# Patient Record
Sex: FEMALE | Race: WHITE
[De-identification: names, ages, dates, MRNs, and addresses within clinical notes are randomized per-mention and may not be internally consistent; named-entity substitution may affect disease eponyms.]

---
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
## 2021-01-05 ENCOUNTER — HOSPITAL ENCOUNTER (OUTPATIENT)
Dept: HOSPITAL 100 - WPOUT | Age: 21
Discharge: HOME | End: 2021-01-05
Payer: MEDICAID

## 2021-01-05 VITALS — DIASTOLIC BLOOD PRESSURE: 71 MMHG | HEART RATE: 93 BPM | SYSTOLIC BLOOD PRESSURE: 130 MMHG

## 2021-01-05 VITALS — DIASTOLIC BLOOD PRESSURE: 75 MMHG | SYSTOLIC BLOOD PRESSURE: 112 MMHG | TEMPERATURE: 98.78 F | HEART RATE: 78 BPM

## 2021-01-05 VITALS — TEMPERATURE: 98.42 F

## 2021-01-05 VITALS — BODY MASS INDEX: 24.2 KG/M2

## 2021-01-05 DIAGNOSIS — O60.00: Primary | ICD-10-CM

## 2021-01-05 DIAGNOSIS — Z3A.00: ICD-10-CM

## 2021-01-05 LAB
LEUKOCYTE ESTERASE UR QL STRIP: 500 /UL
MUCOUS THREADS URNS QL MICRO: (no result) /HPF
RBC UR QL: (no result) /HPF (ref 0–5)
SP GR UR: 1.01 (ref 1–1.03)
SQUAMOUS URNS QL MICRO: (no result) /HPF (ref 5–10)
URINE PRESERVATIVE: (no result)

## 2021-01-05 PROCEDURE — 81002 URINALYSIS NONAUTO W/O SCOPE: CPT

## 2021-01-05 PROCEDURE — 99218: CPT

## 2021-01-05 PROCEDURE — 59025 FETAL NON-STRESS TEST: CPT

## 2021-01-05 PROCEDURE — 87086 URINE CULTURE/COLONY COUNT: CPT

## 2021-01-05 PROCEDURE — 87088 URINE BACTERIA CULTURE: CPT

## 2021-01-05 PROCEDURE — G0378 HOSPITAL OBSERVATION PER HR: HCPCS

## 2021-01-05 PROCEDURE — 59050 FETAL MONITOR W/REPORT: CPT

## 2021-02-05 ENCOUNTER — HOSPITAL ENCOUNTER (OUTPATIENT)
Dept: HOSPITAL 100 - WPOUT | Age: 21
Discharge: HOME | End: 2021-02-05
Payer: MEDICAID

## 2021-02-05 VITALS — SYSTOLIC BLOOD PRESSURE: 97 MMHG | DIASTOLIC BLOOD PRESSURE: 59 MMHG | HEART RATE: 110 BPM

## 2021-02-05 VITALS — HEART RATE: 94 BPM | OXYGEN SATURATION: 94 %

## 2021-02-05 VITALS — OXYGEN SATURATION: 97 % | HEART RATE: 105 BPM

## 2021-02-05 VITALS — BODY MASS INDEX: 26.6 KG/M2 | BODY MASS INDEX: 27.2 KG/M2

## 2021-02-05 DIAGNOSIS — O60.00: Primary | ICD-10-CM

## 2021-02-05 DIAGNOSIS — Z3A.00: ICD-10-CM

## 2021-02-05 LAB — ROM INTERNAL CONTROL TEST: (no result)

## 2021-02-05 PROCEDURE — 84112 EVAL AMNIOTIC FLUID PROTEIN: CPT

## 2021-02-05 PROCEDURE — G0378 HOSPITAL OBSERVATION PER HR: HCPCS

## 2021-02-05 PROCEDURE — 59050 FETAL MONITOR W/REPORT: CPT

## 2021-02-05 PROCEDURE — 59025 FETAL NON-STRESS TEST: CPT

## 2021-02-05 PROCEDURE — 99218: CPT

## 2021-02-12 ENCOUNTER — HOSPITAL ENCOUNTER (OUTPATIENT)
Dept: HOSPITAL 100 - LABSPEC | Age: 21
Discharge: HOME | End: 2021-02-12
Payer: MEDICAID

## 2021-02-12 VITALS — BODY MASS INDEX: 27.6 KG/M2

## 2021-02-12 DIAGNOSIS — Z34.90: Primary | ICD-10-CM

## 2021-02-12 PROCEDURE — 87081 CULTURE SCREEN ONLY: CPT

## 2021-02-19 ENCOUNTER — HOSPITAL ENCOUNTER (OUTPATIENT)
Age: 21
Discharge: HOME | End: 2021-02-19
Payer: MEDICAID

## 2021-02-19 VITALS
TEMPERATURE: 98.24 F | DIASTOLIC BLOOD PRESSURE: 74 MMHG | HEART RATE: 91 BPM | SYSTOLIC BLOOD PRESSURE: 125 MMHG | OXYGEN SATURATION: 97 %

## 2021-02-19 VITALS — BODY MASS INDEX: 27.7 KG/M2 | BODY MASS INDEX: 27.6 KG/M2

## 2021-02-19 DIAGNOSIS — Z34.90: Primary | ICD-10-CM

## 2021-02-19 PROCEDURE — 59025 FETAL NON-STRESS TEST: CPT

## 2021-02-19 PROCEDURE — G0378 HOSPITAL OBSERVATION PER HR: HCPCS

## 2021-02-19 PROCEDURE — 99218: CPT

## 2021-02-19 PROCEDURE — 59050 FETAL MONITOR W/REPORT: CPT

## 2021-03-01 ENCOUNTER — HOSPITAL ENCOUNTER (INPATIENT)
Age: 21
LOS: 1 days | Discharge: HOME | DRG: 560 | End: 2021-03-02
Payer: MEDICAID

## 2021-03-01 VITALS — HEART RATE: 92 BPM | OXYGEN SATURATION: 99 %

## 2021-03-01 VITALS — TEMPERATURE: 97.88 F | OXYGEN SATURATION: 100 %

## 2021-03-01 VITALS — HEART RATE: 98 BPM | OXYGEN SATURATION: 97 %

## 2021-03-01 VITALS — HEART RATE: 88 BPM | OXYGEN SATURATION: 97 %

## 2021-03-01 VITALS — OXYGEN SATURATION: 100 % | HEART RATE: 118 BPM

## 2021-03-01 VITALS
DIASTOLIC BLOOD PRESSURE: 59 MMHG | SYSTOLIC BLOOD PRESSURE: 110 MMHG | OXYGEN SATURATION: 97 % | HEART RATE: 88 BPM | TEMPERATURE: 99.68 F

## 2021-03-01 VITALS — TEMPERATURE: 99.5 F | DIASTOLIC BLOOD PRESSURE: 71 MMHG | SYSTOLIC BLOOD PRESSURE: 102 MMHG | HEART RATE: 116 BPM

## 2021-03-01 VITALS — HEART RATE: 84 BPM | OXYGEN SATURATION: 97 %

## 2021-03-01 VITALS
DIASTOLIC BLOOD PRESSURE: 67 MMHG | OXYGEN SATURATION: 99 % | SYSTOLIC BLOOD PRESSURE: 115 MMHG | TEMPERATURE: 98.8 F | HEART RATE: 80 BPM

## 2021-03-01 VITALS — OXYGEN SATURATION: 97 % | HEART RATE: 94 BPM

## 2021-03-01 VITALS
TEMPERATURE: 97.8 F | SYSTOLIC BLOOD PRESSURE: 119 MMHG | HEART RATE: 104 BPM | RESPIRATION RATE: 16 BRPM | DIASTOLIC BLOOD PRESSURE: 77 MMHG

## 2021-03-01 VITALS — HEART RATE: 74 BPM | DIASTOLIC BLOOD PRESSURE: 88 MMHG | SYSTOLIC BLOOD PRESSURE: 131 MMHG

## 2021-03-01 VITALS — HEART RATE: 77 BPM | DIASTOLIC BLOOD PRESSURE: 71 MMHG | SYSTOLIC BLOOD PRESSURE: 115 MMHG | OXYGEN SATURATION: 100 %

## 2021-03-01 VITALS — OXYGEN SATURATION: 98 % | HEART RATE: 128 BPM

## 2021-03-01 VITALS
DIASTOLIC BLOOD PRESSURE: 85 MMHG | TEMPERATURE: 100.7 F | HEART RATE: 99 BPM | SYSTOLIC BLOOD PRESSURE: 131 MMHG | OXYGEN SATURATION: 99 %

## 2021-03-01 VITALS — SYSTOLIC BLOOD PRESSURE: 119 MMHG | TEMPERATURE: 98.24 F | DIASTOLIC BLOOD PRESSURE: 85 MMHG | HEART RATE: 81 BPM

## 2021-03-01 VITALS — HEART RATE: 94 BPM | OXYGEN SATURATION: 98 %

## 2021-03-01 VITALS
HEART RATE: 76 BPM | TEMPERATURE: 98.06 F | DIASTOLIC BLOOD PRESSURE: 68 MMHG | SYSTOLIC BLOOD PRESSURE: 105 MMHG | OXYGEN SATURATION: 100 %

## 2021-03-01 VITALS — HEART RATE: 99 BPM | OXYGEN SATURATION: 100 %

## 2021-03-01 VITALS — HEART RATE: 69 BPM | DIASTOLIC BLOOD PRESSURE: 84 MMHG | SYSTOLIC BLOOD PRESSURE: 121 MMHG

## 2021-03-01 VITALS
TEMPERATURE: 99.86 F | DIASTOLIC BLOOD PRESSURE: 76 MMHG | OXYGEN SATURATION: 97 % | SYSTOLIC BLOOD PRESSURE: 137 MMHG | HEART RATE: 101 BPM

## 2021-03-01 VITALS
HEART RATE: 96 BPM | OXYGEN SATURATION: 98 % | TEMPERATURE: 98.9 F | SYSTOLIC BLOOD PRESSURE: 116 MMHG | DIASTOLIC BLOOD PRESSURE: 68 MMHG

## 2021-03-01 VITALS — OXYGEN SATURATION: 98 % | HEART RATE: 135 BPM

## 2021-03-01 VITALS
HEART RATE: 82 BPM | OXYGEN SATURATION: 97 % | TEMPERATURE: 100.2 F | DIASTOLIC BLOOD PRESSURE: 70 MMHG | SYSTOLIC BLOOD PRESSURE: 122 MMHG

## 2021-03-01 VITALS — DIASTOLIC BLOOD PRESSURE: 86 MMHG | HEART RATE: 115 BPM | SYSTOLIC BLOOD PRESSURE: 131 MMHG | OXYGEN SATURATION: 99 %

## 2021-03-01 VITALS — HEART RATE: 117 BPM | OXYGEN SATURATION: 86 %

## 2021-03-01 VITALS — HEART RATE: 86 BPM | OXYGEN SATURATION: 99 % | DIASTOLIC BLOOD PRESSURE: 68 MMHG | SYSTOLIC BLOOD PRESSURE: 114 MMHG

## 2021-03-01 VITALS — OXYGEN SATURATION: 97 % | HEART RATE: 78 BPM

## 2021-03-01 VITALS — OXYGEN SATURATION: 100 % | HEART RATE: 81 BPM

## 2021-03-01 VITALS — DIASTOLIC BLOOD PRESSURE: 72 MMHG | HEART RATE: 86 BPM | SYSTOLIC BLOOD PRESSURE: 128 MMHG

## 2021-03-01 VITALS — HEART RATE: 103 BPM | OXYGEN SATURATION: 97 %

## 2021-03-01 VITALS — OXYGEN SATURATION: 100 % | HEART RATE: 86 BPM

## 2021-03-01 VITALS — HEART RATE: 84 BPM | SYSTOLIC BLOOD PRESSURE: 112 MMHG | DIASTOLIC BLOOD PRESSURE: 69 MMHG

## 2021-03-01 VITALS — HEART RATE: 114 BPM | OXYGEN SATURATION: 95 %

## 2021-03-01 VITALS — OXYGEN SATURATION: 97 % | HEART RATE: 83 BPM

## 2021-03-01 VITALS — SYSTOLIC BLOOD PRESSURE: 122 MMHG | HEART RATE: 76 BPM | DIASTOLIC BLOOD PRESSURE: 73 MMHG

## 2021-03-01 VITALS — HEART RATE: 89 BPM | DIASTOLIC BLOOD PRESSURE: 61 MMHG | SYSTOLIC BLOOD PRESSURE: 112 MMHG

## 2021-03-01 VITALS — HEART RATE: 88 BPM | OXYGEN SATURATION: 100 %

## 2021-03-01 VITALS — HEART RATE: 100 BPM | OXYGEN SATURATION: 98 %

## 2021-03-01 VITALS — HEART RATE: 85 BPM | OXYGEN SATURATION: 100 %

## 2021-03-01 VITALS
SYSTOLIC BLOOD PRESSURE: 116 MMHG | DIASTOLIC BLOOD PRESSURE: 73 MMHG | HEART RATE: 84 BPM | OXYGEN SATURATION: 100 % | TEMPERATURE: 98.96 F

## 2021-03-01 VITALS
HEART RATE: 71 BPM | TEMPERATURE: 97.8 F | SYSTOLIC BLOOD PRESSURE: 122 MMHG | DIASTOLIC BLOOD PRESSURE: 68 MMHG | OXYGEN SATURATION: 98 %

## 2021-03-01 VITALS — OXYGEN SATURATION: 97 % | HEART RATE: 78 BPM | SYSTOLIC BLOOD PRESSURE: 130 MMHG | DIASTOLIC BLOOD PRESSURE: 80 MMHG

## 2021-03-01 VITALS — HEART RATE: 122 BPM | OXYGEN SATURATION: 94 %

## 2021-03-01 VITALS — HEART RATE: 84 BPM | DIASTOLIC BLOOD PRESSURE: 65 MMHG | SYSTOLIC BLOOD PRESSURE: 109 MMHG

## 2021-03-01 VITALS — OXYGEN SATURATION: 97 % | HEART RATE: 111 BPM

## 2021-03-01 VITALS — HEART RATE: 114 BPM | OXYGEN SATURATION: 100 %

## 2021-03-01 VITALS — HEART RATE: 118 BPM | SYSTOLIC BLOOD PRESSURE: 118 MMHG | DIASTOLIC BLOOD PRESSURE: 62 MMHG

## 2021-03-01 VITALS
HEART RATE: 75 BPM | SYSTOLIC BLOOD PRESSURE: 122 MMHG | TEMPERATURE: 98.7 F | DIASTOLIC BLOOD PRESSURE: 62 MMHG | OXYGEN SATURATION: 98 %

## 2021-03-01 VITALS — HEART RATE: 101 BPM | OXYGEN SATURATION: 100 %

## 2021-03-01 VITALS — HEART RATE: 94 BPM | OXYGEN SATURATION: 97 %

## 2021-03-01 VITALS — TEMPERATURE: 98.06 F

## 2021-03-01 VITALS — OXYGEN SATURATION: 98 % | TEMPERATURE: 98.3 F

## 2021-03-01 VITALS — OXYGEN SATURATION: 99 % | HEART RATE: 114 BPM

## 2021-03-01 VITALS — SYSTOLIC BLOOD PRESSURE: 109 MMHG | DIASTOLIC BLOOD PRESSURE: 59 MMHG | HEART RATE: 96 BPM

## 2021-03-01 VITALS — TEMPERATURE: 100.76 F

## 2021-03-01 VITALS — BODY MASS INDEX: 27.8 KG/M2 | BODY MASS INDEX: 27.1 KG/M2

## 2021-03-01 VITALS — HEART RATE: 93 BPM | OXYGEN SATURATION: 99 %

## 2021-03-01 VITALS — OXYGEN SATURATION: 100 % | HEART RATE: 91 BPM

## 2021-03-01 VITALS — HEART RATE: 92 BPM | SYSTOLIC BLOOD PRESSURE: 142 MMHG | DIASTOLIC BLOOD PRESSURE: 98 MMHG

## 2021-03-01 VITALS — HEART RATE: 108 BPM | OXYGEN SATURATION: 96 %

## 2021-03-01 DIAGNOSIS — Z3A.39: ICD-10-CM

## 2021-03-01 DIAGNOSIS — O36.8130: Primary | ICD-10-CM

## 2021-03-01 LAB
AMPHET UR-MCNC: NEGATIVE NG/ML
BARBITURATE URINE VISTA: NEGATIVE
BENZODIAZEPINE URINE VISTA: NEGATIVE
COCAINE URINE VISTA: NEGATIVE
DEPRECATED RDW RBC: 43.7 FL (ref 35.1–43.9)
DRUG CONFIRMATION TO FOLLOW?: (no result)
ECSTACY URINE VISTA: NEGATIVE
ERYTHROCYTE [DISTWIDTH] IN BLOOD: 14.5 % (ref 11.6–14.6)
HCT VFR BLD AUTO: 33.4 % (ref 37–47)
HEMOGLOBIN: 10.1 G/DL (ref 12–15)
HGB BLD-MCNC: 10.1 G/DL (ref 12–15)
IMMATURE GRANULOCYTES COUNT: 0.16 X10^3/UL (ref 0–0)
MCV RBC: 83.1 FL (ref 81–99)
MEAN CORP HGB CONC: 30.2 G/DL (ref 32–36)
MEAN PLATELET VOL.: 13.3 FL (ref 6.2–12)
METHADONE URINE VISTA: NEGATIVE
NRBC FLAGGED BY ANALYZER: 0 % (ref 0–5)
PCP UR QL: NEGATIVE
PH UR: 6 [PH]
PLATELET # BLD: 206 K/MM3 (ref 150–450)
PLATELET COUNT: 206 K/MM3 (ref 150–450)
RBC # BLD AUTO: 4.02 M/MM3 (ref 4.2–5.4)
RBC DISTRIBUTION WIDTH CV: 14.5 % (ref 11.6–14.6)
RBC DISTRIBUTION WIDTH SD: 43.7 FL (ref 35.1–43.9)
THC URINE VISTA: NEGATIVE
WBC # BLD AUTO: 9.9 K/MM3 (ref 4.4–11)
WHITE BLOOD COUNT: 9.9 K/MM3 (ref 4.4–11)

## 2021-03-01 PROCEDURE — 86850 RBC ANTIBODY SCREEN: CPT

## 2021-03-01 PROCEDURE — 80307 DRUG TEST PRSMV CHEM ANLYZR: CPT

## 2021-03-01 PROCEDURE — 87426 SARSCOV CORONAVIRUS AG IA: CPT

## 2021-03-01 PROCEDURE — A4216 STERILE WATER/SALINE, 10 ML: HCPCS

## 2021-03-01 PROCEDURE — 86900 BLOOD TYPING SEROLOGIC ABO: CPT

## 2021-03-01 PROCEDURE — 99218: CPT

## 2021-03-01 PROCEDURE — 85025 COMPLETE CBC W/AUTO DIFF WBC: CPT

## 2021-03-01 PROCEDURE — 59050 FETAL MONITOR W/REPORT: CPT

## 2021-03-01 PROCEDURE — G0378 HOSPITAL OBSERVATION PER HR: HCPCS

## 2021-03-01 PROCEDURE — 86901 BLOOD TYPING SEROLOGIC RH(D): CPT

## 2021-03-01 PROCEDURE — 59025 FETAL NON-STRESS TEST: CPT

## 2021-03-02 VITALS
DIASTOLIC BLOOD PRESSURE: 78 MMHG | HEART RATE: 64 BPM | SYSTOLIC BLOOD PRESSURE: 124 MMHG | RESPIRATION RATE: 16 BRPM | TEMPERATURE: 97.2 F

## 2021-03-02 VITALS
SYSTOLIC BLOOD PRESSURE: 117 MMHG | TEMPERATURE: 97.88 F | DIASTOLIC BLOOD PRESSURE: 82 MMHG | HEART RATE: 91 BPM | RESPIRATION RATE: 16 BRPM

## 2021-03-02 VITALS
TEMPERATURE: 98.2 F | HEART RATE: 76 BPM | RESPIRATION RATE: 20 BRPM | SYSTOLIC BLOOD PRESSURE: 120 MMHG | DIASTOLIC BLOOD PRESSURE: 82 MMHG

## 2021-03-02 VITALS
DIASTOLIC BLOOD PRESSURE: 84 MMHG | SYSTOLIC BLOOD PRESSURE: 116 MMHG | TEMPERATURE: 97.16 F | HEART RATE: 78 BPM | RESPIRATION RATE: 16 BRPM

## 2024-02-09 ENCOUNTER — HOSPITAL ENCOUNTER (EMERGENCY)
Facility: HOSPITAL | Age: 24
Discharge: HOME | End: 2024-02-09
Attending: EMERGENCY MEDICINE
Payer: MEDICAID

## 2024-02-09 ENCOUNTER — APPOINTMENT (OUTPATIENT)
Dept: CARDIOLOGY | Facility: HOSPITAL | Age: 24
End: 2024-02-09
Payer: MEDICAID

## 2024-02-09 VITALS
SYSTOLIC BLOOD PRESSURE: 121 MMHG | WEIGHT: 113 LBS | OXYGEN SATURATION: 98 % | RESPIRATION RATE: 16 BRPM | DIASTOLIC BLOOD PRESSURE: 105 MMHG | TEMPERATURE: 97.5 F | HEART RATE: 81 BPM | BODY MASS INDEX: 22.82 KG/M2

## 2024-02-09 DIAGNOSIS — R11.2 NAUSEA AND VOMITING, UNSPECIFIED VOMITING TYPE: Primary | ICD-10-CM

## 2024-02-09 LAB
ALBUMIN SERPL BCP-MCNC: 5 G/DL (ref 3.4–5)
ALP SERPL-CCNC: 49 U/L (ref 33–110)
ALT SERPL W P-5'-P-CCNC: 11 U/L (ref 7–45)
ANION GAP SERPL CALC-SCNC: 23 MMOL/L (ref 10–20)
APPEARANCE UR: ABNORMAL
AST SERPL W P-5'-P-CCNC: 15 U/L (ref 9–39)
BACTERIA #/AREA URNS AUTO: ABNORMAL /HPF
BASOPHILS # BLD AUTO: 0.13 X10*3/UL (ref 0–0.1)
BASOPHILS NFR BLD AUTO: 1.1 %
BILIRUB SERPL-MCNC: 1.3 MG/DL (ref 0–1.2)
BILIRUB UR STRIP.AUTO-MCNC: NEGATIVE MG/DL
BUN SERPL-MCNC: 13 MG/DL (ref 6–23)
CALCIUM SERPL-MCNC: 10.1 MG/DL (ref 8.6–10.3)
CHLORIDE SERPL-SCNC: 102 MMOL/L (ref 98–107)
CO2 SERPL-SCNC: 18 MMOL/L (ref 21–32)
COLOR UR: YELLOW
CREAT SERPL-MCNC: 0.79 MG/DL (ref 0.5–1.05)
EGFRCR SERPLBLD CKD-EPI 2021: >90 ML/MIN/1.73M*2
EOSINOPHIL # BLD AUTO: 0.05 X10*3/UL (ref 0–0.7)
EOSINOPHIL NFR BLD AUTO: 0.4 %
ERYTHROCYTE [DISTWIDTH] IN BLOOD BY AUTOMATED COUNT: 12.3 % (ref 11.5–14.5)
FLUAV RNA RESP QL NAA+PROBE: NOT DETECTED
FLUBV RNA RESP QL NAA+PROBE: NOT DETECTED
GLUCOSE BLD MANUAL STRIP-MCNC: 129 MG/DL (ref 74–99)
GLUCOSE SERPL-MCNC: 123 MG/DL (ref 74–99)
GLUCOSE UR STRIP.AUTO-MCNC: NEGATIVE MG/DL
HCG UR QL IA.RAPID: NEGATIVE
HCT VFR BLD AUTO: 41.1 % (ref 36–46)
HGB BLD-MCNC: 14 G/DL (ref 12–16)
IMM GRANULOCYTES # BLD AUTO: 0.03 X10*3/UL (ref 0–0.7)
IMM GRANULOCYTES NFR BLD AUTO: 0.3 % (ref 0–0.9)
KETONES UR STRIP.AUTO-MCNC: ABNORMAL MG/DL
LEUKOCYTE ESTERASE UR QL STRIP.AUTO: ABNORMAL
LYMPHOCYTES # BLD AUTO: 1.82 X10*3/UL (ref 1.2–4.8)
LYMPHOCYTES NFR BLD AUTO: 15.9 %
MCH RBC QN AUTO: 29.6 PG (ref 26–34)
MCHC RBC AUTO-ENTMCNC: 34.1 G/DL (ref 32–36)
MCV RBC AUTO: 87 FL (ref 80–100)
MONOCYTES # BLD AUTO: 0.8 X10*3/UL (ref 0.1–1)
MONOCYTES NFR BLD AUTO: 7 %
MUCOUS THREADS #/AREA URNS AUTO: ABNORMAL /LPF
NEUTROPHILS # BLD AUTO: 8.59 X10*3/UL (ref 1.2–7.7)
NEUTROPHILS NFR BLD AUTO: 75.3 %
NITRITE UR QL STRIP.AUTO: NEGATIVE
NRBC BLD-RTO: 0 /100 WBCS (ref 0–0)
PH UR STRIP.AUTO: 5 [PH]
PLATELET # BLD AUTO: 347 X10*3/UL (ref 150–450)
POTASSIUM SERPL-SCNC: 3.7 MMOL/L (ref 3.5–5.3)
PROT SERPL-MCNC: 8.1 G/DL (ref 6.4–8.2)
PROT UR STRIP.AUTO-MCNC: ABNORMAL MG/DL
RBC # BLD AUTO: 4.73 X10*6/UL (ref 4–5.2)
RBC # UR STRIP.AUTO: NEGATIVE /UL
RBC #/AREA URNS AUTO: ABNORMAL /HPF
SARS-COV-2 RNA RESP QL NAA+PROBE: NOT DETECTED
SODIUM SERPL-SCNC: 139 MMOL/L (ref 136–145)
SP GR UR STRIP.AUTO: 1.03
SQUAMOUS #/AREA URNS AUTO: ABNORMAL /HPF
UROBILINOGEN UR STRIP.AUTO-MCNC: <2 MG/DL
WBC # BLD AUTO: 11.4 X10*3/UL (ref 4.4–11.3)
WBC #/AREA URNS AUTO: ABNORMAL /HPF

## 2024-02-09 PROCEDURE — 87086 URINE CULTURE/COLONY COUNT: CPT | Mod: SAMLAB | Performed by: EMERGENCY MEDICINE

## 2024-02-09 PROCEDURE — 81001 URINALYSIS AUTO W/SCOPE: CPT | Performed by: EMERGENCY MEDICINE

## 2024-02-09 PROCEDURE — 96361 HYDRATE IV INFUSION ADD-ON: CPT

## 2024-02-09 PROCEDURE — 36415 COLL VENOUS BLD VENIPUNCTURE: CPT | Performed by: EMERGENCY MEDICINE

## 2024-02-09 PROCEDURE — 82947 ASSAY GLUCOSE BLOOD QUANT: CPT | Mod: 59

## 2024-02-09 PROCEDURE — 85025 COMPLETE CBC W/AUTO DIFF WBC: CPT | Performed by: EMERGENCY MEDICINE

## 2024-02-09 PROCEDURE — 93005 ELECTROCARDIOGRAM TRACING: CPT

## 2024-02-09 PROCEDURE — 81025 URINE PREGNANCY TEST: CPT | Performed by: EMERGENCY MEDICINE

## 2024-02-09 PROCEDURE — 2500000004 HC RX 250 GENERAL PHARMACY W/ HCPCS (ALT 636 FOR OP/ED): Performed by: EMERGENCY MEDICINE

## 2024-02-09 PROCEDURE — 96365 THER/PROPH/DIAG IV INF INIT: CPT

## 2024-02-09 PROCEDURE — 96375 TX/PRO/DX INJ NEW DRUG ADDON: CPT

## 2024-02-09 PROCEDURE — 84075 ASSAY ALKALINE PHOSPHATASE: CPT | Performed by: EMERGENCY MEDICINE

## 2024-02-09 PROCEDURE — 87502 INFLUENZA DNA AMP PROBE: CPT | Performed by: EMERGENCY MEDICINE

## 2024-02-09 PROCEDURE — 99284 EMERGENCY DEPT VISIT MOD MDM: CPT | Mod: 25 | Performed by: EMERGENCY MEDICINE

## 2024-02-09 RX ORDER — MORPHINE SULFATE 4 MG/ML
4 INJECTION, SOLUTION INTRAMUSCULAR; INTRAVENOUS ONCE
Status: COMPLETED | OUTPATIENT
Start: 2024-02-09 | End: 2024-02-09

## 2024-02-09 RX ORDER — ONDANSETRON 4 MG/1
4 TABLET, ORALLY DISINTEGRATING ORAL EVERY 8 HOURS PRN
Qty: 30 TABLET | Refills: 0 | Status: SHIPPED | OUTPATIENT
Start: 2024-02-09

## 2024-02-09 RX ORDER — ONDANSETRON HYDROCHLORIDE 2 MG/ML
4 INJECTION, SOLUTION INTRAVENOUS ONCE
Status: COMPLETED | OUTPATIENT
Start: 2024-02-09 | End: 2024-02-09

## 2024-02-09 RX ORDER — HALOPERIDOL 5 MG/ML
2 INJECTION INTRAMUSCULAR ONCE
Status: COMPLETED | OUTPATIENT
Start: 2024-02-09 | End: 2024-02-09

## 2024-02-09 RX ADMIN — ONDANSETRON 4 MG: 2 INJECTION INTRAMUSCULAR; INTRAVENOUS at 14:59

## 2024-02-09 RX ADMIN — MORPHINE SULFATE 4 MG: 4 INJECTION, SOLUTION INTRAMUSCULAR; INTRAVENOUS at 15:06

## 2024-02-09 RX ADMIN — HALOPERIDOL LACTATE 2 MG: 5 INJECTION, SOLUTION INTRAMUSCULAR at 17:27

## 2024-02-09 RX ADMIN — SODIUM CHLORIDE 1000 ML: 9 INJECTION, SOLUTION INTRAVENOUS at 14:59

## 2024-02-09 RX ADMIN — PROMETHAZINE HYDROCHLORIDE 12.5 MG: 25 INJECTION INTRAMUSCULAR; INTRAVENOUS at 15:35

## 2024-02-09 ASSESSMENT — PAIN DESCRIPTION - LOCATION
LOCATION: ABDOMEN
LOCATION: ABDOMEN

## 2024-02-09 ASSESSMENT — PAIN - FUNCTIONAL ASSESSMENT
PAIN_FUNCTIONAL_ASSESSMENT: 0-10
PAIN_FUNCTIONAL_ASSESSMENT: 0-10

## 2024-02-09 ASSESSMENT — COLUMBIA-SUICIDE SEVERITY RATING SCALE - C-SSRS
2. HAVE YOU ACTUALLY HAD ANY THOUGHTS OF KILLING YOURSELF?: NO
2. HAVE YOU ACTUALLY HAD ANY THOUGHTS OF KILLING YOURSELF?: NO
1. IN THE PAST MONTH, HAVE YOU WISHED YOU WERE DEAD OR WISHED YOU COULD GO TO SLEEP AND NOT WAKE UP?: NO
1. IN THE PAST MONTH, HAVE YOU WISHED YOU WERE DEAD OR WISHED YOU COULD GO TO SLEEP AND NOT WAKE UP?: NO
6. HAVE YOU EVER DONE ANYTHING, STARTED TO DO ANYTHING, OR PREPARED TO DO ANYTHING TO END YOUR LIFE?: NO

## 2024-02-09 ASSESSMENT — PAIN SCALES - GENERAL
PAINLEVEL_OUTOF10: 7
PAINLEVEL_OUTOF10: 4

## 2024-02-09 ASSESSMENT — PAIN DESCRIPTION - PAIN TYPE: TYPE: ACUTE PAIN

## 2024-02-09 NOTE — ED PROVIDER NOTES
HPI   Chief Complaint   Patient presents with    Abdominal Pain     Pt to ED with vomiting started yesterday, c/o severe abd pain, dry heaving. Pt unsure if she had a seizure at home, pt's fiance was concerned she may have had one. Pt does not have history. Upon arrival pt was being carried by family, pt drowsy, clammy and pale. Pt able to answer questions.        Limitations to History: None    HPI: 23-year-old female presents with concern for abdominal pain and nausea as well as vomiting.  Present since yesterday morning.  Diffuse abdominal pain.  Aching in nature.  States that she also has lower extremity cramping.  Denies any fever, chills, urinary symptoms, vaginal bleeding or discharge.    ------------------------------------------------------------------------------------------------------------------------------------------  Physical Exam:    VS: As documented in the triage note and EMR flowsheet from this visit were reviewed.    Appearance: Alert. cooperative,  in no acute distress.   Skin: Intact,  dry skin, no lesions, rash, petechiae or purpura.   Eyes: PERRLA, EOMs intact,  Conjunctiva pink with no redness or exudates.   HENT: Normocephalic, atraumatic. Nares patent. No intraoral lesions.   Neck: Supple, without meningismus. Trachea at midline. No lymphadenopathy.  Pulmonary: Clear bilaterally with good chest wall excursion. No rales, rhonchi or wheezing. No accessory muscle use or stridor.  Cardiac: Tachycardic and regular rhythm, no rubs, murmurs, or gallops. No JVD, Carotids without bruits.  Abdomen: Abdomen is soft.  Diffuse abdominal tenderness.  Nondistended.  No palpable organomegaly.  No rebound or guarding.  No CVA tenderness. Nonsurgical abdomen  Genitourinary: Exam deferred.  Musculoskeletal: Full range of motion.  Pulses full and equal. No cyanosis, clubbing, or edema.  Psychiatric: Appropriate mood and affect.                            [place] Coma Scale Score: 15                      Patient History   Past Medical History:   Diagnosis Date    Supervision of other high risk pregnancies, first trimester 09/22/2020    High risk teen pregnancy in first trimester     Past Surgical History:   Procedure Laterality Date    APPENDECTOMY      TONSILLECTOMY       No family history on file.  Social History     Tobacco Use    Smoking status: Not on file    Smokeless tobacco: Not on file   Substance Use Topics    Alcohol use: Not on file    Drug use: Not on file       Physical Exam   ED Triage Vitals [02/09/24 1448]   Temperature Heart Rate Respirations BP   36.4 °C (97.5 °F) (!) 138 (!) 22 (!) 148/132      Pulse Ox Temp Source Heart Rate Source Patient Position   97 % Temporal -- Lying      BP Location FiO2 (%)     Right arm --       Physical Exam    ED Course & MDM   Diagnoses as of 02/09/24 1721   Nausea and vomiting, unspecified vomiting type       Medical Decision Making  Labs Reviewed  CBC WITH AUTO DIFFERENTIAL - Abnormal     WBC                           11.4 (*)               nRBC                          0.0                    RBC                           4.73                   Hemoglobin                    14.0                   Hematocrit                    41.1                   MCV                           87                     MCH                           29.6                   MCHC                          34.1                   RDW                           12.3                   Platelets                     347                    Neutrophils %                 75.3                   Immature Granulocytes %, Automated   0.3                    Lymphocytes %                 15.9                   Monocytes %                   7.0                    Eosinophils %                 0.4                    Basophils %                   1.1                    Neutrophils Absolute          8.59 (*)               Immature Granulocytes Absolute, Au*   0.03                   Lymphocytes Absolute           1.82                   Monocytes Absolute            0.80                   Eosinophils Absolute          0.05                   Basophils Absolute            0.13 (*)            COMPREHENSIVE METABOLIC PANEL - Abnormal     Glucose                       123 (*)                Sodium                        139                    Potassium                     3.7                    Chloride                      102                    Bicarbonate                   18 (*)                 Anion Gap                     23 (*)                 Urea Nitrogen                 13                     Creatinine                    0.79                   eGFR                          >90                    Calcium                       10.1                   Albumin                       5.0                    Alkaline Phosphatase          49                     Total Protein                 8.1                    AST                           15                     Bilirubin, Total              1.3 (*)                ALT                           11                  URINALYSIS WITH REFLEX CULTURE AND MICROSCOPIC - Abnormal     Color, Urine                  Yellow                 Appearance, Urine             Hazy (*)               Specific Gravity, Urine       1.030                  pH, Urine                     5.0                    Protein, Urine                100 (2+) (*)               Glucose, Urine                NEGATIVE                Blood, Urine                  NEGATIVE                Ketones, Urine                80 (2+) (*)               Bilirubin, Urine              NEGATIVE                Urobilinogen, Urine           <2.0                   Nitrite, Urine                NEGATIVE                Leukocyte Esterase, Urine     TRACE (*)            MICROSCOPIC ONLY, URINE - Abnormal     WBC, Urine                    1-5                    RBC, Urine                    1-2                    Squamous Epithelial  Cells, Urine                          Bacteria, Urine               1+ (*)                 Mucus, Urine                  2+                  POCT GLUCOSE - Abnormal     POCT Glucose                  129 (*)             HCG, URINE, QUALITATIVE - Normal     HCG, Urine                    NEGATIVE             SARS-COV-2 PCR - Normal     Coronavirus 2019, PCR                                    Narrative: This assay has received FDA Emergency Use Authorization (EUA) and is only authorized for the duration of time that circumstances exist to justify the authorization of the emergency use of in vitro diagnostic tests for the detection of SARS-CoV-2 virus and/or diagnosis of COVID-19 infection under section 564(b)(1) of the Act, 21 U.S.C. 360bbb-3(b)(1). This assay is an in vitro diagnostic nucleic acid amplification test for the qualitative detection of SARS-CoV-2 from nasopharyngeal specimens and has been validated for use at [facility]. Negative results do not preclude COVID-19 infections and should not be used as the sole basis for diagnosis, treatment, or other management decisions.                    INFLUENZA A AND B PCR - Normal     Flu A Result                                         Flu B Result                                             Narrative: This assay is an in vitro diagnostic multiplex nucleic acid amplification test for the detection and discrimination of Influenza A & B from nasopharyngeal specimens, and has been validated for use at [facility]. Negative results do not preclude Influenza A/B infections, and should not be used as the sole basis for diagnosis, treatment, or other management decisions. If Influenza A/B and RSV PCR results are negative, testing for Parainfluenza virus, Adenovirus and Metapneumovirus is routinely performed for [facility] pediatric oncology and intensive care inpatients, and is available on other patients by placing an add-on  request.  URINE CULTURE  URINALYSIS WITH REFLEX CULTURE AND MICROSCOPIC         Narrative: The following orders were created for panel order Urinalysis with Reflex Culture and Microscopic.                  Procedure                               Abnormality         Status                                     ---------                               -----------         ------                                     Urinalysis with Reflex C...[[id number]]  Abnormal            Final result                               Extra Urine Gray Tube[[id number]]                            In process                                                   Please view results for these tests on the individual orders.  EXTRA URINE GRAY TUBE  Medical Decision Making:    Patient actively vomiting upon arrival.  No recent surgery.  No recent antibiotics.  Tachycardic and hypertensive.  Patient initially treated with 1 L of normal saline, morphine, Zofran.  Pain resolved but continued to have nausea and vomiting.  Treated with intravenous Phenergan as well as 1 L of normal saline.  Lab work otherwise unremarkable.  Urine shows no evidence of infection or pregnancy.  Patient feeling much improved on reevaluation.  Will be treated with ODT Zofran and advised on continued oral hydration.  Patient did have some mild retching following and was given a dose of Haldol and feels much improved.  Stable at time of discharge.    Differential Diagnoses Considered: Gastroenteritis, nausea and vomiting, pregnancy, electrolyte abnormality, volume depletion    Escalation of Care:  Appropriate for discharge and follow-up with primary care.    Prescription Drug Consideration: Oral Zofran.    Diagnostic testing considered: [PERC, D-Dimer, PECARN, etc.]              Procedure  ECG 12 lead    Performed by: Te Snowden DO  Authorized by: Te Snowden DO    ECG interpreted by ED Physician in the absence of a cardiologist: yes    Comments:      EKG  interpreted by Dr. Te Martinez: Sinus tachycardia at a rate of 136 bpm.  AK interval 118 ms.  QTc of 469 ms.  No evidence of ST elevation or depression at this time.       Te Martinez,   02/09/24 5626

## 2024-02-10 LAB — HOLD SPECIMEN: NORMAL

## 2024-02-11 ENCOUNTER — HOSPITAL ENCOUNTER (EMERGENCY)
Facility: HOSPITAL | Age: 24
Discharge: HOME | End: 2024-02-11
Attending: EMERGENCY MEDICINE
Payer: MEDICAID

## 2024-02-11 VITALS
WEIGHT: 113 LBS | HEIGHT: 59 IN | SYSTOLIC BLOOD PRESSURE: 109 MMHG | OXYGEN SATURATION: 97 % | RESPIRATION RATE: 18 BRPM | HEART RATE: 100 BPM | TEMPERATURE: 99 F | BODY MASS INDEX: 22.78 KG/M2 | DIASTOLIC BLOOD PRESSURE: 73 MMHG

## 2024-02-11 DIAGNOSIS — R11.2 NAUSEA AND VOMITING, UNSPECIFIED VOMITING TYPE: ICD-10-CM

## 2024-02-11 DIAGNOSIS — R10.9 ABDOMINAL PAIN, UNSPECIFIED ABDOMINAL LOCATION: Primary | ICD-10-CM

## 2024-02-11 LAB
ALBUMIN SERPL BCP-MCNC: 4.6 G/DL (ref 3.4–5)
ALP SERPL-CCNC: 38 U/L (ref 33–110)
ALT SERPL W P-5'-P-CCNC: 10 U/L (ref 7–45)
ANION GAP SERPL CALC-SCNC: 18 MMOL/L (ref 10–20)
AST SERPL W P-5'-P-CCNC: 13 U/L (ref 9–39)
BACTERIA UR CULT: NORMAL
BASOPHILS # BLD AUTO: 0.1 X10*3/UL (ref 0–0.1)
BASOPHILS NFR BLD AUTO: 1.2 %
BILIRUB SERPL-MCNC: 0.8 MG/DL (ref 0–1.2)
BUN SERPL-MCNC: 7 MG/DL (ref 6–23)
CALCIUM SERPL-MCNC: 9 MG/DL (ref 8.6–10.3)
CHLORIDE SERPL-SCNC: 102 MMOL/L (ref 98–107)
CO2 SERPL-SCNC: 19 MMOL/L (ref 21–32)
CREAT SERPL-MCNC: 0.63 MG/DL (ref 0.5–1.05)
EGFRCR SERPLBLD CKD-EPI 2021: >90 ML/MIN/1.73M*2
EOSINOPHIL # BLD AUTO: 0.03 X10*3/UL (ref 0–0.7)
EOSINOPHIL NFR BLD AUTO: 0.3 %
ERYTHROCYTE [DISTWIDTH] IN BLOOD BY AUTOMATED COUNT: 12.4 % (ref 11.5–14.5)
GLUCOSE SERPL-MCNC: 85 MG/DL (ref 74–99)
HCT VFR BLD AUTO: 39 % (ref 36–46)
HGB BLD-MCNC: 13.1 G/DL (ref 12–16)
IMM GRANULOCYTES # BLD AUTO: 0.02 X10*3/UL (ref 0–0.7)
IMM GRANULOCYTES NFR BLD AUTO: 0.2 % (ref 0–0.9)
LIPASE SERPL-CCNC: 15 U/L (ref 9–82)
LYMPHOCYTES # BLD AUTO: 1.25 X10*3/UL (ref 1.2–4.8)
LYMPHOCYTES NFR BLD AUTO: 14.5 %
MCH RBC QN AUTO: 29.8 PG (ref 26–34)
MCHC RBC AUTO-ENTMCNC: 33.6 G/DL (ref 32–36)
MCV RBC AUTO: 89 FL (ref 80–100)
MONOCYTES # BLD AUTO: 0.64 X10*3/UL (ref 0.1–1)
MONOCYTES NFR BLD AUTO: 7.4 %
NEUTROPHILS # BLD AUTO: 6.56 X10*3/UL (ref 1.2–7.7)
NEUTROPHILS NFR BLD AUTO: 76.4 %
NRBC BLD-RTO: 0 /100 WBCS (ref 0–0)
PLATELET # BLD AUTO: 278 X10*3/UL (ref 150–450)
POTASSIUM SERPL-SCNC: 3.5 MMOL/L (ref 3.5–5.3)
PROT SERPL-MCNC: 6.9 G/DL (ref 6.4–8.2)
RBC # BLD AUTO: 4.39 X10*6/UL (ref 4–5.2)
SODIUM SERPL-SCNC: 135 MMOL/L (ref 136–145)
WBC # BLD AUTO: 8.6 X10*3/UL (ref 4.4–11.3)

## 2024-02-11 PROCEDURE — 83690 ASSAY OF LIPASE: CPT | Performed by: EMERGENCY MEDICINE

## 2024-02-11 PROCEDURE — 36415 COLL VENOUS BLD VENIPUNCTURE: CPT | Performed by: EMERGENCY MEDICINE

## 2024-02-11 PROCEDURE — 96372 THER/PROPH/DIAG INJ SC/IM: CPT

## 2024-02-11 PROCEDURE — 85025 COMPLETE CBC W/AUTO DIFF WBC: CPT | Performed by: EMERGENCY MEDICINE

## 2024-02-11 PROCEDURE — 96375 TX/PRO/DX INJ NEW DRUG ADDON: CPT

## 2024-02-11 PROCEDURE — 2500000004 HC RX 250 GENERAL PHARMACY W/ HCPCS (ALT 636 FOR OP/ED): Performed by: EMERGENCY MEDICINE

## 2024-02-11 PROCEDURE — 96361 HYDRATE IV INFUSION ADD-ON: CPT

## 2024-02-11 PROCEDURE — 96374 THER/PROPH/DIAG INJ IV PUSH: CPT

## 2024-02-11 PROCEDURE — 99284 EMERGENCY DEPT VISIT MOD MDM: CPT | Mod: 25 | Performed by: EMERGENCY MEDICINE

## 2024-02-11 PROCEDURE — 80053 COMPREHEN METABOLIC PANEL: CPT | Performed by: EMERGENCY MEDICINE

## 2024-02-11 RX ORDER — DICYCLOMINE HYDROCHLORIDE 10 MG/ML
20 INJECTION INTRAMUSCULAR ONCE
Status: COMPLETED | OUTPATIENT
Start: 2024-02-11 | End: 2024-02-11

## 2024-02-11 RX ORDER — DIPHENHYDRAMINE HYDROCHLORIDE 50 MG/ML
25 INJECTION INTRAMUSCULAR; INTRAVENOUS ONCE
Status: COMPLETED | OUTPATIENT
Start: 2024-02-11 | End: 2024-02-11

## 2024-02-11 RX ORDER — CAPSAICIN 0 G/G
CREAM TOPICAL ONCE
Status: DISCONTINUED | OUTPATIENT
Start: 2024-02-11 | End: 2024-02-11

## 2024-02-11 RX ORDER — HALOPERIDOL 5 MG/ML
2 INJECTION INTRAMUSCULAR ONCE
Status: COMPLETED | OUTPATIENT
Start: 2024-02-11 | End: 2024-02-11

## 2024-02-11 RX ORDER — DICYCLOMINE HYDROCHLORIDE 20 MG/1
20 TABLET ORAL
Qty: 120 TABLET | Refills: 0 | Status: SHIPPED | OUTPATIENT
Start: 2024-02-11 | End: 2025-02-10

## 2024-02-11 RX ORDER — HYOSCYAMINE SULFATE 0.125 MG
0.12 TABLET ORAL EVERY 4 HOURS PRN
Qty: 28 TABLET | Refills: 0 | Status: SHIPPED | OUTPATIENT
Start: 2024-02-11 | End: 2024-02-18

## 2024-02-11 RX ORDER — METOCLOPRAMIDE HYDROCHLORIDE 5 MG/ML
10 INJECTION INTRAMUSCULAR; INTRAVENOUS ONCE
Status: COMPLETED | OUTPATIENT
Start: 2024-02-11 | End: 2024-02-11

## 2024-02-11 RX ADMIN — DICYCLOMINE HYDROCHLORIDE 20 MG: 10 INJECTION INTRAMUSCULAR at 10:15

## 2024-02-11 RX ADMIN — SODIUM CHLORIDE 2000 ML: 9 INJECTION, SOLUTION INTRAVENOUS at 08:59

## 2024-02-11 RX ADMIN — DIPHENHYDRAMINE HYDROCHLORIDE 25 MG: 50 INJECTION, SOLUTION INTRAMUSCULAR; INTRAVENOUS at 10:15

## 2024-02-11 RX ADMIN — METOCLOPRAMIDE 10 MG: 5 INJECTION, SOLUTION INTRAMUSCULAR; INTRAVENOUS at 10:15

## 2024-02-11 RX ADMIN — HALOPERIDOL LACTATE 2 MG: 5 INJECTION, SOLUTION INTRAMUSCULAR at 09:03

## 2024-02-11 RX ADMIN — PROMETHAZINE HYDROCHLORIDE 12.5 MG: 25 INJECTION INTRAMUSCULAR; INTRAVENOUS at 10:45

## 2024-02-11 ASSESSMENT — COLUMBIA-SUICIDE SEVERITY RATING SCALE - C-SSRS
2. HAVE YOU ACTUALLY HAD ANY THOUGHTS OF KILLING YOURSELF?: NO
1. IN THE PAST MONTH, HAVE YOU WISHED YOU WERE DEAD OR WISHED YOU COULD GO TO SLEEP AND NOT WAKE UP?: NO
1. IN THE PAST MONTH, HAVE YOU WISHED YOU WERE DEAD OR WISHED YOU COULD GO TO SLEEP AND NOT WAKE UP?: NO
6. HAVE YOU EVER DONE ANYTHING, STARTED TO DO ANYTHING, OR PREPARED TO DO ANYTHING TO END YOUR LIFE?: NO
2. HAVE YOU ACTUALLY HAD ANY THOUGHTS OF KILLING YOURSELF?: NO

## 2024-02-11 ASSESSMENT — PAIN DESCRIPTION - DESCRIPTORS
DESCRIPTORS: SHARP
DESCRIPTORS: SHARP

## 2024-02-11 ASSESSMENT — PAIN - FUNCTIONAL ASSESSMENT: PAIN_FUNCTIONAL_ASSESSMENT: 0-10

## 2024-02-11 ASSESSMENT — PAIN DESCRIPTION - LOCATION: LOCATION: ABDOMEN

## 2024-02-11 ASSESSMENT — PAIN SCALES - GENERAL: PAINLEVEL_OUTOF10: 8

## 2024-02-11 ASSESSMENT — PAIN DESCRIPTION - PAIN TYPE: TYPE: ACUTE PAIN

## 2024-02-11 NOTE — ED PROVIDER NOTES
HPI   Chief Complaint   Patient presents with    Abdominal Pain     Pt to ED with c/o nausea and vomiting and epigastric pain since Tuesday. Pt seen here yesterday, then went to [facility] dx with colitis, bu tunable to stop feeling nauseous and dry heaving. Can't keep down meds or food/liquids       Limitations to History: None    HPI: 23-year-old female presents with concern for vomiting and abdominal pain.  Patient was seen here 2 days ago with intractable vomiting.  Patient was treated with Zofran, Phenergan, Haldol with resolution of her vomiting.  Patient began vomiting later that night and was seen by another emergency department where she was diagnosed with colitis.  Was placed on antibiotic therapy.  States that she continues to vomit.  States that she has periumbilical abdominal pain.  Aching in nature.  Denies any fever, chills, cough, vaginal bleeding or discharge.    Additional History Obtained from: Significant other at the bedside.    ------------------------------------------------------------------------------------------------------------------------------------------  Physical Exam:    VS: As documented in the triage note and EMR flowsheet from this visit were reviewed.    Appearance: Alert. cooperative,  in no acute distress.   Skin: Intact,  dry skin, no lesions, rash, petechiae or purpura.   Eyes: PERRLA, EOMs intact,  Conjunctiva pink with no redness or exudates.   HENT: Normocephalic, atraumatic. Nares patent. No intraoral lesions.   Neck: Supple, without meningismus. Trachea at midline. No lymphadenopathy.  Pulmonary: Clear bilaterally with good chest wall excursion. No rales, rhonchi or wheezing. No accessory muscle use or stridor.  Cardiac: Regular rate and rhythm, no rubs, murmurs, or gallops.   Abdomen: Abdomen is soft, nontender, and nondistended.  No palpable organomegaly.  No rebound or guarding.  No CVA tenderness. Nonsurgical abdomen.  Genitourinary: Exam deferred.  Musculoskeletal: Full  range of motion.  Pulses full and equal. No cyanosis, clubbing, or edema.  Psychiatric: Appropriate mood and affect.                            [place] Coma Scale Score: 15                     Patient History   Past Medical History:   Diagnosis Date    Supervision of other high risk pregnancies, first trimester 09/22/2020    High risk teen pregnancy in first trimester     Past Surgical History:   Procedure Laterality Date    APPENDECTOMY      TONSILLECTOMY       No family history on file.  Social History     Tobacco Use    Smoking status: Not on file    Smokeless tobacco: Not on file   Substance Use Topics    Alcohol use: Not on file    Drug use: Not on file       Physical Exam   ED Triage Vitals   Temperature Heart Rate Respirations BP   02/11/24 0843 02/11/24 0843 02/11/24 0843 02/11/24 0843   37.2 °C (99 °F) 93 18 118/72      Pulse Ox Temp Source Heart Rate Source Patient Position   02/11/24 0900 02/11/24 0843 02/11/24 0843 02/11/24 0843   100 % Temporal Monitor Lying      BP Location FiO2 (%)     02/11/24 0843 --     Left arm        Physical Exam    ED Course & MDM   Diagnoses as of 02/11/24 1138   Abdominal pain, unspecified abdominal location   Nausea and vomiting, unspecified vomiting type       Medical Decision Making  Labs Reviewed  COMPREHENSIVE METABOLIC PANEL - Abnormal     Glucose                       85                     Sodium                        135 (*)                Potassium                     3.5                    Chloride                      102                    Bicarbonate                   19 (*)                 Anion Gap                     18                     Urea Nitrogen                 7                      Creatinine                    0.63                   eGFR                          >90                    Calcium                       9.0                    Albumin                       4.6                    Alkaline Phosphatase          38                     Total  Protein                 6.9                    AST                           13                     Bilirubin, Total              0.8                    ALT                           10                  LIPASE - Normal     Lipase                        15                         Narrative: Venipuncture immediately after or during the administration of Metamizole may lead to falsely low results. Testing should be performed immediately prior to Metamizole dosing.  CBC WITH AUTO DIFFERENTIAL    Medical Decision Making:    Patient retching upon arrival.  No active vomiting.  Lab work within normal limits.  Patient treated with intravenous Haldol and 2 L normal saline.  Felt improved but then had continued retching and was treated with intravenous Benadryl, Reglan, IM Bentyl.  Patient continued to have retching and was treated with IM Phenergan.  Feels much improved following reevaluation.  Patient will be given Levsin for home as well as oral Bentyl.  Advised on follow-up with primary care.  Stable at time of discharge.    Differential Diagnoses Considered: Gastroenteritis, cannabinoid hyperemesis syndrome, UTI    Escalation of Care:  Appropriate for discharge and follow-up with primary care.    Prescription Drug Consideration: Oral Levsin and Bentyl          Procedure  Procedures     Te Snowden DO  02/11/24 1141

## 2024-02-12 LAB
ATRIAL RATE: 136 BPM
P AXIS: 83 DEGREES
P OFFSET: 205 MS
P ONSET: 161 MS
PR INTERVAL: 118 MS
Q ONSET: 220 MS
QRS COUNT: 22 BEATS
QRS DURATION: 76 MS
QT INTERVAL: 312 MS
QTC CALCULATION(BAZETT): 469 MS
QTC FREDERICIA: 409 MS
R AXIS: 91 DEGREES
T AXIS: 72 DEGREES
T OFFSET: 376 MS
VENTRICULAR RATE: 136 BPM

## 2024-02-14 ENCOUNTER — APPOINTMENT (OUTPATIENT)
Dept: CARDIOLOGY | Facility: HOSPITAL | Age: 24
End: 2024-02-14
Payer: MEDICAID

## 2024-02-14 ENCOUNTER — APPOINTMENT (OUTPATIENT)
Dept: RADIOLOGY | Facility: HOSPITAL | Age: 24
End: 2024-02-14
Payer: MEDICAID

## 2024-02-14 ENCOUNTER — HOSPITAL ENCOUNTER (EMERGENCY)
Facility: HOSPITAL | Age: 24
Discharge: HOME | End: 2024-02-14
Attending: EMERGENCY MEDICINE
Payer: MEDICAID

## 2024-02-14 VITALS
BODY MASS INDEX: 18.95 KG/M2 | HEART RATE: 78 BPM | TEMPERATURE: 98.4 F | DIASTOLIC BLOOD PRESSURE: 74 MMHG | WEIGHT: 94 LBS | OXYGEN SATURATION: 100 % | SYSTOLIC BLOOD PRESSURE: 121 MMHG | HEIGHT: 59 IN | RESPIRATION RATE: 16 BRPM

## 2024-02-14 DIAGNOSIS — R11.14 BILIOUS VOMITING WITH NAUSEA: Primary | ICD-10-CM

## 2024-02-14 LAB
ALBUMIN SERPL BCP-MCNC: 4.5 G/DL (ref 3.4–5)
ALP SERPL-CCNC: 39 U/L (ref 33–110)
ALT SERPL W P-5'-P-CCNC: 9 U/L (ref 7–45)
AMORPH CRY #/AREA UR COMP ASSIST: ABNORMAL /HPF
AMPHETAMINES UR QL SCN: ABNORMAL
ANION GAP SERPL CALC-SCNC: 17 MMOL/L (ref 10–20)
APPEARANCE UR: ABNORMAL
AST SERPL W P-5'-P-CCNC: 11 U/L (ref 9–39)
ATRIAL RATE: 75 BPM
BACTERIA #/AREA URNS AUTO: ABNORMAL /HPF
BARBITURATES UR QL SCN: ABNORMAL
BASOPHILS # BLD AUTO: 0.09 X10*3/UL (ref 0–0.1)
BASOPHILS NFR BLD AUTO: 1.2 %
BENZODIAZ UR QL SCN: ABNORMAL
BILIRUB SERPL-MCNC: 0.7 MG/DL (ref 0–1.2)
BILIRUB UR STRIP.AUTO-MCNC: NEGATIVE MG/DL
BUN SERPL-MCNC: 7 MG/DL (ref 6–23)
BZE UR QL SCN: ABNORMAL
CALCIUM SERPL-MCNC: 8.9 MG/DL (ref 8.6–10.3)
CANNABINOIDS UR QL SCN: ABNORMAL
CHLORIDE SERPL-SCNC: 99 MMOL/L (ref 98–107)
CO2 SERPL-SCNC: 21 MMOL/L (ref 21–32)
COLOR UR: YELLOW
CREAT SERPL-MCNC: 0.59 MG/DL (ref 0.5–1.05)
EGFRCR SERPLBLD CKD-EPI 2021: >90 ML/MIN/1.73M*2
EOSINOPHIL # BLD AUTO: 0.2 X10*3/UL (ref 0–0.7)
EOSINOPHIL NFR BLD AUTO: 2.7 %
ERYTHROCYTE [DISTWIDTH] IN BLOOD BY AUTOMATED COUNT: 12.4 % (ref 11.5–14.5)
FENTANYL+NORFENTANYL UR QL SCN: ABNORMAL
GLUCOSE SERPL-MCNC: 84 MG/DL (ref 74–99)
GLUCOSE UR STRIP.AUTO-MCNC: NEGATIVE MG/DL
HCG UR QL IA.RAPID: NEGATIVE
HCT VFR BLD AUTO: 39.4 % (ref 36–46)
HGB BLD-MCNC: 13.5 G/DL (ref 12–16)
HOLD SPECIMEN: NORMAL
IMM GRANULOCYTES # BLD AUTO: 0.04 X10*3/UL (ref 0–0.7)
IMM GRANULOCYTES NFR BLD AUTO: 0.5 % (ref 0–0.9)
KETONES UR STRIP.AUTO-MCNC: ABNORMAL MG/DL
LEUKOCYTE ESTERASE UR QL STRIP.AUTO: ABNORMAL
LYMPHOCYTES # BLD AUTO: 1.85 X10*3/UL (ref 1.2–4.8)
LYMPHOCYTES NFR BLD AUTO: 24.9 %
MAGNESIUM SERPL-MCNC: 1.79 MG/DL (ref 1.6–2.4)
MCH RBC QN AUTO: 29.8 PG (ref 26–34)
MCHC RBC AUTO-ENTMCNC: 34.3 G/DL (ref 32–36)
MCV RBC AUTO: 87 FL (ref 80–100)
MONOCYTES # BLD AUTO: 0.73 X10*3/UL (ref 0.1–1)
MONOCYTES NFR BLD AUTO: 9.8 %
MUCOUS THREADS #/AREA URNS AUTO: ABNORMAL /LPF
NEUTROPHILS # BLD AUTO: 4.52 X10*3/UL (ref 1.2–7.7)
NEUTROPHILS NFR BLD AUTO: 60.9 %
NITRITE UR QL STRIP.AUTO: NEGATIVE
NRBC BLD-RTO: 0 /100 WBCS (ref 0–0)
OPIATES UR QL SCN: ABNORMAL
OXYCODONE+OXYMORPHONE UR QL SCN: ABNORMAL
P AXIS: 77 DEGREES
P OFFSET: 203 MS
P ONSET: 161 MS
PCP UR QL SCN: ABNORMAL
PH UR STRIP.AUTO: 6 [PH]
PLATELET # BLD AUTO: 276 X10*3/UL (ref 150–450)
POTASSIUM SERPL-SCNC: 3.2 MMOL/L (ref 3.5–5.3)
PR INTERVAL: 116 MS
PROLACTIN SERPL-MCNC: 9.5 UG/L (ref 3–20)
PROT SERPL-MCNC: 6.6 G/DL (ref 6.4–8.2)
PROT UR STRIP.AUTO-MCNC: ABNORMAL MG/DL
Q ONSET: 219 MS
QRS COUNT: 13 BEATS
QRS DURATION: 78 MS
QT INTERVAL: 380 MS
QTC CALCULATION(BAZETT): 424 MS
QTC FREDERICIA: 409 MS
R AXIS: 80 DEGREES
RBC # BLD AUTO: 4.53 X10*6/UL (ref 4–5.2)
RBC # UR STRIP.AUTO: NEGATIVE /UL
RBC #/AREA URNS AUTO: ABNORMAL /HPF
SODIUM SERPL-SCNC: 134 MMOL/L (ref 136–145)
SP GR UR STRIP.AUTO: 1.02
SQUAMOUS #/AREA URNS AUTO: ABNORMAL /HPF
T AXIS: 67 DEGREES
T OFFSET: 409 MS
UROBILINOGEN UR STRIP.AUTO-MCNC: 4 MG/DL
VENTRICULAR RATE: 75 BPM
WBC # BLD AUTO: 7.4 X10*3/UL (ref 4.4–11.3)
WBC #/AREA URNS AUTO: ABNORMAL /HPF

## 2024-02-14 PROCEDURE — 80307 DRUG TEST PRSMV CHEM ANLYZR: CPT | Performed by: EMERGENCY MEDICINE

## 2024-02-14 PROCEDURE — 81003 URINALYSIS AUTO W/O SCOPE: CPT | Performed by: EMERGENCY MEDICINE

## 2024-02-14 PROCEDURE — 36415 COLL VENOUS BLD VENIPUNCTURE: CPT | Performed by: EMERGENCY MEDICINE

## 2024-02-14 PROCEDURE — 81025 URINE PREGNANCY TEST: CPT | Performed by: EMERGENCY MEDICINE

## 2024-02-14 PROCEDURE — 2500000004 HC RX 250 GENERAL PHARMACY W/ HCPCS (ALT 636 FOR OP/ED)

## 2024-02-14 PROCEDURE — 96361 HYDRATE IV INFUSION ADD-ON: CPT

## 2024-02-14 PROCEDURE — 96366 THER/PROPH/DIAG IV INF ADDON: CPT

## 2024-02-14 PROCEDURE — 84146 ASSAY OF PROLACTIN: CPT | Mod: SAMLAB | Performed by: EMERGENCY MEDICINE

## 2024-02-14 PROCEDURE — 93005 ELECTROCARDIOGRAM TRACING: CPT

## 2024-02-14 PROCEDURE — 87086 URINE CULTURE/COLONY COUNT: CPT | Mod: SAMLAB | Performed by: EMERGENCY MEDICINE

## 2024-02-14 PROCEDURE — 96365 THER/PROPH/DIAG IV INF INIT: CPT

## 2024-02-14 PROCEDURE — 99284 EMERGENCY DEPT VISIT MOD MDM: CPT | Mod: 25 | Performed by: EMERGENCY MEDICINE

## 2024-02-14 PROCEDURE — 85025 COMPLETE CBC W/AUTO DIFF WBC: CPT | Performed by: EMERGENCY MEDICINE

## 2024-02-14 PROCEDURE — 2500000004 HC RX 250 GENERAL PHARMACY W/ HCPCS (ALT 636 FOR OP/ED): Performed by: EMERGENCY MEDICINE

## 2024-02-14 PROCEDURE — 70450 CT HEAD/BRAIN W/O DYE: CPT | Performed by: RADIOLOGY

## 2024-02-14 PROCEDURE — 83735 ASSAY OF MAGNESIUM: CPT | Performed by: EMERGENCY MEDICINE

## 2024-02-14 PROCEDURE — 70450 CT HEAD/BRAIN W/O DYE: CPT

## 2024-02-14 PROCEDURE — 96375 TX/PRO/DX INJ NEW DRUG ADDON: CPT

## 2024-02-14 PROCEDURE — 80053 COMPREHEN METABOLIC PANEL: CPT | Performed by: EMERGENCY MEDICINE

## 2024-02-14 RX ORDER — POTASSIUM CHLORIDE 14.9 MG/ML
20 INJECTION INTRAVENOUS ONCE
Status: COMPLETED | OUTPATIENT
Start: 2024-02-14 | End: 2024-02-14

## 2024-02-14 RX ORDER — ONDANSETRON HYDROCHLORIDE 2 MG/ML
INJECTION, SOLUTION INTRAVENOUS
Status: COMPLETED
Start: 2024-02-14 | End: 2024-02-14

## 2024-02-14 RX ORDER — METOCLOPRAMIDE HYDROCHLORIDE 5 MG/ML
10 INJECTION INTRAMUSCULAR; INTRAVENOUS ONCE
Status: COMPLETED | OUTPATIENT
Start: 2024-02-14 | End: 2024-02-14

## 2024-02-14 RX ORDER — ONDANSETRON HYDROCHLORIDE 2 MG/ML
4 INJECTION, SOLUTION INTRAVENOUS ONCE
Status: COMPLETED | OUTPATIENT
Start: 2024-02-14 | End: 2024-02-14

## 2024-02-14 RX ORDER — PROMETHAZINE HYDROCHLORIDE 25 MG/1
25 SUPPOSITORY RECTAL EVERY 6 HOURS PRN
Qty: 12 SUPPOSITORY | Refills: 0 | Status: SHIPPED | OUTPATIENT
Start: 2024-02-14 | End: 2024-02-17

## 2024-02-14 RX ADMIN — SODIUM CHLORIDE 1000 ML: 9 INJECTION, SOLUTION INTRAVENOUS at 05:35

## 2024-02-14 RX ADMIN — METOCLOPRAMIDE 10 MG: 5 INJECTION, SOLUTION INTRAMUSCULAR; INTRAVENOUS at 03:39

## 2024-02-14 RX ADMIN — POTASSIUM CHLORIDE 20 MEQ: 14.9 INJECTION, SOLUTION INTRAVENOUS at 03:39

## 2024-02-14 RX ADMIN — ONDANSETRON HYDROCHLORIDE 4 MG: 2 INJECTION, SOLUTION INTRAVENOUS at 02:35

## 2024-02-14 RX ADMIN — ONDANSETRON 4 MG: 2 INJECTION, SOLUTION INTRAMUSCULAR; INTRAVENOUS at 02:35

## 2024-02-14 RX ADMIN — SODIUM CHLORIDE 1000 ML: 9 INJECTION, SOLUTION INTRAVENOUS at 02:20

## 2024-02-14 ASSESSMENT — PAIN DESCRIPTION - PAIN TYPE: TYPE: ACUTE PAIN

## 2024-02-14 ASSESSMENT — COLUMBIA-SUICIDE SEVERITY RATING SCALE - C-SSRS
2. HAVE YOU ACTUALLY HAD ANY THOUGHTS OF KILLING YOURSELF?: NO
6. HAVE YOU EVER DONE ANYTHING, STARTED TO DO ANYTHING, OR PREPARED TO DO ANYTHING TO END YOUR LIFE?: NO
1. IN THE PAST MONTH, HAVE YOU WISHED YOU WERE DEAD OR WISHED YOU COULD GO TO SLEEP AND NOT WAKE UP?: NO

## 2024-02-14 ASSESSMENT — PAIN DESCRIPTION - LOCATION: LOCATION: HEAD

## 2024-02-14 ASSESSMENT — PAIN DESCRIPTION - DESCRIPTORS: DESCRIPTORS: SORE

## 2024-02-14 ASSESSMENT — PAIN DESCRIPTION - ONSET: ONSET: SUDDEN

## 2024-02-14 ASSESSMENT — PAIN DESCRIPTION - FREQUENCY: FREQUENCY: CONSTANT/CONTINUOUS

## 2024-02-14 ASSESSMENT — PAIN SCALES - GENERAL: PAINLEVEL_OUTOF10: 4

## 2024-02-14 ASSESSMENT — PAIN - FUNCTIONAL ASSESSMENT: PAIN_FUNCTIONAL_ASSESSMENT: 0-10

## 2024-02-14 NOTE — ED PROVIDER NOTES
HPI   Chief Complaint   Patient presents with    Seizures     Pt comes in for seizure like activity. Pt friends states that she has been having multiple seizure. Pt reports being ill recently and have lot of emesis. Pt was recently at [facility] in [place] for a similar thing. Pt reports having a hx of seizures but no medications. Pt states she was given fluids and sodium bicarb at [facility]. Pt did signout AMA from [facility] due to feeling better.        Patient presents to the emergency department secondary to altered mentation.  She is here with friend to bring her in by private transport.  The patient has a history of seizures/syncope and was recently admitted at an outside hospital and signed herself out AGAINST MEDICAL ADVICE within the past 24 hours.  The medical record was reviewed.  This contributed directly to patient care.  The patient has frequent ER visits for nausea and vomiting with a diagnosis of cannabis hyperemesis.  She was seen here at our facility recently for similar.      History provided by:  Patient   used: No                        [place] Coma Scale Score: 15                     Patient History   Past Medical History:   Diagnosis Date    Anemia     Supervision of other high risk pregnancies, first trimester 09/22/2020    High risk teen pregnancy in first trimester     Past Surgical History:   Procedure Laterality Date    APPENDECTOMY      TONSILLECTOMY       No family history on file.  Social History     Tobacco Use    Smoking status: Never    Smokeless tobacco: Never   Vaping Use    Vaping Use: Never used   Substance Use Topics    Alcohol use: Never    Drug use: Yes     Types: Marijuana       Physical Exam   ED Triage Vitals [02/14/24 0209]   Temperature Heart Rate Respirations BP   36.9 °C (98.4 °F) 79 18 126/90      Pulse Ox Temp Source Heart Rate Source Patient Position   96 % Temporal Monitor Sitting      BP Location FiO2 (%)     Left arm --       Physical Exam  Vitals and  nursing note reviewed.   Constitutional:       General: She is not in acute distress.     Appearance: Normal appearance. She is normal weight. She is not ill-appearing, toxic-appearing or diaphoretic.      Comments: At the time of arrival patient is awake, alert, and able to answer all questions.  There is no evidence of loss of bowel or bladder continence.   HENT:      Head: Normocephalic and atraumatic.      Nose: Nose normal. No rhinorrhea.      Mouth/Throat:      Comments: No evidence of tongue biting.  Neck:      Comments: Trachea is midline  Cardiovascular:      Rate and Rhythm: Normal rate and regular rhythm.      Heart sounds: No murmur heard.  Pulmonary:      Effort: Pulmonary effort is normal.      Breath sounds: Normal breath sounds. No wheezing.   Abdominal:      General: Abdomen is flat. Bowel sounds are normal. There is no distension.      Palpations: Abdomen is soft.      Tenderness: There is no abdominal tenderness.   Musculoskeletal:         General: Normal range of motion.      Cervical back: Normal range of motion.   Skin:     General: Skin is warm and dry.      Findings: No rash.   Neurological:      General: No focal deficit present.      Mental Status: She is alert and oriented to person, place, and time. Mental status is at baseline.      Cranial Nerves: No cranial nerve deficit.   Psychiatric:         Mood and Affect: Mood normal.         Behavior: Behavior normal.         Thought Content: Thought content normal.         Judgment: Judgment normal.         ED Course & MDM   Diagnoses as of 02/14/24 0650   Bilious vomiting with nausea       Medical Decision Making  Twelve-lead EKG was interpreted by myself this was noted to contribute directly to patient care.  Studies noted reveal normal sinus rhythm at 75 bpm, normal axis, early R wave progression, no acute ischemic changes.    Patient had no altered mentation while here in the department.  She is noted to feel better after being hydrated.   Potassium was replaced.  Patient will be discharged with a prescription for Phenergan suppositories.  Referral to GI and primary care.  Patient states she has neurology referral already.  Strongly encouraged to stop abusing cannabis as this is likely at least partially responsible for her cyclic vomiting.  Advance diet as tolerated and return if worse        Procedure  Procedures     Khai Sanchez,   02/14/24 0699

## 2024-02-15 LAB — BACTERIA UR CULT: NORMAL

## 2024-02-20 ENCOUNTER — HOSPITAL ENCOUNTER (EMERGENCY)
Facility: HOSPITAL | Age: 24
Discharge: HOME | End: 2024-02-20
Attending: EMERGENCY MEDICINE
Payer: MEDICAID

## 2024-02-20 VITALS
HEIGHT: 59 IN | OXYGEN SATURATION: 100 % | BODY MASS INDEX: 18.95 KG/M2 | HEART RATE: 91 BPM | RESPIRATION RATE: 18 BRPM | TEMPERATURE: 98.2 F | SYSTOLIC BLOOD PRESSURE: 112 MMHG | WEIGHT: 94 LBS | DIASTOLIC BLOOD PRESSURE: 81 MMHG

## 2024-02-20 DIAGNOSIS — R11.2 NAUSEA AND VOMITING, UNSPECIFIED VOMITING TYPE: Primary | ICD-10-CM

## 2024-02-20 LAB
ALBUMIN SERPL BCP-MCNC: 4.9 G/DL (ref 3.4–5)
ALP SERPL-CCNC: 39 U/L (ref 33–110)
ALT SERPL W P-5'-P-CCNC: 16 U/L (ref 7–45)
ANION GAP SERPL CALC-SCNC: 17 MMOL/L (ref 10–20)
AST SERPL W P-5'-P-CCNC: 15 U/L (ref 9–39)
BASOPHILS # BLD MANUAL: 0.11 X10*3/UL (ref 0–0.1)
BASOPHILS NFR BLD MANUAL: 1 %
BILIRUB DIRECT SERPL-MCNC: 0.1 MG/DL (ref 0–0.3)
BILIRUB SERPL-MCNC: 0.6 MG/DL (ref 0–1.2)
BUN SERPL-MCNC: 4 MG/DL (ref 6–23)
CALCIUM SERPL-MCNC: 9.5 MG/DL (ref 8.6–10.3)
CHLORIDE SERPL-SCNC: 102 MMOL/L (ref 98–107)
CO2 SERPL-SCNC: 20 MMOL/L (ref 21–32)
CREAT SERPL-MCNC: 0.54 MG/DL (ref 0.5–1.05)
EGFRCR SERPLBLD CKD-EPI 2021: >90 ML/MIN/1.73M*2
EOSINOPHIL # BLD MANUAL: 0 X10*3/UL (ref 0–0.7)
EOSINOPHIL NFR BLD MANUAL: 0 %
ERYTHROCYTE [DISTWIDTH] IN BLOOD BY AUTOMATED COUNT: 13.6 % (ref 11.5–14.5)
GLUCOSE SERPL-MCNC: 97 MG/DL (ref 74–99)
HCT VFR BLD AUTO: 44.1 % (ref 36–46)
HGB BLD-MCNC: 14.9 G/DL (ref 12–16)
HOLD SPECIMEN: NORMAL
IMM GRANULOCYTES # BLD AUTO: 0.04 X10*3/UL (ref 0–0.7)
IMM GRANULOCYTES NFR BLD AUTO: 0.4 % (ref 0–0.9)
LIPASE SERPL-CCNC: 27 U/L (ref 9–82)
LYMPHOCYTES # BLD MANUAL: 0.95 X10*3/UL (ref 1.2–4.8)
LYMPHOCYTES NFR BLD MANUAL: 9 %
MCH RBC QN AUTO: 29.7 PG (ref 26–34)
MCHC RBC AUTO-ENTMCNC: 33.8 G/DL (ref 32–36)
MCV RBC AUTO: 88 FL (ref 80–100)
MONOCYTES # BLD MANUAL: 0.64 X10*3/UL (ref 0.1–1)
MONOCYTES NFR BLD MANUAL: 6 %
NEUTS SEG # BLD MANUAL: 8.9 X10*3/UL (ref 1.2–7)
NEUTS SEG NFR BLD MANUAL: 84 %
NRBC BLD-RTO: 0 /100 WBCS (ref 0–0)
PLATELET # BLD AUTO: 306 X10*3/UL (ref 150–450)
POTASSIUM SERPL-SCNC: 3.4 MMOL/L (ref 3.5–5.3)
PROT SERPL-MCNC: 7.4 G/DL (ref 6.4–8.2)
RBC # BLD AUTO: 5.01 X10*6/UL (ref 4–5.2)
RBC MORPH BLD: ABNORMAL
SODIUM SERPL-SCNC: 136 MMOL/L (ref 136–145)
TOTAL CELLS COUNTED BLD: 100
WBC # BLD AUTO: 10.6 X10*3/UL (ref 4.4–11.3)

## 2024-02-20 PROCEDURE — 83690 ASSAY OF LIPASE: CPT | Performed by: EMERGENCY MEDICINE

## 2024-02-20 PROCEDURE — 85007 BL SMEAR W/DIFF WBC COUNT: CPT | Performed by: EMERGENCY MEDICINE

## 2024-02-20 PROCEDURE — 99284 EMERGENCY DEPT VISIT MOD MDM: CPT | Mod: 25 | Performed by: EMERGENCY MEDICINE

## 2024-02-20 PROCEDURE — 82248 BILIRUBIN DIRECT: CPT | Performed by: EMERGENCY MEDICINE

## 2024-02-20 PROCEDURE — 2500000004 HC RX 250 GENERAL PHARMACY W/ HCPCS (ALT 636 FOR OP/ED): Performed by: EMERGENCY MEDICINE

## 2024-02-20 PROCEDURE — 96374 THER/PROPH/DIAG INJ IV PUSH: CPT | Performed by: EMERGENCY MEDICINE

## 2024-02-20 PROCEDURE — 96361 HYDRATE IV INFUSION ADD-ON: CPT | Performed by: EMERGENCY MEDICINE

## 2024-02-20 PROCEDURE — 36415 COLL VENOUS BLD VENIPUNCTURE: CPT | Performed by: EMERGENCY MEDICINE

## 2024-02-20 PROCEDURE — 85027 COMPLETE CBC AUTOMATED: CPT | Performed by: EMERGENCY MEDICINE

## 2024-02-20 RX ORDER — PROCHLORPERAZINE EDISYLATE 5 MG/ML
5 INJECTION INTRAMUSCULAR; INTRAVENOUS ONCE
Status: COMPLETED | OUTPATIENT
Start: 2024-02-20 | End: 2024-02-20

## 2024-02-20 RX ADMIN — SODIUM CHLORIDE 1000 ML: 9 INJECTION, SOLUTION INTRAVENOUS at 21:23

## 2024-02-20 RX ADMIN — PROCHLORPERAZINE EDISYLATE 5 MG: 5 INJECTION INTRAMUSCULAR; INTRAVENOUS at 21:23

## 2024-02-20 ASSESSMENT — PAIN DESCRIPTION - ONSET: ONSET: SUDDEN

## 2024-02-20 ASSESSMENT — COLUMBIA-SUICIDE SEVERITY RATING SCALE - C-SSRS
1. IN THE PAST MONTH, HAVE YOU WISHED YOU WERE DEAD OR WISHED YOU COULD GO TO SLEEP AND NOT WAKE UP?: NO
2. HAVE YOU ACTUALLY HAD ANY THOUGHTS OF KILLING YOURSELF?: NO
6. HAVE YOU EVER DONE ANYTHING, STARTED TO DO ANYTHING, OR PREPARED TO DO ANYTHING TO END YOUR LIFE?: NO

## 2024-02-20 ASSESSMENT — PAIN DESCRIPTION - FREQUENCY: FREQUENCY: CONSTANT/CONTINUOUS

## 2024-02-20 ASSESSMENT — PAIN - FUNCTIONAL ASSESSMENT: PAIN_FUNCTIONAL_ASSESSMENT: 0-10

## 2024-02-20 ASSESSMENT — PAIN DESCRIPTION - DESCRIPTORS
DESCRIPTORS: SHARP
DESCRIPTORS: SHARP

## 2024-02-20 ASSESSMENT — PAIN DESCRIPTION - PAIN TYPE: TYPE: ACUTE PAIN

## 2024-02-20 ASSESSMENT — PAIN SCALES - GENERAL: PAINLEVEL_OUTOF10: 8

## 2024-02-20 ASSESSMENT — PAIN DESCRIPTION - LOCATION: LOCATION: ABDOMEN

## 2024-02-21 NOTE — ED PROVIDER NOTES
Patient is a 23-year-old female who describes a history of gastroparesis and marijuana use presents with a chief complaint of abdominal pain nausea vomiting starting this morning.  She insist that her nausea and vomiting is not due to her marijuana use and that she does not have cannabis hyperemesis syndrome.  This is the patient's 10th visit in February.  It is currently February 20.         Review of Systems     Physical Exam  Vitals and nursing note reviewed.   Constitutional:       General: She is not in acute distress.     Appearance: She is well-developed.   HENT:      Head: Normocephalic and atraumatic.   Eyes:      Conjunctiva/sclera: Conjunctivae normal.   Cardiovascular:      Rate and Rhythm: Normal rate and regular rhythm.      Heart sounds: No murmur heard.  Pulmonary:      Effort: Pulmonary effort is normal. No respiratory distress.      Breath sounds: Normal breath sounds.   Abdominal:      Palpations: Abdomen is soft.      Tenderness: There is no abdominal tenderness.   Musculoskeletal:         General: No swelling.      Cervical back: Neck supple.   Skin:     General: Skin is warm and dry.      Capillary Refill: Capillary refill takes less than 2 seconds.   Neurological:      Mental Status: She is alert.   Psychiatric:         Mood and Affect: Mood normal.          Labs Reviewed   BASIC METABOLIC PANEL - Abnormal       Result Value    Glucose 97      Sodium 136      Potassium 3.4 (*)     Chloride 102      Bicarbonate 20 (*)     Anion Gap 17      Urea Nitrogen 4 (*)     Creatinine 0.54      eGFR >90      Calcium 9.5     MANUAL DIFFERENTIAL - Abnormal    Neutrophils %, Manual 84.0      Lymphocytes %, Manual 9.0      Monocytes %, Manual 6.0      Eosinophils %, Manual 0.0      Basophils %, Manual 1.0      Seg Neutrophils Absolute, Manual 8.90 (*)     Lymphocytes Absolute, Manual 0.95 (*)     Monocytes Absolute, Manual 0.64      Eosinophils Absolute, Manual 0.00      Basophils Absolute, Manual 0.11 (*)      Total Cells Counted 100      RBC Morphology No significant RBC morphology present     CBC WITH AUTO DIFFERENTIAL - Normal    WBC 10.6      nRBC 0.0      RBC 5.01      Hemoglobin 14.9      Hematocrit 44.1      MCV 88      MCH 29.7      MCHC 33.8      RDW 13.6      Platelets 306      Immature Granulocytes %, Automated 0.4      Immature Granulocytes Absolute, Automated 0.04      Narrative:     The previously reported component Neutrophils % is no longer being reported.  The previously reported component Lymphocytes % is no longer being reported.  The previously reported component Monocytes % is no longer being reported.  The previously   reported component Eosinophils % is no longer being reported.  The previously reported component Basophils % is no longer being reported.  The previously reported component Absolute Neutrophils is no longer being reported.  The previously reported   component Absolute Lymphocytes is no longer being reported.  The previously reported component Absolute Monocytes is no longer being reported.  The previously reported component Absolute Eosinophils is no longer being reported.  The previously reported   component Absolute Basophils is no longer being reported.   HEPATIC FUNCTION PANEL - Normal    Albumin 4.9      Bilirubin, Total 0.6      Bilirubin, Direct 0.1      Alkaline Phosphatase 39      ALT 16      AST 15      Total Protein 7.4     LIPASE - Normal    Lipase 27      Narrative:     Venipuncture immediately after or during the administration of Metamizole may lead to falsely low results. Testing should be performed immediately prior to Metamizole dosing.   GRAY TOP        No orders to display        Procedures     Medical Decision Making  23-year-old female with multiple ER visits this month alone presented with nausea vomiting abdominal pain.  She received a liter of fluids and 5 mg IVP Compazine and is feeling much better and request to be discharged home.  Electrolytes are  essentially unremarkable for acute findings.  Lipase and biliary numbers are normal.  She can be discharged.         Diagnoses as of 02/20/24 2211   Nausea and vomiting, unspecified vomiting type                    Vipul Wan MD  02/20/24 0948

## 2024-08-09 ENCOUNTER — HOSPITAL ENCOUNTER (OUTPATIENT)
Dept: HOSPITAL 100 - LABSPEC | Age: 24
Discharge: HOME | End: 2024-08-09
Payer: MEDICAID

## 2024-08-09 DIAGNOSIS — O09.90: ICD-10-CM

## 2024-08-09 DIAGNOSIS — Z3A.00: ICD-10-CM

## 2024-08-09 DIAGNOSIS — O99.891: ICD-10-CM

## 2024-08-09 DIAGNOSIS — N96: ICD-10-CM

## 2024-08-09 DIAGNOSIS — Z12.4: Primary | ICD-10-CM

## 2024-08-09 PROCEDURE — G0145 SCR C/V CYTO,THINLAYER,RESCR: HCPCS

## 2024-08-09 PROCEDURE — 87591 N.GONORRHOEAE DNA AMP PROB: CPT

## 2024-08-09 PROCEDURE — 87491 CHLMYD TRACH DNA AMP PROBE: CPT

## 2024-08-09 PROCEDURE — 87086 URINE CULTURE/COLONY COUNT: CPT

## 2024-08-09 PROCEDURE — 88175 CYTOPATH C/V AUTO FLUID REDO: CPT

## 2024-08-09 PROCEDURE — 87088 URINE BACTERIA CULTURE: CPT

## 2024-08-24 ENCOUNTER — HOSPITAL ENCOUNTER (EMERGENCY)
Age: 24
Discharge: HOME | End: 2024-08-24
Payer: MEDICAID

## 2024-08-24 VITALS
DIASTOLIC BLOOD PRESSURE: 69 MMHG | RESPIRATION RATE: 18 BRPM | OXYGEN SATURATION: 97 % | TEMPERATURE: 97.9 F | SYSTOLIC BLOOD PRESSURE: 110 MMHG | HEART RATE: 108 BPM

## 2024-08-24 VITALS
HEART RATE: 97 BPM | TEMPERATURE: 97.52 F | OXYGEN SATURATION: 100 % | RESPIRATION RATE: 16 BRPM | SYSTOLIC BLOOD PRESSURE: 105 MMHG | DIASTOLIC BLOOD PRESSURE: 75 MMHG

## 2024-08-24 VITALS
SYSTOLIC BLOOD PRESSURE: 106 MMHG | RESPIRATION RATE: 18 BRPM | HEART RATE: 65 BPM | DIASTOLIC BLOOD PRESSURE: 65 MMHG | OXYGEN SATURATION: 95 %

## 2024-08-24 VITALS — BODY MASS INDEX: 21.3 KG/M2

## 2024-08-24 DIAGNOSIS — F12.90: ICD-10-CM

## 2024-08-24 DIAGNOSIS — O99.321: ICD-10-CM

## 2024-08-24 DIAGNOSIS — Z3A.13: ICD-10-CM

## 2024-08-24 DIAGNOSIS — O21.9: Primary | ICD-10-CM

## 2024-08-24 LAB
ALANINE AMINOTRANSFER ALT/SGPT: 7 U/L (ref 13–56)
ALBUMIN SERPL-MCNC: 3.5 G/DL (ref 3.2–5)
ALKALINE PHOSPHATASE: 42 U/L (ref 45–117)
ANION GAP: 11 (ref 5–15)
AST(SGOT): 10 U/L (ref 15–37)
BUN SERPL-MCNC: 6 MG/DL (ref 7–18)
BUN/CREAT RATIO: 9 RATIO (ref 10–20)
CALCIUM SERPL-MCNC: 9.1 MG/DL (ref 8.5–10.1)
CARBON DIOXIDE: 18 MMOL/L (ref 21–32)
CHLORIDE: 104 MMOL/L (ref 98–107)
DEPRECATED RDW RBC: 43.7 FL (ref 35.1–43.9)
ERYTHROCYTE [DISTWIDTH] IN BLOOD: 13.3 % (ref 11.6–14.6)
EST GLOM FILT RATE - AFR AMER: 142 ML/MIN (ref 60–?)
ESTIMATED CREATININE CLEARANCE: 94.6 ML/MIN
GLOBULIN: 4.2 G/DL (ref 2.2–4.2)
GLUCOSE: 126 MG/DL (ref 74–106)
HCT VFR BLD AUTO: 39.9 % (ref 37–47)
HEMOGLOBIN: 13.2 G/DL (ref 12–15)
HGB BLD-MCNC: 13.2 G/DL (ref 12–15)
IMMATURE GRANULOCYTES COUNT: 0.07 X10^3/UL (ref 0–0)
KETONE-DIPSTICK: 150 MG/DL
LIPASE: 23 U/L (ref 13–75)
MCV RBC: 90.3 FL (ref 81–99)
MEAN CORP HGB CONC: 33.1 G/DL (ref 32–36)
MEAN PLATELET VOL.: 11.8 FL (ref 6.2–12)
MUCOUS THREADS URNS QL MICRO: (no result) /HPF
NRBC FLAGGED BY ANALYZER: 0 % (ref 0–5)
PLATELET # BLD: 253 K/MM3 (ref 150–450)
PLATELET COUNT: 253 K/MM3 (ref 150–450)
POTASSIUM: 3.8 MMOL/L (ref 3.5–5.1)
PROT UR QL STRIP.AUTO: 30 MG/DL
RBC # BLD AUTO: 4.42 M/MM3 (ref 4.2–5.4)
RBC DISTRIBUTION WIDTH CV: 13.3 % (ref 11.6–14.6)
RBC DISTRIBUTION WIDTH SD: 43.7 FL (ref 35.1–43.9)
RBC UR QL: (no result) /HPF (ref 0–5)
SP GR UR: 1.03 (ref 1–1.03)
SQUAMOUS URNS QL MICRO: (no result) /HPF (ref 5–10)
URINE PRESERVATIVE: (no result)
WBC # BLD AUTO: 12 K/MM3 (ref 4.4–11)
WHITE BLOOD COUNT: 12 K/MM3 (ref 4.4–11)

## 2024-08-24 PROCEDURE — A4216 STERILE WATER/SALINE, 10 ML: HCPCS

## 2024-08-24 PROCEDURE — 81001 URINALYSIS AUTO W/SCOPE: CPT

## 2024-08-24 PROCEDURE — 99283 EMERGENCY DEPT VISIT LOW MDM: CPT

## 2024-08-24 PROCEDURE — 96375 TX/PRO/DX INJ NEW DRUG ADDON: CPT

## 2024-08-24 PROCEDURE — 96374 THER/PROPH/DIAG INJ IV PUSH: CPT

## 2024-08-24 PROCEDURE — 87086 URINE CULTURE/COLONY COUNT: CPT

## 2024-08-24 PROCEDURE — 87088 URINE BACTERIA CULTURE: CPT

## 2024-08-24 PROCEDURE — 96361 HYDRATE IV INFUSION ADD-ON: CPT

## 2024-08-24 PROCEDURE — 83690 ASSAY OF LIPASE: CPT

## 2024-08-24 PROCEDURE — 85025 COMPLETE CBC W/AUTO DIFF WBC: CPT

## 2024-08-24 PROCEDURE — 80053 COMPREHEN METABOLIC PANEL: CPT

## 2024-08-24 NOTE — EDS_ITS
HPI    
History of Present Illness    
Chief Complaint: Nausea/Vomiting    
Narrative    
Narrative:     
23-year-old female  at approximately 13 weeks gestation presents with   
nausea and vomiting for the last 48 hours.  States she does use marijuana, but   
has not smoked in a while, but she has smoked during this pregnancy.  She states  
that she is currently dry heaving.  She was seen at [facility]   
emergency department yesterday evening was not sent home with medication.  She   
presents with her grandmother because of increased nausea and dry heaving.  She   
denies any vaginal bleeding or cramping.  No fevers but she states she feels   
cold.  She states she has not had problems with hyperemesis with this pregnancy.  
 Her OB/GYN is Dr. Natali Salcido.  She presents because of the continued   
nausea and vomiting and wanting Zofran.    
    
[facility]    
Medical History (Reviewed 24 @ 11:49 by Cleveland Locke MD)    
    
OCD (obsessive compulsive disorder)    
ADD (attention deficit disorder)    
Depression with anxiety    
bipolar    
    
    
                                Home Medications    
    
    
    
?Medication ?Instructions ?Recorded ?Last Taken ?Type    
     
docosahexaenoic acid 200 mg 200 mg PO DAILY 24 Unknown History    
    
capsule (Prenatal DHA)        
     
metoclopramide HCl 10 mg tablet 10 mg PO TID PRN PRN nausea and 24 Unknown  
Rx    
    
(Reglan) vomiting #20 tabs       
    
    
    
                                            
    
    
    
Allergy/AdvReac Type Severity Reaction Status Date / Time    
     
codeine Allergy  HALLUCINATI Verified 24 11:35    
    
   ONS      
     
polyethylene glycol 3350 AdvReac  Hives Verified 24 11:35    
    
(From Miralax)         
    
    
    
Family History (Reviewed 24 @ 11:49 by Cleveland Locke MD)    
Other   Colon cancer    
   Hypertension    
   Lung cancer    
   Ovarian cancer    
   Schizophrenia    
   Uterine cancer    
   bipolar    
    
    
    
Surgical History (Reviewed 24 @ 11:49 by Cleveland Locke MD)    
    
S/P tonsillectomy    
s/p finger surgery    
S/P appendectomy    
    
    
Social History (Reviewed 24 @ 11:49 by Cleveland Locke MD)    
adopted:  No     
household members:  family     
housing:  house     
number of children:  1     
current occupational status:  employed     
current occupation:  dancer     
current occupational exposures/hazards:  No     
pets and animals:  Yes pets and animals: cat(s) and dog(s)     
history of recent travel:  No     
sexually active:  Yes     
Smoking Status:  Never smoker     
alcohol intake:  never     
substance use type:  marijuana     
caffeine:  No     
what type of physical activity do you participate in:  aerobics     
frequency:  5-6 times per week     
seatbelt use:  never     
do you feel safe at home:  Yes     
additional social history:  Boyfriend- Alexander     
    
    
    
ROS    
ROS ED    
ROS Narrative    
Constitutional: No fever, no chills.    
HEENT: No sore throat.  No neck pain.  No loss of vision.  No rhinorrhea.    
Cardiovascular: No chest pain.  No palpitations.  No pedal edema.    
Respiratory: No cough, no shortness of breath.    
Abdominal: No abdominal pain.  Positive nausea and vomiting.    
Genitourinary: No dysuria.  No hematuria.  No vaginal bleeding or cramping.    
Musculoskeletal: No myalgias.  No arthralgias.    
Neurologic: No headaches.  No dizziness.  No lightheadedness.    
Skin: No rash.  No change in color.    
Psychiatric: No depression.  No anxiety.    
    
    
EXAM    
Physical Exam    
Narrative    
Exam Narrative:     
Afebrile.  Vital signs noted.    
HEENT: Normocephalic.  Atraumatic.  PERRL, EOMI.  Neck soft and supple. No point  
tenderness or step off.    
Cardiovascular: Regular rate and rhythm.  No murmurs, rubs, or gallops   
appreciated.    
Respiratory: No tachypnea.  Lungs clear to auscultation bilaterally.    
Gastrointestinal: Abdomen soft, nontender, with normoactive bowel sounds.  No   
rebound or guarding.    
Neurological: Awake. Alert. Nonfocal, nonlateralizing.    
Skin: No rash.  Normal color.  No pallor.    
Musculoskeletal: No pedal edema.  Full range of motion extremities.    
Const    
Vital Signs:     
    
                                            
    
    
    
 24    
11:35 24    
12:50    
     
Temperature 97.5 F L     
     
Temperature Source Temporal     
     
Pulse Rate 97 65    
     
Respiratory Rate 16 18    
     
Blood Pressure 105/75 106/65    
     
Blood Pressure Mean 85 78    
     
Pulse Ox 100 95    
     
Oxygen Delivery Method Room Air Room Air    
    
    
    
    
    
MDM    
MDM    
MDM Narrative    
Medical decision making narrative:     
Concerns for hyperemesis and dehydration.  Patient bolused normal saline   
intravenously and given Zofran intravenously.  She may have hyperemesis   
gravidarum versus cyclic vomiting from cannabis use.  I have low concern for   
ectopic pregnancy or miscarriage currently.  I will obtain a CBC and a CMP along  
with a lipase.  Urinalysis will also be obtained.    
    
I reviewed her laboratory work and she has slightly elevated white count of 12.0  
which may be demargination from her vomiting, hemoglobin normal at 13.2,   
hematocrit 39.3, platelet count normal at 253.  Sodium slightly low at 133.  She  
was bolused normal saline.  Potassium normal 3.8, chloride 104.  Glucose is   
appropriately elevated at 126 with an anion gap normal at 11.  AST is low at 10   
and ALT 7 with also a low alk phos.    
    
Although patient is not having pelvic pain or vaginal bleeding, she requested   
bedside ultrasound which was performed by myself independently.  There is   
evidence of fetal heart rate and activity.  Patient was reassured.    
    
I reviewed her urinalysis and she did have 150 ketones with 0-5 white cells but   
1+ bacteria.  This was sent for culture.  I then discussed patient with the   
nurse midwife for Dr. Natali Salcido.  They agree with refraining from   
marijuana use when possible and she will be given a prescription for Reglan to   
take up to 3 times a day, and follow-up with the OB/GYN.  Repeat examination at   
approximately 2 PM shows her to be resting comfortably after receiving Reglan.    
At this point in time, I feel she can be discharged safely home with follow-up   
as her liter of normal saline has been completed.  Return instructions to the   
emergency department were reviewed.  Disposition is discharged home in stable   
condition.    
    
    
History & Record Review    
Discussion w/independent historian: Patient    
Lab Data    
Attestation: I reviewed the patient's lab results.    
Labs:     
    
                         Laboratory Results - last 24 hr    
    
    
    
  24    
    
  11:55 13:10    
     
WBC  12.0 H     
     
RBC  4.42     
     
Hgb  13.2     
     
Hct  39.9     
     
MCV  90.3     
     
MCH  29.9     
     
MCHC  33.1     
     
RDW Std Deviation  43.7     
     
RDW Coeff of Ivan  13.3     
     
Plt Count  253     
     
MPV  11.8     
     
Immature Gran % (Auto)  0.600     
     
Neut % (Auto)  88.5 H     
     
Lymph % (Auto)  6.3 L     
     
Mono % (Auto)  4.0     
     
Eos % (Auto)  0.3     
     
Baso % (Auto)  0.3     
     
Absolute Neuts (auto)  10.6 H     
     
Absolute Lymphs (auto)  0.76 L     
     
Nucleated RBC %  0     
     
Sodium  133 L     
     
Potassium  3.8     
     
Chloride  104     
     
Carbon Dioxide  18.0 L     
     
Anion Gap  11     
     
BUN  6 L     
     
Creatinine  0.67     
     
Estim Creat Clear Calc  94.60     
     
Est GFR (MDRD) Af Amer  142     
     
Est GFR (MDRD) Non-Af  117     
     
BUN/Creatinine Ratio  9.0 L     
     
Glucose  126 H     
     
Calcium  9.1     
     
Total Bilirubin  0.40     
     
AST  10 L     
     
ALT  7 L     
     
Alkaline Phosphatase  42 L     
     
Total Protein  7.7     
     
Albumin  3.5     
     
Globulin  4.2     
     
Albumin/Globulin Ratio  0.8 L     
     
Lipase  23     
     
Urine Color   Yellow    
     
Urine Clarity   Clear    
     
Urine pH   6.0    
     
Ur Specific Gravity   1.030    
     
Urine Protein   30 H    
     
Urine Glucose (UA)   Normal    
     
Urine Ketones   150 A*    
     
Urine Occult Blood   Negative    
     
Urine Nitrite   Negative    
     
Urine Bilirubin   Negative    
     
Urine Urobilinogen   Normal    
     
Ur Leukocyte Esterase   Negative    
     
Urine RBC   0 SEEN    
     
Urine WBC   0-5 SEEN    
     
Ur Squamous Epith Cells   0 SEEN    
     
Urine Bacteria   1+    
     
Hyaline Casts   0-5 SEEN    
     
Urine Mucus   1+    
    
    
    
    
Management    
Discussion w/another healthcare provider: Consultant (Nurse midwife for Dr. Natali Salcido)    
    
Discharge Plan    
Triage    
Chief Complaint: Nausea/Vomiting    
    
ED Provider: Cleveland Locke    
    
Dx/Rx/DC Orders    
Clinical Impression:    
 Pregnancy, Nausea and vomiting during pregnancy    
    
    
Instructions:  ED Vomiting (Adult), ED Pregnancy Established ...    
    
Prescriptions:    
New    
  metoclopramide HCl [Reglan] 10 mg tablet     
   10 mg PO TID PRN PRN (Reason: nausea and vomiting) Qty: 20 0RF    
    
No Action    
  Prenatal  mg capsule     
   200 mg PO DAILY     
    
Primary Care Provider: Care Physician,No Primary    
    
Referrals:    
Natali Salcido MD [Med Staff - Active Staff] - As soon as possible    
Care Physician,No Primary [Primary Care Provider] -     
    
Activity Restrictions/Additional Instructions:    
Follow-up with your OB/GYN in the next few days.  Return with fever, new or   
worsening symptoms.  Refrain from marijuana use if possible during pregnancy.    
    
Print Language: English    
    
Disposition    
***Disposition***: Home, Self Care

## 2024-08-24 NOTE — [id number]
HPI
History of Present Illness
Chief Complaint: Nausea/Vomiting
Narrative
Narrative: 
23-year-old female  at approximately 13 weeks gestation presents with nausea and vomiting for the last 48 hours.  States she does use marijuana, but has not smoked in a while, but she has smoked during this pregnancy.  She states that she is 
currently dry heaving.  She was seen at [facility] emergency department yesterday evening was not sent home with medication.  She presents with her grandmother because of increased nausea and dry heaving.  She denies any vaginal bleeding 
or cramping.  No fevers but she states she feels cold.  She states she has not had problems with hyperemesis with this pregnancy.  Her OB/GYN is Dr. Natali Salcido.  She presents because of the continued nausea and vomiting and wanting Zofran.

[facility]
Medical History (Reviewed 24 @ 11:49 by Cleveland Locke MD)

OCD (obsessive compulsive disorder)
ADD (attention deficit disorder)
Depression with anxiety
bipolar


Home Medications

?Medication ?Instructions ?Recorded ?Last Taken ?Type
docosahexaenoic acid 200 mg 200 mg PO DAILY 24 Unknown History
capsule (Prenatal DHA)    
metoclopramide HCl 10 mg tablet 10 mg PO TID PRN PRN nausea and 24 Unknown Rx
(Reglan) vomiting #20 tabs   




Allergy/AdvReac Type Severity Reaction Status Date / Time
codeine Allergy  HALLUCINATI Verified 24 11:35
   ONS  
polyethylene glycol 3350 AdvReac  Hives Verified 24 11:35
(From Miralax)     


Family History (Reviewed 24 @ 11:49 by Cleveland Locke MD)
Other
 Colon cancer
 Hypertension
 Lung cancer
 Ovarian cancer
 Schizophrenia
 Uterine cancer
 bipolar



Surgical History (Reviewed 24 @ 11:49 by Cleveland Locke MD)

S/P tonsillectomy
s/p finger surgery
S/P appendectomy


Social History (Reviewed 24 @ 11:49 by Cleveland Locke MD)
adopted:  No 
household members:  family 
housing:  house 
number of children:  1 
current occupational status:  employed 
current occupation:  dancer 
current occupational exposures/hazards:  No 
pets and animals:  Yes pets and animals: cat(s) and dog(s) 
history of recent travel:  No 
sexually active:  Yes 
Smoking Status:  Never smoker 
alcohol intake:  never 
substance use type:  marijuana 
caffeine:  No 
what type of physical activity do you participate in:  aerobics 
frequency:  5-6 times per week 
seatbelt use:  never 
do you feel safe at home:  Yes 
additional social history:  Boyfriend- Alexander 



ROS
ROS ED
ROS Narrative
Constitutional: No fever, no chills.
HEENT: No sore throat.  No neck pain.  No loss of vision.  No rhinorrhea.
Cardiovascular: No chest pain.  No palpitations.  No pedal edema.
Respiratory: No cough, no shortness of breath.
Abdominal: No abdominal pain.  Positive nausea and vomiting.
Genitourinary: No dysuria.  No hematuria.  No vaginal bleeding or cramping.
Musculoskeletal: No myalgias.  No arthralgias.
Neurologic: No headaches.  No dizziness.  No lightheadedness.
Skin: No rash.  No change in color.
Psychiatric: No depression.  No anxiety.


EXAM
Physical Exam
Narrative
Exam Narrative: 
Afebrile.  Vital signs noted.
HEENT: Normocephalic.  Atraumatic.  PERRL, EOMI.  Neck soft and supple. No point tenderness or step off.
Cardiovascular: Regular rate and rhythm.  No murmurs, rubs, or gallops appreciated.
Respiratory: No tachypnea.  Lungs clear to auscultation bilaterally.
Gastrointestinal: Abdomen soft, nontender, with normoactive bowel sounds.  No rebound or guarding.
Neurological: Awake. Alert. Nonfocal, nonlateralizing.
Skin: No rash.  Normal color.  No pallor.
Musculoskeletal: No pedal edema.  Full range of motion extremities.
Const
Vital Signs: 



 24
11:35 24
12:50
Temperature 97.5 F L 
Temperature Source Temporal 
Pulse Rate 97 65
Respiratory Rate 16 18
Blood Pressure 105/75 106/65
Blood Pressure Mean 85 78
Pulse Ox 100 95
Oxygen Delivery Method Room Air Room Air




MDM
MDM
MDM Narrative
Medical decision making narrative: 
Concerns for hyperemesis and dehydration.  Patient bolused normal saline intravenously and given Zofran intravenously.  She may have hyperemesis gravidarum versus cyclic vomiting from cannabis use.  I have low concern for ectopic pregnancy or 
miscarriage currently.  I will obtain a CBC and a CMP along with a lipase.  Urinalysis will also be obtained.

I reviewed her laboratory work and she has slightly elevated white count of 12.0 which may be demargination from her vomiting, hemoglobin normal at 13.2, hematocrit 39.3, platelet count normal at 253.  Sodium slightly low at 133.  She was bolused 
normal saline.  Potassium normal 3.8, chloride 104.  Glucose is appropriately elevated at 126 with an anion gap normal at 11.  AST is low at 10 and ALT 7 with also a low alk phos.

Although patient is not having pelvic pain or vaginal bleeding, she requested bedside ultrasound which was performed by myself independently.  There is evidence of fetal heart rate and activity.  Patient was reassured.

I reviewed her urinalysis and she did have 150 ketones with 0-5 white cells but 1+ bacteria.  This was sent for culture.  I then discussed patient with the nurse midwife for Dr. Natali Salcido.  They agree with refraining from marijuana use when 
possible and she will be given a prescription for Reglan to take up to 3 times a day, and follow-up with the OB/GYN.  Repeat examination at approximately 2 PM shows her to be resting comfortably after receiving Reglan.  At this point in time, I feel 
she can be discharged safely home with follow-up as her liter of normal saline has been completed.  Return instructions to the emergency department were reviewed.  Disposition is discharged home in stable condition.


History & Record Review
Discussion w/independent historian: Patient
Lab Data
Attestation: I reviewed the patient's lab results.
Labs: 

Laboratory Results - last 24 hr

  24
  11:55 13:10
WBC  12.0 H 
RBC  4.42 
Hgb  13.2 
Hct  39.9 
MCV  90.3 
MCH  29.9 
MCHC  33.1 
RDW Std Deviation  43.7 
RDW Coeff of Ivan  13.3 
Plt Count  253 
MPV  11.8 
Immature Gran % (Auto)  0.600 
Neut % (Auto)  88.5 H 
Lymph % (Auto)  6.3 L 
Mono % (Auto)  4.0 
Eos % (Auto)  0.3 
Baso % (Auto)  0.3 
Absolute Neuts (auto)  10.6 H 
Absolute Lymphs (auto)  0.76 L 
Nucleated RBC %  0 
Sodium  133 L 
Potassium  3.8 
Chloride  104 
Carbon Dioxide  18.0 L 
Anion Gap  11 
BUN  6 L 
Creatinine  0.67 
Estim Creat Clear Calc  94.60 
Est GFR (MDRD) Af Amer  142 
Est GFR (MDRD) Non-Af  117 
BUN/Creatinine Ratio  9.0 L 
Glucose  126 H 
Calcium  9.1 
Total Bilirubin  0.40 
AST  10 L 
ALT  7 L 
Alkaline Phosphatase  42 L 
Total Protein  7.7 
Albumin  3.5 
Globulin  4.2 
Albumin/Globulin Ratio  0.8 L 
Lipase  23 
Urine Color   Yellow
Urine Clarity   Clear
Urine pH   6.0
Ur Specific Gravity   1.030
Urine Protein   30 H
Urine Glucose (UA)   Normal
Urine Ketones   150 A*
Urine Occult Blood   Negative
Urine Nitrite   Negative
Urine Bilirubin   Negative
Urine Urobilinogen   Normal
Ur Leukocyte Esterase   Negative
Urine RBC   0 SEEN
Urine WBC   0-5 SEEN
Ur Squamous Epith Cells   0 SEEN
Urine Bacteria   1+
Hyaline Casts   0-5 SEEN
Urine Mucus   1+



Management
Discussion w/another healthcare provider: Consultant (Nurse midwife for Dr. Natali Salcido)

Discharge Plan
Triage
Chief Complaint: Nausea/Vomiting

ED Provider: Cleveland Locke

Dx/Rx/DC Orders
Clinical Impression:
 Pregnancy, Nausea and vomiting during pregnancy


Instructions:  ED Vomiting (Adult), ED Pregnancy Established ...

Prescriptions:
New
  metoclopramide HCl [Reglan] 10 mg tablet 
   10 mg PO TID PRN PRN (Reason: nausea and vomiting) Qty: 20 0RF

No Action
  Prenatal  mg capsule 
   200 mg PO DAILY 

Primary Care Provider: Care Physician,No Primary

Referrals:
Natali Salcido MD [Med Staff - Active Staff] - As soon as possible
Care Physician,No Primary [Primary Care Provider] - 

Activity Restrictions/Additional Instructions:
Follow-up with your OB/GYN in the next few days.  Return with fever, new or worsening symptoms.  Refrain from marijuana use if possible during pregnancy.

Print Language: English

Disposition
***Disposition***: Home, Self Care

## 2024-08-25 ENCOUNTER — HOSPITAL ENCOUNTER (EMERGENCY)
Age: 24
Discharge: LEFT BEFORE DISCHARGE | End: 2024-08-25
Payer: SELF-PAY

## 2024-08-25 VITALS
DIASTOLIC BLOOD PRESSURE: 71 MMHG | OXYGEN SATURATION: 98 % | HEART RATE: 82 BPM | TEMPERATURE: 97.8 F | SYSTOLIC BLOOD PRESSURE: 105 MMHG | RESPIRATION RATE: 18 BRPM

## 2024-08-25 VITALS — BODY MASS INDEX: 20.6 KG/M2

## 2024-08-25 DIAGNOSIS — O99.322: ICD-10-CM

## 2024-08-25 DIAGNOSIS — O21.9: Primary | ICD-10-CM

## 2024-08-25 DIAGNOSIS — Z53.29: ICD-10-CM

## 2024-08-25 DIAGNOSIS — F12.10: ICD-10-CM

## 2024-08-25 DIAGNOSIS — Z3A.00: ICD-10-CM

## 2024-08-25 LAB
ANION GAP: 13 (ref 5–15)
BILIRUB UR QL STRIP: 1 MG/DL
BUN SERPL-MCNC: 6 MG/DL (ref 7–18)
BUN/CREAT RATIO: 12.6 RATIO (ref 10–20)
CALCIUM SERPL-MCNC: 9.2 MG/DL (ref 8.5–10.1)
CARBON DIOXIDE: 19 MMOL/L (ref 21–32)
CHLORIDE: 105 MMOL/L (ref 98–107)
DEPRECATED RDW RBC: 42 FL (ref 35.1–43.9)
ERYTHROCYTE [DISTWIDTH] IN BLOOD: 13.2 % (ref 11.6–14.6)
EST GLOM FILT RATE - AFR AMER: 208 ML/MIN (ref 60–?)
ESTIMATED CREATININE CLEARANCE: 132.05 ML/MIN
GLUCOSE: 105 MG/DL (ref 74–106)
HCT VFR BLD AUTO: 37.6 % (ref 37–47)
HEMOGLOBIN: 12.8 G/DL (ref 12–15)
HGB BLD-MCNC: 12.8 G/DL (ref 12–15)
IMMATURE GRANULOCYTES COUNT: 0.07 X10^3/UL (ref 0–0)
KETONE-DIPSTICK: 150 MG/DL
LEUKOCYTE ESTERASE UR QL STRIP: 25 /UL
MCV RBC: 86.8 FL (ref 81–99)
MEAN CORP HGB CONC: 34 G/DL (ref 32–36)
MEAN PLATELET VOL.: 12.4 FL (ref 6.2–12)
MUCOUS THREADS URNS QL MICRO: (no result) /HPF
NRBC FLAGGED BY ANALYZER: 0 % (ref 0–5)
PLATELET # BLD: 267 K/MM3 (ref 150–450)
PLATELET COUNT: 267 K/MM3 (ref 150–450)
POTASSIUM: 3.2 MMOL/L (ref 3.5–5.1)
PROT UR QL STRIP.AUTO: 30 MG/DL
RBC # BLD AUTO: 4.33 M/MM3 (ref 4.2–5.4)
RBC DISTRIBUTION WIDTH CV: 13.2 % (ref 11.6–14.6)
RBC DISTRIBUTION WIDTH SD: 42 FL (ref 35.1–43.9)
RBC UR QL: (no result) /HPF (ref 0–5)
SP GR UR: 1.02 (ref 1–1.03)
SQUAMOUS URNS QL MICRO: (no result) /HPF (ref 5–10)
URINE PRESERVATIVE: (no result)
WBC # BLD AUTO: 13.2 K/MM3 (ref 4.4–11)
WHITE BLOOD COUNT: 13.2 K/MM3 (ref 4.4–11)

## 2024-08-25 PROCEDURE — A4216 STERILE WATER/SALINE, 10 ML: HCPCS

## 2024-08-25 PROCEDURE — 96361 HYDRATE IV INFUSION ADD-ON: CPT

## 2024-08-25 PROCEDURE — 81001 URINALYSIS AUTO W/SCOPE: CPT

## 2024-08-25 PROCEDURE — 80048 BASIC METABOLIC PNL TOTAL CA: CPT

## 2024-08-25 PROCEDURE — 96374 THER/PROPH/DIAG INJ IV PUSH: CPT

## 2024-08-25 PROCEDURE — 99283 EMERGENCY DEPT VISIT LOW MDM: CPT

## 2024-08-25 PROCEDURE — 85025 COMPLETE CBC W/AUTO DIFF WBC: CPT

## 2024-08-25 NOTE — ED.RN
BATHROOM CALL LIGHT WENT OFF, THIS NURSE WENT TO THE BATHROOM, PT WAS ON THE FLOOR LAYING DOWN. PT STATES  I DIDN'T FALL, I SAT DOWN.  PT ESCORTED BACK TO HER ROOM BY THIS RN. PATIENT DENIES DIZZINESS AND WEAKNESS.

## 2024-08-25 NOTE — [id number]
HPI
<ABBEY Flores - Last Filed: 08/25/24 17:31>
History of Present Illness
Chief Complaint: Nausea/Vomiting
Narrative
Narrative: 
Patient presenting today due to nausea and vomiting.  She reports that she is currently 13 to 15 weeks pregnant and is G4, P1.  She follows with Dr. Salcido.  She reports that over the past several days she has had increased nausea and vomiting. 
 This is her third time being seen in the emergency department.  She was here yesterday and was given a prescription for Reglan, her vomiting was under control when she left the ED.  However, today she began vomiting again.  She did try to take a 
Zofran but is still feeling nauseous.  She does admit to a history of marijuana use, she reports that she has not used in about 1 week.  She denies fever, chills,  vaginal bleeding, and urinary symptoms.  She reports she has had pain across her 
abdomen over the past 3 days from vomiting that has not changed.

[facility]
<ABBEY Flores - Last Filed: 08/25/24 17:31>
[facility]
Medical History (Reviewed 08/25/24 @ 17:28 by ABBEY Flores)

OCD (obsessive compulsive disorder)
ADD (attention deficit disorder)
Depression with anxiety
bipolar


Home Medications

?Medication ?Instructions ?Recorded ?Last Taken ?Type
docosahexaenoic acid 200 mg 200 mg PO DAILY 08/09/24 Unknown History
capsule (Prenatal DHA)    
metoclopramide HCl 10 mg tablet 10 mg PO TID PRN PRN nausea and 08/24/24 Unknown Rx
(Reglan) vomiting #20 tabs   




Allergy/AdvReac Type Severity Reaction Status Date / Time
codeine Allergy  HALLUCINATI Verified 08/25/24 16:01
   ONS  
polyethylene glycol 3350 AdvReac  Hives Verified 08/25/24 16:01
(From Miralax)     


Family History (Reviewed 08/25/24 @ 17:28 by ABBEY Flores)
Other
 Colon cancer
 Hypertension
 Lung cancer
 Ovarian cancer
 Schizophrenia
 Uterine cancer
 bipolar



Surgical History (Reviewed 08/25/24 @ 17:28 by ABBEY Flores)

S/P tonsillectomy
s/p finger surgery
S/P appendectomy


Social History (Reviewed 08/25/24 @ 17:28 by ABBEY Flores)
adopted:  No 
household members:  family 
housing:  house 
number of children:  1 
current occupational status:  employed 
current occupation:  dancer 
current occupational exposures/hazards:  No 
pets and animals:  Yes pets and animals: cat(s) and dog(s) 
history of recent travel:  No 
sexually active:  Yes 
Smoking Status:  Never smoker 
alcohol intake:  never 
substance use type:  marijuana 
caffeine:  No 
what type of physical activity do you participate in:  aerobics 
frequency:  5-6 times per week 
seatbelt use:  never 
do you feel safe at home:  Yes 
additional social history:  Boyfriend- Alexander 



ROS
<ABBEY Flores - Last Filed: 08/25/24 17:31>
[facility] ED
Constitutional
Constitutional ED: Denies chills or fever(s)
Cardiovascular
Cardiovascular: Denies chest pain
Respiratory/Chest
Respiratory/Chest: Denies cough or dyspnea
Gastrointestinal
Gastrointestinal: Reports abdominal pain, nausea and vomiting; Denies constipation or diarrhea
Genitourinary
Genitourinary ED: Denies dysuria, hematuria or urinary urgency
Musculoskeletal
Musculoskeletal: Denies arthralgias or myalgias
Integumentary
Denies rash
Neurologic
Neurologic: Denies weakness

EXAM
<ABBEY Flores - Last Filed: 08/25/24 17:31>
Physical Exam
Const
Vital Signs: 



 08/25/24
16:01
Temperature 97.8 F
Temperature Source Temporal
Pulse Rate 82
Respiratory Rate 18
Blood Pressure 105/71
Blood Pressure Mean 82
Pulse Ox 98
Oxygen Delivery Method Room Air



Positive well nourished, well developed and no apparent distress
General Appearance ED: well developed
HEENT
Reports normocephalic and head/scalp atraumatic
Mouth ED: Yes moist mucous membranes normal
Eyes
PERRL and EOMs intact bilaterally
Neck
full ROM and supple
Chest Wall
inspection of chest normal
Resp
normal respiratory effort and clear to auscultation bilaterally
Cardio
regular rate and regular rhythm
GI
soft to palpation, non-tender, non-distended and no masses
Back/Spine
normal ROM and normal to inspection
Extremity
normal to inspection and full ROM
Neuro
oriented x3, CN's II-XII intact bilaterally, moves all extremities, no focal motor deficits and no sensory deficits noted
Sensorium / Orientation: awake and alert
Psych
mental status grossly normal and thought process normal
Skin
no rashes or lesions noted and no wounds

<Dr. Parish Gallagher DO - Last Filed: 08/25/24 18:37>
Physical Exam
Const
Vital Signs: 



 08/25/24
16:01
Temperature 97.8 F
Temperature Source Temporal
Pulse Rate 82
Respiratory Rate 18
Blood Pressure 105/71
Blood Pressure Mean 82
Pulse Ox 98
Oxygen Delivery Method Room Air




[facility]
<ABBEY Flores - Last Filed: 08/25/24 17:31>
[facility] Narrative
Medical decision making narrative: 
Patient presenting due to nausea and vomiting she has had over the last several days.  This is her third time being seen in the ED for this.  She was seen here yesterday and was given Reglan, her vomiting was under control until this morning when 
she began vomiting again.  Her abdomen is soft and nontender.  Labs will be obtained to assess for electrolyte abnormality, leukocytosis, and UTI.  Patient was given IV fluids and Reglan.  Her WBC is 13.2, likely due to vomiting, potassium is 3.2, 
patient did request to be discharged prior to receiving her urine results or potassium replacement.  She will be leaving AMA, her  has to leave and she needs to be at home with her daughter.  She does understand the risks of leaving and is 
capable of making this decision.  Strict return instructions were given.
Lab Data
Attestation: I reviewed the patient's lab results.
Labs: 

Laboratory Results - last 24 hr

  08/25/24 08/25/24
  16:29 16:49
WBC  13.2 H 
RBC  4.33 
Hgb  12.8 
Hct  37.6 
MCV  86.8 
MCH  29.6 
MCHC  34.0 
RDW Std Deviation  42.0 
RDW Coeff of Ivan  13.2 
Plt Count  267 
MPV  12.4 H 
Immature Gran % (Auto)  0.500 
Neut % (Auto)  83.2 H 
Lymph % (Auto)  9.0 L 
Mono % (Auto)  6.8 
Eos % (Auto)  0.2 
Baso % (Auto)  0.3 
Absolute Neuts (auto)  11.0 H 
Absolute Lymphs (auto)  1.18 
Nucleated RBC %  0 
Sodium  137 
Potassium  3.2 L 
Chloride  105 
Carbon Dioxide  19.0 L 
Anion Gap  13 
BUN  6 L 
Creatinine  0.48 L 
Estim Creat Clear Calc  132.05 
Est GFR (MDRD) Af Amer  208 
Est GFR (MDRD) Non-Af  172 
BUN/Creatinine Ratio  12.6 
Glucose  105 
Calcium  9.2 
Urine Color   Yellow
Urine Clarity   Sl. Cloudy
Urine pH   6.0
Ur Specific Gravity   1.025
Urine Protein   30 H
Urine Glucose (UA)   Normal
Urine Ketones   150 A*
Urine Occult Blood   Negative
Urine Nitrite   Negative
Urine Bilirubin   1 H
Urine Urobilinogen   1 H
Ur Leukocyte Esterase   25 H
Urine RBC   0-5 SEEN
Urine WBC   5-10 SEEN
Ur Squamous Epith Cells   5-10 SEEN
Urine Bacteria   1+
Urine Mucus   0 SEEN




<Dr. Parish Gallagher, DO - Last Filed: 08/25/24 18:37>
[facility] Narrative
Medical decision making narrative: 
Patient presenting due to nausea and vomiting she has had over the last several days.  This is her third time being seen in the ED for this.  She was seen here yesterday and was given Reglan, her vomiting was under control until this morning when 
she began vomiting again.  Her abdomen is soft and nontender.  Labs will be obtained to assess for electrolyte abnormality, leukocytosis, and UTI.  Patient was given IV fluids and Reglan.  Her WBC is 13.2, likely due to vomiting, potassium is 3.2, 
patient did request to be discharged prior to receiving her urine results or potassium replacement.  She will be leaving AMA, her  has to leave and she needs to be at home with her daughter.  She does understand the risks of leaving and is 
capable of making this decision.  Strict return instructions were given.




I have personally performed a face to face assessment of the patient and have reviewed the SHERICE note.  I personally made/approved the management plan and take responsibility for the patient management.  I performed a substantive portion of the visit 
including all aspects of the following.  My key findings include: G4, P1 reports 15-week gestation positive pregnancy at 13 weeks seen in her OB office followed by Dr. Salcido.  Nausea and vomiting last 2 days.  History of hyperemesis gravidarum 
with her other pregnancies.  No vaginal bleeding.  No dysuria.  Prior to my evaluation nursing reported patient wanted to leave AMA.  I evaluated the patient she states since medications no emesis.  She states her sitter cannot be there therefore 
she had to go home to her daughter.  Labs are still pending for return.  Discussed risk factors.  Patient understands patient pertinent x 3.  She signed out against medical vice and will follow-up with her OB team.

Late review of labs ketones in the urine creatinine 0.48 hemoglobin 12.8.
Lab Data
Labs: 

Laboratory Results - last 24 hr

  08/25/24 08/25/24
  16:29 16:49
WBC  13.2 H 
RBC  4.33 
Hgb  12.8 
Hct  37.6 
MCV  86.8 
MCH  29.6 
MCHC  34.0 
RDW Std Deviation  42.0 
RDW Coeff of Ivan  13.2 
Plt Count  267 
MPV  12.4 H 
Immature Gran % (Auto)  0.500 
Neut % (Auto)  83.2 H 
Lymph % (Auto)  9.0 L 
Mono % (Auto)  6.8 
Eos % (Auto)  0.2 
Baso % (Auto)  0.3 
Absolute Neuts (auto)  11.0 H 
Absolute Lymphs (auto)  1.18 
Nucleated RBC %  0 
Sodium  137 
Potassium  3.2 L 
Chloride  105 
Carbon Dioxide  19.0 L 
Anion Gap  13 
BUN  6 L 
Creatinine  0.48 L 
Estim Creat Clear Calc  132.05 
Est GFR (MDRD) Af Amer  208 
Est GFR (MDRD) Non-Af  172 
BUN/Creatinine Ratio  12.6 
Glucose  105 
Calcium  9.2 
Urine Color   Yellow
Urine Clarity   Sl. Cloudy
Urine pH   6.0
Ur Specific Gravity   1.025
Urine Protein   30 H
Urine Glucose (UA)   Normal
Urine Ketones   150 A*
Urine Occult Blood   Negative
Urine Nitrite   Negative
Urine Bilirubin   1 H
Urine Urobilinogen   1 H
Ur Leukocyte Esterase   25 H
Urine RBC   0-5 SEEN
Urine WBC   5-10 SEEN
Ur Squamous Epith Cells   5-10 SEEN
Urine Bacteria   1+
Urine Mucus   0 SEEN




Discharge Plan
Triage
Chief Complaint: Nausea/Vomiting

ED Midlevel Provider: Deepti Conway

ED Provider: Parish Gallagher

Dx/Rx/DC Orders
Clinical Impression:
 Marijuana abuse, Nausea and vomiting during pregnancy


Prescriptions:
No Action
  Prenatal  mg capsule 
   200 mg PO DAILY 
  metoclopramide HCl [Reglan] 10 mg tablet 
   10 mg PO TID PRN PRN (Reason: nausea and vomiting) Qty: 20 0RF

Primary Care Provider: Care Physician,No Primary

Referrals:
Care Physician,No Primary [Primary Care Provider] - 

Print Language: English

Disposition
***Disposition***: Against Medical Advice

Discharge Date/Time: 08/25/24 17:14

## 2024-08-25 NOTE — XMS RPT_ITS
Comprehensive CCD (C-CDA v2.1)  
  
                           Created on: 2024  
  
  
DANNA SOARES  
External Reference #: CDR,PersonID:[id number]  
: 2000  
Sex: Female  
  
Demographics  
  
  
                                        Address             [address]  
[address]  
[place]  [address]  
   
                                        Home Phone          [phone]  
   
                                        Home Phone          [phone]  
   
                                        Home Phone          [phone]  
   
                                        Home Phone          [phone]  
   
                                        Home Phone          [phone]  
   
                                        Home Phone          [phone]  
   
                                        Home Phone          [phone]  
   
                                        Preferred Language  en  
   
                                        Marital Status      Single  
   
                                        [employer] Affiliation Unknown  
   
                                        Race                White  
   
                                        Ethnic Group        Not  or Lati  
no  
  
  
Author  
  
  
                                        Organization        [facility]  
  
  
Care Team Providers  
  
  
                                Care Team Member Name Role            Phone  
   
                                Shahana Mitchell Unavailable     Unavailable  
   
                                Rings, Dylan Unavailable     Unavailable  
   
                                Rings, Dylan Unavailable     Unavailable  
   
                                Shahana Mitchell M Unavailable     Unavailable  
   
                                Shahana Mitchell M Unavailable     Unavailable  
   
                                PaulaShahana M Unavailable     Unavailable  
   
                                Shahana Mitchell M Unavailable     Unavailable  
   
                                Alan Mezaua W Unavailable     Unavailable  
   
                                Ravin Meza W Unavailable     Unavailable  
   
                                SHAHANA MITCHELL Attending       Unavailable  
   
                                REFERRED, SELF  Referring       Unavailable  
   
                                SHAHANA MITCHELL Primary Care    Unavailable  
   
                                PAULASHAHANA Attending       Unavailable  
   
                                REFERRED, SELF  Referring       Unavailable  
   
                                SHAHANA MITCHELL Primary Care    Unavailable  
   
                                PAULASHAHANA RIVERA Attending       Unavailable  
   
                                REFERRED, SELF  Referring       Unavailable  
   
                                SHAHANA MITCHELL Primary Care    Unavailable  
   
                                PAULASHAHANA POTTER Attending       Unavailable  
   
                                REFERRED, SELF  Referring       Unavailable  
   
                                SHAHANA MITCHELL Primary Care    Unavailable  
   
                                PAULASHAHANA Attending       Unavailable  
   
                                REFERRED, SELF  Referring       Unavailable  
   
                                SHAHANA MITCHELL Primary Care    Unavailable  
   
                                Jerri Tabor   Unavailable     Unavailable  
   
                                Chelsie Bowden     Unavailable     Unavailable  
   
                                Pooja Haines Unavailable     Unavailable  
   
                                Memberyelena, Emma P Unavailable     Unavailable  
   
                                Shahana Mitchell Unavailable     Unavailable  
   
                                No, Physician   Primary Care Provider Unavailabl  
e  
   
                                Unknown, Referring Provider Unavailable     Unav  
ailable  
   
                                Shahana Mitchell Unavailable     Unavailable  
   
                                Memberg, Emma P Unavailable     Unavailable  
   
                                Marizol, Marguerite  Unavailable     Unavailable  
   
                                Simran Hart Unavailable     Unavailable  
   
                                No, Physician   Primary Care Provider Unavailabl  
e  
   
                                No, Physician   Unavailable     Unavailable  
   
                                Required, No Pcp Unavailable     Unavailable  
   
                                Priscilla Zepeda I   Unavailable     Unavailable  
   
                                Jonathan Mathis  Unavailable     [phone]  
   
                                Nicolas Connolly Unavailable     [phone]  
[phone]  
   
                                Ivan Coley Unavailable     UnavailMitra Espinosa     Unavailable     Unavailable  
   
                                Unavailable     Primary Care Provider Unavailabl  
e  
   
                                Unavailable     Primary Care Provider UnavailELMIRA Harrington Attending       Unavailable  
   
                                NO, PHYSICIAN   Primary Care    Unavailable  
   
                                SHANAE HERRON Admitting       Unavailable  
   
                                NO, PHYSICIAN   Primary Care    Unavailable  
   
                                [facility]      Unavai  
LARRY Cook     Attending       Unavailable  
   
                                NO, PHYSICIAN   Primary Care    Unavailable  
   
                                NO, PHYSICIAN   Primary Care    Unavailable  
   
                                ADAMS ASHRAF Attending       Unava  
ilable  
   
                                Generic Provider MD, Kayleen Assigned Pcp Primary Car  
e Provider Unavailable  
   
                                PROVIDER, UNKNOWN Admitting       Unavailable  
   
                                AXEL RADFORD  Attending       Unavailable  
   
                                IVAN COLEY Attending       Unavailable  
   
                                IVAN COLEY Attending       Unavailable  
   
                                CHRISTIANO SANTOS Attending       Unavailable  
   
                                WINSTON GORDILLO   Attending       Unavailable  
   
                                BOB COOPER Attending       Unavailable  
   
                                JUANCHO APODACA    Admitting       Unavailable  
   
                                [facility], NEUROLOGY Consulting      Unavailable  
  
  
  
Allergies  
  
  
                                                    Allergy   
Classification                          Reported   
Allergen(s)               Allergy Type              Date of   
Onset                     Reaction(s)               Facility  
   
                                                      
(17 sources)                            codeine;   
Translations:   
[codeine]                 Drug Allergy              10-  
3                                       Delirium,   
Anxiety,   
Psychosis,   
Unknown                                 [facility]   
Repository  
   
                                        Comment on above:    freaks out    
   
                                                      
(6 sources)                             polyethylene   
glycol 3350;   
Translations:   
[MiraLax]           Drug Allergy                            Hives/Urticari  
a                                       [facility]   
Repository  
   
                                                      
(2 sources)                             Polyethylene   
Glycols;   
Translations:   
[POLYETHYLENE   
GLYCOL]                   Drug Allergy              10-  
3                                                   [facility]   
Repository  
   
                                                      
(8 sources)                             POLYETHYLENE   
GLYCOL 3350;   
Translations:   
[POLYETHYLENE   
GLYCOL 3350]              Drug Allergy                
9                         [facility]  
   
                                                      
(3 sources)                             strawberry   
allergenic   
extract;   
Translations:   
[STRAWBERRY]              Drug Allergy                
6                         Rash                      [facility]  
   
                                                      
(1 source)                Strawberry                Propensity to   
adverse   
reactions to   
drug                                      
6                         Rash                      [facility]  
Work Phone:   
[phone]  
   
                                                      
(2 sources)                             Other (Review   
Comments!);   
Translations:   
[OTHER (REVIEW   
COMMENTS!)]                             Propensity to   
adverse   
reactions to   
drug                                      
1                                                   [facility]  
Work Phone:   
[phone]  
  
  
  
Medications  
Current Medications  
  
  
  
                      Medication Drug Class(es) Dates      Sig (Normalized) Sig   
(Original)  
   
                                                    cyclobenzaprine   
hydrochloride 5 mg   
oral tablet  
(5 sources)               Muscle Relaxant           Start:   
  
1                                       take 1 tablet by   
mouth three times   
daily for pain                          cyclobenzaprine 5   
mg oral tablet ; 1   
tab(s) orally 3   
times a day, As   
Needed -for pain   
Quantity: 12   
Refills: 0 Ordered:   
7-Dec-2021 Priscilla Zepeda Start:   
7-Dec-2021 Status:   
Other Generic   
Substitution   
Allowed Comments:   
Some   
non-prescription   
drugs may aggravate   
your condition.   
Read all labels   
carefully. If a   
warning appears,   
check with your   
doctor before   
taking.This drug   
may impair the   
ability to drive or   
operate machinery.   
Use care until you   
become familiar   
with its effects.  
   
                                        Comment on above:   Some non-prescriptio  
n drugs may aggravate your condition. Read  
  
all labels carefully. If a warning appears, check with your   
doctor before taking.This drug may impair the ability to drive or   
operate machinery. Use care until you become familiar with its   
effects.   
   
                                                    dicyclomine   
hydrochloride 20 mg   
oral tablet  
(8 sources)               Anticholinergic           Start:   
  
End:   
  
4                                       inject 1 dose by   
intramuscular   
injection once                          20 mg,   
Intramuscular,   
ONCE, 1 dose, On   
Sun 24 at 1815  
  
  
  
                                                    Start: 2024  
End: 02-                         take 1 tablet by mouth four   
times daily before mealtime             dicyclomine (Bentyl) 20 mg tablet   
Indications: Abdominal pain,   
unspecified abdominal location Take   
1 tablet (20 mg) by mouth 4 times a   
day before meals. 120 tablet 0   
2024 02/10/2025 Active  
   
                                        Start: 2022   take 1 tablet by bjorn  
th four   
times daily                             dicyclomine 20 mg oral tablet ; 1   
tab(s) orally 4 times a day (1 hour   
before meals and at bedtime)   
Quantity: 20 Refills: 0 Ordered:   
9-Mar-2022 Lei Anderson   
Start: 9-Mar-2022 Status:   
Discontinued Generic Substitution   
Allowed Comments: May cause   
drowsiness. Alcohol may intensify   
this effect. Use care when   
operating dangerous machinery.  
   
                                                                dicyclomine 20 m  
g oral tablet ;   
orally 3 times a day Quantity: 0   
Refills: 0 Ordered: 30-Oct-2016   
Cooper Sierra Status: Discontinued   
Generic Substitution Allowed  
  
  
  
                                        Comment on above:   May cause drowsiness  
. Alcohol may intensify this effect. Use   
care when operating dangerous machinery.   
   
                                                    famotidine 20 mg oral   
tablet  
(5 sources)                             Histamine-2 Receptor   
Antagonist                                          take 1 tablet   
by mouth twice   
daily                                   famotidine 20 mg oral   
tablet ; 1 tab(s)   
orally 2 times a day   
Quantity: 0 Refills:   
0 Ordered:   
30-Oct-2016 Cooper Sierra Status:   
Discontinued Generic   
Substitution Allowed  
   
                                                    ferrous sulfate 325 mg   
oral tablet  
(5 sources)                                                     ferrous sulfate   
325   
mg (65 mg elemental   
iron) oral tablet ;   
orally once a day   
Quantity: 0 Refills:   
0 Ordered:   
30-Oct-2016 Cooper Sierra Status:   
Discontinued Generic   
Substitution Allowed  
   
                                                    hydrocortisone 10   
mg/ml vaginal cream  
(1 source)                Corticosteroid            Start:   
                                                 hydrocortisone 1 %   
cream Apply topically   
2 times daily. 2   
times daily 30 g 1   
2013 Active  
   
                                                    hyoscyamine sulfate   
0.125 mg oral tablet  
(1 source)                                          Start:   
                                     take 1 tablet   
by mouth every   
four hours                              hyoscyamine (Anaspaz,   
Levsin) 0.125 mg   
tablet Indications:   
Nausea and vomiting,   
unspecified vomiting   
type Take 1 tablet   
(0.125 mg) by mouth   
every 4 hours if   
needed for cramping   
for up to 7 days. 28   
tablet 0 2024   
Active  
   
                                                    ibuprofen 200 mg oral   
tablet  
(1 source)                              Nonsteroidal   
Anti-inflammatory   
Drug                                    Start:   
  
End:   
                                     take 2 tablets   
by mouth every   
eight hours as   
needed                                  ibuprofen   
(ADVIL,MOTRIN) 200 MG   
tablet Take 2 (two)   
tablets (400 mg   
total) by mouth every   
8 (eight) hours as   
needed for pain . 30   
tablet 0 2021 Active  
   
                                                    1 ml ketorolac   
tromethamine 15 mg/ml   
cartridge  
(2 sources)                             Nonsteroidal   
Anti-inflammatory   
Drug, Cyclooxygenase   
Inhibitor                               Start:   
  
End:   
                                                 ketorolac (TORADOL)   
15 MG/ML injection  
  
  
  
                                                    Start: 2024  
End: 2024                                     30 mg, Intravenous, ONCE, 1   
dose, On Sun 24 at 1900  
  
  
  
                                                    50 ml magnesium sulfate 40   
mg/ml injection  
(1 source)                                          Start: 2024  
End: 2024                                     2 g, Intravenous, Administer  
 over   
4 Hours, DAILY, First dose on 24 at 0600, Until   
Discontinued, Give if magnesium   
is less than 2 on morning labs.   
Infuse at a rate of 0.5 gm/hour.  
  
  
  
                                                    Start: 2024  
End: 2024                                     2 g, Intravenous, Administer  
 over 4 Hours, DAILY, First dose on  
  
24 at 0600, Until Discontinued, Give if magnesium is   
less than 2 on morning labs. Infuse at a rate of 0.5 gm/hour.  
  
  
  
                                                    melatonin 5 mg oral   
tablet  
(5 sources)                                                 take 1 tablet by   
mouth once daily   
at bedtime                              Melatonin 5 mg oral   
tablet ; 1 tab(s)   
orally once a day (at   
bedtime) Quantity: 0   
Refills: 0 Ordered:   
30-Oct-2016 Cooper Sierra Status:   
Discontinued Generic   
Substitution Allowed  
   
                                                    omeprazole 20 mg   
delayed release oral   
capsule  
(5 sources)                             Proton Pump   
Inhibitor                                           take 1 capsule   
by mouth once   
daily                                   omeprazole 20 mg oral   
delayed release   
capsule ; 1 cap(s)   
orally once a day   
Quantity: 0 Refills:   
0 Ordered:   
30-Oct-2016 Cooper Sierra Status:   
Discontinued Generic   
Substitution Allowed  
   
                                                    2 ml ondansetron 2   
mg/ml injection  
(20 sources)                            Serotonin-3   
Receptor Antagonist                     Start:   
  
End:   
                                                   ondansetron (ZOFRAN)   
4 MG/2ML injection  
  
  
  
                                                    Start: 2024  
End: 2024                                     ondansetron (ZOFRAN) 4 MG/2M  
L   
injection  
   
                                                    Start: 2024  
End: 2024                         take 4 mg intravenously every   
four hours as needed                    4 mg, Intravenous, EVERY 4 HOURS   
AS NEEDED, Starting on Sun 24   
at 1939, Until 24 at   
2143, Nausea / Vomiting  
   
                                                    Start: 2024  
End: 2024                                     4 mg, Intravenous, ONCE, 1 d  
ose,   
On Sun 24 at 1715  
   
                                        Start: 2024   take 1 tablet by bjorn  
th every   
eight hours for nausea                  ondansetron ODT (Zofran-ODT) 4 mg   
disintegrating tablet Indications:   
Nausea and vomiting, unspecified   
vomiting type Take 1 tablet (4 mg)   
by mouth every 8 hours if needed   
for nausea or vomiting. 30 tablet   
0 2024 Active  
   
                                                    Start: 2022  
End: 2022                         take 1 tablet by mouth three   
times daily                             ondansetron 4 mg oral tablet,   
disintegrating ; 1 tab(s) orally 3   
times a day Quantity: 0 Refills: 0   
Ordered: 10-Mar-2022 CarmenMeaghan ward   
Start: 6-Mar-2022 End: 16-Mar-2022   
Generic Substitution Allowed  
   
                                Start: 2022                 Zofran 8 mg or  
al tablet ; 1 tab(s)   
orally 3 times, As Needed -for   
nausea and vomiting Quantity: 21   
Refills: 0 Ordered: 2022   
Becky Hyman Start: 2022   
Status: Other Generic Substitution   
Allowed  
   
                                        Start: 10-   take 1 tablet by bjorn  
th every   
eight hours as needed for nausea        Ondansetron 4 MG Oral Tablet   
Disintegrating TAKE 1 TABLET Every   
8 hours PRN Nausea Quantity: 20   
Refills: 0 Chelsie Cuevas   
Start : 18-Oct-2019 Active  
   
                                        Start: 10-   take 1 tablet by bjorn  
th four times   
daily as needed for nausea and   
vomiting                                ondansetron 4 mg oral tablet,   
disintegrating ; 1 tab(s) orally 4   
times a day, As Needed -for nausea   
and vomiting Quantity: 10 Refills:   
0 Ordered: 23-May-2020 Priscilla Zepeda Start: 23-May-2020 Status:   
Completed Generic Substitution   
Allowed  
  
  
  
                                                    oseltamivir 75 mg   
oral capsule  
(1 source)                              Neuraminidase   
Inhibitor                               Start:   
2022  
End: 2022                         take 1   
capsule by   
mouth twice   
daily                                   oseltamivir 75 mg   
oral capsule ; 1   
cap(s) orally 2   
times a day   
Quantity: 2 Refills:   
0 Ordered:   
12-Mar-2022 Alyssa Beckwith Start:   
13-Mar-2022 End:   
13-Mar-2022 Generic   
Substitution Allowed   
Comments: Check with   
your doctor before   
becoming   
pregnant.Finish all   
this medication   
unless otherwise   
directed by   
prescriber.  
   
                                        Comment on above:   Check with your doct  
or before becoming pregnant.Finish all   
this   
medication unless otherwise directed by prescriber.   
   
                                                    Potassium Chloride  
(1 source)                                          Start:   
2024  
End: 2024                                     Potassium chloride   
(K-DUR) tablet ER 40   
mEq  
   
                                                    promethazine   
hydrochloride 25 mg   
rectal suppository  
(19 sources)              Phenothiazine             Start:   
2022                                          Promethegan 25 mg   
rectal suppository ;   
1 suppository(ies)   
rectally every 6   
hours Quantity: 10   
Refills: 0 Ordered:   
9-Mar-2022 Lei Anderson Start:   
9-Mar-2022 Status:   
Discontinued Generic   
Substitution Allowed   
Comments: For rectal   
use only.Keep in   
refrigerator. Do not   
freeze.May cause   
drowsiness. Alcohol   
may intensify this   
effect. Use care   
when operating   
dangerous   
machinery.Obtain   
medical advice   
before taking any   
non-prescription   
drugs as some may   
affect the action of   
this medication.  
  
  
  
                                Start: 01-                 Phenadoz 25 mg  
 rectal suppository ; 1   
suppository(ies) rectally 3 times a   
day, As Needed -for nausea and   
vomiting Quantity: 12 Refills: 0   
Ordered: 10-Franco-2022 Moomaw, Priscilla I   
Start: 10-Franco-2022 Status:   
Discontinued Generic Substitution   
Allowed Comments: For rectal use   
only.Keep in refrigerator. Do not   
freeze.May cause drowsiness. Alcohol   
may intensify this effect. Use care   
when operating dangerous   
machinery.Obtain medical advice   
before taking any non-prescription   
drugs as some may affect the action   
of this medication.  
   
                                        Start: 01-   take 1 tablet by bjorn  
th three   
times daily for nausea and   
vomiting                                promethazine 25 mg oral tablet ; 1   
tab(s) orally 3 times a day, As   
Needed -for nausea and vomiting   
Quantity: 12 Refills: 0 Ordered:   
10-Franco-2022 Morocaelw, Priscilla I Start:   
10-Franco-2022 Status: Discontinued   
Generic Substitution Allowed   
Comments: May cause drowsiness.   
Alcohol may intensify this effect.   
Use care when operating dangerous   
machinery.Obtain medical advice   
before taking any non-prescription   
drugs as some may affect the action   
of this medication.  
   
                                                    Start: 2020  
End: 07-                         take 1 tablet by mouth every   
six hours                               promethazine 25 mg oral tablet ; 1   
tab(s) orally every 6 hours Quantity:   
12 Refills: 0 Ordered: 2020   
Aníbal Gustafson Start:   
2020 End: 15-Jul-2020 Status:   
Discontinued Generic Substitution   
Allowed Comments: May cause   
drowsiness. Alcohol may intensify   
this effect. Use care when operating   
dangerous machinery.Obtain medical   
advice before taking any   
non-prescription drugs as some may   
affect the action of this medication.  
   
                                                    Start: 2020  
End: 2020                         take 1 tablet by mouth three   
times daily for nausea                  promethazine 25 mg oral tablet ; 1   
tab(s) orally 3 times a day, As   
Needed for nausea/vomiting Quantity:   
21 Refills: 0 Ordered: 30-May-2020   
Hernesto Vazquez Start: 30-May-2020   
End: 2020 Status: Completed   
Generic Substitution Allowed   
Comments: May cause drowsiness.   
Alcohol may intensify this effect.   
Use care when operating dangerous   
machinery.Obtain medical advice   
before taking any non-prescription   
drugs as some may affect the action   
of this medication.  
  
  
  
                                        Comment on above:   May cause drowsiness  
. Alcohol may intensify this effect. Use   
care when operating dangerous machinery.Obtain medical advice before taking   
any non-prescription drugs as some may affect the action of this medication.   
   
                                                            For rectal use only.  
Keep in refrigerator. Do not freeze.May cause drowsiness.   
Alcohol may intensify this effect. Use care when operating   
dangerous machinery.Obtain medical advice before taking any non-prescription 
drugs as some may affect the action of this medication.   
  
  
  
Completed/Discontinued Medications  
  
  
  
                      Medication Drug Class(es) Dates      Sig (Normalized) Sig   
(Original)  
   
                                                    acetaminophen 325 mg   
oral tablet  
(1 source)                                          Start:   
2024  
End:   
2024                              take 1 tablet by   
mouth every four   
hours as needed                           
   
                                                    Ampicillin-Sulbactam   
Sodium (UNASYN) 3 g   
in sodium chloride   
0.9% (MB PLUS) 100 mL   
(total volume) IVPB  
(1 source)                                          Start:   
2024  
End:   
2024                                          3 g, Intravenous,   
Administer over   
30 Minutes, ONCE,   
1 dose, On Sun   
24 at 1930,   
Contains a   
penicillin.  
   
                                                    azithromycin 500 mg   
oral tablet  
(3 sources)                             Macrolide   
Antimicrobial                           Start:   
2019                                          Azithromycin 500   
MG Oral Tablet 2   
tablets p.o. x 1   
Quantity: 2   
Refills: 0 Chelsie Cuevas   
Start :   
2019   
Active  
   
                                                    cephalexin 500 mg   
oral capsule  
(10 sources)                            Cephalosporin   
Antibacterial                           Start:   
10-  
End:   
10-                              take 1 capsule by   
mouth twice daily                       Keflex 500 mg   
oral capsule ; 1   
cap(s) orally 2   
times a day x 5   
days Quantity: 10   
Refills: 0   
Ordered:   
17-Oct-2019   
Priscilla Zepeda   
Start:   
17-Oct-2019 End:   
21-Oct-2019   
Status:   
Discontinued   
Generic   
Substitution   
Allowed Comments:   
Finish all this   
medication unless   
otherwise   
directed by   
prescriber.  
   
                                        Comment on above:   Finish all this medi  
cation unless otherwise directed by   
prescriber.   
   
                                                    Dextrose 5% 1,000 mL   
with Sodium   
bicarbonate 100 mEq   
IV solution  
(1 source)                                          Start:   
2024  
End:   
2024                                          Intravenous,   
CONTINUOUS,   
Starting on 24 at 1200,   
Until 24   
at 2143  
   
                                                    diphenhydrAMINE  
(2 sources)                             Histamine-1   
Receptor Antagonist                     Start:   
2024  
End:   
2024                                          12.5 mg,   
Intravenous,   
ONCE, 1 dose, On   
Sun 2/11/24 at   
1715  
  
  
  
                                                    Start: 2020  
End: 2020                                     diphenhydrAMINE (BENADRYL) i  
njection 25 mg  
  
  
  
                                                    docusate sodium 100   
mg oral capsule  
(5 sources)                                         Start:   
2016  
End: 2017                         take 1 capsule by   
mouth once daily                        docusate sodium   
100 mg oral   
capsule ; 1   
cap(s) orally   
once a day   
Quantity: 60   
Refills: 0   
Ordered:   
3-Nov-2016 SylviezoyaTevin Start:   
3-Nov-2016 End:   
2017   
Status:   
Discontinued   
Generic   
Substitution   
Allowed  
   
                                                    doxycycline hyclate   
100 mg oral tablet  
(3 sources)                             Tetracycline-cl  
ass Drug                                Start:   
2019                              take 4 tablets by   
mouth once                              Doxycycline   
Hyclate 100 MG   
Oral Tablet TAKE   
4 TABLET Once   
take all pills at   
once today   
Quantity: 4   
Refills: 0   
Pooja Haines MD Start :   
2019   
Active  
   
                                                    doxylamine succinate   
25 mg oral tablet  
(1 source)                                          Start:   
2020                              take 0.5 tablet by   
mouth once daily   
as needed for   
nausea                                  Unisom SleepTabs   
25 MG Oral Tablet   
Take 1/2 tablet   
for nausea once a   
day, as needed   
Quantity: 30   
Refills: 3   
Simran Hart MD Start :   
25-Aug-2020   
Active  
   
                                                    0.4 ml enoxaparin   
sodium 100 mg/ml   
prefilled syringe  
(1 source)                              Low Molecular   
Weight Heparin                          Start:   
2024  
End: 2024                         inject 40 mg by   
subcutaneous   
injection once   
daily at bedtime                        40 mg,   
Subcutaneous,   
DAILY AT BEDTIME,   
First dose on 24 at 2100,   
Until   
Discontinued,   
Indications:   
DVT/PE   
prophylaxis  
   
                                                    Flintstones Plus   
Iron CHEW  
(1 source)                                          Start:   
2020                                          Flintstones Plus   
Iron CHEW CHEW   
AND SWALLOW 1   
TABLET DAILY.   
Quantity: 30   
Refills: 3   
Simran Hart MD Start :   
25-Aug-2020   
Active  
   
                                                    2 ml metoclopramide   
5 mg/ml prefilled   
syringe  
(5 sources)                             Dopamine-2   
Receptor   
Antagonist                              Start:   
2024  
End: 2024                         take 10 mg   
intravenously   
every six hours as   
needed                                  10 mg,   
Intravenous,   
EVERY 6 HOURS AS   
NEEDED, Starting   
on 24 at   
0918, Until 24 at 2143,   
Nausea /   
Vomiting, 2nd   
line N/V  
  
  
  
                                                    Start: 2024  
End: 2024                                     10 mg, Intravenous, ONCE, 1   
dose, On   
Sun 24 at 1715  
   
                                        Start: 2020   take 1 tablet by bjorn  
th every   
six hours as needed                     Metoclopramide HCl - 10 MG Oral   
Tablet TAKE 1 TABLET EVERY 6 HOURS AS   
NEEDED. Quantity: 30 Refills: 3   
Simran Hart MD Start :   
25-Aug-2020 Active  
   
                                                    Start: 2020  
End: 2020                                     metoclopramide (REGLAN) inje  
ction 10   
mg  
  
  
  
                                                    100 ml   
metroNIDAZOLE 5   
mg/ml injection  
(1 source)                              Nitroimidazole   
Antimicrobial                           Start:   
2024  
End:   
2024                              take 500 mg   
intravenously   
every eight hours                       500 mg,   
Intravenous, at   
200 mL/hr,   
Administer over   
30 Minutes, EVERY   
8 HOURS   
NON-STANDARD,   
First dose on 24 at 1500,   
Until   
Discontinued  
   
                                                    Multivitamin   
preparation  
(5 sources)                                                 take 1 tablet by   
mouth once daily                        Vitamins for Hair   
oral tablet ; 1   
tab(s) orally   
once a day   
Quantity: 0   
Refills: 0   
Ordered:   
30-May-2020 Melva Nieto Status:   
Completed Generic   
Substitution   
Allowed  
  
  
  
                                                take 1 tablet by mouth once noah  
y Vitamins for Hair oral tablet ; 1 tab(s)   
orally   
once a day Quantity: 0 Refills: 0 Ordered:   
30-May-2020 Melva Nieto Generic Substitution   
Allowed  
  
  
  
                                                    pantoprazole 40 mg   
injection  
(4 sources)                             Proton Pump   
Inhibitor                               Start: 2024  
End: 2024                                     40 mg, Intravenous,   
EVERY 12 HOURS, First   
dose on 24 at   
1230, Until   
Discontinued, Dilute   
each 40 mg vial with 10   
mL of NS. All bolus   
doses, whether 40 mg or   
80 mg, should be   
administered over at   
least two minutes.,   
Indications: Inpt   
Stress Ulcer   
Prophylaxis  
  
  
  
                                        Start: 2022   take 1 tablet by bjorn  
th twice   
daily                                   Protonix 40 mg oral delayed release   
tablet ; 1 tab(s) orally 2 times a day   
Quantity: 28 Refills: 0 Ordered:   
2022 Becky Hyman Start:   
2022 Status: Other Generic   
Substitution Allowed Comments: It is   
very important that you take or use   
this exactly as directed. Do not skip   
doses or discontinue unless directed by   
your doctor.Obtain medical advice   
before taking any non-prescription   
drugs as some may affect the action of   
this medication.Swallow whole. Do not   
crush.  
  
  
  
                                        Comment on above:   It is very important  
 that you take or use this exactly as   
directed. Do not skip doses or discontinue unless directed by   
your doctor.Obtain medical advice before taking any   
non-prescription drugs as some may affect the action of this   
medication.Swallow whole. Do not crush.   
   
                                                    Potassium Chloride /   
Sodium Chloride  
(1 source)                                          Start:   
  
End:   
                                                 40 mEq,   
Intravenous, at 125   
mL/hr, Administer   
over 4 Hours, ONCE,   
1 dose, On 24 at 1300  
   
                                                    predniSONE 50 mg   
oral tablet  
(5 sources)                                         Start:   
  
End:   
                                     take 1 tablet   
by mouth once   
daily at   
mealtime                                predniSONE 50 mg   
oral tablet ; 1   
tab(s) orally once   
a day x 5 days   
Quantity: 5   
Refills: 0 Ordered:   
7-Dec-2021 Priscilla Zepeda Start:   
7-Dec-2021 End:   
11-Dec-2021 Status:   
Completed Generic   
Substitution   
Allowed Comments:   
It is very   
important that you   
take or use this   
exactly as   
directed. Do not   
skip doses or   
discontinue unless   
directed by your   
doctor.Obtain   
medical advice   
before taking any   
non-prescription   
drugs as some may   
affect the action   
of this   
medication.Take   
with food or milk.  
   
                                        Comment on above:   It is very important  
 that you take or use this exactly as   
directed. Do not skip doses or discontinue unless directed by   
your doctor.Obtain medical advice before taking any   
non-prescription drugs as some may affect the action of this   
medication.Take with food or milk.   
   
                                                    Prenatal   
Multivitamins with   
Folic Acid 1.25 mg   
oral capsule  
(5 sources)                                         Start:   
  
End:   
08-10-2  
020                                                 Prenatal   
Multivitamins with   
Folic Acid 1.25 mg   
oral capsule ; 1   
cap(s) orally once   
a day Quantity: 30   
Refills: 0 Ordered:   
2020   
Aníbal Gustafson   
Start: 2020   
End: 10-Aug-2020   
Status: Completed   
Generic   
Substitution   
Allowed Comments:   
May discolor urine   
or feces.  
  
  
  
                                                    Start: 2020  
End: 08-                                     Prenatal Multivitamins with   
Folic Acid 1.25 mg oral capsule ; 1  
  
cap(s) orally once a day Quantity: 30 Refills: 0 Ordered:   
2020 Aníbal Gustafson Start: 2020 End:   
10-Aug-2020 Generic Substitution Allowed Comments: May discolor   
urine or feces.  
  
  
  
                                        Comment on above:   May discolor urine o  
r feces.   
   
                                                    Prenatal Vitamin TABS  
(2 sources)                                                     Prenatal Vitamin  
 TABS Refills:   
0 Active  
  
  
  
                                                                Prenatal Vitamin  
 TABS Refills: 0 DO Active  
  
  
  
                                                    prochlorperazine 5 mg/ml   
injectable solution  
(6 sources)               Phenothiazine             Start: 2024  
End: 2024                                     prochlorperazine   
(Compazine) injection 5   
mg  
  
  
  
                                Start: 2020                 prochlorperazi  
ne 25 mg rectal suppository ; 1   
suppository(ies)   
rectally 2 times a day Quantity: 20 Refills: 4 Ordered:   
2020 Be Olson Start: 2020 Status: Completed   
Generic Substitution Allowed Comments: Avoid prolonged or   
excessive exposure to direct and/or artificial sunlight while   
taking this medication.For rectal use only.It is very important   
that you take or use this exactly as directed. Do not skip doses   
or discontinue unless directed by your doctor.May cause   
drowsiness or dizziness.Obtain medical advice before taking any   
non-prescription drugs as some may affect the action of this   
medication.  
  
  
  
                                        Comment on above:   Avoid prolonged or e  
xcessive exposure to direct and/or   
artificial   
sunlight while taking this medication.For rectal use only.It is   
very important that you take or use this exactly as directed. Do   
not skip doses or discontinue unless directed by your doctor.May   
cause drowsiness or dizziness.Obtain medical advice before taking   
any non-prescription drugs as some may affect the action of this   
medication.   
   
                                                    Promethazine   
(PHENERGAN) 12.5 mg   
in Sodium chloride   
0.9%, with overfill   
60.5 mL (total   
volume) IVPB  
(2 sources)                                         Start:   
2024  
End:   
2024                                    take 12.5 mg   
intravenously every   
four hours as   
needed                                  Promethazine   
(PHENERGAN) 12.5   
mg in Sodium   
chloride 0.9%,   
with overfill 60.5   
mL (total volume)   
IVPB  
  
  
  
                                                    Start: 2024  
End: 2024                         take 12.5 mg intravenously every   
four hours as needed                    12.5 mg, Intravenous, at 121-242   
mL/hr, Administer over 15-30   
Minutes, EVERY 4 HOURS AS NEEDED,   
Starting on Sun 24 at 2134,   
Until 24 at 0919, Nausea   
/ Vomiting, Total dose is 25 mg,   
which will be two bags of 12.5mg   
each Extravasation Risk  
  
  
  
                                                    Senokot TABS  
(2 sources)                                                     Senokot TABS Ref  
ills: 0 Active  
  
  
  
                                                                Senokot TABS Ref  
ills: 0 DO Active  
  
  
  
                                                    sertraline 25 mg   
oral tablet  
(10 sources)                            Serotonin   
Reuptake   
Inhibitor                               Start: 2016  
End: 2017                         take 1 tablet   
by mouth once   
daily in the   
morning                                 sertraline 25 mg   
oral tablet ; 1   
tab(s) orally once   
a day (in the   
morning) Quantity:   
60 Refills: 0   
Ordered: 3-Nov-2016   
Tevin Pink   
Start: 3-Nov-2016   
End: 2017   
Status:   
Discontinued   
Generic   
Substitution   
Allowed  
  
  
  
                                                take 1 tablet by mouth once noah  
y Zoloft 50 mg oral tablet ; 1 tab(s) orally   
once a   
day Quantity: 0 Refills: 0 Ordered: 30-Oct-2016   
Cooper Sierra Status: Discontinued Generic   
Substitution Allowed  
  
  
  
                                                    1000 ml sodium chloride 9 mg  
/ml   
injection  
(3 sources)                                         Start: 2024  
End: 2024                                     sodium chloride 0.9 % bolus   
1,000 mL  
  
  
  
                                                    Start: 2024  
End: 2024                                     Intravenous, at 125 mL/hr, C  
ONTINUOUS, Starting on Sun 24   
at   
1945, Until 24 at 1037  
   
                                                    Start: 2024  
End: 2024                                     1,000 mL, Intravenous, ONCE,  
 1 dose, On Sun 24 at 1715  
  
  
  
                                                    sucralfate 1000 mg   
oral tablet  
(3 sources)               Aluminum Complex          Start: 2022  
End: 2022                                     sucralfate 1 g oral   
tablet ; 1 tab(s)   
orally 4 times a   
day -.Meds to Beds   
- 4 Times a Day   
Before Meals   
Quantity: 120   
Refills: 0 Ordered:   
2022 Becky Hyman Start:   
2022 End:   
2022 Status:   
Other Generic   
Substitution   
Allowed  
   
                                                    vitamin b6 100 mg   
oral tablet  
(1 source)                              Start: 2020   take 1 tablet   
by mouth once   
daily                                   B6 Natural 100 MG   
Oral Tablet TAKE 1   
TABLET DAILY AS   
DIRECTED. Quantity:   
30 Refills: 3   
Simran Hart MD   
Start : 25-Aug-2020   
Active  
  
  
  
Problems  
Active Problems  
  
  
                      Problem Classification Problem    Date       Documented Da  
te Episodic/Chronic  
   
                                                    Abdominal pain  
(2 sources)                             Unspecified abdominal   
pain; Translations:   
[Unspecified   
abdominal pain]                         Onset:   
2024                                          Episodic  
   
                                                    Bacterial infection;   
unspecified site  
(5 sources)                             Chlamydial infection;   
Translations:   
[Chlamydia infection]                                         Episodic  
   
                                                    Developmental   
disorders  
(1 source)                              Dyslexia;   
Translations:   
[Dyslexia and alexia]                   Onset:   
2013                Chronic  
   
                                                    Epilepsy; convulsions  
(2 sources)                             Unspecified   
convulsions;   
Translations:   
[Unspecified   
convulsions]                            Onset:   
02-                                          Episodic  
   
                                                    Fluid and electrolyte   
disorders  
(8 sources)                             Acute hypokalemia;   
Translations:   
[Hypopotassemia]                        Onset:   
2024                Episodic  
   
                                                    Genitourinary symptoms   
and ill-defined   
conditions  
(2 sources)                             At risk of urinary   
tract infection;   
Translations: [At   
risk of UTI (urinary   
tract infection)]                                           Episodic  
   
                                                    Influenza  
(1 source)                              Influenza due to   
Influenza A virus;   
Translations:   
[Influenza with other   
respiratory   
manifestations]                         03-          Episodic  
   
                                                    Influenza  
(1 source)      Influenza                       03-        
   
                                                    Mood disorders  
(2 sources)                             Depressive disorder;   
Translations:   
[Depression]                                                Chronic  
   
                                                    Nausea and vomiting  
(20 sources)                            Nausea and vomiting;   
Translations:   
[Vomiting]                              Onset:   
02-                01-                Episodic  
   
                                        Comment on above:   VOMITING   
   
                                                            NAUSEA WITH VOMITING  
, UNSPECIFIED   
   
                                                    Noninfectious   
gastroenteritis  
(5 sources)                             Colitis;   
Translations: [Other   
and unspecified   
noninfectious   
gastroenteritis and   
colitis]                                Onset:   
2022                Episodic  
   
                                                    Nonmalignant breast   
conditions  
(1 source)                              Pain of breast;   
Translations:   
[Postpartum breast   
pain]                                                       Episodic  
   
                                                    Other complications of   
birth; puerperium   
affecting management   
of mother  
(1 source)                              Disorder of breast   
associated with   
childbirth;   
Translations:   
[Postpartum bloody   
nipple discharge]                                           Episodic  
   
                                                    Other complications of   
pregnancy  
(6 sources)                             Missed miscarriage;   
Translations:   
[, missed]                                          Episodic  
   
                                                    Other complications of   
pregnancy  
(3 sources)                             Retained products of   
conception;   
Translations:   
[Retained products of   
conception]                                                 Episodic  
   
                                                    Other complications of   
pregnancy  
(1 source)                              Psychological   
disorder during   
pregnancy;   
Translations:   
[Bipolar disease   
during pregnancy,   
antepartum]                                                 Episodic  
   
                                                    Other gastrointestinal   
disorders  
(2 sources)                             Diarrhea of presumed   
infectious origin;   
Translations:   
[Diarrhea,   
unspecified]                            Onset:   
2024                Episodic  
   
                                                    Other nervous system   
disorders  
(1 source)                              Cognitive deficit in   
communication skills;   
Translations:   
[Cognitive   
communication   
deficit]                                Onset:   
2013                Chronic  
   
                                                    Other nutritional;   
endocrine; and   
metabolic disorders  
(2 sources)                             Hypomagnesemia;   
Translations:   
[Disorders of   
magnesium metabolism]                     2022          Chronic  
   
                                                    Other pregnancy and   
delivery including   
normal  
(12 sources)                            Teenage pregnancy;   
Translations: [Urine   
pregnancy test   
positive]                                                   Episodic  
   
                                                    Other skin disorders  
(1 source)                              Eruption;   
Translations: [Rash]                                         Episodic  
   
                                                    Spondylosis;   
intervertebral disc   
disorders; other back   
problems  
(1 source)                              Lumbago with   
sciatica;   
Translations:   
[Sciatica]                              2021          Episodic  
   
                                                    Substance-related   
disorders  
(2 sources)                             Smoker; Translations:   
[Nicotine dependence,   
unspecified,   
uncomplicated]                          Onset:   
2024                Chronic  
   
                                                    Substance-related   
disorders  
(2 sources)                             Cannabis use,   
unspecified,   
uncomplicated;   
Translations:   
[Cannabis use,   
unspecified,   
uncomplicated]                          Onset:   
02-                                          Episodic  
   
                                                    Syncope  
(6 sources)                             Syncope;   
Translations:   
[Syncope and   
collapse]                               Onset:   
2022                Episodic  
   
                                        Comment on above:   SYNCOPE   
   
                                                    Unclassified  
(2 sources)                             BACK PAIN NO KNOWN   
INJURY                                  2021            
   
                                        Comment on above:   BACK PAIN NO KNOWN I  
NJURY   
   
                                                    Unclassified  
(1 source)                              Low back pain with   
sciatica                                2021            
   
                                                    Unclassified  
(2 sources)                             Cannabinoid   
hyperemesis syndrome                     2022            
   
                                                    Unclassified  
(1 source)      Left sided colitis                 2022        
   
                                                    Unclassified  
(1 source)      Unilateral weakness                 2022        
   
                                                    Unclassified  
(2 sources)     FLU-LIKE SYMPTOMS                 03-        
   
                                        Comment on above:   FLU-LIKE SYMPTOMS   
   
                                                    Unclassified  
(2 sources)               Vomitting                 Onset:   
02-                                            
  
  
Past or Other Problems  
  
  
                                                    Problem   
Classification  Problem         Date            Documented Date Episodic/Chronic  
   
                                                    Intracranial injury  
(1 source)                              Concussion injury of   
body structure;   
Translations:   
[Concussion]                            Onset:   
2013                Episodic  
   
                                                    Malaise and fatigue  
(2 sources)                             Asthenia;   
Translations: [Other   
malaise and fatigue]                    Onset:   
2013                Episodic  
   
                                                    Other non-traumatic   
joint disorders  
(1 source)                              Patellofemoral stress   
syndrome;   
Translations: [Pain   
in joint, lower leg]                    Onset:   
2013                Episodic  
   
                                                    Rehabilitation care;   
fitting of   
prostheses; and   
adjustment of devices  
(2 sources)                             Patient encounter   
status; Translations:   
[Other physical   
therapy]                                Onset:   
2013                Episodic  
   
                                                    Syncope  
(1 source)      Syncope                         2022        
   
                                        Comment on above:   BLURRED VISION, ARM   
TINGLING, SYNCOPE, HIT HEAD AND LOC   
   
                                                    Unclassified  
(2 sources)                             Patient encounter   
status; Translations:   
[Screen for STD   
(sexually transmitted   
disease)]                                                     
   
                                                    NEGATED: Highlighted   
row has not   
occurred!Residual   
codes; unclassified  
(20 sources)    Disease                                         Episodic  
  
  
  
Results  
  
  
                          Test Name    Value        Interpretation Reference   
Range                                   Facility  
   
                                                    Basic metabolic 2000 panelon  
 2024   
   
                      Anion gap [Moles/Vol] 17 mmol/L  Normal     10-20      [facility]  
   
                                        Comment on above:   Performed By: #### 2  
4323-8 ####  
AIDEE RODRIGUES ([id number])  
[facility] ([facility])  
[facility] [place] [address]   
   
                      Calcium [Mass/Vol] 9.5 mg/dL  Normal     8.6-10.3   [facility]  
   
                                        Comment on above:   Performed By: #### 2  
4323-8 ####  
AIDEE RODRIGUES ([id number])  
[facility] ([facility])  
[address] [place] [address]   
   
                      Chloride [Moles/Vol] 102 mmol/L Normal          [facility]  
   
                                        Comment on above:   Performed By: #### 2  
4323-8 ####  
AIDEE RODRIGUES ([id number])  
[facility] ([facility])  
[address] [place] [address]   
   
                      CO2 [Moles/Vol] 20 mmol/L  Low        21-32      [facility]  
   
                                        Comment on above:   Performed By: #### 2  
4323-8 ####  
AIDEE RODRIGUES ([id number])  
[facility] ([facility])  
[address] [place] [address]   
   
                      Creatinine [Mass/Vol] 0.54 mg/dL Normal     0.50-1.05  [facility]  
   
                                        Comment on above:   Performed By: #### 2  
4323-8 ####  
AIDEE RODRIGUES ([id number])  
[facility] ([facility])  
[address] [place] [address]   
   
                                                    GFR/1.73 sq M.predicted   
MDRD (S/P/Bld) [Vol   
rate/Area]      mL/min/{1.73_m2} Normal          >60             [facility]  
   
                                        Comment on above:   Result Comment: Calc  
ulations of estimated GFR are performed   
using the  CKD-EPI Study Refit equation without the race   
variable for the IDMS-Traceable creatinine methods.  
https://jasn.asnjournals.org/content/early//ASN.  
585907   
   
                                                            Performed By: #### 2  
4323-8 ####  
AIDEE RODRIGUES (40411)  
[facility] ([facility])  
[address] [place] [address]   
   
                      Glucose [Mass/Vol] 97 mg/dL   Normal     74-99      [facility]  
   
                                        Comment on above:   Performed By: #### 2  
4323-8 ####  
AIDEE RODRIGUES ([id number])  
[facility] ([facility])  
[address] [place] [address]   
   
                      Potassium [Moles/Vol] 3.4 mmol/L Low        3.5-5.3    [facility]  
   
                                        Comment on above:   Performed By: #### 2  
4323-8 ####  
AIDEE RODRIGUES (92812)  
[facility] ([facility])  
[address] [place] [address]   
   
                      Sodium [Moles/Vol] 136 mmol/L Normal     136-145    [facility]  
   
                                        Comment on above:   Performed By: #### 2  
4323-8 ####  
AIDEE RODRIGUES ([id number])  
[facility] ([facility])  
[address] [place] [address]   
   
                                                    Urea nitrogen   
[Mass/Vol]      4 mg/dL         Low             6-23            [facility]  
   
                                        Comment on above:   Performed By: #### 2  
4323-8 ####  
HOSKINS RAVI ([id number])  
[facility] ([facility])  
[facility] [place] [address]   
   
                          Anion gap [Moles/Vol] 17 mmol/L                 10 - 2  
0   
mmol/L                                  [facility]  
   
                          Calcium [Mass/Vol] 9.5 mg/dL                 8.6 - 10.  
3   
mg/dL                                   [facility]  
   
                          Chloride [Moles/Vol] 102 mmol/L                98 - 10  
7   
mmol/L                                  [facility]  
   
                          CO2 [Moles/Vol] 20 mmol/L    Low          21 - 32   
mmol/L                                  [facility]  
   
                          Creatinine [Mass/Vol] 0.54 mg/dL                0.50 -  
 1.05   
mg/dL                                   [facility]  
   
                      eGFR                             - PINF     [facility]  
   
                                        Comment on above:   Calculations of xiao  
mated GFR are performed using the    
CKD-EPI Study Refit equation without the race variable for the   
IDMS-Traceable creatinine methods.  
https://jasn.asnjournals.org/content/early//ASN.  
884034  
   
   
                          Glucose [Mass/Vol] 97 mg/dL                  74 - 99   
mg/dL                                   [facility]  
   
                                                    Interpretation and   
review of laboratory   
results         Abnormal                                        [facility]  
   
                          Potassium [Moles/Vol] 3.4 mmol/L   Low          3.5 -   
5.3   
mmol/L                                  [facility]  
   
                          Sodium [Moles/Vol] 136 mmol/L                136 - 145  
   
mmol/L                                  [facility]  
   
                                                    Urea nitrogen   
[Mass/Vol]      4 mg/dL         Low             6 - 23 mg/dL    [facility]  
   
                                                    CBC W Auto Differential pane  
l (Bld)on 2024   
   
                                                    Erythrocyte   
distribution width   
(RBC) [Ratio]   13.6 %          Normal          11.5-14.5       [facility]  
   
                                        Comment on above:   Order Comment: The p  
reviously reported component Neutrophils %  
   
is no longer being reported.The previously reported component   
Lymphocytes % is no longer being reported.The previously   
reported component Monocytes % is no longer being reported.The   
previously reported component Eosinophils % is no longer being   
reported.The previously reported component Basophils % is no   
longer being reported.The previously reported component   
Absolute Neutrophils is no longer being reported.The   
previously reported component Absolute Lymphocytes is no   
longer being reported.The previously reported component   
Absolute Monocytes is no longer being reported.The previously   
reported component Absolute Eosinophils is no longer being   
reported.The previously reported component Absolute Basophils   
is no longer being reported.   
   
                                                            Performed By: #### 2  
4323-8 ####  
AIDEE RODRIGUES ([id number])  
[facility] ([facility])  
[address] [place] [address]   
   
                                                    Hematocrit (Bld)   
[Volume fraction] 44.1 %          Normal          36.0-46.0       [facility]  
   
                                        Comment on above:   Order Comment: The p  
reviously reported component Neutrophils %  
  
is no longer being reported.The previously reported component   
Lymphocytes % is no longer being reported.The previously   
reported component Monocytes % is no longer being reported.The   
previously reported component Eosinophils % is no longer being   
reported.The previously reported component Basophils % is no   
longer being reported.The previously reported component   
Absolute Neutrophils is no longer being reported.The   
previously reported component Absolute Lymphocytes is no   
longer being reported.The previously reported component   
Absolute Monocytes is no longer being reported.The previously   
reported component Absolute Eosinophils is no longer being   
reported.The previously reported component Absolute Basophils   
is no longer being reported.   
   
                                                            Performed By: #### 2  
4323-8 ####  
AIDEE RODRIGUES ([id number])  
[facility] ([facility])  
[address] [place] [address]   
   
                                                    Hemoglobin (Bld)   
[Mass/Vol]      14.9 g/dL       Normal          12.0-16.0       [facility]  
   
                                        Comment on above:   Order Comment: The p  
reviously reported component Neutrophils %  
   
is no longer being reported.The previously reported component   
Lymphocytes % is no longer being reported.The previously   
reported component Monocytes % is no longer being reported.The   
previously reported component Eosinophils % is no longer being   
reported.The previously reported component Basophils % is no   
longer being reported.The previously reported component   
Absolute Neutrophils is no longer being reported.The   
previously reported component Absolute Lymphocytes is no   
longer being reported.The previously reported component   
Absolute Monocytes is no longer being reported.The previously   
reported component Absolute Eosinophils is no longer being   
reported.The previously reported component Absolute Basophils   
is no longer being reported.   
   
                                                            Performed By: #### 2  
4323-8 ####  
AIDEE RODRIGUES ([id number])  
[facility] ([facility])  
[address] [place] [address]   
   
                                                    Immature granulocytes   
(Bld) [#/Vol]   0.04 x10*3/uL   Normal          0.00-0.70       [facility]  
   
                                        Comment on above:   Order Comment: The p  
reviously reported component Neutrophils %  
   
is no longer being reported.The previously reported component   
Lymphocytes % is no longer being reported.The previously   
reported component Monocytes % is no longer being reported.The   
previously reported component Eosinophils % is no longer being   
reported.The previously reported component Basophils % is no   
longer being reported.The previously reported component   
Absolute Neutrophils is no longer being reported.The   
previously reported component Absolute Lymphocytes is no   
longer being reported.The previously reported component   
Absolute Monocytes is no longer being reported.The previously   
reported component Absolute Eosinophils is no longer being   
reported.The previously reported component Absolute Basophils   
is no longer being reported.   
   
                                                            Performed By: #### 2  
4323-8 ####  
AIDEE RODRIGUES ([id number])  
[facility] ([facility])  
[address] [place] [address]   
   
                                                    Immature   
granulocytes/100 WBC   
(Bld)           0.4 %           Normal          0.0-0.9         [facility]  
   
                                        Comment on above:   Order Comment: The p  
reviously reported component Neutrophils %  
   
is no longer being reported.The previously reported component   
Lymphocytes % is no longer being reported.The previously   
reported component Monocytes % is no longer being reported.The   
previously reported component Eosinophils % is no longer being   
reported.The previously reported component Basophils % is no   
longer being reported.The previously reported component   
Absolute Neutrophils is no longer being reported.The   
previously reported component Absolute Lymphocytes is no   
longer being reported.The previously reported component   
Absolute Monocytes is no longer being reported.The previously   
reported component Absolute Eosinophils is no longer being   
reported.The previously reported component Absolute Basophils   
is no longer being reported.   
   
                                                            Result Comment: Jossie  
ture Granulocyte Count (IG) includes   
promyelocytes, myelocytes and metamyelocytes but does not   
include bands. Percent differential counts (%) should be   
interpreted in the context of the absolute cell counts   
(cells/UL).   
   
                                                            Performed By: #### 2  
4323-8 ####  
AIDEE RODRIGUES ([id number])  
[facility] ([facility])  
[address] [place] [address]   
   
                                                    MCH (RBC) [Entitic   
mass]           29.7 pg         Normal          26.0-34.0       [facility]  
   
                                        Comment on above:   Order Comment: The p  
reviously reported component Neutrophils %  
  
is no longer being reported.The previously reported component   
Lymphocytes % is no longer being reported.The previously   
reported component Monocytes % is no longer being reported.The   
previously reported component Eosinophils % is no longer being   
reported.The previously reported component Basophils % is no   
longer being reported.The previously reported component   
Absolute Neutrophils is no longer being reported.The   
previously reported component Absolute Lymphocytes is no   
longer being reported.The previously reported component   
Absolute Monocytes is no longer being reported.The previously   
reported component Absolute Eosinophils is no longer being   
reported.The previously reported component Absolute Basophils   
is no longer being reported.   
   
                                                            Performed By: #### 2  
4323-8 ####  
AIDEE RODRIGUES ([id number])  
[facility] ([facility])  
[facility] [place] [address]   
   
                      MCHC (RBC) [Mass/Vol] 33.8 g/dL  Normal     32.0-36.0  [facility]  
   
                                        Comment on above:   Order Comment: The p  
reviously reported component Neutrophils %  
   
is no longer being reported.The previously reported component   
Lymphocytes % is no longer being reported.The previously   
reported component Monocytes % is no longer being reported.The   
previously reported component Eosinophils % is no longer being   
reported.The previously reported component Basophils % is no   
longer being reported.The previously reported component   
Absolute Neutrophils is no longer being reported.The   
previously reported component Absolute Lymphocytes is no   
longer being reported.The previously reported component   
Absolute Monocytes is no longer being reported.The previously   
reported component Absolute Eosinophils is no longer being   
reported.The previously reported component Absolute Basophils   
is no longer being reported.   
   
                                                            Performed By: #### 2  
4323-8 ####  
AIDEE RODRIGUES ([id number])  
[facility] ([facility])  
[address] [place] [address]   
   
                      MCV (RBC) [Entitic vol] 88 fL      Normal          [facility]  
[facility]  
   
                                        Comment on above:   Order Comment: The p  
reviously reported component Neutrophils %  
  
is no longer being reported.The previously reported component   
Lymphocytes % is no longer being reported.The previously   
reported component Monocytes % is no longer being reported.The   
previously reported component Eosinophils % is no longer being   
reported.The previously reported component Basophils % is no   
longer being reported.The previously reported component   
Absolute Neutrophils is no longer being reported.The   
previously reported component Absolute Lymphocytes is no   
longer being reported.The previously reported component   
Absolute Monocytes is no longer being reported.The previously   
reported component Absolute Eosinophils is no longer being   
reported.The previously reported component Absolute Basophils   
is no longer being reported.   
   
                                                            Performed By: #### 2  
4323-8 ####  
AIDEE RODRIGUES ([id number])  
[facility] ([facility])  
[address] [place] [address]   
   
                                                    Nucleated RBC/100 WBC   
(Bld) [Ratio]   0.0 /100 WBCs   Normal          0.0-0.0         [facility]  
   
                                        Comment on above:   Order Comment: The p  
reviously reported component Neutrophils %  
   
is no longer being reported.The previously reported component   
Lymphocytes % is no longer being reported.The previously   
reported component Monocytes % is no longer being reported.The   
previously reported component Eosinophils % is no longer being   
reported.The previously reported component Basophils % is no   
longer being reported.The previously reported component   
Absolute Neutrophils is no longer being reported.The   
previously reported component Absolute Lymphocytes is no   
longer being reported.The previously reported component   
Absolute Monocytes is no longer being reported.The previously   
reported component Absolute Eosinophils is no longer being   
reported.The previously reported component Absolute Basophils   
is no longer being reported.   
   
                                                            Performed By: #### 2  
4323-8 ####  
AIDEE RODRIGUES ([id number])  
[facility] ([facility])  
[address] [place] [address]   
   
                      Platelets (Bld) [#/Vol] 306 x10*3/uL Normal     150-450     
 [facility]  
   
                                        Comment on above:   Order Comment: The p  
reviously reported component Neutrophils %  
   
is no longer being reported.The previously reported component   
Lymphocytes % is no longer being reported.The previously   
reported component Monocytes % is no longer being reported.The   
previously reported component Eosinophils % is no longer being   
reported.The previously reported component Basophils % is no   
longer being reported.The previously reported component   
Absolute Neutrophils is no longer being reported.The   
previously reported component Absolute Lymphocytes is no   
longer being reported.The previously reported component   
Absolute Monocytes is no longer being reported.The previously   
reported component Absolute Eosinophils is no longer being   
reported.The previously reported component Absolute Basophils   
is no longer being reported.   
   
                                                            Performed By: #### 2  
4323-8 ####  
AIDEE RODRIGUES ([id number])  
[facility] ([facility])  
[address] [place] [address]   
   
                      RBC (Bld) [#/Vol] 5.01 x10*6/uL Normal     4.00-5.20  [facility]  
   
                                        Comment on above:   Order Comment: The p  
reviously reported component Neutrophils %  
   
is no longer being reported.The previously reported component   
Lymphocytes % is no longer being reported.The previously   
reported component Monocytes % is no longer being reported.The   
previously reported component Eosinophils % is no longer being   
reported.The previously reported component Basophils % is no   
longer being reported.The previously reported component   
Absolute Neutrophils is no longer being reported.The   
previously reported component Absolute Lymphocytes is no   
longer being reported.The previously reported component   
Absolute Monocytes is no longer being reported.The previously   
reported component Absolute Eosinophils is no longer being   
reported.The previously reported component Absolute Basophils   
is no longer being reported.   
   
                                                            Performed By: #### 2  
4323-8 ####  
AIDEE RODRIGUES ([id number])  
[facility] ([facility])  
[address] [place] [address]   
   
                      WBC (Bld) [#/Vol] 10.6 x10*3/uL Normal     4.4-11.3   [facility]  
   
                                        Comment on above:   Order Comment: The p  
reviously reported component Neutrophils %  
   
is no longer being reported.The previously reported component   
Lymphocytes % is no longer being reported.The previously   
reported component Monocytes % is no longer being reported.The   
previously reported component Eosinophils % is no longer being   
reported.The previously reported component Basophils % is no   
longer being reported.The previously reported component   
Absolute Neutrophils is no longer being reported.The   
previously reported component Absolute Lymphocytes is no   
longer being reported.The previously reported component   
Absolute Monocytes is no longer being reported.The previously   
reported component Absolute Eosinophils is no longer being   
reported.The previously reported component Absolute Basophils   
is no longer being reported.   
   
                                                            Performed By: #### 2  
4323-8 ####  
AIDEE RODRIGUES ([id number])  
[facility] ([facility])  
[address] [place] [address]   
   
                                                    Erythrocyte   
distribution width   
(RBC) [Ratio]       13.6 %                                  11.5 - 14.5   
%                                       [facility]  
   
                                                    Hematocrit (Bld)   
[Volume fraction]   44.1 %                                  36.0 - 46.0   
%                                       [facility]  
   
                                                    Hemoglobin (Bld)   
[Mass/Vol]          14.9 g/dL                               12.0 - 16.0   
g/dL                                    [facility]  
   
                                                    Immature granulocytes   
(Bld) [#/Vol]   0.04 10*3/uL                                    [facility]  
   
                                                    Immature   
granulocytes/100 WBC   
(Bld)           0.4 %                           0.0 - 0.9 %     [facility]  
   
                                        Comment on above:   Immature Granulocyte  
 Count (IG) includes promyelocytes,   
myelocytes and metamyelocytes but does not include bands.   
Percent differential counts (%) should be interpreted in the   
context of the absolute cell counts (cells/UL).   
   
                                                    Interpretation and   
review of laboratory   
results         Normal                                          [facility]  
   
                                                    MCH (RBC) [Entitic   
mass]               29.7 pg                                 26.0 - 34.0   
pg                                      [facility]  
   
                          MCHC (RBC) [Mass/Vol] 33.8 g/dL                 32.0 -  
 36.0   
g/dL                                    [facility]  
   
                      MCV (RBC) [Entitic vol] 88 fL                 80 - 100 fL   
[facility]  
   
                                                    Nucleated RBC/100 WBC   
(Bld) [Ratio]   0.0 %                                           [facility]  
   
                      Platelets (Bld) [#/Vol] 306 10*3/uL                         
[facility]  
   
                      RBC (Bld) [#/Vol] 5.01 10*6/uL                       [facility]  
   
                      WBC (Bld) [#/Vol] 10.6 10*3/uL                       [facility]  
   
                                                            The previously repor  
patricia   
component Neutrophils %   
is no longer being   
reported.The previously   
reported component   
Lymphocytes % is no   
longer being   
reported.The previously   
reported component   
Monocytes % is no   
longer being   
reported.The previously  
reported component   
Eosinophils % is no   
longer being   
reported.The previously   
reported component   
Basophils % is no   
longer being   
reported.The previously   
reported component   
Absolute Neutrophils is   
no longer being   
reported.The previously   
reported  
component Absolute   
Lymphocytes is no   
longer being   
reported.The previously   
reported component   
Absolute Monocytes is   
no longer being   
reported.The previously   
reported component   
Absolute Eosinophils is   
no longer being   
reported.The previously   
reported  
component Absolute   
Basophils is no longer   
being reported.                                             [facility]  
   
                                                    Hepatic function 2000 panelo  
n 2024   
   
                                                    Albumin BCP dye   
[Mass/Vol]      4.9 g/dL        Normal          3.4-5.0         [facility]  
   
                                        Comment on above:   Performed By: #### 2  
4323-8 ####  
HOSKINS RAVI ([id number])  
[facility] ([facility])  
[address] [place] [address]   
   
                                                    ALP [Catalytic   
activity/Vol]   39 U/L          Normal                    [facility]  
   
                                        Comment on above:   Performed By: #### 2  
4323-8 ####  
AIDEE RODRIGUES ([id number])  
[facility] ([facility])  
[address] [place] [address]   
   
                                                    ALT With P-5'-P   
[Catalytic   
activity/Vol]   16 U/L          Normal          7-45            [facility]  
   
                                        Comment on above:   Result Comment: Kaleigh  
ents treated with Sulfasalazine may   
generate falsely decreased results for ALT.   
   
                                                            Performed By: #### 2  
4323-8 ####  
AIDEE RODRIGUES ([id number])  
[facility] ([facility])  
[address] [place] [address]   
   
                                                    AST With P-5'-P   
[Catalytic   
activity/Vol]   15 U/L          Normal          9-39            [facility]  
   
                                        Comment on above:   Performed By: #### 2  
4323-8 ####  
AIDEE RODRIGUES ([id number])  
[facility] ([facility])  
[address] [place] [address]   
   
                      Bilirubin [Mass/Vol] 0.6 mg/dL  Normal     0.0-1.2    [facility]  
   
                                        Comment on above:   Performed By: #### 2  
4323-8 ####  
AIDEE RODRIGUES ([id number])  
[facility] ([facility])  
[address] [place] [address]   
   
                                                    Bilirubin.direct   
[Mass/Vol]      0.1 mg/dL       Normal          0.0-0.3         [facility]  
   
                                        Comment on above:   Performed By: #### 2  
4323-8 ####  
AIDEE RODRIGUES ([id number])  
[facility] ([facility])  
[address] [place] [address]   
   
                      Protein [Mass/Vol] 7.4 g/dL   Normal     6.4-8.2    [facility]  
   
                                        Comment on above:   Performed By: #### 2  
4323-8 ####  
AIDEE RODRIGUES ([id number])  
[facility] ([facility])  
[address] [place] [address]   
   
                                                    Albumin BCP dye   
[Mass/Vol]          4.9 g/dL                                3.4 - 5.0   
g/dL                                    [facility]  
   
                                                    ALP [Catalytic   
activity/Vol]   39 U/L                          33 - 110 U/L    [facility]  
   
                                                    ALT With P-5'-P   
[Catalytic   
activity/Vol]   16 U/L                          7 - 45 U/L      [facility]  
   
                                        Comment on above:   Patients treated wit  
h Sulfasalazine may generate falsely   
decreased results for ALT.   
   
                                                    AST With P-5'-P   
[Catalytic   
activity/Vol]   15 U/L                          9 - 39 U/L      [facility]  
   
                          Bilirubin [Mass/Vol] 0.6 mg/dL                 0.0 - 1  
.2   
mg/dL                                   [facility]  
   
                                                    Bilirubin.direct   
[Mass/Vol]          0.1 mg/dL                               0.0 - 0.3   
mg/dL                                   [facility]  
   
                          Protein [Mass/Vol] 7.4 g/dL                  6.4 - 8.2  
   
g/dL                                    [facility]  
   
                                                    Lipaseon 2024   
   
                                                    Lipase [Catalytic   
activity/Vol]   27 U/L                          9 - 82 U/L      [facility]  
   
                                                    Lipase [Catalytic activity/V  
ol]on 2024   
   
                                                            Venipuncture   
immediately after or   
during the   
administration of   
Metamizole may lead to   
falsely low results.   
Testing should be   
performed immediately   
prior to Metamizole   
dosing.                                                     [facility]  
   
                                                    Manual differential performe  
d Ql (Bld)on 2024   
   
                      Basophils (Bld) [#/Vol] 0.11 x10*3/uL High       0.00-0.10  
  [facility]  
   
                                        Comment on above:   Performed By: #### 2  
4323-8 ####  
AIDEE RODRIGUES ([id number])  
[facility] ([facility])  
[facility] [place] [address]   
   
                      Basophils/100 WBC (Bld) 1.0 %      Normal     0.0-2.0    U  
[facility]  
   
                                        Comment on above:   Performed By: #### 2  
4323-8 ####  
AIDEE RODRIGUES ([id number])  
[facility] ([facility])  
[facility] [place] [address]   
   
                                                    Cells Counted Total   
(Bld) [#]       100             Normal                          [facility]  
   
                                        Comment on above:   Performed By: #### 2  
4323-8 ####  
AIDEE RODRIGUES (73326)  
[facility] ([facility])  
[facility] [place] [address]   
   
                                                    Eosinophils (Bld)   
[#/Vol]         0.00 x10*3/uL   Normal          0.00-0.70       [facility]  
   
                                        Comment on above:   Performed By: #### 2  
4323-8 ####  
AIDEE RODRIGUES ([id number])  
[facility] ([facility])  
[address] [place] [address]   
   
                                                    Eosinophils/100 WBC   
(Bld)           0.0 %           Normal          0.0-6.0         [facility]  
   
                                        Comment on above:   Performed By: #### 2  
4323-8 ####  
AIDEE RODRIGUES (71975)  
[facility] ([facility])  
[address] [place] [address]   
   
                                                    Lymphocytes (Bld)   
[#/Vol]         0.95 x10*3/uL   Low             1.20-4.80       [facility]  
   
                                        Comment on above:   Performed By: #### 2  
4323-8 ####  
AIDEE RODRIGUES ([id number])  
[facility] ([facility])  
[address] [place] [address]   
   
                                                    Lymphocytes/100 WBC   
(Bld)           9.0 %           Normal          13.0-44.0       [facility]  
   
                                        Comment on above:   Performed By: #### 2  
4323-8 ####  
AIDEE RODRIGUES ([id number])  
[facility] ([facility])  
[address] [place] [address]   
   
                      Monocytes (Bld) [#/Vol] 0.64 x10*3/uL Normal     0.10-1.00  
  [facility]  
   
                                        Comment on above:   Performed By: #### 2  
4323-8 ####  
AIDEE RODRIGUES ([id number])  
[facility] ([facility])  
[address] [place] [address]   
   
                      Monocytes/100 WBC (Bld) 6.0 %      Normal     2.0-10.0   U  
[facility]  
   
                                        Comment on above:   Performed By: #### 2  
4323-8 ####  
AIDEE RODRIGUES ([id number])  
[facility] ([facility])  
[address] [place] [address]   
   
                                                    RBC morphology finding   
Nom (Bld)                               No significant RBC   
morphology present  Normal                                  [facility]  
   
                                        Comment on above:   Performed By: #### 2  
4323-8 ####  
AIDEE RODRIGUES ([id number])  
[facility] ([facility])  
[address] [place] [address]   
   
                                                    Segmented neutrophils   
(Bld) [#/Vol]   8.90 x10*3/uL   High            1.20-7.00       [facility]  
   
                                        Comment on above:   Performed By: #### 2  
4323-8 ####  
AIDEE RODRIGUES ([id number])  
[facility] ([facility])  
[address] [place] [address]   
   
                                                    Segmented   
neutrophils/100 WBC   
(Bld)           84.0 %          Normal          40.0-80.0       [facility]  
   
                                        Comment on above:   Result Comment: Perc  
ent differential counts (%) should be   
interpreted in the context of the absolute cell counts   
(cells/uL).   
   
                                                            Performed By: #### 2  
4323-8 ####  
AIDEE RODRIGUES ([id number])  
[facility] ([facility])  
[address] [place] [address]   
   
                      Basophils (Bld) [#/Vol] 0.11 10*3/uL High                   
 [facility]  
   
                      Basophils/100 WBC (Bld) 1.0 %                 0.0 - 2.0 %   
[facility]  
   
                                                    Cells Counted Total   
(Bld) [#]       100 {cells}                                     [facility]  
   
                                                    Eosinophils (Bld)   
[#/Vol]         0.00 10*3/uL                                    [facility]  
   
                                                    Eosinophils/100 WBC   
(Bld)           0.0 %                           0.0 - 6.0 %     [facility]  
   
                                                    Interpretation and   
review of laboratory   
results         Abnormal                                        [facility]  
   
                                                    Lymphocytes (Bld)   
[#/Vol]         0.95 10*3/uL    Low                             [facility]  
   
                                                    Lymphocytes/100 WBC   
(Bld)               9.0 %                                   13.0 - 44.0   
%                                       [facility]  
   
                      Monocytes (Bld) [#/Vol] 0.64 10*3/uL                        
 [facility]  
   
                      Monocytes/100 WBC (Bld) 6.0 %                 2.0 - 10.0 %  
 [facility]  
   
                                                    RBC morphology finding   
Nom (Bld)                               No significant RBC   
morphology present                                          [facility]  
   
                                                    Segmented neutrophils   
(Bld) [#/Vol]   8.90 10*3/uL    High                            [facility]  
   
                                                    Segmented   
neutrophils/100 WBC   
(Bld)               84.0 %                                  40.0 - 80.0   
%                                       [facility]  
   
                                        Comment on above:   Percent differential  
 counts (%) should be interpreted in the   
context of the absolute cell counts (cells/uL).   
   
                                                                  [facility]  
   
                                                    No Panel Informationon    
   
                                                    Interpretation and   
review of laboratory   
results         Normal                                          [facility]  
   
                                                    Triacylglycerol lipaseon    
   
                                                    Lipase [Catalytic   
activity/Vol]   27 U/L          Normal          9-82            [facility]  
   
                                        Comment on above:   Order Comment: Venip  
uncture immediately after or during the   
administration of Metamizole may lead to falsely low results.   
Testing should be performed immediately prior to Metamizole   
dosing.   
   
                                                            Performed By: #### 2  
4323-8 ####  
HOSKINS RAVI ([id number])  
[facility] ([facility])  
[address] [place] [address]   
   
                                                    CALPROTECTIN, FECALon 2024   
   
                      CALPROTECTIN, FECAL 51         Normal                [facility]  
   
                                        Comment on above:   Result Comment: Refe  
rence range: 0 to 120  
Unit: ug/g  
(NOTE)  
Concentration Interpretation Follow-Up  
< 5 - 50 ug/g Normal None  
>50 -120 ug/g Borderline Re-evaluate in 4-6 weeks  
>120 ug/g Abnormal Repeat as clinically  
indicated  
PERFORMED AT [facility]   
   
                                                            Performed By: #### L  
CALP ####  
Testing performed at [facility]   
   
                                                    CT ABDOMEN PELVIS WITH IV CO  
NTRAST ONLYon 02-   
   
                                                    CT ABDOMEN PELVIS WITH   
IV CONTRAST ONLY                        EXAMINATION:  
CT ABDOMEN PELVIS WITH   
IV CONTRAST ONLY  
HISTORY:  
ORDERING SYSTEM   
PROVIDED HISTORY: n/v/d   
unable to keep anything   
down,  
recent diagnosis of   
colitis,  
TECHNOLOGIST PROVIDED   
HISTORY:  
Illness/Other  
Reason for exam:   
nausea/vomiting x 1   
week, pt states no   
injury/chance of  
pregnancy  
Encounter Type: Initial  
Additional signs and   
symptoms: .  
ORDERING SYSTEM   
PROVIDED DIAGNOSIS   
CODES:  
COMPARISON:  
CT abdomen and pelvis   
with contrast,   
02/10/2024.  
TECHNIQUE:  
Dose reduction   
techniques were   
achieved by using   
automated exposure  
control and/or   
adjustment of mA and/or   
kV according to patient   
size and/or  
use of iterative   
reconstruction   
technique.  
Postcontrast axial CT   
images obtained through   
the abdomen and pelvis.  
Reconstructions   
obtained in the   
sagittal and coronal   
planes.  
CONTRAST:  
IOPAMIDOL 370 MG   
IODINE/ML (76 %)   
INTRAVENOUS SOLUTION -   
75 mL,  
FINDINGS:  
Lung bases are clear.   
No pleural effusion.   
Heart size normal.   
Liver  
normal. Gallbladder   
normal. Common bile   
duct normal in size.   
Pancreas  
normal. Spleen normal.   
Adrenal glands normal.   
Kidneys normal. Stomach  
normal. Duodenum   
normal. No bowel   
obstruction.   
Appendectomy. No  
abnormal thickening or   
inflammation of the   
colon. Abdominal aorta   
is  
normal in size.   
Inferior vena cava   
normal in size. No   
ascites. No  
lymphadenopathy. No   
free air.  
In the pelvis, the   
bladder is normal.   
Uterus normal. Ovaries   
appear  
normal. Rectum normal.   
There is a small amount   
of free fluid in the  
pelvis that has   
increased from the   
prior.  
Osseous structures are   
unremarkable.  
IMPRESSION:  
1. Previously described   
diffuse colitis is no   
longer identified. The  
colon appears normal.  
2. Appendectomy.  
3. Small amount of free   
fluid in the pelvis is   
nonspecific. There is a  
greater amount of free   
fluid in the pelvis   
than on the prior.  
4. No small bowel   
obstruction. There is   
no bowel wall   
thickening.  
[facility]  
Workstation ID: [id number]  
Dictated by: ROSE CHANDRA on Thu Feb 15,   
2024 8:59:26 AM EST  
Transcribed by: JAS CESPEDES on Thu Feb 15,   
2024 9:09:40 AM EST  
Finalized by: ROSE CHANDRA on Thu Feb 15,   
2024 4:08:13 PM EST Normal                                  [facility]  
   
                                        Comment on above:   Order Comment: Injur  
y/Trauma or Illness?:Illness/Other  
How long have you had these symptoms (acute/chronic)?:Acute  
Reason for exam?:nausea/vomiting x 1 week, pt states no   
injury/chance of pregnancy  
Type of Exam?:Initial  
Additional signs and symptoms?:.   
   
                                                    ED Provider Noteson 02-15-20  
24   
   
                                                    Transcription   
Authentication   
Interface Message Text                  -----------------------  
------- HISTORY OF   
PRESENT ILLNESS  
-----------------------  
---  
2024, 10:37 PM.  
The history is provided   
by the Patient. Danna Soares is a 23   
year  
old female presenting   
to the ED for seizures.   
Pt states she has had   
seizures  
for the past 4 years   
but has never been   
evaluated for them.   
Reports they have  
been more frequent   
these past few days,   
with associated nausea,   
left eye pain,  
and neck pain. Family   
mentions pt's symptoms   
often resolve after she   
eats,  
noting they suspect she   
may be hypoglycemic. Pt   
requesting more nausea   
and pain  
medications. Pt   
declining further   
treatment at this time   
due to wanting to go  
back home to [place].   
Pt mentions she has an   
appointment with   
neurology  
tomorrow.  
-----------------------  
------------ REVIEW OF   
SYSTEMS  
-----------------------  
--------------  
Review of Systems  
Eyes: Positive for   
pain.  
Gastrointestinal:   
Positive for nausea.  
Musculoskeletal:   
Positive for neck pain.  
Neurological: Positive   
for seizures.  
-----------------------  
---------------- PAST   
HISTORY  
-----------------------  
-------------------  
Past Medical History:  
Past Medical History:  
Diagnosis Date  
Other  infants,   
unspecified (weight)  
Past Surgical History:  
Past Surgical History:  
Procedure Laterality   
Date  
NO PAST SURGICAL   
HISTORY  
Social History: Tobacco   
Use: denies  
Alcohol Use: denies  
Drug Use: denies  
Family History:  
Family History  
Problem Relation Age of   
Onset  
Good health Unknown  
The patient's home   
medications have been   
reviewed.  
Allergies: Strawberry   
and Other (review   
comments!)  
-----------------------  
-------------- PHYSICAL   
EXAM  
-----------------------  
------------------  
Vitals Recorded in This   
Encounter  
2024  
1827  
BP: 114/78  
Pulse: 84  
Resp: 16  
Temp: 98.4 ???F (36.9   
???C)  
Temp src: Oral  
SpO2: 99 %  
Pain Score: 0  
Constitutional: Well   
developed, well   
nourished. Awake AND   
alert. No distress.  
Eyes: PERRL. EOMI.   
Conjunctivae are not   
pale. No scleral   
icterus.  
ENT: Mucous membranes   
are moist.  
Neck: Supple. B/l neck   
tenderness.  
Cardiovascular: Regular   
rate. Regular rhythm.   
No murmurs, rubs, or   
gallops.  
Distal pulses are equal   
and 2+.  
Pulmonary/Chest: No   
evidence of respiratory   
distress. Clear to   
auscultation  
bilaterally. No   
wheezing, rales or   
rhonchi.  
Abdominal: Soft and   
non-distended. There is   
no tenderness. No   
rebound,  
guarding, or rigidity.  
Musculoskeletal: Full   
range of motion in all   
extremities. No edema.   
No calf  
tenderness.  
Skin: Skin is warm and   
dry. No rashes.  
Neurological: Alert,   
awake, and appropriate.   
Normal speech. Normal   
sensation.  
Normal gait.  
Psychiatric: Good eye   
contact. Appropriate in   
content/context. Normal   
affect.  
-----------------------  
----------- LABORATORY   
RESULTS  
-----------------------  
------------  
Results for orders   
placed or performed   
during the hospital   
encounter of 24  
COMPLETE BLOOD COUNT   
W/DIFF  
Narrative  
The following orders   
were created for panel   
order COMPLETE BLOOD   
COUNT W/DIFF.  
Procedure Abnormality   
Status  
--------- -----------   
------  
CBC WITH   
DIFFERENTIAL[[id number]]   
Abnormal Final result  
Please view results for   
these tests on the   
individual orders.  
BASIC METABOLIC PANEL  
Result Value Ref Range  
Glucose 62 (L) 74 - 109   
mg/dL  
Sodium 135 (L) 136 -   
145 mmol/L  
Potassium 3.8 3.5 - 5.0   
mmol/L  
Carbon Dioxide 19 (L)   
21 - 31 mmol/L  
Chloride 100 98 - 107   
mmol/L  
Blood Urea Nitrogen 6   
(L) 7 - 25 mg/dL  
Creatinine 0.57 (L)   
0.60 - 1.20 mg/dL  
Calcium 9.3 8.6 - 10.3   
mg/dL  
Anion Gap 20 10 - 20  
Estimated GFR (CKD-EPI)   
132 >=60 mL/min/1.73sqm  
CBC WITH DIFFERENTIAL  
Result Value Ref Range  
WBC 7.7 4.5 - 11.5 K/uL  
RBC 4.39 4.00 - 5.20   
M/uL  
Hemoglobin 13.2 12.0 -   
15.0 g/dL  
Hematocrit 39.5 36.0 -   
46.0 %  
MCV 90 80 - 100 fL  
MCH 30.0 26.0 - 34.0 pg  
MCHC 33.3 32.0 - 35.9   
g/dL  
Platelet 251 150 - 400   
K/uL  
RDW-CV 13.4 11.5 - 14.5   
%  
MPV 12.2 (H) 7.5 - 11.2   
fL  
Neutrophils 68.0 31.0 -   
76.0 %  
Neutrophil # 5.21 1.50   
- 8.00 K/uL  
Lymphocytes 20.5 (L)   
24.0 - 44.0 %  
Lymphocytes # 1.57 1.00   
- 4.80 K/uL  
Monocytes 9.8 2.0 -   
11.0 %  
Monocyte # 0.75 0.20 -   
1.00 K/uL  
Eosinophil 0.6 0.1 -   
4.0 %  
Eosinophil # 0.05 0.00   
- 0.70 K/uL  
Basophils 1.1 <=1.9 %  
Basophil # 0.09 0.00 -   
0.20 K/uL  
MDW 19 <=20  
-----------------------  
------------------- ED   
COURSE  
-----------------------  
----------------------  
HIPAA: Verbal   
permission granted from   
patient to discuss case   
, including  
protected health   
information, in front   
of family / friends in   
room at the time  
of the evaluation.  
ED Medications:  
Medications  
ondansetron (ZOFRAN) 4   
MG/2ML injection (has   
no administration in   
time range)  
ketorolac (TORADOL) 15   
MG/ML injection (has no   
administration in time   
range)  
ondansetron (ZOFRAN) 4   
MG/2ML injection (4 mg   
Intravenous Push Given   
24  
185)  
10:54 PM. Shared   
decision making:   
Patient is appropriate   
for discharge. They  
were given follow up   
instructions and return   
precautions, (more   
content not   
included)...        Normal                                  The   
[facility]   
System  
   
                                                    BASIC METABOLIC PANELon    
   
                      Anion gap [Moles/Vol] 20 mmol/L  Normal     10-20      The  
   
[facility]   
System  
   
                                        Comment on above:   Performed By: #### C  
H8 ####  
S PATHOLOG[facility] LAB[facility]  
[address] [place], [place],    
   
                      Calcium [Mass/Vol] 9.3 mg/dL  Normal     8.6-10.3   The   
[facility]   
System  
   
                                        Comment on above:   Result Comment: Note  
 updated reference ranges.   
   
                                                            Performed By: #### C  
H8 ####  
[facility]  
[address] [place], [place],    
   
                      Chloride [Moles/Vol] 100 mmol/L Normal          The   
[facility]   
System  
   
                                        Comment on above:   Result Comment: Note  
 updated reference ranges.   
   
                                                            Performed By: #### C  
H8 ####  
S PATHOLOGY LABORATORY  
[address] [place], [place],    
   
                      CO2 [Moles/Vol] 19 mmol/L  Low        21-31      The   
[facility]   
System  
   
                                        Comment on above:   Result Comment: Note  
 updated reference ranges.   
   
                                                            Performed By: #### C  
H8 ####  
S PATHOLOG[facility] LABORATORY  
[address] [place], [place],    
   
                      Creatinine [Mass/Vol] 0.57 mg/dL Low        0.60-1.20  The  
   
[facility]   
System  
   
                                        Comment on above:   Result Comment: Note  
 updated reference ranges.   
   
                                                            Performed By: #### C  
H8 ####  
S PATHOLOGY LABORATORY  
[address] [place], [place],    
   
                      ESTIMATED GFR (CKD-EPI) 132 mL/min/1.73sqm Normal     >=60  
       The   
[facility]   
[facility]  
   
                                        Comment on above:   Result Comment:   
 CKD EPI Equation using Creatinine without  
   
Race  
Comment: Estimated glomerular filtration rate (eGFR) is   
calculated without a race coefficient. Values should be   
interpreted in the context of the patient's full clinical   
presentation.  
Reference:  
1. Nicolás FIERRO, Amado M, Geoffrey HODGES, et al.. A Unifying Approach   
for GFR Estimation: Recommendations of the NKF-ASN Task Force   
on Reassessing the Inclusion of Race in Diagnosing Kidney   
Disease. American Journal of Kidney Diseases   
202;79(2):268-88.e1.  
2. N Engl J Med 1 Vol. 385 Issue 19 Pages 5561-0579   
   
                                                            Performed By: #### C  
H8 ####  
S PATHOLOGY LABORATORY  
 [place], [place],    
   
                      Glucose [Mass/Vol] 62 mg/dL   Low             The   
[facility]   
System  
   
                                        Comment on above:   Performed By: #### C  
H8 ####  
S PATHOLOGY LABORATORY  
 [place], [place],    
   
                      Potassium [Moles/Vol] 3.8 mmol/L Normal     3.5-5.0    The  
   
[facility]   
System  
   
                                        Comment on above:   Result Comment: Note  
 updated reference ranges.  
Note updated reference ranges.   
   
                                                            Performed By: #### C  
H8 ####  
[facility]  
 [place], [place],    
   
                      Sodium [Moles/Vol] 135 mmol/L Low        136-145    The   
[facility]   
System  
   
                                        Comment on above:   Result Comment: Note  
 updated reference ranges.   
   
                                                            Performed By: #### C  
H8 ####  
S PATHOLOGY LABORATORY  
 [place],    
   
                                                    Urea nitrogen   
[Mass/Vol]      6 mg/dL         Low             7-25            The   
[facility]   
System  
   
                                        Comment on above:   Result Comment: Note  
 updated reference ranges.   
   
                                                            Performed By: #### C  
H8 ####  
S PATHOLOGY LABORATORY  
 [place], [place],    
   
                                                    Bacteria identifiedon 2024   
   
                                                    Bacteria identified Cx   
Nom (U)                                 Test: Urine Culture  
Specimen Source: Clean   
Catch/Voided  
Specimen Type: Urine  
Specimen Date:   
2024 3:23 AM  
Result Date: 2/15/2024   
8:47 AM  
Result Status: Final   
result  
Abnormal: No  
Resulting Lab: [facility]   
LAB  
[address] [address]  
Tel: [phone]  
  
CULTURE  
------------------  
No significant growth Normal                                  [facility]  
   
                                        Comment on above:   Performed By: #### 2  
4323-8 ####  
HOSKINS RAVI ([id number])  
[facility] ([facility])  
[address] [place] [address]   
   
                                                    Basic metabolic 2000 panelon  
 2024   
   
                      Anion gap [Moles/Vol] 20 mmol/L             10 - 20    Met  
[facility]  
   
                          Calcium [Mass/Vol] 9.3 mg/dL                 8.6 - 10.  
3   
mg/dL                                   [facility]  
   
                                        Comment on above:   Note updated referen  
ce ranges.   
   
                          Chloride [Moles/Vol] 100 mmol/L                98 - 10  
7   
mmol/L                                  [facility]  
   
                                        Comment on above:   Note updated referen  
ce ranges.   
   
                          CO2 [Moles/Vol] 19 mmol/L    Low          21 - 31   
mmol/L                                  [facility]  
   
                                        Comment on above:   Note updated referen  
ce ranges.   
   
                          Creatinine [Mass/Vol] 0.57 mg/dL   Low          0.60 -  
 1.20   
mg/dL                                   [facility]  
   
                                        Comment on above:   Note updated referen  
ce ranges.   
   
                                                    GFR/1.73 sq M.predicted   
CKD-EPI (S/P/Bld) [Vol   
rate/Area]      132                             - PINF          [facility]  
   
                                        Comment on above:    CKD EPI Equatio  
n using Creatinine without Race  
Comment: Estimated glomerular filtration rate (eGFR) is   
calculated without a race coefficient. Values should be   
interpreted in the context of the patient's full clinical   
presentation.  
Reference:  
1. Nicolás C, Amado M, Geoffrey HODGES, et al.. A Unifying Approach   
for GFR Estimation: Recommendations of the NKF-ASN Task Force   
on Reassessing the Inclusion of Race in Diagnosing Kidney   
Disease. American Journal of Kidney Diseases   
202;79(2):268-88.e1.  
2. N Engl J Med 1 Vol. 385 Issue 19 Pages 1771-8284  
  
   
   
                          Glucose [Mass/Vol] 62 mg/dL     Low          74 - 109   
mg/dL                                   [facility]  
   
                                                    Interpretation and   
review of laboratory   
results         Abnormal                                        [facility]  
   
                          Potassium [Moles/Vol] 3.8 mmol/L                3.5 -   
5.0   
mmol/L                                  [facility]  
   
                                        Comment on above:   Note updated referen  
ce ranges.  
Note updated reference ranges.  
   
   
                          Sodium [Moles/Vol] 135 mmol/L   Low          136 - 145  
   
mmol/L                                  [facility]  
   
                                        Comment on above:   Note updated referen  
ce ranges.   
   
                                                    Urea nitrogen   
[Mass/Vol]      6 mg/dL         Low             7 - 25 mg/dL    [facility]  
   
                                        Comment on above:   Note updated referen  
ce ranges.   
   
                                                                  [facility]  
   
                                                    CBC W Auto Differential pane  
l (Bld)on 2024   
   
                      Basophils (Bld) [#/Vol] 0.09 x10*3/uL Normal     0.00-0.10  
  [facility]  
   
                                        Comment on above:   Performed By: #### 5  
7021-8 ####  
AIDEE RODRIGUES ([id number])  
[facility] ([facility])  
[address] [place] [address]   
   
                      Basophils/100 WBC (Bld) 1.2 %      Normal     0.0-2.0    [facility]  
   
                                        Comment on above:   Performed By: ####   
7021-8 ####  
AIDEE RODRIGUES (35230)  
[facility] ([facility])  
[address] [place] [address]   
   
                                                    Eosinophils (Bld)   
[#/Vol]         0.20 x10*3/uL   Normal          0.00-0.70       [facility]  
   
                                        Comment on above:   Performed By: #### 5  
7021-8 ####  
AIDEE RODRIGUES (67204)  
[facility] ([facility])  
[address] [place] [address]   
   
                                                    Eosinophils/100 WBC   
(Bld)           2.7 %           Normal          0.0-6.0         [facility]  
   
                                        Comment on above:   Performed By: ####   
7021-8 ####  
AIDEE RODRIGUES (46396)  
[facility] ([facility])  
[address] [place] [address]   
   
                                                    Erythrocyte   
distribution width   
(RBC) [Ratio]   12.4 %          Normal          11.5-14.5       [facility]  
   
                                        Comment on above:   Performed By: #### 5  
7021-8 ####  
AIDEE RODRIGUES (83502)  
[facility] ([facility])  
[address] [place] [address]   
   
                                                    Hematocrit (Bld)   
[Volume fraction] 39.4 %          Normal          36.0-46.0       [facility]  
   
                                        Comment on above:   Performed By: ####   
7021-8 ####  
AIDEE RODRIGUES (62873)  
[facility] ([facility])  
[address] [place] [address]   
   
                                                    Hemoglobin (Bld)   
[Mass/Vol]      13.5 g/dL       Normal          12.0-16.0       [facility]  
   
                                        Comment on above:   Performed By: ####   
7021-8 ####  
AIDEE RODRIGUES (32960)  
[facility] ([facility])  
[address] [place] [address]   
   
                                                    Immature granulocytes   
(Bld) [#/Vol]   0.04 x10*3/uL   Normal          0.00-0.70       [facility]  
   
                                        Comment on above:   Performed By: #### 5  
7021-8 ####  
AIDEE RODRIGUES (20389)  
[facility] ([facility])  
[address] [place] [address]   
   
                                                    Immature   
granulocytes/100 WBC   
(Bld)           0.5 %           Normal          0.0-0.9         [facility]  
   
                                        Comment on above:   Result Comment: Jossie  
ture Granulocyte Count (IG) includes   
promyelocytes, myelocytes and metamyelocytes but does not   
include bands. Percent differential counts (%) should be   
interpreted in the context of the absolute cell counts   
(cells/UL).   
   
                                                            Performed By: #### 5  
7021-8 ####  
AIDEE RODRIGUES (96958)  
[facility] ([facility])  
[address] [place] [address]   
   
                                                    Lymphocytes (Bld)   
[#/Vol]         1.85 x10*3/uL   Normal          1.20-4.80       [facility]  
   
                                        Comment on above:   Performed By: #### 5  
7021-8 ####  
AIDEE RODRIGUES (05303)  
[facility] ([facility])  
[address] [place] [address]   
   
                                                    Lymphocytes/100 WBC   
(Bld)           24.9 %          Normal          13.0-44.0       [facility]  
   
                                        Comment on above:   Performed By: #### 5  
7021-8 ####  
AIDEE RODRIGUES (83717)  
[facility] ([facility])  
[address] [place] [address]   
   
                                                    MCH (RBC) [Entitic   
mass]           29.8 pg         Normal          26.0-34.0       [facility]  
   
                                        Comment on above:   Performed By: #### 5  
7021-8 ####  
AIDEE RODRIGUES (15999)  
[facility] ([facility])  
[address] [place] [address]   
   
                      MCHC (RBC) [Mass/Vol] 34.3 g/dL  Normal     32.0-36.0  [facility]  
   
                                        Comment on above:   Performed By: #### 5  
7021-8 ####  
AIDEE RODRIGUES (03195)  
[facility] ([facility])  
[address] [place] [address]   
   
                      MCV (RBC) [Entitic vol] 87 fL      Normal          U  
[facility]  
   
                                        Comment on above:   Performed By: #### 5  
7021-8 ####  
AIDEE RODRIGUES (90303)  
[facility] ([facility])  
[address] [place] [address]   
   
                      Monocytes (Bld) [#/Vol] 0.73 x10*3/uL Normal     0.10-1.00  
  [facility]  
   
                                        Comment on above:   Performed By: #### 5  
7021-8 ####  
AIDEE RODRIGUES (86560)  
[facility] ([facility])  
[address] [place] [address]   
   
                      Monocytes/100 WBC (Bld) 9.8 %      Normal     2.0-10.0   U  
[facility]  
   
                                        Comment on above:   Performed By: #### 5  
7021-8 ####  
AIDEE RODRIGUES (16243)  
[facility] ([facility])  
[address] [place] [address]   
   
                                                    Neutrophils (Bld)   
[#/Vol]         4.52 x10*3/uL   Normal          1.20-7.70       [facility]  
   
                                        Comment on above:   Result Comment: Perc  
ent differential counts (%) should be   
interpreted in the context of the absolute cell counts   
(cells/uL).   
   
                                                            Performed By: #### 5  
7021-8 ####  
AIDEE RODRIGUES (18611)  
[facility] ([facility])  
[address] [place] [address]   
   
                                                    Neutrophils/100 WBC   
(Bld)           60.9 %          Normal          40.0-80.0       [facility]  
   
                                        Comment on above:   Performed By: #### 5  
7021-8 ####  
AIDEE RODRIGUES (86804)  
[facility] ([facility])  
[address] [place] [address]   
   
                                                    Nucleated RBC/100 WBC   
(Bld) [Ratio]   0.0 /100 WBCs   Normal          0.0-0.0         [facility]  
   
                                        Comment on above:   Performed By: #### 5  
7021-8 ####  
AIDEE RODRIGUES (12252)  
[facility] ([facility])  
[address] [place] [address]   
   
                      Platelets (Bld) [#/Vol] 276 x10*3/uL Normal     150-450     
 [facility]  
   
                                        Comment on above:   Performed By: #### 5  
7021-8 ####  
AIDEE RODRIGUES (28280)  
[facility] ([facility])  
[address] [place] [address]   
   
                      RBC (Bld) [#/Vol] 4.53 x10*6/uL Normal     4.00-5.20  [facility]  
   
                                        Comment on above:   Performed By: #### 5  
7021-8 ####  
AIDEE RODRIGUES (88813)  
[facility] ([facility])  
[address] [place] [address]   
   
                      WBC (Bld) [#/Vol] 7.4 x10*3/uL Normal     4.4-11.3   [facility]  
   
                                        Comment on above:   Performed By: #### 5  
7021-8 ####  
AIDEE RODRIGUES (98898)  
[facility] ([facility])  
[address] [place] [address]   
   
                                                    CBC WITH DIFFERENTIALon    
   
                      Basophils (Bld) [#/Vol] 0.09 10*3/uL Normal     0.00-0.20   
 The   
[facility]   
System  
   
                                        Comment on above:   Performed By: #### C  
BCDSAT ####  
S PATHOLOGY LABORATORY  
[address] [place] [address],    
   
                      Basophils/100 WBC (Bld) 1.1 %      Normal     <=1.9      T  
he   
[facility]   
System  
   
                                        Comment on above:   Performed By: #### C  
BCDSAT ####  
[facility]  
[address] [place] [address],    
   
                                                    Eosinophils (Bld)   
[#/Vol]         0.05 10*3/uL    Normal          0.00-0.70       The   
[facility]   
System  
   
                                        Comment on above:   Performed By: #### C  
BCDSAT ####  
S PATHOLOGY LABORATORY  
[address] [place] [address],    
   
                                                    Eosinophils/100 WBC   
(Bld)           0.6 %           Normal          0.1-4.0         The   
[facility]   
System  
   
                                        Comment on above:   Performed By: #### C  
HEROAT ####  
[facility] PATHOLOGY LABORATORY  
[address] [place] [address],    
   
                                                    Erythrocyte   
distribution width   
(RBC) [Ratio]   13.4 %          Normal          11.5-14.5       The   
[facility]   
System  
   
                                        Comment on above:   Performed By: #### C  
HEROAT ####  
[facility] PATHOLOGY LABORATORY  
[address] [place] [address],    
   
                                                    Hematocrit (Bld)   
[Volume fraction] 39.5 %          Normal          36.0-46.0       The   
[facility]   
System  
   
                                        Comment on above:   Performed By: #### C  
HEROAT ####  
[facility] PATHOLOGY LABORATORY  
[address] [place] [address],    
   
                                                    Hemoglobin (Bld)   
[Mass/Vol]      13.2 g/dL       Normal          12.0-15.0       The   
[facility]   
System  
   
                                        Comment on above:   Performed By: #### C  
HEROAT ####  
[facility] PATHOLOGY LABORATORY  
[address] [place] [address],    
   
                                                    Lymphocytes (Bld)   
[#/Vol]         1.57 10*3/uL    Normal          1.00-4.80       The   
[facility]   
System  
   
                                        Comment on above:   Performed By: #### C  
HEROAT ####  
[facility] PATHOLOGY LABORATORY  
[address] [place] [address],    
   
                                                    Lymphocytes/100 WBC   
(Bld)           20.5 %          Low             24.0-44.0       The   
[facility]   
System  
   
                                        Comment on above:   Performed By: #### C  
HEROAT ####  
[facility] PATHOLOGY LABORATORY  
[address] [place] [address],    
   
                                                    MCH (RBC) [Entitic   
mass]           30.0 pg         Normal          26.0-34.0       The   
[facility]   
System  
   
                                        Comment on above:   Performed By: #### C  
HEROAT ####  
[facility] PATHOLOGY LABORATORY  
[address] [place] [address],    
   
                      MCHC (RBC) [Mass/Vol] 33.3 g/dL  Normal     32.0-35.9  The  
   
[facility]   
System  
   
                                        Comment on above:   Performed By: #### C  
HEROAT ####  
[facility] PATHOLOGY LABORATORY  
[address] [place] [address],    
   
                      MCV (RBC) [Entitic vol] 90 fL      Normal          T  
[facility]   
System  
   
                                        Comment on above:   Performed By: #### VADIM  
BCLIBERTYAT ####  
[facility] PATHOLOGY LABORATORY  
[address] [place] [address],    
   
                                                    MONOCYTE DISTRIBUTION   
WIDTH           19              Normal          <=20            The   
[facility]   
System  
   
                                        Comment on above:   Performed By: #### C  
HEROAT ####  
[facility] PATHOLOGY LABORATORY  
[address] [place] [address],    
   
                      Monocytes (Bld) [#/Vol] 0.75 10*3/uL Normal     0.20-1.00   
 The   
[facility]   
System  
   
                                        Comment on above:   Performed By: #### C  
BCLIBERTYAT ####  
[facility] PATHOLOGY LABORATORY  
[address] [place] [address],    
   
                      Monocytes/100 WBC (Bld) 9.8 %      Normal     2.0-11.0   T  
[facility]   
System  
   
                                        Comment on above:   Performed By: #### C  
HEROAT ####  
[facility] PATHOLOGY LABORATORY  
[address] [place] [address],    
   
                                                    Neutrophils (Bld)   
[#/Vol]         5.21 10*3/uL    Normal          1.50-8.00       The   
[facility]   
System  
   
                                        Comment on above:   Performed By: #### C  
HEROAT ####  
[facility] PATHOLOGY LABORATORY  
[address] [place] [address],    
   
                                                    Neutrophils/100 WBC   
(Bld)           68.0 %          Normal          31.0-76.0       The   
[facility]   
System  
   
                                        Comment on above:   Performed By: #### VADIM  
BCLIBERTYAT ####  
[facility] PATHOLOGY LABORATORY  
[address] [place] [address],    
   
                                                    Platelet mean volume   
(Bld) [Entitic vol] 12.2 fL         High            7.5-11.2        The   
[facility]   
System  
   
                                        Comment on above:   Performed By: #### C  
BCLIBERTYAT ####  
[facility] PATHOLOGY LABORATORY  
[address] [place] [address],    
   
                      Platelets (Bld) [#/Vol] 251 10*3/uL Normal     150-400      
The   
[facility]   
System  
   
                                        Comment on above:   Performed By: #### VADIM  
BCLIBERTYAT ####  
[facility] PATHOLOGY LABORATORY  
[address] [place], [place],    
   
                      RBC (Bld) [#/Vol] 4.39 10*6/uL Normal     4.00-5.20  The   
[facility]   
System  
   
                                        Comment on above:   Performed By: #### C  
BCDSAT ####  
[facility] PATHOLOGY LABORATORY  
[address] [place], [place],    
   
                      WBC (Bld) [#/Vol] 7.7 10*3/uL Normal     4.5-11.5   The   
[facility]   
System  
   
                                        Comment on above:   Performed By: #### C  
BCDSAT ####  
[facility] PATHOLOGY LABORATORY  
[address] [place], [place],    
   
                          Basophils (Bld) [#/Vol] 0.09 10*3/uL              0.00  
 - 0.20   
K/uL                                    [facility]  
   
                      Basophils/100 WBC (Bld) 1.1 %                 NINF - 1.9 %  
 [facility]  
   
                                                    Eosinophils (Bld)   
[#/Vol]             0.05 10*3/uL                            0.00 - 0.70   
K/uL                                    [facility]  
   
                                                    Eosinophils/100 WBC   
(Bld)           0.6 %                           0.1 - 4.0 %     [facility]  
   
                                                    Erythrocyte   
distribution width   
(RBC) [Ratio]       13.4 %                                  11.5 - 14.5   
%                                       [facility]  
   
                                                    Hematocrit (Bld)   
[Volume fraction]   39.5 %                                  36.0 - 46.0   
%                                       [facility]  
   
                                                    Hemoglobin (Bld)   
[Mass/Vol]          13.2 g/dL                               12.0 - 15.0   
g/dL                                    [facility]  
   
                                                    Interpretation and   
review of laboratory   
results         Abnormal                                        [facility]  
   
                                                    Lymphocytes (Bld)   
[#/Vol]             1.57 10*3/uL                            1.00 - 4.80   
K/uL                                    [facility]  
   
                                                    Lymphocytes/100 WBC   
(Bld)               20.5 %              Low                 24.0 - 44.0   
%                                       [facility]  
   
                                                    MCH (RBC) [Entitic   
mass]               30.0 pg                                 26.0 - 34.0   
pg                                      [facility]  
   
                          MCHC (RBC) [Mass/Vol] 33.3 g/dL                 32.0 -  
 35.9   
g/dL                                    [facility]  
   
                      MCV (RBC) [Entitic vol] 90 fL                 80 - 100 fL   
[facility]  
   
                                                    Monocyte distribution   
width Auto (Bld)   
[Entitic vol]   19                              NINF - 20       [facility]  
   
                          Monocytes (Bld) [#/Vol] 0.75 10*3/uL              0.20  
 - 1.00   
K/uL                                    [facility]  
   
                      Monocytes/100 WBC (Bld) 9.8 %                 2.0 - 11.0 %  
 [facility]  
   
                                                    Neutrophils (Bld)   
[#/Vol]             5.21 10*3/uL                            1.50 - 8.00   
K/uL                                    [facility]  
   
                                                    Neutrophils/100 WBC   
(Bld)               68.0 %                                  31.0 - 76.0   
%                                       [facility]  
   
                                                    Platelet mean volume   
(Bld) [Entitic vol] 12.2 fL             High                7.5 - 11.2   
fL                                      [facility]  
   
                          Platelets (Bld) [#/Vol] 251 10*3/uL               150   
- 400   
K/uL                                    [facility]  
   
                      RBC (Bld) [#/Vol] 4.39 10*6/uL                       [facility]  
   
                          WBC (Bld) [#/Vol] 7.7 10*3/uL               4.5 - 11.5  
   
K/uL                                    [facility]  
   
                                                                  [facility]  
   
                                                    CT HEAD WO IV CONTRASTon    
   
                                        CT HEAD WO IV CONTRAST Interpreted By:   
Zhou Simmons,  
STUDY:  
CT HEAD WO IV CONTRAST;   
2024 2:58 am  
  
INDICATION:  
Signs/Symptoms:altered   
loc.  
  
COMPARISON:  
None  
  
ACCESSION NUMBER(S):  
[id number]  
  
ORDERING CLINICIAN:  
CHRISTIANO SANTOS  
  
TECHNIQUE:  
Contiguous axial images   
of the head were   
obtained without   
intravenous  
contrast.  
  
FINDINGS:  
BRAIN PARENCHYMA: The   
gray white matter   
differentiation is  
preserved. No mass   
effect or midline   
shift.  
  
HEMORRHAGE: No evidence   
of acute intracranial   
hemorrhage.  
VENTRICLES AND   
EXTRA-AXIAL SPACES: The   
ventricles are within   
normal  
limits in size for   
brain volume. No   
evidence of abnormal   
extraaxial  
fluid collection.   
EXTRACRANIAL SOFT   
TISSUES: Within normal   
limits.  
PARANASAL   
SINUSES/MASTOIDS: The   
visualized paranasal   
sinuses and  
mastoid air cells are   
clear and well   
pneumatized. CALVARIUM:   
No  
evidence of depressed   
calvarial fracture.  
  
OTHER FINDINGS: None  
  
IMPRESSION:  
No evidence of acute   
intracranial hemorrhage   
or mass effect.  
  
  
  
MACRO:  
None  
  
Signed by: Zhou Simmons   
2024 3:03 AM  
Dictation workstation:   
[id number]        Normal                                  [facility]  
   
                                                    Comprehensive metabolic 2000  
 panelon 2024   
   
                                                    Albumin BCP dye   
[Mass/Vol]      4.5 g/dL        Normal          3.4-5.0         [facility]  
   
                                        Comment on above:   Performed By: #### 5  
7021-8 ####  
AIDEE RODRIGUES ([id number])  
[facility] ([facility])  
[address] [place] [address]   
   
                                                    ALP [Catalytic   
activity/Vol]   39 U/L          Normal                    [facility]  
   
                                        Comment on above:   Performed By: #### 5  
7021-8 ####  
AIDEE RODRIGUES (31222)  
[facility] ([facility])  
[address] [place] [address]   
   
                                                    ALT With P-5'-P   
[Catalytic   
activity/Vol]   9 U/L           Normal          7-45            [facility]  
   
                                        Comment on above:   Result Comment: Kaleigh  
ents treated with Sulfasalazine may   
generate falsely decreased results for ALT.   
   
                                                            Performed By: #### 5  
7021-8 ####  
AIDEE RODRIGUES (55564)  
[facility] ([facility])  
[address] [place] [address]   
   
                      Anion gap [Moles/Vol] 17 mmol/L  Normal     10-20      [facility]  
   
                                        Comment on above:   Performed By: #### 5  
7021-8 ####  
AIDEE RODRIGUES (47057)  
[facility] ([facility])  
[address] [place] [address]   
   
                                                    AST With P-5'-P   
[Catalytic   
activity/Vol]   11 U/L          Normal          9-39            [facility]  
   
                                        Comment on above:   Performed By: #### 5  
7021-8 ####  
AIDEE RODRIGUES (57229)  
[facility] ([facility])  
[address] [place] [address]   
   
                      Bilirubin [Mass/Vol] 0.7 mg/dL  Normal     0.0-1.2    [facility]  
   
                                        Comment on above:   Performed By: #### 5  
7021-8 ####  
AIDEE RODRIGUES (40789)  
[facility] ([facility])  
[address] [place] [address]   
   
                      Calcium [Mass/Vol] 8.9 mg/dL  Normal     8.6-10.3   [facility]  
   
                                        Comment on above:   Performed By: #### 5  
7021-8 ####  
AIDEE RODRIGUES (24925)  
[facility] ([facility])  
[address] [place] [address] [place] [address]   
   
                      Chloride [Moles/Vol] 99 mmol/L  Normal          [facility]  
   
                                        Comment on above:   Performed By: #### 5  
7021-8 ####  
AIDEE RODRIGUES (71667)  
[facility] ([facility])  
[address] [place] [address]   
   
                      CO2 [Moles/Vol] 21 mmol/L  Normal     21-32      [facility]  
   
                                        Comment on above:   Performed By: #### 5  
7021-8 ####  
AIDEE RODRIGUES (07912)  
[facility] ([facility])  
[address] [place] [address]   
   
                      Creatinine [Mass/Vol] 0.59 mg/dL Normal     0.50-1.05  [facility]  
   
                                        Comment on above:   Performed By: #### 5  
7021-8 ####  
AIDEE RODRIGUES (83024)  
[facility] ([facility])  
[address] [place] [address]   
   
                                                    GFR/1.73 sq M.predicted   
MDRD (S/P/Bld) [Vol   
rate/Area]      mL/min/{1.73_m2} Normal          >60             [facility]  
   
                                        Comment on above:   Result Comment: Calc  
ulations of estimated GFR are performed   
using the  CKD-EPI Study Refit equation without the race   
variable for the IDMS-Traceable creatinine methods.  
https://jasn.asnjournals.org/content/early//ASN.  
623387   
   
                                                            Performed By: #### 5  
7021-8 ####  
AIDEE RODRIGUES (26973)  
[facility] ([facility])  
[facility] [place] [address]   
   
                      Glucose [Mass/Vol] 84 mg/dL   Normal     74-99      [facility]  
   
                                        Comment on above:   Performed By: #### 5  
7021-8 ####  
AIDEE RODRIGUES (91033)  
[facility] ([facility])  
[facility] [place] [address]   
   
                      Potassium [Moles/Vol] 3.2 mmol/L Low        3.5-5.3    [facility]  
   
                                        Comment on above:   Performed By: #### 5  
7021-8 ####  
AIDEE RODRIGUES (24086)  
[facility] ([facility])  
[address] [place] [address]   
   
                      Protein [Mass/Vol] 6.6 g/dL   Normal     6.4-8.2    [facility]  
   
                                        Comment on above:   Performed By: #### 5  
7021-8 ####  
AIDEE RODRIGUES (81184)  
[facility] ([facility])  
[address] [place], [place] [id number]   
   
                      Sodium [Moles/Vol] 134 mmol/L Low        136-145    [facility]  
   
                                        Comment on above:   Performed By: #### 5  
7021-8 ####  
AIDEE RODRIGUES (38205)  
[facility] ([facility])  
[address] [place] [address]   
   
                                                    Urea nitrogen   
[Mass/Vol]      7 mg/dL         Normal          6-23            [facility]  
   
                                        Comment on above:   Performed By: #### 5  
7021-8 ####  
AIDEE RODRIGUES (82396)  
[facility] ([facility])  
[address] [place] [address]   
   
                                                    DRUG SCREEN,URINEon 20  
24   
   
                                                    Amphetamines Screen Ql   
(U)                 Negative            Normal              Presumptive   
Negative                                [facility]  
   
                                        Comment on above:   Order Comment: Drug   
screen results are presumptive and should   
not be used to assesscompliance with prescribed medication.   
Contact the performing [facility] laboratoryto add-on definitive   
confirmatory testing if clinically indicated.Toxicology   
screening results are reported qualitatively. The   
concentration must???be greater than or equal to the cutoff to   
be reported as positive. The concentrationat which the   
screening test can detect an individual drug or metabolite   
varies.The absence of expected drug(s) and/or drug   
metabolite(s) may indicate non-compliance,inappropriate timing   
of specimen collection relative to drug administration, poor   
drugabsorption, diluted/adulterated urine, or limitations of   
testing. For medical purposesonly; not valid for forensic   
use.Interpretive questions should be directed to the   
laboratory medical directors.   
   
                                                            Result Comment: CUTO  
FF LEVEL: 500 NG/ML  
Cross-reactivity has been reported with high concentrations  
of the following drugs: buproprion, chloroquine,   
chlorpromazine,  
ephedrine, mephentermine, fenfluramine, phentermine,  
phenylpropanolamine, pseudoephedrine, and propranolol.   
   
                                                            Performed By: #### 5  
7021-8 ####  
AIDEE RODRIGUES (37182)  
[facility] ([facility])  
[address] [place] [address]   
   
                                                    Barbiturates Screen Ql   
(U)                 Negative            Normal              Presumptive   
Negative                                [facility]  
   
                                        Comment on above:   Order Comment: Drug   
screen results are presumptive and should   
not be used to assesscompliance with prescribed medication.   
Contact the performing [facility] laboratoryto add-on definitive   
confirmatory testing if clinically indicated.Toxicology   
screening results are reported qualitatively. The   
concentration must???be greater than or equal to the cutoff to   
be reported as positive. The concentrationat which the   
screening test can detect an individual drug or metabolite   
varies.The absence of expected drug(s) and/or drug   
metabolite(s) may indicate non-compliance,inappropriate timing   
of specimen collection relative to drug administration, poor   
drugabsorption, diluted/adulterated urine, or limitations of   
testing. For medical purposesonly; not valid for forensic   
use.Interpretive questions should be directed to the   
laboratory medical directors.   
   
                                                            Result Comment: CUTO  
FF LEVEL: 200 NG/ML   
   
                                                            Performed By: #### 5  
7021-8 ####  
AIDEE RODRIGUES (27624)  
[facility] ([facility])  
[address] [place] [address]   
   
                          Benzodiazepines Ql (U) Negative     Normal       Presu  
mptive   
Negative                                [facility]  
   
                                        Comment on above:   Order Comment: Drug   
screen results are presumptive and should   
not be used to assesscompliance with prescribed medication.   
Contact the performing [facility] laboratoryto add-on definitive   
confirmatory testing if clinically indicated.Toxicology   
screening results are reported qualitatively. The   
concentration must???be greater than or equal to the cutoff to   
be reported as positive. The concentrationat which the   
screening test can detect an individual drug or metabolite   
varies.The absence of expected drug(s) and/or drug   
metabolite(s) may indicate non-compliance,inappropriate timing   
of specimen collection relative to drug administration, poor   
drugabsorption, diluted/adulterated urine, or limitations of   
testing. For medical purposesonly; not valid for forensic   
use.Interpretive questions should be directed to the   
laboratory medical directors.   
   
                                                            Result Comment: CUTO  
FF LEVEL: 200 NG/ML   
   
                                                            Performed By: #### 5  
7021-8 ####  
AIDEE RODRIGUES (66042)  
[facility] ([facility])  
[facility] [place] [address]   
   
                                                    Benzoylecgonine Screen   
Ql (U)              Negative            Normal              Presumptive   
Negative                                [facility]  
   
                                        Comment on above:   Order Comment: Drug   
screen results are presumptive and should   
not be used to assesscompliance with prescribed medication.   
Contact the performing [facility] laboratoryto add-on definitive   
confirmatory testing if clinically indicated.Toxicology   
screening results are reported qualitatively. The   
concentration must???be greater than or equal to the cutoff to   
be reported as positive. The concentrationat which the   
screening test can detect an individual drug or metabolite   
varies.The absence of expected drug(s) and/or drug   
metabolite(s) may indicate non-compliance,inappropriate timing   
of specimen collection relative to drug administration, poor   
drugabsorption, diluted/adulterated urine, or limitations of   
testing. For medical purposesonly; not valid for forensic   
use.Interpretive questions should be directed to the   
laboratory medical directors.   
   
                                                            Result Comment: CUTO  
FF LEVEL: 150 NG/ML   
   
                                                            Performed By: #### 5  
7021-8 ####  
AIDEE RODRIGUES ([id number])  
[facility] ([facility])  
[address] [place] [address]   
   
                                                    Cannabinoids Screen Ql   
(U)                 Positive            Abnormal            Presumptive   
Negative                                [facility]  
   
                                        Comment on above:   Order Comment: Drug   
screen results are presumptive and should   
not be used to assesscompliance with prescribed medication.   
Contact the performing [facility] laboratoryto add-on definitive   
confirmatory testing if clinically indicated.Toxicology   
screening results are reported qualitatively. The   
concentration must???be greater than or equal to the cutoff to   
be reported as positive. The concentrationat which the   
screening test can detect an individual drug or metabolite   
varies.The absence of expected drug(s) and/or drug   
metabolite(s) may indicate non-compliance,inappropriate timing   
of specimen collection relative to drug administration, poor   
drugabsorption, diluted/adulterated urine, or limitations of   
testing. For medical purposesonly; not valid for forensic   
use.Interpretive questions should be directed to the   
laboratory medical directors.   
   
                                                            Result Comment: CUTO  
FF LEVEL: 50 NG/ML   
   
                                                            Performed By: #### 5  
7021-8 ####  
AIDEE RODRIGUES ([id number])  
[facility] ([facility])  
[facility] [place] [address]   
   
                                                    fentaNYL+Norfentanyl   
Screen Ql (U)       Negative            Normal              Presumptive   
Negative                                [facility]  
   
                                        Comment on above:   Order Comment: Drug   
screen results are presumptive and should   
not be used to assesscompliance with prescribed medication.   
Contact the performing [facility] laboratoryto add-on definitive   
confirmatory testing if clinically indicated.Toxicology   
screening results are reported qualitatively. The   
concentration must???be greater than or equal to the cutoff to   
be reported as positive. The concentrationat which the   
screening test can detect an individual drug or metabolite   
varies.The absence of expected drug(s) and/or drug   
metabolite(s) may indicate non-compliance,inappropriate timing   
of specimen collection relative to drug administration, poor   
drugabsorption, diluted/adulterated urine, or limitations of   
testing. For medical purposesonly; not valid for forensic   
use.Interpretive questions should be directed to the   
laboratory medical directors.   
   
                                                            Result Comment: CUTO  
FF LEVEL: 5 NG/ML   
   
                                                            Performed By: #### 5  
7021-8 ####  
AIDEE RODRIGUES ([id number])  
[facility] ([facility])  
[address] [place] [address]   
   
                          Opiates Screen Ql (U) Negative     Normal       Presum  
ptive   
Negative                                [facility]  
   
                                        Comment on above:   Order Comment: Drug   
screen results are presumptive and should   
not be used to assesscompliance with prescribed medication.   
Contact the performing [facility] laboratoryto add-on definitive   
confirmatory testing if clinically indicated.Toxicology   
screening results are reported qualitatively. The   
concentration must???be greater than or equal to the cutoff to   
be reported as positive. The concentrationat which the   
screening test can detect an individual drug or metabolite   
varies.The absence of expected drug(s) and/or drug   
metabolite(s) may indicate non-compliance,inappropriate timing   
of specimen collection relative to drug administration, poor   
drugabsorption, diluted/adulterated urine, or limitations of   
testing. For medical purposesonly; not valid for forensic   
use.Interpretive questions should be directed to the   
laboratory medical directors.   
   
                                                            Result Comment: CUTO  
FF LEVEL: 300 NG/ML  
The opiate screen does not detect fentanyl, meperidine, or  
tramadol. Oxycodone is not consistently detected (refer to  
Oxycodone Screen, Urine result).   
   
                                                            Performed By: #### 5  
7021-8 ####  
AIDEE RODRIGUES (94317)  
[facility] ([facility])  
[address] [place] [address]   
   
                                                    oxyCODONE+oxyMORphone   
Screen Ql (U)       Negative            Normal              Presumptive   
Negative                                [facility]  
   
                                        Comment on above:   Order Comment: Drug   
screen results are presumptive and should   
not be used to assesscompliance with prescribed medication.   
Contact the performing [facility] laboratoryto add-on definitive   
confirmatory testing if clinically indicated.Toxicology   
screening results are reported qualitatively. The   
concentration must???be greater than or equal to the cutoff to   
be reported as positive. The concentrationat which the   
screening test can detect an individual drug or metabolite   
varies.The absence of expected drug(s) and/or drug   
metabolite(s) may indicate non-compliance,inappropriate timing   
of specimen collection relative to drug administration, poor   
drugabsorption, diluted/adulterated urine, or limitations of   
testing. For medical purposesonly; not valid for forensic   
use.Interpretive questions should be directed to the   
laboratory medical directors.   
   
                                                            Result Comment: CUTO  
FF LEVEL: 100 NG/ML  
This test will accurately detect both oxycodone and   
oxymorphone.   
   
                                                            Performed By: #### 5  
7021-8 ####  
AIDEE RODRIGUES (75099)  
[facility] ([facility])  
[address] [place] [address]   
   
                          Phencyclidine Ql (U) Negative     Normal       Presump  
tive   
Negative                                [facility]  
   
                                        Comment on above:   Order Comment: Drug   
screen results are presumptive and should   
not be used to assesscompliance with prescribed medication.   
Contact the performing [facility] laboratoryto add-on definitive   
confirmatory testing if clinically indicated.Toxicology   
screening results are reported qualitatively. The   
concentration must???be greater than or equal to the cutoff to   
be reported as positive. The concentrationat which the   
screening test can detect an individual drug or metabolite   
varies.The absence of expected drug(s) and/or drug   
metabolite(s) may indicate non-compliance,inappropriate timing   
of specimen collection relative to drug administration, poor   
drugabsorption, diluted/adulterated urine, or limitations of   
testing. For medical purposesonly; not valid for forensic   
use.Interpretive questions should be directed to the   
laboratory medical directors.   
   
                                                            Result Comment: CUTO  
FF LEVEL: 25 NG/ML  
Cross-reactivity has been reported with dextromethorphan.   
   
                                                            Performed By: #### 5  
7021-8 ####  
HOSKINS RAVI ([id number])  
[facility] ([facility])  
[address] [place] [address]   
   
                                                    ECG 12-LEADon 2024   
   
                                        ECG 12-LEAD         Ventricular Rate  
75  
Atrial Rate  
75  
P-R Interval  
116  
QRS Duration  
78  
Q-T Interval  
380  
QTC Calculation(Bazett)  
424  
P Axis  
77  
R Axis  
80  
T Axis  
67  
QRS Count  
13  
Q Onset  
219  
P Onset  
161  
P Offset  
203  
T Offset  
409  
QTC Fredericia  
409  
Diagnosis  
Normal sinus rhythm  
Normal ECG  
When compared with ECG   
of 2024 14:49,  
Vent. rate has   
decreased BY 61 BPM  
See ED provider note   
for full interpretation   
and clinical   
correlation  
Confirmed by Ranjana Eason ([id number]) on   
2024 4:47:21 PM Normal                                  [facility]  
   
                                                    HCG (pregnancy test) IA.rapi  
d Ql (U)on 2024   
   
                                                    HCG (pregnancy test) Ql   
(U)             Negative        Normal          NEGATIVE        [facility]  
   
                                        Comment on above:   Performed By: #### 5  
7021-8 ####  
AIDEE RODRIGUES (46323)  
[facility] ([facility])  
[address] [place] [address]   
   
                                                    Magnesiumon 2024   
   
                      Magnesium [Mass/Vol] 1.79 mg/dL Normal     1.60-2.40  [facility]  
   
                                        Comment on above:   Performed By: #### 5  
7021-8 ####  
AIDEE RODRIGUES (14291)  
[facility] ([facility])  
[address] [place] [address]   
   
                                                    Prolactinon 2024   
   
                      Prolactin [Mass/Vol] 9.5 ug/L   Normal     3.0-20.0   [facility]  
   
                                        Comment on above:   Performed By: #### 2  
4323-8 ####  
AIDEE RODRIGUES (94648)  
[facility] ([facility])  
[address] [place], [place] [address]   
   
                                                    Urinalysis complete W Reflex  
 Culture panel (U)on 2024   
   
                      Appearance (U) Hazy       Normal     Clear      [facility]  
   
                                        Comment on above:   Performed By: #### 5  
7021-8 ####  
AIDEE RODRIGUES (28421)  
[facility] ([facility])  
[address] [place] [address]   
   
                                                    Bilirubin (U)   
[Mass/Vol]      Negative        Normal          NEGATIVE        [facility]  
   
                                        Comment on above:   Performed By: #### 5  
7021-8 ####  
AIDEE RODRIGUES (04035)  
[facility] ([facility])  
[address] [place] [address]   
   
                          Color (U)    Yellow       Normal       Straw,   
Yellow                                  [facility]  
   
                                        Comment on above:   Performed By: #### 5  
7021-8 ####  
AIDEE RODRIGUES (15001)  
[facility] ([facility])  
[address] [place] [address]   
   
                                                    Glucose Auto test strip   
(U) [Mass/Vol]  Negative        Normal          NEGATIVE        [facility]  
   
                                        Comment on above:   Performed By: #### 5  
7021-8 ####  
AIDEE RODRIGUES (40297)  
[facility] ([facility])  
[address] [place], [place] [address]   
   
                      Ketones (U) [Mass/Vol] 80 (2+)    Abnormal   NEGATIVE   [facility]  
[facility]  
   
                                        Comment on above:   Performed By: #### 5  
7021-8 ####  
AIDEE RODRIGUES (69540)  
[facility] ([facility])  
[address] [place], [place] [address]   
   
                                                    Leukocyte esterase Auto   
test strip Ql (U) TRACE           Abnormal        NEGATIVE        [facility]  
   
                                        Comment on above:   Performed By: #### 5  
7021-8 ####  
AIDEE RODRIGUES (47569)  
[facility] ([facility])  
[address] [place] [address]   
   
                                                    Nitrite Auto test strip   
Ql (U)          Negative        Normal          NEGATIVE        [facility]  
   
                                        Comment on above:   Performed By: #### 5  
7021-8 ####  
AIDEE RODRIGUES (11176)  
[facility] ([facility])  
[address] [place] [address]   
   
                          pH (U)       6.0 [pH]     Normal       5.0, 5.5,   
6.0, 6.5,   
7.0, 7.5,   
8.0                                     [facility]  
   
                                        Comment on above:   Performed By: #### 5  
7021-8 ####  
AIDEE RODRIGUES (40925)  
[facility] ([facility])  
[address] [place] [address]   
   
                      Protein (U) [Mass/Vol] 30 (1+)    Normal     NEGATIVE   [facility]  
[facility]  
   
                                        Comment on above:   Performed By: #### 5  
7021-8 ####  
AIDEE RODRIGUES (83856)  
[facility] ([facility])  
[address] [place] [address]   
   
                      RBC (U) [#/Vol] Negative   Normal     NEGATIVE   [facility]  
   
                                        Comment on above:   Performed By: #### 5  
7021-8 ####  
AIDEE RODRIGUES (07352)  
[facility] ([facility])  
[address] [place] [address]   
   
                                                    Specific gravity (U)   
[Rel density]   1.023           Normal          1.005-1.035     [facility]  
   
                                        Comment on above:   Performed By: #### 5  
7021-8 ####  
AIDEE RODRIGUES (66498)  
[facility] ([facility])  
[address] [place] [address]   
   
                                                    Urobilinogen (U)   
[Mass/Vol]      4.0 mg/dL       Normal          <2.0            [facility]  
   
                                        Comment on above:   Result Comment: Some  
 pigments and medications may cause a   
false positive urobilinogen.   
   
                                                            Performed By: #### 5  
7021-8 ####  
AIDEE RODRIGUES (49908)  
[facility] ([facility])  
[address] [place] [address]   
   
                                                    Urinalysis microscopic panel  
 Auto Ql (U)on 2024   
   
                                                    Bacteria Auto (Urine   
sed) [#/Area]   1+ /HPF         Abnormal        NONE SEEN       [facility]  
   
                                        Comment on above:   Performed By: #### 5  
7021-8 ####  
AIDEE RODRIGUES (63456)  
[facility] ([facility])  
[address] [place] [address]   
   
                                                    Crystals.amorphous   
Computer assisted (U)   
[#/Area]        1+ /HPF         Normal          NONE, 1+, 2+    [facility]  
   
                                        Comment on above:   Performed By: #### 5  
7021-8 ####  
AIDEE RODRIGUES (09418)  
[facility] ([facility])  
[address] [place] [address]   
   
                                                    Epithelial   
cells.squamous Auto   
(Urine sed) [#/Area] 1-9 (SPARSE)        Normal              Reference   
range not   
established.                            [facility]  
   
                                        Comment on above:   Performed By: #### 5  
7021-8 ####  
AIDEE RODRIGUES (95883)  
[facility] ([facility])  
[address] [place] [address]   
   
                                                    Mucus Auto (Urine sed)   
[#/Area]            3+ /LPF             Normal              Reference   
range not   
established.                            [facility]  
   
                                        Comment on above:   Performed By: #### 5  
7021-8 ####  
AIDEE RODRIGUES (47747)  
[facility] ([facility])  
[address] [place] [address]   
   
                                                    RBC Auto (Urine sed)   
[#/Area]            1-2                 Normal              NONE, 1-2,   
3-5                                     [facility]  
   
                                        Comment on above:   Performed By: #### 5  
7021-8 ####  
AIDEE RODRIGUES (66105)  
[facility] ([facility])  
[address] [place] [address]   
   
                                                    WBC Auto (Urine sed)   
[#/Area]        1-5             Normal          1-5, NONE       [facility]  
   
                                        Comment on above:   Performed By: #### 5  
7021-8 ####  
AIDEE RODRIGUES (25544)  
[facility] ([facility])  
[address] [place] [address]   
   
                                                    GI PANELon 2024   
   
                      ADENOVIRUS F 40/41 Not detected Normal     NOT DETECTED [facility]  
   
                                        Comment on above:   Result Comment: Tila moran:  
Nucleic acid may persist in vivo independently of organism   
viability.  
Additionally, some organisms may be carried asymptomatically.  
Detection of target organisms does not imply that the  
corresponding organisms are infectious or are the causative   
agent of clinical symptoms.  
Results must be correlated with clinical history,   
epidemiological data and other clinical information available.  
Testing performed at [place] [address]   
   
                                                            Performed By: #### G  
IPAN ####  
Testing performed at [facility]  
[address] [place] [address]  
#### CDDNAT ####  
Testing performed at [facility]  
[address] [place] [address]   
   
                      ASTROVIRUS Not detected Normal     NOT DETECTED [facility]  
   
                                        Comment on above:   Performed By: #### G  
IPAN ####  
Testing performed at [facility]  
[address] [place] [address]  
#### CDDNAT ####  
Testing performed at [address] [place] [address]   
   
                      CAMPYLOBACTER Not detected Normal     NOT DETECTED [facility]  
   
                                        Comment on above:   Performed By: #### G  
IPAN ####  
Testing performed at [address] [place] [address]  
#### CDDNAT ####  
Testing performed at [address] [place] [address]   
   
                      CRYPTOSPORIDIUM Not detected Normal     NOT DETECTED [facility]  
   
                                        Comment on above:   Performed By: #### G  
IPAN ####  
Testing performed at [address] [place] [address]  
#### CDDNAT ####  
Testing performed at [address] [place] [address]   
   
                      CYCLOSPORA CAYETANENSIS Not detected Normal     NOT DETECT  
ED [facility]  
   
                                        Comment on above:   Performed By: #### G  
IPAN ####  
Testing performed at [address] [place] [address]  
#### CDDNAT ####  
Testing performed at [address] [place] [address]   
   
                      ENTAMOEBA HISTOLYTICA Not detected Normal     NOT DETECTED  
 [facility]  
   
                                        Comment on above:   Performed By: #### G  
IPAN ####  
Testing performed at [address] [place] [address]  
#### CDDNAT ####  
Testing performed at [address] [place] [address]   
   
                                                    ENTEROAGGREGATIVE E   
COLI            Not detected    Normal          NOT DETECTED    [facility]  
   
                                        Comment on above:   Performed By: #### G  
IPAN ####  
Testing performed at [address] [place] [address]  
#### CDDNAT ####  
Testing performed at [address] [place] [address]   
   
                      ENTEROTOXIGENIC E COLI Not detected Normal     NOT DETECTE  
D [facility]  
   
                                        Comment on above:   Performed By: #### G  
IPAN ####  
Testing performed at [address] [place] [address]  
#### CDDNAT ####  
Testing performed at [address] [place] [address]   
   
                      ENTROPATHOGENIC E COLI Not detected Normal     NOT DETECTE  
D [facility]  
   
                                        Comment on above:   Performed By: #### G  
IPAN ####  
Testing performed at [address] [place] [address]  
#### CDDNAT ####  
Testing performed at [address] [place] [address]   
   
                      GIARDIA LAMBLIA Not detected Normal     NOT DETECTED [facility]  
   
                                        Comment on above:   Performed By: #### G  
IPAN ####  
Testing performed at [address] [place] [address]  
#### CDDNAT ####  
Testing performed at [address] [place] [address]   
   
                      NOROVIRUS GI/GII Not detected Normal     NOT DETECTED [facility]  
   
                                        Comment on above:   Performed By: #### G  
IPAN ####  
Testing performed at [address] [place] [address]  
#### CDDNAT ####  
Testing performed at [address] [place] [address]   
   
                                                    PLESIOMONAS   
SHIGELLOIDES    Not detected    Normal          NOT DETECTED    [facility]  
   
                                        Comment on above:   Performed By: #### G  
IPAN ####  
Testing performed at [address] [place] [address]  
#### CDDNAT ####  
Testing performed at [address] [place] [address]   
   
                      ROTAVIRUS A Not detected Normal     NOT DETECTED [facility]  
   
                                        Comment on above:   Performed By: #### G  
IPAN ####  
Testing performed at [address] [place] [address]  
#### CDDNAT ####  
Testing performed at [address] [place] [address]   
   
                      SALMONELLA Not detected Normal     NOT DETECTED [facility]  
   
                                        Comment on above:   Performed By: #### G  
IPAN ####  
Testing performed at [address] [place] [address]  
#### CDDNAT ####  
Testing performed at [address] [place] [address]   
   
                      SAPOVIRUS  Not detected Normal     NOT DETECTED [facility]  
   
                                        Comment on above:   Performed By: #### G  
IPAN ####  
Testing performed at [address] [place] [address]  
#### CDDNAT ####  
Testing performed at [address] [place] [address]   
   
                                                    SHIGA-LIKE   
TOXIN-PRODUCING E COLI Not detected    Normal          NOT DETECTED    [facility]  
   
                                        Comment on above:   Performed By: #### G  
IPAN ####  
Testing performed at [address] [address]  
#### CDDNAT ####  
Testing performed at [address] [address]   
   
                                                    SHIGELLA/ENTEROINVASIVE   
E COLI          Not detected    Normal          NOT DETECTED    [facility]  
   
                                        Comment on above:   Performed By: #### G  
IPAN ####  
Testing performed at [address] [address]  
#### CDDNAT ####  
Testing performed at [address] [place] [address]   
   
                      VIBRIO     Not detected Normal     NOT DETECTED [facility]  
   
                                        Comment on above:   Performed By: #### G  
IPAN ####  
Testing performed at [address] [place] [address]  
#### CDDNAT ####  
Testing performed at [address] [address]   
   
                      VIBRIO CHOLERAE Not detected Normal     NOT DETECTED [facility]  
   
                                        Comment on above:   Performed By: #### G  
IPAN ####  
Testing performed at [address] [place] [address]  
#### CDDNAT ####  
Testing performed at [address] [place] [address]   
   
                      YESINIA ENTEROCOLITICA Not detected Normal     NOT DETECTE  
D [facility]  
   
                                        Comment on above:   Performed By: #### G  
IPAN ####  
Testing performed at [address] [place] [address]  
#### CDDNAT ####  
Testing performed at [address] [address]   
   
                                                    C DIFFICILE BY PCR (CLOSTRID  
IUM DIFFICILE TOXIN)on 2024   
   
                      C. Diff 027 Negative              NEGATIVE   [facility]  
   
                                        Comment on above:   Hypervirulent C. dif  
ficile strain 027/NAP1/BI  
TESTING PERFORMED BY PCR  
  
   
   
                      C.DIFFICILE TOXIN,PCR Negative              NEGATIVE   [facility]  
   
                                                    C DIFFICILE DNAon 2024  
   
   
                      C. DIFF 027 Negative   Normal     NEGATIVE   [facility]  
   
                                        Comment on above:   Result Comment: Hype  
rvirulent C. difficile strain 027/NAP1/BI  
TESTING PERFORMED BY PCR   
   
                                                            Performed By: #### G  
IPAN ####  
Testing performed at [address] [place] [address]  
#### CDDNAT ####  
Testing performed at [address] [address]   
   
                      TOXIGENIC C. DIFF Negative   Normal     NEGATIVE   [facility]  
   
                                        Comment on above:   Performed By: #### G  
IPAN ####  
Testing performed at [address] [place] [address]  
#### CDDNAT ####  
Testing performed at [address] [place] [address]   
   
                                                    CBCon 2024   
   
                      ABSOLUTE BAS 0.1 10*3/uL Normal     0.0-0.2    [facility]  
   
                                        Comment on above:   Performed By: #### C  
MPF ACBC ####  
Testing performed at [address] [place] [address]   
   
                      ABSOLUTE EOS 0.1 10*3/uL Normal     0.0-0.7    [facility]  
   
                                        Comment on above:   Performed By: #### C  
MPF ACBC ####  
Testing performed at [address] [place] [address]   
   
                                                    ABSOLUTE NEUTROPHIL   
COUNT           6.5 10*3/uL     Normal          1.4-6.5         [facility]  
   
                                        Comment on above:   Performed By: #### C  
MPF ACBC ####  
Testing performed at [address] [place] [address]   
   
                      Basophils/100 WBC (Bld) 0.7 %      Normal     0.0-2.0    [facility]  
   
                                        Comment on above:   Performed By: #### C  
MPF, ACBC ####  
Testing performed at [address] [place] [address]   
   
                      DTYPE      AUTO DIFF  Normal                [facility]  
   
                                        Comment on above:   Performed By: #### C  
MPF, ACBC ####  
Testing performed at [address] [address]   
   
                                                    Eosinophils/100 WBC   
(Bld)           0.7 %           Normal          0.0-11.0        [facility]  
   
                                        Comment on above:   Performed By: #### C  
MPF, ACBC ####  
Testing performed at [address] [address]   
   
                                                    Lymphocytes (Bld)   
[#/Vol]         1.9 10*3/uL     Normal          1.2-3.4         [facility]  
   
                                        Comment on above:   Performed By: #### C  
MPF, ACBC ####  
Testing performed at [address] [address]   
   
                                                    Lymphocytes/100 WBC   
(Bld)           20.1 %          Normal          20.0-55.0       [facility]  
   
                                        Comment on above:   Performed By: #### C  
MPF, ACBC ####  
Testing performed at [address] [address]   
   
                      Monocytes (Bld) [#/Vol] 0.8 10*3/uL High       0.0-0.7      
[facility]  
   
                                        Comment on above:   Performed By: #### C  
MPF, ACBC ####  
Testing performed at [address] [address]   
   
                      Monocytes/100 WBC (Bld) 9.0 %      Normal     0.0-10.0   [facility]  
   
                                        Comment on above:   Performed By: #### C  
MPF ACBC ####  
Testing performed at [address] [address]   
   
                                                    Neutrophils/100 WBC   
(Bld)           69.5 %          Normal          37.0-75.0       [facility]  
   
                                        Comment on above:   Performed By: #### C  
MPF, ACBC ####  
Testing performed at [address] [address]   
   
                                                    Erythrocyte   
distribution width   
(RBC) [Ratio]   13.2 %          Normal          11.5-14.5       [facility]  
   
                                        Comment on above:   Performed By: #### C  
MPF, ACBC ####  
Testing performed at [address] [address]   
   
                                                    Hematocrit (Bld)   
[Volume fraction] 37.8 %          Normal          36.0-48.0       [facility]  
   
                                        Comment on above:   Performed By: #### C  
MPF, ACBC ####  
Testing performed at [facility]  
[address] [place] [address]   
   
                                                    Hemoglobin (Bld)   
[Mass/Vol]      12.8 g/dL       Normal          12.0-16.0       [facility]  
   
                                        Comment on above:   Performed By: #### C  
MPF, ACBC ####  
Testing performed at [address] [place] [address]   
   
                                                    MCH (RBC) [Entitic   
mass]           30.2 pg         Normal          26.0-35.0       [facility]  
   
                                        Comment on above:   Performed By: #### C  
MPF, ACBC ####  
Testing performed at [address] [place] [address]   
   
                      MCHC (RBC) [Mass/Vol] 33.9 g/dL  Normal     27.0-37.0  [facility]  
   
                                        Comment on above:   Performed By: #### C  
MPF, ACBC ####  
Testing performed at [address] [place] [address]   
   
                      MCV (RBC) [Entitic vol] 88.9 fL    Normal     80.0-100.0 [facility]  
   
                                        Comment on above:   Performed By: #### C  
MPF, ACBC ####  
Testing performed at [address] [place] [address]   
   
                                                    Platelet mean volume   
(Bld) [Entitic vol] 11.3 fL         High            7.4-11.0        [facility]  
   
                                        Comment on above:   Performed By: #### C  
MPF, ACBC ####  
Testing performed at [address] [place] [address]   
   
                      Platelets (Bld) [#/Vol] 228 10*3/uL Normal     130-400      
[facility]  
   
                                        Comment on above:   Performed By: #### C  
MPF, ACBC ####  
Testing performed at [address] [place] [address]   
   
                      RBC (Bld) [#/Vol] 4.25 10*6/uL Normal     4.0-5.4    [facility]  
   
                                        Comment on above:   Performed By: #### C  
MPF, ACBC ####  
Testing performed at [address] [place] [address]   
   
                      WBC (Bld) [#/Vol] 9.4 10*3/uL Normal     3.6-11.0   [facility]  
   
                                        Comment on above:   Performed By: #### C  
MPF, ACBC ####  
Testing performed at [facility]  
[address] [place] [address]   
   
                                                    CBC, EDIF, PLATELETon 2024   
   
                          ABSOLUTE BASOPHIL COUNT 0.1 10*3/uL               0.0   
- 0.2   
10*3/uL                                 [facility]   
System  
   
                      Basophils/100 WBC (Bld) 0.7 %                 0.0 - 2.0 %   
[facility]  
   
                                                    Differential cell count   
method Nom (Bld) AUTO DIFF                       %               [facility]  
   
                                                    Eosinophils (Bld)   
[#/Vol]             0.1 10*3/uL                             0.0 - 0.7   
10*3/uL                                 [facility]  
   
                                                    Eosinophils/100 WBC   
(Bld)           0.7 %                           0.0 - 11.0 %    [facility]  
   
                                                    Erythrocyte   
distribution width   
(RBC) [Ratio]       13.2 %                                  11.5 - 14.5   
%                                       [facility]  
   
                                                    Hematocrit (Bld)   
[Volume fraction]   37.8 %                                  36.0 - 48.0   
%                                       [facility]  
   
                                                    Hemoglobin (Bld)   
[Mass/Vol]      12.8 g/dL                                       [facility]  
   
                                                    Interpretation and   
review of laboratory   
results         Abnormal                                        [facility]  
   
                                                    Lymphocytes (Bld)   
[#/Vol]             1.9 10*3/uL                             1.2 - 3.4   
10*3/uL                                 [facility]   
System  
   
                                                    Lymphocytes/100 WBC   
(Bld)               20.1 %                                  20.0 - 55.0   
%                                       [facility]  
   
                                                    MCH (RBC) [Entitic   
mass]               30.2 pg                                 26.0 - 35.0   
PG                                      [facility]  
   
                      MCHC (RBC) [Mass/Vol] 33.9 g/dL                        [facility]  
   
                      MCV (RBC) [Entitic vol] 88.9 fL                          A  
[facility]   
System  
   
                          Monocytes (Bld) [#/Vol] 0.8 10*3/uL  High         0.0   
- 0.7   
10*3/uL                                 [facility]  
   
                      Monocytes/100 WBC (Bld) 9.0 %                 0.0 - 10.0 %  
 [facility]  
   
                                                    Neutrophils (Bld)   
[#/Vol]             6.5 10*3/uL                             1.4 - 6.5   
10*3/uL                                 [facility]   
System  
   
                                                    Neutrophils/100 WBC   
(Bld)               69.5 %                                  37.0 - 75.0   
%                                       [facility]  
   
                                                    Platelet mean volume   
(Bld) [Entitic vol] 11.3 fL         High                            [facility]  
   
                          Platelets (Bld) [#/Vol] 228 10*3/uL               130   
- 400   
10*3/uL                                 [facility]  
   
                          RBC (Bld) [#/Vol] 4.25 10*6/uL              4.0 - 5.4   
10*6/uL                                 [facility]  
   
                          WBC (Bld) [#/Vol] 9.4 10*3/uL               3.6 - 11.0  
   
10*3/uL                                 [facility]  
   
                                                    CMP FASTINGon 2024   
   
                                        GFR Information     Average GFR for 20-2  
9   
years old = 116.    Normal                                  [facility]  
   
                                        Comment on above:   Result Comment:   
enrico Kidney disease, GFR = <60.  
Kidney failure, GFR = <15.  
The GFR estimate is not adjusted for extreme body surface area   
or acute process, nor has it been validated for pregnant women   
or ethnic groups other than  and .   
   
                                                            Performed By: #### C  
MPF, ACBC ####  
Testing performed at [place] [id number]   
   
                      Glucose [Mass/Vol] 78 mg/dL   Normal          [facility]  
   
                                        Comment on above:   Result Comment:  
NORMAL <100 mg/dL  
PREDIABETES 101-126 mg/dL  
DIABETES 126 mg/dL or higher   
   
                                                            Performed By: #### C  
MPF, ACBC ####  
Testing performed at [address] [address]   
   
                      A:G RATIO  1.6 RATIO  Normal                [facility]  
   
                                        Comment on above:   Performed By: #### C  
MPF, ACBC ####  
Testing performed at [address] [address]   
   
                      ALBUMIN    3.9 G/dl   Normal     3.5-5.0    [facility]  
   
                                        Comment on above:   Performed By: #### C  
MPF, ACBC ####  
Testing performed at [address] [address]   
   
                                                    ALP [Catalytic   
activity/Vol]   51 U/L          Normal                    [facility]  
   
                                        Comment on above:   Performed By: #### C  
MPF, ACBC ####  
Testing performed at [address] [address]   
   
                                                    ALT [Catalytic   
activity/Vol]   14 U/L          Normal          <35             [facility]  
   
                                        Comment on above:   Performed By: #### C  
MPF, ACBC ####  
Testing performed at [address] [address]   
   
                                                    AST [Catalytic   
activity/Vol]   23 U/L          Normal          14-36           [facility]  
   
                                        Comment on above:   Performed By: #### C  
MPF, ACBC ####  
Testing performed at [address] [address]   
   
                      Bilirubin [Mass/Vol] 1.0 mg/dL  Normal     0.2-1.3    [facility]  
   
                                        Comment on above:   Performed By: #### C  
MPF, ACBC ####  
Testing performed at [address] [address]   
   
                      Calcium [Mass/Vol] 8.7 mg/dL  Normal     8.4-10.2   [facility]  
   
                                        Comment on above:   Performed By: #### C  
MPF, ACBC ####  
Testing performed at [address] [address]   
   
                      Chloride [Moles/Vol] 108 mmol/L High            [facility]  
   
                                        Comment on above:   Result Comment: Farzana mccabe note: Triglyceride levels of 600mg/dL   
or higher may positively bias chloride results by   
approximately 2.1 mmol   
   
                                                            Performed By: #### C  
MPF, ACBC ####  
Testing performed at [address] [address]   
   
                      CO2 [Moles/Vol] 12 mmol/L  Critically low 22-30      [facility]  
   
                                        Comment on above:   Result Comment: Resu  
lt called to read back by: ABEBE MARTÍNEZ   
2024 @ 05:52 by TAS   
   
                                                            Performed By: #### C  
MPF, ACBC ####  
Testing performed at [address] [address]   
   
                      Creatinine [Mass/Vol] 0.56 mg/dL Low        0.70-1.20  [facility]  
   
                                        Comment on above:   Performed By: #### C  
MPF, ACBC ####  
Testing performed at [address] [address]   
   
                                                    EST. GFR,   
American        174 ml/min/1.73sq.m Normal                          [facility]  
   
                                        Comment on above:   Performed By: #### C  
MPF, ACBC ####  
Testing performed at [address] [address]   
   
                                                    EST. GFR,Non    
American        144 ml/min/1.73sq.m Normal                          [facility]  
   
                                        Comment on above:   Performed By: #### C  
MPF, ACBC ####  
Testing performed at [address] [address]   
   
                      Potassium [Moles/Vol] 3.5 mmol/L Normal     3.5-5.1    [facility]  
   
                                        Comment on above:   Performed By: #### C  
MPF, KYLAH ####  
Testing performed at [address] [address]   
   
                      Protein [Mass/Vol] 6.3 g/dL   Normal     6.3-8.2    [facility]  
   
                                        Comment on above:   Performed By: #### C  
MPF, ACBC ####  
Testing performed at [place] [id number]   
   
                      Sodium [Moles/Vol] 136 mmol/L Low        137-145    [facility]  
   
                                        Comment on above:   Performed By: #### C  
MPFKYLAH ####  
Testing performed at [address] [address]   
   
                                                    Urea nitrogen   
[Mass/Vol]      4 mg/dL         Low             7-20            [facility]  
   
                                        Comment on above:   Performed By: #### C  
MPF, ACBC ####  
Testing performed at [address] [address]   
   
                                                    COMPREHENSIVE METABOLIC PANE  
[place]jamin 2024   
   
                          Albumin [Mass/Vol] 3.9 G/dl                  3.5 - 5.0  
   
G/dl                                    [facility]  
   
                                                    Albumin/Globulin [Mass   
ratio]          1.6 {ratio}                     RATIO           [facility]  
   
                                                    ALP [Catalytic   
activity/Vol]   51 U/L                                          [facility]  
   
                                                    ALT [Catalytic   
activity/Vol]   14 U/L                          NINF            [facility]  
   
                                                    AST [Catalytic   
activity/Vol]   23 U/L                                          [facility]  
   
                      Bilirubin [Mass/Vol] 1.0 mg/dL                        [facility]  
   
                      Calcium [Mass/Vol] 8.7 mg/dL                        [facility]  
   
                      Chloride [Moles/Vol] 108 mmol/L High                  [facility]  
   
                                        Comment on above:   Please note: Triglyc  
eride levels of 600mg/dL or higher may   
positively bias chloride results by approximately 2.1 mmol   
   
                      CO2 [Moles/Vol] 12 mmol/L  Critically low            [facility]  
   
                                        Comment on above:   Result called to jeimy godinez back by: ABEBE MARTÍNEZ 2024 @ 05:52   
by TAS   
   
                      Creatinine [Mass/Vol] 0.56 mg/dL Low                   [facility]  
   
                                        GFR COMMENT         Average GFR for 20-2  
9   
years old = 116.                                            [facility]  
   
                                        Comment on above:   Chronic Kidney disea  
se, GFR = <60.  
Kidney failure, GFR = <15.  
The GFR estimate is not adjusted for extreme body surface area   
or acute process, nor has it been validated for pregnant women   
or ethnic groups other than  and .  
  
   
   
                                                    GFR/1.73 sq M.predicted   
among blacks MDRD   
(S/P/Bld) [Vol   
rate/Area]          174 mL/min/{1.73_m2}                     ml/min/1.73s  
q.m                                     [facility]   
System  
   
                                                    GFR/1.73 sq M.predicted   
among non-blacks MDRD   
(S/P/Bld) [Vol   
rate/Area]          144 mL/min/{1.73_m2}                     ml/min/1.73s  
q.m                                     [facility]  
   
                      Glucose [Mass/Vol] 78 mg/dL                         [facility]  
   
                                        Comment on above:     
NORMAL <100 mg/dL  
PREDIABETES 101-126 mg/dL  
DIABETES 126 mg/dL or higher  
  
   
   
                                                    Interpretation and   
review of laboratory   
results         Abnormal                                        [facility]  
   
                      Potassium [Moles/Vol] 3.5 mmol/L                       [facility]  
   
                      Protein [Mass/Vol] 6.3 g/dL                         [facility]  
   
                      Sodium [Moles/Vol] 136 mmol/L Low                   [facility]  
   
                                                    Urea nitrogen   
[Mass/Vol]      4 mg/dL         Low                             [facility]  
   
                                                    CBCon 2024   
   
                      ABSOLUTE BAS 0.1 10*3/uL Normal     0.0-0.2    [facility]  
   
                                        Comment on above:   Performed By: #### C  
MPF, ACBC, LIPA2 ####  
[place] [address]   
   
                      ABSOLUTE EOS 0.0 10*3/uL Normal     0.0-0.7    [facility]  
   
                                        Comment on above:   Performed By: #### C  
MPF, ACBC, LIPA2 ####  
[place] [address]   
   
                                                    ABSOLUTE NEUTROPHIL   
COUNT           6.2 10*3/uL     Normal          1.4-6.5         [facility]  
   
                                        Comment on above:   Performed By: #### C  
MPF, ACBC, LIPA2 ####  
Testing performed at [address] [address]   
   
                      Basophils/100 WBC (Bld) 1.0 %      Normal     0.0-2.0    [facility] [place]  
   
                                        Comment on above:   Performed By: #### C  
MPF, ACBC, LIPA2 ####  
Testing performed at [address] [address]   
   
                      DTYPE      AUTO DIFF  Normal                [facility]  
   
                                        Comment on above:   Performed By: #### C  
MPF, ACBC, LIPA2 ####  
Testing performed at [address] [address]   
   
                                                    Eosinophils/100 WBC   
(Bld)           0.4 %           Normal          0.0-11.0        [facility]  
   
                                        Comment on above:   Performed By: #### C  
MPF, ACBC, LIPA2 ####  
Testing performed at [address] [address]   
   
                                                    Lymphocytes (Bld)   
[#/Vol]         1.4 10*3/uL     Normal          1.2-3.4         [facility]  
   
                                        Comment on above:   Performed By: #### C  
MPF, ACBC, LIPA2 ####  
Testing performed at [address] [address]   
   
                                                    Lymphocytes/100 WBC   
(Bld)           16.1 %          Low             20.0-55.0       [facility]  
   
                                        Comment on above:   Performed By: #### C  
MPF, ACBC, LIPA2 ####  
Testing performed at [address] [address]   
   
                      Monocytes (Bld) [#/Vol] 0.7 10*3/uL Normal     0.0-0.7      
[facility]  
   
                                        Comment on above:   Performed By: #### C  
MPF, ACBC, LIPA2 ####  
Testing performed at [address] [address]   
   
                      Monocytes/100 WBC (Bld) 8.8 %      Normal     0.0-10.0   [facility] [place]  
   
                                        Comment on above:   Performed By: #### C  
MPF, ACBC, LIPA2 ####  
Testing performed at [address] [address]   
   
                                                    Neutrophils/100 WBC   
(Bld)           73.7 %          Normal          37.0-75.0       [facility]  
   
                                        Comment on above:   Performed By: #### C  
MPF, ACBC, LIPA2 ####  
Testing performed at [address] [address]   
   
                                                    Erythrocyte   
distribution width   
(RBC) [Ratio]   13.6 %          Normal          11.5-14.5       [facility]  
   
                                        Comment on above:   Performed By: #### C  
MPF, ACBC, LIPA2 ####  
Testing performed at [address] [address]   
   
                                                    Hematocrit (Bld)   
[Volume fraction] 40.1 %          Normal          36.0-48.0       [facility]  
   
                                        Comment on above:   Performed By: #### C  
MPF, ACBC, LIPA2 ####  
Testing performed at [address] [address]   
   
                                                    Hemoglobin (Bld)   
[Mass/Vol]      13.3 g/dL       Normal          12.0-16.0       [facility]  
   
                                        Comment on above:   Performed By: #### C  
MPF, ACBC, LIPA2 ####  
Testing performed at [address] [address]   
   
                                                    MCH (RBC) [Entitic   
mass]           29.8 pg         Normal          26.0-35.0       [facility]  
   
                                        Comment on above:   Performed By: #### C  
MPF, ACBC, LIPA2 ####  
Testing performed at [address] [address]   
   
                      MCHC (RBC) [Mass/Vol] 33.1 g/dL  Normal     27.0-37.0  [facility]  
   
                                        Comment on above:   Performed By: #### C  
MPF, ACBC, LIPA2 ####  
Testing performed at [address] [address]   
   
                      MCV (RBC) [Entitic vol] 90.2 fL    Normal     80.0-100.0 [facility] [place]  
   
                                        Comment on above:   Performed By: #### C  
MPF, ACBC, LIPA2 ####  
Testing performed at [address] [address]   
   
                                                    Platelet mean volume   
(Bld) [Entitic vol] 10.2 fL         Normal          7.4-11.0        [facility]  
   
                                        Comment on above:   Performed By: #### C  
MPF, ACBC, LIPA2 ####  
Testing performed at [address] [address]   
   
                      Platelets (Bld) [#/Vol] 237 10*3/uL Normal     130-400      
[facility]  
   
                                        Comment on above:   Performed By: #### C  
MPF, ACBC, LIPA2 ####  
Testing performed at [address] [address]   
   
                      RBC (Bld) [#/Vol] 4.45 10*6/uL Normal     4.0-5.4    [facility]  
   
                                        Comment on above:   Performed By: #### C  
MPF, ACBC, LIPA2 ####  
Testing performed at [address] [address]   
   
                      WBC (Bld) [#/Vol] 8.4 10*3/uL Normal     3.6-11.0   [facility]  
   
                                        Comment on above:   Performed By: #### C  
MPF, ACBC, LIPA2 ####  
Testing performed at [address] [address]   
   
                                                    CBC W Auto Differential pane  
l (Bld)on 2024   
   
                      Basophils (Bld) [#/Vol] 0.10 x10*3/uL Normal     0.00-0.10  
  [facility]  
   
                                        Comment on above:   Performed By: #### 5  
7021-8 ####  
AIDEE RODRIGUES (55125)  
[facility] ([facility])  
[address] [place] [address]   
   
                      Basophils/100 WBC (Bld) 1.2 %      Normal     0.0-2.0    U  
[facility]  
   
                                        Comment on above:   Performed By: #### 5  
7021-8 ####  
AIDEE RODRIGUES (99581)  
[facility] ([facility])  
[address] [place] [address]   
   
                                                    Eosinophils (Bld)   
[#/Vol]         0.03 x10*3/uL   Normal          0.00-0.70       [facility]  
   
                                        Comment on above:   Performed By: #### 5  
7021-8 ####  
AIDEE RODRIGUES (10564)  
[facility] ([facility])  
[address] [place] [address]   
   
                                                    Eosinophils/100 WBC   
(Bld)           0.3 %           Normal          0.0-6.0         [facility]  
   
                                        Comment on above:   Performed By: #### 5  
7021-8 ####  
AIDEE RODRIGUES (35295)  
[facility] ([facility])  
[address] [place] [address]   
   
                                                    Erythrocyte   
distribution width   
(RBC) [Ratio]   12.4 %          Normal          11.5-14.5       [facility]  
   
                                        Comment on above:   Performed By: #### 5  
7021-8 ####  
AIDEE RODRIGUES (50863)  
[facility] ([facility])  
[address] [place] [address]   
   
                                                    Hematocrit (Bld)   
[Volume fraction] 39.0 %          Normal          36.0-46.0       [facility]  
   
                                        Comment on above:   Performed By: #### 5  
7021-8 ####  
AIDEE RODRIGUES (13873)  
[facility] ([facility])  
[address] [place] [address]   
   
                                                    Hemoglobin (Bld)   
[Mass/Vol]      13.1 g/dL       Normal          12.0-16.0       [facility]  
   
                                        Comment on above:   Performed By: #### 5  
7021-8 ####  
AIDEE RODRIGUES (57569)  
[facility] ([facility])  
[address] [place] [address]   
   
                                                    Immature granulocytes   
(Bld) [#/Vol]   0.02 x10*3/uL   Normal          0.00-0.70       [facility]  
   
                                        Comment on above:   Performed By: #### 5  
7021-8 ####  
AIDEE RODRIGUES (28510)  
[facility] ([facility])  
[address] [place] [address]   
   
                                                    Immature   
granulocytes/100 WBC   
(Bld)           0.2 %           Normal          0.0-0.9         [facility]  
   
                                        Comment on above:   Result Comment: Jossie  
ture Granulocyte Count (IG) includes   
promyelocytes, myelocytes and metamyelocytes but does not   
include bands. Percent differential counts (%) should be   
interpreted in the context of the absolute cell counts   
(cells/UL).   
   
                                                            Performed By: #### 5  
7021-8 ####  
AIDEE RODRIGUES (25079)  
[facility] ([facility])  
[address] [place] [address]   
   
                                                    Lymphocytes (Bld)   
[#/Vol]         1.25 x10*3/uL   Normal          1.20-4.80       [facility]  
   
                                        Comment on above:   Performed By: #### 5  
7021-8 ####  
AIDEE RODRIGUES (53371)  
[facility] ([facility])  
[address] [place] [address]   
   
                                                    Lymphocytes/100 WBC   
(Bld)           14.5 %          Normal          13.0-44.0       [facility]  
   
                                        Comment on above:   Performed By: #### 5  
7021-8 ####  
AIDEE RODRIGUES (61558)  
[facility] ([facility])  
[address] [place] [address]   
   
                                                    MCH (RBC) [Entitic   
mass]           29.8 pg         Normal          26.0-34.0       [facility]  
   
                                        Comment on above:   Performed By: #### 5  
7021-8 ####  
AIDEE RODRIGUES (27534)  
[facility] ([facility])  
[address] [place] [address]   
   
                      MCHC (RBC) [Mass/Vol] 33.6 g/dL  Normal     32.0-36.0  [facility]  
   
                                        Comment on above:   Performed By: #### 5  
7021-8 ####  
AIDEE RODRIGUES (26222)  
[facility] ([facility])  
[address] [place] [address]   
   
                      MCV (RBC) [Entitic vol] 89 fL      Normal          U  
[facility]  
   
                                        Comment on above:   Performed By: #### 5  
7021-8 ####  
AIDEE RODRIGUES (55683)  
[facility] ([facility])  
[address] [place] [address]   
   
                      Monocytes (Bld) [#/Vol] 0.64 x10*3/uL Normal     0.10-1.00  
  [facility]  
   
                                        Comment on above:   Performed By: #### 5  
7021-8 ####  
AIDEE RODRIGUES (53373)  
[facility] ([facility])  
[address] [place] [address]   
   
                      Monocytes/100 WBC (Bld) 7.4 %      Normal     2.0-10.0   U  
[facility]  
   
                                        Comment on above:   Performed By: #### 5  
7021-8 ####  
AIDEE RODRIGUES (60819)  
[facility] ([facility])  
[address] [place] [address]   
   
                                                    Neutrophils (Bld)   
[#/Vol]         6.56 x10*3/uL   Normal          1.20-7.70       [facility]  
   
                                        Comment on above:   Result Comment: Perc  
ent differential counts (%) should be   
interpreted in the context of the absolute cell counts   
(cells/uL).   
   
                                                            Performed By: #### 5  
7021-8 ####  
AIDEE RODRIGUES (46024)  
[facility] ([facility])  
[address] [place] [address]   
   
                                                    Neutrophils/100 WBC   
(Bld)           76.4 %          Normal          40.0-80.0       [facility]  
   
                                        Comment on above:   Performed By: #### 5  
7021-8 ####  
AIDEE RODRIGUES (89421)  
[facility] ([facility])  
[address] [place] [address]   
   
                                                    Nucleated RBC/100 WBC   
(Bld) [Ratio]   0.0 /100 WBCs   Normal          0.0-0.0         [facility]  
   
                                        Comment on above:   Performed By: #### 5  
7021-8 ####  
AIDEE RODRIGUES (05695)  
[facility] ([facility])  
[address] [place] [address]   
   
                      Platelets (Bld) [#/Vol] 278 x10*3/uL Normal     150-450     
 [facility]  
   
                                        Comment on above:   Performed By: #### 5  
7021-8 ####  
AIDEE RODRIGUES (41793)  
[facility] ([facility])  
[address] [place] [address]   
   
                      RBC (Bld) [#/Vol] 4.39 x10*6/uL Normal     4.00-5.20  [facility]  
   
                                        Comment on above:   Performed By: #### 5  
7021-8 ####  
AIDEE RODRIGUES (05914)  
[facility] ([facility])  
[address] [place] [address]   
   
                      WBC (Bld) [#/Vol] 8.6 x10*3/uL Normal     4.4-11.3   [facility]  
   
                                        Comment on above:   Performed By: #### 5  
7021-8 ####  
HOSKINS RAVI (38182)  
[facility] ([facility])  
[address] [place] [address]   
   
                                                    CBC, EDIF, PLATELETon 2024   
   
                          ABSOLUTE BASOPHIL COUNT 0.1 10*3/uL               0.0   
- 0.2   
10*3/uL                                 [facility]   
System  
   
                      Basophils/100 WBC (Bld) 1.0 %                 0.0 - 2.0 %   
[facility]   
System  
   
                                                    Differential cell count   
method Nom (Bld) AUTO DIFF                       %               [facility]   
System  
   
                                                    Eosinophils (Bld)   
[#/Vol]             0.0 10*3/uL                             0.0 - 0.7   
10*3/uL                                 [facility]   
System  
   
                                                    Eosinophils/100 WBC   
(Bld)           0.4 %                           0.0 - 11.0 %    [facility]   
System  
   
                                                    Erythrocyte   
distribution width   
(RBC) [Ratio]       13.6 %                                  11.5 - 14.5   
%                                       [facility]   
System  
   
                                                    Hematocrit (Bld)   
[Volume fraction]   40.1 %                                  36.0 - 48.0   
%                                       [facility]   
System  
   
                                                    Hemoglobin (Bld)   
[Mass/Vol]      13.3 g/dL                                       [facility]   
System  
   
                                                    Interpretation and   
review of laboratory   
results         Abnormal                                        [facility]   
System  
   
                                                    Lymphocytes (Bld)   
[#/Vol]             1.4 10*3/uL                             1.2 - 3.4   
10*3/uL                                 [facility]   
System  
   
                                                    Lymphocytes/100 WBC   
(Bld)               16.1 %              Low                 20.0 - 55.0   
%                                       [facility]   
System  
   
                                                    MCH (RBC) [Entitic   
mass]               29.8 pg                                 26.0 - 35.0   
PG                                      [facility]   
System  
   
                      MCHC (RBC) [Mass/Vol] 33.1 g/dL                        [facility]   
[facility]  
   
                      MCV (RBC) [Entitic vol] 90.2 fL                          [facility]  
[facility]   
[facility]  
   
                          Monocytes (Bld) [#/Vol] 0.7 10*3/uL               0.0   
- 0.7   
10*3/uL                                 [facility]   
System  
   
                      Monocytes/100 WBC (Bld) 8.8 %                 0.0 - 10.0 %  
 [facility]   
System  
   
                                                    Neutrophils (Bld)   
[#/Vol]             6.2 10*3/uL                             1.4 - 6.5   
10*3/uL                                 [facility]  
   
                                                    Neutrophils/100 WBC   
(Bld)               73.7 %                                  37.0 - 75.0   
%                                       [facility]  
   
                                                    Platelet mean volume   
(Bld) [Entitic vol] 10.2 fL                                         [facility]  
   
                          Platelets (Bld) [#/Vol] 237 10*3/uL               130   
- 400   
10*3/uL                                 [facility]  
   
                          RBC (Bld) [#/Vol] 4.45 10*6/uL              4.0 - 5.4   
10*6/uL                                 [facility]  
   
                          WBC (Bld) [#/Vol] 8.4 10*3/uL               3.6 - 11.0  
   
10*3/uL                                 [facility]  
   
                                                    CMP FASTINGon 2024   
   
                      A:G RATIO  1.7 RATIO  Normal                [facility]  
   
                                        Comment on above:   Performed By: #### C  
MPF, ACBC, LIPA2 ####  
Testing performed at [address] [address]   
   
                      ALBUMIN    4.5 G/dl   Normal     3.5-5.0    [facility]  
   
                                        Comment on above:   Performed By: #### C  
MPF, ACBC, LIPA2 ####  
Testing performed at [address] [address]   
   
                                                    ALP [Catalytic   
activity/Vol]   53 U/L          Normal                    [facility]  
   
                                        Comment on above:   Performed By: #### C  
MPF, ACBC, LIPA2 ####  
Testing performed at [address] [address]   
   
                                                    ALT [Catalytic   
activity/Vol]   15 U/L          Normal          <35             [facility]  
   
                                        Comment on above:   Performed By: #### C  
MPF, ACBC, LIPA2 ####  
Testing performed at [address] [address]   
   
                                                    AST [Catalytic   
activity/Vol]   26 U/L          Normal          14-36           [facility]  
   
                                        Comment on above:   Performed By: #### C  
MPF, ACBC, LIPA2 ####  
Testing performed at [address] [address]   
   
                      Bilirubin [Mass/Vol] 1.2 mg/dL  Normal     0.2-1.3    [facility]  
   
                                        Comment on above:   Performed By: #### C  
MPF, ACBC, LIPA2 ####  
Testing performed at [address] [address]   
   
                      Calcium [Mass/Vol] 8.8 mg/dL  Normal     8.4-10.2   [facility]  
   
                                        Comment on above:   Performed By: #### C  
MPF, ACBC, LIPA2 ####  
Testing performed at [address] [address]   
   
                      Chloride [Moles/Vol] 106 mmol/L Normal          [facility]  
   
                                        Comment on above:   Result Comment: Farzana mccabe note: Triglyceride levels of 600mg/dL   
or higher may positively bias chloride results by   
approximately 2.1 mmol   
   
                                                            Performed By: #### C  
MPF, ACBC, LIPA2 ####  
Testing performed at [address] [address]   
   
                      CO2 [Moles/Vol] 14 mmol/L  Critically low 22-30      [facility]  
   
                                        Comment on above:   Result Comment: Resu  
lt called to read back by: ESPINOZA SUTTON RN   
2024 @ 17:10 by [facility]   
   
                                                            Performed By: #### C  
MPF, ACBC, LIPA2 ####  
Testing performed at [address] [address]   
   
                      Creatinine [Mass/Vol] 0.60 mg/dL Low        0.70-1.20  [facility]  
   
                                        Comment on above:   Performed By: #### C  
MPF, ACBC, LIPA2 ####  
Testing performed at [address] [address]   
   
                                                    EST. GFR,   
American        161 ml/min/1.73sq.m [facility]  
   
                                        Comment on above:   Performed By: #### C  
MPF, ACBC, LIPA2 ####  
Testing performed at [address] [address]   
   
                                                    EST. GFR,Non    
American        133 ml/min/1.73sq.m [facility]  
   
                                        Comment on above:   Performed By: #### C  
MPF, ACBC, LIPA2 ####  
Testing performed at [address] [address]   
   
                                        GFR Information     Average GFR for 20-2  
9   
years old = 116.    Normal                                  [facility]  
   
                                        Comment on above:   Result Comment:   
enrico Kidney disease, GFR = <60.  
Kidney failure, GFR = <15.  
The GFR estimate is not adjusted for extreme body surface area   
or acute process, nor has it been validated for pregnant women   
or ethnic groups other than  and .   
   
                                                            Performed By: #### C  
MPF, ACBC, LIPA2 ####  
Testing performed at [address] [address]   
   
                      Glucose [Mass/Vol] 83 mg/dL   Normal          [facility]  
   
                                        Comment on above:   Result Comment:  
NORMAL <100 mg/dL  
PREDIABETES 101-126 mg/dL  
DIABETES 126 mg/dL or higher   
   
                                                            Performed By: #### C  
MPF, ACBC, LIPA2 ####  
Testing performed at [address] [address]   
   
                      Potassium [Moles/Vol] 3.5 mmol/L Normal     3.5-5.1    [facility]  
   
                                        Comment on above:   Performed By: #### C  
MPF, ACBC, LIPA2 ####  
Testing performed at [address] [address]   
   
                      Protein [Mass/Vol] 7.1 g/dL   Normal     6.3-8.2    [facility]  
   
                                        Comment on above:   Performed By: #### C  
MPF, ACBC, LIPA2 ####  
Testing performed at [address] [address]   
   
                      Sodium [Moles/Vol] 136 mmol/L Low        137-145    [facility]  
   
                                        Comment on above:   Performed By: #### C  
MPF, ACBC, LIPA2 ####  
Testing performed at [address] [address]   
   
                                                    Urea nitrogen   
[Mass/Vol]      3 mg/dL         Low             7-20            [facility]  
   
                                        Comment on above:   Performed By: #### C  
MPF, ACBC, LIPA2 ####  
Testing performed at [address] [address]   
   
                                                    COMPREHENSIVE METABOLIC PANE  
[place] 2024   
   
                          Albumin [Mass/Vol] 4.5 G/dl                  3.5 - 5.0  
   
G/dl                                    [facility]  
   
                                                    Albumin/Globulin [Mass   
ratio]          1.7 {ratio}                     RATIO           [facility]  
   
                                                    ALP [Catalytic   
activity/Vol]   53 U/L                                          [facility]  
   
                                                    ALT [Catalytic   
activity/Vol]   15 U/L                          NINF            [facility]  
   
                                                    AST [Catalytic   
activity/Vol]   26 U/L                                          [facility]  
   
                      Bilirubin [Mass/Vol] 1.2 mg/dL                        [facility]  
   
                      Calcium [Mass/Vol] 8.8 mg/dL                        [facility]  
   
                      Chloride [Moles/Vol] 106 mmol/L                       [facility]  
   
                                        Comment on above:   Please note: Triglyc  
eride levels of 600mg/dL or higher may   
positively bias chloride results by approximately 2.1 mmol   
   
                      CO2 [Moles/Vol] 14 mmol/L  Critically low            [facility]  
   
                                        Comment on above:   Result called to jeimy godinez back by: ESPINOZA SUTTON RN 2024 @ 17:10 by   
[facility]   
   
                      Creatinine [Mass/Vol] 0.60 mg/dL Low                   [facility]  
   
                                        GFR COMMENT         Average GFR for 20-2  
9   
years old = 116.                                            [facility]  
   
                                        Comment on above:   Chronic Kidney disea  
se, GFR = <60.  
Kidney failure, GFR = <15.  
The GFR estimate is not adjusted for extreme body surface area   
or acute process, nor has it been validated for pregnant women   
or ethnic groups other than  and .  
  
   
   
                                                    GFR/1.73 sq M.predicted   
among blacks MDRD   
(S/P/Bld) [Vol   
rate/Area]          161 mL/min/{1.73_m2}                     ml/min/1.73s  
q.m                                     [facility]   
System  
   
                                                    GFR/1.73 sq M.predicted   
among non-blacks MDRD   
(S/P/Bld) [Vol   
rate/Area]          133 mL/min/{1.73_m2}                     ml/min/1.73s  
q.m                                     [facility]  
   
                                                    Glucose post fast   
[Mass/Vol]      83 mg/dL                                        [facility]  
   
                                        Comment on above:     
NORMAL <100 mg/dL  
PREDIABETES 101-126 mg/dL  
DIABETES 126 mg/dL or higher  
  
   
   
                                                    Interpretation and   
review of laboratory   
results         Abnormal                                        [facility]  
   
                      Potassium [Moles/Vol] 3.5 mmol/L                       [facility]  
   
                      Protein [Mass/Vol] 7.1 g/dL                         [facility]  
   
                      Sodium [Moles/Vol] 136 mmol/L Low                   [facility]  
   
                                                    Urea nitrogen   
[Mass/Vol]      3 mg/dL         Low                             [facility]  
   
                                                    Comprehensive metabolic 2000  
 panelon 2024   
   
                                                    Albumin BCP dye   
[Mass/Vol]      4.6 g/dL        Normal          3.4-5.0         [facility]  
   
                                        Comment on above:   Performed By: #### 5  
7021-8 ####  
AIDEE RODRIGUES ([id number])  
[facility] ([facility])  
[address] [place] [address]   
   
                                                    ALP [Catalytic   
activity/Vol]   38 U/L          Normal                    [facility]  
   
                                        Comment on above:   Performed By: #### 5  
7021-8 ####  
AIDEE RODRIGUES (92825)  
[facility] ([facility])  
[address] [place] [address]   
   
                                                    ALT With P-5'-P   
[Catalytic   
activity/Vol]   10 U/L          Normal          7-45            [facility]  
   
                                        Comment on above:   Result Comment: Kaleigh  
ents treated with Sulfasalazine may   
generate falsely decreased results for ALT.   
   
                                                            Performed By: #### 5  
7021-8 ####  
AIDEE RODRIGUES (07154)  
[facility] ([facility])  
[address] [place] [address]   
   
                      Anion gap [Moles/Vol] 18 mmol/L  Normal     10-20      [facility]  
   
                                        Comment on above:   Performed By: ####   
7021-8 ####  
AIDEE RODRIGUES (05070)  
[facility] ([facility])  
[address] [place] [address]   
   
                                                    AST With P-5'-P   
[Catalytic   
activity/Vol]   13 U/L          Normal          9-39            [facility]  
   
                                        Comment on above:   Performed By: #### 5  
7021-8 ####  
AIDEE RODRIGUES (27490)  
[facility] ([facility])  
[address] [place] [address]   
   
                      Bilirubin [Mass/Vol] 0.8 mg/dL  Normal     0.0-1.2    [facility]  
   
                                        Comment on above:   Performed By: #### 5  
7021-8 ####  
AIDEE RODRIGUES (01730)  
[facility] ([facility])  
[address] [place] [address]   
   
                      Calcium [Mass/Vol] 9.0 mg/dL  Normal     8.6-10.3   [facility]  
   
                                        Comment on above:   Performed By: ####   
7021-8 ####  
AIDEE RODRIGUES (65518)  
[facility] ([facility])  
[address] [place] [address]   
   
                      Chloride [Moles/Vol] 102 mmol/L Normal          [facility]  
   
                                        Comment on above:   Performed By: #### 5  
7021-8 ####  
AIDEE RODRIGUES (63376)  
[facility] ([facility])  
[facility] [place] [address]   
   
                      CO2 [Moles/Vol] 19 mmol/L  Low        21-32      [facility]  
   
                                        Comment on above:   Performed By: #### 5  
7021-8 ####  
AIDEE RODRIGUES (46963)  
[facility] ([facility])  
[address] [place] [address]   
   
                      Creatinine [Mass/Vol] 0.63 mg/dL Normal     0.50-1.05  [facility]  
   
                                        Comment on above:   Performed By: #### 5  
7021-8 ####  
AIDEE RODRIGUES (68949)  
[facility] ([facility])  
[address] [place] [address]   
   
                                                    GFR/1.73 sq M.predicted   
MDRD (S/P/Bld) [Vol   
rate/Area]      mL/min/{1.73_m2} Normal          >60             [facility]  
   
                                        Comment on above:   Result Comment: Calc  
ulations of estimated GFR are performed   
using the  CKD-EPI Study Refit equation without the race   
variable for the IDMS-Traceable creatinine methods.  
https://jasn.asnjournals.org/content/early//ASN.  
872136   
   
                                                            Performed By: #### 5  
7021-8 ####  
AIDEE RODRIGUES (06037)  
[facility] ([facility])  
[address] [place] [address]   
   
                      Glucose [Mass/Vol] 85 mg/dL   Normal     74-99      [facility]  
   
                                        Comment on above:   Performed By: #### 5  
7021-8 ####  
AIDEE RODRIGUES (90387)  
[facility] ([facility])  
[address] [place] [address]   
   
                      Potassium [Moles/Vol] 3.5 mmol/L Normal     3.5-5.3    [facility]  
   
                                        Comment on above:   Performed By: #### 5  
7021-8 ####  
AIDEE RODRIGUES (84777)  
[facility] ([facility])  
[address] [place] [address]   
   
                      Protein [Mass/Vol] 6.9 g/dL   Normal     6.4-8.2    [facility]  
   
                                        Comment on above:   Performed By: #### 5  
7021-8 ####  
AIDEE RODRIGUES (75816)  
[facility] ([facility])  
[address] [place] [address]   
   
                      Sodium [Moles/Vol] 135 mmol/L Low        136-145    [facility]  
   
                                        Comment on above:   Performed By: #### 5  
7021-8 ####  
AIDEE RODRIGUES (08933)  
[facility] ([facility])  
[address] [place] [address]   
   
                                                    Urea nitrogen   
[Mass/Vol]      7 mg/dL         Normal          6-23            [facility]  
   
                                        Comment on above:   Performed By: #### 5  
7021-8 ####  
AIDEE RODRIGUES (93079)  
[facility] ([facility])  
[address] [place] [address]   
   
                                                    LIPASEon 2024   
   
                                                    Lipase [Catalytic   
activity/Vol]   80 U/L                          23 - 300 U/L    [facility]  
   
                                                    LIPASE,SERUMon 2024   
   
                      LIPASE,SERUM 80 U/L     Normal          [facility]  
   
                                        Comment on above:   Performed By: #### C  
MPF, ACBC, LIPA2 ####  
Testing performed at [address] [address]   
   
                                                    No Panel Informationon    
   
                                                                  [facility]  
   
                                                    RAPID TOX SCREEN,URINEon    
   
                      BUPRENORPHINE Negative   Normal     NEGATIVE   [facility]  
   
                                        Comment on above:   Result Comment: <12.  
5 ng/ml CUTOFF   
   
                                                            Performed By: #### R  
TOX ####  
Testing performed at [address] [address]   
   
                      MDMA       Negative   Normal     NEGATIVE   [facility]  
   
                                        Comment on above:   Result Comment: <100  
0 ng/ml CUTOFF   
   
                                                            Performed By: #### R  
TOX ####  
Testing performed at [address] [place] [address]   
   
                      OXYCODONE  Negative   Normal     NEGATIVE   [facility]  
   
                                        Comment on above:   Result Comment: <100  
 ng/ml CUTOFF   
   
                                                            Performed By: #### R  
TOX ####  
Testing performed at [address] [place] [address]   
   
                      AMPHETAMINE Negative   Normal     NEGATIVE   [facility]  
   
                                        Comment on above:   Result Comment: <500  
 ng/ml CUTOFF   
   
                                                            Performed By: #### R  
TOX ####  
Testing performed at [address] [place] [address]   
   
                      BARBITURATES Negative   Normal     NEGATIVE   [facility]  
   
                                        Comment on above:   Result Comment: <200  
 ng/ml CUTOFF   
   
                                                            Performed By: #### R  
TOX ####  
Testing performed at [address] [place] [address]   
   
                      BENZODIAZEPINES Negative   Normal     NEGATIVE   [facility]  
   
                                        Comment on above:   Result Comment: <200  
 ng/ml CUTOFF   
   
                                                            Performed By: #### R  
TOX ####  
Testing performed at [address] [place] [address]   
   
                      CANNABINOIDS Positive   Abnormal   NEGATIVE   [facility]  
   
                                        Comment on above:   Result Comment: <50   
ng/ml CUTOFF  
*Unconfirmed Screening Result* Unconfirmed screening results   
are to be used only for medical treatment purposes.   
   
                                                            Performed By: #### R  
TOX ####  
Testing performed at [address] [place] [address]   
   
                      COCAINE    Negative   Normal     NEGATIVE   [facility]  
   
                                        Comment on above:   Result Comment: <150  
 ng/ml CUTOFF   
   
                                                            Performed By: #### R  
TOX ####  
Testing performed at [address] [place] [address]   
   
                      METHADONE  Negative   Normal     NEGATIVE   [facility]  
   
                                        Comment on above:   Result Comment: Meth  
adone Metabolite  
<100 ng/ml CUTOFF   
   
                                                            Performed By: #### R  
TOX ####  
Testing performed at [address] [place] [address]   
   
                      METHAMPHETAMINE Negative   Normal     NEGATIVE   [facility]  
   
                                        Comment on above:   Result Comment: <500  
 ng/ml CUTOFF   
   
                                                            Performed By: #### R  
TOX ####  
Testing performed at [address] [place] [address]   
   
                      OPIATES    Negative   Normal     NEGATIVE   [facility]  
   
                                        Comment on above:   Result Comment: <300  
 ng/ml CUTOFF   
   
                                                            Performed By: #### R  
TOX ####  
Testing performed at [address] [place] [address]   
   
                                                    TRICYCLIC   
ANTIDEPRESSANTS Negative        Normal          NEGATIVE        [facility]  
   
                                        Comment on above:   Result Comment: <100  
0 ng/ml CUTOFF   
   
                                                            Performed By: #### R  
TOX ####  
Testing performed at [address] [address]   
   
                      FENTANYL   Negative   Normal     NEGATIVE   [facility]  
   
                                        Comment on above:   Performed By: #### R  
TOX ####  
Testing performed at [address] [address]   
   
                                                    TOXICOLOGY DRUG SCREEN, URIN  
[place] 2024   
   
                                                    Amphetamine (U)   
[Mass/Vol]          Negative                                NEGATIVE   
NG/ML                                   [facility]  
   
                                        Comment on above:   <500 ng/ml CUTOFF   
   
                                                    Barbiturates Screen Ql   
(U)                 Negative                                NEGATIVE   
NG/ML                                   [facility]   
System  
   
                                        Comment on above:   <200 ng/ml CUTOFF   
   
                          Benzodiazepines Ql (U) Negative                  NEGAT  
BHAVANI   
NG/ML                                   [facility]   
System  
   
                                        Comment on above:   <200 ng/ml CUTOFF   
   
                          Benzoylecgonine Ql (U) Negative                  NEGAT  
BHAVANI   
NG/ML                                   [facility]   
System  
   
                                        Comment on above:   <150 ng/ml CUTOFF   
   
                          Buprenorphine Ql (U) Negative                  NEGATIV  
E   
NG/ML                                   [facility] [employer] [facility]   
System  
   
                                        Comment on above:   <12.5 ng/ml CUTOFF   
   
                                                    Cannabinoids Screen Ql   
(U)                 Positive            Abnormal            NEGATIVE   
NG/ML                                   [facility]   
System  
   
                                        Comment on above:   <50 ng/ml CUTOFF  
*Unconfirmed Screening Result* Unconfirmed screening results   
are to be used only for medical treatment purposes.  
  
   
   
                          Fentanyl     Negative                  NEGATIVE   
NG/ML                                   [facility]   
System  
   
                                                    Interpretation and   
review of laboratory   
results         Abnormal                                        [facility]   
System  
   
                          Methadone Screen Ql (U) Negative                  NEGA  
TIVE   
NG/ML                                   [facility]   
System  
   
                                        Comment on above:   Methadone Metabolite  
<100 ng/ml CUTOFF  
  
   
   
                                                    Methamphetamine (U)   
[Mass/Vol]          Negative                                NEGATIVE   
NG/ML                                   [facility]   
System  
   
                                        Comment on above:   <500 ng/ml CUTOFF   
   
                                                    Methylenedioxymethamphe  
tamine Ql (Unsp spec) Negative                                NEGATIVE   
NG/ML                                   [facility]   
System  
   
                                        Comment on above:   <1000 ng/ml CUTOFF   
   
                          Opiates Screen Ql (U) Negative                  NEGATI  
VE   
NG/ML                                   [facility]   
System  
   
                                        Comment on above:   <300 ng/ml CUTOFF   
   
                          oxyCODONE Ql (U) Negative                  NEGATIVE   
NG/ML                                   [facility]   
System  
   
                                        Comment on above:   <100 ng/ml CUTOFF   
   
                                                    Tricyclic   
antidepressants Screen   
Ql (U)              Negative                                NEGATIVE   
NG/ML                                   [facility]   
System  
   
                                        Comment on above:   <1000 ng/ml CUTOFF   
   
                                                                  [facility]  
   
                                                    Triacylglycerol lipaseon    
   
                                                    Lipase [Catalytic   
activity/Vol]   15 U/L          Normal          9-82            [facility]  
   
                                        Comment on above:   Order Comment: Venip  
uncture immediately after or during the   
administration of Metamizole may lead to falsely low results.   
Testing should be performed immediately prior to Metamizole   
dosing.   
   
                                                            Performed By: #### 5  
7021-8 ####  
HOSKINS RAVI ([id number])  
[facility] ([facility])  
[address] [place] [address]   
   
                                                    CT ABDOMEN PELVIS WITH IV CO  
NTRAST ONLYon 02-   
   
                                                    CT ABDOMEN PELVIS WITH   
IV CONTRAST ONLY                        EXAMINATION:  
CT ABDOMEN PELVIS WITH   
IV CONTRAST ONLY  
HISTORY:  
ORDERING SYSTEM   
PROVIDED HISTORY: Upper   
and periumbilical   
abdominal pain,  
TECHNOLOGIST PROVIDED   
HISTORY:  
Illness/Other  
Reason for exam:   
seizure yesterday,   
vomiting since  
Encounter Type: Initial  
Additional signs and   
symptoms: .  
ORDERING SYSTEM   
PROVIDED DIAGNOSIS   
CODES:  
COMPARISON:  
2022  
TECHNIQUE:  
CT examination of the   
abdomen and pelvis   
following the   
administration of  
intravenous contrast.   
Coronal and sagittal   
reformations were   
performed.  
Dose reduction   
techniques were   
achieved by using   
automated exposure  
control and/or   
adjustment of mA and/or   
kV according to patient   
size and/or  
use of iterative   
reconstruction   
technique.  
CONTRAST:  
IOPAMIDOL 370 MG   
IODINE/ML (76 %)   
INTRAVENOUS SOLUTION -   
75 mL,  
FINDINGS:  
Lung bases:The   
visualized lung bases   
are clear. Small hiatal   
hernia.  
Liver: Unremarkable.  
Gallbladder:   
Unremarkable.  
Pancreas: Unremarkable.  
Spleen: Unremarkable.  
Adrenal glands:   
Unremarkable.  
Kidneys: The kidneys   
enhance   
symmetrically.There is   
no hydronephrosis.No  
focal renal lesions.   
Evaluation for   
nephrolithiasis is   
limited due to  
contrast in the renal   
collecting systems   
bilaterally.  
Bladder:   
Underdistended.  
Bowel: Mild diffuse   
colonic wall thickening   
which may be secondary   
to mild  
colitis. No evidence of   
bowel obstruction.   
Status post   
appendectomy.  
Retroperitoneum: No   
lymphadenopathy or   
mass.  
Vasculature: The   
abdominal aorta is   
normal in caliber.  
Mesentery: No abdominal   
ascites or   
lymphadenopathy. No   
free air.  
Pelvis: Small amount of   
free fluid in the   
pelvis. No   
lymphadenopathy.  
Body Wall: Small fat   
containing umbilical   
hernia.  
Bones: No acute   
abnormality.  
IMPRESSION:  
1. Mild diffuse   
colitis. No evidence of   
bowel obstruction.  
2. Small hiatal hernia.  
3. Small amount of free   
fluid in the   
cul-de-sac.  
Workstation ID: [id number]  
Dictated by: CEDRICK COOK on Sat Feb 10,   
2024 2:41:42 PM EST  
Transcribed by:   
CEDRICK COOK on Sat   
Feb 10, 2024 2:41:42 PM   
EST  
Finalized by: CEDRICK COOK on Sat Feb 10,   
2024 2:41:42 PM EST Normal                                  [facility]  
   
                                        Comment on above:   Order Comment: Injur  
y/Trauma or Illness?:Illness/Other  
How long have you had these symptoms (acute/chronic)?:Acute  
Reason for exam?:seizure yesterday, vomiting since  
Type of Exam?:Initial  
Additional signs and symptoms?:.   
   
                                                    CT HEAD OR BRAIN WITHOUT CON  
TRASTon 02-   
   
                                                    CT HEAD OR BRAIN   
WITHOUT CONTRAST                        EXAMINATION:  
CT HEAD OR BRAIN   
WITHOUT CONTRAST,   
02/10/2024  
COMPARISON:  
None.  
HISTORY:  
Dx: R56.9 (Seizure   
(HCC))  
Injury/Trauma or   
Illness?: Illness/Other  
How long have you had   
these symptoms   
(acute/chronic)?: Acute  
Seizure  
TECHNIQUE:  
3 mm axial images   
performed through the   
head. 3 mm axial,   
sagittal and  
coronal MPR   
reconstructions   
performed.  
Dose reduction   
techniques were   
achieved by using   
automated exposure  
control and/or   
adjustment of mA and/or   
kV according to patient   
size and/or  
use of iterative   
reconstruction   
technique.  
FINDINGS:  
The third, fourth, and   
lateral ventricles are   
normal in size, shape   
and  
position. There is no   
evidence of acute   
hemorrhage, mass effect   
or  
midline shift.   
Visualized paranasal   
sinuses, mastoid air   
cells and bony  
structures are   
unremarkable.  
IMPRESSION:  
No acute intracranial   
abnormality identified.   
If this is patient's   
1st  
seizure workup, an MRI   
of the head, without   
and with contrast, is a   
much  
more sensitive modality   
in assessing for   
seizure foci.  
GJT/vrs  
Workstation ID: [id number]  
Dictated by: COREEN SAMPSON on Sat Feb 10,   
2024 7:38:50 PM EST  
Transcribed by:   
KIRA GREEN on   
Sat Feb 10, 2024   
7:56:11 PM EST  
Finalized by: COREEN SAMPSON on Sat Feb 10,   
2024 8:15:26 PM EST Normal                                  [facility]  
   
                                        Comment on above:   Order Comment: Injur  
y/Trauma or Illness?:Illness/Other  
How long have you had these symptoms (acute/chronic)?:Acute  
Reason for exam?:seizures  
Type of Exam?:Initial  
Additional signs and symptoms?:vomiting, chills   
   
                                                    Bacteria identifiedon 2024   
   
                                                    Bacteria identified Cx   
Nom (U)                                 Test: Urine Culture  
Specimen Source: Clean   
Catch/Voided  
Specimen Type: Urine  
Specimen Date: 2024   
5:02 PM  
Result Date: 2024   
8:24 AM  
Result Status: Final   
result  
Abnormal: No  
Resulting Lab: [facility]   
LAB  
[address] [place] [address]  
Tel: [phone]  
  
CULTURE  
------------------  
Normal genitourinary   
maddison               Normal                                  [facility]  
   
                                        Comment on above:   Performed By: #### 6  
30-4 ####  
KATIA RIVERA ([id number])  
[facility] ([facility])  
[address] [place] [address]   
   
                                                    CBC W Auto Differential pane  
l (Bld)on 2024   
   
                      Basophils (Bld) [#/Vol] 0.13 x10*3/uL High       0.00-0.10  
  [facility]  
   
                                        Comment on above:   Performed By: #### 5  
7021-8 ####  
AIDEE RODRIGUES (15049)  
[facility] ([facility])  
[address] [place] [address]   
   
                      Basophils/100 WBC (Bld) 1.1 %      Normal     0.0-2.0    U  
[facility]  
   
                                        Comment on above:   Performed By: #### 5  
7021-8 ####  
AIDEE RODRIGUES (56562)  
[facility] ([facility])  
[address] [place] [address]   
   
                                                    Eosinophils (Bld)   
[#/Vol]         0.05 x10*3/uL   Normal          0.00-0.70       [facility]  
   
                                        Comment on above:   Performed By: #### 5  
7021-8 ####  
AIDEE RODRIGUES (89515)  
[facility] ([facility])  
[address] [place] [address]   
   
                                                    Eosinophils/100 WBC   
(Bld)           0.4 %           Normal          0.0-6.0         [facility]  
   
                                        Comment on above:   Performed By: #### 5  
7021-8 ####  
AIDEE RODRIGUES (07124)  
[facility] ([facility])  
[address] [place] [address]   
   
                                                    Erythrocyte   
distribution width   
(RBC) [Ratio]   12.3 %          Normal          11.5-14.5       [facility]  
   
                                        Comment on above:   Performed By: #### 5  
7021-8 ####  
AIDEE RODRIGUES (09175)  
[facility] ([facility])  
[address] [place] [address]   
   
                                                    Hematocrit (Bld)   
[Volume fraction] 41.1 %          Normal          36.0-46.0       [facility]  
   
                                        Comment on above:   Performed By: #### 5  
7021-8 ####  
AIDEE RODRIGUES (18380)  
[facility] ([facility])  
[address] [place] [address]   
   
                                                    Hemoglobin (Bld)   
[Mass/Vol]      14.0 g/dL       Normal          12.0-16.0       [facility]  
   
                                        Comment on above:   Performed By: #### 5  
7021-8 ####  
AIDEE RODRIGUES (90801)  
[facility] ([facility])  
[address] [place] [address]   
   
                                                    Immature granulocytes   
(Bld) [#/Vol]   0.03 x10*3/uL   Normal          0.00-0.70       [facility]  
   
                                        Comment on above:   Performed By: #### 5  
7021-8 ####  
AIDEE RODRIGUES (07617)  
[facility] ([facility])  
[address] [place] [address]   
   
                                                    Immature   
granulocytes/100 WBC   
(Bld)           0.3 %           Normal          0.0-0.9         [facility]  
   
                                        Comment on above:   Result Comment: Jossie  
ture Granulocyte Count (IG) includes   
promyelocytes, myelocytes and metamyelocytes but does not   
include bands. Percent differential counts (%) should be   
interpreted in the context of the absolute cell counts   
(cells/UL).   
   
                                                            Performed By: #### 5  
7021-8 ####  
AIDEE RODRIGUES (65979)  
[facility] ([facility])  
[address] [place] [address]   
   
                                                    Lymphocytes (Bld)   
[#/Vol]         1.82 x10*3/uL   Normal          1.20-4.80       [facility]  
   
                                        Comment on above:   Performed By: #### 5  
7021-8 ####  
AIDEE RODRIGUES (33821)  
[facility] ([facility])  
[address] [place] [address]   
   
                                                    Lymphocytes/100 WBC   
(Bld)           15.9 %          Normal          13.0-44.0       [facility]  
   
                                        Comment on above:   Performed By: #### 5  
7021-8 ####  
AIDEE RODRIGUES (12312)  
[facility] ([facility])  
[address] [place] [address]   
   
                                                    MCH (RBC) [Entitic   
mass]           29.6 pg         Normal          26.0-34.0       [facility]  
   
                                        Comment on above:   Performed By: #### 5  
7021-8 ####  
AIDEE RODRIGUES (63996)  
[facility] ([facility])  
[address] [place] [address]   
   
                      MCHC (RBC) [Mass/Vol] 34.1 g/dL  Normal     32.0-36.0  [facility]  
   
                                        Comment on above:   Performed By: #### 5  
7021-8 ####  
AIDEE RODRIGUES (27659)  
[facility] ([facility])  
[address] [place] [address]   
   
                      MCV (RBC) [Entitic vol] 87 fL      Normal          U  
[facility]  
   
                                        Comment on above:   Performed By: #### 5  
7021-8 ####  
AIDEE RODRIGUES (29542)  
[facility] ([facility])  
[address] [place] [address]   
   
                      Monocytes (Bld) [#/Vol] 0.80 x10*3/uL Normal     0.10-1.00  
  [facility]  
   
                                        Comment on above:   Performed By: #### 5  
7021-8 ####  
AIDEE RODRIGUES (90846)  
[facility] ([facility])  
[address] [place] [address]   
   
                      Monocytes/100 WBC (Bld) 7.0 %      Normal     2.0-10.0   U  
[facility]  
   
                                        Comment on above:   Performed By: #### 5  
7021-8 ####  
AIDEE RODRIGUES (34660)  
[facility] ([facility])  
[address] [place] [address]   
   
                                                    Neutrophils (Bld)   
[#/Vol]         8.59 x10*3/uL   High            1.20-7.70       [facility]  
   
                                        Comment on above:   Result Comment: Perc  
ent differential counts (%) should be   
interpreted in the context of the absolute cell counts   
(cells/uL).   
   
                                                            Performed By: #### 5  
7021-8 ####  
AIDEE RODRIGUES (07263)  
[facility] ([facility])  
[address] [place] [address]   
   
                                                    Neutrophils/100 WBC   
(Bld)           75.3 %          Normal          40.0-80.0       [facility]  
   
                                        Comment on above:   Performed By: #### 5  
7021-8 ####  
AIDEE RODRIGUES (03765)  
[facility] ([facility])  
[address] [place] [address]   
   
                                                    Nucleated RBC/100 WBC   
(Bld) [Ratio]   0.0 /100 WBCs   Normal          0.0-0.0         [facility]  
   
                                        Comment on above:   Performed By: #### 5  
7021-8 ####  
AIDEE RODRIGUES (69125)  
[facility] ([facility])  
[address] [place] [address]   
   
                      Platelets (Bld) [#/Vol] 347 x10*3/uL Normal     150-450     
 [facility]  
   
                                        Comment on above:   Performed By: #### 5  
7021-8 ####  
AIDEE RODRIGUES (22343)  
[facility] ([facility])  
[address] [place] [address]   
   
                      RBC (Bld) [#/Vol] 4.73 x10*6/uL Normal     4.00-5.20  [facility]  
   
                                        Comment on above:   Performed By: #### 5  
7021-8 ####  
AIDEE RODRIGUES (53608)  
[facility] ([facility])  
[address] [place] [address]   
   
                      WBC (Bld) [#/Vol] 11.4 x10*3/uL High       4.4-11.3   [facility]  
   
                                        Comment on above:   Performed By: #### 5  
7021-8 ####  
AIDEE RODRIGUES (21315)  
[facility] ([facility])  
[address] [place] [address]   
   
                                                    Comprehensive metabolic 2000  
 panelon 2024   
   
                                                    Albumin BCP dye   
[Mass/Vol]      5.0 g/dL        Normal          3.4-5.0         [facility]  
   
                                        Comment on above:   Performed By: #### 2  
4323-8 ####  
AIDEE RODRIGUES (98815)  
[facility] ([facility])  
[address] [place] [address]   
   
                                                    ALP [Catalytic   
activity/Vol]   49 U/L          Normal                    [facility]  
   
                                        Comment on above:   Performed By: #### 2  
4323-8 ####  
AIDEE RODRIGUES (75232)  
[facility] ([facility])  
[facility] [place] [address]   
   
                                                    ALT With P-5'-P   
[Catalytic   
activity/Vol]   11 U/L          Normal          7-45            [facility]  
   
                                        Comment on above:   Result Comment: Kaleigh  
ents treated with Sulfasalazine may   
generate falsely decreased results for ALT.   
   
                                                            Performed By: #### 2  
4323-8 ####  
AIDEE RODRIGUES (41252)  
[facility] ([facility])  
[address] [place] [address]   
   
                      Anion gap [Moles/Vol] 23 mmol/L  High       10-20      [facility]  
   
                                        Comment on above:   Performed By: #### 2  
4323-8 ####  
AIDEE RODRIGUES (58477)  
[facility] ([facility])  
[address] [place] [address]   
   
                                                    AST With P-5'-P   
[Catalytic   
activity/Vol]   15 U/L          Normal          9-39            [facility]  
   
                                        Comment on above:   Performed By: #### 2  
4323-8 ####  
AIDEE RODRIGUES (03114)  
[facility] ([facility])  
[address] [place] [address]   
   
                      Bilirubin [Mass/Vol] 1.3 mg/dL  High       0.0-1.2    [facility]  
   
                                        Comment on above:   Performed By: #### 2  
4323-8 ####  
AIDEE RODRIGUES (36267)  
[facility] ([facility])  
[address] [place] [address]   
   
                      Calcium [Mass/Vol] 10.1 mg/dL Normal     8.6-10.3   [facility]  
   
                                        Comment on above:   Performed By: #### 2  
4323-8 ####  
AIDEE RODRIGUES (61923)  
[facility] ([facility])  
[address] [place] [address]   
   
                      Chloride [Moles/Vol] 102 mmol/L Normal          [facility]  
   
                                        Comment on above:   Performed By: #### 2  
4323-8 ####  
AIDEE RODRIGUES (99989)  
[facility] ([facility])  
[address] [place] [address]   
   
                      CO2 [Moles/Vol] 18 mmol/L  Low        21-32      [facility]  
   
                                        Comment on above:   Performed By: #### 2  
4323-8 ####  
AIDEE RODRIGUES (11613)  
[facility] ([facility])  
[address] [place] [address]   
   
                      Creatinine [Mass/Vol] 0.79 mg/dL Normal     0.50-1.05  [facility]  
   
                                        Comment on above:   Performed By: #### 2  
4323-8 ####  
AIDEE RODRIGUES (26784)  
[facility] ([facility])  
[address] [place] [address]   
   
                                                    GFR/1.73 sq M.predicted   
MDRD (S/P/Bld) [Vol   
rate/Area]      mL/min/{1.73_m2} Normal          >60             [facility]  
   
                                        Comment on above:   Result Comment: Calc  
ulations of estimated GFR are performed   
using the  CKD-EPI Study Refit equation without the race   
variable for the IDMS-Traceable creatinine methods.  
https://jasn.asnjournals.org/content/early//ASN.  
212981   
   
                                                            Performed By: #### 2  
4323-8 ####  
AIDEE RODRIGUES (65577)  
[facility] ([facility])  
[facility] [place] [address]   
   
                      Glucose [Mass/Vol] 123 mg/dL  High       74-99      [facility]  
   
                                        Comment on above:   Performed By: #### 2  
4323-8 ####  
AIDEE RODRIGUES (99772)  
[facility] ([facility])  
[facility] [place] [address]   
   
                      Potassium [Moles/Vol] 3.7 mmol/L Normal     3.5-5.3    [facility]  
   
                                        Comment on above:   Performed By: #### 2  
4323-8 ####  
AIDEE RODRIGUES (18343)  
[facility] ([facility])  
[address] [place] [address]   
   
                      Protein [Mass/Vol] 8.1 g/dL   Normal     6.4-8.2    [facility]  
   
                                        Comment on above:   Performed By: #### 2  
4323-8 ####  
AIDEE RODRIGUES (94056)  
[facility] ([facility])  
[address] [place] [address]   
   
                      Sodium [Moles/Vol] 139 mmol/L Normal     136-145    [facility]  
   
                                        Comment on above:   Performed By: #### 2  
4323-8 ####  
AIDEE RODRIGUES (55543)  
[facility] ([facility])  
[address] [place] [address]   
   
                                                    Urea nitrogen   
[Mass/Vol]      13 mg/dL        Normal          6-23            [facility]  
   
                                        Comment on above:   Performed By: #### 2  
4323-8 ####  
AIDEE RODRIGUES (25251)  
[facility] ([facility])  
[address] [place] [address]   
   
                                                    ECG 12-LEADon 2024   
   
                                        ECG 12-LEAD         Ventricular Rate  
136  
Atrial Rate  
136  
P-R Interval  
118  
QRS Duration  
76  
Q-T Interval  
312  
QTC Calculation(Bazett)  
469  
P Axis  
83  
R Axis  
91  
T Axis  
72  
QRS Count  
22  
Q Onset  
220  
P Onset  
161  
P Offset  
205  
T Offset  
376  
QTC Fredericia  
409  
Diagnosis  
Sinus tachycardia  
Rightward axis  
Borderline ECG  
When compared with ECG   
of 2022 17:28,  
Previous ECG has   
undetermined rhythm,   
needs review  
See ED provider note   
for full interpretation   
and clinical   
correlation  
Confirmed by Ranjana Eason ([id number]) on   
2024 6:58:48 PM Normal                                  [facility]  
   
                                                    FLUAV and FLUBV RNA KIRSTEN+prob  
e Nom (Unsp spec)on 2024   
   
                                                    FLUAV RNA KIRSTEN+probe Ql   
(Resp)          Not detected    Normal          Not Detected    [facility]  
   
                                        Comment on above:   Order Comment: This   
assay is an in vitro diagnostic multiplex   
nucleic acid amplification test for the detection and   
discrimination of Influenza A & B from nasopharyngeal   
specimens, and has been validated for use at [facility]. Negative results do not preclude   
Influenza A/B infections, and should not be used as the sole   
basis for diagnosis, treatment, or other management decisions.   
If Influenza A/B and RSV PCR results are negative, testing for   
Parainfluenza virus, Adenovirus and Metapneumovirus is   
routinely performed for [facility] and intensive   
care inpatients, and is available on other patients by placing   
an add-on request.   
   
                                                            Performed By: #### 4  
8509-4 ####  
AIDEE RODRIGUES (72019)  
[facility] ([facility])  
[address] [place] [address]   
   
                                                    FLUBV RNA KIRSTEN+probe Ql   
(Resp)          Not detected    Normal          Not Detected    [facility]  
   
                                        Comment on above:   Order Comment: This   
assay is an in vitro diagnostic multiplex   
nucleic acid amplification test for the detection and   
discrimination of Influenza A & B from nasopharyngeal   
specimens, and has been validated for use at [facility]. Negative results do not preclude   
Influenza A/B infections, and should not be used as the sole   
basis for diagnosis, treatment, or other management decisions.   
If Influenza A/B and RSV PCR results are negative, testing for   
Parainfluenza virus, Adenovirus and Metapneumovirus is   
routinely performed for [facility] and intensive   
care inpatients, and is available on other patients by placing   
an add-on request.   
   
                                                            Performed By: #### 4  
8509-4 ####  
AIDEE RODRIGUES (34140)  
[facility] ([facility])  
[address] [place] [address]   
   
                                                    Glucose Test strip manual (B  
ld) [Mass/Vol]on 2024   
   
                      Glucose [Mass/Vol] 129 mg/dL  High       74-99      [facility]  
   
                                        Comment on above:   Performed By: #### 2  
341-6 ####  
AIDEE RODRIGUES (06632)  
[facility] ([facility])  
[address] [place], [place] [address]   
   
                                                    HCG (pregnancy test) IAshannon  
d Ql (U)on 2024   
   
                                                    HCG (pregnancy test) Ql   
(U)             Negative        Normal          NEGATIVE        [facility]  
   
                                        Comment on above:   Performed By: #### 8  
0384-1 ####  
AIDEE RODRIGUES (80850)  
[facility] ([facility])  
[address] [place] [address]   
   
                                                    SARS coronavirus 2 RNAon    
   
                                                    SARS-CoV-2 (COVID-19)   
RNA KIRSTEN+probe Ql (Resp) Not detected    Normal          Not Detected    [facility]  
   
                                        Comment on above:   Order Comment: This   
assay has received FDA Emergency Use   
Authorization (EUA) and is only authorized for the duration of   
time that circumstances exist to justify the authorization of   
the emergency use of in vitro diagnostic tests for the   
detection of SARS-CoV-2 virus and/or diagnosis of COVID-19   
infection under section 564(b)(1) of the Act, 21 U.S.C.   
360bbb-3(b)(1). This assay is an in vitro diagnostic nucleic   
acid amplification test for the qualitative detection of   
SARS-CoV-2 from nasopharyngeal specimens and has been   
validated for use at [facility].   
Negative results do not preclude COVID-19 infections and   
should not be used as the sole basis for diagnosis, treatment,   
or other management decisions.   
   
                                                            Performed By: #### 9  
4500-6 ####  
AIDEE RODRIGUES ([id number])  
[facility] ([facility])  
[address] [place] [address]   
   
                                                    Urinalysis complete W Reflex  
 Culture panel (U)on 2024   
   
                      Appearance (U) Hazy       Normal     Clear      [facility]  
   
                                        Comment on above:   Performed By: #### 5  
8077-9 ####  
AIDEE RODRIGUES (69715)  
[facility] ([facility])  
[address] [place] [address]   
   
                                                    Bilirubin (U)   
[Mass/Vol]      Negative        Normal          NEGATIVE        [facility]  
   
                                        Comment on above:   Performed By: #### 5  
8077-9 ####  
AIDEE RODRIGUES (98930)  
[facility] ([facility])  
[address] [place] [address]   
   
                          Color (U)    Yellow       Normal       Straw,   
Yellow                                  [facility]  
   
                                        Comment on above:   Performed By: #### 5  
8077-9 ####  
AIDEE RODRIGUES (85986)  
[facility] ([facility])  
[address] [place] [address]   
   
                                                    Glucose Auto test strip   
(U) [Mass/Vol]  Negative        Normal          NEGATIVE        [facility]  
   
                                        Comment on above:   Performed By: #### 5  
8077-9 ####  
AIDEE RODRIGUES (15500)  
[facility] ([facility])  
[address] [place] [address]   
   
                      Ketones (U) [Mass/Vol] 80 (2+)    Abnormal   NEGATIVE   [facility]  
   
                                        Comment on above:   Performed By: #### 5  
8077-9 ####  
AIDEE RODRIGUES (86981)  
[facility] ([facility])  
[address] [place], [place] [id number]   
   
                                                    Leukocyte esterase Auto   
test strip Ql (U) TRACE           Abnormal        NEGATIVE        [facility]  
   
                                        Comment on above:   Performed By: #### 5  
8077-9 ####  
AIDEE RODRIGUES (72603)  
[facility] ([facility])  
[address] [place], [place] [id number]   
   
                                                    Nitrite Auto test strip   
Ql (U)          Negative        Normal          NEGATIVE        [facility]  
   
                                        Comment on above:   Performed By: #### 5  
8077-9 ####  
AIDEE RODRIGUES (48747)  
[facility] ([facility])  
[address] [place] [address]   
   
                          pH (U)       5.0 [pH]     Normal       5.0, 5.5,   
6.0, 6.5,   
7.0, 7.5,   
8.0                                     [facility]  
   
                                        Comment on above:   Performed By: #### 5  
8077-9 ####  
AIDEE RODRIGUES (17596)  
[facility] ([facility])  
[address] [place] [address]   
   
                      Protein (U) [Mass/Vol] 100 (2+)   Normal     NEGATIVE   [facility]  
   
                                        Comment on above:   Performed By: #### 5  
8077-9 ####  
AIDEE RODRIGUES (60429)  
[facility] ([facility])  
[address] [place] [address]   
   
                      RBC (U) [#/Vol] Negative   Normal     NEGATIVE   [facility]  
   
                                        Comment on above:   Performed By: #### 5  
8077-9 ####  
AIDEE RODRIGUES (08575)  
[facility] ([facility])  
[address] [place] [address]   
   
                                                    Specific gravity (U)   
[Rel density]   1.030           Normal          1.005-1.035     [facility]  
   
                                        Comment on above:   Performed By: #### 5  
8077-9 ####  
AIDEE RODRIGUES (03318)  
[facility] ([facility])  
[address] [place] [address]   
   
                                                    Urobilinogen (U)   
[Mass/Vol]      mg/dL           Normal          <2.0            [facility]  
   
                                        Comment on above:   Performed By: #### 5  
8077-9 ####  
AIDEE RODRIGUES (63542)  
[facility] ([facility])  
[address] [place] [address]   
   
                                                    Urinalysis microscopic panel  
 Auto Ql (U)on 2024   
   
                                                    Bacteria Auto (Urine   
sed) [#/Area]   1+ /HPF         Abnormal        NONE SEEN       [facility]  
   
                                        Comment on above:   Performed By: #### 5  
3315-8 ####  
AIDEE RODRIGUES (21496)  
[facility] ([facility])  
[address] [place] [address]   
   
                                                    Epithelial   
cells.squamous Auto   
(Urine sed) [#/Area] 1-9 (SPARSE)        Normal              Reference   
range not   
established.                            [facility]  
   
                                        Comment on above:   Performed By: #### 5  
3315-8 ####  
AIDEE RODRIGUES (87448)  
[facility] ([facility])  
[address] [place] [address]   
   
                                                    Mucus Auto (Urine sed)   
[#/Area]            2+ /LPF             Normal              Reference   
range not   
established.                            [facility]  
   
                                        Comment on above:   Performed By: #### 5  
3315-8 ####  
AIDEE RODRIGUES (68702)  
[facility] ([facility])  
[address] [place] [address]   
   
                                                    RBC Auto (Urine sed)   
[#/Area]            1-2                 Normal              NONE, 1-2,   
3-5                                     [facility]  
   
                                        Comment on above:   Performed By: #### 5  
4475-8 ####  
AIDEE RODRIGUES (13164)  
[facility] ([facility])  
[address] [place] [address]   
   
                                                    WBC Auto (Urine sed)   
[#/Area]        1-5             Normal          1-5, NONE       [facility]  
   
                                        Comment on above:   Performed By: #### 5  
3315-8 ####  
AIDEE RODRIGUES (97139)  
[facility] ([facility])  
[facility] [place] [address]   
   
                                                    BASIC METABOLIC PANELon -   
   
                      Anion gap [Moles/Vol] 13 mmol/L  Normal     10 - 20    [facility]  
   
                                        Comment on above:   Performed By: #### B  
MP ####[address] [address]   
   
                      Calcium [Mass/Vol] 8.6 mg/dL  Normal     8.6 - 10.3 [facility]  
   
                                        Comment on above:   Performed By: #### B  
MP ####[address] [address]   
   
                      Chloride [Moles/Vol] 104 mmol/L Normal     98 - 107   [facility]  
   
                                        Comment on above:   Performed By: #### B  
MP ####[address] [address]   
   
                      Creatinine [Mass/Vol] 0.45 mg/dL Low        0.50 - 1.05 [facility]  
   
                                        Comment on above:   Performed By: #### B  
MP ####[address] [address]   
   
                      eGFR FEMALE >90        Normal     >90        [facility]  
   
                                        Comment on above:   Result Comment: CALC  
ULATIONS OF ESTIMATED GFR ARE PERFORMED  
USING THE  CKD-EPI STUDY REFIT EQUATION  
WITHOUT THE RACE VARIABLE FOR THE IDMS-TRACEABLE  
CREATININE METHODS.  
https://jasn.asnjournals.org/content/early//ASN.  
629646   
   
                                                            Performed By: #### B  
MP ####[address] [address]   
   
                      Glucose [Mass/Vol] 79 mg/dL   Normal     74 - 99    [facility]  
   
                                        Comment on above:   Performed By: #### B  
MP ####[address] [address]   
   
                      HCO3 (Bld) [Moles/Vol] 22 mmol/L  Normal     21 - 32    [facility]  
   
                                        Comment on above:   Performed By: #### B  
MP ####[address] [address]   
   
                      Potassium [Moles/Vol] 3.4 mmol/L Low        3.5 - 5.3  [facility]  
   
                                        Comment on above:   Performed By: #### B  
MP ####[place] [id number]   
   
                      Sodium [Moles/Vol] 136 mmol/L Normal     136 - 145  [facility]  
   
                                        Comment on above:   Performed By: #### B  
MP ####[place] [id number]   
   
                                                    Urea nitrogen   
[Mass/Vol]      5 mg/dL         Low             6 - 23          [facility]  
   
                                        Comment on above:   Performed By: #### B  
MP ####[place] [id number]   
   
                                                    CBCon 2022   
   
                                                    Erythrocyte   
distribution width   
(RBC) [Ratio]   14.5 %          Normal          11.5 - 14.5     [facility]  
   
                                        Comment on above:   Performed By: #### P  
TINR ####  
[address] [address]   
   
                                                    Hematocrit (Bld)   
[Volume fraction] 35.5 %          Low             36.0 - 46.0     [facility]  
   
                                        Comment on above:   Performed By: #### P  
TINR ####  
[address] [address]   
   
                                                    Hemoglobin (Bld)   
[Mass/Vol]      11.6 g/dL       Low             12.0 - 16.0     [facility]  
   
                                        Comment on above:   Performed By: #### P  
TINR ####  
[address] [address]   
   
                      MCHC (RBC) [Mass/Vol] 32.6 g/dL  Normal     32.0 - 36.0 [facility]  
   
                                        Comment on above:   Performed By: #### P  
TINR ####  
[address] [address]   
   
                      MCV (RBC) [Entitic vol] 85 fL      Normal     80 - 100   S  
[facility]  
   
                                        Comment on above:   Performed By: #### P  
TINR ####  
[address] [address]   
   
                      Platelets (Bld) [#/Vol] 156 10*3/uL Normal     150 - 450    
[facility]  
   
                                        Comment on above:   Performed By: #### P  
TINR ####  
[address] [address]   
   
                      RBC        4.18 x10E12/L Normal     4.00 - 5.20 [facility]  
   
                                        Comment on above:   Performed By: #### P  
TINR ####  
[address] [address]   
   
                      WBC (Bld) [#/Vol] 3.3 10*3/uL Low        4.4 - 11.3 [facility]  
   
                                        Comment on above:   Performed By: #### P  
TINR ####  
[address] [address]   
   
                                                    Daily Progress Note-Nephrolo  
antoinette 2022   
   
                                                    Daily Progress   
Note-Nephrology                         Service: Nephrology  
  
Subjective Data:  
DANNA SOARES   
is a 21 year old Female   
who is Hospital Day #   
3.  
  
Patient seen and   
examined at the bedside   
this morning  
She is resting   
comfortably in bed  
She states that she is   
feeling better  
She is not having any   
more nausea vomiting or   
diarrhea however she   
also has not  
eaten  
No other major   
complaints at this   
time.  
  
Objective Data:  
  
Objective Information:  
T PRBPSpO2  
[id number]/7296%  
Date/Time3/11 21:   
21::   
21: 21:29  
Range(36.6C - 36.8C )   
(80 - 87 ) (16 - 17 )   
(112 - 117 )/ (72 - 78   
) (96%  
- 100% )  
  
  
Pain reported at 3/11   
21:29: 6 = Moderate  
  
Physical Exam by   
System:  
  
Constitutional: Awake,   
alert, oriented, no   
acute distress  
Eyes: Extraocular   
muscles intact  
ENMT: Moist mucous   
membranes  
Head/Neck:   
Normocephalic,   
atraumatic  
Respiratory/Thorax:   
Bilateral equal breath   
sounds  
Cardiovascular: Regular   
rate and rhythm  
Gastrointestinal: Soft,   
nontender,   
nondistended, positive   
bowel sounds  
Musculoskeletal: Moving   
all extremities  
Extremities: No   
peripheral edema  
Neurological: Awake,   
alert, oriented  
Psychological:   
Cooperative  
Skin: Warm and dry  
  
Medication:  
  
Medications:  
  
Continuous Medications  
-----------------------  
---------  
  
1. Lactated Ringers   
Infusion: 1000 mL   
IntraVenous  
  
Scheduled Medications  
-----------------------  
---------  
  
1. LORazepam   
Injectable: 1 mg   
IntraVenous Push Every   
6 Hours  
2. Oseltamivir: 75 mg   
Oral Every 12 Hours  
3. Pantoprazole   
Injectable: 40 mg   
IntraVenous Push Every   
12 Hours  
4. Potassium Chloride   
Extended Release: 40   
mEq Oral 2 Times a Day  
  
PRN Medications  
-----------------------  
---------  
  
1. Acetaminophen: 650   
mg Oral Every 4 Hours  
2. Ondansetron   
Injectable: 4 mg   
IntraVenous Push Every   
6 Hours  
3. oxyCODONE Immediate   
Release: 5 mg Oral   
Every 6 Hours  
4. Promethazine IV   
Piggy Back: 12.5 mg   
IntraVenous Piggyback   
Every 6 Hours  
  
5. Sore Throat Lozenge:   
1 lozenge(s) Oral Every   
4 Hours  
  
  
Recent Lab Results:  
  
Results:  
CBC: 3/12/2022 05:40  
  
\ Hgb / \ 11.6 L /  
WBC ----------------   
Plt 3.3 L   
---------------- 156  
/ Hct \ / 35.5 L \  
  
RBC: 4.18 MCV: 85  
  
  
BMP: 3/12/2022 05:40  
NA+   Cl-   BUN / 136     
104   5 L /  
-----------------------  
--------- Glucose  
-----------------------  
---- 79  
K+   HCO3-   Creat \   
3.4 L  22   0.45 L \  
Calcium : 8.6 Anion Gap   
: 13  
  
  
Assessment and Plan:  
Code Status:  
Code StatusFull Code  
  
Assessment:  
Normal anion gap   
metabolic acidosis:   
Resolved  
Hypokalemia  
Very low osmolar state  
Nausea/vomiting/diarrhe  
a: Currently resolved  
Depression  
History of marijuana   
abuse  
Influenza A  
Leukopenia  
Malnourishment  
  
Plan:  
Her metabolic acidosis   
has resolved as   
suspected likely   
secondary to her  
diarrhea that she was   
having  
I will stop her IV   
fluids  
We will replace her   
potassium  
She needs a   
high-protein diet and   
better osmole intake   
with her malnourishment  
and very low osmolar   
state  
I will sign off at this   
time  
Please call with any   
further issues or needs  
  
  
Electronic Signatures:  
Srini Messina ()   
(Signed 12-Mar-2022   
07:42)  
Authored: Service,   
Subjective Data,   
Objective Data,   
Assessment and Plan,   
Note  
Completion  
  
  
Last Updated:   
12-Mar-2022 07:42 by   
Srini Messina () [facility]  
   
                                                    Discharge [id number]   
022   
   
                                        Discharge Profile2  Discharge Orders:  
Anticipated Discharge   
Date:  
Anticipated Discharge   
[id number]  
  
Anticipated Discharge   
Time12:00  
  
DNAR:  
Code Status at   
Discharge: Full Code  
  
Diet:  
Dietregular, high   
protein  
  
Labs 1:  
Lab Test(s)Basic   
Metabolic Panel  
Date To Be Drawn3/14  
Call Results ToPCP  
Fax Results ToPCP  
Lab InstructionsWalk-in   
lab, no appointment   
required.  
Commentsmonitor   
potassium  
  
Additional Orders:  
Additional Instructions  
Follow up with PCP   
within 1 week of   
discharge  
Continue Tamiflu for   
one more day (tomorrow)   
on discharge  
Continue home   
medications  
No new medications at   
time of discharge  
High protein regular   
diet  
  
Call Provider If   
(Homegoing Patients):  
Breathing faster than   
normal.  
  
Breathing harder than   
normal or having   
retractions.  
  
Fever of 100.4 F (38 C)   
or higher.  
  
Temperature is greater   
than 102 degrees.  
  
Chills.  
  
Drinking less than   
normal.  
  
Not being able to go   
4-6 hours between   
albuterol treatments.  
  
Urinating less than   
normal, over 1 day.  
  
Urinating less than 4   
times per day.  
  
Acting very sleepy and   
difficult to awaken.  
  
Vomiting (throwing up)   
and not able to eat or   
drink for 12 hours.  
  
3 or more loose, watery   
bowel movements in 24   
hours (diarrhea).  
  
Any new concerning   
symptoms.  
  
Hospital Course (Home   
Care/Gold Form):  
Hospital Course:  
Hospital Course:   
include significant   
abnormal lab values  
HPI:  
DANNA SOARES   
is a 21 year old Female   
with pmh of hyperemesis  
syndrome secondary to   
cannanboid use, major   
depression disorder who   
had  
hospitalization in   
2022 for   
similar presentation as   
today. She has 3  
emergency room visits   
in the past few days   
with today being the   
3rd visit. She  
was seen in another ED   
on the  and our ED   
on the  she had   
n/v/diarrhea  
and abdominal pain. She   
has been having   
diarrhea nausea and   
vomiting for 3  
days. On the  she   
developed abdominal   
pain that hurts with   
movement and on  
inspiration. CT abdomen   
pelvis negative and did   
not reveal acute   
pathology. She  
presented today not   
feeling any better. she   
was treated yesterday   
with  
phenergan and bentyl.   
She was diagnosed with   
influenza A and dc to   
home with  
tamiflu, bentyl and   
phenergan. On   
presentation today she   
continued to vomit was  
unable to keep down any   
liquids or her nausea   
medicine so she   
presented to  
[facility] emergency   
department for further   
evaluation. She has  
had decrease in   
urination. She denies   
any recent fevers. She   
is currently on  
her menses with a   
negative pregnancy test   
2 days ago. She had   
low-grade  
temperature 99.3, blood   
pressure 134/99 heart   
rate 82 respirations 18   
pulse  
oxygen saturation 98%   
on room air. Her   
laboratory values   
showed a bicarbonate  
of 14 and yesterday was   
12, normal creatinine,   
potassium 3.5 sodium   
135 and  
glucose 93. Urinalysis   
with ketones 80 and a   
large amount of blood   
white cells  
2, 1+ mucus. Urine   
toxicology screen   
showed cannabinoid   
positive. She states  
that she last smoked   
marijuana a week ago.   
In the emergency   
department she was  
treated with 2 L of   
normal saline and is   
being admitted for   
further evaluation.  
  
Past medical history:   
Major depressive   
disorder, hyperemesis   
syndrome secondary  
to cannabinoid  
Past surgical history:   
Appendectomy, right   
index finger surgery,   
ORIF of right  
wrist, tonsillectomy   
and adenoidectomy  
Family history:   
Father-diabetes  
Social history: Patient   
is single, she is   
cannabinoid dependent,   
denies  
nicotine or alcohol   
use.  
  
Allergies: Codeine   
MiraLAX  
Medications: Reviewed   
and reconciled  
  
Hospital course:   
Patient was started on   
Tamiflu on 3/9 which is   
4/5 day  
treatment. She will   
continue 1 day on   
discharge. She was   
given IV fluids and  
electrolytes replaced   
accordingly.   
Nausea/vomiting and   
abdominal pain likely  
related to hyperemesis   
syndrome secondary to   
cannabinoid use. She   
was seen by  
critical care Dr. Messina   
for normal anion gap   
metabolic acidosis   
which resolved.  
She has very low   
osmolar state and   
recommends a high   
protein diet on   
discharge.  
She indicates today she   
feels better than she   
has and requests to go   
home. She  
was counseled on   
marijuana cessation at   
time of discharge.  
  
Provider FINAL REVIEW   
of Orders:  
Final Review:  
Final Review of   
Medication   
Reconciliation and   
Orders Completedby APRN  
Suni   
ProviderFLYNN Graff at 12-Mar-2022   
10:53:01  
  
Appointments:  
Follow-Up Appointment   
01:  
Physician/Dept/ServiceP  
CP  
Reason for   
Referralfollow up post   
discharge  
Call to Schedule in1   
week  
CommentsYou were given   
a list of primary care   
physicians please call   
one and  
get established  
  
  
Electronic Signatures:  
Brittany Krishnamurthy ([facility]   
SECT) (Signed   
12-Mar-2022 11:23)  
Authored: Discharge   
Orders, Appointments  
Alyssa Beckwith   
(APRN-CNP) (Signed   
12-Mar-2022 10:53)  
Authored: Discharge   
Orders, Hospital Course   
(Home Care/Gold Form),   
Provider  
FINAL REVIEW of Orders,   
Appointments, Gold Form   
-    
Summary  
  
  
Last Updated: 12-Mar-20   
(more content not   
included)...        Normal                                  [facility]  
   
                                                    MAGNESIUMon 2022   
   
                      Magnesium [Mass/Vol] 1.87 mg/dL Normal     1.60 - 2.40 [facility]  
   
                                        Comment on above:   Performed By: #### M  
G ####  
[place] [id number] [place] [address]   
   
                                                    Order Reconciliationon    
   
                                        Order Reconciliation Page 1  
  
Discharge   
Reconciliation Document  
  
  
  
Reconciliation Type:   
Discharge requested on   
behalf of Alyssa Beckwith (Advanced  
Practice Nurse) done by   
Alyssa Beckwith   
(APRN-CNP)  
  
Discharge - Partial   
Reconciliation:   
12-Mar-2022 10:42 by:   
Alyssa Beckwith  
(APRN-CNP)  
Discharge -   
Reconciliation:   
12-Mar-2022 10:51 by:   
Alyssa Beckwith   
(APRN-CNP)  
  
Home Medications   
EnteredHOME MEDICATIONS   
AT DISCHARGE   
DateReconciliation  
Comment/ Additional   
Information  
dicyclomine 20 mg oral   
tablet 1 tab(s) orally   
4 times a day (1 hour   
before  
meals and at bedtime)   
09-Mar-2022 12:08   
Discontinued;   
Discontinue  
from ORM  
  
dicyclomine 20 mg oral   
tablet is not required  
ondansetron 4 mg oral   
tablet, disintegrating   
1 tab(s) orally 3 times   
a day  
06-Mar-2022 16:21   
ondansetron 4 mg oral   
tablet, disintegrating   
1 tab(s)  
orally 3 times a day   
06-Mar-2022 16:21   
ondansetron 4 mg oral   
tablet,  
disintegrating is   
continued as   
ondansetron 4 mg oral   
tablet, disintegrating  
oseltamivir 75 mg oral   
capsule 1 cap(s) orally   
2 times a day   
09-Mar-2022  
12:12 oseltamivir 75 mg   
oral capsule 1 cap(s)   
orally 2 times a day  
13-Mar-2022 10:50   
Discontinued;   
Copy/Discontinue  
  
oseltamivir 75 mg oral   
capsule is continued   
and modified  
Promethegan 25 mg   
rectal suppository 1   
suppository(ies)   
rectally every 6 hours  
09-Mar-2022 12:09   
Discontinued;   
Discontinue from ORM  
  
Promethegan 25 mg   
rectal suppository is   
not required  
  
  
Current OrdersDateHOME   
MEDICATIONS AT   
DISCHARGE   
DateReconciliation   
Comment/  
Additional Information  
Acetaminophen Tablet   
(TYLENOL)DOSE = 650 mg   
Oral Every 4 Hours, PRN   
Pain -  
Mild (1-3) 10-Mar-2022   
16:15 Acetaminophen is   
not required  
LORazepam Injectable   
(ATIVAN)DOSE = 1 mg   
IntraVenous Push Every   
6 Hours  
10-Mar-2022 16:15   
LORazepam Injectable is   
not required  
Ondansetron Injectable   
(ZOFRAN)DOSE = 4 mg   
IntraVenous Push Every   
6 Hours,  
PRN Nausea and/or   
Vomiting 10-Mar-2022   
19:57 Ondansetron   
Injectable  
is not required  
Oseltamivir Capsule   
(TAMIFLU)DOSE = 75 mg   
Oral Every 12 HoursStop   
After 5  
Days 10-Mar-2022 16:22   
Oseltamivir is not   
required  
oxyCODONE Immediate   
Release Tablet (OXYIR,   
ROXICODONE)DOSE = 5 mg   
Oral Every  
6 Hours, PRN Pain - Mod   
(4-6) 11-Mar-2022 17:51   
oxyCODONE Immediate  
Release is not required  
Pantoprazole Injectable   
(PROTONIX)DOSE = 40 mg   
IntraVenous Push Every   
12  
Hours 10-Mar-2022 16:15   
Pantoprazole Injectable   
is not required  
Potassium Chloride   
Extended Release   
Tablet, Extended   
ReleaseDOSE = 40 mEq   
Oral  
2 Times a DayStop After   
2 Doses 12-Mar-2022   
06:47 Potassium   
Chloride  
Extended Release is not   
required  
Promethazine IV Piggy   
Back in Sodium Chloride   
0.9% 50 mL   
(PHENERGAN)DOSE =  
12.5 mg Every 6 Hours,   
PRN NauseaRecommended   
Infusion Time: 15   
minute(s)  
10-Mar-2022 16:20   
Promethazine IV Piggy   
Back is not required  
Sore Throat Lozenge   
LozengeDOSE = 1   
lozenge(s) Oral Every 4   
Hours, PRN Sore  
ThroatCa   
lozenge(s)/DOSE x 1 = 1   
lozenge(s)/Dose (Daily   
Total is 6  
lozenge(s)) 11-Mar-2022   
17:52 Sore Throat   
Lozenge is not required  
  
  
All Active Home   
Medications at time of   
Discharge   
Reconciliation:   
12-Mar-2022  
10:51  
ondansetron 4 mg oral   
tablet, disintegrating   
1 tab(s) orally 3 times   
a day  
oseltamivir 75 mg oral   
capsule 1 cap(s) orally   
2 times a day       Normal                                  [facility]  
   
                                                    PHOSPHORUSon 2022   
   
                      Phosphate [Mass/Vol] 2.9 mg/dL  Normal     2.5 - 4.9  [facility]  
   
                                        Comment on above:   Result Comment: The   
performance characteristics of phosphorus   
testing in  
heparinized plasma have been validated by the individual  
 laboratory site where testing is performed. Testing  
on heparinized plasma is not approved by the FDA;  
however, such approval is not necessary.   
   
                                                            Performed By: #### P  
HOS ####  
[place] [id number]   
   
                                                    BASIC METABOLIC PANELon    
   
                      Anion gap [Moles/Vol] 13 mmol/L  Normal     10 - 20    [facility]  
   
                                        Comment on above:   Performed By: #### P  
TINR ####  
[address] [address]   
   
                      Calcium [Mass/Vol] 8.3 mg/dL  Low        8.6 - 10.3 [facility]  
   
                                        Comment on above:   Performed By: #### P  
TINR ####  
[address] [address]   
   
                      Chloride [Moles/Vol] 106 mmol/L Normal     98 - 107   [facility]  
   
                                        Comment on above:   Performed By: #### P  
TINR ####  
[address] [address]   
   
                      Creatinine [Mass/Vol] 0.40 mg/dL Low        0.50 - 1.05 [facility]  
   
                                        Comment on above:   Performed By: #### P  
TINR ####  
[address] [address]   
   
                      eGFR FEMALE >90        Normal     >90        [facility]  
   
                                        Comment on above:   Result Comment: CALC  
ULATIONS OF ESTIMATED GFR ARE PERFORMED  
USING THE  CKD-EPI STUDY REFIT EQUATION  
WITHOUT THE RACE VARIABLE FOR THE IDMS-TRACEABLE  
CREATININE METHODS.  
https://jasn.asnjournals.org/content/early//ASN.  
640481   
   
                                                            Performed By: #### P  
TINR ####  
[address] [address]   
   
                      Glucose [Mass/Vol] 89 mg/dL   Normal     74 - 99    [facility]  
   
                                        Comment on above:   Performed By: #### P  
TINR ####  
[address] [address]   
   
                      HCO3 (Bld) [Moles/Vol] 20 mmol/L  Low        21 - 32    [facility]  
   
                                        Comment on above:   Performed By: #### P  
TINR ####  
[address] [address]   
   
                      Potassium [Moles/Vol] 3.0 mmol/L Low        3.5 - 5.3  [facility]  
   
                                        Comment on above:   Performed By: #### P  
TINR ####  
[address] [address]   
   
                      Sodium [Moles/Vol] 136 mmol/L Normal     136 - 145  [facility]  
   
                                        Comment on above:   Performed By: #### P  
TINR ####  
[address] [address]   
   
                                                    Urea nitrogen   
[Mass/Vol]      2 mg/dL         Low             6 - 23          [facility]  
   
                                        Comment on above:   Performed By: #### P  
TINR ####  
[address] [address]   
   
                                                    CBCon 2022   
   
                                                    Erythrocyte   
distribution width   
(RBC) [Ratio]   14.4 %          Normal          11.5 - 14.5     [facility]  
   
                                        Comment on above:   Performed By: #### H  
EPFP ####  
[address] [address]   
   
                                                    Hematocrit (Bld)   
[Volume fraction] 35.3 %          Low             36.0 - 46.0     [facility]  
   
                                        Comment on above:   Performed By: #### H  
EPFP ####  
[address] [address]   
   
                                                    Hemoglobin (Bld)   
[Mass/Vol]      11.8 g/dL       Low             12.0 - 16.0     [facility]  
   
                                        Comment on above:   Performed By: #### H  
EPFP ####  
[address] [address]   
   
                      MCHC (RBC) [Mass/Vol] 33.3 g/dL  Normal     32.0 - 36.0 [facility]  
   
                                        Comment on above:   Performed By: #### H  
EPFP ####  
[address] [address]   
   
                      MCV (RBC) [Entitic vol] 85 fL      Normal     80 - 100   S  
[facility]  
   
                                        Comment on above:   Performed By: #### H  
EPFP ####  
[address] [address]   
   
                      Platelets (Bld) [#/Vol] 151 10*3/uL Normal     150 - 450    
[facility]  
   
                                        Comment on above:   Performed By: #### H  
EPFP ####  
[address] [address]   
   
                      RBC        4.14 x10E12/L Normal     4.00 - 5.20 [facility]  
   
                                        Comment on above:   Performed By: #### H  
EPFP ####  
[address] [address]   
   
                      WBC (Bld) [#/Vol] 3.4 10*3/uL Low        4.4 - 11.3 [facility]  
   
                                        Comment on above:   Performed By: #### H  
EPFP ####  
[address] [address]   
   
                                                    Consult-Nephrologyon   
022   
   
                                        Consult-Nephrology  Service:  
Service: Nephrology  
  
Consult:  
Consult requested by   
(Attending Name): Mitra Camara  
Reason: ACUTE NON ANION   
GAP METABOLIC ACIDOSIS-   
BICARB 14- RECEIVED 2   
LITERS  
NORMAL SALINE IN ED.   
REPEAT 12 NOW ON LR AT   
200 ML/HR  
  
History of Present   
Illness:  
HPI:  
DANNA SOARES   
is a 21 year old Female  
  
She presented to the   
emergency room   
secondary to complaints   
of nausea vomiting  
and diarrhea. She also   
was having abdominal   
pain. She presented   
multiple  
times to the emergency   
room and was also seen   
back in January for   
very similar  
complaints  
  
I was consulted to see   
her secondary to   
metabolic acidosis  
  
She was found to have a   
severe metabolic   
acidosis likely   
secondary to her acute  
illness of nausea   
vomiting and diarrhea  
  
She has a past medical   
history of depression,   
hyperemesis, marijuana   
use  
  
Her past surgical   
history includes an   
appendectomy, right   
index finger surgery,  
wrist surgery,   
tonsillectomy,   
adenoidectomy  
  
Her family history   
includes diabetes in   
her father  
  
Her social history   
includes that she is   
single. She does use   
marijuana  
  
This a.m. when I met   
her she is resting   
comfortably in bed  
She states that she is   
tired  
She denies nausea   
vomiting and diarrhea   
at this time she states   
that since she  
has been admitted here   
this is all much better   
for her and much   
improved  
  
She denies abdominal   
pain currently  
She states that she is   
having some bowel   
movements and also   
urinating well  
  
A full 10 point review   
of systems was obtained   
is negative except HPI   
as above  
  
Review Family/Social   
History and ROS:  
Social History:  
  
Smoking Status: never   
smoker (1)  
Alcohol Use: denies(1)  
Drug Use: occasionally   
(2)  
  
  
Allergies:  
MiraLax:   
Hives/Urticaria  
codeine: Unknown  
  
Objective:  
  
Objective Information:  
  
T PRBPSpO2  
[id number]/80427%  
Date/Time3/10 23:113/10   
23:113/10 23:113/10   
23:113/10 23:11  
Range(36.7C - 37.3C )   
(71 - 100 ) (16 - 18 )   
(112 - 136 )/ (68 - 103   
)  
(97% - 100% )  
Highest temp of 37.3 C   
was recorded at 3/10   
10:41  
  
Physical Exam by   
System:  
  
Constitutional: Awake,   
alert, oriented, no   
acute distress  
Eyes: Extraocular   
muscles intact  
ENMT: Moist mucous   
membranes  
Head/Neck:   
Normocephalic,   
atraumatic  
Respiratory/Thorax:   
Bilateral equal breath   
sounds  
Cardiovascular: Regular   
rate and rhythm  
Gastrointestinal: Soft,   
nontender,   
nondistended, positive   
bowel sounds  
Musculoskeletal: Moving   
all extremities  
Extremities: No   
peripheral edema  
Neurological: Awake,   
alert, oriented  
Psychological:   
Cooperative  
Skin: Warm and dry  
  
Medications:  
  
Medications:  
  
Continuous Medications  
-----------------------  
---------  
  
1. Lactated Ringers   
Infusion: 1000 mL   
IntraVenous  
  
Scheduled Medications  
-----------------------  
---------  
  
1. LORazepam   
Injectable: 1 mg   
IntraVenous Push Every   
6 Hours  
2. Oseltamivir: 75 mg   
Oral Every 12 Hours  
3. Pantoprazole   
Injectable: 40 mg   
IntraVenous Push Every   
12 Hours  
  
PRN Medications  
-----------------------  
---------  
  
1. Acetaminophen: 650   
mg Oral Every 4 Hours  
2. Ondansetron   
Injectable: 4 mg   
IntraVenous Push Every   
6 Hours  
3. Promethazine IV   
Piggy Back: 12.5 mg   
IntraVenous Piggyback   
Every 6 Hours  
  
  
  
Recent Lab Results:  
  
Results:  
  
  
I have reviewed these   
laboratory results:   
Basic Metabolic Panel   
[Drawn  
11-Mar-2022 05:38:00],   
Basic Metabolic Panel   
[Drawn 10-Mar-2022   
19:37:00],  
Basic Metabolic Panel   
[Drawn 10-Mar-2022   
17:32:00], Complete   
Blood Count [Drawn  
11-Mar-2022 05:38:00],   
Lactate, Level [Drawn   
10-Mar-2022 19:37:00],   
Blood Gas,  
Arterial [Drawn   
10-Mar-2022 19:09:00],   
Drug Screen, Urine   
[Drawn 10-Mar-2022  
13:27:00], Urinalysis   
[Drawn 10-Mar-2022   
13:27:00], Urinalysis,   
Microscopic  
[Drawn 10-Mar-2022   
13:27:00], Urinalysis   
with Culture if   
Indicated [Drawn  
09-Mar-2022 10:18:00],   
Influenza A/B,Covid   
2019 PCR,Symptomatic   
[Drawn  
09-Mar-2022 10:18:00].  
  
  
Assessment:  
Normal anion gap   
metabolic acidosis  
Hypokalemia  
Very low osmolar state  
Nausea/vomiting/diarrhe  
a  
Depression  
History of marijuana   
abuse  
Influenza A  
  
Plan:  
At this time her   
metabolic acidosis is   
greatly improved  
Her bicarb this morning   
is up to 20 with   
supportive measures  
Her metabolic acidosis   
from history is likely   
secondary to her acute   
illness  
with diarrhea  
We could obtain a urine   
anion gap but currently   
her acidosis is much   
improved  
with supportive   
measures and so I do   
not think necessary at   
this time  
I will back down on her   
lactated Ringer's  
She needs a better diet  
She has a very low   
osmolar state and so   
she needs a better   
well-balanced diet  
with good protein   
intake  
Her potassium needs   
replaced  
Thanks for the consult  
  
Consult Status:  
Consult Order ID:   
[id number]  
  
  
Electronic Signatures:  
JuanSrini BASILIA ()   
(Signed 11-Mar-2022   
08:18)  
Authored: Service,   
History of Present   
Illness, Review   
Family/Social History  
and ROS, Allergies,   
Objective,   
Assessment/Recommendati  
ons, Note Completion  
  
  
Last Updated: 11-Mar-2   
(more content not   
included)...        Normal                                  [facility]  
   
                                                    Daily Progress Note-General   
Internal Medicineon 2022   
   
                                                    Daily Progress   
Note-General Internal   
Medicine                                Consult Type:   
subsequent visit/care  
  
Service: General   
Internal Medicine  
  
Subjective Data:  
DANNA SOARES   
is a 21 year old Female   
who is Hospital Day #   
2.  
  
Patient seen and   
examined  
Lying in bed  
Has had some nausea,   
tolerating clears  
No other chief   
complaints.  
  
Overnight Events:   
Patient had an   
uneventful night.  
  
Objective Data:  
  
Objective Information:  
T PRBPSpO2  
[id number]/91647%  
Date/Time3/10 23:113/10   
23:113/10 23:113/10   
23:113/10 23:11  
Range(36.7C - 37.3C )   
(71 - 100 ) (16 - 18 )   
(112 - 136 )/ (68 - 103   
)  
(97% - 100% )  
Highest temp of 37.3 C   
was recorded at 3/10   
10:41  
  
  
Pain reported at 3/10   
20:19: 5 = Moderate  
  
Physical Exam by   
System:  
  
Constitutional:   
Ill-appearing, pale  
Eyes: PERRL, EOMI,   
clear sclera  
ENMT: mucous membranes   
moist, no apparent   
injury, no lesions seen  
Head/Neck: Neck supple,   
no apparent injury,No   
JVD, trachea midline,  
Respiratory/Thorax:   
Patent airways, CTAB,   
normal breath sounds   
with good chest  
expansion, thorax   
symmetric  
Cardiovascular:   
Regular, rate and   
rhythm, no murmurs, 2+   
equal pulses of the  
extremities, normal S   
1and S 2  
Gastrointestinal:   
Nondistended, soft,   
non-tender, no rebound   
tenderness or  
guarding, no masses   
palpable, no   
organomegaly, +BS, no   
bruits  
Musculoskeletal: ROM   
intact, no joint   
swelling, normal   
strength  
Extremities: normal   
extremities, no   
cyanosis edema,   
contusions or wounds,   
no  
clubbing  
Neurological: alert and   
oriented x3, intact   
senses, motor, response   
and  
reflexes, normal   
strength  
Psychological:   
Appropriate mood and   
behavior  
Skin: Warm and dry, no   
lesions, no rashes  
  
Medication:  
  
Medications:  
  
Continuous Medications  
-----------------------  
---------  
  
1. Lactated Ringers   
Infusion: 1000 mL   
IntraVenous  
  
Scheduled Medications  
-----------------------  
---------  
  
1. LORazepam   
Injectable: 1 mg   
IntraVenous Push Every   
6 Hours  
2. Oseltamivir: 75 mg   
Oral Every 12 Hours  
3. Pantoprazole   
Injectable: 40 mg   
IntraVenous Push Every   
12 Hours  
  
PRN Medications  
-----------------------  
---------  
  
1. Acetaminophen: 650   
mg Oral Every 4 Hours  
2. Ondansetron   
Injectable: 4 mg   
IntraVenous Push Every   
6 Hours  
3. Promethazine IV   
Piggy Back: 12.5 mg   
IntraVenous Piggyback   
Every 6 Hours  
  
  
  
Recent Lab Results:  
  
Results:  
  
  
I have reviewed these   
laboratory results:  
  
Basic Metabolic Panel   
11-Mar-2022 05:38:00  
  
ResultValue  
Glucose, Serum 89  
  
K 3.0 L  
  
Bicarbonate, Serum 20 L  
Anion Gap, Serum 13  
BUN 2 L  
CREAT 0.40 L  
GFR Female >90  
Calcium, Serum 8.3 L  
  
Complete Blood Count   
11-Mar-2022 05:38:00  
  
ResultValue  
White Blood Cell Count   
3.4 L  
Red Blood Cell Count   
4.14  
HGB 11.8 L  
HCT 35.3 L  
MCV 85  
MCHC 33.3  
  
RDW-CV 14.4  
  
  
Radiology Results:  
  
Results:  
  
  
Conclusion:  
 Please see physician   
note for formal   
interpretation   
confirmed by Scribe   
Confirmed by CARLOS MOODY   
() on 2022   
12:18:50 PM  
  
Electrocardiogram 12   
Lead [2022   
12:19PM]  
  
  
Assessment and Plan:  
Code Status:  
Code StatusFull Code  
  
Assessment:  
21-year-old female with   
significant past   
medical history of   
chronic depression,  
hyperemesis syndrome   
secondary to  
  
Cannabinoid, bipolar   
disorder who presented   
to the emergency   
department after 3  
ED visits with nausea   
vomiting diarrhea   
weakness. She was   
diagnosed with  
influenza A on 2022 and is being   
admitted for severe   
dehydration  
  
Impression  
Severe dehydration with   
acute non-anion gap   
metabolic acidosis   
secondary to GI  
loss  
Hyperemesis syndrome   
secondary to   
cannabinoid use  
Influenza A  
Chronic   
depression/bipolar   
disorder  
GI prophylaxis  
DVT prophylaxis  
  
Assessment and plan  
Patient seen and   
examined in the   
emergency department  
She is pale dehydrated  
clear liquids advance   
as tolerated- last   
admission took about 48   
hours to  
settle her vomiting   
down. she did well with   
carafate can add if no   
improvement  
on current regimen  
Oral mucosa is   
moist-her bicarb   
yesterday was 12 and   
today is 14 on  
presentation she is   
status post 2 L of   
normal saline IV fluid   
boluses  
I will add lactated   
Ringer's at 200 cc an   
hour  
Repeat BMP now  
Repeat BMP at 2000  
BMP in a.m.  
Tamiflu 75 mg p.o.   
twice a day for total   
of 5 days  
iv protonix 40 mg twice   
a day  
phenergan 12.5 mg iv   
every 6 hours as needed  
potassium 3.6-   
potassium 20 iv  
she has been counseled   
prior on marijuana and   
risk with dehydration   
causing  
hyperemesis. she is   
severely dehydrated so   
this may just be   
dehydration from  
the flu. will see how   
she does on the ativan  
plan of care discussed   
with patient  
time spent 45 minutes  
  
3/11  
Patient seen and   
examined  
Afebrile, VSS, 100 Sp02   
room air  
Has had some nausea,   
tolerating clears  
Consult to Dr. Messina:   
Metabolic acidosis   
improving, Maintain LR   
at 75 ml hour  
Hyperemesis syndrome   
secondary to   
cannabinoid use:   
Metabolic acidosis   
improving  
this am, will continue   
LR and await Dr. Messina   
recommendations  
Clear liquids (more   
content not   
included)...        Normal                                  [facility]  
   
                                                    ARTERIAL BLOOD GASon 03-10-  
022   
   
                      BASE EXCESS-BLOOD -9.9 mmol/L Low        -2.0 - 3.0 [facility]  
   
                                        Comment on above:   Performed By: #### P  
TINR ####  
[address] [address]   
   
                      BICARB, CALCULATED 13.6 mmol/L Low        22.0 - 26.0 [facility]  
   
                                        Comment on above:   Performed By: #### P  
TINR ####  
[address] [address]   
   
                                                    Oxygen (Bld) [Partial   
pressure]       111 mm[Hg]      High            85 - 95         [facility]  
   
                                        Comment on above:   Performed By: #### P  
TINR ####  
[address] [address]   
   
                      PCO2       24 mmHg    Low        38 - 42    [facility]  
   
                                        Comment on above:   Performed By: #### P  
TINR ####  
[place] [id number]   
   
                      pH (Bld)   7.36 [pH]  Low        7.38 - 7.42 [facility]  
   
                                        Comment on above:   Performed By: #### P  
TINR ####  
[place] [id number]   
   
                      SO2        100 %      Normal     94 - 100   [facility]  
   
                                        Comment on above:   Performed By: #### P  
TINR ####  
[place] [id number]   
   
                                                    Admission Risk Screen - Adul  
ton 03-   
   
                                                    Admission Risk Screen -   
Adult                                   Allergies:  
Allergies:  
MiraLax:   
Hives/Urticaria  
codeine: Unknown  
  
Patient Verification:  
New W ID Band Applied   
in my Departmentno  
Type of ID Patient is   
WearingW wristband, but   
not applied here  
Patient Transferred   
from Other  Facility   
([facility], [facility],etc)no  
Patient Identity   
Verified Bypatient  
ID Band FULL Name,   
include Middle,   
spelling matches   
patient's ID used for  
verificationyes  
ID Band  Matches   
Patient ID used for   
Verficationyes  
ID Band MRN Matches EMR   
MRNyes  
  
Visitor Restriction:  
Coronavirus Visitor   
Restriction: Reasonable   
restrictions to   
in-person  
visitors will be   
observed due to current   
coronavirus pandemic.  
  
Travel History:  
COVID-19 Screening   
Completedno exposure or   
symptoms(1)  
Travel or Exposure Past   
30 DaysNO travel to   
International locations   
in the  
past 30 days  
Ebola AlertFor   
Ebola-like Symptoms:   
Isolate Patient and   
Notify  
Provider/Infection   
Preventionist  
  
For Contact: Notify   
Provider/Infection   
Preventionist  
  
Advance Directive:  
Advance Directive/DNRno   
(2)  
Advance Directive   
Information   
Givenpatient/family   
declined  
  
[place] Fall Screen:  
History of falling   
(immediate or   
previous)no (0)  
Secondary Diagnosisyes   
(15)  
Intravenous Therapy/   
Heparin/Saline Lockyes   
(20)  
Gait/Transferringnormal  
/bedrest/wheelchair (0)  
Ambulatory   
Aidsnone/bedrest/nurse   
assist (0)  
Mental Statusoriented   
to own ability (0)  
Score: Low risk (<25).   
Moderate risk (25-44).   
High risk (>44).35  
[place]   
InterventionsMODERATE   
INTERVENTIONS:  
*Low Interventions   
Plus:  
* falls risk   
band/sticker applied to   
patient,  
*yellow non-skid   
footwear,  
*instruct to call for   
assistance before  
getting out of bed,  
*bed/chair/bedside   
commode/toilet alarms,  
*sensory   
devices/ambulatory   
aides  
available and in reach,  
*medications reviewed   
for potential  
side effects and care   
planning.  
  
  
Family Violence Screen:  
Are you or have you   
been threatened or   
abused physically,   
emotionally, or  
sexually by anyoneno  
Do you feel UNSAFE   
going back to the place   
where you are livingno  
Clinical assessment:   
Are there any apparent   
signs of   
injuries/behaviors that  
could be related to   
abuse/neglectno  
Social Service Consult   
for abuse/neglect   
needed this visitno  
  
Functional Screen:  
Functional Screen: In   
the recent/past 2-4   
weeks, patient or   
family have  
noticedno issues that   
require a   
speech/language consult   
at this time  
  
AM-PAC- Basic   
Mobility/Daily   
Activity:  
Patient baseline   
bedboundno  
  
Learning Assessment   
(Patient):  
Patient is Able to be   
Assessed for   
Learningyes  
Factors Influencing   
Readiness to   
Learnacuteness of   
illness; anxiety  
Factors that Impact   
Ability to Learnnone  
Devices/Methods Used to   
Communicatenone  
Learning   
Preferencesskill   
demonstration; verbal   
instruction; written  
material; video  
Cultural   
Considerationsnone  
Developmental   
Considerationsnone  
Mormon   
Considerationsnone  
  
Learning Assessment   
(Other Learner):  
Other learner   
availableno  
  
Depression Screen:  
During the past month,   
have you often been   
bothered by feeling   
down,  
depressed or hopelessno  
During the past month,   
have you often had   
little interest or   
pleasure in  
doing thingsno  
Have you had any   
thoughts of harming   
anyone elseno (1)  
  
[place] Suicide:  
Risk Screen Not   
Applicable/Able to   
Answerable to be   
screened  
In the Past Month: Have   
you wished you were   
dead or could go to   
sleep and not  
wake upno(1)  
In the Past Month: Have   
you had any actual   
thoughts of killing   
yourself  
no(1)  
Lifetime: Have you ever   
done, started to do, or   
prepared to do anything   
to  
end your lifeno  
[place] Suicide   
Risknegative  
  
Adult Nutrition Screen:  
Have you recently lost   
weight without tryingno  
Have you been eating   
poorly because of a   
decreased appetiteyes  
Malnutrition Screening   
Tool Score1  
Malnutrition Screening   
Tool RiskMST = 0 or 1   
Not at risk. Eating   
well with  
little or no weight   
loss  
Nutrition Consult   
needed this visitno  
Can Patient Participate   
in Room Serviceyes  
Patient requires Paper   
Dishes/Plastic   
Utensilsno  
  
Pain Screen:  
Pain Scalenumerical   
0-10  
Pain Scale   
Educationteaching   
provided  
Current Pain Level7 =   
Severe  
Acceptable Pain Level5   
= Moderate  
Expression of Pain   
(nonverbal)verbalizatio  
n  
Chronic Painno  
  
Spiritual Screen:  
Are there any cultural,   
spiritual, Taoism   
practices/values/needs   
that are  
important for us to   
knowno  
  
CAGE:  
Is this an injured   
patient at a Trauma   
Center   
([facility] [place]  
[place]): no (2)  
  
Vaccinations:  
Vaccination - Influenza   
Vaccination Screen:  
Is it flu season   
(between  and   
)Yes  
Screening for   
identified   
contraindications to   
influenza vaccination  
patient/caregiver   
refusal  
  
Vaccination - Pneumonia   
Vaccination Screen:  
Patient has received a   
previous pneumonia   
vaccine:no/unknown...  
Immunocompetent persons   
with underlying chronic   
conditions or reside in   
long  
term care   
facilitiesnone of these   
conditions  
Persons with Functional   
or Anatomic   
Asplenianone of t (more   
content not   
included)...        Normal                                  [facility]  
   
                                                    BASIC METABOLIC PANELon    
   
                      Anion gap [Moles/Vol] 17 mmol/L  Normal     10 - 20    [facility]  
   
                                        Comment on above:   Performed By: #### P  
TINR ####  
[address] [address]   
   
                      Calcium [Mass/Vol] 8.4 mg/dL  Low        8.6 - 10.3 [facility]  
   
                                        Comment on above:   Performed By: #### P  
TINR ####  
[address] [address]   
   
                      Chloride [Moles/Vol] 108 mmol/L High       98 - 107   [facility]  
   
                                        Comment on above:   Performed By: #### P  
TINR ####  
[address] [address]   
   
                      Creatinine [Mass/Vol] 0.49 mg/dL Low        0.50 - 1.05 [facility]  
   
                                        Comment on above:   Performed By: #### P  
TINR ####  
[address] [address]   
   
                      eGFR FEMALE >90        Normal     >90        [facility]  
   
                                        Comment on above:   Result Comment: CALC  
ULATIONS OF ESTIMATED GFR ARE PERFORMED  
USING THE  CKD-EPI STUDY REFIT EQUATION  
WITHOUT THE RACE VARIABLE FOR THE IDMS-TRACEABLE  
CREATININE METHODS.  
https://jasn.asnjournals.org/content/early//ASN.  
685670   
   
                                                            Performed By: #### P  
TINR ####  
[address] [address]   
   
                      Glucose [Mass/Vol] 80 mg/dL   Normal     74 - 99    [facility]  
   
                                        Comment on above:   Performed By: #### P  
TINR ####  
[address] [address]   
   
                      HCO3 (Bld) [Moles/Vol] 12 mmol/L  Low        21 - 32    [facility]  
   
                                        Comment on above:   Performed By: #### P  
TINR ####  
[address] [address]   
   
                      Potassium [Moles/Vol] 3.2 mmol/L Low        3.5 - 5.3  [facility]  
   
                                        Comment on above:   Performed By: #### P  
TINR ####  
[address] [address]   
   
                      Sodium [Moles/Vol] 134 mmol/L Low        136 - 145  [facility]  
   
                                        Comment on above:   Performed By: #### P  
TINR ####  
[address] [address]   
   
                                                    Urea nitrogen   
[Mass/Vol]      4 mg/dL         Low             6 - 23          [facility]  
   
                                        Comment on above:   Performed By: #### P  
TINR ####  
[address] [address]   
   
                      Anion gap [Moles/Vol] 17 mmol/L  Normal     10 - 20    [facility]  
   
                                        Comment on above:   Performed By: #### M  
G ####  
[address] [address]   
   
                      Calcium [Mass/Vol] 8.3 mg/dL  Low        8.6 - 10.3 [facility]  
   
                                        Comment on above:   Performed By: #### M  
G ####  
[address] [address]   
   
                      Chloride [Moles/Vol] 109 mmol/L High       98 - 107   [facility]  
   
                                        Comment on above:   Performed By: #### M  
G ####  
[address] [address]   
   
                      Creatinine [Mass/Vol] 0.45 mg/dL Low        0.50 - 1.05 [facility]  
   
                                        Comment on above:   Performed By: #### M  
G ####  
[address] [address]   
   
                      eGFR FEMALE >90        Normal     >90        [facility]  
   
                                        Comment on above:   Result Comment: CALC  
ULATIONS OF ESTIMATED GFR ARE PERFORMED  
USING THE  CKD-EPI STUDY REFIT EQUATION  
WITHOUT THE RACE VARIABLE FOR THE IDMS-TRACEABLE  
CREATININE METHODS.  
https://jasn.asnjournals.org/content/early/ASN.  
214723   
   
                                                            Performed By: #### M  
G ####  
[address] [address]   
   
                      Glucose [Mass/Vol] 87 mg/dL   Normal     74 - 99    [facility]  
   
                                        Comment on above:   Performed By: #### M  
G ####  
[address] [address]   
   
                      HCO3 (Bld) [Moles/Vol] 12 mmol/L  Low        21 - 32    [facility]  
   
                                        Comment on above:   Performed By: #### M  
G ####  
[address] [address]   
   
                      Potassium [Moles/Vol] 3.2 mmol/L Low        3.5 - 5.3  [facility]  
   
                                        Comment on above:   Performed By: #### M  
G ####  
[address] [address]   
   
                      Sodium [Moles/Vol] 135 mmol/L Low        136 - 145  [facility]  
   
                                        Comment on above:   Performed By: #### M  
G ####  
[address] [address]   
   
                                                    Urea nitrogen   
[Mass/Vol]      4 mg/dL         Low             6 - 23          [facility]  
   
                                        Comment on above:   Performed By: #### M  
G ####  
[address] [address]   
   
                                                    CBC AND DIFFERENTIALon 03-10  
-2022   
   
                      DIFFERENTIAL SEE MANUAL DIFF Normal                [facility]  
   
                                        Comment on above:   Performed By: #### H  
EPFP ####  
[address] [address]   
   
                                                    Erythrocyte   
distribution width   
(RBC) [Ratio]   14.7 %          High            11.5 - 14.5     [facility]  
   
                                        Comment on above:   Performed By: #### H  
EPFP ####  
[address] [address]   
   
                                                    Hematocrit (Bld)   
[Volume fraction] 41.6 %          Normal          36.0 - 46.0     [facility]  
   
                                        Comment on above:   Performed By: #### H  
EPFP ####  
[address] [address]   
   
                                                    Hemoglobin (Bld)   
[Mass/Vol]      13.5 g/dL       Normal          12.0 - 16.0     [facility]  
   
                                        Comment on above:   Performed By: #### H  
EPFP ####  
[address] [address]   
   
                      MCHC (RBC) [Mass/Vol] 32.4 g/dL  Normal     32.0 - 36.0 [facility]  
   
                                        Comment on above:   Performed By: #### H  
EPFP ####  
[address] [address]   
   
                      MCV (RBC) [Entitic vol] 86 fL      Normal     80 - 100   S  
[facility]  
   
                                        Comment on above:   Performed By: #### H  
EPFP ####  
[address] [address]   
   
                      NUCLEATED RBC 0.2 /100 WBC Normal                [facility]  
   
                                        Comment on above:   Performed By: #### H  
EPFP ####  
[address] [address]   
   
                      Platelets (Bld) [#/Vol] 194 10*3/uL Normal     150 - 450    
[facility]  
   
                                        Comment on above:   Performed By: #### H  
EPFP ####  
[address] [address]   
   
                      RBC        4.84 x10E12/L Normal     4.00 - 5.20 [facility]  
   
                                        Comment on above:   Performed By: #### H  
EPFP ####  
[address] [address]   
   
                      WBC (Bld) [#/Vol] 3.1 10*3/uL Low        4.4 - 11.3 [facility]  
   
                                        Comment on above:   Performed By: #### H  
EPFP ####  
[place] [id number]   
   
                                                    COMPREHENSIVE PANELon 03-10-  
2022   
   
                      Albumin [Mass/Vol] 4.5 g/dL   Normal     3.4 - 5.0  [facility]  
   
                                        Comment on above:   Performed By: #### C  
BCDF ####  
[address] [address]   
   
                                                    ALP [Catalytic   
activity/Vol]   42 U/L          Normal          33 - 110        [facility]  
   
                                        Comment on above:   Performed By: #### C  
BCDF ####  
[address] [address]   
   
                                                    ALT [Catalytic   
activity/Vol]   10 U/L          Normal          7 - 45          [facility]  
   
                                        Comment on above:   Result Comment: K[place]  
ents treated with Sulfasalazine may   
generate  
falsely decreased results for ALT.   
   
                                                            Performed By: #### C  
BCDF ####  
[address] [address]   
   
                      Anion gap [Moles/Vol] 19 mmol/L  Normal     10 - 20    [facility]  
   
                                        Comment on above:   Performed By: #### C  
BCDF ####  
[address] [address]   
   
                                                    AST [Catalytic   
activity/Vol]   21 U/L          Normal          9 - 39          [facility]  
   
                                        Comment on above:   Performed By: #### C  
BCDF ####  
[address] [address]   
   
                      Bilirubin [Mass/Vol] 0.3 mg/dL  Normal     0.0 - 1.2  [facility]  
   
                                        Comment on above:   Performed By: #### C  
BCDF ####  
[address] [address]   
   
                      Calcium [Mass/Vol] 9.0 mg/dL  Normal     8.6 - 10.3 [facility]  
   
                                        Comment on above:   Performed By: #### C  
BCDF ####  
[address] [address]   
   
                      Chloride [Moles/Vol] 106 mmol/L Normal     98 - 107   [facility]  
   
                                        Comment on above:   Performed By: #### C  
BCDF ####  
[address] [address]   
   
                      Creatinine [Mass/Vol] 0.52 mg/dL Normal     0.50 - 1.05 [facility]  
   
                                        Comment on above:   Performed By: #### C  
BCDF ####  
[address] [address]   
   
                      eGFR FEMALE >90        Normal     >90        [facility]  
   
                                        Comment on above:   Result Comment: CALC  
ULATIONS OF ESTIMATED GFR ARE PERFORMED  
USING THE  CKD-EPI STUDY REFIT EQUATION  
WITHOUT THE RACE VARIABLE FOR THE IDMS-TRACEABLE  
CREATININE METHODS.  
https://jasn.asnjournals.org/content/early/ASN.  
158082   
   
                                                            Performed By: #### C  
BCDF ####  
[address] [address]   
   
                      Glucose [Mass/Vol] 93 mg/dL   Normal     74 - 99    [facility]  
   
                                        Comment on above:   Performed By: #### C  
BCDF ####  
[address] [address]   
   
                      HCO3 (Bld) [Moles/Vol] 14 mmol/L  Low        21 - 32    [facility]  
   
                                        Comment on above:   Performed By: #### C  
BCDF ####  
[address] [address]   
   
                      Potassium [Moles/Vol] 3.5 mmol/L Normal     3.5 - 5.3  [facility]  
   
                                        Comment on above:   Performed By: #### C  
BCDF ####  
[address] [address]   
   
                      Protein [Mass/Vol] 7.3 g/dL   Normal     6.4 - 8.2  [facility]  
   
                                        Comment on above:   Performed By: #### C  
BCDF ####  
[address] [address]   
   
                      Sodium [Moles/Vol] 135 mmol/L Low        136 - 145  [facility]  
   
                                        Comment on above:   Performed By: #### C  
BCDF ####  
[address] [address]   
   
                                                    Urea nitrogen   
[Mass/Vol]      4 mg/dL         Low             6 - 23          [facility]  
   
                                        Comment on above:   Performed By: #### C  
BCDF ####  
[address] [address]   
   
                                                    CORONAVIRUS 2019, SCREEN ASY  
MPTOMATICon 03-   
   
                                                    DATE OF SYMPTOM ONSET   
[[id number]]?     Canceled        [facility]  
   
                                        Comment on above:   Order Comment: TEST   
CORONAVIRUS , SCREEN ASYMPTOMATIC WAS   
CANCELLED, 03/10/2022 15:28   
   
                                                            Performed By: #### C  
OVSC ####[place] [address]   
   
                                                    SARS-CoV-2 (COVID-19)   
RNA KIRSTEN+probe Ql (Unsp   
spec)           Canceled        [facility]  
   
                                        Comment on above:   Order Comment: TEST   
CORONAVIRUS , SCREEN ASYMPTOMATIC WAS   
CANCELLED, 03/10/2022 15:28   
   
                                                            Result Comment: .  
This test has received FDA Emergency Use Authorization (EUA)   
and has been  
verified by [facility].   
This test is only  
authorized for the duration of time that circumstances exist   
to justify the  
authorization of the emergency use of in vitro diagnostic   
tests for the  
detection of SARS-CoV-2 virus and/or diagnosis of COVID-19   
infection under  
section 564(b)(1) of the Act, 21 U.S.C. 360bbb-3(b)(1), unless   
the  
authorization is terminated or revoked sooner.  
[facility] is certified   
under CLIA-88 as  
qualified to perform high complexity testing. Testing is   
performed in the  
[facility] laboratory located at [address] [place] [address].  
SARS-CoV-2/Flu/RSV Multiplex Test:  
Fact sheet for providers:   
https://www.fda.gov/media/634919/download  
Fact sheet for patients:   
https://www.fda.gov/media/857773/download   
   
                                                            Performed By: #### C  
OVSC ####[place] [address]   
   
                      Lab Specimen Source Nasal, Nasopharyngeal [facility]  
   
                                        Comment on above:   Order Comment: TEST   
CORONAVIRUS , SCREEN ASYMPTOMATIC WAS   
CANCELLED, 03/10/2022 15:28   
   
                                                            Performed By: #### C  
OVSC ####[place] [address]   
   
                                                    DRUG SCREEN,URINEon 03-10-20  
22   
   
                      AMPHETAMINE SCREEN,U Negative   Normal     NEGATIVE   [facility]  
   
                                        Comment on above:   Result Comment: CUTO  
FF LEVEL: 500 NG/ML  
Cross-reactivity has been reported with high concentrations  
of the following drugs: buproprion, chloroquine,   
chlorpromazine,  
ephedrine, mephentermine, fenfluramine, phentermine,  
phenylpropanolamine, pseudoephedrine, and propranolol.   
   
                                                            Performed By: #### U  
AMIC ####  
[place] [id number]   
   
                      BARBITURATES SCREEN,U Negative   Normal     NEGATIVE   [facility]  
   
                                        Comment on above:   Result Comment: CUTO  
FF LEVEL: 200 NG/ML   
   
                                                            Performed By: #### U  
AMIC ####  
[place] [address]   
   
                                                    BENZODIAZEPINES   
SCREEN,U        Negative        Normal          NEGATIVE        [facility]  
   
                                        Comment on above:   Result Comment: CUTO  
FF LEVEL: 200 NG/ML   
   
                                                            Performed By: #### U  
AMIC ####  
[place] [address]   
   
                      CANNABINOIDS SCREEN,U Positive   Abnormal   NEGATIVE   [facility]  
   
                                        Comment on above:   Result Comment: CUTO  
FF LEVEL: 50 NG/ML   
   
                                                            Performed By: #### U  
AMIC ####  
[place] [address]   
   
                                                    COCAINE METABOLITE   
SCREEN,U        Negative        Normal          NEGATIVE        [facility]  
   
                                        Comment on above:   Result Comment: CUTO  
FF LEVEL: 150 NG/ML   
   
                                                            Performed By: #### U  
AMIC ####  
[place] [address]   
   
                      DRUG SCREEN COMMENT SEE BELOW  Normal                [facility]  
   
                                        Comment on above:   Result Comment: Drug  
 screen results are presumptive and should  
   
not be used to assess  
compliance with prescribed medication. Contact the performing   
[facility]  
laboratory to add-on definitive confirmatory testing if   
clinically  
indicated.  
.  
Toxicology screening results are reported qualitatively. The   
concentration  
must be greater than or equal to the cutoff to be reported as   
positive. The  
concentration at which the screening test can detect an   
individual drug or  
metabolite varies. The absence of expected drug(s) and/or drug   
metabolite(s)  
may indicate non-compliance, inappropriate timing of specimen   
collection  
relative to drug administration, poor drug absorption,   
diluted/adulterated  
urine, or limitations of testing. For medical purposes only;   
not valid for  
forensic use.  
.  
Interpretive questions should be directed to the laboratory   
medical  
directors.   
   
                                                            Performed By: #### U  
AMIC ####  
[place] [id number]   
   
                      FENTANYL SCREEN,URINE Negative   Normal     NEGATIVE   [facility]  
   
                                        Comment on above:   Result Comment: CUTO  
FF LEVEL: 1 NG/ML  
The performance characteristics of this  
test have been determined by the individual  
 laboratory site where testing is performed.  
This test has not been cleared or approved  
by the FDA; however, the FDA has determined  
that such clearance is not necessary.   
   
                                                            Performed By: #### U  
AMIC ####  
[place] [id number]   
   
                      METHADONE SCREEN,U Negative   Normal     NEGATIVE   [facility]  
   
                                        Comment on above:   Result Comment: CUTO  
FF LEVEL: 150 NG/ML  
The metabolite L-alpha-acetylmethadol (LAAM) is not  
detected by this method in concentrations that would  
be found in the urine of patients on LAAM therapy.   
   
                                                            Performed By: #### U  
AMIC ####  
[place] [address]   
   
                      OPIATES SCREEN,U Negative   Normal     NEGATIVE   [facility]  
   
                                        Comment on above:   Result Comment: CUTO  
FF LEVEL: 300 NG/ML  
The opiate screen does not detect fentanyl, meperidine, or  
tramadol. Oxycodone is not consistently detected (refer to  
Oxycodone Screen, Urine result).   
   
                                                            Performed By: #### U  
AMIC ####  
[place] [id number]   
   
                      OXYCODONE SCREEN,U Negative   Normal     NEGATIVE   [facility]  
   
                                        Comment on above:   Result Comment: CUTO  
FF LEVEL: 100 NG/ML  
This test will accurately detect both oxycodone and   
oxymorphone.   
   
                                                            Performed By: #### U  
AMIC ####  
[place] [id number]   
   
                      PCP SCREEN,U Negative   Normal     NEGATIVE   [facility]  
   
                                        Comment on above:   Result Comment: CUTO  
FF LEVEL: 25 NG/ML  
Cross-reactivity has been reported with dextromethorphan.   
   
                                                            Performed By: #### U  
AMIC ####  
[place] [address]   
   
                                                    EMR ADDONon 03-   
   
                      ADDON CONFIRMATION REQUEST REC'D Normal                [facility]  
   
                                        Comment on above:   Performed By: #### P  
TINR ####  
[address] [address]   
   
                                                    HCG,URINEon 03-   
   
                                                    Beta HCG (pregnancy   
test) Ql (U)    Negative        Normal          Negative        [facility]  
   
                                        Comment on above:   Performed By: #### U  
AMIC ####  
[address] [address]   
   
                                                    LACTATEon 03-   
   
                      Lactate [Moles/Vol] 1.1 mmol/L Normal     0.4 - 2.0  [facility]  
   
                                        Comment on above:   Result Comment: [place]  
puncture immediately after or during the  
administration of Metamizole may lead to falsely  
low results. Testing should be performed immediately  
prior to Metamizole dosing.   
   
                                                            Performed By: #### P  
TINR ####  
[address] [address]   
   
                                                    LIPASEon 03-   
   
                                                    Lipase [Catalytic   
activity/Vol]   63 U/L          Normal          9 - 82          [facility]  
   
                                        Comment on above:   Result Comment: [place]  
puncture immediately after or during the  
administration of Metamizole may lead to falsely  
low results. Testing should be performed immediately  
prior to Metamizole dosing.  
N-acetyl-p-benzoquinone imine (metabolite of Acetaminophen)  
will generate erroneously low results in samples for patients  
that have taken toxic doses of acetaminophen.   
   
                                                            Performed By: #### U  
AMIC ####  
[address] [address]   
   
                                                    MANUAL DIFFERENTIALon 03-10-  
2022   
   
                      % BAND NEUTROPHIL 2.0 %      Normal     0.0 - 5.0  [facility]  
   
                                        Comment on above:   Performed By: #### U  
AMIC ####  
[address] [address]   
   
                      % BASOPHIL 0.0 %      Normal     0.0 - 2.0  [facility]  
   
                                        Comment on above:   Performed By: #### U  
AMIC ####  
[address] [address]   
   
                      % EOSINOPHIL 0.0 %      Normal     0.0 - 6.0  [facility]  
   
                                        Comment on above:   Performed By: #### U  
AMIC ####  
[address] [address]   
   
                      % LYMPH-ATYPICAL 1.0 %      Normal     0.0 - 2.0  [facility]  
   
                                        Comment on above:   Performed By: #### U  
AMIC ####  
[address] [address]   
   
                      % LYMPHOCYTE 28.0 %     Normal     13.0 - 44.0 [facility]  
   
                                        Comment on above:   Performed By: #### U  
AMIC ####  
[address] [address]   
   
                      % MONOCYTE 27.0 %     Normal     2.0 - 10.0 [facility]  
   
                                        Comment on above:   Performed By: #### U  
AMIC ####  
[address] [address]   
   
                      % SEG NEUTROPHIL 42.0 %     Normal     40.0 - 80.0 [facility]  
   
                                        Comment on above:   Result Comment: Perc  
ent differential counts (%) should be   
interpreted in the  
context of the absolute cell counts (cells/L).   
   
                                                            Performed By: #### U  
AMIC ####  
[address] [address]   
   
                      ANC        1.36 x10E9/L Normal     1.20 - 7.70 [facility]  
   
                                        Comment on above:   Performed By: #### U  
AMIC ####  
[address] [address]   
   
                      BAND NEUTROPHIL 0.06 x10E9/L Normal     0.00 - 0.70 [facility]  
   
                                        Comment on above:   Performed By: #### U  
AMIC ####  
[address] [address]   
   
                      BASOPHIL   0.00 x10E9/L Normal     0.00 - 0.10 [facility]  
   
                                        Comment on above:   Performed By: #### U  
AMIC ####  
[address] [address]   
   
                      EOSINOPHIL 0.00 x10E9/L Normal     0.00 - 0.70 [facility]  
   
                                        Comment on above:   Performed By: #### U  
AMIC ####  
[address] [address]   
   
                      LYMPH-ATYPICAL 0.03 x10E9/L Normal     0.00 - 0.50 [facility]  
   
                                        Comment on above:   Performed By: #### U  
AMIC ####  
[address] [address]   
   
                      LYMPHOCYTE 0.87 x10E9/L Low        1.20 - 4.80 [facility]  
   
                                        Comment on above:   Performed By: #### U  
AMIC ####  
[address] [address]   
   
                      MONOCYTE   0.84 x10E9/L Normal     0.10 - 1.00 [facility]  
   
                                        Comment on above:   Performed By: #### U  
AMIC ####  
[address] [address]   
   
                      SEG NEUTROPHIL 1.30 x10E9/L Normal     1.20 - 7.00 [facility]  
   
                                        Comment on above:   Performed By: #### U  
AMIC ####  
[address] [address]   
   
                                                    Patient Profile - Adult v2on  
 03-   
   
                                                    Patient Profile - Adult   
v2                                      Profile:  
Initial Info:  
How to be   
Addressed DANNA (1)  
Spoken Language   
PreferredEnglish (2)  
Source of   
Informationpatient  
Stated Reason for   
Admission nausea   
Wants Family/Rep   
Notified of   
Admissionn/a; family   
present  
Notify PCPnotify PCP  
Informed of Patient   
Visiting Rightsyes  
Arrived Fromemergency   
department  
Patient   
Belongingsremains with   
patient  
Patient Belongings   
Remaining with   
Patientcell   
phone/electronics;   
clothing  
Medications Brought to   
Hospitalno  
  
General Health:  
Weight in kg47   
kilogram(s)  
Weight in gcp736.6   
pound(s)  
Weight Methodactual   
(measured)  
Scale Typebed  
Height in cm149.8   
centimeter(s)(3)  
Height in feet4 feet  
Height in cbloup94.98   
inch(es)  
Height Methodstated  
BMI (kg/m2)20.944   
square meter  
  
RSP Based Care:  
How would you like to   
participate in your   
care as much as I can   
What is the number one   
concern for you during   
this   
hospitalization nausea   
What is the most   
important thing we can   
do to support you   
during this  
hospitalization help me   
feel better   
Is there anything we   
need to know to best   
care for you no   
  
Substance:  
Smoking Statusnever   
smoker (4)  
Alcohol Usedenies(4)  
  
Health Mgmt:  
Symptoms/Conditions   
Managed at   
Homemusculoskeletal  
Are You Pregnantno (5)  
Are You Currently   
Breastfeedingno (5)  
Musculoskeletal   
Symptoms/Conditionsscol  
iosis  
Musculoskeletal   
Managementmanaged  
  
Relationship/Environ:  
Resource/Environmental   
Concernsnone  
Primary Source of   
Support/Comfortsignific  
ant other  
Lives Withsignificant   
other; dependent   
child(melia)  
Living   
Arrangementsmobile home  
Services Anticipated at   
Transitionnone  
Anticipated Transition   
[place]  
Significant   
IndicatorsComplete  
  
  
  
Information Review:  
Allergies, Home Meds   
and Significant Events   
have been Reviewed and   
Verified  
with Patient/Familyyes  
  
ALLERGY, INTOLERANCE,   
ADVERSE EVENT:  
Allergies:  
MiraLax: Drug,   
Hives/Urticaria, Active  
codeine: Drug, Unknown,   
Active  
  
  
Electronic Signatures:  
Rosa Elena Thao (RN)   
(Signed 10-Mar-2022   
16:46)  
Authored: Initial Info,   
General Health, RSP   
Based Care, Substance,   
Health  
Mgmt,   
Relationship/Environ,   
Additional Information  
  
  
Last Updated:   
10-Mar-2022 16:46 by   
Rosa Elena Thao (RN)  
  
References:  
1. Data Referenced From   
 Patient Profile -   
Adult v2  2022   
05:16  
2. Data Referenced From   
 Triage - ED    
2022 17:34  
3. Data Referenced From   
 1. Vital Signs    
10-Mar-2022 10:41  
4. Data Referenced From   
 Risk Screen - Adult   
Emergency  10-Mar-2022   
15:45  
5. Data Referenced From   
 Triage - ED    
10-Mar-2022 10:41   Normal                                  [facility]  
   
                                                    Provider Note - ED v3on 03-1  
   
   
                                        Provider Note - ED v3 Provider Note:  
Chart Review:  
ED NOTES  
ED NOTES:  
HPI:  
Patient complains of   
nausea vomiting no   
abdominal pain which   
has been ongoing  
for the last several   
days. She states the   
pain in her abdomen is   
worsened last  
night. She was seen   
 at [facility] ER for   
patient had  
negative lab work and a   
normal CT scan of the   
abdomen. She was seen  
subsequently here  with normal lab work   
otherwise and was   
tested  
positive for influenza   
A and negative for   
Covid. She states that   
she continues  
to vomit multiple times   
and is unable to keep   
down any liquids or her  
antiemetics. She states   
that she has been   
unable to urinate.   
Denies any  
recent fevers. Denies   
any chance of pregnancy   
stating that she had a   
negative  
pregnancy test 2 days   
ago and is currently on   
her period.  
  
  
ROS:  
All systems are   
negative other than as   
noted in HPI.  
  
  
Physical Exam  
  
I have reviewed the   
triage vital signs.  
Const: Thin young   
female , appears stated   
age, no acute distress  
Eyes: PERRL, EOM   
intact, no conjunctival   
injection, vision   
grossly normal  
HENT: Neck supple   
without meningismus ,   
Moist mucous membranes,   
no pharyengeal  
swelling or exudate  
CV: Regular rate and   
rhythm, Warm,   
well-perfused   
extremities. Chest non   
tender  
RESP: Lungs clear   
bilaterally, Unlabored   
respiratory effort  
GI: soft, left lower   
quadrant tenderness,   
non-distended, no   
masses  
:  
MSK: No gross   
deformities appreciated  
Back: Non tender, no   
pain with ROM  
Skin: Warm, dry. No   
rashes  
Neuro: Alert and   
oriented x4, GCS 15 ,   
CNs II-XII grossly   
intact. Sensation and  
motor function of   
extremities grossly   
intact.  
Psych: Appropriate mood   
and affect.  
  
I have reviewed and   
confirmed nurses/medics   
notes for patient past,   
social  
and family history.  
  
  
Portions of this note   
were dictated by speech   
recognition. An attempt   
at proof  
reading was made to   
minimize errors. Minor   
errors in transcription   
may be  
present.  
  
  
HISTORY OF PRESENTING   
ILLNESS  
DANNA is a 21 year old   
Female and was seen by   
me at 10-Mar-2022 10:37   
for a  
chief complaint of   
flu-like symptoms   
(Patient to ED   
[id number]   
nausea/vomiting  
with abdominal pain.   
Patient was diagnosed   
with influenza x 1 day   
prior.)(1).  
  
Triage Information:   
Most recent Vital Sign   
Value Date  
Temp (F): 99.3   
03- 10:41  
Temp (C): 37.3   
03- 10:41  
Heart Rate (beats/min):   
82 03- 10:41  
Respirations   
(breaths/min): 18   
03- 10:41  
SpO2 (%): 98 03-   
10:41  
BP Systolic (mm Hg):   
134 03- 10:41  
BP Diastolic (mm Hg):   
99 03- 10:41  
  
  
  
  
  
PAST MEDICAL HISTORY  
ALLERGIES/INTOLERANCES:   
Allergy  
Allergen: MiraLax  
Type: Drug  
Reaction:   
Hives/Urticaria  
  
Allergen: codeine  
Type: Drug  
Reaction: Unknown  
  
HEALTH HISTORY: No   
documented data.  
  
OUTPATIENT MEDICATIONS:   
Home Medications Review   
Status for   
Reconciliation:  
Incomplete  
Med Status: Incomplete   
Medication History  
  
Drug Name: ondansetron   
4 mg oral tablet,   
disintegrating  
Instructions: 1 tab(s)   
orally 3 times a day  
  
Drug Name: oseltamivir   
75 mg oral capsule  
Instructions: 1 cap(s)   
orally 2 times a day  
  
SIGNIFICANT EVENTS:   
Past Medical History  
Description:premature  
  
  
Description:delayed   
bone growth  
  
  
Description:Miscarriage  
  
  
Description:scoliosis  
  
  
Past Surgical History  
Description:Appendectom  
y  
  
  
  
MDM  
MDM/ED COURSE:  
1145-patient was   
initially given 4 mg   
oral Zofran and 2 L   
normal saline were  
ordered. On   
reevaluation at this   
time patient notes that   
symptoms have not  
improved and she will   
be given 10 mg Reglan   
and 50 mg IV Benadryl.  
  
1215-on reevaluation   
patient again complains   
of generalized lower   
abdominal  
pain. As she had a   
negative CAT scan  I do not feel that it   
would merit  
repeating a CAT scan   
and she was given 40 mg   
IV Protonix.  
  
1250-patient resting in   
bed asleep.  
  
1315-patient continues   
to complain of nausea   
and has urinated   
approximately 2  
times after the 2 L of   
fluids. She will be   
given 10 mg IV   
Compazine. As  
patient has been seen   
here 3 times in 3 days   
I discussed with her   
possible  
admission to the   
hospital however she   
prefers to try the   
Compazine first.  
  
1400-patient sleeping   
in bed.  
  
1445-on reevaluation   
patient does look   
better and she was   
willing to attempt  
oral intake. Patient   
given ginger ale.  
  
1430-patient again   
vomiting. I discussed   
with her that as she   
has had 3 visits  
in 3 days and currently   
unable to tolerate oral   
intake I have felt that   
she  
would benefit with   
hospitalization for   
intractable vomiting.   
Urine tox was  
positive for marijuana.   
Patient states that she   
does usually smoke   
marijuana  
about once per day but   
has not had any in   
approximately 1 week.  
  
1500-patient vomiting   
and unable to tolerate   
oral intake. Case   
reviewed with  
Dr. Camara who agrees to   
admit patient for   
intractable vomiting.  
  
DISPOSITION  
Diagnosis/Annotation:   
ED Dx  
Name:Nausea and   
vomiting  
Code:R11.2  
  
Name:Influenza A  
Code:J10.1  
  
Name:Intractable vomit   
(more content not   
included)...        Normal                                  [facility]  
   
                                                    RED CELL MORPHOLOGYon 03-10-  
2022   
   
                                                    RBC morphology finding   
Nom (Bld)       NORMAL          Normal                          [facility]  
   
                                        Comment on above:   Performed By: #### U  
[facility] ####  
[address] [address]   
   
                                                    Risk Screen - Adult Emergenc  
yon 03-   
   
                                                    Risk Screen - Adult   
Emergency                               Preferred Language:  
Preferred Language:  
Preferred Language for   
Discussing Health Care   
(patient/designee)Charleen patel  
  
Advanced Directives:  
Advance Directive/DNRno  
  
Family Violence Adult:  
Abuse Screen:  
Are you or have you   
been threatened or   
abused physically,   
emotionally, or  
sexually by anyoneno  
  
Learning Assessment   
(Patient):  
Learning Assessment   
(Patient):  
Patient is Able to be   
Assessed for   
Learningyes  
Factors Influencing   
Readiness to   
Learnacuteness of   
illness  
Factors that Impact   
Ability to Learnnone  
Devices/Methods Used to   
Communicatenone  
Learning   
Preferencesaudio  
Cultural   
Considerationsnone  
Developmental   
Considerationsnone  
Mormon   
Considerationsnone  
  
Learning Assessment   
(Other Learner):  
Learning Assessment   
(Other Learner):  
Other learner   
availableno  
  
Pressure   
Injury/TB/Substance:  
Pressure Injury:  
Do you have a coughno  
Smoking Statusnever   
smoker  
Alcohol Usedenies  
  
  
Admission Risk Screen:  
Significant   
IndicatorsComplete  
  
CAGE:  
CAGE:  
Is this an injured   
patient at a Trauma   
Center   
([facility]  
a/[place]): no  
  
  
Electronic Signatures:  
Becky Lee (RN)   
(Signed 10-Mar-2022   
15:45)  
Authored: Preferred   
Language, Advanced   
Directives, Family   
Violence Adult,  
Learning Assessment   
(Patient), Learning   
Assessment (Other   
Learner), Pressure  
Injury/TB/Substance,   
Pressure Injury, CAGE  
  
  
Last Updated:   
10-Mar-2022 15:45 by   
Becky Lee (ERNA)    [facility]  
   
                                                    Triage - EDon 03-   
   
                                        Triage - ED         Quick Triage:  
Are You Pregnantno  
Have You Given Birth In   
The Last 6 Weeksno  
Are You Currently   
Breastfeedingno  
The patient and/or   
guardian verbally   
acknowledges placement   
for services into  
the following (when   
Urgent Care Service   
hours are   
operating):emergency  
department  
  
Chart Review:  
ARRIVAL INFORMATION  
  
Mode of Arrival:   
private vehicle  
  
  
  
CHIEF COMPLAINT  
  
DANNA SOARES   
is a Female patient   
with a chief complaint   
of flu-like  
symptoms (Patient to ED   
[id number]   
nausea/vomiting with   
abdominal pain. Patient  
was diagnosed with   
influenza x 1 day   
prior.).  
Onset of the Complaint:   
06-Mar-2022 09:00  
Triage Date/Time:   
10-Mar-2022 10:41  
DIPIKA: 3  
Pain Rating (0-10): 7 =   
Severe  
Vital Signs:  
Temperature: 99.3F (   
37.3C) taken temporal  
Blood Pressure: 134/99   
Mean:  
Heart Rate: 82  
Respiratory Rate: 18  
Pulse Oximetry: 98% on   
room air, no   
respiratory support.   
Height: 4 feet 11.00  
inches. 149.8 CM  
Weight: 100.3 pounds.   
Calculated 45.5 kg.  
Calculated BMI (kg/m2):   
20.276 Calculated BSA   
(m2) 1.38  
  
Sujit Coma Scale:  
Best Eye Response: (E4)   
spontaneous  
Best Motor Response:   
(M6) obeys commands  
Best Verbal Response:   
(V5) oriented  
Sujit Score: 15  
[place]umm[place] Assessment   
Qualifiers: patient not   
sedated/intubated  
  
Cough lasting greater   
than 3 weeks: no  
Allergies: yes  
Mask applied: yes  
Last menstrual period:   
10-Mar-2022  
Patient has homicidal   
thoughts: no  
  
  
  
  
Risk Screens  
Suicide Risk Screen  
  
In the Past Month: Have   
you wished you were   
dead or wished you   
could go to  
sleep and not wake up   
no  
In the Past Month: Have   
you had any actual   
thoughts of killing   
yourself no  
In Your Lifetime: Have   
you ever done anything,   
started to do anything,   
or  
prepared to do anything   
to end your life no  
  
  
  
Avila Fall Scale   
Screening  
Has the patient fallen   
before (or is the   
patient in the ED as a   
result of a  
fall) has not had a   
fall  
Does the patient have   
an impaired gait does   
not have impaired gait  
Is the patient   
cognitively impaired   
not cognitively   
impaired  
  
  
Interventions:  
Avila Fall   
Interventions: LOW   
INTERVENTIONS:  
*patient oriented to   
surroundings and call   
system,  
* patient/family falls   
education completed  
and documented,  
*patients fall status   
communicated  
during bedside handoff,  
*whiteboard updated,  
*mode of toileting   
discussed with patient,  
*bed in low position   
with brakes locked,  
*call light in reach,  
* non-skid footwear  
  
  
TRAVEL HISTORY  
Travel History  
Coronavirus Screening:   
no exposure or symptoms  
Travel Exposure   
History: NO travel to   
International locations   
in the past 30  
days  
  
  
PAIN  
  
Pain Scale Used: ASHLEY  
Pain Rating (0-10): 7 =   
Severe  
  
  
  
  
  
Past Medical History:  
Past Medical History   
Reviewedyes  
  
  
Appendectomy: Past   
Surgical History,   
Active  
scoliosis: Past Medical   
History, Active  
Miscarriage: Past   
Medical History, Active  
delayed bone growth:   
Past Medical History,   
Active  
premature: Past Medical   
History, Active  
  
  
Electronic Signatures:  
Andrew Guerra   
(EMT-P) (Signed   
10-Mar-2022 10:44)  
Entered: Risk Screens,   
Pain, Travel History,   
Chart Review, Scores  
Authored: Quick Triage,   
Risk Screens, Pain,   
Travel History, Chart   
Review,  
Scores  
Becky Rice (RN)   
(Signed 10-Mar-2022   
11:05)  
Authored: Quick Triage,   
Chart Review, Past   
Medical History  
  
  
Last Updated:   
10-Mar-2022 11:05 by   
Becky RiceRN) Normal                                  [facility]   
Health  
   
                                                    UA MICROSCOPICon 03-   
   
                      Mucus Ql (Urine sed) 1+ /LPF    Normal                [facility]  
   
                                        Comment on above:   Performed By: #### U  
AMIC ####[address] [address]   
   
                      RBC        66 /HPF    Abnormal   0-5        [facility]  
   
                                        Comment on above:   Performed By: #### U  
AMIC ####[place] [address]   
   
                      SQUAMOUS EPITH. CELLS 3 /HPF     Normal                [facility]  
   
                                        Comment on above:   Performed By: #### U  
AMIC ####[place] [address]   
   
                      WBC        2 /HPF     Normal     0-5        [facility]  
   
                                        Comment on above:   Performed By: #### U  
AMIC ####[place] [id number]   
   
                                                    URINALYSISon 03-   
   
                      Appearance (U) HAZY       Normal     CLEAR      [facility]  
   
                                        Comment on above:   Performed By: #### C  
BCDF ####  
[address] [address]   
   
                      Bilirubin Ql (U) Negative   Normal     NEGATIVE   [facility]  
   
                                        Comment on above:   Performed By: #### C  
BCDF ####  
[address] [address]   
   
                      Color (U)  Straw      Normal     STRAW,YELLOW [facility]  
   
                                        Comment on above:   Performed By: #### C  
BCDF ####  
[place] [id number]   
   
                      Glucose Ql (U) Negative   Normal     NEGATIVE   [facility]  
   
                                        Comment on above:   Performed By: #### C  
BCDF ####  
[address] [address]   
   
                      Hemoglobin Ql (U) LARGE(3+)  Abnormal   NEGATIVE   [facility]  
   
                                        Comment on above:   Performed By: #### C  
BCDF ####  
[place] [id number]   
   
                      Ketones Ql (U) 80(2+)     Abnormal   NEGATIVE   [facility]  
   
                                        Comment on above:   Performed By: #### C  
BCDF ####  
[address] [address]   
   
                                                    Leukocyte esterase Test   
strip Ql (U)    Negative        Normal          NEGATIVE        [facility]  
   
                                        Comment on above:   Performed By: #### C  
BCDF ####  
[address] [address]   
   
                      Nitrite Ql (U) Negative   Normal     NEGATIVE   [facility]  
   
                                        Comment on above:   Performed By: #### C  
BCDF ####  
[address] [address]   
   
                      pH (U)     6.0 [pH]   Normal     5.0 - 8.0  [facility]  
   
                                        Comment on above:   Performed By: #### C  
BCDF ####  
[address] [address]   
   
                      Protein Ql (U) Negative   Normal     NEGATIVE   [facility]  
   
                                        Comment on above:   Performed By: #### C  
BCDF ####  
[address] [address]   
   
                                                    Specific gravity (U)   
[Rel density]       1.012               Normal              1.005 -   
1.035                                   [facility]  
   
                                        Comment on above:   Performed By: #### C  
BCDF ####  
[address] [address]   
   
                                                    Urobilinogen (U)   
[Mass/Vol]      mg/dL           Normal          0.0 - 1.9       [facility] [facility]  
   
                                        Comment on above:   Performed By: #### C  
BCDF ####  
[address] [address]   
   
                                                    CBC AND DIFFERENTIALon    
   
                      Basophils (Bld) [#/Vol] 0.00 10*3/uL Normal     0.00 - 0.1  
0 [facility]  
   
                                        Comment on above:   Performed By: #### C  
BCDF ####  
[address] [address]   
   
                      Basophils/100 WBC (Bld) 0.7 %      Normal     0.0 - 2.0  S  
[facility]  
   
                                        Comment on above:   Performed By: #### C  
BCDF ####  
[address] [address]   
   
                                                    Eosinophils (Bld)   
[#/Vol]         0.00 10*3/uL    Normal          0.00 - 0.70     [facility]  
   
                                        Comment on above:   Performed By: #### C  
BCDF ####  
[address] [address]   
   
                                                    Eosinophils/100 WBC   
(Bld)           0.0 %           Normal          0.0 - 6.0       [facility]  
   
                                        Comment on above:   Performed By: #### C  
BCDF ####  
[address] [address]   
   
                                                    Erythrocyte   
distribution width   
(RBC) [Ratio]   14.6 %          High            11.5 - 14.5     [facility]  
   
                                        Comment on above:   Performed By: #### C  
BCDF ####  
[address] [address]   
   
                                                    Hematocrit (Bld)   
[Volume fraction] 39.0 %          Normal          36.0 - 46.0     [facility]  
   
                                        Comment on above:   Performed By: #### C  
BCDF ####  
[address] [address]   
   
                                                    Hemoglobin (Bld)   
[Mass/Vol]      12.6 g/dL       Normal          12.0 - 16.0     [facility]  
   
                                        Comment on above:   Performed By: #### C  
BCDF ####  
[address] [address]   
   
                                                    Lymphocytes (Bld)   
[#/Vol]         0.70 10*3/uL    Low             1.20 - 4.80     [facility]  
   
                                        Comment on above:   Performed By: #### C  
BCDF ####  
[address] [address]   
   
                                                    Lymphocytes/100 WBC   
(Bld)           21.9 %          Normal          13.0 - 44.0     [facility]  
   
                                        Comment on above:   Performed By: #### C  
BCDF ####  
[address] [address]   
   
                      MCHC (RBC) [Mass/Vol] 32.3 g/dL  Normal     32.0 - 36.0 [facility]  
   
                                        Comment on above:   Performed By: #### C  
BCDF ####  
[address] [address]   
   
                      MCV (RBC) [Entitic vol] 86 fL      Normal     80 - 100   S  
[facility]  
   
                                        Comment on above:   Performed By: #### C  
BCDF ####  
[address] [address]   
   
                      Monocytes (Bld) [#/Vol] 0.60 10*3/uL Normal     0.10 - 1.0  
0 [facility]  
   
                                        Comment on above:   Performed By: #### C  
BCDF ####  
[address] [address]   
   
                      Monocytes/100 WBC (Bld) 19.0 %     Normal     2.0 - 10.0 S  
[facility]  
   
                                        Comment on above:   Performed By: #### C  
BCDF ####  
[address] [address]   
   
                                                    Neutrophils (Bld)   
[#/Vol]         1.80 10*3/uL    Normal          1.20 - 7.70     [facility]  
   
                                        Comment on above:   Result Comment: Perc  
ent differential counts (%) should be   
interpreted in the  
context of the absolute cell counts (cells/L).   
   
                                                            Performed By: #### C  
BCDF ####  
[address] [address]   
   
                                                    Neutrophils/100 WBC   
(Bld)           58.4 %          Normal          40.0 - 80.0     [facility]  
   
                                        Comment on above:   Performed By: #### C  
BCDF ####  
[address] [address]   
   
                      NUCLEATED RBC 0.1 /100 WBC Normal                [facility]  
   
                                        Comment on above:   Performed By: #### C  
BCDF ####  
[address] [address]   
   
                      Platelets (Bld) [#/Vol] 187 10*3/uL Normal     150 - 450    
[facility]  
   
                                        Comment on above:   Performed By: #### C  
BCDF ####  
[address] [address]   
   
                      RBC        4.53 x10E12/L Normal     4.00 - 5.20 [facility]  
   
                                        Comment on above:   Performed By: #### C  
BCDF ####  
[address] [address]   
   
                      WBC (Bld) [#/Vol] 3.2 10*3/uL Low        4.4 - 11.3 [facility]  
   
                                        Comment on above:   Performed By: #### C  
BCDF ####  
[address] [address]   
   
                                                    COMPREHENSIVE PANELon 2022   
   
                      Albumin [Mass/Vol] 5.0 g/dL   Normal     3.4 - 5.0  [facility]  
   
                                        Comment on above:   Performed By: #### U  
AMIC ####  
[address] [address]   
   
                                                    ALP [Catalytic   
activity/Vol]   50 U/L          Normal          33 - 110        [facility]  
   
                                        Comment on above:   Performed By: #### U  
AMIC ####  
[address] [address]   
   
                                                    ALT [Catalytic   
activity/Vol]   9 U/L           Normal          7 - 45          [facility]  
   
                                        Comment on above:   Result Comment: Kaleigh  
ents treated with Sulfasalazine may   
generate  
falsely decreased results for ALT.   
   
                                                            Performed By: #### U  
AMIC ####  
[address] [address]   
   
                      Anion gap [Moles/Vol] 24 mmol/L  High       10 - 20    [facility]  
   
                                        Comment on above:   Performed By: #### U  
AMIC ####  
[address] [address]   
   
                                                    AST [Catalytic   
activity/Vol]   16 U/L          Normal          9 - 39          [facility]  
   
                                        Comment on above:   Performed By: #### U  
AMIC ####  
[address] [address]   
   
                      Bilirubin [Mass/Vol] 0.4 mg/dL  Normal     0.0 - 1.2  [facility]  
   
                                        Comment on above:   Performed By: #### U  
AMIC ####  
[address] [address]   
   
                      Calcium [Mass/Vol] 9.4 mg/dL  Normal     8.6 - 10.3 [facility]  
   
                                        Comment on above:   Performed By: #### U  
AMIC ####  
[address] [address]   
   
                      Chloride [Moles/Vol] 106 mmol/L Normal     98 - 107   [facility]  
   
                                        Comment on above:   Performed By: #### U  
AMIC ####  
[address] [address]   
   
                      Creatinine [Mass/Vol] 0.68 mg/dL Normal     0.50 - 1.05 [facility]  
   
                                        Comment on above:   Performed By: #### U  
AMIC ####  
[address] [address]   
   
                      eGFR FEMALE >90        Normal     >90        [facility]  
   
                                        Comment on above:   Result Comment: CALC  
ULATIONS OF ESTIMATED GFR ARE PERFORMED  
USING THE  CKD-EPI STUDY REFIT EQUATION  
WITHOUT THE RACE VARIABLE FOR THE IDMS-TRACEABLE  
CREATININE METHODS.  
https://jasn.asnjournals.org/content/early//ASN.  
347312   
   
                                                            Performed By: #### U  
AMIC ####  
[address] [address]   
   
                      Glucose [Mass/Vol] 93 mg/dL   Normal     74 - 99    [facility]  
   
                                        Comment on above:   Performed By: #### U  
AMIC ####  
[address] [address]   
   
                      HCO3 (Bld) [Moles/Vol] 12 mmol/L  Low        21 - 32    [facility]  
   
                                        Comment on above:   Performed By: #### U  
AMIC ####  
[address] [address]   
   
                      Potassium [Moles/Vol] 3.9 mmol/L Normal     3.5 - 5.3  [facility]  
   
                                        Comment on above:   Performed By: #### U  
AMIC ####  
[address] [address]   
   
                      Protein [Mass/Vol] 8.1 g/dL   Normal     6.4 - 8.2  [facility]  
   
                                        Comment on above:   Performed By: #### U  
AMIC ####  
[address] [address]   
   
                      Sodium [Moles/Vol] 138 mmol/L Normal     136 - 145  [facility]  
   
                                        Comment on above:   Performed By: #### U  
AMIC ####  
[address] [address]   
   
                                                    Urea nitrogen   
[Mass/Vol]      6 mg/dL         Normal          6 - 23          [facility]  
   
                                        Comment on above:   Performed By: #### U  
AMIC ####  
[facility]  
[address] [place] [address]   
   
                                                    Covid 19 Resultson   
2   
   
                                                    SARS-CoV-2 (COVID-19)   
RNA KIRSTEN+probe Ql (Unsp   
spec)                                   NEGATIVE COVID-19 Test  
  
Coronaviruses are   
common world-wide and   
are the cause of many   
common colds.  
SARS-COV2 is a new   
coronavirus that began   
circulating worldwide   
in 2019 so we  
are calling it   
COVID-19. It has been   
estimated that four out   
of five patients  
with COVID-19 will   
recover at home without   
the need for medical   
attention.  
Symptoms of COVID-19   
may include cough,   
fever, shortness of   
breath, loss of  
taste or smell and   
other flu-like symptoms   
including chills, sore   
muscles, sore  
throat, and headache.   
Severe illness is more   
common in older people   
and people  
with other health   
problems such as high   
blood pressure,   
obesity, and immune  
system problems.  
  
If the test is   
positive, you have   
COVID-19. You will be   
contacted by the  
ordering physicians   
office and instructed   
to remain on home   
isolation, in  
accordance with CDC   
guidelines. You may   
also be contacted by   
the [facility] to   
see if any of your   
close contacts may have   
been exposed  
to the virus and need   
to quarantine.  
  
If the test is   
negative, you likely do   
not have COVID-19 at   
this time, but you  
still may have a   
different illness that   
can spread to other   
people (like  
Influenza, or the Flu)   
and could still be at   
risk for getting   
COVID-19. We  
recommend that you stay   
away from other people   
to limit the spread of   
illness  
until your symptoms are   
improving and you are   
fever-free for 24 hours   
without  
the use of fever   
lowering medications   
such as acetaminophen   
or ibuprofen. No  
test is 100% accurate   
so if you are still   
concerned you may have   
COVID-19, talk  
to your doctor about   
the need to continue to   
stay away from others.  
  
Medicines  
  
Unless your provider   
told you not to use the   
following:   
Acetaminophen (Tylenol  
and others) is   
generally safe.   
Anti-inflammatory   
medications, such as   
Ibuprofen  
(Advil or Motrin) or   
Naproxen (Aleve) can   
also be used.   
Over-the-counter  
cough and cold   
medicines can be used   
according to the   
instructions on the  
package. Some   
over-the-counter   
medicines also contain   
acetaminophen. Make   
sure  
you are not taking more   
than your recommended   
dose. For those not   
hospitalized,  
there is no specific   
treatment available for   
this illness.   
Antibiotics do not  
treat Coronaviruses.  
  
Follow-Up  
  
Follow up with your   
doctor by scheduling a   
virtual visit or   
consider follow-up  
at one of our urgent   
care fever clinics. If   
you are having   
difficulty  
breathing, or are very   
weak and having   
difficulty standing,   
this is a medical  
emergency. Call 911 or   
have someone take you   
to the nearest   
emergency room  
immediately. If   
possible, wear a   
facemask.  
  
Additional guidance   
from the CDC for   
patients who tested   
POSITIVE for COVID-19  
  
How to isolate:  
Isolate yourself in a   
specific room at home   
and limit your contact   
with others.  
Use a separate bathroom   
from other members of   
the household, when   
possible.  
Leave home only to get   
essential medical care.   
Do not go to work,   
school or  
public areas.  
Avoid using public   
transportation,   
ride-sharing, or taxis.  
Restrict contact with   
pets and other animals.   
If you must care for   
your pet or  
be around animals while   
you are sick, wash your   
hands before and after   
your  
interaction and wear a   
facemask.  
Make sure that shared   
spaces in the home have   
good airflow, such as   
by an air  
conditioner or an   
opened window, weather   
permitting.  
  
Personal Hygiene   
Procedures:  
Wear a face mask when   
in the same room as   
other people or pets.   
If a face mask  
interferes with your   
breathing, others   
should wear a mask when   
sharing space  
with you.  
Frequent hand-washing:   
wash your hands with   
soap and water for at   
least 20  
seconds. If soap and   
water are not   
available, use   
alcohol-based hand   
.  
Avoid touching your   
eyes, nose, and mouth   
with unwashed hands.  
  
Household Hygiene   
Procedures:  
Avoid sharing personal   
household items such as   
dishes, glassware,   
cups, eating  
utensils, towels or   
bedding with other   
people or pets in your   
home. After use,  
these items should be   
washed with soap and   
hot water.  
Disinfect all   
high-touch surfaces   
every day with   
antibacterial cleaning  
solutions such as Lysol   
wipes, bleach,   
cleansers, etc.   
High-touch surfaces  
include tabletops,   
doorknobs, bathroom   
fixtures, toilets,   
phones, keyboards,  
tablets and bedside   
tables.  
Immediately clean any   
surfaces that may have   
blood, poop or body   
fluids on  
them, using   
antibacterial cleaning   
solutions such as Lysol   
wipes, bleach,  
cleansers, etc.  
If clothing or bedding   
come into contact with   
blood, poop or body   
fluids, they  
should be washed   
immediately. Follow the   
directions on the   
laundry detergent  
and clothing labels but   
hot water is   
recommended when   
possible.  
  
Stopping home isolation   
precautions:  
If possible, consult   
your doctor before   
stopping home isolation   
precautions.  
According to the CDC,   
you can discontinue   
home isolation   
precautions when you  
have met both of these   
criteria:  
Your fever and   
respiratory symptoms   
have been gone for 24   
[place] (more content not   
included)...        Normal                                  [facility]  
   
                                                    HCG,URINEon 2022   
   
                                                    Beta HCG (pregnancy   
test) Ql (U)    Negative        Normal          Negative        [facility]  
   
                                        Comment on above:   Performed By: #### H  
EPFP ####  
[place] [id number]   
   
                                                    INFLUENZA A/B, COVID 2019 PC  
R,SYMPTOMATICon 2022   
   
                      INFLUENZA A, PCR Detected   Abnormal   Not Detected [facility]  
   
                                        Comment on above:   Result Comment: Resp  
iratory virus testing is performed   
routinely by PCR  
for Influenza A/B and RSV.  
Not Detected results do not preclude Influenza A/B or RSV  
infections since the adequacy of sample collection or low  
viral burden may impact the clinical sensitivity of this  
test method.   
   
                                                            Performed By: #### C  
BCDF ####  
[place] [id number]   
   
                      INFLUENZA B, PCR Not detected Normal     Not Detected [facility]  
   
                                        Comment on above:   Result Comment: Resp  
iratory virus testing is performed   
routinely by PCR  
for Influenza A/B and RSV.  
Not Detected results do not preclude Influenza A/B or RSV  
infections since the adequacy of sample collection or low  
viral burden may impact the clinical sensitivity of this  
test method.   
   
                                                            Performed By: #### C  
BCDF ####  
[place] [id number]   
   
                                                    SARS-CoV-2 (COVID-19)   
RNA KIRSTEN+probe Ql (Unsp   
spec)           Not detected    Normal          Not Detected    [facility]  
   
                                        Comment on above:   Result Comment: .  
This test has received FDA Emergency Use Authorization (EUA)   
and has been  
verified by [facility].   
This test is only  
authorized for the duration of time that circumstances exist   
to justify the  
authorization of the emergency use of in vitro diagnostic   
tests for the  
detection of SARS-CoV-2 virus and/or diagnosis of COVID-19   
infection under  
section 564(b)(1) of the Act, 21 U.S.C. 360bbb-3(b)(1), unless   
the  
authorization is terminated or revoked sooner.  
[facility] is certified   
under CLIA-88 as  
qualified to perform high complexity testing. Testing is   
performed in the  
[facility] laboratory located at [address] [place] [address].  
SARS-CoV-2/Flu/RSV Multiplex Test:  
Fact sheet for providers:   
https://www.fda.gov/media/442967/download  
Fact sheet for patients:   
https://www.fda.gov/media/733760/download   
   
                                                            Performed By: #### C  
BCDF ####  
[place] [id number]   
   
                      Lab Specimen Source Nasal, Nasopharyngeal Normal            
      [facility]  
   
                                        Comment on above:   Performed By: #### C  
BCDF ####  
[place] [id number]   
   
                                                    DATE OF SYMPTOM ONSET   
[[id number]]?     2022        Normal                          [facility]  
   
                                        Comment on above:   Performed By: #### C  
BCDF ####  
[place] [id number]   
   
                                                    LIPASEon 2022   
   
                                                    Lipase [Catalytic   
activity/Vol]   28 U/L          Normal                    [facility]  
   
                                        Comment on above:   Result Comment: [place]  
puncture immediately after or during the  
administration of Metamizole may lead to falsely  
low results. Testing should be performed immediately  
prior to Metamizole dosing.  
N-acetyl-p-benzoquinone imine (metabolite of Acetaminophen)  
will generate erroneously low results in samples for patients  
that have taken toxic doses of acetaminophen.   
   
                                                            Performed By: #### U  
AMIC ####  
[place] [id number]   
   
                                                    Provider Note - ED v3on 03-0  
   
   
                                        Provider Note - ED v3 Provider Note:  
Chart Review:  
  
ED NOTES  
ED NOTES:  
Source of Information:   
Patient. EMR was   
reviewed for previous   
records.  
-----------------------  
-----------  
-----------------------  
-----------------------  
---------------  
HPI: Nausea, vomiting,   
diarrhea, abdominal   
pain. This 21-year-old   
white female  
presents to the ED   
complaint of nausea,   
vomiting and diarrhea   
that began 3 days  
ago she states that   
abdominal pain symptoms   
started yesterday.   
Patient states  
that she was seen and   
evaluated at [facility] yesterday. She   
states that she  
had blood work   
performed and was told   
that something was low   
and also had  
imaging of her abdomen   
and pelvis with a CT   
scan that was negative.   
Patient  
states that she has not   
seen a GI specialist   
for symptoms in the   
past. She did  
treat herself with   
Zofran at midnight   
without any improvement   
of her symptoms.  
She states that she is   
unable to keep anything   
down. She denies any   
ill  
contacts.  
-----------------------  
-----------  
-----------------------  
-----------------------  
---------------  
  
PMH: Irregular   
menstrual cycle  
PSH: Appendectomy, ORIF   
of right wrist  
Social Hx: The patient   
denies any use of   
cigarettes or alcohol.   
Occasional use  
of marijuana, denies   
any recent use of   
marijuana.  
Fam:  
MEDS: Zofran  
ALLERGIES: Codeine,   
MiraLAX  
-----------------------  
-----------  
-----------------------  
-----------------------  
---------------  
PHYSICAL EXAM:  
General: Patient alert,   
awake, oriented X3,   
appears be in mild   
distress,  
nontoxic, cooperative  
Skin: Warm. Dry.   
Intact. No rash.  
Eyes: PEARTLA, EOMIs   
intact, sclera white,   
conjunctiva clear  
HEENT: Atraumatic.   
Normo-cephalic. Oral   
nasal mucosa pink and   
moist.  
Neck: Supple without   
meningismus, no   
lymphadenopathy.  
CV: Regular rate and   
rhythm without murmurs,   
heaves, lifts or   
thrills.  
Respiratory: Nonlabored   
breathing. There are no   
retractions or   
tachypnea.  
Lungs are clear to   
auscultation   
bilaterally. She is   
noted to be coughing.  
GI: Soft, tenderness in   
the epigastrium without   
gross distention, bowel   
sounds  
present in all 4   
quadrants. There is no   
pulsatile masses. There   
is no CVA  
tenderness. No rebound,   
rigidity or guarding.  
MUSC: There is no joint   
swelling or bony   
tenderness on exam.  
Neuro: Cranial nerves   
II - XII grossly   
intact. Speech is   
fluent. No focal  
neurologic deficits are   
noted on exam.  
Lower extremities:   
There is no peripheral   
edema bilaterally,   
negative Homans  
sign. No palpable   
cords. Distal pulses   
are present in both   
lower extremities.  
Psych: Maintains eye   
contact. Cooperative.  
-----------------------  
-----------  
-----------------------  
-----------------------  
---------------  
ED course: CT scan   
imaging that was   
performed at [facility] yesterday   
revealed  
free fluid in the   
pelvis which may be due   
to a ruptured ovarian   
cyst although  
no adnexal masses seen.   
Prior appendectomy. No   
acute process involving   
the  
bowel. Patient   
continues to complain   
of nausea with   
epigastric abdominal   
pain.  
Lab work is   
unremarkable with   
normal liver enzymes   
she does have evidence   
of  
dehydration. Patient   
was ordered Phenergan   
and Bentyl for   
discomfort. Patient  
was diagnosed with   
influenza A. She was   
doing much better after   
Phenergan and  
was discharged home   
with a prescription for   
Phenergan   
suppositories, Tamiflu,  
Bentyl. She was   
provided a role to see   
a primary care doctor   
and a GI  
specialist.  
  
This chart was dictated   
with the use of Dragon   
software within the   
framework of  
the current electronic   
medical records   
software. Attempts were   
made to edit in  
real time, given time   
constraints there is   
the potential for   
inaccuracies in my  
dictation.  
  
Lei Anderson DO  
  
  
HISTORY OF PRESENTING   
ILLNESS  
DANNA is a 21 year old   
Female and was seen by   
me at 09-Mar-2022 08:25   
for a  
chief complaint of   
abdominal pain (N/V/D   
started 3 days ago,   
today abd pain  
started was seen   
[place]esterday [place] said   
electrolytes were   
abnormal.).  
  
Triage Information:   
Most recent Vital Sign   
Value Date  
Temp (F): 99.9   
2022 08:29  
Temp (C): 37.7   
2022 08:29  
Heart Rate (beats/min):   
95 2022 08:29  
Respirations   
(breaths/min): 18   
2022 08:29  
SpO2 (%): 99 2022   
08:29  
BP Systolic (mm Hg):   
117 2022 08:29  
BP Diastolic (mm Hg):   
85 2022 08:29  
  
  
  
  
  
PAST MEDICAL HISTORY  
CURRENT OR FORMER   
SUBSTANCE USE:  
Tobacco/Nicotine Use:   
never smoker  
Alcohol Use: denies  
Drug Use:   
occasionally,Substance   
Comment: Marijuana  
ALLERGIES/INTOLERANCES:   
Allergy  
Allergen: MiraLax  
Type: Drug  
Reaction:   
Hives/Urticaria  
  
Allergen: codeine  
Type: Drug  
Reaction: Unknown  
  
HEALTH HISTORY: No   
documented data.  
  
OUTPATIENT MEDICATIONS:   
Home Medications Review   
Status for   
Reconciliation: Not  
Done  
Med Status: Incomplete   
Medication History  
  
Drug Name: Prot (more   
content not   
included)...        Normal                                  [facility]  
   
                                                    Risk Screen - Adult Emergenc  
yon 2022   
   
                                                    Risk Screen - Adult   
Emergency                               Preferred Language:  
Preferred Language:  
Preferred Language for   
Discussing Health Care   
(patient/designee)Engli  
sh  
  
Advanced Directives:  
Advance Directive/DNRno  
  
Family Violence Adult:  
Abuse Screen:  
Are you or have you   
been threatened or   
abused physically,   
emotionally, or  
sexually by anyoneno  
  
Learning Assessment   
(Patient):  
Learning Assessment   
(Patient):  
Patient is Able to be   
Assessed for   
Learningyes  
Factors Influencing   
Readiness to   
Learnacuteness of   
illness  
Factors that Impact   
Ability to Learnnone  
Devices/Methods Used to   
Communicatenone  
Learning   
Preferencesaudio  
Cultural   
Considerationsnone  
Developmental   
Considerationsnone  
Mormon   
Considerationsnone  
  
Learning Assessment   
(Other Learner):  
Learning Assessment   
(Other Learner):  
Other learner   
availableno  
  
Pressure   
Injury/TB/Substance:  
Pressure Injury:  
Pressure Injury Present   
on Admissionno  
Do you have a coughno  
Smoking Statusnever   
smoker  
Alcohol Usedenies  
Drug Usedenies  
  
  
Admission Risk Screen:  
Significant   
IndicatorsComplete  
  
CAGE:  
CAGE:  
Is this an injured   
patient at a Trauma   
Center   
[place] [place]  
a/[place] no  
  
  
Electronic Signatures:  
Becky Lee (ERNA)   
(Signed 09-Mar-2022   
08:34)  
Authored: Preferred   
Language, Advanced   
Directives, Family   
Violence Adult,  
Learning Assessment   
(Patient), Learning   
Assessment (Other   
Learner), Pressure  
Injury/TB/Substance,   
Pressure Injury, CAGE  
  
  
Last Updated:   
09-Mar-2022 08:34 by   
Becky Lee (ERNA)    [facility]  
   
                                                    Triage - EDon 2022   
   
                                        Triage - ED         Quick Triage:  
Are You Pregnantno  
Are You Currently   
Breastfeedingno  
  
Chart Review:  
ARRIVAL INFORMATION  
  
Mode of Arrival:   
private vehicle  
  
  
CHIEF COMPLAINT  
  
DANNA SOARES   
is a Female patient   
with a chief complaint   
of abdominal  
pain (N/V/D started 3   
days ago, today abd   
pain started was seen   
[place]day [place]  
said electrolytes were   
abnormal.).  
Triage Date/Time:   
09-Mar-2022 08:29  
DIPIKA: 3  
Pain Rating (0-10): 10   
= Severe  
Pain location: abd  
Vital Signs:  
Temperature: 99.9F (   
37.7C)  
Blood Pressure: 117/85   
Mean:  
Heart Rate: 95  
Respiratory Rate: 18  
Pulse Oximetry: 99% on   
room air, no   
respiratory support.   
Height: 4 feet 11.00  
inches. 149.8 CM  
Weight: 100.3 pounds.   
Calculated 45.5 kg.  
Calculated BMI (kg/m2):   
20.276 Calculated BSA   
(m2) 1.38  
  
Cummings Coma Scale:  
Best Motor Response:   
(M6) obeys commands  
Best Verbal Response:   
(V5) oriented  
  
  
Allergies: yes  
Last menstrual period:   
09-Mar-2022  
Patient has homicidal   
thoughts: no  
  
  
  
  
Risk Screens  
Suicide Risk Screen  
  
In the Past Month: Have   
you wished you were   
dead or wished you   
could go to  
sleep and not wake up   
no  
In the Past Month: Have   
you had any actual   
thoughts of killing   
yourself no  
In Your Lifetime: Have   
you ever done anything,   
started to do anything,   
or  
prepared to do anything   
to end your life no  
  
  
  
Avila Fall Scale   
Screening  
Has the patient fallen   
before (or is the   
patient in the ED as a   
result of a  
fall) has not had a   
fall  
Does the patient have   
an impaired gait does   
not have impaired gait  
Is the patient   
cognitively impaired   
not cognitively   
impaired  
  
  
Interventions:  
Avila Fall   
Interventions: LOW   
INTERVENTIONS:  
*patient oriented to   
surroundings and call   
system,  
* patient/family falls   
education completed  
and documented,  
*patients fall status   
communicated  
during bedside handoff,  
*whiteboard updated,  
*mode of toileting   
discussed with patient,  
*bed in low position   
with brakes locked,  
*call light in reach,  
* non-skid footwear  
  
  
TRAVEL HISTORY  
Travel History  
Coronavirus Screening:   
no exposure or symptoms  
Travel Exposure   
History: NO travel to   
International locations   
in the past 30  
days  
  
  
PAIN  
  
Pain Scale Used: ASHLEY  
Pain Rating (0-10): 10   
= Severe  
  
  
  
  
  
Past Medical History:  
Past Medical History   
Reviewedyes  
  
Appendectomy: Past   
Surgical History,   
Active  
  
  
Electronic Signatures:  
Becky Lee)   
(Signed 09-Mar-2022   
08:33)  
Entered: Risk Screens,   
Pain, Travel History,   
Chart Review, Scores,   
Past  
Medical History  
Authored: Quick Triage,   
Risk Screens, Pain,   
Travel History, Chart   
Review,  
Scores, Past Medical   
History  
  
  
Last Updated:   
09-Mar-2022 08:33 by   
Becky Lee (ERNA)    Normal                                  [facility]  
   
                                                    UA MICROSCOPICon 2022   
   
                      Mucus Ql (Urine sed) 1+ /LPF    Normal                [facility]  
   
                                        Comment on above:   Performed By: #### U  
AMIC ####  
[address] [address]   
   
                      RBC        2 /HPF     Normal     0-5        [facility]  
   
                                        Comment on above:   Performed By: #### U  
AMIC ####  
[address] [address]   
   
                      SQUAMOUS EPITH. CELLS 1 /HPF     Normal                [facility]  
   
                                        Comment on above:   Performed By: #### U  
AMIC ####  
[address] [address]   
   
                      WBC        2 /HPF     Normal     0-5        [facility]  
   
                                        Comment on above:   Performed By: #### U  
AMIC ####  
[place] [id number]   
   
                                                    URINALYSIS WITH CULTURE IF I  
NDICATEDon 2022   
   
                      Appearance (U) CLEAR      Normal     CLEAR      [facility]  
   
                                        Comment on above:   Performed By: #### U  
AMIC ####  
[address] [address]   
   
                      Bilirubin Ql (U) Negative   Normal     NEGATIVE   [facility]  
   
                                        Comment on above:   Performed By: #### U  
AMIC ####  
[address] [address]   
   
                      Color (U)  Yellow     Normal     STRAW,YELLOW [facility]  
   
                                        Comment on above:   Performed By: #### U  
AMIC ####  
[address] [address]   
   
                      Glucose Ql (U) Negative   Normal     NEGATIVE   [facility]  
   
                                        Comment on above:   Performed By: #### U  
AMIC ####  
[address] [address]   
   
                      Hemoglobin Ql (U) LARGE(3+)  Abnormal   NEGATIVE   [facility]  
   
                                        Comment on above:   Performed By: #### U  
AMIC ####  
[address] [address]   
   
                      Ketones Ql (U) 80(2+)     Abnormal   NEGATIVE   [facility]  
   
                                        Comment on above:   Performed By: #### U  
AMIC ####  
[address] [address]   
   
                                                    Leukocyte esterase Test   
strip Ql (U)    Negative        Normal          NEGATIVE        [facility]  
   
                                        Comment on above:   Performed By: #### U  
AMIC ####  
[address] [address]   
   
                      Nitrite Ql (U) Negative   Normal     NEGATIVE   [facility]  
   
                                        Comment on above:   Performed By: #### U  
AMIC ####  
[place] [id number]   
   
                      pH (U)     5.0 [pH]   Normal     5.0 - 8.0  [facility]  
   
                                        Comment on above:   Performed By: #### U  
AMIC ####  
[address] [address]   
   
                      Protein Ql (U) 30(1+)     Abnormal   NEGATIVE   [facility]  
   
                                        Comment on above:   Performed By: #### U  
AMIC ####  
[place] [id number]   
   
                                                    Specific gravity (U)   
[Rel density]       1.023               Normal              1.005 -   
1.035                                   [facility]  
   
                                        Comment on above:   Performed By: #### U  
AMIC ####  
[place] [id number]   
   
                                                    Urobilinogen (U)   
[Mass/Vol]      mg/dL           Normal          0.0 - 1.9       [facility]  
   
                                        Comment on above:   Performed By: #### U  
AMIC ####  
[place] [id number]   
   
                                                    Clinical Event Note-Stroke F  
ollow-Up Call Backon 2022   
   
                                                    Clinical Event   
Note-Stroke Follow-Up   
Call Back                               Clinical Event:  
Clinical Event Note:  
TopicStroke Follow-Up   
Call Back  
Details  
phone # listed is not a   
working number  
  
  
  
Electronic Signatures:  
Penny Vallejo (ERNA)   
(Signed 2022   
16:09)  
Authored: Clinical   
Event Note  
  
  
Last Updated:   
2022 16:09 by   
Penny Vallejo (ERNA)  Normal                                  [facility]  
   
                                                    CBC AND DIFFERENTIALon    
   
                      Basophils (Bld) [#/Vol] 0.10 10*3/uL Normal     0.00 - 0.1  
0 [facility]  
   
                                        Comment on above:   Performed By: #### C  
BCDF ####  
[address] [address]   
   
                      Basophils/100 WBC (Bld) 0.9 %      Normal     0.0 - 2.0  S  
[facility]  
   
                                        Comment on above:   Performed By: #### C  
BCDF ####  
[address] [address]   
   
                                                    Eosinophils (Bld)   
[#/Vol]         0.20 10*3/uL    Normal          0.00 - 0.70     [facility]  
   
                                        Comment on above:   Performed By: #### C  
BCDF ####  
[address] [address]   
   
                                                    Eosinophils/100 WBC   
(Bld)           2.0 %           Normal          0.0 - 6.0       [facility]  
   
                                        Comment on above:   Performed By: #### C  
BCDF ####  
[address] [address]   
   
                                                    Erythrocyte   
distribution width   
(RBC) [Ratio]   15.0 %          High            11.5 - 14.5     [facility]  
   
                                        Comment on above:   Performed By: #### C  
BCDF ####  
[address] [address]   
   
                                                    Hematocrit (Bld)   
[Volume fraction] 39.6 %          Normal          36.0 - 46.0     [facility]  
   
                                        Comment on above:   Performed By: #### C  
BCDF ####  
[address] [address]   
   
                                                    Hemoglobin (Bld)   
[Mass/Vol]      13.2 g/dL       Normal          12.0 - 16.0     [facility]  
   
                                        Comment on above:   Performed By: #### C  
BCDF ####  
[address] [address]   
   
                                                    Lymphocytes (Bld)   
[#/Vol]         2.60 10*3/uL    Normal          1.20 - 4.80     [facility]  
   
                                        Comment on above:   Performed By: #### C  
BCDF ####  
[address] [address]   
   
                                                    Lymphocytes/100 WBC   
(Bld)           29.4 %          Normal          13.0 - 44.0     [facility]  
   
                                        Comment on above:   Performed By: #### C  
BCDF ####  
[address] [address]   
   
                      MCHC (RBC) [Mass/Vol] 33.2 g/dL  Normal     32.0 - 36.0 [facility]  
   
                                        Comment on above:   Performed By: #### C  
BCDF ####  
[address] [address]   
   
                      MCV (RBC) [Entitic vol] 86 fL      Normal     80 - 100   S  
[facility]  
   
                                        Comment on above:   Performed By: #### C  
BCDF ####  
[address] [address]   
   
                      Monocytes (Bld) [#/Vol] 1.10 10*3/uL High       0.10 - 1.0  
0 [facility]  
   
                                        Comment on above:   Performed By: #### C  
BCDF ####  
[address] [address]   
   
                      Monocytes/100 WBC (Bld) 12.6 %     Normal     2.0 - 10.0 S  
[facility]  
   
                                        Comment on above:   Performed By: #### C  
BCDF ####  
[address] [address]   
   
                                                    Neutrophils (Bld)   
[#/Vol]         4.80 10*3/uL    Normal          1.20 - 7.70     [facility]  
   
                                        Comment on above:   Result Comment: Perc  
ent differential counts (%) should be   
interpreted in the  
context of the absolute cell counts (cells/L).   
   
                                                            Performed By: #### C  
BCDF ####  
[address] [address]   
   
                                                    Neutrophils/100 WBC   
(Bld)           55.1 %          Normal          40.0 - 80.0     [facility]  
   
                                        Comment on above:   Performed By: #### C  
BCDF ####  
[address] [address]   
   
                      NUCLEATED RBC 0.1 /100 WBC Normal                [facility]  
   
                                        Comment on above:   Performed By: #### C  
BCDF ####  
[address] [address]   
   
                      Platelets (Bld) [#/Vol] 298 10*3/uL Normal     150 - 450    
[facility]  
   
                                        Comment on above:   Performed By: #### C  
BCDF ####  
[address] [address]   
   
                      RBC        4.59 x10E12/L Normal     4.00 - 5.20 [facility]  
   
                                        Comment on above:   Performed By: #### C  
BCDF ####  
[address] [address]   
   
                      WBC (Bld) [#/Vol] 8.7 10*3/uL Normal     4.4 - 11.3 [facility]  
   
                                        Comment on above:   Performed By: #### C  
BCDF ####  
[facility]  
[address] [id number]   
   
                                                    CHEST 1 VIEWon 2022   
   
                                        CHEST 1 VIEW        MRN: [id number]  
Patient Name:   
DANNA SOARES  
  
STUDY:  
NR CT BRAIN ATTACK   
ANGIO HEAD W CONTRAST   
AND POST PROC; NR CT   
BRAIN  
ATTACK ANGIO NECK W   
CONTRAST AND POST PROC;   
CHEST 1 VIEW; 2022  
7:15 pm; 2022 7:03   
pm  
  
INDICATION:  
syncoep and L sided   
weakness ; syncope and   
L sided weakness ;   
syncope  
.  
  
Concern for new onset   
left-sided weakness.  
  
COMPARISON:  
Same day CT head  
  
ACCESSION NUMBER(S):  
[id number]; [id number];   
[id number]  
  
ORDERING CLINICIAN:  
PRISCILLA ZEPEDA  
  
TECHNIQUE:  
Unenhanced CT images of   
the head were obtained.   
Subsequently, 68 mL  
Omnipaque 350 was   
administered   
intravenously and axial   
images of the  
head and neck were   
acquired. Coronal,   
sagittal, and 3-D  
reconstructions were   
provided for review.   
Modified NASCET   
criteria  
were utilized for the   
evaluation of carotid   
stenoses.  
  
FINDINGS:  
  
  
CTA [id number]:  
  
Anterior circulation:   
The bilateral   
intracranial internal   
carotid  
arteries, carotid   
terminals, and proximal   
anterior and middle  
cerebral arteries are   
normal in caliber   
without significant  
narrowing, large vessel   
occlusion, or aneurysm.  
  
Posterior circulation:   
The bilateral   
intracranial vertebral   
arteries,  
vertebrobasilar   
junction, basilar   
artery, and proximal   
posterior  
cerebral arteries are   
normal in caliber   
without significant  
narrowing, large vessel   
occlusion, or aneurysm.  
  
  
CTA NECK:  
  
There is a normal   
three-vessel branch   
pattern of the aortic   
arch.  
  
Right carotid: The   
right common carotid   
artery, carotid   
bifurcation,  
and cervical internal   
carotid artery are   
normal in caliber   
without  
significant narrowing,   
dissection, or   
aneurysm. There is 0%   
stenosis  
by NASCET criteria.  
  
Left carotid: The left   
common carotid artery,   
carotid bifurcation,  
and cervical internal   
carotid artery are   
normal in caliber   
without  
significant narrowing,   
dissection, or   
aneurysm. There is 0%   
stenosis  
by NASCET criteria.  
  
Vertebral arteries: The   
cervical vertebral   
arteries are normal in  
caliber bilaterally   
without significant   
narrowing, dissection,   
or  
aneurysm.  
  
IMPRESSION:  
1. No hemodynamically   
significant stenosis of   
the anterior or  
posterior circulation   
of the brain.  
2. Normal CT angiogram   
of the neck.  
  
  
Electronically signed   
by: NUNO GANN MD  Normal                                  [facility]  
   
                                                    COMPREHENSIVE PANELon 2022   
   
                      Albumin [Mass/Vol] 4.2 g/dL   Normal     3.4 - 5.0  [facility]  
   
                                        Comment on above:   Performed By: #### U  
AMIC ####  
[address] [address]   
   
                                                    ALP [Catalytic   
activity/Vol]   42 U/L          Normal          33 - 110        [facility]  
   
                                        Comment on above:   Performed By: #### U  
AMIC ####  
[address] [address]   
   
                                                    ALT [Catalytic   
activity/Vol]   17 U/L          Normal          7 - 45          [facility]  
   
                                        Comment on above:   Result Comment: Kaleigh  
ents treated with Sulfasalazine may   
generate  
falsely decreased results for ALT.   
   
                                                            Performed By: #### U  
AMIC ####  
[address] [address]   
   
                      Anion gap [Moles/Vol] 18 mmol/L  Normal     10 - 20    [facility]  
   
                                        Comment on above:   Performed By: #### U  
AMIC ####  
[address] [address]   
   
                                                    AST [Catalytic   
activity/Vol]   19 U/L          Normal          9 - 39          [facility]  
   
                                        Comment on above:   Performed By: #### U  
AMIC ####  
[address] [address]   
   
                      Bilirubin [Mass/Vol] 0.6 mg/dL  Normal     0.0 - 1.2  [facility]  
   
                                        Comment on above:   Performed By: #### U  
AMIC ####  
[address] [address]   
   
                      Calcium [Mass/Vol] 9.0 mg/dL  Normal     8.6 - 10.3 [facility]  
   
                                        Comment on above:   Performed By: #### U  
AMIC ####  
[address] [address]   
   
                      Chloride [Moles/Vol] 104 mmol/L Normal     98 - 107   [facility]  
   
                                        Comment on above:   Performed By: #### U  
AMIC ####  
[address] [address]   
   
                      Creatinine [Mass/Vol] 0.52 mg/dL Normal     0.50 - 1.05 [facility]  
   
                                        Comment on above:   Performed By: #### U  
AMIC ####  
[address] [address]   
   
                      eGFR FEMALE >90        Normal     >90        [facility]  
   
                                        Comment on above:   Result Comment: CALC  
ULATIONS OF ESTIMATED GFR ARE PERFORMED  
USING THE  CKD-EPI STUDY REFIT EQUATION  
WITHOUT THE RACE VARIABLE FOR THE IDMS-TRACEABLE  
CREATININE METHODS.  
https://jasn.asnjournals.org/content/early/ASN.  
639642   
   
                                                            Performed By: #### U  
AMIC ####  
[address] [address]   
   
                      Glucose [Mass/Vol] 99 mg/dL   Normal     74 - 99    [facility]  
   
                                        Comment on above:   Performed By: #### U  
AMIC ####  
[address] [address]   
   
                      HCO3 (Bld) [Moles/Vol] 20 mmol/L  Low        21 - 32    [facility]  
   
                                        Comment on above:   Performed By: #### U  
AMIC ####  
[address] [address]   
   
                      Potassium [Moles/Vol] 3.2 mmol/L Low        3.5 - 5.3  [facility]  
   
                                        Comment on above:   Performed By: #### U  
AMIC ####  
[address] [address]   
   
                      Protein [Mass/Vol] 6.4 g/dL   Normal     6.4 - 8.2  [facility]  
   
                                        Comment on above:   Performed By: #### U  
AMIC ####  
[address] [address]   
   
                      Sodium [Moles/Vol] 139 mmol/L Normal     136 - 145  [facility]  
   
                                        Comment on above:   Performed By: #### U  
AMIC ####  
[address] [address]   
   
                                                    Urea nitrogen   
[Mass/Vol]      5 mg/dL         Low             6 - 23          [facility]  
   
                                        Comment on above:   Performed By: #### U  
AMIC ####  
[address] [address]   
   
                                                    CT BRAIN ATTACK HEAD WO CONT  
ANNTon 2022   
   
                                                    CT BRAIN ATTACK HEAD WO   
CONTRAST                                Addendum Begins  
MRN: [id number]  
Patient Name:   
DANNA SOARES  
  
ADDENDUM:  
Negative results   
verbally confirmed with   
Priscilla Zepeda by RAD OP   
team  
member Abel Greer.  
Electronically signed   
by: NUNO GANN MD   
Addendum Ends  
MRN: [id number]  
Patient Name:   
DANNA SOARES  
  
STUDY:  
NR CT BRAIN ATTACK HEAD   
WO CONTRAST; 2022   
6:09 pm  
  
INDICATION:  
L sided weakness  
  
COMPARISON:  
Prior CT head   
2022.  
  
ACCESSION NUMBER(S):  
[id number]  
  
ORDERING CLINICIAN:  
PRISCILLA ZEPEDA  
  
TECHNIQUE:  
Multiple contiguous   
noncontrast axial CT   
images of the brain   
were  
obtained from the skull   
base to the vertex   
utilizing 5 mm slice  
thickness. Coronal   
reformatted images were   
also obtained.  
  
FINDINGS:  
  
  
VENTRICLES AND CSF   
SPACES:  
Age-appropriate size   
and configuration of   
the ventricles and   
sulci.  
  
Brain parenchyma:  
Gray-white matter   
interface is   
well-maintained. No   
masses are seen.  
No midline shift or   
mass effect.  
  
HEMORRHAGE:  
None.  
  
CALVARIUM:  
No destructive lesion   
or depressed calvarial   
fracture. The  
extracranial soft   
tissues are grossly   
unremarkable.  
  
ADDITIONAL FINDINGS:  
Visualized paranasal   
sinuses and bilateral   
mastoids are grossly   
clear.  
  
IMPRESSION:  
No acute intracranial   
abnormality.  
  
Electronically signed   
by: NUNO GANN MD  [facility]  
   
                                                    CT C-SPINE WO CONTRASTon    
   
                                        CT C-SPINE WO CONTRAST STUDY:  
CT Maxillofacial Bones   
without IV Contrast; CT   
Cervical Spine without  
IV contrast; 2022   
5:25 PM  
  
INDICATION:  
Right eye, right jaw,   
and right neck pain.   
Left arm and leg   
weakness.  
Syncope and   
collapse/fall. Found on   
floor. Vomiting   
yesterday.  
  
COMPARISON:  
2022 CT Head.  
  
ACCESSION NUMBER(S):  
[id number], [id number]  
  
ORDERING CLINICIAN:  
PRISCILLA ZEPEDA CNP  
  
TECHNIQUE: CT of the   
maxillofacial bones was   
performed without  
contrast. CT of the   
cervical spine was   
performed without   
intravenous  
or intrathecal   
contrast. Sagittal and   
coronal reconstructions   
were  
generated.  
  
  
Automated mA/kV   
exposure control was   
utilized and patient   
examination  
was performed in strict   
accordance with   
principles of ALARA.  
  
FINDINGS:  
  
CT MAXILLOFACIAL BONES:  
There are no facial   
bone fractures. Mild   
leftward directed nasal  
septal deviation  
  
Scant opacification in   
the ethmoid air cells.   
There are no air-fluid  
levels.  
  
Globes, orbits, and   
extraocular muscles are   
intact. No retrobulbar  
hematoma is   
demonstrated. The   
temporomandibular   
joints are  
unremarkable. The   
visualized mastoid air   
cells are clear.  
  
CT CERVICAL SPINE:  
The alignment is   
anatomic. There is no   
fracture or traumatic  
subluxation.  
  
The vertebral body   
heights are well   
maintained. Disc spaces   
are  
preserved. No   
significant central   
canal stenosis is   
demonstrated.  
The neural foramina are   
patent throughout.  
  
The paravertebral soft   
tissues are within   
normal limits. The  
visualized upper chest   
is unremarkable.  
  
IMPRESSION:  
  
  
1. No evidence of   
facial bone fracture.  
  
2. Scattered   
opacifications in the   
ethmoid air cells.  
  
3. Chronic appearing   
leftward directed nasal   
septal deviation.  
  
4. No acute vertebral   
body fracture or   
subluxation.  
  
  
Signed by Nguyễn Vences MD  
Electronically signed   
by: NGUYỄN SIERRA MD     [facility]  
   
                                                    CT FACIAL BONES W/O CONTRAST  
on 2022   
   
                                                    CT FACIAL BONES W/O   
CONTRAST                                STUDY:  
CT Maxillofacial Bones   
without IV Contrast; CT   
Cervical Spine without  
IV contrast; 2022   
5:25 PM  
  
INDICATION:  
Right eye, right jaw,   
and right neck pain.   
Left arm and leg   
weakness.  
Syncope and   
collapse/fall. Found on   
floor. Vomiting   
yesterday.  
  
COMPARISON:  
2022 CT Head.  
  
ACCESSION NUMBER(S):  
[id number], [id number]  
  
ORDERING CLINICIAN:  
PRISCILLA ZEPEDA CNP  
  
TECHNIQUE: CT of the   
maxillofacial bones was   
performed without  
contrast. CT of the   
cervical spine was   
performed without   
intravenous  
or intrathecal   
contrast. Sagittal and   
coronal reconstructions   
were  
generated.  
  
  
Automated mA/kV   
exposure control was   
utilized and patient   
examination  
was performed in strict   
accordance with   
principles of ALARA.  
  
FINDINGS:  
  
CT MAXILLOFACIAL BONES:  
There are no facial   
bone fractures. Mild   
leftward directed nasal  
septal deviation  
  
Scant opacification in   
the ethmoid air cells.   
There are no air-fluid  
levels.  
  
Globes, orbits, and   
extraocular muscles are   
intact. No retrobulbar  
hematoma is   
demonstrated. The   
temporomandibular   
joints are  
unremarkable. The   
visualized mastoid air   
cells are clear.  
  
CT CERVICAL SPINE:  
The alignment is   
anatomic. There is no   
fracture or traumatic  
subluxation.  
  
The vertebral body   
heights are well   
maintained. Disc spaces   
are  
preserved. No   
significant central   
canal stenosis is   
demonstrated.  
The neural foramina are   
patent throughout.  
  
The paravertebral soft   
tissues are within   
normal limits. The  
visualized upper chest   
is unremarkable.  
  
IMPRESSION:  
  
  
1. No evidence of   
facial bone fracture.  
  
2. Scattered   
opacifications in the   
ethmoid air cells.  
  
3. Chronic appearing   
leftward directed nasal   
septal deviation.  
  
4. No acute vertebral   
body fracture or   
subluxation.  
  
  
Signed by Nguyễn Vences MD  
Electronically signed   
by: NGUYỄN SIERRA MD     Normal                                  [facility]  
   
                                                    DRUG SCREEN,URINEon 20   
   
                      AMPHETAMINE SCREEN,U Canceled   [facility]  
   
                                        Comment on above:   Order Comment: TEST   
DRUG SCREEN,URINE WAS CANCELLED,   
2022 20:45 PATIENT DISCHARGED.   
   
                                                            Result Comment: CUTO  
FF LEVEL: 500 NG/ML  
Cross-reactivity has been reported with high concentrations  
of the following drugs: buproprion, chloroquine,   
chlorpromazine,  
ephedrine, mephentermine, fenfluramine, phentermine,  
phenylpropanolamine, pseudoephedrine, and propranolol.   
   
                                                            Performed By: #### H  
EPFP ####  
[place] [id number]   
   
                      BARBITURATES SCREEN,U Canceled   formerly [facility]  
   
                                        Comment on above:   Order Comment: TEST   
DRUG SCREEN,URINE WAS CANCELLED,   
2022 20:45 PATIENT DISCHARGED.   
   
                                                            Result Comment: CUTO  
FF LEVEL: 200 NG/ML   
   
                                                            Performed By: #### H  
EPFP ####  
[place] [id number]   
   
                                                    BENZODIAZEPINES   
SCREEN,U        Canceled        [facility]  
   
                                        Comment on above:   Order Comment: TEST   
DRUG SCREEN,URINE WAS CANCELLED,   
2022 20:45 PATIENT DISCHARGED.   
   
                                                            Result Comment: CUTO  
FF LEVEL: 200 NG/ML   
   
                                                            Performed By: #### H  
EPFP ####  
[place] [id number]   
   
                      CANNABINOIDS SCREEN,U Canceled   formerly [facility]  
   
                                        Comment on above:   Order Comment: TEST   
DRUG SCREEN,URINE WAS CANCELLED,   
2022 20:45 PATIENT DISCHARGED.   
   
                                                            Result Comment: CUTO  
FF LEVEL: 50 NG/ML   
   
                                                            Performed By: #### H  
EPFP ####  
[place] [id number]   
   
                                                    COCAINE METABOLITE   
SCREEN,U        Canceled        [facility]  
   
                                        Comment on above:   Order Comment: TEST   
DRUG SCREEN,URINE WAS CANCELLED,   
2022 20:45 PATIENT DISCHARGED.   
   
                                                            Result Comment: CUTO  
FF LEVEL: 150 NG/ML   
   
                                                            Performed By: #### H  
EPFP ####  
[address] [address]   
   
                      DRUG SCREEN COMMENT Canceled   [facility]  
   
                                        Comment on above:   Order Comment: TEST   
DRUG SCREEN,URINE WAS CANCELLED,   
2022 20:45 PATIENT DISCHARGED.   
   
                                                            Result Comment: Drug  
 screen results are presumptive and should   
not be used to assess  
compliance with prescribed medication. Contact the performing   
[facility]  
laboratory to add-on definitive confirmatory testing if   
clinically  
indicated.  
.  
Toxicology screening results are reported qualitatively. The   
concentration  
must be greater than or equal to the cutoff to be reported as   
positive. The  
concentration at which the screening test can detect an   
individual drug or  
metabolite varies. The absence of expected drug(s) and/or drug   
metabolite(s)  
may indicate non-compliance, inappropriate timing of specimen   
collection  
relative to drug administration, poor drug absorption,   
diluted/adulterated  
urine, or limitations of testing. For medical purposes only;   
not valid for  
forensic use.  
.  
Interpretive questions should be directed to the laboratory   
medical  
directors.   
   
                                                            Performed By: #### H  
EPFP ####  
[address] [address]   
   
                      FENTANYL SCREEN,URINE Canceled   formerly [facility]  
   
                                        Comment on above:   Order Comment: TEST   
DRUG SCREEN,URINE WAS CANCELLED,   
2022 20:45 PATIENT DISCHARGED.   
   
                                                            Result Comment: CUTO  
FF LEVEL: 1 NG/ML  
The performance characteristics of this  
test have been determined by the individual  
 laboratory site where testing is performed.  
This test has not been cleared or approved  
by the FDA; however, the FDA has determined  
that such clearance is not necessary.   
   
                                                            Performed By: #### H  
EPFP ####  
[address] [address]   
   
                      METHADONE SCREEN,U Canceled   [facility]  
   
                                        Comment on above:   Order Comment: TEST   
DRUG SCREEN,URINE WAS CANCELLED,   
2022 20:45 PATIENT DISCHARGED.   
   
                                                            Result Comment: CUTO  
FF LEVEL: 150 NG/ML  
The metabolite L-alpha-acetylmethadol (LAAM) is not  
detected by this method in concentrations that would  
be found in the urine of patients on LAAM therapy.   
   
                                                            Performed By: #### H  
EPFP ####  
[address] [address]   
   
                      OPIATES SCREEN,U Canceled   Normal                [facility]  
   
                                        Comment on above:   Order Comment: TEST   
DRUG SCREEN,URINE WAS CANCELLED,   
2022 20:45 PATIENT DISCHARGED.   
   
                                                            Result Comment: CUTO  
FF LEVEL: 300 NG/ML  
The opiate screen does not detect fentanyl, meperidine, or  
tramadol. Oxycodone is not consistently detected (refer to  
Oxycodone Screen, Urine result).   
   
                                                            Performed By: #### H  
EPFP ####  
[place] [id number]   
   
                      OXYCODONE SCREEN,U Canceled   Normal                [facility]  
   
                                        Comment on above:   Order Comment: TEST   
DRUG SCREEN,URINE WAS CANCELLED,   
2022 20:45 PATIENT DISCHARGED.   
   
                                                            Result Comment: CUTO  
FF LEVEL: 100 NG/ML  
This test will accurately detect both oxycodone and   
oxymorphone.   
   
                                                            Performed By: #### H  
EPFP ####  
[place] [id number]   
   
                      PCP SCREEN,U Canceled   Normal                [facility]  
   
                                        Comment on above:   Order Comment: TEST   
DRUG SCREEN,URINE WAS CANCELLED,   
2022 20:45 PATIENT DISCHARGED.   
   
                                                            Result Comment: CUTO  
FF LEVEL: 25 NG/ML  
Cross-reactivity has been reported with dextromethorphan.   
   
                                                            Performed By: #### H  
EPFP ####  
[address] [address]   
   
                                                    GLUCOSE-POCTon 2022   
   
                      Glucose [Mass/Vol] 87 mg/dL   Normal     74 - 99    [facility]  
   
                                        Comment on above:   Performed By: #### H  
EPFP ####  
[address] [address]   
   
                                                    HCG,SERUM QUALITATIVEon    
   
                      HCG,SERUM QUALITATIVE Negative   Normal     Negative   [facility]  
   
                                        Comment on above:   Performed By: #### H  
EPFP ####  
[address] [address]   
   
                                                    NR CT BRAIN ATTACK ANGIO HEA  
D W CONTRAST AND POST PROCon 2022   
   
                                                    NR CT BRAIN ATTACK   
ANGIO HEAD W CONTRAST   
AND POST PROC                           MRN: [id number]  
Patient Name:   
DANNA SOARES  
  
STUDY:  
NR CT BRAIN ATTACK   
ANGIO HEAD W CONTRAST   
AND POST PROC; NR CT   
BRAIN  
ATTACK ANGIO NECK W   
CONTRAST AND POST PROC;   
CHEST 1 VIEW; 2022  
7:15 pm; 2022 7:03   
pm  
  
INDICATION:  
syncoep and L sided   
weakness ; syncope and   
L sided weakness ;   
syncope  
.  
  
Concern for new onset   
left-sided weakness.  
  
COMPARISON:  
Same day CT head  
  
ACCESSION NUMBER(S):  
[id number]; [id number];   
[id number]  
  
ORDERING CLINICIAN:  
PRISCILLA ZEPEDA  
  
TECHNIQUE:  
Unenhanced CT images of   
the head were obtained.   
Subsequently, 68 mL  
Omnipaque 350 was   
administered   
intravenously and axial   
images of the  
head and neck were   
acquired. Coronal,   
sagittal, and 3-D  
reconstructions were   
provided for review.   
Modified NASCET   
criteria  
were utilized for the   
evaluation of carotid   
stenoses.  
  
FINDINGS:  
  
  
CTA [id number]:  
  
Anterior circulation:   
The bilateral   
intracranial internal   
carotid  
arteries, carotid   
terminals, and proximal   
anterior and middle  
cerebral arteries are   
normal in caliber   
without significant  
narrowing, large vessel   
occlusion, or aneurysm.  
  
Posterior circulation:   
The bilateral   
intracranial vertebral   
arteries,  
vertebrobasilar   
junction, basilar   
artery, and proximal   
posterior  
cerebral arteries are   
normal in caliber   
without significant  
narrowing, large vessel   
occlusion, or aneurysm.  
  
  
CTA NECK:  
  
There is a normal   
three-vessel branch   
pattern of the aortic   
arch.  
  
Right carotid: The   
right common carotid   
artery, carotid   
bifurcation,  
and cervical internal   
carotid artery are   
normal in caliber   
without  
significant narrowing,   
dissection, or   
aneurysm. There is 0%   
stenosis  
by NASCET criteria.  
  
Left carotid: The left   
common carotid artery,   
carotid bifurcation,  
and cervical internal   
carotid artery are   
normal in caliber   
without  
significant narrowing,   
dissection, or   
aneurysm. There is 0%   
stenosis  
by NASCET criteria.  
  
Vertebral arteries: The   
cervical vertebral   
arteries are normal in  
caliber bilaterally   
without significant   
narrowing, dissection,   
or  
aneurysm.  
  
IMPRESSION:  
1. No hemodynamically   
significant stenosis of   
the anterior or  
posterior circulation   
of the brain.  
2. Normal CT angiogram   
of the neck.  
  
  
Electronically signed   
by: NUNO GANN MD  [facility]  
   
                                                    NR CT BRAIN ATTACK ANGIO NEC  
K W CONTRAST AND POST PROCon 2022   
   
                                                    NR CT BRAIN ATTACK   
ANGIO NECK W CONTRAST   
AND POST PROC                           MRN: [id number]  
Patient Name:   
DANNA SOARES  
  
STUDY:  
NR CT BRAIN ATTACK   
ANGIO HEAD W CONTRAST   
AND POST PROC; NR CT   
BRAIN  
ATTACK ANGIO NECK W   
CONTRAST AND POST PROC;   
CHEST 1 VIEW; 2022  
7:15 pm; 2022 7:03   
pm  
  
INDICATION:  
syncoep and L sided   
weakness ; syncope and   
L sided weakness ;   
syncope  
.  
  
Concern for new onset   
left-sided weakness.  
  
COMPARISON:  
Same day CT head  
  
ACCESSION NUMBER(S):  
[id number]; [id number];   
[id number]  
  
ORDERING CLINICIAN:  
PRISCILLA ZEPEDA  
  
TECHNIQUE:  
Unenhanced CT images of   
the head were obtained.   
Subsequently, 68 mL  
Omnipaque 350 was   
administered   
intravenously and axial   
images of the  
head and neck were   
acquired. Coronal,   
sagittal, and 3-D  
reconstructions were   
provided for review.   
Modified NASCET   
criteria  
were utilized for the   
evaluation of carotid   
stenoses.  
  
FINDINGS:  
  
  
CTA [id number]:  
  
Anterior circulation:   
The bilateral   
intracranial internal   
carotid  
arteries, carotid   
terminals, and proximal   
anterior and middle  
cerebral arteries are   
normal in caliber   
without significant  
narrowing, large vessel   
occlusion, or aneurysm.  
  
Posterior circulation:   
The bilateral   
intracranial vertebral   
arteries,  
vertebrobasilar   
junction, basilar   
artery, and proximal   
posterior  
cerebral arteries are   
normal in caliber   
without significant  
narrowing, large vessel   
occlusion, or aneurysm.  
  
  
CTA NECK:  
  
There is a normal   
three-vessel branch   
pattern of the aortic   
arch.  
  
Right carotid: The   
right common carotid   
artery, carotid   
bifurcation,  
and cervical internal   
carotid artery are   
normal in caliber   
without  
significant narrowing,   
dissection, or   
aneurysm. There is 0%   
stenosis  
by NASCET criteria.  
  
Left carotid: The left   
common carotid artery,   
carotid bifurcation,  
and cervical internal   
carotid artery are   
normal in caliber   
without  
significant narrowing,   
dissection, or   
aneurysm. There is 0%   
stenosis  
by NASCET criteria.  
  
Vertebral arteries: The   
cervical vertebral   
arteries are normal in  
caliber bilaterally   
without significant   
narrowing, dissection,   
or  
aneurysm.  
  
IMPRESSION:  
1. No hemodynamically   
significant stenosis of   
the anterior or  
posterior circulation   
of the brain.  
2. Normal CT angiogram   
of the neck.  
  
  
Electronically signed   
by: NUNO GANN MD  Normal                                  [facility]  
   
                                                    PT/INRon 2022   
   
                      PT Coag (PPP) [Time] 15.2 s     High       9.8 - 13.4 [facility]  
   
                                        Comment on above:   Result Comment: Note  
 new reference range as of 2021 at   
10:00am.   
   
                                                            Performed By: #### P  
TINR ####  
[place] [id number]   
   
                      PT, INR    1.3        High       0.9 - 1.1  [facility]  
   
                                        Comment on above:   Performed By: #### P  
TINR ####  
[facility]  
[address] [place] [address]   
   
                                                    Provider Note - ED v3on    
   
                                        Provider Note - ED v3 Provider Note:  
Chart Review:  
ED NOTES  
ED NOTES:  
HPI:  
Admitted to the   
hospital here for 5   
days and discharged   
today for vomiting.  
Apparently patient had   
gone to the bathroom   
had a syncopal episode   
and fell  
forward hitting her   
head on the ground. She   
complains of pain to   
the right jaw  
and around the right   
orbital bones. Patient   
also states that since   
her  
syncopal episode her   
left side feels weak.   
She also complains of   
some  
left-sided chest pain.   
She does note that her   
vomiting is better.  
  
  
ROS:  
All systems are   
negative other than as   
noted in HPI.  
  
  
Physical Exam  
  
I have reviewed the   
triage vital signs.  
Const: Well nourished,   
well developed, appears   
stated age, no acute   
distress  
Eyes: PERRL, EOM   
intact, no conjunctival   
injection, vision   
grossly normal  
HENT: Neck supple   
without meningismus ,   
diffuse cervical   
vertebral tenderness,  
moist mucous membranes,   
no pharyengeal swelling   
or exudate  
CV: Regular rate and   
rhythm, Warm,   
well-perfused   
extremities. Chest non   
tender  
RESP: Lungs clear   
bilaterally, Unlabored   
respiratory effort  
GI: soft, non-tender,   
non-distended, no   
masses  
:  
MSK: No gross   
deformities   
appreciated, tenderness   
over the right orbital   
bones.  
Back: Non tender, no   
pain with ROM  
Skin: Warm, dry. No   
rashes  
Neuro: Alert and   
oriented x4, GCS 15 ,   
patient has difficulty   
lifting left leg  
off the bed patient has   
decreased push and   
pulls with left foot.   
Left arm  
movement is weak..  
Psych: Appropriate mood   
and affect.  
  
I have reviewed and   
confirmed nurses/medics   
notes for patient past,   
social  
and family history.  
  
  
Portions of this note   
were dictated by speech   
recognition. An attempt   
at proof  
reading was made to   
minimize errors. Minor   
errors in transcription   
may be  
present.  
  
  
HISTORY OF PRESENTING   
ILLNESS  
DANNA is a 21 year old   
Female and was seen by   
me at 2022 17:27   
for a  
chief complaint of   
syncope (to er per   
jusitn ems with c/o   
after going to the  
, she had a syncopal   
episode. family found   
her on the floor. pt   
c/o right  
eye, right jaw and neck   
pain. c/o left arm and   
leg weakness after the   
syncopal  
episode. pt is alert   
and oriented now. pt   
was just in the   
hospital for vomiting  
and d/c yesterday.) .  
  
Triage Information:   
Most recent Vital Sign   
Value Date  
Temp (F): 98.8   
2022 17:34  
Temp (C): 37.1   
2022 17:34  
Heart Rate (beats/min):   
99 2022 17:34  
Respirations   
(breaths/min): 18   
2022 17:34  
SpO2 (%): 99 2022   
17:34  
BP Systolic (mm Hg):   
123 2022 17:34  
BP Diastolic (mm Hg):   
83 2022 17:34  
  
  
  
  
PAST MEDICAL HISTORY  
ALLERGIES/INTOLERANCES:   
Allergy  
Allergen: MiraLax  
Type: Drug  
Reaction:   
Hives/Urticaria  
  
Allergen: codeine  
Type: Drug  
Reaction: Unknown  
  
HEALTH HISTORY: No   
documented data.  
  
OUTPATIENT MEDICATIONS:   
Home Medications Review   
Status for   
Reconciliation: N/A  
Med Status: Incomplete   
Medication History  
  
Drug Name: Protonix 40   
mg oral delayed release   
tablet  
Instructions: 1 tab(s)   
orally 2 times a day  
  
Drug Name: Zofran 8 mg   
oral tablet  
Instructions: 1 tab(s)   
orally 3 times, As   
Needed -for nausea and   
vomiting  
  
Drug Name: sucralfate 1   
g oral tablet  
Instructions: 1 tab(s)   
orally 4 times a day   
-.Meds to Beds - 4   
Times a Day  
Before Meals  
  
SIGNIFICANT EVENTS:   
Past Medical History  
Description:premature  
  
  
Description:delayed   
bone growth  
  
  
Description:Miscarriage  
  
  
Description:scoliosis  
  
  
  
CRITICAL CARE  
RESULTS:  
Recent Lab Results:  
  
I have reviewed these   
laboratory results:  
  
Complete Blood Count +   
Differential   
2022 17:45:00  
  
ResultValue  
White Blood Cell Count   
8.7  
Nucleated Erythrocyte   
Count 0.1  
Red Blood Cell Count   
4.59  
HGB 13.2  
HCT 39.6  
MCV 86  
MCHC 33.2  
  
RDW-CV 15.0 H  
Neutrophil % 55.1  
Lymphocyte % 29.4  
Monocyte % 12.6  
Eosinophil % 2.0  
Basophil % 0.9  
Neutrophil Count 4.80  
Lymphocyte Count 2.60  
Monocyte Count 1.10 H  
Eosinophil Count 0.20  
Basophil Count 0.10  
  
Comprehensive Metabolic   
Panel 2022   
17:45:00  
  
ResultValue  
Glucose, Serum 99  
  
K 3.2 L  
  
Bicarbonate, Serum 20 L  
Anion Gap, Serum 18  
BUN 5 L  
CREAT 0.52  
GFR Female >90  
Calcium, Serum 9.0  
ALB 4.2  
ALKP 42  
T Pro 6.4  
T Bili 0.6  
Alanine   
Aminotransferase, Serum   
17  
Aspartate Transaminase,   
Serum 19  
  
PT + INR, Plasma   
2022 17:45:00  
  
ResultValue  
Prothrombin Time,   
Plasma 15.2 H  
International   
Normalized Ratio,   
Plasma 1.3 H  
  
RBC Morphology   
2022 17:45:00  
  
ResultValue  
Red Blood Cell   
Morphology SEE BELOW  
Ovalocytes FEW  
  
Troponin I, Serum   
2022 17:45:00  
  
ResultValue  
Troponin I, Serum <0.02  
  
HCG, Serum 2022   
17:45:00  
  
ResultValue  
HCG, Serum NEGATIVE  
  
Glucose_POCT   
2022 17:42:00  
  
ResultValue  
Glucose-POCT 87  
  
Radiology Results:  
  
Impression:  
  
1. No hemodynamically   
significant stenosis of   
the anterior or  
posterior circulation   
of the brain.  
2. Normal CT angiogram   
of the neck.  
  
(more content not   
included)...        Normal                                  [facility]  
   
                                                    RED CELL MORPHOLOGYon 2022   
   
                      OVALOCYTES FEW        Normal                [facility]  
   
                                        Comment on above:   Performed By: #### C  
BCDF ####  
[place] [id number]   
   
                                                    RBC morphology finding   
Nom (Bld)       SEE BELOW       [facility]  
   
                                        Comment on above:   Performed By: #### C  
BCDF ####  
[place] [id number]   
   
                                                    Risk Screen - Adult Emergenc  
yon 2022   
   
                                                    Risk Screen - Adult   
Emergency                               Preferred Language:  
Preferred Language:  
Preferred Language for   
Discussing Health Care   
(patient/designee)Charleen patel  
  
Advanced Directives:  
Advance Directive/DNRno  
  
Family Violence Adult:  
Abuse Screen:  
Are you or have you   
been threatened or   
abused physically,   
emotionally, or  
sexually by anyoneno  
  
Learning Assessment   
(Patient):  
Learning Assessment   
(Patient):  
Patient is Able to be   
Assessed for   
Learningyes  
Factors Influencing   
Readiness to Learnn/a  
Factors that Impact   
Ability to Learnnone  
Devices/Methods Used to   
Communicatenone  
Learning   
Preferencesverbal   
instruction; written   
material  
Cultural   
Considerationsnone  
Developmental   
Considerationsnone  
Mormon   
Considerationsnone  
  
Learning Assessment   
(Other Learner):  
Learning Assessment   
(Other Learner):  
Other learner   
availableno  
  
Pressure   
Injury/TB/Substance:  
Pressure Injury:  
Do you have a coughno  
Smoking Statusnever   
smoker  
Alcohol Usedenies  
Drug Useoccasionally  
  
  
Admission Risk Screen:  
Significant   
IndicatorsComplete  
  
CAGE:  
CAGE:  
Is this an injured   
patient at a Trauma   
Center   
([facility] [place]  
[place]): no  
  
  
Electronic Signatures:  
Becky Rice (RN)   
(Signed 2022   
17:49)  
Authored: Preferred   
Language, Advanced   
Directives, Family   
Violence Adult,  
Learning Assessment   
(Patient), Learning   
Assessment (Other   
Learner), Pressure  
Injury/TB/Substance,   
Pressure Injury, CAGE  
  
  
Last Updated:   
2022 17:49 by   
Becky Rice (ERNA) Normal                                  [facility]  
   
                                                    TROPONIN Ion 2022   
   
                                                    Troponin I.cardiac   
[Mass/Vol]      ng/mL           Normal          0.00 - 0.03     [facility]  
   
                                        Comment on above:   Result Comment: LESS  
 THAN 0.04 NG/ML: NEGATIVE  
REPEAT TESTING IN THREE TO SIX HOURS  
IF CLINICALLY INDICATED.  
0.04 - 0.5 NG/ML: CONSISTENT WITH POSSIBLE  
CARDIAC DAMAGE AND POSSIBLE INCREASED  
CLINICAL RISK.  
SERIAL MEASUREMENTS MAY HELP ASSESS EXTENT OF  
MYOCARDIAL DAMAGE.  
>0.5 NG/ML: CONSISTENT WITH CARDIAC DAMAGE,  
INCREASED CLINICAL RISK AND MYOCARDIAL  
INFARCTION. SERIAL MEASUREMENTS MAY HELP  
ASSESS EXTENT OF MYOCARDIAL DAMAGE.  
.  
Note: Troponin I testing is performed using different  
testing methodology at [facility] than at other  
[facility]. Direct result comparisons should only  
be made within the same method.   
   
                                                            Performed By: #### H  
EPFP ####  
[place] [id number]   
   
                                                    Triage - EDon 2022   
   
                                        Triage - ED         Quick Triage:  
Are You Pregnantno  
Have You Given Birth In   
The Last 6 Weeksno  
Are You Currently   
Breastfeedingno  
  
Chart Review:  
PRIMARY ASSESSMENT  
Team Activation  
Team Activated: STROKE  
  
ABCD Normal Findings:   
airway open and patent,   
circulation normal and   
alert and  
oriented  
  
  
ARRIVAL INFORMATION  
  
Means of Arrival:   
stretcher Mode of   
Arrival: ambulance   
Agency[facility]  
[facility]   
From: home Accompanied   
By: self  
Language:  
Spoken Language   
Preferred: English   
Reading Language   
Preferred: English  
  
  
CHIEF COMPLAINT  
  
DANNA SOARES   
is a Female patient   
with a chief complaint   
of syncope  
(to er per justin ems   
with c/o after going to   
the br, she had a   
syncopal  
episode. family found   
her on the floor. pt   
c/o right eye, right   
jaw and neck  
pain. c/o left arm and   
leg weakness after the   
syncopal episode. pt is   
alert and  
oriented now. pt was   
just in the hospital   
for vomiting and d/c   
yesterday.).  
Triage Date/Time:   
2022 17:26  
DIPIKA: 2  
Pain Rating (0-10): 9 =   
Severe  
Pain location: right   
eye  
Vital Signs:  
Temperature: 98.8F (   
37.1C) taken temporal  
Blood Pressure: 123/83   
Mean:  
Heart Rate: 99  
Respiratory Rate: 18  
Pulse Oximetry: 99% on   
room air, no   
respiratory support.   
Height: 4 feet 11.00  
inches. 149.8 CM  
Weight: 100.3 pounds.   
Calculated 45.5 kg.   
(stated)  
Calculated BMI (kg/m2):   
20.276 Calculated BSA   
(m2) 1.38  
  
Cummings Coma Scale:  
Best Eye Response: (E4)   
spontaneous  
Best Motor Response:   
(M6) obeys commands  
Best Verbal Response:   
(V5) oriented  
Cummings Score: 15  
  
  
Allergies: yes  
Last menstrual period:   
2022  
Patient has homicidal   
thoughts: no  
  
  
  
  
Risk Screens  
Suicide Risk Screen  
  
In the Past Month: Have   
you wished you were   
dead or wished you   
could go to  
sleep and not wake up   
no  
In the Past Month: Have   
you had any actual   
thoughts of killing   
yourself no  
In Your Lifetime: Have   
you ever done anything,   
started to do anything,   
or  
prepared to do anything   
to end your life no  
  
  
  
Avila Fall Scale   
Screening  
Has the patient fallen   
before (or is the   
patient in the ED as a   
result of a  
fall) has had a fall  
Does the patient have   
an impaired gait does   
not have impaired gait  
Is the patient   
cognitively impaired   
not cognitively   
impaired  
  
Avila Fall Scale  
History of falling   
(immediate or previous)   
yes (25)  
Secondary Diagnosis yes   
(15)  
Intravenous Therapy/   
Heparin/Saline Lock no   
(0)  
Gait/Transferring   
normal/bedrest/wheelcha  
ir (0)  
Ambulatory Aids   
none/bedrest/nurse   
assist (0)  
Mental Status oriented   
to own ability (0)  
Avila Fall Risk Score:   
40  
  
Interventions:  
Avila Fall   
Interventions: LOW   
INTERVENTIONS:  
*patient oriented to   
surroundings and call   
system,  
* patient/family falls   
education completed  
and documented,  
*patients fall status   
communicated  
during bedside handoff,  
*whiteboard updated,  
*mode of toileting   
discussed with patient,  
*bed in low position   
with brakes locked,  
*call light in reach,  
* non-skid footwear and   
MODERATE INTERVENTIONS:  
*Low Interventions   
Plus:  
* falls risk   
band/sticker applied to   
patient,  
*yellow non-skid   
footwear,  
*instruct to call for   
assistance before  
getting out of bed,  
*bed/chair/bedside   
commode/toilet alarms,  
*sensory   
devices/ambulatory   
aides  
available and in reach,  
*medications reviewed   
for potential  
side effects and care   
planning.  
  
  
TRAVEL HISTORY  
Travel History  
Coronavirus Screening:   
no exposure or symptoms  
Travel Exposure   
History: NO travel to   
International locations   
in the past 30  
days  
  
  
PAIN  
  
Pain Scale Used: ASHLEY  
Pain Rating (0-10): 9 =   
Severe  
  
  
  
  
  
Past Medical History:  
Past Medical History   
Reviewedyes  
  
scoliosis: Past Medical   
History, Active  
Miscarriage: Past   
Medical History, Active  
delayed bone growth:   
Past Medical History,   
Active  
premature: Past Medical   
History, Active  
  
  
Electronic Signatures:  
Becky Rice (ERNA)   
(Signed 2022   
17:47)  
Entered: Risk Screens,   
Pain, Arrival, ABCD,   
Travel History, Chart   
Review, Past  
Medical History  
Authored: Quick Triage,   
Risk Screens, Pain,   
Arrival, ABCD, Travel   
History,  
Chart Review, Past   
Medical History  
  
  
Last Updated:   
2022 17:47 by   
Becky Rice (ERNA) [facility]  
   
                                                    URINALYSISon 2022   
   
                      Appearance (U) Canceled   [facility]  
   
                                        Comment on above:   Order Comment: TEST   
URINALYSIS WAS CANCELLED, 2022 20:45  
   
PATIENT DISCHARGED.   
   
                                                            Performed By: #### C  
BCDF ####  
[place] [id number]   
   
                      ASCORBIC ACID Canceled   [facility]  
   
                                        Comment on above:   Order Comment: TEST   
URINALYSIS WAS CANCELLED, 2022 20:45  
   
PATIENT DISCHARGED.   
   
                                                            Result Comment: Conc  
entrations > = 20 mg/dL of ascorbic acid   
can be expected to cause strong  
interference in the reactions testing for glucose, nitrite and   
blood. It is  
recommended to discontinue Vitamin C administration and retest   
in 10 hours.   
   
                                                            Performed By: #### C  
BCDF ####  
[place] [id number]   
   
                      Bilirubin Ql (U) Canceled   [facility]  
   
                                        Comment on above:   Order Comment: TEST   
URINALYSIS WAS CANCELLED, 2022 20:45  
   
PATIENT DISCHARGED.   
   
                                                            Performed By: #### C  
BCDF ####  
[address] [address]   
   
                      Color (U)  Canceled   [facility]  
   
                                        Comment on above:   Order Comment: TEST   
URINALYSIS WAS CANCELLED, 2022 20:45  
   
PATIENT DISCHARGED.   
   
                                                            Performed By: #### C  
BCDF ####  
[address] [address]   
   
                      Glucose Ql (U) Canceled   [facility]  
   
                                        Comment on above:   Order Comment: TEST   
URINALYSIS WAS CANCELLED, 2022 20:45  
   
PATIENT DISCHARGED.   
   
                                                            Performed By: #### C  
BCDF ####  
[address] [address]   
   
                      Hemoglobin Ql (U) Canceled   [facility]  
   
                                        Comment on above:   Order Comment: TEST   
URINALYSIS WAS CANCELLED, 2022 20:45  
   
PATIENT DISCHARGED.   
   
                                                            Performed By: #### C  
BCDF ####  
[address] [address]   
   
                      Ketones Ql (U) Canceled   [facility]  
   
                                        Comment on above:   Order Comment: TEST   
URINALYSIS WAS CANCELLED, 2022 20:45  
   
PATIENT DISCHARGED.   
   
                                                            Performed By: #### C  
BCDF ####  
[address] [address]   
   
                                                    Leukocyte esterase Test   
strip Ql (U)    Canceled        [facility]  
   
                                        Comment on above:   Order Comment: TEST   
URINALYSIS WAS CANCELLED, 2022 20:45  
   
PATIENT DISCHARGED.   
   
                                                            Performed By: #### C  
BCDF ####  
[address] [address]   
   
                      Nitrite Ql (U) Canceled   [facility]  
   
                                        Comment on above:   Order Comment: TEST   
URINALYSIS WAS CANCELLED, 2022 20:45  
   
PATIENT DISCHARGED.   
   
                                                            Performed By: #### C  
BCDF ####  
[address] [address]   
   
                      pH         Canceled   [facility]  
   
                                        Comment on above:   Order Comment: TEST   
URINALYSIS WAS CANCELLED, 2022 20:45  
   
PATIENT DISCHARGED.   
   
                                                            Performed By: #### C  
BCDF ####  
[address] [address]   
   
                      Protein Ql (U) Canceled   [facility]  
   
                                        Comment on above:   Order Comment: TEST   
URINALYSIS WAS CANCELLED, 2022 20:45  
   
PATIENT DISCHARGED.   
   
                                                            Performed By: #### C  
BCDF ####  
[address] [address]   
   
                                                    Specific gravity (U)   
[Rel density]   Canceled        Normal                          [facility]  
   
                                        Comment on above:   Order Comment: TEST   
URINALYSIS WAS CANCELLED, 2022 20:45  
  
PATIENT DISCHARGED.   
   
                                                            Performed By: #### C  
BCDF ####  
[address] [address]   
   
                      UROBILINOGEN Canceled   [facility]  
   
                                        Comment on above:   Order Comment: TEST   
URINALYSIS WAS CANCELLED, 2022 20:45  
  
PATIENT DISCHARGED.   
   
                                                            Performed By: #### C  
BCDF ####  
[address] [address]   
   
                                                    BASIC METABOLIC PANELon    
   
                                                    Urea nitrogen   
[Mass/Vol]      mg/dL           Low             6 - 23          [facility]  
   
                                        Comment on above:   Performed By: #### C  
BCDF ####  
[address] [address]   
   
                      Anion gap [Moles/Vol] 13 mmol/L  Normal     10 - 20    [facility]  
   
                                        Comment on above:   Performed By: #### C  
BCDF ####  
[address] [address]   
   
                      Calcium [Mass/Vol] 8.3 mg/dL  Low        8.6 - 10.3 [facility]  
   
                                        Comment on above:   Performed By: #### C  
BCDF ####  
[address] [address]   
   
                      Chloride [Moles/Vol] 102 mmol/L Normal     98 - 107   [facility]  
   
                                        Comment on above:   Performed By: #### C  
BCDF ####  
[address] [address]   
   
                      Creatinine [Mass/Vol] 0.40 mg/dL Low        0.50 - 1.05 [facility]  
   
                                        Comment on above:   Performed By: #### C  
BCDF ####  
[address] [address]   
   
                      eGFR FEMALE >90        Normal     >90        [facility]  
   
                                        Comment on above:   Result Comment: CALC  
ULATIONS OF ESTIMATED GFR ARE PERFORMED  
USING THE  CKD-EPI STUDY REFIT EQUATION  
WITHOUT THE RACE VARIABLE FOR THE IDMS-TRACEABLE  
CREATININE METHODS.  
https://jasn.asnjournals.org/content/early//ASN.  
145716   
   
                                                            Performed By: #### C  
BCDF ####  
[address] [address]   
   
                      Glucose [Mass/Vol] 85 mg/dL   Normal     74 - 99    [facility]  
   
                                        Comment on above:   Performed By: #### C  
BCDF ####  
[address] [address]   
   
                      HCO3 (Bld) [Moles/Vol] 23 mmol/L  Normal     21 - 32    [facility]  
   
                                        Comment on above:   Performed By: #### C  
BCDF ####  
[address] [address]   
   
                      Potassium [Moles/Vol] 3.4 mmol/L Low        3.5 - 5.3  [facility]  
   
                                        Comment on above:   Performed By: #### C  
BCDF ####  
[place] [id number]   
   
                      Sodium [Moles/Vol] 135 mmol/L Low        136 - 145  [facility]  
   
                                        Comment on above:   Performed By: #### C  
BCDF ####  
[address] [address]   
   
                                                    Discharge [id number]   
022   
   
                                        Discharge Profile2  Discharge Orders:  
Anticipated Discharge   
Date:  
Anticipated Discharge   
[id number]  
  
Anticipated Discharge   
Time10:58  
  
DNAR:  
Code Status at   
Discharge: Full Code  
  
Activity:  
activity as tolerated.  
  
Diet:  
Dietlow cholesterol,   
low fat  
  
Call Provider If   
(Homegoing Patients):  
Any new concerning   
symptoms.  
  
Heart Failure:  
Patient Instructions:  
- CALL 911 IF YOU HAVE   
ANY OF THE SIGNS AND   
SYMPTOMS OF HEART   
FAILURE: 1. Chest  
pain 2. Significant   
Shortness of breath 3.   
Fainting.  
  
- Notify your physician   
immediately if you have   
shortness of breath;   
weight  
gain of 3 lbs. or more;   
fatigue and loss of   
energy; swelling of   
lower  
extremities or abdomen;   
dizziness or fainting;   
change of appetite; and   
frequent  
coughing.  
  
- Patient received   
Living With Heart   
Failure book.  
  
- Daily weight on the   
same scale, same time   
after voiding and   
before eating.  
  
- Maintain daily weight   
log.  
  
Activity:  
- Balance activity with   
rest, gradually   
increase your activity   
as tolerated.  
  
- Exercise as   
prescribed by your   
physician.  
  
Stroke:  
Patient Instructions:  
- CALL 911 OR GO   
DIRECTLY TO THE   
EMERGENCY ROOM IF YOU   
HAVE ANY OF THE SIGNS  
AND SYMPTOMS OF STROKE:   
1. Sudden weakness or   
numbness of the face,   
arm or leg,  
especially on one side   
of the body. 2. Sudden   
difficulty speaking or  
understanding. 3.   
Sudden trouble seeing   
in one or both eyes. 4.   
Sudden trouble  
walking, dizziness,   
loss of balance or   
coordination. 5. Sudden   
severe headache  
with no known cause. 6.   
Loss of consciousness   
or decreased   
consciousness,  
fainting, or seizures.  
  
- Know the Risk Factors   
for Stroke: high blood   
pressure, high   
cholesterol,  
diabetes, smoking,   
physical inactivity,   
overweight, previous   
stroke or TIA,  
heart disease, atrial   
fibrillation.  
  
- Always carry a   
medication list with   
you and take it to ALL   
Healthcare  
Provider visits.  
  
- You may be contacted   
by a Hospital   
Representative after   
discharge to evaluate  
your progress at home   
and to discuss your   
experience at   
[facility].  
  
Hospital Specific   
Instructions - [facility]:  
- For   
questions/problems/conc  
erns call the Discharge   
Physician at   
[phone]  
and have them paged.  
  
Hospital Course (Home   
Care/Gold Form):  
Hospital Course:  
Hospital Course:   
include significant   
abnormal lab values  
21-year-old female who   
was diagnosed at [facility] with infectious   
colitis was  
prescribed   
ciprofloxacin and   
Flagyl had a 7-day   
course of intractable   
nausea  
vomiting, who Has   
cannabinoid use was   
admitted to the   
hospital secondary to  
severe dehydration,   
hypokalemia and   
hypomagnesemia. Patient   
was trialed on  
multiple different   
antiemetics with no   
relief in her nausea   
vomiting. After  
she was appropriately   
fluid resuscitated   
nausea vomiting did   
improve, we tried  
a diet and she   
immediately threw up.   
She was started on   
Reglan and still threw  
up after the Reglan.   
She was complaining of   
left upper quadrant   
abdominal pain  
and secondary to the   
nausea vomiting I   
started her on Carafate   
for concern for  
gastritis. She had 2 CT   
abdomen pelvis is this   
admission 1 in the   
emergency  
room and one   
recommended by Dr. Gallagher which did not   
reveal acute pathology.  
She had a right upper   
quadrant ultrasound   
which was unremarkable.   
She was  
started on IV Protonix   
twice a day as she had   
epigastric pain, acid   
reflux and  
pain with swallowing   
and concern for   
persistent nausea   
vomiting that she  
possibly also had   
esophagitis. Patient   
did not undergo   
endoscopic as we  
clinically were   
treating her symptoms.   
She improved with   
adding Carafate and  
was able to eat with no   
vomiting. She was   
instructed to stop   
cannabinoid and  
she is in agreement   
with the plan. She has   
been instructed to   
follow with her  
primary care physician.   
She had 2 CT abdomen   
pelvis's which did not   
show  
infectious colitis so I   
discontinued   
antibiotics at time of   
discharge. She  
remained afebrile and   
hemodynamically stable.   
She has been prescribed   
a  
gastric emptying study   
that will be performed   
on Monday as   
outpatient. She is  
being discharged to   
home in stable   
condition. Discharge   
time greater than 30  
minutes. Her   
electrolytes have been   
corrected, her   
potassium is 3.4   
magnesium  
2.00. She will have an   
additional dose of   
potassium prior to   
discharge of the  
hospital. Should her   
potassium will need to   
be followed as   
outpatient and she  
has been instructed to   
eat potassium rich   
foods and she loves   
spinach which  
will also help her   
magnesium.  
  
Provider FINAL REVIEW   
of Orders:  
Final Review:  
Final Review of   
Medication   
Reconciliation and   
Orders Completedby VAL Dickens at   
2022 11:05:05  
  
Appointments:  
Follow-Up Appointment   
01:  
Physician/Dept/Kyle JORGENSEN  
Call to Schedule in1   
week  
  
Follow-Up Appointment   
02:  
Physician/Dept/Ignacio Mathis  
Reason for   
ReferralHospital   
Follow-up  
Call to Schedule in2   
weeks  
[place] [address]  
Phone   
[phone]  
  
Other Clinician   
Instructions: (more   
content not   
included)...        Normal                                  [facility]  
   
                                                    LACTATEon 2022   
   
                      Lactate [Moles/Vol] 0.6 mmol/L Normal     0.4 - 2.0  [facility]  
   
                                        Comment on above:   Result Comment: [place]  
puncture immediately after or during the  
administration of Metamizole may lead to falsely  
low results. Testing should be performed immediately  
prior to Metamizole dosing.   
   
                                                            Performed By: #### C  
BCDF ####  
[address] [address]   
   
                                                    MAGNESIUMon 2022   
   
                      Magnesium [Mass/Vol] 2.00 mg/dL Normal     1.60 - 2.40 [facility]  
   
                                        Comment on above:   Performed By: #### H  
EPFP ####  
[address] [address]   
   
                                                    BASIC METABOLIC PANELon    
   
                      Anion gap [Moles/Vol] 13 mmol/L  Normal     10 - 20    [facility]  
   
                                        Comment on above:   Performed By: #### H  
EPFP ####  
[address] [address]   
   
                      Calcium [Mass/Vol] 8.3 mg/dL  Low        8.6 - 10.3 [facility]  
   
                                        Comment on above:   Performed By: #### H  
EPFP ####  
[address] [address]   
   
                      Chloride [Moles/Vol] 102 mmol/L Normal     98 - 107   [facility]  
   
                                        Comment on above:   Performed By: #### H  
EPFP ####  
[address] [address]   
   
                      Creatinine [Mass/Vol] 0.40 mg/dL Low        0.50 - 1.05 [facility]  
   
                                        Comment on above:   Performed By: #### H  
EPFP ####  
[address] [address]   
   
                      eGFR FEMALE >90        Normal     >90        [facility]  
   
                                        Comment on above:   Result Comment: CALC  
ULATIONS OF ESTIMATED GFR ARE PERFORMED  
USING THE  CKD-EPI STUDY REFIT EQUATION  
WITHOUT THE RACE VARIABLE FOR THE IDMS-TRACEABLE  
CREATININE METHODS.  
https://jasn.asnjournals.org/content/early//ASN.  
480632   
   
                                                            Performed By: #### H  
EPFP ####  
[address] [address]   
   
                      Glucose [Mass/Vol] 102 mg/dL  High       74 - 99    [facility]  
   
                                        Comment on above:   Performed By: #### H  
EPFP ####  
[address] [address]   
   
                      HCO3 (Bld) [Moles/Vol] 23 mmol/L  Normal     21 - 32    [facility]  
   
                                        Comment on above:   Performed By: #### H  
EPFP ####  
[address] [address]   
   
                      Potassium [Moles/Vol] 3.5 mmol/L Normal     3.5 - 5.3  [facility]  
   
                                        Comment on above:   Performed By: #### H  
EPFP ####  
[address] [address]   
   
                      Sodium [Moles/Vol] 134 mmol/L Low        136 - 145  [facility]  
   
                                        Comment on above:   Performed By: #### H  
EPFP ####  
[address] [address]   
   
                                                    Urea nitrogen   
[Mass/Vol]      2 mg/dL         Low             6 - 23          [facility]  
   
                                        Comment on above:   Performed By: #### H  
EPFP ####  
[address] [address]   
   
                                                    Urea nitrogen   
[Mass/Vol]      2 mg/dL         Low             6 - 23          [facility]  
   
                                        Comment on above:   Result Comment: repe  
ated and verified   
   
                                                            Performed By: #### P  
TINR ####  
[address] [address]   
   
                      Anion gap [Moles/Vol] 8 mmol/L   Low        10 - 20    [facility]  
   
                                        Comment on above:   Performed By: #### P  
TINR ####  
[address] [address]   
   
                      Calcium [Mass/Vol] 8.2 mg/dL  Low        8.6 - 10.3 [facility]  
   
                                        Comment on above:   Performed By: #### P  
TINR ####  
[address] [address]   
   
                      Chloride [Moles/Vol] 103 mmol/L Normal     98 - 107   [facility]  
   
                                        Comment on above:   Performed By: #### P  
TINR ####  
[address] [address]   
   
                      Creatinine [Mass/Vol] 0.36 mg/dL Low        0.50 - 1.05 [facility]  
   
                                        Comment on above:   Performed By: #### P  
TINR ####  
[facility]  
[address] [place] [address]   
   
                      eGFR FEMALE >90        Normal     >90        [facility]  
   
                                        Comment on above:   Result Comment: CALC  
ULATIONS OF ESTIMATED GFR ARE PERFORMED  
USING THE  CKD-EPI STUDY REFIT EQUATION  
WITHOUT THE RACE VARIABLE FOR THE IDMS-TRACEABLE  
CREATININE METHODS.  
https://jasn.asnjournals.org/content/early/ASN.  
130406   
   
                                                            Performed By: #### P  
TINR ####  
[address] [address]   
   
                      Glucose [Mass/Vol] 91 mg/dL   Normal     74 - 99    [facility]  
   
                                        Comment on above:   Performed By: #### P  
TINR ####  
[address] [address]   
   
                      HCO3 (Bld) [Moles/Vol] 26 mmol/L  Normal     21 - 32    [facility]  
   
                                        Comment on above:   Performed By: #### P  
TINR ####  
[address] [address]   
   
                      Potassium [Moles/Vol] 3.1 mmol/L Low        3.5 - 5.3  [facility]  
   
                                        Comment on above:   Performed By: #### P  
TINR ####  
[address] [address]   
   
                      Sodium [Moles/Vol] 134 mmol/L Low        136 - 145  [facility]  
   
                                        Comment on above:   Performed By: #### P  
TINR ####  
[address] [address]   
   
                      Anion gap [Moles/Vol] 10 mmol/L  Normal     10 - 20    [facility]  
   
                                        Comment on above:   Order Comment: quiles  
d/rb to 2nd floor, 2022 09:47   
   
                                                            Performed By: #### B  
MP ####[address] [address]   
   
                      Calcium [Mass/Vol] 8.1 mg/dL  Low        8.6 - 10.3 [facility]  
   
                                        Comment on above:   Order Comment: quiles  
d/rb to 2nd floor, 2022 09:47   
   
                                                            Performed By: #### B  
MP ####[address] [address]   
   
                      Chloride [Moles/Vol] 103 mmol/L Normal     98 - 107   [facility]  
   
                                        Comment on above:   Order Comment: quiles  
d/rb to 2nd floor, 2022 09:47   
   
                                                            Performed By: #### B  
MP ####[address] [address]   
   
                      Creatinine [Mass/Vol] 0.41 mg/dL Low        0.50 - 1.05 [facility]  
   
                                        Comment on above:   Order Comment: quiles  
d/rb to 2nd floor, 2022 09:47   
   
                                                            Performed By: #### B  
MP ####[address] [address]   
   
                      eGFR FEMALE >90        Normal     >90        [facility]  
   
                                        Comment on above:   Order Comment: quiles  
d/rb to 2nd floor, 2022 09:47   
   
                                                            Result Comment: CALC  
ULATIONS OF ESTIMATED GFR ARE PERFORMED  
USING THE  CKD-EPI STUDY REFIT EQUATION  
WITHOUT THE RACE VARIABLE FOR THE IDMS-TRACEABLE  
CREATININE METHODS.  
https://jasn.asnjournals.org/content/early//ASN.  
421953   
   
                                                            Performed By: #### B  
MP ####[address] [address]   
   
                      Glucose [Mass/Vol] 135 mg/dL  High       74 - 99    [facility]  
   
                                        Comment on above:   Order Comment: quiles  
d/rb to 2nd floor, 2022 09:47   
   
                                                            Performed By: #### B  
MP ####[address] [address]   
   
                      HCO3 (Bld) [Moles/Vol] 25 mmol/L  Normal     21 - 32    [facility]  
   
                                        Comment on above:   Order Comment: quiles  
d/rb to 2nd floor, 2022 09:47   
   
                                                            Performed By: #### B  
MP ####[address] [address]   
   
                      Potassium [Moles/Vol] 2.9 mmol/L Critically low 3.5 - 5.3   
 [facility]  
   
                                        Comment on above:   Order Comment: quiles  
d/rb to 2nd floor, 2022 09:47   
   
                                                            Result Comment: call  
ed/rb to 2nd floor, 2022 09:47   
   
                                                            Performed By: #### B  
MP ####[address] [address]   
   
                      Sodium [Moles/Vol] 135 mmol/L Low        136 - 145  [facility]  
   
                                        Comment on above:   Order Comment: quiles  
d/rb to 2nd floor, 2022 09:47   
   
                                                            Performed By: #### B  
MP ####[address] [address]   
   
                                                    Urea nitrogen   
[Mass/Vol]      2 mg/dL         Low             6 - 23          [facility]  
   
                                        Comment on above:   Order Comment: quiles  
d/rb to 2nd floor, 2022 09:47   
   
                                                            Performed By: #### B  
MP ####[address] [address]   
   
                                                    Daily Progress Note-General   
Internal Medicineon 2022   
   
                                                    Daily Progress   
Note-General Internal   
Medicine                                Consult Type:   
subsequent visit/care  
  
Service: General   
Internal Medicine  
  
History of Present   
Illness:  
History Present   
Illness:  
Admission Reason:   
nausea and vomiting  
HPI:  
patient was feeling   
better this morning.   
diet was advanced. She   
took a few  
bites of her lunch meat   
sandwich and started   
vomiting.She had no   
abdominal  
pain. Her epigastric   
pain resolved. She   
denies ruq pain. She   
just is not  
keeping food down. She   
tried lunch meat   
sandwich after i seen   
her and vomited.  
  
Subjective Data:  
DANNA SOARES   
is a 21 year old Female   
who is Hospital Day #   
5.  
  
Overnight Events:   
Patient had an   
uneventful night.  
  
Objective Data:  
  
Objective Information:  
T PRBPSpO2  
[id number]/9599%  
Date/Time 8:   
8: 8:   
8: 8:20  
Range(36.8C - 37.5C )   
(95 - 98 ) (15 - 18 )   
(108 - 124 )/ (84 - 95   
) (98%  
- 99% )  
Highest temp of 37.5 C   
was recorded at    
8:20  
  
  
Pain reported at    
20:08: 0 = None  
  
Physical Exam by   
System:  
  
Constitutional: Well   
developed,   
awake/alert/oriented   
x3, no distress, alert   
and  
cooperative  
Eyes: PERRL, EOMI,   
clear sclera  
ENMT: mucous membranes   
moist, no apparent   
injury, no lesions seen  
Respiratory/Thorax:   
Patent airways, CTAB,   
normal breath sounds   
with good chest  
expansion, thorax   
symmetric  
Cardiovascular:   
Regular, rate and   
rhythm, no murmurs, 2+   
equal pulses of the  
extremities, normal S   
1and S 2  
Gastrointestinal:   
Nondistended, soft,   
non-tender, no rebound   
tenderness or  
guarding, no masses   
palpable, no   
organomegaly, +BS, no   
bruits  
Musculoskeletal: ROM   
intact, no joint   
swelling, normal   
strength  
Extremities: normal   
extremities, no   
cyanosis edema,   
contusions or wounds,   
no  
clubbing  
Neurological: alert and   
oriented x3, intact   
senses, motor, response   
and  
reflexes, normal   
strength  
Psychological:   
Appropriate mood and   
behavior  
Skin: Warm and dry, no   
lesions, no rashes  
  
Medication:  
  
Medications:  
  
Continuous Medications  
-----------------------  
---------  
  
1. Lactated Ringers   
Infusion: 1000 mL   
IntraVenous  
  
Scheduled Medications  
-----------------------  
---------  
  
1. Ciprofloxacin 400 mg   
IVPB/ Premixed Soln 200   
mL: 200 mL IntraVenous  
Piggyback Every 12   
Hours  
2. Pantoprazole   
Injectable: 40 mg   
IntraVenous Push Every   
12 Hours  
3. Potassium Chloride   
20 mEq/Sterile Water   
100 mL Premix IVPB: 20   
mEq  
IntraVenous Piggyback   
Every 2 Hours  
  
PRN Medications  
-----------------------  
---------  
  
1. Acetaminophen: 650   
mg Oral Every 4 Hours  
2. Acetaminophen: 650   
mg Oral Every 4 Hours  
3. Docusate: 100 mg   
Oral 2 Times a Day  
4. LORazepam   
Injectable: 1 mg   
IntraVenous Push Every   
8 Hours  
5. Magnesium Hydroxide   
-Al Hydrox -Simethicone   
Oral Liquid: 30 mL Oral  
Every 6 Hours  
  
  
Recent Lab Results:  
  
Results:  
  
  
  
  
I have reviewed these   
laboratory results:  
  
Basic Metabolic Panel   
2022 09:02:00  
  
ResultValue  
Lab Comment: called/rb   
to 2nd floor,   
2022 09:47  
Glucose, Serum 135 H  
 L  
K 2.9 LL  
  
Bicarbonate, Serum 25  
Anion Gap, Serum 10  
BUN 2 L  
CREAT 0.41 L  
GFR Female >90  
Calcium, Serum 8.1 L  
  
Magnesium, Serum   
2022 09:02:00  
  
ResultValue  
Magnesium, Serum 1.88  
  
  
Radiology Results:  
  
Results:  
  
  
Impression:  
  
1. Nonspecific free   
fluid within the   
pelvis.  
2. No evidence of bowel   
obstruction, free   
intraperitoneal air or  
abnormal   
intra-abdominal fluid   
collection.  
  
CT Abdomen and Pelvis   
with IV Contrast [2022 11:06AM]  
  
  
Assessment and Plan:  
Code Status:  
Code StatusFull Code  
  
Assessment:  
21-year-old female   
admitted for   
intractable nausea and   
vomiting and recent  
colitis. She also has a   
positive urine tox   
screen with   
cannabinoids and   
opiates  
so she may have   
cannabinoid hyperemesis   
syndrome. There is a   
documented history  
of major depressive   
disorder and bipolar   
disorder.  
  
PLAN:  
1. Continue fluid   
hydration with IV   
fluids  
2. Clear liquids as   
tolerated  
3. Zofran is not   
helping and she said   
Compazine did help in   
the past; I will  
discontinue the Zofran   
for now and add   
Compazine IV every 6   
hours as needed;  
may also discontinue   
the around-the-clock   
metoclopramide  
4. Lab for tomorrow   
morning  
5. We will consult GI  
6. This is day 2 of the   
IV metronidazole and   
ciprofloxacin for her   
recent  
diagnosis of colitis  
7. Pain control as   
needed  
8. DVT prophylaxis with   
early ambulation  
9. Patient will   
maintain inpatient   
status at this time as   
she is still having  
significant nausea and   
vomiting and unable to   
take much in the way of   
p.o.  
  
  
seen and examined  
afebrile  
hemodynamically stable  
change ivf to lr 150   
ml/hr - i ordered 1   
liter bolus this   
morning- clinically  
looks dry- Lips very   
dry and cracked.   
Slightly dry  
Change Flagyl to 250 mg   
IV every 6 hours-she is   
likely intolerant to   
the 500  
mg-if she continues to   
vomit despite changing   
to this dose and adding   
Ativan  
for cyclic vomiting   
along with Protonix 40   
mg IV twice daily we   
will consider  
changing to Augmentin.   
A (more content not   
included)...        Normal                                  [facility]  
   
                                                    MAGNESIUMon 2022   
   
                      Magnesium [Mass/Vol] 1.90 mg/dL Normal     1.60 - 2.40 [facility]  
   
                                        Comment on above:   Performed By: #### H  
EPFP ####  
[place] [id number]   
   
                      Magnesium [Mass/Vol] 1.63 mg/dL Normal     1.60 - 2.40 [facility]  
   
                                        Comment on above:   Performed By: #### M  
G ####  
[address] [address]   
   
                      Magnesium [Mass/Vol] 1.88 mg/dL Normal     1.60 - 2.40 [facility]  
   
                                        Comment on above:   Performed By: #### M  
G ####  
[address] [address]   
   
                                                    Nutrition Therapy-Assessment  
on 2022   
   
                                                    Nutrition   
Therapy-Assessment                      Assessment   
Subjective/Objective:  
Note Type: Assessment  
  
Note Authored by:   
Registered Dietitian   
Nutritionist  
Pager Number: [phone]  
  
Nutrition Note:  
The patient is a 21   
year old Female   
hospital day #5   
admitted for   
intractable  
N/V.  
  
Nutrition assessment   
completed today for   
NPO/Clear Liquids x 5   
days.  
Pt with diagnosis of   
colitis PTA.  
  
Clear Liquid diet x 4   
days. NPO status prior.   
Pt to be advanced to   
Regular diet  
prior to lunch.  
No oral intakes   
documented. States that   
she has had minimal   
oral intakes for  
the past 10 days.  
Pt reported that she is   
tolerating Clear   
Liquids and is ready   
for diet  
advancement as she is   
hungry. No N/V today.  
Lactated Ringers   
Infusion @150mL/hr x 24   
hours.  
GI following.  
MST=1 on admission risk   
screen for eating   
poorly.  
UBW ~120lb per pt.   
Weight upon admission:   
113lb. Weight obtained   
during  
assessment: 104lb.  
Pt with significant   
weight loss of 8.0% in   
less than one week.  
No recent weight   
history available for   
review.  
Completed NFPA. Minimal   
muscle or fat wasting   
noted.  
With significant weight   
loss and decreased oral   
intakes 2/2 N/V, the pt   
meets  
criteria for severe   
malnutrition in the   
setting of acute   
disease.  
Will monitor oral   
intakes of Regular diet   
and implement ONS only   
as needed.  
Obtained food   
preferences and   
discussed with the diet   
office.  
Encouraged taking   
solids slowly and   
making sure to get   
enough fluids.  
  
No nutritional   
interventions at this   
time. Will continue to   
follow the pt.  
  
Medical Surgical   
History: PMHx: MDD,   
bipolar disorder  
  
  
Objective Information:  
  
T PRBPSpO2  
[id number]  
%  
Date/Time 17:   
17: 17:   
17: 17:36  
Range(36.6C - 37.5C )   
(95 - 100 ) (15 - 18 )   
(108 - 118 )/ (84 - 98   
)  
(98% - 100% )  
Highest temp of 37.5 C   
was recorded at    
8:20  
  
  
  
Pain reported at    
8:00: 0 = None  
  
  
---- Intake and Output   
-----  
Mn/Dy/Year   
[facility]  
2022 2:00   
[id number]  
2022 6:00   
[id number]  
2022 10:00   
[id number]  
  
The Intake and Output   
Totals for the last 24   
hours are:  
IntakeOutputNet  
[id number]  
  
Intake Output  
IV Fluids 1800 mL Urine   
2000 mL  
  
Height/Weight:  
Height in cm: 149.8   
centimeter(s)  
Weight (kg): 51.3  
BMI (kg/m2): 22.86   
square meter  
Significant Weight   
Loss: yes  
Interpretation of   
Weight Loss: >2% in 1   
week  
  
Recent Lab Results:  
  
Results:  
  
  
I have reviewed these   
laboratory results:  
  
Basic Metabolic Panel   
[facility]  
  
[id number]   
14:03:00 2022   
09:02:00 2022   
13:33:00  
Glucose, Serum91 135 H   
121 H  
 L 135 L 134 L  
K3.1 L 2.9 LL 2.8 LL  
 103 105  
Bicarbonate, Serum26 25   
20 L  
Anion Gap, Serum8 L 10   
12  
BUN2 L 2 L 3 L  
CREAT0.36 L 0.41 L 0.42   
L  
GFR Female>90 >90 >90  
Calcium, Serum8.2 L 8.1   
L 7.9 L  
Lab Comment: called/rb   
to 2nd floor,   
2022 09:47   
called/rb to radhika harden, 2022   
14:11  
  
Magnesium, Serum   
[facility]  
  
[id number]   
14:03:00 2022   
09:02:00  
Magnesium, Serum1.63   
1.88  
  
  
Current Active   
Medications/PN:  
Acetaminophen, Tablet   
(TYLENOL)  
DOSE = 650 mg Oral   
Every 4 Hours, PRN Pain   
- Mild (1-3),   
2022  
Acetaminophen, Tablet   
(TYLENOL)  
DOSE = 650 mg Oral   
Every 4 Hours, PRN Temp   
Greater Than or Equal   
to 38.0 C,  
2022  
Docusate, Capsule   
(COLACE)  
DOSE = 100 mg Oral 2   
Times a Day, PRN   
Constipation,   
2022  
Magnesium Hydroxide -Al   
Hydrox -Simethicone   
Oral Liquid, (MAALOX)  
DOSE = 30 mL Oral Every   
6 Hours, PRN Dyspepsia,   
2022  
Ciprofloxacin 400 mg   
IVPB/ Premixed Soln 200   
mL, (CIPRO)  
Every 12 Hours  
Recommended Infusion   
Time: 60 minute(s),   
2022  
LORazepam Injectable,   
(ATIVAN)  
DOSE = 1 mg IntraVenous   
Push Every 8 Hours, PRN   
Nausea and/or Vomiting,  
2022  
Pantoprazole   
Injectable, (PROTONIX)  
DOSE = 40 mg   
IntraVenous Push Every   
12 Hours, 2022  
Potassium Chloride   
Extended Release,   
Tablet, Extended   
Release  
DOSE = 40 mEq Oral Once  
Stop After 2 Doses,   
2022  
Metoclopramide   
Injectable, (REGLAN)  
DOSE = 5 mg IntraVenous   
Push Every 6 Hours,   
2022  
LORazepam Injectable,   
(ATIVAN)  
DOSE = 1 mg IntraVenous   
Push Once, 2022  
Lactated Ringers   
Infusion, IV Bag Volume   
= 1,000 mL  
Run at: 150 mL/hr   
IntraVenous ,   
2022  
Lactated Ringers IV   
Bolus, DOSE = 1,000 mL   
Once  
Infuse over 60   
minute(s), 2022  
  
Nutrition Orders:  
Diet May Not   
Participate in Room   
Service, No  
Order entered from   
Admission Screens.,   
2022  
Full Liquid Diet,   
Routine, 2022  
  
Food/Nutrition Related   
History:  
Energy Intake: No oral   
intakes documented. Pt   
has had minimal oral   
intakes for  
the past 10 days she   
reports.  
GI Symptoms: anorexia,   
nausea, vomiting, No   
N/V today. 22 bm   
documented.  
Pt reports that she is   
hungry today. Ordered   
Colace PRN, Protonix   
and Flagyl.  
Oral Problems: denies  
  
Food Allergies Comment:   
FA  
  
Nutrition Focused   
Physical Findings:  
Subcutaneous Fat Loss:  
Orbital Fat Pads: Well   
No (more content not   
included)...        Normal                                  [facility]  
   
                                                    Order Reconciliationon    
   
                                        Order Reconciliation Page 1  
  
Discharge   
Reconciliation Document  
  
  
  
Reconciliation Type:   
Discharge requested on   
behalf of Becky Hyman   
(Advanced  
Practice Nurse) done by   
Becky Hyman (APRN-CNS)  
  
Discharge -   
Reconciliation:   
2022 16:25 by:   
Becky Hyman (APRN-CNS)  
Discharge - Reset to   
Incomplete: 2022   
10:54 by: Becky Hyman   
(APRN-CNS)  
Discharge -   
Reconciliation:   
2022 10:56 by:   
Becky Hyman (APRN-CNS)  
  
Home Medications   
EnteredHOME MEDICATIONS   
AT DISCHARGE   
DateReconciliation  
Comment/ Additional   
Information  
Phenadoz 25 mg rectal   
suppository 1   
suppository(ies)   
rectally 3 times a day,  
As Needed -for nausea   
and vomiting   
10-Franco-2022 16:20   
Discontinued;  
Discontinue from ORM  
  
Phenadoz 25 mg rectal   
suppository is not   
required  
promethazine 25 mg oral   
tablet 1 tab(s) orally   
3 times a day, As   
Needed -for  
nausea and vomiting   
10-Franco-2022 16:20   
Discontinued;   
Discontinue from  
ORM  
  
promethazine 25 mg oral   
tablet is not required  
  
  
Current OrdersDateHOME   
MEDICATIONS AT   
DISCHARGE   
DateReconciliation   
Comment/  
Additional Information  
Acetaminophen Tablet   
(TYLENOL)DOSE = 650 mg   
Oral Every 4 Hours, PRN   
Pain -  
Mild (1-3) 2022   
10:03 Acetaminophen is   
not required  
Acetaminophen Tablet   
(TYLENOL)DOSE = 650 mg   
Oral Every 4 Hours, PRN   
Temp  
Greater Than or Equal   
to 38.0 C 2022   
10:03 Acetaminophen is  
not required  
Ciprofloxacin 400 mg   
IVPB/ Premixed Soln 200   
mL (CIPRO)Every 12  
HoursRecommended   
Infusion Time: 60   
minute(s) 2022   
10:34  
Ciprofloxacin 400 mg   
IVPB/ Premixed Soln 200   
mL is not required  
Docusate Capsule   
(COLACE)DOSE = 100 mg   
Oral 2 Times a Day, PRN   
Constipation  
2022 10:03   
Docusate is not   
required  
Lactated Ringers   
Infusion IV Bag Volume   
= 1,000 mL Run at: 150   
mL/hr  
IntraVenous 2022   
12:48 Lactated Ringers   
Infusion  
is not required  
LORazepam Injectable   
(ATIVAN)DOSE = 1 mg   
IntraVenous Push Every   
8 Hours, PRN  
Nausea and/or Vomiting   
2022 08:05   
LORazepam Injectable is   
not  
required  
Magnesium Hydroxide -Al   
Hydrox -Simethicone   
Oral Liquid   
(MAALOX)DOSE = 30 mL  
Oral Every 6 Hours, PRN   
Dyspepsia 2022   
10:03 Magnesium  
Hydroxide -Al Hydrox   
-Simethicone Oral   
Liquid is not required  
Magnesium Sulfate 2   
gram/Sterile Water 50   
mL Premix Soln   
OnceRecommended  
Infusion Time: 2   
hour(s)Stop After 1   
Doses 2022 10:53  
Magnesium Sulfate 2   
gram/Sterile Water 50   
mL Premix Soln is not   
required  
Metoclopramide   
Injectable (REGLAN)DOSE   
= 5 mg IntraVenous Push   
Every 6 Hours  
2022 16:30   
Metoclopramide   
Injectable is not   
required  
Pantoprazole Injectable   
(PROTONIX)DOSE = 40 mg   
IntraVenous Push Every   
12  
Hours 2022 12:37   
Pantoprazole Injectable   
is not required  
Potassium Chloride   
Extended Release   
Tablet, Extended   
ReleaseDOSE = 40 mEq   
Oral  
OnceStop After 2 Doses   
2022 10:53   
Potassium Chloride   
Extended  
Release is not required  
Sucralfate Tablet   
(CARAFATE)DOSE = 1   
gram(s) Oral 4 Times a   
Day Before Meals  
2022 22:26   
sucralfate 1 g oral   
tablet 1 tab(s) orally   
4 times a day  
-.Meds to Beds - 4   
Times a Day Before   
Meals 2022 10:54   
Prescription  
is created for   
sucralfate 1 g oral   
tablet  
Technetium Tc 99m   
Sulfur Colloid -   
(Radiology Contrast)   
DOSE = 500 microCurie  
Oral Once 2022   
16:29 Technetium Tc 99m   
Sulfur Colloid -  
(Radiology Contrast) is   
not required  
  
  
Home Medications Added   
During Discharge   
Reconciliation  
Discharge Discharge   
Diagnosis< R11.2   
Cannabinoid hyperemesis   
syndrome;R11.2  
Intractable nausea and   
vomiting;E87.6 Acute   
hypokalemia;E83.42   
Hypomagnesemia  
Discharge Provider,   
Nicolas Connolly   
Discharge Disposition :   
.Home Condition  
at Discharge:   
Satisfactory  
Discharge Communication   
Instructions for   
Nursing Only: Discharge   
patient  
TODAY.  
Discharge Communication   
Instructions for   
Nursing Only: Remove IV   
prior to  
discharge from   
hospital. Do not remove   
any midline, if   
present, without an  
order from the   
provider.  
Discharge Instructions   
- PHR After your   
discharge from the   
hospital, two  
Summary of Care   
Documents will be   
available online in   
your  Personal Health  
Record (PHR).   
1.Consolidated-Clinical   
Document Architecture   
(C-CDA) Patient  
Discharge Summary This   
document is a summary   
of your hospital stay   
to be kept  
for your   
reference.2.C-CDA Visit   
Summary This document   
is a summary of your  
hospital stay to be   
shared with your   
follow-up providers   
(doctor, dietician,  
physical therapist,   
etc.).  
Guidelines for a   
Healthy Lifestyle  
Protonix 40 mg oral   
delayed release tablet   
1 tab(s) orally 2 times   
a day  
Zofran 8 mg oral tablet   
1 tab(s) orally 3   
times, As Needed -for   
nausea and  
vomiting  
  
All Active Home   
Medications at time of   
Discharge   
Reconciliation:   
2022  
10:56  
Discharge Discharge   
Diagnosis< R11.2   
Cannabinoid hyperemesis   
syndrome;R11.2  
Intractable nausea and   
vomiting;E87.6 Acute   
hypokalemia;E83.42   
Hypomagnesemia  
Discharge Provider,   
Nicolas Connolly   
Discharge Disposition :   
.Home Condition  
at Discharge:   
Satisfactory  
Discharge Communicati   
(more content not   
included)...        Normal                                  [facility] RIGHT UPPER QUADRANTon    
   
                                         RIGHT UPPER QUADRANT MRN: [id number]  
Patient Name:   
DANNA SOARES  
  
STUDY:  
US RUQ ABDOMEN;   
2022 3:15 pm  
  
INDICATION:  
emesis .  
  
COMPARISON:  
None.  
  
ACCESSION NUMBER(S):  
[id number]  
  
ORDERING CLINICIAN:  
BECKY HYMAN  
  
TECHNIQUE:  
Multiple images of the   
abdomen were obtained.  
  
FINDINGS:  
LIVER:  
The echogenicity of the   
liver is within normal   
limits.  
There is no hepatic   
mass.  
The sagittal dimension   
of the right lobe of   
the liver is 14 cm, not  
enlarged.  
  
GALLBLADDER:  
The gallbladder is   
nondistended.  
The gallbladder wall is   
not thickened.  
There are no   
gallstones.  
There is no sludge.  
There is no   
pericholecystic fluid.  
  
  
BILE DUCTS:  
There is no   
intrahepatic biliary   
dilatation.  
The common bile duct is   
nondilated measuring   
0.5 cm.  
  
PANCREAS:  
The pancreas is   
unremarkable.  
  
RIGHT KIDNEY:  
The right kidney is   
normal in size   
measuring 11.2 cm in   
length.  
  
The echogenicity of the   
cortex is within normal   
limits.  
There is no renal mass.  
There is no intrarenal   
calculus or   
hydronephrosis.  
  
IMPRESSION:  
Unremarkable right   
upper quadrant   
ultrasound.  
Electronically signed   
by: BAKARI SHIRLEY MD                  Normal                                  [facility]  
   
                                                    BASIC METABOLIC PANELon    
   
                      Anion gap [Moles/Vol] 12 mmol/L  Normal     10 -     [facility]  
   
                                        Comment on above:   Order Comment: etta godinez/tarik to radhika dereck, 2022 14:11   
   
                                                            Performed By: #### C  
BCDF ####  
[address] [address]   
   
                      Calcium [Mass/Vol] 7.9 mg/dL  Low        8.6 - 10.3 [facility]  
   
                                        Comment on above:   Order Comment: quiles  
d/rb to radhika harden, 2022 14:11   
   
                                                            Performed By: #### C  
BCDF ####  
[address] [address]   
   
                      Chloride [Moles/Vol] 105 mmol/L Normal     98 - 107   [facility]  
   
                                        Comment on above:   Order Comment: quiles  
d/rb to radhika harden, 2022 14:11   
   
                                                            Performed By: #### C  
BCDF ####  
[address] [address]   
   
                      Creatinine [Mass/Vol] 0.42 mg/dL Low        0.50 - 1.05 [facility]  
   
                                        Comment on above:   Order Comment: quiles  
d/rb to radhika carloser, 2022 14:11   
   
                                                            Performed By: #### C  
BCDF ####  
[address] [address]   
   
                      eGFR FEMALE >90        Normal     >90        [facility]  
   
                                        Comment on above:   Order Comment: quiles  
d/rb to radhika carloser, 2022 14:11   
   
                                                            Result Comment: CALC  
ULATIONS OF ESTIMATED GFR ARE PERFORMED  
USING THE  CKD-EPI STUDY REFIT EQUATION  
WITHOUT THE RACE VARIABLE FOR THE IDMS-TRACEABLE  
CREATININE METHODS.  
https://jasn.asnjournals.org/content/early//ASN.  
674687   
   
                                                            Performed By: #### C  
BCDF ####  
[address] [address]   
   
                      Glucose [Mass/Vol] 121 mg/dL  High       74 - 99    [facility]  
   
                                        Comment on above:   Order Comment: quiles  
d/rb to radhika harden, 2022 14:11   
   
                                                            Performed By: #### C  
BCDF ####  
[address] [address]   
   
                      HCO3 (Bld) [Moles/Vol] 20 mmol/L  Low        21 - 32    [facility]  
   
                                        Comment on above:   Order Comment: quiles  
d/rb to radhika harden, 2022 14:11   
   
                                                            Performed By: #### C  
BCDF ####  
[address] [address]   
   
                      Potassium [Moles/Vol] 2.8 mmol/L Critically low 3.5 - 5.3   
 [facility]  
   
                                        Comment on above:   Order Comment: quiles  
d/rb to radhika harden, 2022 14:11   
   
                                                            Result Comment: MILD  
 HEMOLYSIS DETECTED. The result may be   
falsely elevated due to  
hemolysis or other interferents. Clinical correlation is   
recommended.  
Repeat testing may be considered.  
called/rb to radhika harden, 2022 14:11   
   
                                                            Performed By: #### C  
BCDF ####  
[place] [id number]   
   
                      Sodium [Moles/Vol] 134 mmol/L Low        136 - 145  [facility]  
   
                                        Comment on above:   Order Comment: quiles  
d/rb to radhika harden, 2022 14:11   
   
                                                            Performed By: #### C  
BCDF ####  
[address] [address]   
   
                                                    Urea nitrogen   
[Mass/Vol]      3 mg/dL         Low             6 - 23          [facility]  
   
                                        Comment on above:   Order Comment: quiles  
d/rb to radhika harden, 2022 14:11   
   
                                                            Performed By: #### C  
BCDF ####  
[address] [address]   
   
                                                    CBCon 2022   
   
                                                    Erythrocyte   
distribution width   
(RBC) [Ratio]   14.6 %          High            11.5 - 14.5     [facility]  
   
                                        Comment on above:   Performed By: #### M  
G ####  
[address] [address]   
   
                                                    Hematocrit (Bld)   
[Volume fraction] 36.0 %          Normal          36.0 - 46.0     [facility]  
   
                                        Comment on above:   Performed By: #### M  
G ####  
[address] [address]   
   
                                                    Hemoglobin (Bld)   
[Mass/Vol]      11.9 g/dL       Low             12.0 - 16.0     [facility]  
   
                                        Comment on above:   Performed By: #### M  
G ####  
[address] [address]   
   
                      MCHC (RBC) [Mass/Vol] 33.1 g/dL  Normal     32.0 - 36.0 [facility]  
   
                                        Comment on above:   Performed By: #### M  
G ####  
[address] [address]   
   
                      MCV (RBC) [Entitic vol] 87 fL      Normal     80 - 100   S  
[facility]  
   
                                        Comment on above:   Performed By: #### M  
G ####  
[address] [address]   
   
                      Platelets (Bld) [#/Vol] 233 10*3/uL Normal     150 - 450    
[facility]  
   
                                        Comment on above:   Performed By: #### M  
G ####  
[address] [address]   
   
                      RBC        4.16 x10E12/L Normal     4.00 - 5.20 [facility]  
   
                                        Comment on above:   Performed By: #### M  
G ####  
[address] [address]   
   
                      WBC (Bld) [#/Vol] 6.3 10*3/uL Normal     4.4 - 11.3 [facility]  
   
                                        Comment on above:   Performed By: #### M  
G ####  
[address] [address]   
   
                                                    Clinical Event Noteon 2022   
   
                                        Clinical Event Note Clinical Event:  
Clinical Event Note:  
Details  
Patient still having   
intractable nausea and   
vomiting. I gave 1 dose   
of IV  
Phenergan with no   
relief. She is now   
complaining from chest   
pain. I will  
order EKG and troponin.   
She is on 4 different   
types of antiemetics.   
Given  
that she is not having   
any relief of that, I   
am suspecting now a   
central  
sensitization/central   
etiology. I will go   
ahead first and try   
morphine 2 mg IV  
1 dose stat to see if   
this will help the   
patient relax. If not,   
then I may  
consider giving IV   
Ativan.  
  
  
  
Electronic Signatures:  
Sal Ibrahim)   
(Signed 2022   
03:14)  
Authored: Clinical   
Event Note  
  
  
Last Updated:   
2022 03:14 by Sal Ibrahim) [facility]  
   
                                                    Consult-Gastroenterologyon 0  
2022   
   
                                                    Consult-Gastroenterolog  
y                                       Service:  
Service:   
Gastroenterology  
  
Consult:  
Consult requested by   
(Attending Name):   
Nicolas Connolly  
Reason: Recalcitrant   
nausea and vomiting  
  
History of Present   
Illness:  
HPI:  
DANNA SOARES   
is a 21 year old Female   
presents to ER where   
she is  
admitted with diffuse   
abdominal pain. She is   
admitted for   
recalcitrant  
vomiting. History of   
abdominal problems   
since adolescence   
largely constipation  
has seen multiple   
doctors in the past.   
Had been struggling   
with daily nausea  
and anorexia beginning   
just before [place]   
worsened after New   
Year's and the  
became acutely worse   
after eating venison.   
She states that she had   
some meat  
that she had not   
processed her uncle   
admitted later that the   
animal had hung  
out and 60 degree   
weather for 4 days   
prior to being   
processed. She states   
that  
she her grandfather and   
uncle all ate the meat   
and only she became   
ill. She  
initially presented to   
the freestanding ER   
with [facility] where   
CT scan was  
done showing diffuse   
colitis she was   
referred to [facility] and  
admitted she was   
treated with 2 days of   
IV antibiotics and sent   
home. Upon  
arrival home she had   
more vomiting and   
presented to our ER   
where she was  
readmitted. She was   
then discharged and   
readmitted with   
recalcitrant vomiting  
and hypokalemia.  
  
She states that   
multiple family members   
throughout her   
knowledge of had their  
gallbladders removed   
after pregnancy and she   
is concerned that some   
of this is  
her gallbladder.  
  
She states she had a   
normal bowel movement   
this afternoon it was   
soft dark in  
color but no blood. She   
denies any worsening   
abdominal pain with   
defecation.  
She states the pain is   
rather constant seems   
to be worse with moving   
or with  
any sudden vibration.   
She states she feels   
somewhat better with   
vomiting feels  
worse with eating. Has   
pain in her left upper   
quadrant rating to her   
left  
lower quadrant with any   
ingestion of food. She   
denies any hematemesis   
enlarged  
with the vomitus is   
bile and undigested   
food.  
  
Past medical history is   
unremarkable other than   
the constipation.  
  
Past surgical history   
is denied.  
  
Family history no one   
with inflammatory bowel   
disease or colon   
cancer.  
  
Social history she   
smokes marijuana   
socially last use was   
[place]. She  
does not use drugs does   
not drink alcohol or   
smoke. She has sole   
custody of  
her younger sister   
stating her mom just   
walked out. She does   
receive state  
assistance for her   
sister and is her   
primary guardian.  
  
10 system review is   
negative set that noted   
above  
  
Review Family/Social   
History and ROS:  
Social History:  
  
Smoking Status: never   
smoker (1)  
Alcohol Use: denies(1)  
Drug Use: denies (1)  
  
  
Allergies:  
MiraLax:   
Hives/Urticaria  
codeine: Unknown  
  
Objective:  
  
Objective Information:  
  
T PRBPSpO2  
[id number]/9299%  
Date/Time 15:   
15: 15:   
15: 15:25  
Range(36.7C - 37.4C )   
(97 - 133 ) (18 - 20 )   
(117 - 139 )/ (86 - 101   
)  
(97% - 99% )  
Highest temp of 37.4 C   
was recorded at    
15:25  
  
Physical Exam by   
System:  
  
Constitutional: Well   
developed,   
awake/alert/oriented   
x3, no distress, alert   
and  
cooperative  
Eyes: PERRL, EOMI,   
clear sclera  
ENMT: mucous membranes   
moist, no apparent   
injury, no lesions seen  
Head/Neck: Neck supple,   
no apparent injury,   
thyroid without mass or   
tenderness,  
No JVD, trachea   
midline, no bruits  
Respiratory/Thorax:   
Patent airways, CTAB,   
normal breath sounds   
with good chest  
expansion, thorax   
symmetric  
Cardiovascular:   
Regular, rate and   
rhythm, no murmurs, 2+   
equal pulses of the  
extremities, normal S   
1and S 2  
Gastrointestinal:   
Abdomen soft mildly   
tender left lower   
quadrant and  
periumbilical region   
without rebound or   
guarding no palpable   
mass or bruit  
Musculoskeletal: ROM   
intact, no joint   
swelling, normal   
strength  
Neurological: alert and   
oriented x3, intact   
senses, motor, response   
and  
reflexes, normal   
strength  
Lymphatic: No   
significant   
lymphadenopathy  
Psychological:   
Appropriate mood and   
behavior  
Skin: Warm and dry, no   
lesions, no rashes  
  
Recent Lab Results:  
  
Results:  
  
  
I have reviewed these   
laboratory results:  
  
Basic Metabolic Panel   
2022 13:33:00  
  
ResultValue  
Lab Comment: called/rb   
to radhika harden,   
2022 14:11  
Glucose, Serum 121 H  
 L  
K 2.8 LL  
  
Bicarbonate, Serum 20 L  
Anion Gap, Serum 12  
BUN 3 L  
CREAT 0.42 L  
GFR Female >90  
Calcium, Serum 7.9 L  
  
Complete Blood Count   
2022 13:33:00  
  
ResultValue  
White Blood Cell Count   
6.3  
Red Blood Cell Count   
4.16  
HGB 11.9 L  
HCT 36.0  
MCV 87  
MCHC 33.1  
  
RDW-CV 14.6 H  
  
Comprehensive Metabolic   
Panel 2022   
12:11:00  
  
ResultValue  
Glucose, Serum 78  
  
K 3.2 L  
  
Bicarbonate, Serum 19 L  
Anion Gap, Serum 16  
BUN 4 L  
CREAT 0.54  
GFR Female >90  
Calcium, Serum 8.5 L  
ALB 3.9  
ALKP 38  
T Pro 6.2 L  
T Bili 0.5  
Alanine   
Aminotransferase, Serum   
10  
Aspartate Transaminase,   
Serum 17  
  
Drug Screen, Urine 1   
(more content not   
included)...        Normal                                  [facility]  
   
                                                    Daily Progress Note-General   
Internal Medicineon 2022   
   
                                                    Daily Progress   
Note-General Internal   
Medicine                                Consult Type:   
subsequent visit/care  
  
Service: General   
Internal Medicine  
  
History of Present   
Illness:  
History Present   
Illness:  
Admission Reason:   
INTRACTABLE NAUSEA AND   
VOMITING  
HPI:  
SHE IS STILL NAUSEAS   
AND VOMITING. SHE HAS   
EPIGASTRIC PAIN. SHE IS   
INTOLERANT  
TO FLAGYL AND HAS   
VOMITING SWALLOWING THE   
PILL. SHE FELT BETTER   
AFTER SHE GOT  
LITER BOLUS THIS   
MORNING. SHE HAS NOT   
SMOKED MARIJUANA FOR   
ABOUT 10 DAYS SAID  
SHE VOMITED WHEN SHE   
TRIED. SHE STILL HAS   
LEFT LOWER QUADRANT   
ABDOMINAL PAIN  
  
Subjective Data:  
BERTHATHOMASSDGLORIA DANNA ALVAREZ   
is a 21 year old Female   
who is Hospital Day #   
4.  
  
Overnight Events:   
Patient had an   
uneventful night.  
  
Objective Data:  
  
Objective Information:  
T PRBPSpO2  
[id number]/8798%  
Date/Time 8:   
8: 8:   
8: 8:00  
Range(36.7C - 37C ) (97   
- 133 ) (18 - 20 ) (117   
- 139 )/ (86 - 101 )   
(97%  
- 99% )  
Highest temp of 37 C   
was recorded at    
17:37  
  
  
Pain reported at    
8:00: 0 = None  
  
Physical Exam by   
System:  
  
Constitutional: Well   
developed,   
awake/alert/oriented   
x3, no distress, alert   
and  
cooperative  
Eyes: PERRL, EOMI,   
clear sclera  
ENMT: mucous membranes   
slightly dry, lips   
cracked, no apparent   
injury, no  
lesions seen  
Head/Neck: Neck supple,   
no apparent injury,   
thyroid without mass or   
tenderness,  
No JVD, trachea   
midline, no bruits  
Respiratory/Thorax:   
Patent airways, CTAB,   
normal breath sounds   
with good chest  
expansion, thorax   
symmetric  
Cardiovascular:   
Regular, rate and   
rhythm, no murmurs, 2+   
equal pulses of the  
extremities, normal S   
1and S 2  
Gastrointestinal:   
Nondistended, soft,   
non-tender, no rebound   
tenderness or  
guarding, no masses   
palpable, no   
organomegaly, +BS, no   
bruits  
Musculoskeletal: ROM   
intact, no joint   
swelling, normal   
strength  
Extremities: normal   
extremities, no   
cyanosis edema,   
contusions or wounds,   
no  
clubbing  
Neurological: alert and   
oriented x3, intact   
senses, motor, response   
and  
reflexes, normal   
strength  
Psychological:   
Appropriate mood and   
behavior  
Skin: Warm and dry, no   
lesions, no rashes  
  
Medication:  
  
Medications:  
  
Continuous Medications  
-----------------------  
---------  
  
1. Lactated Ringers   
Infusion: 1000 mL   
IntraVenous  
  
Scheduled Medications  
-----------------------  
---------  
  
1. Ciprofloxacin 400 mg   
IVPB/ Premixed Soln 200   
mL: 200 mL IntraVenous  
Piggyback Every 12   
Hours  
2. metroNIDAZOLE   
(FLAGYL) 500 mg IVPB/   
Premixed Soln 100 mL:   
100 mL  
IntraVenous Piggyback   
Every 8 Hours  
3. Pantoprazole   
Injectable: 40 mg   
IntraVenous Push Every   
12 Hours  
  
PRN Medications  
-----------------------  
---------  
  
1. Acetaminophen: 650   
mg Oral Every 4 Hours  
2. Acetaminophen: 650   
mg Oral Every 4 Hours  
3. Docusate: 100 mg   
Oral 2 Times a Day  
4. LORazepam   
Injectable: 1 mg   
IntraVenous Push Every   
8 Hours  
5. Magnesium Hydroxide   
-Al Hydrox -Simethicone   
Oral Liquid: 30 mL Oral  
Every 6 Hours  
  
  
Recent Lab Results:  
  
Results:  
  
  
I have reviewed these   
laboratory results:  
  
Troponin I, Serum   
2022 04:26:00  
  
ResultValue  
Troponin I, Serum <0.02  
  
Complete Blood Count +   
Differential   
2022 12:11:00  
  
ResultValue  
White Blood Cell Count   
9.4  
Nucleated Erythrocyte   
Count 0.2  
Red Blood Cell Count   
4.53  
HGB 13.1  
HCT 39.7  
MCV 88  
MCHC 32.9  
  
RDW-CV 14.7 H  
Neutrophil % 61.2  
Lymphocyte % 25.4  
Monocyte % 10.0  
Eosinophil % 2.3  
Basophil % 1.1  
Neutrophil Count 5.70  
Lymphocyte Count 2.40  
Monocyte Count 0.90  
Eosinophil Count 0.20  
Basophil Count 0.10  
  
Comprehensive Metabolic   
Panel 2022   
12:11:00  
  
ResultValue  
Glucose, Serum 78  
  
K 3.2 L  
  
Bicarbonate, Serum 19 L  
Anion Gap, Serum 16  
BUN 4 L  
CREAT 0.54  
GFR Female >90  
Calcium, Serum 8.5 L  
ALB 3.9  
ALKP 38  
T Pro 6.2 L  
T Bili 0.5  
Alanine   
Aminotransferase, Serum   
10  
Aspartate Transaminase,   
Serum 17  
  
  
Radiology Results:  
  
Results:  
  
  
Impression:  
  
Nondiagnostic gastric   
emptying study due   
toemesis.  
  
NM Gastric Empty Solid   
Only [2022   
9:27AM]  
  
  
Conclusion:  
 Please see physician   
note for formal   
interpretation   
confirmed by Scribe   
Confirmed by CARLOS MOODY   
() on 2022   
5:11:03 AM  
  
Electrocardiogram 12   
Lead [2022   
5:11AM]  
  
  
Assessment and Plan:  
Code Status:  
Code StatusFull Code  
  
Assessment:  
21-year-old female   
admitted for   
intractable nausea and   
vomiting and recent  
colitis. She also has a   
positive urine tox   
screen with   
cannabinoids and   
opiates  
so she may have   
cannabinoid hyperemesis   
syndrome. There is a   
documented history  
of major depressive   
disorder and bipolar   
disorder.  
  
PLAN:  
1. Continue fluid   
hydration with IV   
fluids  
2. Clear liquids as   
tolerated  
3. Zofran is not   
helping and she said   
Compazine did help in   
the past; I will  
discontinue the Zofran   
for now and add   
Compazine IV every 6   
hours as needed;  
may also discontinue   
the around-the-clock   
metoclopramide  
4. Lab for tomorrow   
morning  
5. We will consult GI  
6. This is day 2 of the   
IV metr (more content   
not included)...    Normal                                  [facility]  
   
                                                    TROPONIN Ion 2022   
   
                                                    Troponin I.cardiac   
[Mass/Vol]      ng/mL           Normal          0.00 - 0.03     [facility]  
   
                                        Comment on above:   Result Comment: LESS  
 THAN 0.04 NG/ML: NEGATIVE  
REPEAT TESTING IN THREE TO SIX HOURS  
IF CLINICALLY INDICATED.  
0.04 - 0.5 NG/ML: CONSISTENT WITH POSSIBLE  
CARDIAC DAMAGE AND POSSIBLE INCREASED  
CLINICAL RISK.  
SERIAL MEASUREMENTS MAY HELP ASSESS EXTENT OF  
MYOCARDIAL DAMAGE.  
>0.5 NG/ML: CONSISTENT WITH CARDIAC DAMAGE,  
INCREASED CLINICAL RISK AND MYOCARDIAL  
INFARCTION. SERIAL MEASUREMENTS MAY HELP  
ASSESS EXTENT OF MYOCARDIAL DAMAGE.  
.  
Note: Troponin I testing is performed using different  
testing methodology at [facility] than at other  
[facility]. Direct result comparisons should only  
be made within the same method.   
   
                                                            Performed By: #### C  
BCDF ####  
[address] [address]   
   
                                                    CBC AND DIFFERENTIALon    
   
                      Basophils (Bld) [#/Vol] 0.10 10*3/uL Normal     0.00 - 0.1  
0 [facility]  
   
                                        Comment on above:   Performed By: #### P  
TINR ####  
[address] [address]   
   
                      Basophils/100 WBC (Bld) 1.1 %      Normal     0.0 - 2.0  S  
[facility]  
   
                                        Comment on above:   Performed By: #### P  
TINR ####  
[address] [address]   
   
                                                    Eosinophils (Bld)   
[#/Vol]         0.20 10*3/uL    Normal          0.00 - 0.70     [facility]  
   
                                        Comment on above:   Performed By: #### P  
TINR ####  
[address] [address]   
   
                                                    Eosinophils/100 WBC   
(Bld)           2.3 %           Normal          0.0 - 6.0       [facility]  
   
                                        Comment on above:   Performed By: #### P  
TINR ####  
[address] [address]   
   
                                                    Erythrocyte   
distribution width   
(RBC) [Ratio]   14.7 %          High            11.5 - 14.5     [facility]  
   
                                        Comment on above:   Performed By: #### P  
TINR ####  
[address] [address]   
   
                                                    Hematocrit (Bld)   
[Volume fraction] 39.7 %          Normal          36.0 - 46.0     [facility]  
   
                                        Comment on above:   Performed By: #### P  
TINR ####  
[address] [address]   
   
                                                    Hemoglobin (Bld)   
[Mass/Vol]      13.1 g/dL       Normal          12.0 - 16.0     [facility]  
   
                                        Comment on above:   Performed By: #### P  
TINR ####  
[address] [address]   
   
                                                    Lymphocytes (Bld)   
[#/Vol]         2.40 10*3/uL    Normal          1.20 - 4.80     [facility]  
   
                                        Comment on above:   Performed By: #### P  
TINR ####  
[address] [address]   
   
                                                    Lymphocytes/100 WBC   
(Bld)           25.4 %          Normal          13.0 - 44.0     [facility]  
   
                                        Comment on above:   Performed By: #### P  
TINR ####  
[address] [address]   
   
                      MCHC (RBC) [Mass/Vol] 32.9 g/dL  Normal     32.0 - 36.0 [facility]  
   
                                        Comment on above:   Performed By: #### P  
TINR ####  
[address] [address]   
   
                      MCV (RBC) [Entitic vol] 88 fL      Normal     80 - 100   S  
[facility]  
   
                                        Comment on above:   Performed By: #### P  
TINR ####  
[address] [address]   
   
                      Monocytes (Bld) [#/Vol] 0.90 10*3/uL Normal     0.10 - 1.0  
0 [facility]  
   
                                        Comment on above:   Performed By: #### P  
TINR ####  
[address] [address]   
   
                      Monocytes/100 WBC (Bld) 10.0 %     Normal     2.0 - 10.0 S  
[facility]  
   
                                        Comment on above:   Performed By: #### P  
TINR ####  
[address] [address]   
   
                                                    Neutrophils (Bld)   
[#/Vol]         5.70 10*3/uL    Normal          1.20 - 7.70     [facility]  
   
                                        Comment on above:   Result Comment: Perc  
ent differential counts (%) should be   
interpreted in the  
context of the absolute cell counts (cells/L).   
   
                                                            Performed By: #### P  
TINR ####  
[address] [address]   
   
                                                    Neutrophils/100 WBC   
(Bld)           61.2 %          Normal          40.0 - 80.0     [facility]  
   
                                        Comment on above:   Performed By: #### P  
TINR ####  
[address] [address]   
   
                      NUCLEATED RBC 0.2 /100 WBC Normal                [facility]  
   
                                        Comment on above:   Performed By: #### P  
TINR ####  
[address] [address]   
   
                      Platelets (Bld) [#/Vol] 265 10*3/uL Normal     150 - 450    
[facility]  
   
                                        Comment on above:   Performed By: #### P  
TINR ####  
[address] [address]   
   
                      RBC        4.53 x10E12/L Normal     4.00 - 5.20 [facility]  
   
                                        Comment on above:   Performed By: #### P  
TINR ####  
[address] [address]   
   
                      WBC (Bld) [#/Vol] 9.4 10*3/uL Normal     4.4 - 11.3 [facility]  
   
                                        Comment on above:   Performed By: #### P  
TINR ####  
[place] [id number]   
   
                                                    COMPREHENSIVE PANELon 2022   
   
                      Albumin [Mass/Vol] 3.9 g/dL   Normal     3.4 - 5.0  [facility]  
   
                                        Comment on above:   Performed By: #### P  
TINR ####  
[address] [address]   
   
                                                    ALP [Catalytic   
activity/Vol]   38 U/L          Normal          33 - 110        [facility]  
   
                                        Comment on above:   Performed By: #### P  
TINR ####  
[address] [address]   
   
                                                    ALT [Catalytic   
activity/Vol]   10 U/L          Normal          7 - 45          [facility]  
   
                                        Comment on above:   Result Comment: Kaleigh  
ents treated with Sulfasalazine may   
generate  
falsely decreased results for ALT.   
   
                                                            Performed By: #### P  
TINR ####  
[address] [address]   
   
                      Anion gap [Moles/Vol] 16 mmol/L  Normal     10 - 20    [facility]  
   
                                        Comment on above:   Performed By: #### P  
TINR ####  
[address] [address]   
   
                                                    AST [Catalytic   
activity/Vol]   17 U/L          Normal          9 - 39          [facility]  
   
                                        Comment on above:   Performed By: #### P  
TINR ####  
[address] [address]   
   
                      Bilirubin [Mass/Vol] 0.5 mg/dL  Normal     0.0 - 1.2  [facility]  
   
                                        Comment on above:   Performed By: #### P  
TINR ####  
[address] [address]   
   
                      Calcium [Mass/Vol] 8.5 mg/dL  Low        8.6 - 10.3 [facility]  
   
                                        Comment on above:   Performed By: #### P  
TINR ####  
[address] [address]   
   
                      Chloride [Moles/Vol] 105 mmol/L Normal     98 - 107   [facility]  
   
                                        Comment on above:   Performed By: #### P  
TINR ####  
[address] [address]   
   
                      Creatinine [Mass/Vol] 0.54 mg/dL Normal     0.50 - 1.05 [facility]  
   
                                        Comment on above:   Performed By: #### P  
TINR ####  
[address] [address]   
   
                      eGFR FEMALE >90        Normal     >90        [facility]  
   
                                        Comment on above:   Result Comment: CALC  
ULATIONS OF ESTIMATED GFR ARE PERFORMED  
USING THE  CKD-EPI STUDY REFIT EQUATION  
WITHOUT THE RACE VARIABLE FOR THE IDMS-TRACEABLE  
CREATININE METHODS.  
https://jasn.asnjournals.org/content/early/ASN.  
643186   
   
                                                            Performed By: #### P  
TINR ####  
[address] [address]   
   
                      Glucose [Mass/Vol] 78 mg/dL   Normal     74 - 99    [facility]  
   
                                        Comment on above:   Performed By: #### P  
TINR ####  
[address] [address]   
   
                      HCO3 (Bld) [Moles/Vol] 19 mmol/L  Low        21 - 32    [facility]  
   
                                        Comment on above:   Performed By: #### P  
TINR ####  
[address] [address]   
   
                      Potassium [Moles/Vol] 3.2 mmol/L Low        3.5 - 5.3  [facility]  
   
                                        Comment on above:   Performed By: #### P  
TINR ####  
[address] [address]   
   
                      Protein [Mass/Vol] 6.2 g/dL   Low        6.4 - 8.2  [facility]  
   
                                        Comment on above:   Performed By: #### P  
TINR ####  
[address] [address]   
   
                      Sodium [Moles/Vol] 137 mmol/L Normal     136 - 145  [facility]  
   
                                        Comment on above:   Performed By: #### P  
TINR ####  
[address] [address]   
   
                                                    Urea nitrogen   
[Mass/Vol]      4 mg/dL         Low             6 - 23          [facility]  
   
                                        Comment on above:   Performed By: #### P  
TINR ####  
[address] [address]   
   
                                                    Daily Progress Note-Medicine  
on 2022   
   
                                                    Daily Progress   
Note-Medicine                           Service: Medicine  
  
Review of Systems:  
Review of Systems:  
Constitutional:   
NEGATIVE: Fever, Chills  
  
ENMT: NEGATIVE: Nasal   
Discharge, Nasal   
Congestion, Ear Pain,   
Mouth Pain, Throat  
Pain  
  
Respiratory: NEGATIVE:   
Dry Cough, Productive   
Cough, Hemoptysis,   
Wheezing,  
Shortness of Breath  
  
Cardiac: NEGATIVE:   
Chest Pain, Dyspnea on   
Exertion, Orthopnea,   
Palpitations,  
Syncope  
  
Gastrointestinal:   
POSITIVE: Nausea,   
Vomiting, Abdominal   
Pain; NEGATIVE:  
Diarrhea, Constipation  
  
Genitourinary:   
NEGATIVE: Discharge,   
Dysuria, Flank Pain,   
Frequency, Hematuria  
  
Musculoskeletal:   
NEGATIVE: Decreased   
ROM, Pain, Swelling,   
Stiffness, Weakness  
  
Neurological: NEGATIVE:   
Dizziness, Confusion,   
Headache, Syncope  
  
Psychiatric: NEGATIVE:   
Mood Changes, Anxiety  
  
  
Subjective Data:  
DANNA SOARES   
is a 21 year old Female   
who is Hospital Day #   
3.  
  
Day 2 IV ciprofloxacin   
and metronidazole.  
  
Additional Information:  
Patient still having   
nausea and vomiting;   
she cannot keep some   
water down  
  
Attempted the upper GI   
this morning but   
vomited within 10   
minutes after taking  
the contrast  
  
Objective Data:  
  
Objective Information:  
T PRBPSpO2  
[id number]/9797%  
Date/Time 0:001   
0:001 0:001   
0:00118 0:00  
Range(37.1C - 37.6C )   
(93 - 101 ) (16 - 16 )   
(130 - 133 )/ (97 - 97   
)  
(97% - 97% )  
Highest temp of 37.6 C   
was recorded at    
16:13  
  
  
Pain reported at    
8:17: 1 = Mild  
  
Physical Exam by   
System:  
  
Constitutional: Awake   
and alert; oriented x3   
with no apparent   
distress or  
respiratory distress  
Head/Neck: Neck supple   
with no palpable   
lymphadenopathy, bruits   
or masses;  
trachea midline  
Respiratory/Thorax:   
Clear to auscultation   
bilaterally no wheezes   
or rhonchi  
noted  
Cardiovascular: Regular   
rate and rhythm; normal   
S1-S2 with no murmur;   
no  
pitting edema and 2+   
pulses bilaterally  
Gastrointestinal: Soft,   
nondistended, positive   
bowel sounds; there is   
some mild  
tenderness with minimal   
palpation left lower   
quadrant with some   
guarding but no  
rebound  
Neurological: Nonfocal;   
cranial nerves II   
through XII appear   
intact  
Psychological: Pleasant   
affect  
  
Recent Lab Results:  
  
Results:  
  
  
I have reviewed these   
laboratory results:  
  
Complete Blood Count +   
Differential   
2022 10:29:00  
  
ResultValue  
White Blood Cell Count   
9.3  
Nucleated Erythrocyte   
Count 0.1  
Red Blood Cell Count   
4.53  
HGB 12.8  
HCT 39.2  
MCV 87  
MCHC 32.7  
  
RDW-CV 14.8 H  
Neutrophil % 66.2  
Lymphocyte % 20.3  
Monocyte % 9.5  
Eosinophil % 2.6  
Basophil % 1.4  
Neutrophil Count 6.20  
Lymphocyte Count 1.90  
Monocyte Count 0.90  
Eosinophil Count 0.20  
Basophil Count 0.10  
  
Basic Metabolic Panel   
2022 10:29:00  
  
ResultValue  
Glucose, Serum 74  
  
K 3.6  
  
Bicarbonate, Serum 17 L  
Anion Gap, Serum 18  
BUN 7  
CREAT 0.51  
GFR Female >90  
Calcium, Serum 9.1  
  
Drug Screen, Urine   
2022 10:19:00  
  
ResultValue  
Comments. SEE BELOW   
Drug screen results are   
presumptive and should   
not be used  
to assess compliance   
with prescribed   
medication. Contact the   
performing [facility]  
laboratory to add-on   
definitive confirmatory   
testing if clinically   
indicated.  
.Toxicology scre  
Amphetamine Screen,   
Urine PRESUMPTIVE   
NEGATIVE CUTOFF LEVEL:   
500 NG/ML  
Cross-reactivity has   
been reported with high   
concentrations of the   
following  
drugs: buproprion,   
chloroquine,   
chlorpromazine,   
ephedrine,   
mephentermine,  
fenfluramine,   
phentermine,   
phenylpropanolamine  
Barbiturate Screen,   
Urine PRESUMPTIVE   
NEGATIVE PRESUMPTIVE   
NEGATIVE CUTOFF  
LEVEL: 200 NG/ML  
Benzodiazepine Screen,   
Urine PRESUMPTIVE   
NEGATIVE PRESUMPTIVE   
NEGATIVE CUTOFF  
LEVEL: 200 NG/ML  
Cannabinoid Screen,   
Urine PRESUMPTIVE   
POSITIVE PRESUMPTIVE   
POSITIVE CUTOFF  
LEVEL: 50 NG/ML A  
Cocaine Metabolite   
Screen, Urine   
PRESUMPTIVE NEGATIVE   
PRESUMPTIVE NEGATIVE  
CUTOFF LEVEL: 150 NG/ML  
Fentanyl Screen, Urine   
PRESUMPTIVE NEGATIVE   
CUTOFF LEVEL: 1 NG/ML   
The  
performance   
characteristics of this   
test have been   
determined by the   
individual  
 laboratory site   
where testing is   
performed. This test   
has not been cleared  
or approved by the FDA;   
however,  
Methadone Screen, Urine   
PRESUMPTIVE NEGATIVE   
CUTOFF LEVEL: 150 NG/ML   
The  
metabolite   
L-alpha-acetylmethadol   
(LAAM) is not detected   
by this method in  
concentrations that   
would be found in the   
urine of patients on   
LAAM therapy.  
Opiate Screen, Urine   
PRESUMPTIVE POSITIVE   
CUTOFF LEVEL: 300 NG/ML   
The opiate  
screen does not detect   
fentanyl, meperidine,   
or tramadol. Oxycodone   
is not  
consistently detected   
(refer to Oxycodone   
Screen, Urine result).   
A  
Oxycodone Screen, Urine   
(item) PRESUMPTIVE   
NEGATIVE CUTOFF LEVEL:   
100 NG/ML  
This test will   
accurately detect both   
oxycodone and   
oxymorphone.  
PCP Screen, Urine   
PRESUMPTIVE NEGATIVE   
CUTOFF LEVEL: 25 NG/ML  
Cross-reactivity has   
been reported with   
dextromethorphan.  
  
  
Assessment and Plan:  
Additional Dx:  
Left sided colitis:   
Entered Date:   
2022 12:07  
Cannabinoid hyperemesis   
(more content not   
included)...        Normal                                  [facility]  
   
                                                    GASTRIC EMPTY SOLID ONLYon 0  
2022   
   
                                                    GASTRIC EMPTY SOLID   
ONLY                                    MRN: [id number]  
Patient Name:   
DANNA SOARES  
  
STUDY:  
GASTRIC EMPTY SOLID   
ONLY; 2022 6:35 am  
  
INDICATION:  
vomiting .  
  
COMPARISON:  
None.  
  
ACCESSION NUMBER(S):  
[id number]  
  
ORDERING CLINICIAN:  
SAL MCDERMOTT  
  
TECHNIQUE:  
DIVISION OF NUCLEAR   
MEDICINE  
GASTRIC EMPTYING   
QUANTIFICATION  
  
The patient received an   
oral radiolabeled   
solid-phase meal   
utilizing  
1.2 mCi of Tc-99m   
sulfur colloid in   
cooked egg. Sequential   
anterior  
and posterior images of   
the abdomen were then   
acquired. 10 minutes  
after eating, the   
patient vomited and the   
study was discontinued.  
  
FINDINGS:  
The image series shows   
normal filling of the   
stomach in the  
immediate images. An   
image was taken after   
emesis which showed  
retained radiotracer   
material within the   
stomach.  
  
IMPRESSION  
Nondiagnostic gastric   
emptying study due to   
emesis.  
  
I personally reviewed   
the images/study and I   
agree with the findings  
as stated. This study   
was interpreted at   
[facility], [place], [place].  
Electronically signed   
by: SUMIT WETEZL MD           [facility]  
   
                                                    Admission Risk Screen - Adul  
ton 2022   
   
                                                    Admission Risk Screen -   
Adult                                   Allergies:  
Allergies:  
MiraLax:   
Hives/Urticaria  
codeine: Unknown  
  
Patient Verification:  
New W ID Band Applied   
in my Departmentyes  
Patient Identity   
Verified Bypatient  
ID Band FULL Name,   
include Middle,   
spelling matches   
patient's ID used for  
verificationyes  
ID Band  Matches   
Patient ID used for   
Verficationyes  
ID Band MRN Matches EMR   
MRNyes  
  
Visitor Restriction:  
Coronavirus Visitor   
Restriction: Reasonable   
restrictions to   
in-person  
visitors will be   
observed due to current   
coronavirus pandemic.  
  
Travel History:  
COVID-19 Screening   
Completedno exposure or   
symptoms(1)  
Travel or Exposure Past   
30 DaysNO travel to   
International locations   
in the  
past 30 days  
Ebola AlertFor   
Ebola-like Symptoms:   
Isolate Patient and   
Notify  
Provider/Infection   
Preventionist  
  
For Contact: Notify   
Provider/Infection   
Preventionist  
  
Advance Directive:  
Advance Directive/DNRno  
Advance Directive   
Information   
Givenpatient/family   
declined  
  
[place] Fall Screen:  
History of falling   
(immediate or   
previous)no (0)  
Secondary Diagnosisno   
(0)  
Intravenous Therapy/   
Heparin/Saline Lockyes   
(20)  
Gait/Transferringnormal  
/bedrest/wheelchair (0)  
Ambulatory   
Aidsnone/bedrest/nurse   
assist (0)  
Mental Statusoriented   
to own ability (0)  
Score: Low risk (<25).   
Moderate risk (25-44).   
High risk (>44).20  
[place] InterventionsLOW   
INTERVENTIONS:  
*patient oriented to   
surroundings and call   
system,  
* patient/family falls   
education completed  
and documented,  
*patients fall status   
communicated  
during bedside handoff,  
*whiteboard updated,  
*mode of toileting   
discussed with patient,  
*bed in low position   
with brakes locked,  
*call light in reach,  
* non-skid footwear  
  
  
Family Violence Screen:  
Are you or have you   
been threatened or   
abused physically,   
emotionally, or  
sexually by anyoneno  
Has anyone ever   
threatened to hurt your   
family or your petsno  
Does anyone try to keep   
you from   
having/contacting other   
friends or doing  
things outside your   
homeno  
Do you feel UNSAFE   
going back to the place   
where you are livingno  
Do you feel anyone has   
exploited or taken   
advantage of you   
financially or of  
your personal   
propertyno  
Clinical assessment:   
Are there any apparent   
signs of   
injuries/behaviors that  
could be related to   
abuse/neglectno  
Social Service Consult   
for abuse/neglect   
needed this visitno  
  
Functional Screen:  
Functional Screen: In   
the recent/past 2-4   
weeks, patient or   
family have  
noticedno issues that   
require a   
speech/language consult   
at this time  
  
AM-PAC- Basic   
Mobility/Daily   
Activity:  
Patient baseline   
bedboundno  
Turning from your back   
to your side while in a   
flat bed without using  
bedrailsnone  
Moving from lying on   
your back to sitting on   
the side of a flat bed   
without  
using bedrailsnone  
Moving to and from bed   
to chair (including a   
wheelchair)none  
Standing up from a   
chair using your arms   
(e.g. wheelchair or   
bedside chair)  
none  
To walk in hospital   
roomnone  
Climbing 3-5 steps with   
railingnone  
Basic Mobility - Total   
Score24  
Putting on and taking   
off regular lower body   
clothingnone  
Bathing (including   
washing, rinsing,   
drying)none  
Putting on and taking   
off regular upper body   
clothingnone  
Toileting, which   
includes using toilet,   
bedpan or urinalnone  
Taking care of personal   
grooming such as   
brushing teethnone  
Eating Mealsnone  
Daily Activity - Total   
Score24  
  
Learning Assessment   
(Patient):  
Patient is Able to be   
Assessed for   
Learningyes  
Factors Influencing   
Readiness to   
Learnfatigue  
Factors that Impact   
Ability to Learnnone  
Devices/Methods Used to   
Communicatenone  
Learning   
Preferenceswritten   
material; video; verbal   
instruction; individual  
instruction  
Cultural   
Considerationsnone  
Developmental   
Considerationsnone  
Mormon   
Considerationsnone  
Other   
Learnerssignificant   
other  
  
Learning Assessment   
(Other Learner):  
Other learner   
availableno  
  
Depression Screen:  
During the past month,   
have you often been   
bothered by feeling   
down,  
depressed or hopelessno  
During the past month,   
have you often had   
little interest or   
pleasure in  
doing thingsno  
Have you had any   
thoughts of harming   
anyone elseno (1)  
  
[place] Suicide:  
Risk Screen Not   
Applicable/Able to   
Answerable to be   
screened  
In the Past Month: Have   
you wished you were   
dead or could go to   
sleep and not  
wake upno(1)  
In the Past Month: Have   
you had any actual   
thoughts of killing   
yourself  
no(1)  
Lifetime: Have you ever   
done, started to do, or   
prepared to do anything   
to  
end your lifeno  
[place] Suicide   
Risknegative  
  
Adult Nutrition Screen:  
Have you recently lost   
weight without tryingno  
Have you been eating   
poorly because of a   
decreased appetiteyes  
Malnutrition Screening   
Tool Score1  
Malnutrition Screening   
Tool RiskMST = 0 or 1   
Not at risk. Eating   
well with  
little or no weight   
loss  
Nutrition Consult   
needed this visitno  
Can Patient Participate   
in Room [facility]  
Patient requires Paper   
Dishes/Plastic   
Utensilsno  
CommentsNPO FOR NOW  
  
Pain Screen:  
Joel (more content not   
included)...        Normal                                  [facility]  
   
                                                    BASIC METABOLIC PANELon    
   
                      Anion gap [Moles/Vol] 18 mmol/L  Normal     10 - 20    [facility]  
   
                                        Comment on above:   Performed By: #### B  
MP ####[address] [address]   
   
                      Calcium [Mass/Vol] 9.1 mg/dL  Normal     8.6 - 10.3 [facility]  
   
                                        Comment on above:   Performed By: #### B  
MP ####[address] [address]   
   
                      Creatinine [Mass/Vol] 0.51 mg/dL Normal     0.50 - 1.05 [facility]  
   
                                        Comment on above:   Performed By: #### B  
MP ####[address] [address]   
   
                      eGFR FEMALE >90        Normal     >90        [facility]  
   
                                        Comment on above:   Result Comment: CALC  
ULATIONS OF ESTIMATED GFR ARE PERFORMED  
USING THE  CKD-EPI STUDY REFIT EQUATION  
WITHOUT THE RACE VARIABLE FOR THE IDMS-TRACEABLE  
CREATININE METHODS.  
https://jasn.asnjournals.org/content/early/ASN.  
081824   
   
                                                            Performed By: #### B  
MP ####[address] [address]   
   
                      Glucose [Mass/Vol] 74 mg/dL   Normal     74 - 99    [facility]  
   
                                        Comment on above:   Performed By: #### B  
MP ####[address] [address]   
   
                      HCO3 (Bld) [Moles/Vol] 17 mmol/L  Low        21 - 32    [facility]  
   
                                        Comment on above:   Performed By: #### B  
MP ####[address] [address]   
   
                      Potassium [Moles/Vol] 3.6 mmol/L Normal     3.5 - 5.3  [facility]  
   
                                        Comment on above:   Result Comment: MILD  
 HEMOLYSIS DETECTED. The result may be   
falsely elevated due to  
hemolysis or other interferents. Clinical correlation is   
recommended.  
Repeat testing may be considered.   
   
                                                            Performed By: #### B  
MP ####[place] [address]   
   
                      Sodium [Moles/Vol] 136 mmol/L Normal     136 - 145  [facility]  
   
                                        Comment on above:   Performed By: #### B  
MP ####[place] [id number]   
   
                                                    Urea nitrogen   
[Mass/Vol]      7 mg/dL         Normal          6 - 23          [facility]  
   
                                        Comment on above:   Performed By: #### B  
MP ####[address] [address]   
   
                      Chloride [Moles/Vol] 105 mmol/L Normal     98 - 107   [facility]  
   
                                        Comment on above:   Performed By: #### B  
MP ####[address] [address]   
   
                                                            Performed By: #### E  
LECT ####[address] [address]   
   
                                                    CBC AND DIFFERENTIALon    
   
                      Basophils (Bld) [#/Vol] 0.10 10*3/uL Normal     0.00 - 0.1  
0 [facility]  
   
                                        Comment on above:   Performed By: #### C  
BCDF ####[address] [address]   
   
                      Basophils/100 WBC (Bld) 1.4 %      Normal     0.0 - 2.0  S  
[facility]  
   
                                        Comment on above:   Performed By: #### C  
BCDF ####[address] [address]   
   
                                                    Eosinophils (Bld)   
[#/Vol]         0.20 10*3/uL    Normal          0.00 - 0.70     [facility]  
   
                                        Comment on above:   Performed By: #### C  
BCDF ####[address] [address]   
   
                                                    Eosinophils/100 WBC   
(Bld)           2.6 %           Normal          0.0 - 6.0       [facility]  
   
                                        Comment on above:   Performed By: #### C  
BCDF ####[address] [address]   
   
                                                    Erythrocyte   
distribution width   
(RBC) [Ratio]   14.8 %          High            11.5 - 14.5     [facility]  
   
                                        Comment on above:   Performed By: #### C  
BCDF ####[address] [address]   
   
                                                    Hematocrit (Bld)   
[Volume fraction] 39.2 %          Normal          36.0 - 46.0     [facility]  
   
                                        Comment on above:   Performed By: #### C  
BCDF ####[address] [address]   
   
                                                    Hemoglobin (Bld)   
[Mass/Vol]      12.8 g/dL       Normal          12.0 - 16.0     [facility]  
   
                                        Comment on above:   Performed By: #### C  
BCDF ####[address] [address]   
   
                                                    Lymphocytes (Bld)   
[#/Vol]         1.90 10*3/uL    Normal          1.20 - 4.80     [facility]  
   
                                        Comment on above:   Performed By: #### C  
BCDF ####[address] [address]   
   
                                                    Lymphocytes/100 WBC   
(Bld)           20.3 %          Normal          13.0 - 44.0     [facility]  
   
                                        Comment on above:   Performed By: #### C  
BCDF ####[address] [address]   
   
                      MCHC (RBC) [Mass/Vol] 32.7 g/dL  Normal     32.0 - 36.0 [facility]  
   
                                        Comment on above:   Performed By: #### C  
BCDF ####[address] [address]   
   
                      MCV (RBC) [Entitic vol] 87 fL      Normal     80 - 100   S  
[facility]  
   
                                        Comment on above:   Performed By: #### C  
BCDF ####[address] [address]   
   
                      Monocytes (Bld) [#/Vol] 0.90 10*3/uL Normal     0.10 - 1.0  
0 [facility]  
   
                                        Comment on above:   Performed By: #### C  
BCDF ####[address] [address]   
   
                      Monocytes/100 WBC (Bld) 9.5 %      Normal     2.0 - 10.0 [facility]  
   
                                        Comment on above:   Performed By: #### C  
BCDF ####[address] [address]   
   
                                                    Neutrophils (Bld)   
[#/Vol]         6.20 10*3/uL    Normal          1.20 - 7.70     [facility]  
   
                                        Comment on above:   Result Comment: Perc  
ent differential counts (%) should be   
interpreted in the  
context of the absolute cell counts (cells/L).   
   
                                                            Performed By: #### C  
BCDF ####[address] [address]   
   
                                                    Neutrophils/100 WBC   
(Bld)           66.2 %          Normal          40.0 - 80.0     [facility]  
   
                                        Comment on above:   Performed By: #### C  
BCDF ####[address] [address]   
   
                      NUCLEATED RBC 0.1 /100 WBC Normal                [facility]  
   
                                        Comment on above:   Performed By: #### C  
BCDF ####[address] [address]   
   
                      Platelets (Bld) [#/Vol] 275 10*3/uL Normal     150 - 450    
[facility]  
   
                                        Comment on above:   Performed By: #### C  
BCDF ####[address] [address]   
   
                      RBC        4.53 x10E12/L Normal     4.00 - 5.20 [facility]  
   
                                        Comment on above:   Performed By: #### C  
BCDF ####[address] [address]   
   
                      WBC (Bld) [#/Vol] 9.3 10*3/uL Normal     4.4 - 11.3 [facility]  
   
                                        Comment on above:   Performed By: #### C  
BCDF ####[address] [address]   
   
                      Basophils (Bld) [#/Vol] 0.10 10*3/uL Normal     0.00 - 0.1  
0 [facility]  
   
                                        Comment on above:   Performed By: #### P  
TINR ####  
[address] [address]   
   
                      Basophils/100 WBC (Bld) 1.5 %      Normal     0.0 - 2.0  [facility]  
   
                                        Comment on above:   Performed By: #### P  
TINR ####  
[address] [address]   
   
                                                    Eosinophils (Bld)   
[#/Vol]         0.20 10*3/uL    Normal          0.00 - 0.70     [facility]  
   
                                        Comment on above:   Performed By: #### P  
TINR ####  
[address] [address]   
   
                                                    Eosinophils/100 WBC   
(Bld)           2.1 %           Normal          0.0 - 6.0       [facility]  
   
                                        Comment on above:   Performed By: #### P  
TINR ####  
[address] [address]   
   
                                                    Erythrocyte   
distribution width   
(RBC) [Ratio]   14.5 %          Normal          11.5 - 14.5     [facility]  
   
                                        Comment on above:   Performed By: #### P  
TINR ####  
[address] [address]   
   
                                                    Hematocrit (Bld)   
[Volume fraction] 40.2 %          Normal          36.0 - 46.0     [facility]  
   
                                        Comment on above:   Performed By: #### P  
TINR ####  
[address] [address]   
   
                                                    Hemoglobin (Bld)   
[Mass/Vol]      13.4 g/dL       Normal          12.0 - 16.0     [facility]  
   
                                        Comment on above:   Performed By: #### P  
TINR ####  
[address] [address]   
   
                                                    Lymphocytes (Bld)   
[#/Vol]         1.30 10*3/uL    Normal          1.20 - 4.80     [facility]  
   
                                        Comment on above:   Performed By: #### P  
TINR ####  
[address] [address]   
   
                                                    Lymphocytes/100 WBC   
(Bld)           16.6 %          Normal          13.0 - 44.0     [facility]  
   
                                        Comment on above:   Performed By: #### P  
TINR ####  
[address] [address]   
   
                      MCHC (RBC) [Mass/Vol] 33.4 g/dL  Normal     32.0 - 36.0 [facility]  
   
                                        Comment on above:   Performed By: #### P  
TINR ####  
[address] [address]   
   
                      MCV (RBC) [Entitic vol] 86 fL      Normal     80 - 100   S  
[facility]  
   
                                        Comment on above:   Performed By: #### P  
TINR ####  
[address] [address]   
   
                      Monocytes (Bld) [#/Vol] 0.80 10*3/uL Normal     0.10 - 1.0  
0 [facility]  
   
                                        Comment on above:   Performed By: #### P  
TINR ####  
[address] [address]   
   
                      Monocytes/100 WBC (Bld) 9.9 %      Normal     2.0 - 10.0 S  
[facility]  
   
                                        Comment on above:   Performed By: #### P  
TINR ####  
[address] [address]   
   
                                                    Neutrophils (Bld)   
[#/Vol]         5.60 10*3/uL    Normal          1.20 - 7.70     [facility]  
   
                                        Comment on above:   Result Comment: Perc  
ent differential counts (%) should be   
interpreted in the  
context of the absolute cell counts (cells/L).   
   
                                                            Performed By: #### P  
TINR ####  
[address] [address]   
   
                                                    Neutrophils/100 WBC   
(Bld)           69.9 %          Normal          40.0 - 80.0     [facility]  
   
                                        Comment on above:   Performed By: #### P  
TINR ####  
[address] [address]   
   
                      Platelets (Bld) [#/Vol] 272 10*3/uL Normal     150 - 450    
[facility]  
   
                                        Comment on above:   Performed By: #### P  
TINR ####  
[address] [address]   
   
                      RBC        4.69 x10E12/L Normal     4.00 - 5.20 [facility]  
   
                                        Comment on above:   Performed By: #### P  
TINR ####  
[address] [address]   
   
                      WBC (Bld) [#/Vol] 8.1 10*3/uL Normal     4.4 - 11.3 [facility]  
   
                                        Comment on above:   Performed By: #### P  
TINR ####  
[address] [address]   
   
                                                    COMPREHENSIVE PANELon 2022   
   
                      Albumin [Mass/Vol] 4.1 g/dL   Normal     3.4 - 5.0  [facility]  
   
                                        Comment on above:   Performed By: #### C  
MP ####[address] [address]   
   
                                                    ALP [Catalytic   
activity/Vol]   39 U/L          Normal          33 - 110        [facility]  
   
                                        Comment on above:   Performed By: #### C  
MP ####[address] [address]   
   
                                                    ALT [Catalytic   
activity/Vol]   9 U/L           Normal          7 - 45          [facility]  
   
                                        Comment on above:   Result Comment: Kaleigh  
ents treated with Sulfasalazine may   
generate  
falsely decreased results for ALT.   
   
                                                            Performed By: #### C  
MP ####[address] [address]   
   
                      Anion gap [Moles/Vol] 18 mmol/L  Normal     10 - 20    [facility]  
   
                                        Comment on above:   Performed By: #### C  
MP ####[address] [address]   
   
                                                    AST [Catalytic   
activity/Vol]   13 U/L          Normal          9 - 39          [facility]  
   
                                        Comment on above:   Performed By: #### C  
MP ####[address] [address]   
   
                      Bilirubin [Mass/Vol] 0.5 mg/dL  Normal     0.0 - 1.2  [facility]  
   
                                        Comment on above:   Performed By: #### C  
MP ####[address] [address]   
   
                      Calcium [Mass/Vol] 8.8 mg/dL  Normal     8.6 - 10.3 [facility]  
   
                                        Comment on above:   Performed By: #### C  
MP ####[address] [address]   
   
                      Chloride [Moles/Vol] 100 mmol/L Normal     98 - 107   [facility]  
   
                                        Comment on above:   Performed By: #### C  
MP ####[address] [address]   
   
                      Creatinine [Mass/Vol] 0.56 mg/dL Normal     0.50 - 1.05 [facility]  
   
                                        Comment on above:   Performed By: #### C  
MP ####[address] [address]   
   
                      eGFR FEMALE >90        Normal     >90        [facility]  
   
                                        Comment on above:   Result Comment: CALC  
ULATIONS OF ESTIMATED GFR ARE PERFORMED  
USING THE  CKD-EPI STUDY REFIT EQUATION  
WITHOUT THE RACE VARIABLE FOR THE IDMS-TRACEABLE  
CREATININE METHODS.  
https://jasn.asnjournals.org/content/early//ASN.  
077857   
   
                                                            Performed By: #### C  
MP ####[address] [address]   
   
                      Glucose [Mass/Vol] 86 mg/dL   Normal     74 - 99    [facility]  
   
                                        Comment on above:   Performed By: #### C  
MP ####[address] [address]   
   
                      HCO3 (Bld) [Moles/Vol] 22 mmol/L  Normal     21 - 32    [facility]  
   
                                        Comment on above:   Performed By: #### C  
MP ####[address] [address]   
   
                      Potassium [Moles/Vol] 3.1 mmol/L Low        3.5 - 5.3  [facility]  
   
                                        Comment on above:   Performed By: #### C  
MP ####[address] [address]   
   
                      Protein [Mass/Vol] 6.4 g/dL   Normal     6.4 - 8.2  [facility]  
   
                                        Comment on above:   Performed By: #### C  
MP ####[place] [address]   
   
                      Sodium [Moles/Vol] 137 mmol/L Normal     136 - 145  [facility]  
   
                                        Comment on above:   Performed By: #### C  
MP ####[place] [address]   
   
                                                    Urea nitrogen   
[Mass/Vol]      6 mg/dL         Normal          6 - 23          [facility]  
   
                                        Comment on above:   Performed By: #### C  
MP ####[place] [address]   
   
                                                    CORONAVIRUS 2019 BY PCRon    
   
                                                    SARS-CoV-2 (COVID-19)   
RNA KIRSTEN+probe Ql (Unsp   
spec)           Not detected    Normal          Not Detected    [facility]  
   
                                        Comment on above:   Result Comment: .  
This test has received FDA Emergency Use Authorization (EUA)   
and has been  
verified by [facility].   
This test is only  
authorized for the duration of time that circumstances exist   
to justify the  
authorization of the emergency use of in vitro diagnostic   
tests for the  
detection of SARS-CoV-2 virus and/or diagnosis of COVID-19   
infection under  
section 564(b)(1) of the Act, 21 U.S.C. 360bbb-3(b)(1), unless   
the  
authorization is terminated or revoked sooner.  
[facility] is certified   
under CLIA-88 as  
qualified to perform high complexity testing. Testing is   
performed in the  
[facility] laboratory located at [address] [place] [address].  
SARS-CoV-2/Flu/RSV Multiplex Test:  
Fact sheet for providers:   
https://www.fda.gov/media/591701/download  
Fact sheet for patients:   
https://www.fda.gov/media/935420/download   
   
                                                            Performed By: #### C  
OV19 ####[place] [address]   
   
                      Lab Specimen Source Nasal, Nasopharyngeal Normal            
      [facility]  
   
                                        Comment on above:   Performed By: #### C  
OV19 ####[place] [address]   
   
                                                    CT HEAD WO CONTRASTon 2022   
   
                                        CT HEAD WO CONTRAST MRN: [id number]  
Patient Name:   
DANNA OSARES  
  
STUDY:  
CT HEAD WO CONTRAST;   
2022 12:15 am  
  
INDICATION:  
head injury .  
  
COMPARISON:  
None.  
  
ACCESSION NUMBER(S):  
[id number]  
  
ORDERING CLINICIAN:  
IVAN COLEY  
  
TECHNIQUE:  
Noncontrast axial CT   
scan of head was   
performed. Angled   
reformats in  
brain and bone windows   
were generated. The   
images were reviewed in  
bone, brain, blood and   
soft tissue windows.  
  
FINDINGS:  
CSF Spaces: The   
ventricles, sulci and   
basal cisterns are   
within  
normal limits. There is   
no extraaxial fluid   
collection.  
  
Parenchyma: The   
grey-white   
differentiation is   
intact. There is no  
mass effect or midline   
shift. There is no   
intracranial   
hemorrhage.  
  
Calvarium: The   
calvarium is   
unremarkable.  
  
Paranasal sinuses and   
mastoids: Visualized   
paranasal sinuses and  
mastoids are clear.  
  
IMPRESSION:  
No evidence of acute   
intracranial   
abnormality.  
Electronically signed   
by: KENZIE HINOJOSA MD           [facility]  
   
                                                    Covid 19 Resultson   
2   
   
                                                    SARS-CoV-2 (COVID-19)   
RNA KIRSTEN+probe Ql (Unsp   
spec)                                   NEGATIVE COVID-19 Test  
  
Coronaviruses are   
common world-wide and   
are the cause of many   
common colds.  
SARS-COV2 is a new   
coronavirus that began   
circulating worldwide   
in 2019 so we  
are calling it   
COVID-19. It has been   
estimated that four out   
of five patients  
with COVID-19 will   
recover at home without   
the need for medical   
attention.  
Symptoms of COVID-19   
may include cough,   
fever, shortness of   
breath, loss of  
taste or smell and   
other flu-like symptoms   
including chills, sore   
muscles, sore  
throat, and headache.   
Severe illness is more   
common in older people   
and people  
with other health   
problems such as high   
blood pressure,   
obesity, and immune  
system problems.  
  
If the test is   
positive, you have   
COVID-19. You will be   
contacted by the  
ordering physicians   
office and instructed   
to remain on home   
isolation, in  
accordance with CDC   
guidelines. You may   
also be contacted by   
the [facility] to   
see if any of your   
close contacts may have   
been exposed  
to the virus and need   
to quarantine.  
  
If the test is   
negative, you likely do   
not have COVID-19 at   
this time, but you  
still may have a   
different illness that   
can spread to other   
people (like  
Influenza, or the Flu)   
and could still be at   
risk for getting   
COVID-19. We  
recommend that you stay   
away from other people   
to limit the spread of   
illness  
until your symptoms are   
improving and you are   
fever-free for 24 hours   
without  
the use of fever   
lowering medications   
such as acetaminophen   
or ibuprofen. No  
test is 100% accurate   
so if you are still   
concerned you may have   
COVID-19, talk  
to your doctor about   
the need to continue to   
stay away from others.  
  
Medicines  
  
Unless your provider   
told you not to use the   
following:   
Acetaminophen (Tylenol  
and others) is   
generally safe.   
Anti-inflammatory   
medications, such as   
Ibuprofen  
(Advil or Motrin) or   
Naproxen (Aleve) can   
also be used.   
Over-the-counter  
cough and cold   
medicines can be used   
according to the   
instructions on the  
package. Some   
over-the-counter   
medicines also contain   
acetaminophen. Make   
sure  
you are not taking more   
than your recommended   
dose. For those not   
hospitalized,  
there is no specific   
treatment available for   
this illness.   
Antibiotics do not  
treat Coronaviruses.  
  
Follow-Up  
  
Follow up with your   
doctor by scheduling a   
virtual visit or   
consider follow-up  
at one of our urgent   
care fever clinics. If   
you are having   
difficulty  
breathing, or are very   
weak and having   
difficulty standing,   
this is a medical  
emergency. Call 911 or   
have someone take you   
to the nearest   
emergency room  
immediately. If   
possible, wear a   
facemask.  
  
Additional guidance   
from the CDC for   
patients who tested   
POSITIVE for COVID-19  
  
How to isolate:  
Isolate yourself in a   
specific room at home   
and limit your contact   
with others.  
Use a separate bathroom   
from other members of   
the household, when   
possible.  
Leave home only to get   
essential medical care.   
Do not go to work,   
school or  
public areas.  
Avoid using public   
transportation,   
ride-sharing, or taxis.  
Restrict contact with   
pets and other animals.   
If you must care for   
your pet or  
be around animals while   
you are sick, wash your   
hands before and after   
your  
interaction and wear a   
facemask.  
Make sure that shared   
spaces in the home have   
good airflow, such as   
by an air  
conditioner or an   
opened window, weather   
permitting.  
  
Personal Hygiene   
Procedures:  
Wear a face mask when   
in the same room as   
other people or pets.   
If a face mask  
interferes with your   
breathing, others   
should wear a mask when   
sharing space  
with you.  
Frequent hand-washing:   
wash your hands with   
soap and water for at   
least 20  
seconds. If soap and   
water are not   
available, use   
alcohol-based hand   
.  
Avoid touching your   
eyes, nose, and mouth   
with unwashed hands.  
  
Household Hygiene   
Procedures:  
Avoid sharing personal   
household items such as   
dishes, glassware,   
cups, eating  
utensils, towels or   
bedding with other   
people or pets in your   
home. After use,  
these items should be   
washed with soap and   
hot water.  
Disinfect all   
high-touch surfaces   
every day with   
antibacterial cleaning  
solutions such as Lysol   
wipes, bleach,   
cleansers, etc.   
High-touch surfaces  
include tabletops,   
doorknobs, bathroom   
fixtures, toilets,   
phones, keyboards,  
tablets and bedside   
tables.  
Immediately clean any   
surfaces that may have   
blood, poop or body   
fluids on  
them, using   
antibacterial cleaning   
solutions such as Lysol   
wipes, bleach,  
cleansers, etc.  
If clothing or bedding   
come into contact with   
blood, poop or body   
fluids, they  
should be washed   
immediately. Follow the   
directions on the   
laundry detergent  
and clothing labels but   
hot water is   
recommended when   
possible.  
  
Stopping home isolation   
precautions:  
If possible, consult   
your doctor before   
stopping home isolation   
precautions.  
According to the CDC,   
you can discontinue   
home isolation   
precautions when you  
have met both of these   
criteria:  
Your fever and   
respiratory symptoms   
have been gone for 24   
[place] (more content not   
included)...        Normal                                  [facility]  
   
                                                    DRUG SCREEN,URINEon 20  
22   
   
                      AMPHETAMINE SCREEN,U Negative   Normal     NEGATIVE   [facility]  
   
                                        Comment on above:   Result Comment: CUTO  
FF LEVEL: 500 NG/ML  
Cross-reactivity has been reported with high concentrations  
of the following drugs: buproprion, chloroquine,   
chlorpromazine,  
ephedrine, mephentermine, fenfluramine, phentermine,  
phenylpropanolamine, pseudoephedrine, and propranolol.   
   
                                                            Performed By: #### D  
RUG3 ####[place] [address]   
   
                      BARBITURATES SCREEN,U Negative   Normal     NEGATIVE   [facility]  
   
                                        Comment on above:   Result Comment: CUTO  
FF LEVEL: 200 NG/ML   
   
                                                            Performed By: #### D  
RUG3 ####[address] [address]   
   
                                                    BENZODIAZEPINES   
SCREEN,U        Negative        Normal          NEGATIVE        [facility]  
   
                                        Comment on above:   Result Comment: CUTO  
FF LEVEL: 200 NG/ML   
   
                                                            Performed By: #### D  
RUG3 ####[place] [address]   
   
                      CANNABINOIDS SCREEN,U Positive   Abnormal   NEGATIVE   [facility]  
   
                                        Comment on above:   Result Comment: CUTO  
FF LEVEL: 50 NG/ML   
   
                                                            Performed By: #### D  
RUG3 ####[place] [address]   
   
                                                    COCAINE METABOLITE   
SCREEN,U        Negative        Normal          NEGATIVE        [facility]  
   
                                        Comment on above:   Result Comment: CUTO  
FF LEVEL: 150 NG/ML   
   
                                                            Performed By: #### D  
RUG3 ####[place] [address]   
   
                      DRUG SCREEN COMMENT SEE BELOW  Normal                [facility]  
   
                                        Comment on above:   Result Comment: Drug  
 screen results are presumptive and should  
   
not be used to assess  
compliance with prescribed medication. Contact the performing   
[facility]  
laboratory to add-on definitive confirmatory testing if   
clinically  
indicated.  
.  
Toxicology screening results are reported qualitatively. The   
concentration  
must be greater than or equal to the cutoff to be reported as   
positive. The  
concentration at which the screening test can detect an   
individual drug or  
metabolite varies. The absence of expected drug(s) and/or drug   
metabolite(s)  
may indicate non-compliance, inappropriate timing of specimen   
collection  
relative to drug administration, poor drug absorption,   
diluted/adulterated  
urine, or limitations of testing. For medical purposes only;   
not valid for  
forensic use.  
.  
Interpretive questions should be directed to the laboratory   
medical  
directors.   
   
                                                            Performed By: #### D  
RUG3 ####[place] [address]   
   
                      FENTANYL SCREEN,URINE Negative   Normal     NEGATIVE   [facility]  
   
                                        Comment on above:   Result Comment: CUTO  
FF LEVEL: 1 NG/ML  
The performance characteristics of this  
test have been determined by the individual  
 laboratory site where testing is performed.  
This test has not been cleared or approved  
by the FDA; however, the FDA has determined  
that such clearance is not necessary.   
   
                                                            Performed By: #### D  
RUG3 ####[place] [address]   
   
                      METHADONE SCREEN,U Negative   Normal     NEGATIVE   [facility]  
   
                                        Comment on above:   Result Comment: CUTO  
FF LEVEL: 150 NG/ML  
The metabolite L-alpha-acetylmethadol (LAAM) is not  
detected by this method in concentrations that would  
be found in the urine of patients on LAAM therapy.   
   
                                                            Performed By: #### D  
RUG3 ####[place] [address]   
   
                      OPIATES SCREEN,U Positive   Abnormal   NEGATIVE   [facility]  
   
                                        Comment on above:   Result Comment: CUTO  
FF LEVEL: 300 NG/ML  
The opiate screen does not detect fentanyl, meperidine, or  
tramadol. Oxycodone is not consistently detected (refer to  
Oxycodone Screen, Urine result).   
   
                                                            Performed By: #### D  
RUG3 ####[place] [address]   
   
                      OXYCODONE SCREEN,U Negative   Normal     NEGATIVE   [facility]  
   
                                        Comment on above:   Result Comment: CUTO  
FF LEVEL: 100 NG/ML  
This test will accurately detect both oxycodone and   
oxymorphone.   
   
                                                            Performed By: #### D  
RUG3 ####[place] [address]   
   
                      PCP SCREEN,U Negative   Normal     NEGATIVE   [facility]  
   
                                        Comment on above:   Result Comment: CUTO  
FF LEVEL: 25 NG/ML  
Cross-reactivity has been reported with dextromethorphan.   
   
                                                            Performed By: #### D  
RUG3 ####[place] [address]   
   
                                                    Discharge Planning [id number] 0  
2022   
   
                                                    Discharge Planning   
Note2                                   Discharge Planning:  
Discharge Barriersnone  
Planned Dispositionhome  
Discharge   
Destinationhome  
Patient/Representative   
Stated Goalhome  
Anticipated Discharge   
[id number]  
  
Discharge Planning  
22 CHEPE STARK RN [facility]  
TRANSITIONAL CARE   
COORDINATOR  
I met with the pt,   
introduced myself, and   
explained my role.   
Reviewed  
demographics, phone   
number, insurance, and   
RX coverage. Pt lives   
with  
significant other and   
her children. The Phone   
# she provided to [facility]   
was  
[phone] . Pt   
denies the use of any   
assistance devices ie:   
walker, cane, or  
wheelchair. Pt is able   
to drive. Pt not   
utilizing any [facility]   
agencies. Pt is able  
to obtain home meds   
without difficulty and   
denies any questions.   
Pt states  
significant other will   
take home when   
discharged. She had no   
PCP she is  
uncomfortable at moment   
and will follow to   
provide option of PCP   
list.. Denies  
home oxygen use. Pt   
feels will have the   
help needs after   
discharge at home.  
Dary FAIR, RN,   
[facility]  
  
  
22 CHEPE STARK RN [facility]  
  
Spoke with patient this   
AM and she is feeling   
better. The medical   
team is  
anticipating discharge   
in next 24 hrs as they   
have begun new   
medications trx  
for patient abdominal   
issues. plan is for   
home with no   
anticipated discharge  
needs. laxmi  
  
22 CHEPE STARK RN [facility]  
patient advises that   
she willl not be   
discharged today as   
planned as she has  
begun experiencing   
nausea and vomiting   
despite medications to   
combat. kb  
  
Discharge Note:   
2022 1133   
Discharge instructions   
reviewed by THIAGO Toney RN  
with pt. Declines   
wheelchair, ambulates   
to private car   
accompanied by self,  
personal belongings   
taken by patient, no   
distress noted, no   
complaints voiced.  
Yvonne UMANZOR  
  
  
  
Assessment:  
Discharge Planning   
Assessment   
[id number]  
Primary Contact Name   
and NumberTERE MOONEY   
[phone](1)  
Lives Withsignificant   
other(1)  
Living   
Arrangementsmobile   
home(1)  
Stated Reason for   
AdmissionNAUSEA &   
VOMITING(1)  
Arrived Fromhome (1)  
Resource/Environmental   
Concernsnone(1)  
Anticipated Transition   
[place](1)  
Services Anticipated at   
Transitionnon(1)  
  
Nursing Checklist:  
Lines/Cathetersremoved/  
appropriate for next   
level of care  
Discharge Med Rec   
Reconciled with Miguel  
  
Discharge   
Documentation:  
Discharge/Transfer   
Date/[id number]   
11:33  
Discharged Accompanied   
Byfamily member  
Discharge   
Modeambulatory  
Transportation   
Methodprivate car  
Code StatusCode Status   
order at time of   
discharge: Full Code  
[place] DNR Form Sent with   
Patient and/or   
Familyn/a  
Valuables/Medications/B  
elongings Returnedyes  
Final DispositionHome  
  
  
  
Electronic Signatures:  
Dary Stark (CLIN   
COOR) (Signed   
2022 14:37)  
Authored: Discharge   
Planning  
Kourtney Toney (RN)   
(Signed 2022   
18:04)  
Authored: Discharge   
Planning, Discharge   
Documentation  
Tonie Hunter (RN) (Signed   
2022 16:05)  
Authored: Discharge   
Planning, Nursing   
Checklist, Discharge   
Documentation  
Remi Decker (SUPV)   
(Signed 2022   
05:33)  
Authored: Assessment  
  
  
Last Updated:   
2022 18:04 by   
Kourtney Toney (RN)  
  
References:  
1. Data Referenced From   
 Patient Profile -   
Adult v2  2022   
05:16               [facility]   
Health  
   
                                                    ELECTROLYTE PANELon 20   
   
                      Anion gap [Moles/Vol] 17 mmol/L  Normal     10 - 20    [facility]  
   
                                        Comment on above:   Performed By: #### E  
LECT ####[place] [id number]   
   
                      HCO3 (Bld) [Moles/Vol] 18 mmol/L  Low        21 - 32    [facility]  
   
                                        Comment on above:   Performed By: #### E  
LECT ####[place] [address]   
   
                      Potassium [Moles/Vol] 3.2 mmol/L Low        3.5 - 5.3  [facility]  
   
                                        Comment on above:   Performed By: #### E  
LECT ####[place] [address]   
   
                      Sodium [Moles/Vol] 137 mmol/L Normal     136 - 145  [facility]  
   
                                        Comment on above:   Performed By: #### E  
LECT ####[place] [address]   
   
                                                    HCG,URINEon 2022   
   
                                                    Beta HCG (pregnancy   
test) Ql (U)    Negative        Normal          Negative        [facility]  
   
                                        Comment on above:   Performed By: #### P  
HOS ####  
[place] [id number]   
   
                                                    LIPASEon 2022   
   
                                                    Lipase [Catalytic   
activity/Vol]   37 U/L          Normal          9 - 82          [facility]  
   
                                        Comment on above:   Result Comment: [place]  
puncture immediately after or during the  
administration of Metamizole may lead to falsely  
low results. Testing should be performed immediately  
prior to Metamizole dosing.  
N-acetyl-p-benzoquinone imine (metabolite of Acetaminophen)  
will generate erroneously low results in samples for patients  
that have taken toxic doses of acetaminophen.   
   
                                                            Performed By: #### L  
IPAS ####[place] [address]   
   
                                                    MAGNESIUMon 2022   
   
                      Magnesium [Mass/Vol] 1.85 mg/dL Normal     1.60 - 2.40 [facility]  
   
                                        Comment on above:   Performed By: #### M  
G ####  
[place] [id number]   
   
                                                    Order Reconciliationon    
   
                                        Order Reconciliation Page 1  
  
Admission   
Reconciliation Document  
  
  
Reconciliation Type: ED   
to Observation   
requested on behalf of   
Nicolas Connolly (Physician) done by   
Nicolas Connolly   
(DO)  
  
ED to Observation -   
Reconciliation:   
2022 09:50 by:   
Nicolas Connolly  
(DO)  
ED to Observation -   
AutoLinked: 2022   
09:50 by: Nicolas Connolly (DO)  
  
Home   
MedicationsEnteredLast   
Dose TakenReconciled   
with current Order  
Reconciliation Comment/   
Additional Information  
Phenadoz 25 mg rectal   
suppository 1   
suppository(ies)   
rectally 3 times a day,   
As  
Needed -for nausea and   
vomiting   
[id number]   
PM Reviewed and  
Held  
promethazine 25 mg oral   
tablet 1 tab(s) orally   
3 times a day, As   
Needed -for  
nausea and vomiting   
[id number]   
UNKNOWN Reviewed and   
Held  
  
  
Additional Current   
Orders  
Metoclopramide   
Injectable (REGLAN)DOSE   
= 5 mg IntraVenous Push   
Every 8 Hours  
Technetium Tc 99m   
Sulfur Colloid -   
(Radiology Contrast)   
DOSE = 500 microCurie  
Oral Once           Normal                                  [facility]  
   
                                                    Patient Profile - Adult v2on  
 2022   
   
                                                    Patient Profile - Adult   
v2                                      Profile:  
Initial Info:  
How to be   
Addressed DANNA   
Spoken Language   
PreferredEnglish (1)  
Stated Reason for   
AdmissionNAUSEA &   
VOMITING  
Primary Contact Name   
and NumberTERE MOONEY   
[phone]  
Wants Family/Rep   
Notified of   
Admissionyes, primary   
contact  
Notify PCPnotify PCP  
Informed of Patient   
Visiting Rightsyes  
[place]  
Patient   
Belongingsremains with   
patient  
Patient Belongings   
Remaining with   
Patientclothing; cell   
phone/electronics;  
purse/wallet  
Medications Brought to   
Hospitalno  
  
General Health:  
Weight in kg51.3   
kilogram(s)(2)  
Weight in wet700   
pound(s)  
Weight Methodactual   
(measured)  
Scale Typebed  
Height in cm149.8   
centimeter(s)  
Height in feet4 feet(2)  
Height in tfdhbw11   
inch(es)(2)  
Height Methodstated  
BMI (kg/m2)22.86 square   
meter  
  
RSP Based Care:  
How would you like to   
participate in your   
careJUST GET MY NAUSEA   
AND VOMITING  
BETTER  
What is the number one   
concern for you during   
this   
hospitalization KEEPING  
DOWN MY ANTIBIOTICS,   
FIRST TIME I HAVE EVER   
HAD COLITIS    
What is the most   
important thing we can   
do to support you   
during this  
hospitalization HELP MY   
NAUSEA   
Is there anything we   
need to know to best   
care for you NOT   
REALLY'  
  
Substance:  
Smoking Statusnever   
smoker  
Alcohol Usedenies  
Drug Usedenies  
  
Health Mgmt:  
Symptoms/Conditions   
Managed at [place];   
obstetric/gynecologic  
Are You Pregnantno (3)  
Are You Currently   
Breastfeedingno (3)  
OB/GYN   
Managementmanaged  
OB/GYN   
Symptoms/Conditions   
CommentMISCARRIAGE X 2  
Skin Managementmanaged  
Skin   
Symptoms/Conditions   
CommentHYPER   
PIGMENTATION  
Barriers to Managing   
Healthnone  
  
Relationship/Environ:  
Resource/Environmental   
Concernsnone  
Primary Source of   
Support/Comfortgrandpar  
ent  
Lives Withsignificant   
other  
Living   
Arrangementsmobile home  
Services Anticipated at   
Transitionnone  
Anticipated Transition   
[place]  
Significant   
IndicatorsComplete  
  
  
  
Information Review:  
Allergies, Home Meds   
and Significant Events   
have been Reviewed and   
Verified  
with Patient/Familyyes  
  
ALLERGY, INTOLERANCE,   
ADVERSE EVENT:  
Allergies:  
MiraLax: Drug,   
Hives/Urticaria, Active  
codeine: Drug, Unknown,   
Active  
  
  
Electronic Signatures:  
Remi Decker (SUPV)   
(Signed 2022   
05:24)  
Authored: Initial Info,   
General Health, RSP   
Based Care, Substance,   
Health  
Mgmt,   
Relationship/Environ,   
Additional Information  
  
  
Last Updated:   
2022 05:24 by   
Remi Decker (SUPV)  
  
References:  
1. Data Referenced From   
 Triage - ED    
2022 19:46  
2. Data Referenced From   
 1. Vital Signs    
2022 23:14  
3. Data Referenced From   
 Triage - ED    
2022 23:14   [facility]  
   
                                                    Provider Note - ED v3on    
   
                                        Provider Note - ED v3 Provider Note:  
Chart Review:  
ED NOTES  
ED NOTES:  
History of Present   
Illness:  
21 year-old female   
presents with concern   
for presents with   
concern for syncope.  
Patient recently   
diagnosed with colitis.   
States that she has had   
vomiting and  
diarrhea. States that   
she passed out while   
vomiting over the edge   
of her bed.  
States that she fell   
off the bed and struck   
her head. States that   
she is  
having abdominal   
cramping. Denies any   
fever, chills, chest   
pain, shortness of  
breath, urinary   
symptoms. States that   
she has been having   
irregular periods  
with heavier bleeding.  
  
Past Medical History:   
None  
Past surgical History:   
None  
Family history:   
Reviewed and not   
pertinent to complaint  
Social history: Denies   
any drugs, alcohol,   
tobacco abuse.  
  
_______________________  
_____  
REVIEW OF SYSTEMS:  
  
Pertinent negatives and   
positives noted in the   
HPI. Otherwise, a   
complete  
review of system was   
negative.  
_______________________  
_____  
PHYSICAL EXAM:  
Appearance: Alert,   
oriented , cooperative,   
in no acute distress.  
Skin: Intact, dry skin,   
no lesions, rash,   
petechiae or purpura.   
Pale.  
Eyes: PERRLA, EOMs   
intact, Conjunctiva   
pink with no redness or   
exudates.  
Eyelids without   
lesions. No scleral   
icterus.  
HENT: Normocephalic,   
atraumatic. Nares   
patent  
Neck: Supple, without   
meningismus. Trachea at   
midline. No   
lymphadenopathy.  
Pulmonary: Clear   
bilaterally with good   
chest wall excursion.   
No rales, rhonchi  
or wheezing. No   
accessory muscle use or   
stridor.  
Cardiac: Regular rate   
and rhythm, no rubs,   
murmurs, or gallops. No   
JVD,  
Carotids without   
bruits.  
Abdomen: Abdomen is   
soft, nontender, and   
nondistended. No   
palpable  
organomegaly. No   
rebound or guarding. No   
CVA tenderness.   
Nonsurgical abdomen  
Genitourinary: Exam   
deferred.  
Musculoskeletal: Full   
range of motion. Pulses   
full and equal. No   
cyanosis,  
clubbing, or edema.  
Neurological: Cranial   
nerves are grossly   
intact, grossly normal   
sensation, no  
weakness, no focal   
findings identified.  
Psychiatric:   
Appropriate mood and   
affect.  
  
HISTORY OF PRESENTING   
ILLNESS  
DANNA is a 21 year old   
Female and was seen by   
me at 2022   
23:03.  
  
Triage Information:   
Most recent Vital Sign   
Value Date  
Temp (F): 100   
2022 23:14  
Temp (C): 37.7   
2022 23:14  
Heart Rate (beats/min):   
88 2022 23:14  
Respirations   
(breaths/min): 18   
2022 23:14  
SpO2 (%): 98 2022   
23:14  
BP Systolic (mm Hg):   
125 2022 23:14  
BP Diastolic (mm Hg):   
95 2022 23:14  
  
  
  
  
  
PAST MEDICAL HISTORY  
ALLERGIES/INTOLERANCES:   
Allergy  
Allergen: MiraLax  
Type: Drug  
Reaction:   
Hives/Urticaria  
  
Allergen: codeine  
Type: Drug  
Reaction: Unknown  
  
HEALTH HISTORY: No   
documented data.  
  
OUTPATIENT MEDICATIONS:   
Home Medications Review   
Status for   
Reconciliation:  
Complete  
Med Status: Patient   
Currently Takes   
Medications  
  
Drug Name: promethazine   
25 mg oral tablet  
Instructions: 1 tab(s)   
orally 3 times a day,   
As Needed -for nausea   
and  
vomiting  
  
Drug Name: Phenadoz 25   
mg rectal suppository  
Instructions: 1   
suppository(ies)   
rectally 3 times a day,   
As Needed -for  
nausea and vomiting  
  
SIGNIFICANT EVENTS:   
Past Medical History  
Description:premature  
  
  
Description:delayed   
bone growth  
  
  
Description:Miscarriage  
  
  
Description:scoliosis  
  
  
  
CRITICAL CARE  
RESULTS:  
Recent Lab Results:  
  
I have reviewed these   
laboratory results:  
  
Complete Blood Count +   
Differential   
2022 23:33:00  
  
ResultValue  
White Blood Cell Count   
8.1  
Red Blood Cell Count   
4.69  
HGB 13.4  
HCT 40.2  
MCV 86  
MCHC 33.4  
  
RDW-CV 14.5  
Neutrophil % 69.9  
Lymphocyte % 16.6  
Monocyte % 9.9  
Eosinophil % 2.1  
Basophil % 1.5  
Neutrophil Count 5.60  
Lymphocyte Count 1.30  
Monocyte Count 0.80  
Eosinophil Count 0.20  
Basophil Count 0.10  
  
Comprehensive Metabolic   
Panel 2022   
23:33:00  
  
ResultValue  
Glucose, Serum 86  
  
K 3.1 L  
  
Bicarbonate, Serum 22  
Anion Gap, Serum 18  
BUN 6  
CREAT 0.56  
GFR Female >90  
Calcium, Serum 8.8  
ALB 4.1  
ALKP 39  
T Pro 6.4  
T Bili 0.5  
Alanine   
Aminotransferase, Serum   
9  
Aspartate Transaminase,   
Serum 13  
  
Lipase, Serum   
2022 23:33:00  
  
ResultValue  
Lipase, Serum 37  
  
Radiology Results:  
  
Impression:  
  
No evidence of acute   
intracranial   
abnormality.  
  
CT Head without   
Contrast [2022   
12:20AM]  
  
VITAL SIGNS:  
  
T PRBP SpO2O2(LPM)   
%FiO2 Method  
2022   
23:14:[id number]   
98 room air, no   
respiratory  
support  
  
  
  
  
[facility]  
MDM/ED COURSE:  
Patient appears well   
and nontoxic. Vital   
signs within normal   
limits. EKG  
nonischemic. No   
significant anemia. CT   
brain negative. Patient   
treated with  
Zofran and fluid.   
Patient remained   
nauseous and was   
treated with Phenergan   
and  
Toradol given her   
abdominal cramping.   
Reviewed patient's   
charts from previous  
ER visits over the past   
week. Initial CT did   
show some evidence of   
mild  
c (more content not   
included)...        Normal                                  [facility]  
   
                                                    Triage - EDon 2022   
   
                                        Triage - ED         Quick Triage:  
Are You Pregnantno  
Have You Given Birth In   
The Last 6 Weeksno  
Are You Currently   
Breastfeedingno  
The patient and/or   
guardian verbally   
acknowledges placement   
for services into  
the following (when   
Urgent Care Service   
hours are   
operating):emergency  
department  
  
Chart Review:  
ARRIVAL INFORMATION  
  
Mode of Arrival:   
private vehicle  
  
  
CHIEF COMPLAINT  
  
DANNA SOARES   
is a Female patient   
with a chief complaint   
of multiple  
medical complaints (pt   
here from home with c/o   
syncope while trying to   
get out  
of bed, pt reports   
hitting head on floor,   
+LOC. states  I was hot   
and dizzy and  
hit the floor.  pt   
reports having low   
blood glucose at times.   
pt reports  
currently being treated   
for bacterial colitis   
but is unable to keep   
meds down.  
reports N/V/D, fever   
and headache. pt   
reports since falling   
has right hand  
numbness).  
Triage Date/Time:   
2022 23:14  
DIPIKA: 3  
Pain Rating (0-10): 9 =   
Severe  
Vital Signs:  
Temperature: 100.0F (   
37.7C) taken forehead  
Blood Pressure: 125/95   
Mean:  
Heart Rate: 88  
Respiratory Rate: 18  
Pulse Oximetry: 98% on   
room air, no   
respiratory support.   
Height: 4 feet 11.00  
inches. 149.8 CM  
Weight: 113.0 pounds.   
Calculated 51.3 kg.   
(stated)  
Calculated BMI (kg/m2):   
22.860 Calculated BSA   
(m2) 1.46  
  
Cummings Coma Scale:  
Best Eye Response: (E4)   
spontaneous  
Best Motor Response:   
(M6) obeys commands  
Best Verbal Response:   
(V5) oriented  
Sujit Score: 15  
  
  
Allergies: yes  
Last menstrual period:   
2022  
Patient has homicidal   
thoughts: no  
  
  
Symptoms Are POSITIVE   
For:  
headache, nausea,   
numbness and pain  
Symptoms Are Negative   
For:  
anxiety, chills,   
diaphoresis, dyspnea,   
loss of consciousness,   
tingling and  
weakness  
  
  
Risk Screens  
Suicide Risk Screen  
  
In the Past Month: Have   
you wished you were   
dead or wished you   
could go to  
sleep and not wake up   
no  
In the Past Month: Have   
you had any actual   
thoughts of killing   
yourself no  
In Your Lifetime: Have   
you ever done anything,   
started to do anything,   
or  
prepared to do anything   
to end your life no  
  
  
  
Avila Fall Scale   
Screening  
Has the patient fallen   
before (or is the   
patient in the ED as a   
result of a  
fall) has had a fall  
Does the patient have   
an impaired gait does   
not have impaired gait  
Is the patient   
cognitively impaired   
not cognitively   
impaired  
  
Avila Fall Scale  
History of falling   
(immediate or previous)   
yes (25)  
Secondary Diagnosis yes   
(15)  
Intravenous Therapy/   
Heparin/Saline Lock yes   
(20)  
Gait/Transferring weak   
(10)  
Ambulatory Aids   
none/bedrest/nurse   
assist (0)  
Mental Status oriented   
to own ability (0)  
Avila Fall Risk Score:   
70  
  
Interventions:  
Avila Fall   
Interventions: HIGH   
INTERVENTIONS  
*Low and Moderate   
Interventions Plus:  
* supervised toileting   
at all times  
  
  
TRAVEL HISTORY  
Travel History  
Coronavirus Screening:   
no exposure or symptoms  
Travel Exposure   
History: NO travel to   
International locations   
in the past 30  
days  
  
  
PAIN  
  
Pain Scale Used: ASHLEY  
Pain Rating (0-10): 9 =   
Severe  
  
  
  
  
  
Past Medical History:  
Past Medical History   
Reviewedyes  
  
  
Electronic Signatures:  
Robel Sauer (ERNA)   
(Signed 2022   
23:19)  
Entered: Risk Screens,   
Pain, Travel History,   
Chart Review, Scores,   
Past  
Medical History  
Authored: Quick Triage,   
Risk Screens, Pain,   
Travel History, Chart   
Review,  
Scores, Past Medical   
History  
  
  
Last Updated:   
2022 23:19 by   
Robel Sauer (ERNA) Normal                                  [facility]  
   
                                                    UA MICROSCOPICon 2022   
   
                      Mucus Ql (Urine sed) 2+ /LPF    Normal                [facility]  
   
                                        Comment on above:   Performed By: #### P  
HOS ####  
[facility]  
[address] [place] [address]   
   
                      RBC (U) [#/Vol] /uL        Abnormal   0-5        [facility]  
   
                                        Comment on above:   Performed By: #### P  
HOS ####  
[place] [id number]   
   
                      SQUAMOUS EPITH. CELLS 7 /HPF     Normal                [facility]  
   
                                        Comment on above:   Performed By: #### P  
HOS ####  
[place] [address]   
   
                      WBC        23 /HPF    Abnormal   0-5        [facility]  
   
                                        Comment on above:   Performed By: #### P  
HOS ####  
[place] [address]   
   
                                                    URINALYSISon 2022   
   
                      Appearance (U) HAZY       Normal     CLEAR      [facility]  
   
                                        Comment on above:   Performed By: #### U  
A ####[place] [address]   
   
                      Bilirubin Ql (U) Negative   Normal     NEGATIVE   [facility]  
   
                                        Comment on above:   Performed By: #### U  
A ####[place] [address]   
   
                      Color (U)  Yellow     Normal     STRAW,YELLOW [facility]  
   
                                        Comment on above:   Performed By: #### U  
A ####[place] [address]   
   
                      Glucose Ql (U) Negative   Normal     NEGATIVE   [facility]  
   
                                        Comment on above:   Performed By: #### U  
A ####[place] [address]   
   
                      Hemoglobin Ql (U) LARGE(3+)  Abnormal   NEGATIVE   [facility]  
   
                                        Comment on above:   Performed By: #### U  
A ####[place] [address]   
   
                      Ketones Ql (U) 80(2+)     Abnormal   NEGATIVE   [facility]  
   
                                        Comment on above:   Performed By: #### U  
A ####[place] [address]   
   
                                                    Leukocyte esterase Test   
strip Ql (U)    Negative        Normal          NEGATIVE        [facility]  
   
                                        Comment on above:   Performed By: #### U  
A ####[place] [address]   
   
                      Nitrite Ql (U) Negative   Normal     NEGATIVE   [facility]  
   
                                        Comment on above:   Performed By: #### U  
A ####[place] [address]   
   
                      pH (U)     6.0 [pH]   Normal     5.0 - 8.0  [facility]  
   
                                        Comment on above:   Performed By: #### U  
A ####[place] [id number]   
   
                      Protein Ql (U) 100(2+)    Abnormal   NEGATIVE   [facility]  
   
                                        Comment on above:   Performed By: #### U  
A ####[place] [id number]   
   
                                                    Specific gravity (U)   
[Rel density]       1.023               Normal              1.005 -   
1.035                                   [facility]  
   
                                        Comment on above:   Performed By: #### U  
A ####[place] [id number]   
   
                                                    Urobilinogen (U)   
[Mass/Vol]      mg/dL           Normal          0.0 - 1.9       [facility]  
   
                                        Comment on above:   Performed By: #### U  
A ####[place] [id number]   
   
                                                    CBC AND DIFFERENTIALon 01-10  
-2022   
   
                      Basophils (Bld) [#/Vol] 0.10 10*3/uL Normal     0.00 - 0.1  
0 [facility]  
   
                                        Comment on above:   Performed By: #### C  
BCDF ####[place] [id number]   
   
                      Basophils/100 WBC (Bld) 1.1 %      Normal     0.0 - 2.0  S  
[facility]  
   
                                        Comment on above:   Performed By: #### C  
BCDF ####[place] [id number]   
   
                                                    Eosinophils (Bld)   
[#/Vol]         0.00 10*3/uL    Normal          0.00 - 0.70     [facility]  
   
                                        Comment on above:   Performed By: #### C  
BCDF ####[place] [id number]   
   
                                                    Eosinophils/100 WBC   
(Bld)           0.0 %           Normal          0.0 - 6.0       [facility]  
   
                                        Comment on above:   Performed By: #### C  
BCDF ####[place] [id number]   
   
                                                    Erythrocyte   
distribution width   
(RBC) [Ratio]   14.6 %          High            11.5 - 14.5     [facility]  
   
                                        Comment on above:   Performed By: #### C  
BCDF ####[address] [address]   
   
                                                    Hematocrit (Bld)   
[Volume fraction] 39.2 %          Normal          36.0 - 46.0     [facility]  
   
                                        Comment on above:   Performed By: #### C  
BCDF ####[address] [address]   
   
                                                    Hemoglobin (Bld)   
[Mass/Vol]      12.7 g/dL       Normal          12.0 - 16.0     [facility]  
   
                                        Comment on above:   Performed By: #### C  
BCDF ####[address] [address]   
   
                                                    Lymphocytes (Bld)   
[#/Vol]         1.10 10*3/uL    Low             1.20 - 4.80     [facility]  
   
                                        Comment on above:   Performed By: #### C  
BCDF ####[address] [address]   
   
                                                    Lymphocytes/100 WBC   
(Bld)           9.9 %           Normal          13.0 - 44.0     [facility]  
   
                                        Comment on above:   Performed By: #### C  
BCDF ####[address] [address]   
   
                      MCHC (RBC) [Mass/Vol] 32.3 g/dL  Normal     32.0 - 36.0 [facility]  
   
                                        Comment on above:   Performed By: #### C  
BCDF ####[address] [address]   
   
                      MCV (RBC) [Entitic vol] 88 fL      Normal     80 - 100   S  
[facility]  
   
                                        Comment on above:   Performed By: #### C  
BCDF ####[address] [address]   
   
                      Monocytes (Bld) [#/Vol] 1.10 10*3/uL High       0.10 - 1.0  
0 [facility]  
   
                                        Comment on above:   Performed By: #### C  
BCDF ####[address] [address]   
   
                      Monocytes/100 WBC (Bld) 10.0 %     Normal     2.0 - 10.0 S  
[facility]  
   
                                        Comment on above:   Performed By: #### C  
BCDF ####[address] [address]   
   
                                                    Neutrophils (Bld)   
[#/Vol]         8.80 10*3/uL    High            1.20 - 7.70     [facility]  
   
                                        Comment on above:   Result Comment: Perc  
ent differential counts (%) should be   
interpreted in the  
context of the absolute cell counts (cells/L).   
   
                                                            Performed By: #### C  
BCDF ####[address] [address]   
   
                                                    Neutrophils/100 WBC   
(Bld)           79.0 %          Normal          40.0 - 80.0     [facility]  
   
                                        Comment on above:   Performed By: #### C  
BCDF ####[address] [address]   
   
                      NUCLEATED RBC 0.1 /100 WBC Normal                [facility]  
   
                                        Comment on above:   Performed By: #### C  
BCDF ####[address] [address]   
   
                      Platelets (Bld) [#/Vol] 262 10*3/uL Normal     150 - 450    
[facility]  
   
                                        Comment on above:   Performed By: #### C  
BCDF ####[address] [address]   
   
                      RBC        4.46 x10E12/L Normal     4.00 - 5.20 [facility]  
   
                                        Comment on above:   Performed By: #### C  
BCDF ####[address] [address]   
   
                      WBC (Bld) [#/Vol] 11.2 10*3/uL Normal     4.4 - 11.3 [facility]  
   
                                        Comment on above:   Performed By: #### C  
BCDF ####[address] [address]   
   
                                                    COMPREHENSIVE PANELon 01-10-  
2022   
   
                      Albumin [Mass/Vol] 4.2 g/dL   Normal     3.4 - 5.0  [facility]  
   
                                        Comment on above:   Performed By: #### C  
MP ####[address] [address]   
   
                                                    ALP [Catalytic   
activity/Vol]   50 U/L          Normal          33 - 110        [facility]  
   
                                        Comment on above:   Performed By: #### C  
MP ####[address] [address]   
   
                                                    ALT [Catalytic   
activity/Vol]   7 U/L           Normal          7 - 45          [facility]  
   
                                        Comment on above:   Result Comment: Kaleigh  
ents treated with Sulfasalazine may   
generate  
falsely decreased results for ALT.   
   
                                                            Performed By: #### C  
MP ####[place] [id number]   
   
                      Anion gap [Moles/Vol] 20 mmol/L  Normal     10 - 20    [facility]  
   
                                        Comment on above:   Performed By: #### C  
MP ####[place] [address]   
   
                                                    AST [Catalytic   
activity/Vol]   11 U/L          Normal          9 - 39          [facility]  
   
                                        Comment on above:   Performed By: #### C  
MP ####[address] [address]   
   
                      Bilirubin [Mass/Vol] 0.5 mg/dL  Normal     0.0 - 1.2  [facility]  
   
                                        Comment on above:   Performed By: #### C  
MP ####[place] [address]   
   
                      Calcium [Mass/Vol] 9.5 mg/dL  Normal     8.6 - 10.3 [facility]  
   
                                        Comment on above:   Performed By: #### C  
MP ####[address] [address]   
   
                      Chloride [Moles/Vol] 107 mmol/L Normal     98 - 107   [facility]  
   
                                        Comment on above:   Performed By: #### C  
MP ####[address] [address]   
   
                      Creatinine [Mass/Vol] 0.66 mg/dL Normal     0.50 - 1.05 [facility]  
   
                                        Comment on above:   Performed By: #### C  
MP ####[address] [address]   
   
                      eGFR FEMALE >90        Normal     >90        [facility]  
   
                                        Comment on above:   Result Comment: CALC  
ULATIONS OF ESTIMATED GFR ARE PERFORMED  
USING THE  CKD-EPI STUDY REFIT EQUATION  
WITHOUT THE RACE VARIABLE FOR THE IDMS-TRACEABLE  
CREATININE METHODS.  
https://jasn.asnjournals.org/content/early/ASN.  
692279   
   
                                                            Performed By: #### C  
MP ####[address] [address]   
   
                      Glucose [Mass/Vol] 91 mg/dL   Normal     74 - 99    [facility]  
   
                                        Comment on above:   Performed By: #### C  
MP ####[place] [id number]   
   
                      HCO3 (Bld) [Moles/Vol] 14 mmol/L  Low        21 - 32    [facility]  
   
                                        Comment on above:   Performed By: #### C  
MP ####[place] [id number]   
   
                      Potassium [Moles/Vol] 3.4 mmol/L Low        3.5 - 5.3  [facility]  
   
                                        Comment on above:   Performed By: #### C  
MP ####[address] [address]   
   
                      Protein [Mass/Vol] 6.8 g/dL   Normal     6.4 - 8.2  [facility]  
   
                                        Comment on above:   Performed By: #### C  
MP ####[place] [id number]   
   
                      Sodium [Moles/Vol] 138 mmol/L Normal     136 - 145  [facility]  
   
                                        Comment on above:   Performed By: #### C  
MP ####[address] [address]   
   
                                                    Urea nitrogen   
[Mass/Vol]      8 mg/dL         Normal          6 - 23          [facility]  
   
                                        Comment on above:   Performed By: #### C  
MP ####[address] [address]   
   
                                                    GROUP A STREP,PCRon 01-10-20  
22   
   
                      GROUP A STREP,PCR Not detected Normal     Not Detected [facility]  
   
                                        Comment on above:   Result Comment: This  
 test was performed utilizing an   
FDA-cleared rapid nucleic  
acid amplification by PCR to qualitatively detect Group A  
Streptococci from throat swab specimens without the need for  
culture confirmation of negative results.   
   
                                                            Performed By: #### G  
APC1 ####[place] [id number]   
   
                      Lab Specimen Source Throat     Normal                [facility]  
   
                                        Comment on above:   Performed By: #### G  
APC1 ####[place] [address]   
[place] [address]   
   
                                                    HCG,URINEon 01-   
   
                                                    Beta HCG (pregnancy   
test) Ql (U)    Negative        Normal          Negative        [facility]  
   
                                        Comment on above:   Performed By: #### H  
CGU ####[address] [address]   
   
                                                    Beta HCG (pregnancy   
test) Ql (U)    Canceled        Normal                          [facility]  
   
                                        Comment on above:   Order Comment: TEST   
HCG,URINE WAS CANCELLED, 2022 22:53   
PATIENT DISCHARGED.   
   
                                                            Performed By: #### M  
G ####  
[address] [address]   
   
                                                    Provider Note - ED v3on    
   
                                        Provider Note - ED v3 Provider Note:  
Chart Review:  
ED NOTES  
ED NOTES:  
HPI:  
Patient complains of 3   
days of vomiting. She   
states she has Zofran   
at home but  
it is not helping at   
all. Patient otherwise   
denies any fever   
urinary burning  
or frequency and does   
not think that she is   
pregnant. And   
improvement in her  
symptoms at that time.   
An appendectomy when   
she was a child.  
  
  
ROS:  
All systems are   
negative other than as   
noted in HPI.  
  
  
Physical Exam  
  
I have reviewed the   
triage vital signs.  
Const: Well nourished,   
well developed, appears   
stated age, no acute   
distress  
Eyes: PERRL, EOM   
intact, no conjunctival   
injection, vision   
grossly normal  
HENT: Neck supple   
without meningismus ,   
Moist mucous membranes,   
no pharyengeal  
swelling or exudate  
CV: Regular rate and   
rhythm, Warm,   
well-perfused   
extremities. Chest non   
tender  
RESP: Lungs clear   
bilaterally, Unlabored   
respiratory effort  
GI: soft, diffuse   
tenderness,   
non-distended, no   
masses  
:  
MSK: No gross   
deformities appreciated  
Back: Non tender, no   
pain with ROM  
Skin: Warm, dry. No   
rashes  
Neuro: Alert and   
oriented x4, GCS 15 ,   
CNs II-XII grossly   
intact. Sensation and  
motor function of   
extremities grossly   
intact.  
Psych: Appropriate mood   
and affect.  
  
I have reviewed and   
confirmed nurses/medics   
notes for patient past,   
social  
and family history.  
  
  
Portions of this note   
were dictated by speech   
recognition. An attempt   
at proof  
reading was made to   
minimize errors. Minor   
errors in transcription   
may be  
present.  
  
  
HISTORY OF PRESENTING   
ILLNESS  
DANNA is a 21 year old   
Female and was seen by   
me at 10-Franco-2022 12:58   
for a  
chief complaint of   
nausea (Pt states that   
she has been vomiting x   
3 days.  
initally it was a white   
foamy substance not is   
brown/ black. Pt states   
that she  
is unable fluids / meds   
down. Pt is post   
paratum - 10   
months)(1).  
  
Triage Information:   
Most recent Vital Sign   
Value Date  
Temp (F): 98.1   
01- 12:27  
Temp (C): 36.7   
01- 12:27  
Heart Rate (beats/min):   
110 01- 12:  
Respirations   
(breaths/min): 19   
01- 12:27  
SpO2 (%): 100   
01- 12:27  
BP Systolic (mm Hg):   
120 01- 12:27  
BP Diastolic (mm Hg):   
72 01- 12:27  
  
  
  
  
PAST MEDICAL HISTORY  
ALLERGIES/INTOLERANCES:   
Allergy  
Allergen: MiraLax  
Type: Drug  
Reaction:   
Hives/Urticaria  
  
Allergen: codeine  
Type: Drug  
Reaction: Unknown  
  
HEALTH HISTORY: No   
documented data.  
  
OUTPATIENT MEDICATIONS:   
Home Medications Review   
Status for   
Reconciliation:  
Complete  
Med Status: Patient   
Currently Takes   
Medications  
  
Drug Name:   
cyclobenzaprine 5 mg   
oral tablet  
Instructions: 1 tab(s)   
orally 3 times a day,   
As Needed -for pain  
  
SIGNIFICANT EVENTS:   
Past Medical History  
Description:premature  
  
  
Description:delayed   
bone growth  
  
  
Description:Miscarriage  
  
  
Description:scoliosis  
  
OB/GYN: Is Pregnant: no   
Is Breastfeeding: no  
  
MDM  
MDM/ED COURSE:  
1620-patient initially   
was hydrated with a   
liter of fluids and   
given 25 mg  
Phenergan with minimal   
relief of symptoms. She   
was subsequently given   
another  
liter of fluids and   
Reglan and Benadryl.   
She did complain of   
pain in her  
throat and a strep swab   
was obtained which was   
unremarkable.   
Urinalysis was  
unremarkable and   
pregnancy was negative.   
I did reevaluate her   
midway through  
her visit and patient   
was denying any   
abdominal pain other   
than when she was  
vomiting. Abdomen was   
soft with mild diffuse   
tenderness. On   
reevaluation  
patient denies any   
regular marijuana use.   
And on reevaluation at   
this time  
patient states she is   
feeling better and is   
comfortable with   
discharge home.  
She was given   
prescriptions for   
Phenergan oral and   
suppository and patient   
is  
comfortable with this.   
Patient is tolerating a   
small amount of oral   
intake at  
this time.  
  
  
Your lab work,   
urinalysis, and strep   
test were normal today.   
As your symptoms  
have improved here in   
the emergency   
department I feel that   
you are stable for  
discharge home and you   
have been given   
prescriptions for   
Phenergan oral and  
suppository. Please   
slowly reintroduce   
fluids and solids into   
your diet,  
follow-up with your   
family doctor, and   
otherwise return to the   
nearest ER for  
any new or worsening   
concerns.  
  
  
  
DISPOSITION  
Diagnosis/Annotation:   
ED Dx  
Name:Vomiting  
Code:R11.10  
  
Disposition: discharged  
Type: home  
  
  
CONSULT  
CRITICAL CARE TIME  
Is this a critically   
ill patient: no  
  
  
  
  
  
Electronic Signatures:  
Priscilla Zepeda   
(APRN-CNP) (Signed   
10-Franco-2022 16:27)  
Authored: ED Notes,   
HPI, PMH, MDM/ED   
Course, Clinical   
Impression,   
Attestation,  
Chart Review, Scores  
  
  
Last Updated:   
10-Franco-2022 16:27 by   
Priscilla Zepeda   
(APRN-CNP)  
  
References:  
1. Data Referenced From   
 Triage - ED    
10-Franco-2022 12:27   Normal                                  [facility]  
   
                                                    UA MICROSCOPICon 01-   
   
                      Mucus Ql (Urine sed) 1+ /LPF    Normal                [facility]  
   
                                        Comment on above:   Performed By: #### U  
AMIC ####  
[place] [id number]   
   
                      RBC (U) [#/Vol] /uL        Normal     0-5        [facility]  
   
                                        Comment on above:   Performed By: #### U  
AMIC ####  
[place] [id number]   
   
                      SQUAMOUS EPITH. CELLS 2 /HPF     Normal                [facility]  
   
                                        Comment on above:   Performed By: #### U  
AMIC ####  
[place] [id number]   
   
                      WBC        1 /HPF     Normal     0-5        [facility]  
   
                                        Comment on above:   Performed By: #### U  
AMIC ####  
[place] [id number]   
   
                                                    URINALYSISon 01-   
   
                      Appearance (U) CLEAR      Normal     CLEAR      [facility]  
   
                                        Comment on above:   Performed By: #### U  
A ####[place] [address]   
   
                      Bilirubin Ql (U) Negative   Normal     NEGATIVE   [facility]  
   
                                        Comment on above:   Performed By: #### U  
A ####[place] [address]   
   
                      Color (U)  Yellow     Normal     STRAW,YELLOW [facility]  
   
                                        Comment on above:   Performed By: #### U  
A ####[place] [address]   
   
                      Glucose Ql (U) Negative   Normal     NEGATIVE   [facility]  
   
                                        Comment on above:   Performed By: #### U  
A ####[place] [address]   
   
                      Hemoglobin Ql (U) Negative   Normal     NEGATIVE   [facility]  
an   
[facility]  
   
                                        Comment on above:   Performed By: #### U  
A ####[place] [address]   
   
                      Ketones Ql (U) 80(2+)     Abnormal   NEGATIVE   [facility]  
   
                                        Comment on above:   Performed By: #### U  
A ####[place] [address]   
   
                                                    Leukocyte esterase Test   
strip Ql (U)    Negative        Normal          NEGATIVE        [facility]  
   
                                        Comment on above:   Performed By: #### U  
A ####[place] [address]   
   
                      Nitrite Ql (U) Negative   Normal     NEGATIVE   [facility]  
   
                                        Comment on above:   Performed By: #### U  
A ####[place] [address]   
   
                      pH (U)     5.0 [pH]   Normal     5.0 - 8.0  [facility]  
   
                                        Comment on above:   Performed By: #### U  
A ####[place] [address]   
   
                      Protein Ql (U) 30(1+)     Abnormal   NEGATIVE   [facility]  
   
                                        Comment on above:   Performed By: #### U  
A ####[place] [address]   
   
                                                    Specific gravity (U)   
[Rel density]       1.025               Normal              1.005 -   
1.035                                   [facility]  
   
                                        Comment on above:   Performed By: #### U  
A ####[place] [address]   
   
                                                    Urobilinogen (U)   
[Mass/Vol]      mg/dL           Normal          0.0 - 1.9       [facility]  
   
                                        Comment on above:   Performed By: #### U  
A ####[address] [address]   
   
                      Appearance (U) Canceled   Normal                [facility]  
   
                                        Comment on above:   Order Comment: TEST   
URINALYSIS WAS CANCELLED, 2022 22:53  
   
PATIENT DISCHARGED.   
   
                                                            Performed By: #### U  
A ####[address] [address]   
   
                      ASCORBIC ACID Canceled   Normal                [facility]  
   
                                        Comment on above:   Order Comment: TEST   
URINALYSIS WAS CANCELLED, 2022 22:53  
   
PATIENT DISCHARGED.   
   
                                                            Result Comment: Conc  
entrations > = 20 mg/dL of ascorbic acid   
can be expected to cause strong  
interference in the reactions testing for glucose, nitrite and   
blood. It is  
recommended to discontinue Vitamin C administration and retest   
in 10 hours.   
   
                                                            Performed By: #### U  
A ####[address] [address]   
   
                      Bilirubin Ql (U) Canceled   Normal                [facility]  
   
                                        Comment on above:   Order Comment: TEST   
URINALYSIS WAS CANCELLED, 2022 22:53  
   
PATIENT DISCHARGED.   
   
                                                            Performed By: #### U  
A ####[address] [address]   
   
                      Color (U)  Canceled   [facility]  
   
                                        Comment on above:   Order Comment: TEST   
URINALYSIS WAS CANCELLED, 2022 22:53  
   
PATIENT DISCHARGED.   
   
                                                            Performed By: #### U  
A ####[address] [address]   
   
                      Glucose Ql (U) Canceled   [facility]  
   
                                        Comment on above:   Order Comment: TEST   
URINALYSIS WAS CANCELLED, 2022 22:53  
   
PATIENT DISCHARGED.   
   
                                                            Performed By: #### U  
A ####[address] [address]   
   
                      Hemoglobin Ql (U) Canceled   Normal                [facility]  
   
                                        Comment on above:   Order Comment: TEST   
URINALYSIS WAS CANCELLED, 2022 22:53  
   
PATIENT DISCHARGED.   
   
                                                            Performed By: #### U  
A ####[address] [address]   
   
                      Ketones Ql (U) Canceled   [facility]  
   
                                        Comment on above:   Order Comment: TEST   
URINALYSIS WAS CANCELLED, 2022 22:53  
   
PATIENT DISCHARGED.   
   
                                                            Performed By: #### U  
A ####[address] [address]   
   
                                                    Leukocyte esterase Test   
strip Ql (U)    Canceled        [facility]  
   
                                        Comment on above:   Order Comment: TEST   
URINALYSIS WAS CANCELLED, 2022 22:53  
   
PATIENT DISCHARGED.   
   
                                                            Performed By: #### U  
A ####[place] [id number]   
   
                      Nitrite Ql (U) Canceled   [facility]  
   
                                        Comment on above:   Order Comment: TEST   
URINALYSIS WAS CANCELLED, 2022 22:53  
   
PATIENT DISCHARGED.   
   
                                                            Performed By: #### U  
A ####[place] [id number]   
   
                      pH         Canceled   [facility]  
   
                                        Comment on above:   Order Comment: TEST   
URINALYSIS WAS CANCELLED, 2022 22:53  
   
PATIENT DISCHARGED.   
   
                                                            Performed By: #### U  
A ####[place] [id number]   
   
                      Protein Ql (U) Canceled   [facility]  
   
                                        Comment on above:   Order Comment: TEST   
URINALYSIS WAS CANCELLED, 2022 22:53  
   
PATIENT DISCHARGED.   
   
                                                            Performed By: #### U  
A ####[address] [address]   
   
                                                    Specific gravity (U)   
[Rel density]   Canceled        [facility]  
   
                                        Comment on above:   Order Comment: TEST   
URINALYSIS WAS CANCELLED, 2022 22:53  
  
PATIENT DISCHARGED.   
   
                                                            Performed By: #### U  
A ####[address] [address]   
   
                      UROBILINOGEN Canceled   [facility]  
   
                                        Comment on above:   Order Comment: TEST   
URINALYSIS WAS CANCELLED, 2022 22:53  
  
PATIENT DISCHARGED.   
   
                                                            Performed By: #### U  
A ####[address] [address]   
   
                                                    BASIC METABOLIC PANELon 01-0  
   
   
                      Anion gap [Moles/Vol] 25 mmol/L  High       10 - 20    [facility]  
   
                                        Comment on above:   Performed By: #### M  
G ####  
[address] [address]   
   
                      Calcium [Mass/Vol] 10.4 mg/dL High       8.6 - 10.3 [facility]  
   
                                        Comment on above:   Performed By: #### M  
G ####  
[address] [address]   
   
                      Chloride [Moles/Vol] 99 mmol/L  Normal     98 - 107   [facility]  
   
                                        Comment on above:   Performed By: #### M  
G ####  
[address] [address]   
   
                      Creatinine [Mass/Vol] 0.75 mg/dL Normal     0.50 - 1.05 [facility]  
   
                                        Comment on above:   Performed By: #### M  
G ####  
[address] [address]   
   
                      eGFR FEMALE >90        Normal     >90        [facility]  
   
                                        Comment on above:   Result Comment: CALC  
ULATIONS OF ESTIMATED GFR ARE PERFORMED  
USING THE  CKD-EPI STUDY REFIT EQUATION  
WITHOUT THE RACE VARIABLE FOR THE IDMS-TRACEABLE  
CREATININE METHODS.  
https://jasn.asnjournals.org/content/early/ASN.  
854401   
   
                                                            Performed By: #### M  
G ####  
[address] [address]   
   
                      Glucose [Mass/Vol] 102 mg/dL  High       74 - 99    [facility]  
   
                                        Comment on above:   Performed By: #### M  
G ####  
[address] [address]   
   
                      HCO3 (Bld) [Moles/Vol] 17 mmol/L  Low        21 - 32    [facility]  
   
                                        Comment on above:   Performed By: #### M  
G ####  
[address] [address]   
   
                      Potassium [Moles/Vol] 3.4 mmol/L Low        3.5 - 5.3  [facility]  
   
                                        Comment on above:   Performed By: #### BASILIA  
G ####  
[address] [address]   
   
                      Sodium [Moles/Vol] 138 mmol/L Normal     136 - 145  [facility]  
   
                                        Comment on above:   Performed By: #### BASILIA  
G ####  
[address] [address]   
   
                                                    Urea nitrogen   
[Mass/Vol]      11 mg/dL        Normal          6 - 23          [facility]  
   
                                        Comment on above:   Performed By: #### BASILIA  
G ####  
[address] [address]   
   
                                                    CBC AND DIFFERENTIALon    
   
                      Basophils (Bld) [#/Vol] 0.10 10*3/uL Normal     0.00 - 0.1  
0 [facility]  
   
                                        Comment on above:   Performed By: #### BASILIA  
G ####  
[address] [address]   
   
                      Basophils/100 WBC (Bld) 0.6 %      Normal     0.0 - 2.0  [facility]  
   
                                        Comment on above:   Performed By: #### BASILIA  
G ####  
[address] [address]   
   
                                                    Eosinophils (Bld)   
[#/Vol]         0.10 10*3/uL    Normal          0.00 - 0.70     [facility]  
   
                                        Comment on above:   Performed By: #### BASILIA  
G ####  
[address] [address]   
   
                                                    Eosinophils/100 WBC   
(Bld)           0.7 %           Normal          0.0 - 6.0       [facility]  
   
                                        Comment on above:   Performed By: #### BASILIA  
G ####  
[address] [address]   
   
                                                    Erythrocyte   
distribution width   
(RBC) [Ratio]   14.3 %          Normal          11.5 - 14.5     [facility]  
   
                                        Comment on above:   Performed By: #### BASILIA  
G ####  
[address] [address]   
   
                                                    Hematocrit (Bld)   
[Volume fraction] 46.0 %          Normal          36.0 - 46.0     [facility]  
   
                                        Comment on above:   Performed By: #### BASILIA  
G ####  
[address] [address]   
   
                                                    Hemoglobin (Bld)   
[Mass/Vol]      15.1 g/dL       Normal          12.0 - 16.0     [facility]  
   
                                        Comment on above:   Performed By: #### M  
G ####  
[address] [address]   
   
                                                    Lymphocytes (Bld)   
[#/Vol]         1.10 10*3/uL    Low             1.20 - 4.80     [facility]  
   
                                        Comment on above:   Performed By: #### M  
G ####  
[address] [address]   
   
                                                    Lymphocytes/100 WBC   
(Bld)           12.2 %          Normal          13.0 - 44.0     [facility]  
   
                                        Comment on above:   Performed By: #### M  
G ####  
[address] [address]   
   
                      MCHC (RBC) [Mass/Vol] 32.7 g/dL  Normal     32.0 - 36.0 [facility]  
   
                                        Comment on above:   Performed By: #### M  
G ####  
[address] [address]   
   
                      MCV (RBC) [Entitic vol] 85 fL      Normal     80 - 100   S  
[facility]  
   
                                        Comment on above:   Performed By: #### M  
G ####  
[address] [address]   
   
                      Monocytes (Bld) [#/Vol] 0.80 10*3/uL Normal     0.10 - 1.0  
0 [facility]  
   
                                        Comment on above:   Performed By: #### M  
G ####  
[address] [address]   
   
                      Monocytes/100 WBC (Bld) 8.4 %      Normal     2.0 - 10.0 S  
[facility]  
   
                                        Comment on above:   Performed By: #### M  
G ####  
[address] [address]   
   
                                                    Neutrophils (Bld)   
[#/Vol]         7.30 10*3/uL    Normal          1.20 - 7.70     [facility]  
   
                                        Comment on above:   Result Comment: Perc  
ent differential counts (%) should be   
interpreted in the  
context of the absolute cell counts (cells/L).   
   
                                                            Performed By: #### M  
G ####  
[address] [address]   
   
                                                    Neutrophils/100 WBC   
(Bld)           78.1 %          Normal          40.0 - 80.0     [facility]  
   
                                        Comment on above:   Performed By: #### M  
G ####  
[address] [address]   
   
                      NUCLEATED RBC 0.1 /100 WBC Normal                [facility]  
   
                                        Comment on above:   Performed By: #### BASILIA  
G ####  
[address] [address]   
   
                      Platelets (Bld) [#/Vol] 314 10*3/uL Normal     150 - 450    
[facility]  
   
                                        Comment on above:   Performed By: #### BASILIA  
G ####  
[address] [address]   
   
                      RBC        5.38 x10E12/L High       4.00 - 5.20 [facility]  
   
                                        Comment on above:   Performed By: #### BASILIA  
G ####  
[address] [address]   
   
                      WBC (Bld) [#/Vol] 9.3 10*3/uL Normal     4.4 - 11.3 [facility]  
   
                                        Comment on above:   Performed By: #### BASILIA  
G ####  
[address] [address]   
   
                                                    HEPATIC FUNCTION PANELon    
   
                      Albumin [Mass/Vol] 5.2 g/dL   High       3.4 - 5.0  [facility]  
   
                                        Comment on above:   Performed By: #### H  
EPFP ####  
[address] [address]   
   
                                                    ALP [Catalytic   
activity/Vol]   69 U/L          Normal          33 - 110        [facility]  
   
                                        Comment on above:   Performed By: #### H  
EPFP ####  
[address] [address]   
   
                                                    ALT [Catalytic   
activity/Vol]   10 U/L          Normal          7 - 45          [facility]  
   
                                        Comment on above:   Result Comment: Kaleigh  
ents treated with Sulfasalazine may   
generate  
falsely decreased results for ALT.   
   
                                                            Performed By: #### H  
EPFP ####  
[address] [address]   
   
                                                    AST [Catalytic   
activity/Vol]   14 U/L          Normal          9 - 39          [facility]  
   
                                        Comment on above:   Performed By: #### H  
EPFP ####  
[address] [address]   
   
                      Bilirubin [Mass/Vol] 0.6 mg/dL  Normal     0.0 - 1.2  [facility]  
   
                                        Comment on above:   Performed By: #### H  
EPFP ####  
[address] [address]   
   
                                                    Bilirubin.indirect   
[Mass/Vol]      0.1 mg/dL       Normal          0.0 - 0.3       [facility]  
   
                                        Comment on above:   Performed By: #### H  
EPFP ####  
[address] [address]   
   
                      Protein [Mass/Vol] 8.5 g/dL   High       6.4 - 8.2  [facility]  
   
                                        Comment on above:   Performed By: #### H  
EPFP ####  
[address] [address]   
   
                                                    LIPASEon 2022   
   
                                                    Lipase [Catalytic   
activity/Vol]   16 U/L          Normal          9 - 82          [facility]  
   
                                        Comment on above:   Result Comment: [place]  
puncture immediately after or during the  
administration of Metamizole may lead to falsely  
low results. Testing should be performed immediately  
prior to Metamizole dosing.  
N-acetyl-p-benzoquinone imine (metabolite of Acetaminophen)  
will generate erroneously low results in samples for patients  
that have taken toxic doses of acetaminophen.   
   
                                                            Performed By: #### M  
G ####  
[address] [address]   
   
                                                    Provider Note - ED v3on    
   
                                        Provider Note - ED v3 Provider Note:  
Chart Review:  
ED NOTES  
ED NOTES:  
HPI:  
Patient is a   
21-year-old female with   
a 2-day history of   
nausea vomiting and  
left lower quadrant   
abdominal pain. No   
longer vomiting any   
substance now it is  
just dry heaving.   
States she has had a   
long history of   
 stomach issues  . She  
tried to take some   
Zofran at home but it   
did not work. No fevers   
or chills.  
No neurologic   
complaints. No focal   
weakness or rashes.  
  
  
ROS:  
All systems are   
negative other than as   
noted in HPI.  
  
  
Physical Exam  
  
I have reviewed the   
triage vital signs.  
Const: Well nourished,   
well developed, appears   
stated age, no acute   
distress  
Eyes: PERRL, EOM   
intact, no conjunctival   
injection, vision   
grossly normal  
HENT: Neck supple   
without meningismus ,   
Moist mucous membranes,   
no pharyengeal  
swelling or exudate  
CV: Regular rate and   
rhythm, Warm,   
well-perfused   
extremities. Chest non   
tender  
RESP: Lungs clear   
bilaterally, Unlabored   
respiratory effort  
GI: soft, non-tender,   
non-distended, no   
masses  
:  
MSK: No gross   
deformities appreciated  
Skin: Warm, dry. No   
rashes  
Neuro: Alert and   
oriented x4, GCS 15 ,   
CNs II-XII grossly   
intact. Sensation and  
motor function of   
extremities grossly   
intact.  
Psych: Appropriate mood   
and affect.  
  
  
HISTORY OF PRESENTING   
ILLNESS  
DANNA is a 21 year old   
Female and was seen by   
me at 2022 19:24   
for a  
chief complaint of   
vomiting (Patient   
complains of nausea,   
vomiting, fever and  
left sided abdominal   
pain for 2 days)(1).  
  
Triage Information:   
Most recent Vital Sign   
Value Date  
Temp (F): 99.8   
2022 19:46  
Temp (C): 37.6   
2022 19:46  
Heart Rate (beats/min):   
116 2022 19:46  
Respirations   
(breaths/min): 18   
2022 19:46  
SpO2 (%): 98 2022   
19:46  
BP Systolic (mm Hg):   
126 2022 19:46  
BP Diastolic (mm Hg):   
100 2022 19:46  
  
  
  
  
PAST MEDICAL HISTORY  
ALLERGIES/INTOLERANCES:   
Allergy  
Allergen: MiraLax  
Type: Drug  
Reaction:   
Hives/Urticaria  
  
Allergen: codeine  
Type: Drug  
Reaction: Unknown  
  
HEALTH HISTORY: No   
documented data.  
  
OUTPATIENT MEDICATIONS:   
Home Medications Review   
Status for   
Reconciliation: N/A  
Med Status: Patient   
Currently Takes   
Medications  
  
Drug Name: Vitamins for   
Hair oral tablet  
Instructions: 1 tab(s)   
orally once a day  
  
Drug Name: Prenatal   
Multivitamins with   
Folic Acid 1.25 mg oral   
capsule  
Instructions: 1 cap(s)   
orally once a day  
  
Drug Name:   
prochlorperazine 25 mg   
rectal suppository  
Instructions: 1   
suppository(ies)   
rectally 2 times a day  
  
Drug Name:   
cyclobenzaprine 5 mg   
oral tablet  
Instructions: 1 tab(s)   
orally 3 times a day,   
As Needed -for pain  
  
Drug Name: predniSONE   
50 mg oral tablet  
Instructions: 1 tab(s)   
orally once a day x 5   
days  
  
SIGNIFICANT EVENTS:   
Past Medical History  
Description:premature  
  
  
Description:delayed   
bone growth  
  
  
Description:Miscarriage  
  
  
Description:scoliosis  
  
  
  
  
CRITICAL CARE  
RESULTS:  
Recent Lab Results:  
  
I have reviewed these   
laboratory results:  
  
Hepatic Function Panel   
2022 20:16:00  
  
ResultValue  
Aspartate Transaminase,   
Serum 14  
ALB 5.2 H  
T Bili 0.6  
Bilirubin, Serum Direct   
- Conjugated 0.1  
ALKP 69  
Alanine   
Aminotransferase, Serum   
10  
T Pro 8.5 H  
  
Complete Blood Count +   
Differential   
2022 20:16:00  
  
ResultValue  
White Blood Cell Count   
9.3  
Nucleated Erythrocyte   
Count 0.1  
Red Blood Cell Count   
5.38 H  
HGB 15.1  
HCT 46.0  
MCV 85  
MCHC 32.7  
  
RDW-CV 14.3  
Neutrophil % 78.1  
Lymphocyte % 12.2  
Monocyte % 8.4  
Eosinophil % 0.7  
Basophil % 0.6  
Neutrophil Count 7.30  
Lymphocyte Count 1.10 L  
Monocyte Count 0.80  
Eosinophil Count 0.10  
Basophil Count 0.10  
  
Basic Metabolic Panel   
2022 20:16:00  
  
ResultValue  
Glucose, Serum 102 H  
  
K 3.4 L  
CL 99  
Bicarbonate, Serum 17 L  
Anion Gap, Serum 25 H  
BUN 11  
CREAT 0.75  
GFR Female >90  
Calcium, Serum 10.4 H  
  
Lipase, Serum   
2022 20:16:00  
  
ResultValue  
Lipase, Serum 16  
  
VITAL SIGNS:  
  
T PRBP SpO2O2(LPM)   
%FiO2 Method  
2022   
20:30:[id number]/ 96   
room air, no   
respiratory  
support  
2022   
20:00:[id number]/ 100   
room air, no   
respiratory  
support  
2022   
19:46:[id number]/ 98 room air, no   
respiratory  
support  
2022   
19:40:[id number]/ 98 room air, no   
respiratory  
support  
  
  
  
  
MDM  
MDM/ED COURSE:  
Patient has received 2   
L of fluid and some   
Phenergan. She has   
Zofran at home.  
Heart rate is improved.   
She is ready for   
discharge.  
  
  
  
  
DISPOSITION  
Diagnosis/Annotation:   
ED Dx  
Name:Nausea and   
vomiting  
Code:R11.2  
  
Name:Dehydration  
Code:E86.0  
  
Disposition: discharged  
Type: home  
  
  
CONSULT  
CRITICAL CARE TIME  
Is this a critically   
ill patient: no  
  
  
  
  
  
Electronic Signatures:  
Winston Gordillo)   
(Signed 2022   
22:27)  
Authored: ED Notes,   
HPI, PMH, PE,   
Results/Vital Signs,   
MDM/ED Course, Clinical  
Impression,   
Attestation, Chart   
Review, Scores  
  
  
Last Updated:   
2022 22:27 by   
Winston Gordillo (more   
content not   
included)...        [facility]  
   
                                                    Risk Screen - Adult Emergenc  
yon 2022   
   
                                                    Risk Screen - Adult   
Emergency                               Preferred Language:  
Preferred Language:  
Preferred Language for   
Discussing Health Care   
(patient/designee)Charleen patel  
  
Advanced Directives:  
Advance Directive/DNRno  
Advance Directive   
Information   
Givenpatient/family   
declined  
  
Family Violence Adult:  
Abuse Screen:  
Are you or have you   
been threatened or   
abused physically,   
emotionally, or  
sexually by anyoneno  
  
Learning Assessment   
(Patient):  
Learning Assessment   
(Patient):  
Patient is Able to be   
Assessed for   
Learningyes  
Factors Influencing   
Readiness to   
Learninterest in   
learning  
Factors that Impact   
Ability to Learnnone  
Devices/Methods Used to   
Communicatenone  
Learning   
Preferencesverbal   
instruction  
Cultural   
Considerationsnone  
Developmental   
Considerationsnone  
Mormon   
Considerationsnone  
  
Learning Assessment   
(Other Learner):  
Learning Assessment   
(Other Learner):  
Other learner   
availableno  
  
Pressure   
Injury/TB/Substance:  
Pressure Injury:  
Pressure Injury Present   
on Admissionno  
Do you have a coughno  
Smoking Statusnever   
smoker  
Alcohol Usedenies  
Drug Usedenies  
  
  
Admission Risk Screen:  
Significant   
IndicatorsComplete  
  
CAGE:  
CAGE:  
Is this an injured   
patient at a Trauma   
Center   
[place] [place]  
[place] no  
  
  
Electronic Signatures:  
Ashwini Wells (RN)   
(Signed 2022   
19:50)  
Authored: Preferred   
Language, Advanced   
Directives, Family   
Violence Adult,  
Learning Assessment   
(Patient), Learning   
Assessment (Other   
Learner), Pressure  
Injury/TB/Substance,   
Pressure Injury, CAGE  
  
  
Last Updated:   
2022 19:50 by   
Ashwini Wells (RN)  [facility]  
   
                                                    Triage - EDon 2022   
   
                                        Triage - ED         Quick Triage:  
Are You Pregnantno  
Have You Given Birth In   
The Last 6 Weeksno  
Are You Currently   
Breastfeedingno  
  
Chart Review:  
PRIMARY ASSESSMENT  
  
DANNA AA   
EICHELSDERFER's primary   
assessment is Within   
Defined Limits. The  
airway is open and   
patent. Breathing   
spontaneous and   
unlabored with clear  
breath sounds   
bilaterally.   
Circulation is normal   
with good peripheral   
pulses.  
Skin is warm and dry   
and color is normal for   
race.  
  
  
ARRIVAL INFORMATION  
  
Means of Arrival:   
wheelchair Mode of   
Arrival: private   
vehicle Arrival From:  
home Accompanied By:   
self  
Language:  
Spoken Language   
Preferred: English  
  
  
CHIEF COMPLAINT  
  
DANNA SOARES   
is a Female patient   
with a chief complaint   
of vomiting  
(Patient complains of   
nausea, vomiting, fever   
and left sided   
abdominal pain for  
2 days).  
Triage Date/Time:   
2022 19:40  
DIPIKA: 3  
Pain Rating (0-10): 10   
= Severe  
Vital Signs:  
Temperature: 99.8F (   
37.6C) taken oral  
Blood Pressure: 126/100   
Mean:  
Heart Rate: 116  
Respiratory Rate: 18  
Pulse Oximetry: 98% on   
room air, no   
respiratory support.   
Height: 4 feet 11.00  
inches. 149.8 CM  
Weight: 120.5 pounds.   
Calculated 54.7 kg.   
(stated)  
Calculated BMI (kg/m2):   
24.376 Calculated BSA   
(m2) 1.51  
  
Sujit Coma Scale:  
Best Eye Response: (E4)   
spontaneous  
Best Motor Response:   
(M6) obeys commands  
Best Verbal Response:   
(V5) oriented  
Sujit Score: 15  
  
  
Allergies: yes  
Mask applied: yes  
Last menstrual period:   
2022  
Patient has homicidal   
thoughts: no  
  
  
Last Known Well: known  
Time Last Known Well   
Date/Time: 2022  
  
  
Risk Screens  
Suicide Risk Screen  
  
In the Past Month: Have   
you wished you were   
dead or wished you   
could go to  
sleep and not wake up   
no  
In the Past Month: Have   
you had any actual   
thoughts of killing   
yourself no  
In Your Lifetime: Have   
you ever done anything,   
started to do anything,   
or  
prepared to do anything   
to end your life no  
  
  
  
Avila Fall Scale   
Screening  
Has the patient fallen   
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  
Work Phone:   
[phone]  
   
                                                    Rh immune globulin   
screen (Bld) [Interp] Positive                                        MG-OBGYN-L  
an  
[address]  
Work Phone:   
[phone]  
50  
   
                                                    Otheron 2020   
   
                                 2.8 %                            BK-NYRVY-Gkk  
[address]  
Work Phone:   
[phone]  
   
                                        Comment on above:   HGB A2 values may be  
 falsely elevated in the presence of HGB   
S.   
   
                                 SEE COMMENT                       EH-BHQWX-Gbo  
[address]  
Work Phone:   
[phone]  
   
                                        Comment on above:   Normal   
   
                                 0.4 %                            PX-NEAQU-Jpc  
[address]  
Work Phone:   
[phone]  
[phone]  
   
                                 Not depressed                       MG-OBGYN-La  
n  
[address]  
Work Phone:   
[phone]  
[phone]  
   
                                 Never                            JN-LONYF-Ctx  
[address]  
Work Phone:   
[phone]  
[phone]  
   
                                 0 1                              QQ-RMTUA-Ngg  
[address]  
Work Phone:   
[phone]  
   
                                        Comment on above:   Q1: As much as I alw  
ays couldQ2: As much as I ever didQ3: No,   
neverQ4: No, not at allQ5: No, not at allQ6: No, I have been   
coping as well as everQ7: No, not at allQ8: No, not all allQ9:   
No, iqhptX18: Never   
   
                                 Negative              Negative   YH-ZPPWD-Rwm  
[address]  
Work Phone:   
[phone]  
   
                                        Comment on above:   SOURCE: Urine   
   
                                                    Rubella IgG Antibodyon    
   
                      Rubella virus IgG IA Ql Positive                         M  
G-OBGYN-Scott  
[address]  
Work Phone:   
[phone]  
   
                                        Comment on above:   SOURCE: INTERPRETATI  
VE COMMENT NEGATIVE: No IgG antibodies   
specific to Rubella detected. It is likely that the patient   
has not had a previous exposure to Rubella through infection   
or vaccination. Alternatively, the patient may have been   
exposed to Rubella but a failure to respond may indicate   
immunodeficiency. EQUIVOCAL:Equivocal results; obtain   
additional sample for retesting. POSITIVE: IgG antibody to   
Rubella detected. This may indicate that the patient was   
exposed to Rubella through infection or vaccination.The   
interpretation of serological tests should take into   
accountthe immunological status of the patient. Test results   
forpatients, including immunocompromised patients, neonates,   
andpediatric patients, reflect their capacity to   
respondimmunologically to the virus as well as their exposure   
to thepathogen. Patients treated with IVIG may demonstrate   
alteredresults in serological assays.   
   
                                                    SYPHILIS SCREENING WITH REFL  
[facility] 2020   
   
                                                    T. pallidum IgG+IgM IA   
Ql (S)          NONREACTIVE                     See Below       [employer]  
Work Phone:   
[phone]  
   
                                        Comment on above:   SOURCE: Reference Ra  
nge: NONREACTIVENo significant level of   
Treponema pallidum antibody detected. Repeat testing in 2 to 4   
weeks may be considered if early infection or incubating   
syphilis infection is suspected.   
   
                                                    Complete Blood Count + Diffe  
cristian 2020   
   
                                                    Erythrocyte   
distribution width   
(RBC) [Ratio]   13.7 %                          See Below       [employer]  
Work Phone:   
[phone]  
   
                                        Comment on above:   Reference Range: 11.  
5 - 14.5   
   
                                                            Ordering Provider: BASILIA JACOBSON [id number]   
   
                                                    Hematocrit (Bld)   
[Volume fraction] 44.1 %                          See Below       [employer]  
Work Phone:   
[phone]  
   
                                        Comment on above:   Reference Range: 36.  
0 - 46.0   
   
                                                            Ordering Provider: BASILIA JACOBSON [address]87   
   
                                                    Hemoglobin (Bld)   
[Mass/Vol]      14.6 g/dL                       See Below       [employer]  
Work Phone:   
[phone]  
   
                                        Comment on above:   Reference Range: 12.  
0 - 16.0   
   
                                                            Ordering Provider: BASILIA JACOBSON [id number]   
   
                      MCHC (RBC) [Mass/Vol] 33.1 g/dL             See Below  [employer]  
Work Phone:   
[phone]  
   
                                        Comment on above:   Reference Range: 32.  
0 - 36.0   
   
                                                            Ordering Provider: BASILIA Fong   
   
                      MCV (RBC) [Entitic vol] 90 fL                 80 - 100   M  
G-OBGYN-Scott  
[address]  
Work Phone:   
[phone]  
   
                                        Comment on above:   Ordering Provider: BASILIA Fong   
   
                      Platelets (Bld) [#/Vol] 316 {x10E9/L}            150 - 450  
  HU-GAVZQ-Acz  
[address]  
Work Phone:   
[phone]  
   
                                        Comment on above:   Ordering Provider: BASILIA Fong   
   
                      RBC (Bld) [#/Vol] 4.92 {x10E12/L}            See Below  MG  
-OBGYN-Scott  
[address]  
Work Phone:   
[phone]  
   
                                        Comment on above:   Reference Range: 4.0  
0 - 5.20   
   
                                                            Ordering Provider: BASILIA Rivera146   
   
                                WBC (Bld) [#/Vol] 17.4 {x10E9/L}  above high   
threshold                 4.4 - 11.3                SF-VKDMB-Nym  
[address]  
Work Phone:   
[phone]  
   
                                        Comment on above:   Ordering Provider: BASILIA Rivera146   
   
                                                    Complete Blood Count +   
Differential    SEE MANUAL DIFF                                 NO-LVFQP-Dbo  
[address]  
Work Phone:   
[phone]  
   
                                        Comment on above:   Ordering Provider: BASILIA Fong   
   
                                                    Coronavirus 2019 RNA by PCR,  
 Symptomaticon 2020   
   
                                                    Coronavirus 2019 RNA by   
PCR, Symptomatic NOT DETECTED                    See Below       HO-SKSSS-Scq  
[address]  
Work Phone:   
[phone]  
   
                                        Comment on above:   SOURCE: Nasal, Nasop  
haryngealReference Range: Not DetectedThis  
   
assay is designed to detect the N2 and E genes of SARS-CoV-2   
via nucleic acid amplification. A Not Detected result does not   
preclude COVID-19 infection since the adequacy of sample   
collection and/or low viral burden may result in presence of   
viral nucleic acids below the clinical sensitivity of this   
test method. Fact sheet for providers:   
www.fda.gov/media/848919/downloadFact sheet for patients:   
www.fda.gov/media/744112/downloadThis test has received FDA   
Emergency Use Authorization (EUA) and has been verified by   
[facility]. This test is   
only authorized for the duration of time that circumstances   
exist to justify the authorization of the emergency use of in   
vitro diagnostic tests for the detection of SARS-CoV-2 virus   
and/or diagnosis of COVID-19 infection under section 564(b)(1)   
of the Act, 21 U.S.C. 360bbb-3(b)(1), unless the authorization   
is terminated or revoked sooner. [facility] is certified under CLIA-88 as   
qualified to perform high complexity testing. Testing is   
performed in the [facility] laboratory located   
at [address] [place] [address].   
   
                                                            Ordering Provider: BASILIA JACOBSON [id number]   
   
                                                    HCG, Beta Quantitativeon    
   
                      [place]n [address] {mIU/mL} Abnormal              M  
G-OBGYN-Scott  
[address]  
Work Phone:   
[phone]  
   
                                        Comment on above:   Low-level positive H  
CG results can be seen in early pregnancy,  
   
in venus- or post-menopausal females due to normal pituitary   
HCG production, or with analytic interference. Repeat testing   
in 48-72 hours can aid in assessing for pregnancy as results   
should double in this time period. FSH measurement is   
recommended in venus- or post-menopausal females as concurrent   
elevation of FSH can support pituitary production as the   
source of the HCG elevation.. Total HCG measurement is   
performed using the [employer] Access Immunoassay which   
detects intact HCG and free beta HCG subunit. This test is not   
indicated for use as a tumor marker. HCG testing is performed   
using a different test methodology at [facility]   
than other [facility]. Direct result comparison should   
only be made within the same method. REF VALUESNONPREGNANT   
FEMALE <5MALES <5   
   
                                                            Ordering Provider: BASILIA JACOBSON [id number]   
   
                                                    Hematologyon 2020   
   
                                                    Band form   
neutrophils/100 WBC   
(Bld)           2.0 %                           0.0 - 5.0       OQ-TOMVQ-Gbt  
[address]  
Work Phone:   
[phone]  
   
                                        Comment on above:   Ordering Provider: BASILIA JACOBSON [address]   
   
                      Basophils (Bld) [#/Vol] 0.00 {x10E9/L}            See Adelina oseguera  QE-LKXVY-Gjn  
[address]  
Work Phone:   
[phone]  
   
                                        Comment on above:   Reference Range: 0.0  
0 - 0.10   
   
                                                            Ordering Provider: BASILIA JACOBSON [id number]   
   
                      Basophils/100 WBC (Bld) 0.0 %                 0.0 - 2.0  M  
G-OBGYN-[facility]  
[place] [address]  
Work Phone:   
[phone]  
   
                                        Comment on above:   Ordering Provider: BASILIA JACOBSON [address]   
   
                                                    Eosinophils (Bld)   
[#/Vol]         0.00 {x10E9/L}                  See Below       EQ[facility]WPTAF[facility]Sqo  
[facility]  
Work Phone:   
[phone]  
[phone]  
   
                                        Comment on above:   Reference Range: 0.0  
0 - 0.70   
   
                                                            Ordering Provider: BASILIA JACOBSON [id number]   
   
                                                    Eosinophils/100 WBC   
(Bld)           0.0 %                           0.0 - 6.0       [facility]  
Work Phone:   
[phone]  
   
                                        Comment on above:   Ordering Provider: BASILIA JACOBSON [address]453   
   
                                                    Lymphocytes (Bld)   
[#/Vol]         1.22 {x10E9/L}                  See Below       [facility]  
Work Phone:   
[phone]  
   
                                        Comment on above:   Reference Range: 1.2  
0 - 4.80   
   
                                                            Ordering Provider: BASILIA JACOBSON [address]157   
   
                                                    Lymphocytes/100 WBC   
(Bld)           7.0 %                           See Below       [facility]  
Work Phone:   
[phone]  
   
                                        Comment on above:   Reference Range: 13.  
0 - 44.0   
   
                                                            Ordering Provider: BASILIA JACOBSON [address]1528   
   
                      Monocytes (Bld) [#/Vol] 0.70 {x10E9/L}            See Adelina oseguera  [facility]  
Work Phone:   
[phone]  
50  
   
                                        Comment on above:   Reference Range: 0.1  
0 - 1.00   
   
                                                            Ordering Provider: BASILIA JACOBSON [address]   
   
                      Monocytes/100 WBC (Bld) 4.0 %                 2.0 - 10.0 M  
G-OBGYN-[facility] [address]  
Work Phone:   
[phone]  
   
                                        Comment on above:   Ordering Provider: BASILIA JACOBSON [address]   
   
                                                    Lactate, Levelon 2020   
   
                      Lactate [Moles/Vol] 1.7 mmol/L            0.4 - 2.0  MG-OB  
GYN-[facility]  
Work Phone:   
[phone]  
[phone]  
   
                                        Comment on above:   Venipuncture immedia  
tely after or during the administration of  
   
Metamizole may lead to falsely low results. Testing should be   
performed immediately prior to Metamizole dosing.   
   
                                                            Ordering Provider: BASILIA Rivera146   
   
                                                    Metabolic Panelon 2020  
   
   
                                                    ALP [Catalytic   
activity/Vol]   48 U/L                          33 - 110        IB-HJXWV-Zrf  
[address]  
Work Phone:   
[phone]  
   
                                        Comment on above:   Ordering Provider: BASILIA Fong   
   
                                Anion gap [Moles/Vol] 24 mmol/L       above high  
   
threshold                 10 - 20                   WW-YIWXB-Hpu  
[address]  
Work Phone:   
[phone]  
   
                                        Comment on above:   Ordering Provider: BASILIA Fong   
   
                      Bilirubin [Mass/Vol] 0.8 mg/dL             0.0 - 1.2  MG-O  
BGYN-Scott  
[address]  
Work Phone:   
[phone]  
   
                                        Comment on above:   Ordering Provider: BASILIA Fong   
   
                                Calcium [Mass/Vol] 11.3 mg/dL      above high   
threshold                 8.6 - 10.3                ZC-QTIKJ-Cxh  
[address]  
Work Phone:   
[phone]  
   
                                        Comment on above:   Ordering Provider: BASILIA Fong   
   
                      Chloride [Moles/Vol] 100 mmol/L            98 - 107   MG-O  
BGYN-Scott  
[address]  
Work Phone:   
[phone]  
   
                                        Comment on above:   Ordering Provider: BASILIA Fong   
   
                                CO2 [Moles/Vol] 12 mmol/L       below low   
threshold                 21 - 32                   DP-GXGRX-Gbe  
[address]  
Work Phone:   
[phone]  
   
                                        Comment on above:   Ordering Provider: BASILIA Fong   
   
                      Creatinine [Mass/Vol] 0.73 mg/dL            See Below  MG-  
OBGYN-Scott  
[address]  
Work Phone:   
[phone]  
   
                                        Comment on above:   Reference Range: 0.5  
0 - 1.05   
   
                                                            Ordering Provider: BASILIA Fong   
   
                                Glucose [Mass/Vol] 123 mg/dL       above high   
threshold                 74 - 99                   IZ-FFHOW-Kui  
[address]  
Work Phone:   
[phone]  
   
                                        Comment on above:   Ordering Provider: BASILIA Fong   
   
                      Potassium [Moles/Vol] 3.7 mmol/L            3.5 - 5.3  MG-  
OBGYN-Scott  
[address]  
Work Phone:   
[phone]  
   
                                        Comment on above:   Ordering Provider: BASILIA  
SANTACHULADOROTHEA JACOBSON [address]   
   
                                Protein [Mass/Vol] 8.6 g/dL        above high   
threshold                 6.4 - 8.2                 ZR-EVRUP-Fcn  
[address]  
Work Phone:   
[phone]  
   
                                        Comment on above:   Ordering Provider: BASILIA DELA CRUZDOROTHEA JACOBSON [address]   
   
                                Sodium [Moles/Vol] 132 mmol/L      below low   
threshold                 136 - 145                 AI-JHRGU-Spt  
[address]  
Work Phone:   
[phone]  
   
                                        Comment on above:   Ordering Provider: BASILIA DELA CRUZDOROTHEA JACOBSON [id number]   
   
                                                    Urea nitrogen   
[Mass/Vol]      11 mg/dL                        6 - 23          KV-HPQKS-Oll  
[address]  
Work Phone:   
[phone]  
   
                                        Comment on above:   Ordering Provider: BASILIA  
SANTACHULADOROTHEA JACOBSON [id number]   
   
                                                    Otheron 2020   
   
                                                            Interpreted by: OLIVIA BREWER20   
08:56MRN:   
[id number] Name:   
DANNA SOARES   
STUDY:US PELVIS OB   
TRANSABDOMINAL WITH   
TRANSVAGINAL; ;   
2020 6:53 am   
INDICATION:Abdominal   
pain in pregnancy.   
Vomiting and   
right-sided pelvic   
pain.   
COMPARISON:2020.   
ACCESSION   
NUMBER(S):[id number]   
ORDERING   
CLINICIAN:MUNA JACOBSON   
TECHNIQUE:Grayscale   
ultrasound.   
FINDINGS:Uterus is   
retroflexed and   
measures 8.8 x 5.3 x   
6.7 cm. There is   
asingle live   
intrauterine pregnancy.   
Heart rate is 115 beats   
perminute. Gestational   
sac diameter is 1.75   
mm. Crown-rump length   
is0.32 cm. These   
correspond to a   
composite age of 6   
weeks and 1 day+ / -3   
days. LMP is unknown. A   
normal-size yolk sac is   
noted.Gestational sac   
has a normal   
configuration. There is   
a smallelongated   
hypoechoic area in the   
upper cervical canal   
measured at 9 x5 x 10   
mm probably   
representing small   
amount of blood. Cervix   
isotherwise close.   
Right ovary measures   
2.9 x 1.7 x 2.4 cm and   
contains a 1.8 x 1.3   
x1.6 cm slightly   
isoechoic area. This   
may represent a corpus   
luteum orhemorrhagic   
cyst. Vascular flow is   
present in the right   
ovary Left ovary   
measures 2.2 x 1.4 x   
1.2 cm and appears   
unremarkable.Vascular   
flow is present. No   
adnexal masses or free   
fluid. IMPRESSION:A   
single live   
intrauterine pregnancy   
corresponding to 6   
weeks and 1day + / -3   
days. A small   
hypoechoic area in the   
upper cervical canal   
suggesting asmall   
amount of blood. Cervix   
is otherwise close.   
Gestational sac hasa   
normal configuration.   
Approximately 1.8 cm   
isoechoic structure in   
the right ovary   
probablya corpus luteum   
or hemorrhagic cyst.   
Electronically signed   
by: OLIVIA BREWER 20   
08:56               Normal                                  UN-GSZBB-Qkx  
[address]  
Work Phone:   
[phone]  
   
                                        Comment on above:   Ordering Provider: BASILIA Fong   
   
                                                    Albumin BCP dye   
[Mass/Vol]                5.4 g/dL                  above high   
threshold                 3.4 - 5.0                 RO-KXUTA-Bah  
[address]  
Work Phone:   
[phone]  
   
                                        Comment on above:   Ordering Provider: BASILIA Fong   
   
                                                    ALT With P-5'-P   
[Catalytic   
activity/Vol]   14 U/L                          7 - 45          YZ-VSYOK-Wvp  
[address]  
Work Phone:   
[phone]  
   
                                        Comment on above:   Patients treated wit  
h Sulfasalazine may generate falsely   
decreased results for ALT.   
   
                                                            Ordering Provider: BASILIA Fong   
   
                                                    AST With P-5'-P   
[Catalytic   
activity/Vol]   33 U/L                          9 - 39          HM-UYSWV-Ngj  
[address]  
Work Phone:   
[phone]  
   
                                        Comment on above:   Ordering Provider: BASILIA Fong   
   
                                                    Segmented   
neutrophils/100 WBC   
(Bld)           87.0 %                          See Below       VF-ZHGWO-Lix  
[address]  
Work Phone:   
[phone]  
   
                                        Comment on above:   Reference Range: 40.  
0 - 80.0 Percent differential counts (%)   
should be interpreted in the context of the absolute cell   
counts (cells/L).   
   
                                                            Ordering Provider: BASILIA Fong   
   
                                                15.49 {x10E9/L} above high   
threshold                 See Below                 YK-VDZEG-Cmr  
[address]  
Work Phone:   
[phone]  
   
                                        Comment on above:   Reference Range: 1.2  
0 - 7.70   
   
                                                            Ordering Provider: BASILIA Fong   
   
                                                15.14 {x10E9/L} above high   
threshold                 See Below                 FR-VFYFL-Gpn  
[address]  
Work Phone:   
[phone]  
   
                                        Comment on above:   Reference Range: 1.2  
0 - 7.00   
   
                                                            Ordering Provider: BASILIA Fong   
   
                                 0.35 {x10E9/L}            See Below  MG-OBGYN-L  
an  
[address]  
Work Phone:   
[phone]  
50  
   
                                        Comment on above:   Reference Range: 0.0  
0 - 0.70   
   
                                                            Ordering Provider: BASILIA Rivera146   
   
                                 NORMAL                           IC-ANJTS-Jgf  
[address]  
Work Phone:   
[phone]  
50  
   
                                        Comment on above:   Ordering Provider: BASILIA  
CARMEN Rivera146   
   
                                 >60                   >60        FZ-CEANQ-Zka  
[address]  
Work Phone:   
[phone]  
   
                                        Comment on above:   CALCULATIONS OF XIAO  
MATED GFR ARE PERFORMED USING THE MDRD   
STUDY EQUATION FOR THE IDMS-TRACEABLE CREATININE METHODS. CLIN   
CHEM 2007;53:766-72   
   
                                                            Ordering Provider: BASILIA Rivera146   
   
                                                            [email or web address]  
[email or web address]  
[email or web address]  
[email or web address]  
Time?[email or web address]  
9                                                           QS-NPKXA-Wtv  
[address]  
Work Phone:   
[phone]  
   
                                        Comment on above:   Ordering Provider: BASILIA Rivera146   
   
                                                             Please see physicia  
n   
note for formal   
interpretation   
confirmed by Scribe                                          FU-KSXBX-Wfc  
[address]  
Work Phone:   
[phone]  
   
                                        Comment on above:   Ordering Provider: BASILIA Fong   
   
                                 126 1                            QV-SRNWF-Wvd  
[address]  
Work Phone:   
[phone]  
50  
   
                                        Comment on above:   Ordering Provider: BASILIA Rivera146   
   
                                 76 1                             KW-FTSFS-Udc  
[address]  
Work Phone:   
[phone]  
50  
   
                                        Comment on above:   Ordering Provider: BASILIA Rivera146   
   
                                 290 1                            EY-DJBEP-Cyo  
[address]  
Work Phone:   
[phone]  
50  
   
                                        Comment on above:   Ordering Provider: BASILIA Rivera146   
   
                                 420 1                            FG-NLQAD-Amk  
[address]  
Work Phone:   
[phone]  
50  
   
                                        Comment on above:   Ordering Provider: BASILIA Jacobson [id number]   
   
                                 74 1                             BE-ABNMQ-Chr  
[address]  
Work Phone:   
[phone]  
50  
   
                                        Comment on above:   Ordering Provider: BASILIA Fong   
   
                                 89 1                             OT-KNBMI-Xak  
[address]  
Work Phone:   
[phone]  
50  
   
                                        Comment on above:   Ordering Provider: BASILIA Fong   
   
                                 31 1                             RV-WHYAP-Jcc  
[address]  
Work Phone:   
[phone]  
50  
   
                                        Comment on above:   Ordering Provider: BASILIA Fong   
   
                                 21 1                             VM-COXLE-Ueb  
[address]  
Work Phone:   
[phone]  
50  
   
                                        Comment on above:   Ordering Provider: BASILIA Fong   
   
                                 219 1                            PO-PYVKB-Bxw  
[address]  
Work Phone:   
[phone]  
50  
   
                                        Comment on above:   Ordering Provider: BASILIA Fong   
   
                                 166 1                            MJ-HSOXX-Plg  
[address]  
Work Phone:   
[phone]  
50  
   
                                        Comment on above:   Ordering Provider: BASILIA Jacobson [address]   
   
                                 212 1                            RZ-XDWWZ-Mrd  
[address]  
Work Phone:   
[phone]  
50  
   
                                        Comment on above:   Ordering Provider: BASILIA morales Kavon Fong   
   
                                 364 1                            VZ-DQVSL-Lko  
[address]  
Work Phone:   
[phone]  
50  
   
                                        Comment on above:   Ordering Provider: BASILIA morales Kavon Rivera146   
   
                                 371 1                            ET-YUEDM-Yjg  
[address]  
Work Phone:   
[phone]  
50  
   
                                        Comment on above:   Ordering Provider: BASILIA  
carmen Rivera146   
   
                                                    URINALYSIS WITH CULTURE IF I  
NDICATEDon 2020   
   
                      Appearance (U) CLEAR                 CLEAR      MG-OBGYN-L  
an  
[address]  
Work Phone:   
[phone]  
50  
   
                                        Comment on above:   Ordering Provider: BASILIA  
CARMEN Rivera146   
   
                      Color (U)  Straw                 See Below  OE-HICUA-Bdc  
[address]  
Work Phone:   
[phone]  
50  
   
                                        Comment on above:   SOURCE: Reference Ra  
nge: STRAW,YELLOW   
   
                                                            Ordering Provider: BASILIA JACOBSON [address]   
   
                      Glucose Ql (U) Negative              NEGATIVE   MG-OBGYN-L  
an  
[address]  
Work Phone:   
[phone]  
50  
   
                                        Comment on above:   Ordering Provider: BASILIA Fong   
   
                      Ketones Ql (U) 80(2+)     Abnormal   NEGATIVE   MG-OBGYN-L  
an  
[address]  
Work Phone:   
[phone]  
   
                                        Comment on above:   Ordering Provider: BASILIA Fong   
   
                                                    Leukocyte esterase Test   
strip Ql (U)    Negative                        NEGATIVE        GW-BXXID-Wzl  
[address]  
Work Phone:   
[phone]  
   
                                        Comment on above:   Ordering Provider: BASILIA Fong   
   
                      pH (U)     6.0 [pH]              5.0 - 8.0  MT-HOTTU-Phb  
[address]  
Work Phone:   
[phone]  
   
                                        Comment on above:   Ordering Provider: BASILIA Fong   
   
                      Protein (U) [Mass/Vol] Negative              NEGATIVE   MG  
-OBGYN-Scott  
[address]  
Work Phone:   
[phone]  
   
                                        Comment on above:   Ordering Provider: BASILIA Fong   
   
                      RBC (U) [#/Vol] Negative              NEGATIVE   MG-OBGYN-  
Scott  
[address]  
Work Phone:   
[phone]  
   
                                        Comment on above:   Ordering Provider: BASILIA Fong   
   
                                                    Specific gravity (U)   
[Rel density]   1.014 1                         See Below       QM-NQRHE-Abb  
[address]  
Work Phone:   
[phone]  
   
                                        Comment on above:   Reference Range: 1.0  
05 - 1.035   
   
                                                            Ordering Provider: BASILIA Fong   
   
                                                    URINALYSIS WITH CULTURE   
IF INDICATED    Negative                        NEGATIVE        BT-SBXKE-Czq  
[address]  
Work Phone:   
[phone]  
   
                                        Comment on above:   Ordering Provider: BASILIA Fong   
   
                                                    URINALYSIS WITH CULTURE   
IF INDICATED    <2.0                            0.0 - 1.9       MR-QDWEA-Gkb  
[address]  
Work Phone:   
[phone]  
   
                                        Comment on above:   Ordering Provider: BASILIA Fong   
   
                                                    CBC WITH AUTO DIFFERENTIALon  
 2020   
   
                      Basophils (Bld) [#/Vol] 0.07 10*3/uL                        
 [facility]  
   
                      Basophils/100 WBC (Bld) 0.6 %                            O  
[facility]  
   
                                                    Eosinophils (Bld)   
[#/Vol]         0.01 10*3/uL                                    [facility]  
   
                                                    Eosinophils/100 WBC   
(Bld)           0.1 %                                           [facility]  
   
                                                    Erythrocyte   
distribution width   
(RBC) [Entitic vol] 12.5 %                                  11.6 - 14.8   
%                                       [facility]  
   
                                                    Hematocrit (Bld)   
[Volume fraction] 43.3 %                          36 - 46 %       [facility]  
   
                                                    Hemoglobin (Bld)   
[Mass/Vol]      14.5 g/dL                       12 - 16 g/dL    [facility]  
   
                                                    Immature granulocytes   
(Bld) [#/Vol]   0.06 10*3/uL                                    [facility]  
   
                                                    Immature   
granulocytes/100 WBC   
(Bld)           0.50 %                                          [facility]  
   
                                        Comment on above:   The IG parameter is   
the percentage of metamyelocytes,   
myelocytes and promyelocytes. An immature granulocyte count   
(IG) of 1% or more suggests the possibility of infection, an   
IG count of 3% is very likely related to an infection.   
   
                                                    Interpretation and   
review of laboratory   
results         Abnormal                                        [facility]  
   
                                                    Lymphocytes (Bld)   
[#/Vol]         0.47 10*3/uL    Low                             [facility]  
   
                                                    Lymphocytes/100 WBC   
(Bld)           3.8 %                                           [facility]  
   
                                                    MCH (RBC) [Entitic   
mass]           29.2 pg                         25 - 35 pg      [facility]  
   
                      MCHC (RBC) [Mass/Vol] 33.5 g/dL             31 - 37 g/dL [facility]  
[facility]  
   
                      MCV (RBC) [Entitic vol] 87.3 fL               78 - 102 fL   
[facility]  
   
                      Monocytes (Bld) [#/Vol] 0.29 10*3/uL Low                    
 [facility]  
   
                      Monocytes/100 WBC (Bld) 2.4 %                            [facility]  
[facility]  
   
                                                    Neutrophils (Bld)   
[#/Vol]         11.39 10*3/uL   High                            [facility]  
   
                                                    Neutrophils/100 WBC   
(Bld)           92.6 %                                          [facility]  
   
                                                    Nucleated RBC (Bld)   
[#/Vol]         0.00 10*3/uL                                    [facility]  
   
                                                    Nucleated RBC/100 WBC   
(Bld) [Ratio]   0.0 %                                           [facility]  
   
                                                    Platelet mean volume   
(Bld) [Entitic vol] 13.2 fL             High                9.4 - 12.4   
fL                                      [facility]  
   
                      Platelets (Bld) [#/Vol] 307 10*3/uL                         
[facility]  
   
                      RBC (Bld) [#/Vol] 4.96 10*6/uL                       [facility]  
[facility]alth  
   
                      WBC (Bld) [#/Vol] 12.29 10*3/uL [facility]  
   
                                                    COVID-19, Molecularon 2020   
   
                                                    Interpretation and   
review of laboratory   
results         Normal                                          [facility]  
   
                      SARS-CoV-2 Not Detected            Not Detected [facility]  
   
                                        Comment on above:   This test was perfor  
med under the FDA's Emergency Use   
Authorization (EUA). Testing was performed using the Abbott ID   
NOW COVID-19 assay on the ID NOW platform.  
  
This test has not been approved for use in asymptomatic   
patients and its performance in this patient population has   
not been evaluated. Negative results do not rule out the   
presence of SARS-CoV-2/COVID-19.  
  
Fact sheets for the EUA can be found at the following links:  
For Healthcare Providers:   
https://www.fda.gov/media/129536/download  
For Patients: https://www.fda.gov/media/848110/download  
  
   
   
                                                    Comprehensive Metabolic Pane  
benjamin 2020   
   
                          Albumin [Mass/Vol] 4.6 g/dL     High         3.2 - 4.5  
   
g/dL                                    [facility]  
   
                                                    ALP [Catalytic   
activity/Vol]   56 U/L                          40 - 140 U/L    [facility]  
   
                                                    ALT [Catalytic   
activity/Vol]   20 U/L                          14 - 65 U/L     [facility]  
   
                          Anion gap [Moles/Vol] 22 mmol/L    High         10 - 2  
0   
mmol/L                                  [facility]  
   
                                                    AST [Catalytic   
activity/Vol]   42 U/L                          0 - 45 U/L      [facility]  
   
                          Bilirubin [Mass/Vol] 0.8 mg/dL                 0 - 1.3  
   
mg/dL                                   [facility]  
   
                          Calcium [Mass/Vol] 9.7 mg/dL                 8.4 - 10.  
2   
mg/dL                                   [facility]  
   
                          Chloride [Moles/Vol] 106 mmol/L                98 - 10  
8   
mmol/L                                  [facility]  
   
                      Creatinine [Mass/Vol] 0.64 mg/dL            0.50 - 1.00 [facility]  
   
                                                    GFR/1.73 sq M predicted   
among non-blacks MDRD   
(S/P/Bld) [Vol   
rate/Area]                              The eGFR should be used   
for monitoring renal   
function only and not   
for medication dosing.                                         [facility]  
   
                                                    GFR/1.73 sq M.predicted   
CKD-EPI (S/P/Bld) [Vol   
rate/Area]          131                                     >=60   
mL/min/1.73   
m2                                      [facility]  
   
                          Glucose [Mass/Vol] 96 mg/dL                  65 - 99   
mg/dL                                   [facility]  
   
                          HCO3 [Moles/Vol] 10 mmol/L    Low          21 - 32   
mmol/L                                  [facility]  
   
                          Potassium [Moles/Vol] 4.3 mmol/L                3.5 -   
5.1   
mmol/L                                  [facility]  
   
                      Protein [Mass/Vol] 8.6 g/dL   High       6 - 8 g/dL [facility]  
alth  
   
                          Sodium [Moles/Vol] 134 mmol/L   Low          135 - 145  
   
mmol/L                                  [facility]  
   
                                                    Urea nitrogen   
[Mass/Vol]      6 mg/dL         Low             8 - 25 mg/dL    [facility]  
   
                                                    Urea   
nitrogen/Creatinine   
[Mass ratio]    9.4 mg/mg       Low                             [facility]  
   
                                                    Otheron 2020   
   
                      Extra Tube Hold for add-ons.                       [facility]  
   
                                        Comment on above:   Auto resulted.   
   
                                                    Interpretation and   
review of laboratory   
results         Abnormal                                        [facility]  
   
                                                            Interpreted by: MAC STEARNS20   
04:26STUDY:Pelvic   
Ultrasound; 2020   
4:03 AM   
INDICATION:RLQ/right   
pelvic pain, nausea,   
vomiting. COMPARISON:US   
pelvis 2020, CT   
AP 2020.   
ACCESSION   
NUMBER(S):[id number]   
ORDERING   
CLINICIAN:HARMAN RIOS MD TECHNIQUE:   
Ultrasound imaging of   
the pelvis was   
performed   
bothtransabdominally   
and transvaginally.   
FINDINGS: The uterus   
measures 8.4 x 5.9 x   
6.2 cm. It demonstrates   
anormal, homogeneous   
echotexture. Single   
intrauterine gestation   
isidentified. Fetal   
pole is identified   
measuring 2 mm in   
diametercorresponding   
to a gestational age of   
5 weeks 5 days with   
fetal heartrate of 106   
bpm. Yolk sac is   
identified. Small   
subchorionichemorrhage   
is again noted,   
unchanged. The right   
ovary measures 3.5 x   
1.7 x 2.3 cm. The right   
ovary onceagain   
demonstrates an   
echogenic 1.8 x 1.5 x   
1.8 cm lesion. The   
rightovary demonstrates   
normal color Doppler   
flow. Small amount of   
simplefree fluid is   
seen in the region of   
the right ovary. The   
left ovary measures 2.7   
x 1.6 x 1.5 cm. It   
demonstrates a   
normalechotexture. The   
left ovary demonstrates   
normal color Doppler   
flow. No significant   
fluid is present within   
the cul-de-sac.   
IMPRESSION: No   
significant interval   
change. Single viable   
intrauterine   
gestation,approximately   
5 weeks 5 days   
gestational age with   
fetal heart rate of106   
bpm.Small subchorionic   
hemorrhage.Stable 1.8   
cm hypoechoic lesion in   
the right ovary,   
possiblyendometrioma or   
hemorrhagic cyst.   
Signed by Mac StearnsElectronically   
signed by: MAC STEARNS   
20 04:26      Normal                                  JG-MVYFX-Voa  
[address]  
Work Phone:   
[phone]  
   
                                        Comment on above:   Ordering Provider: BASILIA RIOS [id number]   
   
                                                    hCG, Blood, QUANTitativeon 0  
2020   
   
                                                    Beta HCG (pregnancy   
test) Ql (U)                            Males and nonpregnant   
females: <5 mIU/mL   
Females during   
pregnancy: 3-4 weeks   
9-130 mIU/mL 4-5 weeks   
 mIU/mL 5-6   
weeks 850-20,800 mIU/mL   
6-7 weeks 4000-100,200   
mIU/mL 7-12 weeks   
11,500-289,000 mIU/mL   
12-16 weeks   
18,300-137,000 mIU/mL   
16-29 weeks   
1,400-53,000 mIU/mL   
29-41 weeks 940-60,000   
mIU/mL                                                      [facility]  
   
                      HCG Qn     [address] m[IU]/mL High                  [facility]  
   
                                                    Otheron 2020   
   
                                                            Interpreted by: YO NELSON20   
00:38MRN:   
[id number] Name:   
DANNA SOARES   
STUDY:US PELVIS OB   
TRANSABDOMINAL WITH   
TRANSVAGINAL; 2020   
12:19 am   
INDICATION:pelvic pain.   
COMPARISON:None.   
ACCESSION   
NUMBER(S):[id number]   
ORDERING   
CLINICIAN:ANÍBAL GUSTAFSON   
TECHNIQUE:Grayscale and   
color Doppler   
transabdominal and   
transvaginal imagesof   
the pelvis.   
FINDINGS:Transabdominal   
images are essentially   
nondiagnostic due to   
lack ofbladder   
distention.Uterus: The   
uterus measures 8.6 x   
5.2 x 5.3 cm. There is   
anintrauterine   
gestational sac with   
normal appearing yolk   
sac. Mean sacdiameter   
is 15 mm, corresponding   
to 5 weeks 6 days   
gestational age.There   
is a small echogenic   
focus adjacent to the   
yolk sac, likely atiny   
distal pole. This   
measures 1 mm, which is   
too small to   
calculategestational   
age. Cardiac activity   
is not detected, likely   
due tosmall size. A 9 x   
10 x 8 mm hypoechoic   
nonvascular focus   
adjacent tothe   
gestational sac is   
consistent with a small   
periimplantationalbleed  
. Ovaries: The right   
ovary measures 3 x 3.1   
x 3 cm. There is a   
stable1.9 x 1.6 x 2 cm   
echogenic mass in the   
right ovary with   
diffuseinternal echoes   
and posterior acoustic   
enhancement, suggesting   
anendometrioma.. The   
left ovary measures 2.5   
x 1.3 x 1.2 cm and   
appearsnormal. No   
adnexal mass is seen.   
Cul de sac: Small   
amount of simple free   
fluid is nonspecific   
and maybe physiologic.   
IMPRESSION:Intrauterine   
gestational sac with   
normal appearing yolk   
sac andpossible small   
fetal pole. Gestational   
age is 5 weeks 6 days   
by meansac diameter. A   
short-term follow-up   
ultrasound may be   
considered toconfirm   
viability. Small amount   
of simple free pelvic   
fluid is nonspecific   
and may bephysiologic.   
1.9 cm hypoechoic focus   
in the right ovary is   
stable from   
10/17/2019,with imaging   
features suggesting an   
endometrioma.   
Differentialconsiderati  
ons include a   
hemorrhagic   
cyst.Electronically   
signed by: ROSA ELENA NELSON 20 00:38 Normal                                  YO-NZWVL-Cik  
[address]  
Work Phone:   
[phone]  
   
                                        Comment on above:   Ordering Provider: SARAH GUSTAFSON 16104   
   
                                                    Complete Blood Count + Diffe  
rentialon 2020   
   
                      Basophils (Bld) [#/Vol] 0.10 {x10E9/L}            See Belo  
w  MF-PXUVY-Ylf  
[address]  
Work Phone:   
[phone]  
   
                                        Comment on above:   Reference Range: 0.0  
0 - 0.10   
   
                                                            Ordering Provider: BASILIA RUTH 89301   
   
                      Basophils/100 WBC (Bld) 0.8 %                 0.0 - 2.0  M  
G-OBGYN-Scott  
[address]  
Work Phone:   
[phone]  
   
                                        Comment on above:   Ordering Provider: BASILIA RUTH 71927   
   
                                                    Eosinophils (Bld)   
[#/Vol]         0.00 {x10E9/L}                  See Below       DP-UIILO-Lip  
[address]  
Work Phone:   
[phone]  
   
                                        Comment on above:   Reference Range: 0.0  
0 - 0.70   
   
                                                            Ordering Provider: BASILIA RUTH 33496   
   
                                                    Eosinophils/100 WBC   
(Bld)           0.1 %                           0.0 - 6.0       FH-IIVUT-Eqs  
[address]  
Work Phone:   
[phone]  
   
                                        Comment on above:   Ordering Provider: BASILIA RUTH 42353   
   
                                                    Erythrocyte   
distribution width   
(RBC) [Ratio]   13.4 %                          See Below       NJ-VTYPC-Nud  
[address]  
Work Phone:   
[phone]  
   
                                        Comment on above:   Reference Range: 11.  
5 - 14.5   
   
                                                            Ordering Provider: BASILIA Marquez   
   
                                                    Hematocrit (Bld)   
[Volume fraction] 43.8 %                          See Below       [place] [address]  
Work Phone:   
[phone]  
   
                                        Comment on above:   Reference Range: 36.  
0 - 46.0   
   
                                                            Ordering Provider: BASILIA Marquez   
   
                                                    Hemoglobin (Bld)   
[Mass/Vol]      14.7 g/dL                       See Below       [place] [address]  
Work Phone:   
[phone]  
   
                                        Comment on above:   Reference Range: 12.  
0 - 16.0   
   
                                                            Ordering Provider: BASILIA Marquez   
   
                                                    Lymphocytes (Bld)   
[#/Vol]                   1.00 {x10E9/L}            below low   
threshold                 See Below                 [place] [address]  
Work Phone:   
[phone]  
   
                                        Comment on above:   Reference Range: 1.2  
0 - 4.80   
   
                                                            Ordering Provider: BASILIA Marquez   
   
                                                    Lymphocytes/100 WBC   
(Bld)           12.2 %                          See Below       [place] [address]  
Work Phone:   
[phone]  
   
                                        Comment on above:   Reference Range: 13.  
0 - 44.0   
   
                                                            Ordering Provider: BASILIA Marquez   
   
                      MCHC (RBC) [Mass/Vol] 33.6 g/dL             See Below  [place] [address]  
Work Phone:   
[phone]  
   
                                        Comment on above:   Reference Range: 32.  
0 - 36.0   
   
                                                            Ordering Provider: BASILIA Marquez   
   
                      MCV (RBC) [Entitic vol] 89 fL                 80 - 100   [place] [address]  
Work Phone:   
[phone]  
   
                                        Comment on above:   Ordering Provider: BASILIA Marquez   
   
                      Monocytes (Bld) [#/Vol] 0.50 {x10E9/L}            See Belo  
w  [place] [address]  
Work Phone:   
[phone]  
   
                                        Comment on above:   Reference Range: 0.1  
0 - 1.00   
   
                                                            Ordering Provider: BASILIA Marquez   
   
                      Monocytes/100 WBC (Bld) 6.3 %                 2.0 - 10.0 [place] [address]  
Work Phone:   
[phone]  
   
                                        Comment on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 [employer], [place], [place], 2013.   
   
                                                            Performed By: #### C  
BCDIF, BMP ####  
[facility]  
[address] [facility]  
[phone]   
   
                      Chloride [Moles/Vol] 103 mmol/L Normal          [facility]  
   
                                        Comment on above:   Performed By: #### C  
BCDIF, BMP ####  
[facility]  
[address] [facility]  
[phone]   
   
                                        CO2 [Moles/Vol]     Unable to assay.   
Specimen hemolyzed. Normal              22-30               [facility]  
   
                                        Comment on above:   Performed By: #### C  
BCDIF, BMP ####  
[facility]  
[address] [facility]  
[phone]   
   
                      Creatinine [Mass/Vol] 0.48 mg/dL Low        0.58-0.96  [facility]  
   
                                        Comment on above:   Performed By: #### C  
BCDIF, BMP ####  
[facility]  
[address] [facility]  
[phone]   
   
                      Glucose [Mass/Vol] 111 mg/dL  High       74-99      [facility]  
   
                                        Comment on above:   Result Comment: Refe  
rence ranges for this patient's age group   
have not been established. These reference ranges reflect   
verified or established ranges for the adult population.   
Interpret these ranges with caution using the clinical context   
and additional reference resources.  
The American Diabetes Association (ADA) provides guidance for   
cutoff values for fasting glucose and random glucose. The ADA   
defines fasting as no caloric intake for at least 8 hours.   
Fasting plasma glucose results between 100 to 125 mg/dL   
indicate increased risk for diabetes (prediabetes).  
Fasting plasma glucose results greater than or equal to 126   
mg/dL meet the criteria for diagnosis of diabetes. In the   
absence of unequivocal hyperglycemia, results should be   
confirmed by repeat testing. In a patient with classic   
symptoms of hyperglycemia or hyperglycemic crisis, random   
plasma glucose results greater than or equal to 200 mg/dL meet   
the criteria for diagnosis of diabetes.  
Reference: Standards of Medical Care in Diabetes 2016,   
American Diabetes Association. Diabetes Care. 2016.39(Suppl   
1).   
   
                                                            Performed By: #### C  
RAFAEL, ELLEN ####  
[facility]  
[address] [facility]  
[phone]   
   
                                        Potassium [Moles/Vol] Unable to assay.   
Specimen hemolyzed. Normal              3.7-5.1             [facility]  
   
                                        Comment on above:   Result Comment: Call  
ed to and read back by:  
STEPHANIA MIGUEL IN ER 19 1030   
   
                                                            Performed By: #### C  
BCDIF, ELLEN ####  
[facility]  
[address] [facility]  
[phone]   
   
                      Sodium [Moles/Vol] 135 mmol/L Low        136-144    [facility]  
   
                                        Comment on above:   Performed By: #### VADIM HALL, ELLEN ####  
[facility]  
[address] [facility]  
[phone]   
   
                                                    Urea nitrogen   
[Mass/Vol]      7 mg/dL         Normal          5-18            [facility]  
   
                                        Comment on above:   Result Comment: Refe  
rence ranges were not locally established   
for this patient's age group. The normal values are based on   
the following source: Urea/BUN (package insert v7.0 English).   
[facility], [place], [place], 2015.   
   
                                                            Performed By: #### VADIM  
BCLUZ MARIAF, BMP ####  
[facility]  
[address] [facility]  
[phone]   
   
                                                    Beta HCG Quant, EDon   
019   
   
                      Beta HCG Quant, .5 mU/mL High       <5.0       [facility]  
   
                                        Comment on above:   Result Comment: NAHEED  
TITATIVE HCG NORMAL RANGES  
Weeks of Gestation (Weeks Since LMP)  
3 Weeks (5.8-71.2 mIU/mL)  
4 Weeks (9.5-750 mIU/mL)  
5 Weeks (217-7138 mIU/mL)  
6 Weeks (158-32003 mIU/mL)  
7 Weeks (3697-705163 mIU/mL)  
8 Weeks (01467-195745 mIU/mL)  
9 Weeks (61477-732247 mIU/mL)  
10 Weeks (83487-056181 mIU/mL)  
12 Weeks (11056-042245 mIU/mL)  
Referenced to 4th IS of [facility]   
   
                                                            Performed By: #### K  
1, HCGED ####  
[facility]  
 [place] [address]   
   
                                                    CBC and Differentialon    
   
                      Abs Baso   0.03 k/uL  Normal     <0.11      [facility]  
   
                                        Comment on above:   Performed By: #### C  
BCDIF, BMP ####  
[facility]  
 [place] [address]   
   
                      Abs Mono   0.66 k/uL  Normal     <0.87      [facility]  
   
                                        Comment on above:   Performed By: #### C  
BCDIF, BMP ####  
[facility]  
 [place] [address]   
   
                      Abs Neut   5.14 k/uL  Normal     1.45-7.50  [facility]  
   
                                        Comment on above:   Performed By: #### C  
BCDIF, BMP ####  
[facility]  
 [place] [address]   
   
                      Basophils/100 WBC (Bld) 0.4 %      Normal                [facility]  
   
                                        Comment on above:   Performed By: #### C  
BCDIF, BMP ####  
[facility]  
 [place] [address]   
   
                                                    Eosinophils (Bld)   
[#/Vol]         0.06 10*3/uL    Normal          <0.46           [facility]  
   
                                        Comment on above:   Performed By: #### C  
BCDIF, BMP ####  
[facility]  
 [place] [address]   
   
                                                    Eosinophils/100 WBC   
(Bld)           0.8 %           Normal                          [facility]  
   
                                        Comment on above:   Performed By: #### C  
BCDIF, BMP ####  
[facility]  
[address] [place] [address]   
   
                                                    Erythrocyte   
distribution width   
(RBC) [Ratio]   13.7 %          Normal          11.5-15.0       [facility]  
   
                                        Comment on above:   Performed By: #### C  
BCDIF, BMP ####  
[facility]  
 [place] [address]   
   
                                                    Hematocrit (Bld)   
[Volume fraction] 32.4 %          Low             36.0-46.0       [facility]  
   
                                        Comment on above:   Performed By: #### C  
BCDIF, BMP ####  
[facility]  
 [place] [address]   
   
                                                    Hemoglobin (Bld)   
[Mass/Vol]      10.4 g/dL       Low             11.5-15.5       [facility]  
   
                                        Comment on above:   Performed By: #### C  
BCDIF, BMP ####  
[facility]  
 [place] [address]   
   
                                                    Lymphocytes (Bld)   
[#/Vol]         1.77 10*3/uL    Normal          1.00-4.00       [facility]  
   
                                        Comment on above:   Performed By: #### C  
BCDIF, BMP ####  
[facility]  
 [address]   
   
                                                    Lymphocytes/100 WBC   
(Bld)           23.1 %          Normal                          [facility]  
   
                                        Comment on above:   Performed By: #### C  
BCDIF, BMP ####  
[facility]  
 [place] [id number]   
   
                                                    MCH (RBC) [Entitic   
mass]           29.0 pG         Normal          26.0-34.0       [facility]  
   
                                        Comment on above:   Performed By: #### C  
BCDIF, BMP ####  
[facility]  
 [address]   
   
                      MCHC (RBC) [Mass/Vol] 32.1 g/dL  Normal     30.5-36.0  [facility]  
   
                                        Comment on above:   Performed By: #### C  
BCDIF, BMP ####  
[facility]  
 [address]   
   
                      MCV (RBC) [Entitic vol] 90.3 fL    Normal     80.0-100.0 [facility]  
   
                                        Comment on above:   Performed By: #### C  
BCDIF, BMP ####  
[facility]  
 [address]   
   
                      Monocytes/100 WBC (Bld) 8.6 %      Normal                [facility]  
   
                                        Comment on above:   Performed By: #### C  
BCDIF, BMP ####  
[facility]  
 [place] [address]   
   
                                                    Neutrophils/100 WBC   
(Bld)           67.1 %          Normal                          [facility]  
   
                                        Comment on above:   Performed By: #### C  
BCDIF, BMP ####  
[facility]  
 [place] [address]   
   
                                                    Platelet mean volume   
(Bld) [Entitic vol] 11.8 fL         Normal          9.0-12.7        [facility]  
   
                                        Comment on above:   Performed By: #### C  
BCDIF, BMP ####  
[facility]  
[address]   
   
                      Platelets (Bld) [#/Vol] 224 10*3/uL Normal     150-400      
[facility]  
   
                                        Comment on above:   Performed By: #### C  
BCDIF, BMP ####  
[facility]  
 [place] [address]   
   
                      RBC (Bld) [#/Vol] 3.59 10*6/uL Low        3.90-5.20  [facility]  
   
                                        Comment on above:   Performed By: #### C  
BCDIF, BMP ####  
[facility]  
 [place] [address]   
   
                      WBC (Bld) [#/Vol] 7.66 10*3/uL Normal     3.70-11.00 [facility]  
   
                                        Comment on above:   Performed By: #### C  
BCDIF, BMP ####  
[facility]  
 [place] [address]   
   
                      Abs Baso   0.04 k/uL  Normal     <0.11      [facility]  
   
                                        Comment on above:   Performed By: #### C  
BCDIF, BMP ####  
[facility]  
 [place] [address]   
   
                      Abs Mono   0.86 k/uL  Normal     <0.87      [facility]  
   
                                        Comment on above:   Performed By: #### C  
BCDIF, BMP ####  
[facility]  
[address] [place] [address]   
   
                      Abs Neut   6.68 k/uL  Normal     1.45-7.50  [facility]  
   
                                        Comment on above:   Performed By: #### C  
BCDIF, BMP ####  
[facility]  
[address] [place], [place] [id number]   
   
                      Basophils/100 WBC (Bld) 0.5 %      Normal                [facility]  
   
                                        Comment on above:   Performed By: #### C  
BCDIF, BMP ####  
[facility]  
 [place] [address]   
   
                                                    Eosinophils (Bld)   
[#/Vol]         0.11 10*3/uL    Normal          <0.46           [facility]  
   
                                        Comment on above:   Performed By: #### C  
BCDIF, BMP ####  
[facility]  
[address] [place] [address]   
   
                                                    Eosinophils/100 WBC   
(Bld)           1.2 %           Normal                          [facility]  
   
                                        Comment on above:   Performed By: #### C  
BCDIF, BMP ####  
[facility]  
 [facility]  
[phone]   
   
                                                    Erythrocyte   
distribution width   
(RBC) [Ratio]   21.3 %          High            11.5-15.0       [facility]  
   
                                        Comment on above:   Performed By: #### C  
BCDIF, BMP ####  
[facility]  
 [facility]  
[phone]   
   
                                                    Hematocrit (Bld)   
[Volume fraction] 39.4 %          Normal          36.0-46.0       [facility]  
   
                                        Comment on above:   Performed By: #### C  
BCDIF, BMP ####  
[facility]  
 [facility]  
[phone]   
   
                                                    Hemoglobin (Bld)   
[Mass/Vol]      11.9 g/dL       Normal          11.5-15.5       [facility]  
   
                                        Comment on above:   Performed By: #### C  
BCDIF, BMP ####  
[facility]  
 [address]   
   
                                                    Lymphocytes (Bld)   
[#/Vol]         1.17 10*3/uL    Normal          1.00-4.00       [facility]  
   
                                        Comment on above:   Performed By: #### C  
BCDIF, BMP ####  
[facility]  
 [address]   
   
                                                    Lymphocytes/100 WBC   
(Bld)           13.2 %          Normal                          [facility]  
   
                                        Comment on above:   Performed By: #### C  
BCDIF, BMP ####  
[facility]  
 [place] [phone]   
   
                                                    MCH (RBC) [Entitic   
mass]           29.7 pG         Normal          26.0-34.0       [facility]  
   
                                        Comment on above:   Performed By: #### C  
BCDIF, BMP ####  
[facility]  
 [place] [phone]   
   
                      MCHC (RBC) [Mass/Vol] 30.2 g/dL  Low        30.5-36.0  [facility]  
   
                                        Comment on above:   Performed By: #### C  
BCDIF, BMP ####  
[facility]  
 [place] [phone]   
   
                      MCV (RBC) [Entitic vol] 98.3 fL    Normal     80.0-100.0 [facility]  
   
                                        Comment on above:   Performed By: #### C  
BCDIF, BMP ####  
[facility]  
[address] [facility]  
[phone]   
   
                      Monocytes/100 WBC (Bld) 9.7 %      Normal                [facility]  
   
                                        Comment on above:   Performed By: #### C  
BCDIF, BMP ####  
[facility] [facility]  
 [facility]  
[phone]   
   
                                                    Neutrophils/100 WBC   
(Bld)           75.4 %          Normal                          [facility]  
   
                                        Comment on above:   Performed By: #### C  
BCDIF, BMP ####  
[facility]  
 [facility]  
[phone]   
   
                                                    Platelet mean volume   
(Bld) [Entitic vol] 12.1 fL         Normal          9.0-12.7        [facility]  
   
                                        Comment on above:   Performed By: #### C  
RAFAEL, BMP ####  
[facility]  
 [facility]  
[phone]   
   
                      Platelets (Bld) [#/Vol] 230 10*3/uL Normal     150-400      
[facility]  
   
                                        Comment on above:   Performed By: #### VADIM HALL, BMP ####  
[facility]  
 [facility]  
[phone]   
   
                      RBC (Bld) [#/Vol] 4.01 10*6/uL Normal     3.90-5.20  [facility]  
   
                                        Comment on above:   Performed By: #### VADIM  
BCDIF, BMP ####  
[facility]  
 [facility]  
[phone]   
   
                      WBC (Bld) [#/Vol] 8.86 10*3/uL Normal     3.70-11.00 [facility]  
   
                                        Comment on above:   Performed By: #### VADIM  
BCLUZ MARIAF, BMP ####  
[facility]  
 [facility]  
[phone]   
   
                                                    ED NOTEon 2019   
   
                                        ED NOTE             HNO ID: [id number]  
Author: Jerri HAM  
Service: ?  
Author Type: ?  
Type: ED Notes  
Filed: 2019 3:35   
PM  
Note Text:  
Report given to fco Alicia from [facility]   
Care medical transfer  
services. Patient is   
not in distress.   
Physician stated   
another bag of  
fluids was not needed   
for transfer since VSS. [facility]  
   
                                        ED NOTE             HNO ID: [id number]  
Author: Jerri HAM  
Service: ?  
Author Type: ?  
Type: ED Notes  
Filed: 2019 2:40   
PM  
Note Text:  
Report called and given   
to [facility] ED, charge   
nurse ERNA Elliott.  [facility]  
   
                                        ED NOTE             HNO ID: [id number]  
Author: Jerri HAM  
Service: ?  
Author Type: ?  
Type: ED Notes  
Filed: 2019 10:27   
AM  
Note Text:  
Labs redrawn for   
hemolysis. Patient is   
resting comfortably in   
bed with no  
distress. Will continue   
to monitor.         [facility]  
   
                                        ED NOTE             HNO ID: [id number]  
Author: Eugenia (Rn)   
ERNA Thomason  
Service: ?  
Author Type: Registered   
Nurse  
Type: ED Notes  
Filed: 2019 9:33   
AM  
Note Text:  
Patient presents to the   
ED post D AND C last   
Wednesday. Patient   
states she  
passed out twice last   
week after the D AND C   
and today she started   
passing  
large clots and is in   
lots of pain.       [facility]  
   
                                                    ED PROV NOTEon 2019   
   
                                        ED PROV NOTE        HNO ID: [id number]  
Author: Krissy Forbes  
Service: Emergency   
Medicine  
Author Type: Physician  
Type: ED Provider Notes  
Filed: 2019 2:14   
PM  
Note Text:  
ED Provider Note  
Patient Name: Danna Soares  
MRN: [id number]  
SERVICE DATE: 19  
History  
Patient presents with:  
Vaginal Bleeding  
Syncope  
Patient presents for   
pelvic pain and vaginal   
bleeding. She had a   
DANDC done  
6 days ago for a missed   
. At that time   
after the procedure she  
went home and states   
she passed out. She has   
not had any further   
syncopal  
episodes and the   
bleeding actually   
subsided until this   
morning when she  
woke up passing large   
blood clots and   
increased pelvic pain.   
No fevers or  
chills, nausea or   
vomiting. She states   
the procedure was done   
at   
facility.  
History reviewed. No   
pertinent past medical   
history.  
History reviewed. No   
pertinent surgical   
history.  
No family history on   
file.  
Social History  
Tobacco Use  
- Smoking status: Never   
Smoker  
- Smokeless tobacco:   
Never Used  
Substance and Sexual   
Activity  
- Alcohol use: Not   
Currently  
- Drug use: Not on file  
- Sexual activity: Not   
on file  
ALLERGIES  
Allergen Reactions  
- Codeine Mental Status   
Change  
- Miralax [Polyethyle*   
Hives  
Review of Systems  
Constitutional:   
Negative for appetite   
change, chills,   
diaphoresis and  
fever.  
Respiratory: Negative   
for cough, shortness of   
breath and wheezing.  
Cardiovascular:   
Negative for chest   
pain, palpitations and   
leg swelling.  
Gastrointestinal:   
Negative for abdominal   
pain, diarrhea, nausea   
and  
vomiting.  
Genitourinary: Positive   
for pelvic pain and   
vaginal bleeding.   
Negative for  
dysuria, flank pain,   
frequency, hematuria   
and urgency.  
Musculoskeletal:   
Negative for   
arthralgias, back pain,   
neck pain and neck  
stiffness.  
Skin: Negative for   
pallor, rash and wound.  
Neurological: Positive   
for syncope and   
light-headedness.   
Negative for  
dizziness, weakness,   
numbness and headaches.  
Psychiatric/Behavioral:   
Negative for   
self-injury and   
suicidal ideas.  
All other systems   
reviewed and are   
negative.  
Physical Exam  
/71   Pulse 75     
Temp 98.4   Resp 16     
Wt 86 lb (39.0kg)     
SpO2 98%  
O2 Therapy: Room Air  
Physical Exam  
Vitals signs and   
nursing note reviewed.  
Constitutional:  
General: She is not in   
acute distress.  
Appearance: She is   
well-developed. She is   
not diaphoretic.  
HENT:  
Head: Normocephalic and   
atraumatic.  
Nose: Nose normal.  
Eyes:  
Conjunctiva/sclera:   
Conjunctivae normal.  
Pupils: Pupils are   
equal, round, and   
reactive to light.  
Neck:  
Musculoskeletal: Normal   
range of motion and   
neck supple.  
Cardiovascular:  
Rate and Rhythm: Normal   
rate and regular   
rhythm.  
Heart sounds: Normal   
heart sounds.  
Pulmonary:  
Effort: Pulmonary   
effort is normal. No   
respiratory distress.  
Breath sounds: Normal   
breath sounds.  
Abdominal:  
General: Bowel sounds   
are normal. There is no   
distension.  
Palpations: Abdomen is   
soft.  
Tenderness: There is no   
tenderness.  
Genitourinary:  
Comments: Large amount   
of vaginal blood in   
vaginal canal. No   
clots.  
No cervical motion   
tenderness or adnexal   
tenderness or masses  
Musculoskeletal: Normal   
range of motion.  
Skin:  
General: Skin is warm   
and dry.  
Capillary Refill:   
Capillary refill takes   
less than 2 seconds.  
Coloration: Skin is not   
pale.  
Findings: No erythema   
or rash.  
Neurological:  
Mental Status: She is   
alert and oriented to   
person, place, and   
time.  
Psychiatric:  
Behavior: Behavior   
normal.  
Diagnostic Testing  
ED Labs Ordered and   
Reviewed  
CBC + DIFF - Abnormal;   
Notable for the   
following components:  
Result Value Ref Range  
MCHC 30.2 (*) 30.5 -   
36.0 g/dL  
RDW-CV 21.3 (*) 11.5 -   
15.0 %  
All other components   
within normal limits  
BASIC METABOLIC PNL -   
Abnormal; Notable for   
the following   
components:  
Glucose 111 (*) 74 - 99   
mg/dL  
Creatinine 0.48 (*)   
0.58 - 0.96 mg/dL  
Sodium 135 (*) 136 -   
144 mmol/L  
All other components   
within normal limits  
BETA HCG, QUANTITATIVE   
FOR ED - Abnormal;   
Notable for the   
following  
components:  
Beta HCG, Quantitative   
For ED Use 180.5 (*)   
<5.0 mU/mL  
All other components   
within normal limits  
CBC + DIFF - Abnormal;   
Notable for the   
following components:  
RBC 3.59 (*) 3.90 -   
5.20 m/uL  
Hemoglobin 10.4 (*)   
11.5 - 15.5 g/dL  
Hematocrit 32.4 (*)   
36.0 - 46.0 %  
All other components   
within normal limits  
PROTHROMBIN TIME/PT  
POTASSIUM BLD  
Procedures  
ED Course / Clinical   
Impression  
Clinical Impressions as   
of Dec 02 1413  
Vaginal bleeding  
MDM / Disposition /   
Plan  
Patient evaluated for   
vaginal bleeding after   
a DANDC 5 days ago.   
Initial  
hemoglobin stable.   
Repeat hemoglobin 2   
hours later had dropped   
1.5 g.  
Her blood pressures   
remained stable.   
Analgesia provided.   
Transvaginal  
ultrasound sewed in   
thickened endometrium.   
Spoke with Dr. Tabor   
who did  
the procedure up at .   
She agreed on the   
transfer. Report given   
to Dr. Qureshi in the emergency   
department. Dr. Tabor   
requested ED to ED  
transfer. Patient's   
pain was improved on   
reassessment. IV fluids   
given.  
She is stable for   
transfer at this time.  
Disposition  
The patient was   
transferred.  
Transferred to    
hospital .  
Condition at   
disposition is stable.  
SIGNATURE: DO Krissy Reddy  
19 1414       Normal                                  [facility]  
   
                                                    Potassiumon 2019   
   
                      Potassium [Moles/Vol] 3.9 mmol/L Normal     3.7-5.1    [facility]  
   
                                        Comment on above:   Performed By: #### K  
1, HCGED ####  
[facility]  
[address] [facility]  
[phone]   
   
                                                    Protimeon 2019   
   
                      PT Coag (PPP) [Time] 1.0 s      Normal     0.9-1.3    [facility]  
   
                                        Comment on above:   Result Comment: Art  
min K Antagonist (VKA) Therapeutic Range:   
INR 2 to 3 (Target INR of 2.5)  
Note: For patients treated with VKA drugs, such as warfarin,   
the American College of Chest Physicians 2012 Guideline   
recommends a therapeutic INR range of 2 to 3 (target INR of   
2.5). This recommendation includes high-risk patients with   
antiphospholipid syndrome with previous arterial or venous   
thromboembolism, current-generation mechanical or   
bioprosthetic aortic heart valve replacement.  
Note: Patients with mechanical aortic valve replacement and   
additional risk factors for thromboembolic events (atrial   
fibrillation, previous thromboembolism, LV dysfunction,   
hypercoagulable conditions) or an older generation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   
irregularity of   
scheduling)  
Activities & Sports:   
performs less than 1   
hour of physical   
activity daily  
Drugs: no tobacco use,   
no drug   
use/experimentation and   
no alcohol use  
Safety: uses seat belt  
Safety: does not use   
phone/text while   
driving  
Safety comment: Not   
driving yet, but says   
 I don't want to die    
when  
discussing driving   
safety  
Sex: sexually active  
13  
Gender of partners:   
Males (with current   
boyfriend, Baljinder, for   
the past 1-2  
months - though did   
date him in the past)  
oral contraceptives  
Previous Pregnancy: No  
Previous STD: No  
Laboratory Screening:   
pregnancy test   
(negative in ED 10 days   
ago) and  
chlamydia / gonorrhea   
(and trich negative in   
ED 10 days ago)  
Suicidality: depression   
(on Celexa 20mg, doing   
well at this dose   
though recent  
increase in stressors   
due to probation and   
starting job, doesn't   
like to be  
around people)  
Suicidality: has no   
problems with sleep,   
has no suicidal   
ideation and has no  
homicidal ideation  
Suicidality comment:   
Court mandated   
counseling at   
[facility] for   
previous  
disorderly conduct   
charge, on probation  
Follow-Up: taking   
medication as   
prescribed, desires to   
stay in current   
treatment  
plan and counseling  
Starting PHQ-9 Score: 9   
(at last med check 2   
months ago)  
Follow up PHQ-9 Score:   
13 (today, positive for   
suicidality only for   
past  
attempt, denies recent   
attempts or current SI)  
Primary Care Review of   
Systems  
Objective:  
Physical Exam  
Constitutional: She   
appears well. She is   
active. No distress.  
HENT:  
Head: Atraumatic.  
Right Ear: Tympanic   
membrane and external   
ear normal.  
Left Ear: Tympanic   
membrane and external   
ear normal.  
Nose: Nose normal.  
Mouth/Throat: Mucous   
membranes are moist.   
Dentition is normal.   
Oropharynx is  
clear.  
Eyes: Conjunctivae and   
EOM are normal. No   
strabismus. Pupils are   
equal, round,  
and reactive to light.  
Neck: Normal range of   
motion. Neck supple.   
Thyroid normal. No neck   
adenopathy.  
Cardiovascular: Normal   
rate, regular rhythm,   
S1 normal and S2   
normal. Pulses  
are palpable.  
No murmur heard.  
Pulmonary/Chest: Breath   
sounds normal. No   
respiratory distress.   
Exhibits no  
deformity.  
Abdominal: Soft. Bowel   
sounds are normal. She   
exhibits no distension   
and no  
mass. There is no   
hepatosplenomegaly.   
There is no tenderness.  
Musculoskeletal: Normal   
range of motion.  
Back: She exhibits no   
scoliosis.  
Neurological: She is   
alert. She has normal   
strength. She exhibits   
normal muscle  
tone. Gait normal.  
Skin: Capillary refill   
takes less than 3   
seconds. No rash noted.   
No pallor. Skin  
is warm.  
Vitals reviewed: Blood   
pressure 102/64, pulse   
62, height 153 cm,   
weight 45.3 kg,  
last menstrual period   
2018.         Normal                                  [facility]   
[facility]  
   
                                                    C Genitalon 2018   
   
                                        C Genital           Final Report: Modera  
te   
Gardnerella vaginalis Normal                                  [facility]  
   
                                        Comment on above:   Performed By: #### 2  
[id number] ####DU Urinalysis Manual   
[id number] [place] [address]   
   
                                                    Auto Diffon 2018   
   
                                                    Basophils Auto #/vol   
(Bld)           0.1 E3/mcL      Normal          0.0-0.2         [facility]  
   
                                        Comment on above:   Order Comment: Order  
 Added by Discern Expert.   
   
                                                            Performed By: #### 2  
[id number] ####DU [id number] [place] [address]   
   
                                                    Basophils/100 WBC Auto   
(Bld)           0.7 %           Normal          0.0-2.0         [facility]  
   
                                        Comment on above:   Order Comment: Order  
 Added by Discern Expert.   
   
                                                            Performed By: #### 2  
[id number] ####DU [id number] [place] [address]   
   
                      Eos Absolute 0.2 E3/mcL Normal     0.0-0.7    [facility]  
   
                                        Comment on above:   Order Comment: Order  
 Added by Discern Expert.   
   
                                                            Performed By: #### 2  
[id number] ####DU [id number] [place] [address]   
   
                                                    Eosinophils/100   
leukocytes      1.4 %           Normal          0.0-11.0        [facility]  
   
                                        Comment on above:   Order Comment: Order  
 Added by Discern Expert.   
   
                                                            Performed By: #### 2  
[id number] ####DU [id number] [place] [address]   
   
                      Lymphocytes 1.9 E3/mcL Normal     1.2-3.4    [facility]  
   
                                        Comment on above:   Order Comment: Order  
 Added by Discern Expert.   
   
                                                            Performed By: #### 2  
[id number] ####DU [id number] [place] [address]   
   
                                                    Lymphocytes/100   
leukocytes      15.5 %          Low             20.0-55.0       [facility]  
   
                                        Comment on above:   Order Comment: Order  
 Added by Discern Expert.   
   
                                                            Performed By: #### 2  
[id number] ####DU [id number] [place] [address]   
   
                      Mono Absolute 1.0 E3/mcL High       0.0-0.7    [facility]  
   
                                        Comment on above:   Order Comment: Order  
 Added by Discern Expert.   
   
                                                            Performed By: #### 2  
[id number] ####DU [id number] [place] [address]   
   
                                                    Monocytes/100   
leukocytes      8.5 %           Normal          0.0-10.0        [facility]  
   
                                        Comment on above:   Order Comment: Order  
 Added by Discern Expert.   
   
                                                            Performed By: #### 2  
[id number] ####DU [address] [place] [address]   
   
                      Neutro Absolute 8.9 E3/mcL High       1.4-6.5    [facility]  
   
                                        Comment on above:   Order Comment: Order  
 Added by Discern Expert.   
   
                                                            Performed By: #### 2  
[id number] ####DU [address] [place] [address]   
   
                      Neutro Auto 73.9 %     Normal     37.0-75.0  [facility]  
   
                                        Comment on above:   Order Comment: Order  
 Added by Discern Expert.   
   
                                                            Performed By: #### 2  
[id number] ####DU [address] [place] [address]   
   
                                                    CBC w/ Auto Diffon   
8   
   
                                                    Erythrocyte   
distribution width Auto   
Ratio (RBC)     14.5 %          Normal          11.5-14.5       [facility]  
   
                                        Comment on above:   Performed By: #### 2  
[id number] ####DU [address] [place] [address]   
   
                      Erythrocytes (RBC) 4.60 E6/mcL Normal     3.90-5.30  [facility]  
   
                                        Comment on above:   Performed By: #### 2  
[id number] ####DU [address] [place] [id number]   
   
                      Hematocrit (HCT) 39.9 %     Normal     35.0-45.0  [facility]  
   
                                        Comment on above:   Performed By: #### 2  
[id number] ####DU [address] [place] [id number]   
   
                                                    Hemoglobin mass conc   
(Bld)           13.2 g/dL       Normal          12.0-15.0       [facility]  
   
                                        Comment on above:   Performed By: #### 2  
[id number] ####DU [address] [place] [address]   
   
                      MCH        28.6 pg    Normal     26.0-32.0  [facility]  
   
                                        Comment on above:   Performed By: #### 2  
[id number] ####DU [address] [place] [id number]   
   
                      MCHC mass conc (RBC) 33.0 g/dL  Normal     33.0-37.0  [facility]  
   
                                        Comment on above:   Performed By: #### 2  
[id number] ####DU [address] [place] [address]   
   
                      MCV        86.9 fL    Normal     78.0-95.0  [facility]  
   
                                        Comment on above:   Performed By: #### 2  
[id number] ####DUTHEO [address] [place] [address]   
   
                                                    Platelet mean volume   
(PMV)           10.2 fL         Normal          7.4-11.0        [facility]  
   
                                        Comment on above:   Performed By: #### 2  
[id number] ####DUTHEO [address] [place] [address]   
   
                      Platelets  246 E3/mcL Normal     130-400    [facility]  
   
                                        Comment on above:   Performed By: #### 2  
[id number] ####DU [address] [place] [address]   
   
                      WBC (Leukocytes) 12.0 E3/mcL High       3.6-11.0   [facility]  
   
                                        Comment on above:   Performed By: #### 2  
[id number] ####DUTHEO [id number] [place] [id number]   
   
                                                    Chlamydia GC by PCRon 2018   
   
                      Chlamydia by PCR. Not Detected Normal     Not Detected [facility]  
   
                                        Comment on above:   Result Comment: Xper  
t CT/NG Assay performance has not been   
evaluated in patients less than 14 years of age.   
   
                                                            Performed By: #### 2  
[id number] ####DU Urinalysis Manual   
[id number] [place] [address]   
   
                      Gonorrhoeae by PCR Not Detected Normal     Not Detected [facility]  
   
                                        Comment on above:   Result Comment: Xper  
t CT/NG Assay performance has not been   
evaluated in patients less than 14 years of age.   
   
                                                            Performed By: #### 2  
[id number] ####DU Urinalysis Manual   
[id number] [place] [address]   
   
                                                    U BhCG Qlton 2018   
   
                      HCG.beta subunit Qn Negative   Normal     Neg        [facility]  
   
                                        Comment on above:   Performed By: #### 2  
[id number] ####DU Urinalysis Manual   
[id number] [place] [address]   
   
                                                    UA Completeon 2018   
   
                      UA Blood   Negative   Normal     Negative   [facility]  
   
                                        Comment on above:   Performed By: #### 8  
[id number] ####DU Urinalysis Automated   
[id number] [place] [address]   
   
                      UA Clarity Clear      Normal     Clear      [facility]  
   
                                        Comment on above:   Performed By: #### 8  
[id number] ####DU Urinalysis Automated   
[id number] [place] [address]   
   
                      UA Leuk Est Negative   Normal     Negative   [facility]  
   
                                        Comment on above:   Performed By: #### 8  
[id number] ####DU Urinalysis Automated   
[id number] [place] [id number]   
   
                      UA Mucous  Trace      Abnormal   Trace      [facility]  
   
                                        Comment on above:   Performed By: #### 8  
[id number] ####DU Urinalysis Automated   
[id number] [place], [place] [id number]   
   
                      UA Nitrite Negative   Normal     Negative   [facility]  
   
                                        Comment on above:   Performed By: #### 8  
[id number] ####DU Urinalysis Automated   
[address] [place] [address]   
   
                      UA pH      6.0        Normal     4.6-8.0    [facility]  
   
                                        Comment on above:   Performed By: #### 8  
[id number] ####DU Urinalysis Automated   
[id number] [place] [address]   
   
                      UA Protein Negative   Normal     Negative   [facility]  
   
                                        Comment on above:   Performed By: #### 8  
[id number] ####DU Urinalysis Automated   
[address] [place] [address]   
   
                      UA Spec Grav 1.018      Normal     1.003-1.030 [facility]  
   
                                        Comment on above:   Performed By: #### 8  
[id number] ####DU Urinalysis Automated   
[address] [place] [id number]   
   
                      UA Squam Epithelial 0-5        Normal     0-5        [facility]  
   
                                        Comment on above:   Performed By: #### 8  
[id number] ####DU Urinalysis Automated   
[id number] [place], [place] [address]   
   
                      UA Urobilinogen 2.0 mg/dL  Abnormal              [facility]  
   
                                        Comment on above:   Performed By: #### 8  
[id number] ####DU Urinalysis Automated   
[address] [place] [address]   
   
                      UA WBC     0-5        Normal     0-5        [facility]  
   
                                        Comment on above:   Performed By: #### 8  
[id number] ####DU Urinalysis Automated   
[address] [place] [address]   
   
                      Urine, color Yellow     Normal     Yellow     [facility]  
   
                                        Comment on above:   Performed By: #### 8  
[id number] ####DU Urinalysis Automated   
[address] [place] [address]   
   
                      Urine, erythrocytes 0-3        Normal     0-3        [facility]  
   
                                        Comment on above:   Performed By: #### 8  
[id number] ####DU Urinalysis Automated   
[address] [place] [address]   
   
                      Urine, glucose Negative   Normal     Negative   [facility]  
   
                                        Comment on above:   Performed By: #### 8  
[id number] ####DU Urinalysis Automated   
[address] [place] [address]   
   
                      Urine, ketones presence Negative   Normal     Negative   S  
[facility]  
   
                                        Comment on above:   Performed By: #### 8  
[id number] ####DU Urinalysis Automated   
[address] [place] [address]   
   
                      Urine, urobilinogen Negative   Normal     Negative   [facility]  
   
                                        Comment on above:   Performed By: #### 8  
[id number] ####DU Urinalysis Automated   
[address] [place] [address]   
   
                                                    [facility]et [facility]/Trich & Yeaston    
   
                          Trich & Yeast None Detected Normal       None   
Detected                                [facility]  
   
                                        Comment on above:   Performed By: #### 8  
[id number] ####DU Misc Micro SubSection,   
   
                                                    Progress Noteon 2018   
   
                                                    Transcription   
Authentication   
Interface Message Text                  Patient ID: Danna Soares is a 17   
y.o. female. Her chief   
complaint(s)  
include: Back Pain  
.  
Assessment:  
1. Acute low back pain   
without sciatica,   
unspecified back pain   
laterality  
2. Disturbance of   
conduct  
3. Depression with   
anxiety  
Plan:  
Danna was seen today   
for back pain.  
Diagnoses and all   
orders for this visit:  
Acute low back pain   
without sciatica,   
unspecified back pain   
laterality  
- X-ray thoracic spine   
(4 views); Future  
- X-Ray Lumbar Spine 4   
or More Views; Future  
- X-Ray Lumbar Spine 4   
or More Views  
- X-ray thoracic spine   
(4 views)  
- naproxen (NAPROSYN)   
375 MG tablet; Take 1   
Tab (375 mg) by mouth 2   
times  
daily  
Disturbance of conduct  
Depression with anxiety  
Will check xrays of   
spine, to evaluate for   
possibly bony   
pathology, though  
suspect muscle strain.   
If xrays negative,   
instructed to start   
Naprosyn BID for  
3-5 days. Should call   
for Ortho referral if   
this does not help   
symptoms.  
Will continue court   
mandated counseling,   
and follow up at [facility]   
check as planned.  
____________  
Results of xrays given   
to mom while in office   
on 3/29/18 with sib -   
negative.  
Reviewed plan to use   
Naprosyn BID through   
the weekend and call   
next week if  
needing Ortho referral.  
Subjective:  
She is accompanied by   
her mother and   
sibling(s).  
Back Pain  
The onset has been   
acute (no known injury   
or trauma). The   
duration has been 1  
week. The course is   
worsening (constant   
pain is worsening). The   
location of  
pain/injury in spine is   
mid back and lower back   
(more to the left side,   
does go  
to middle and right   
side).  
Mechanism of injury did   
not include: assault,   
fall, sports injury and   
work  
(starts new job at   
[employer] tomorrow).   
Pain is aggravated by   
flexing and  
walking/running.   
(Picking stuff/little   
sister up).  
Associated symptoms   
include(s) painful ROM   
and decreased ROM.   
Patient denies  
fever, radiating pain,   
numbness and muscle   
weakness. (No vomiting,   
diarrhea, or  
recent illness; no   
urinary symptoms).   
Prior management   
include(s) rest (feels  
better laying flat,   
pain is still there).   
There have been no   
prior visits.  
There have been no   
previous diagnostic   
tests.  
Additional Parental   
Concerns: Mom also   
mentions (with patient   
out of room) that  
Danna is currently on   
probation and in court   
mandated counseling at   
[facility].  
See previous encounters   
regarding arrest in   
[place] and [facility] time.   
She had  
charges filed for   
disorderly conduct. She   
continues to take   
Celexa for  
depression, prescribed   
by me, and is doing   
well.  
Review of Systems  
Musculoskeletal:   
Positive for back pain.  
Objective:  
Physical Exam  
Constitutional: She   
appears well. No   
distress.  
HENT:  
Head: Atraumatic.  
Right Ear: Tympanic   
membrane and external   
ear normal.  
Left Ear: Tympanic   
membrane and external   
ear normal.  
Nose: Nose normal. No   
nasal discharge.  
Mouth/Throat: Throat is   
not red. Mucous   
membranes are moist. No   
tonsillar  
exudate. Oropharynx is   
clear.  
Eyes: Conjunctivae are   
normal.  
Neck: Neck supple. No   
neck adenopathy.  
Cardiovascular: Normal   
rate, regular rhythm,   
S1 normal and S2   
normal.  
No murmur heard.  
Pulmonary/Chest: Breath   
sounds normal. No   
respiratory distress.  
Abdominal: Soft. Bowel   
sounds are normal. She   
exhibits no distension   
and no  
mass. There is no   
hepatosplenomegaly.   
There is no tenderness.  
Musculoskeletal:  
Right hip: Normal.  
Left hip: Normal.   
Cervical back: Normal.  
Thoracic back: She   
exhibits tenderness and   
bony tenderness. She   
exhibits  
normal range of motion,   
no swelling and no   
edema.  
Lumbar back: She   
exhibits tenderness and   
bony tenderness. She   
exhibits  
normal range of motion,   
no swelling and no   
edema.  
Pain to low thoracic   
through lumbar   
vertebrae, worse with   
flexion and extension  
though able to complete   
FROM. Pain on palpation   
of spinous processes   
and  
paraspinal musculature   
from T6/7 through L5.  
Neurological: She is   
alert.  
Skin: Capillary refill   
takes less than 3   
seconds. No rash noted.   
No pallor. Skin  
is warm and dry.  
Vitals reviewed: Blood   
pressure 93/63, pulse   
96, temperature 36.3 C   
(97.3 F),  
temperature source   
Temporal, weight 45.8   
kg, last menstrual   
period 2018.  Normal                                  [facility]  
   
                                                    XR Spine Lumbar w/ Obliqueso  
n 2018   
   
                                        INR Coag RelTime (Bld) Exam   
Date/Time:3/28/2018   
12:13 EDTReason for   
Exam:MID TO LOW BACK   
PAINReportXR SPINE   
LUMBAR W/   
OBLIQUESCLINICAL   
STATEMENT: Mid to low   
back pain for 3 weeks.   
No known   
trauma.TECHNIQUE: 5   
views of the lumbar   
spine were compared to   
x-rays   
2014.FINDINGS:   
There are 5   
nonrib-bearing   
lumbar-type vertebral   
body segments   
noted.Convex left   
curvature of the lumbar   
spine is seen. Pedicles   
appear symmetric.No   
pars defects are seen.   
No compression fracture   
or malalignment   
identified.No   
significant   
degenerative disc   
disease is   
identified.IMPRESSION:N  
o acute osseous   
variation is seen.*****   
FINAL REPORT   
*****Dictated:   
2018 9:06 pm Muna Walden DO   
(Electronic Signature):   
2018 9:06   
pmSigned by: Muna Walden DO Technologist:   
[facility]  
   
                                                    XR Spine Thoracic 3 Viewson   
2018   
   
                                        INR Coag RelTime (Bld) Exam   
Date/Time:3/28/2018   
12:13 EDTReason for   
Exam:MID TO LOW BACK   
PAINReportXR SPINE   
THORACIC 3   
VIEWSCLINICAL   
STATEMENT: Mid to low   
back pain. Symptoms for   
3 weeks. No known   
trauma.TECHNIQUE: 3   
views of the thoracic   
spine were compared to   
chest x-ray   
9/15/2016.FINDINGS:   
S-shaped curvature of   
the thoracolumbar spine   
is seen. 12   
rib-bearingthoracic   
segments are noted.   
Pedicles appear   
symmetric. No   
compression fractureor   
malalignment   
identified. No   
significant   
degenerative disc   
disease is   
seen.Visualized lungs   
appear   
clear.IMPRESSION:S-shap  
ed curvature of the   
thoracolumbar spine. No   
acute osseous variation   
isseen.***** FINAL   
REPORT *****Dictated:   
2018 9:07 pm Muna Walden DO PSigned   
(Electronic Signature):   
2018 9:07   
pmSigned by: Muna Walden DO Technologist:   
GLP                 Normal                                  [facility]  
   
                                                    C Urineon 2017   
   
                                        C Urine             Final Report: Rare   
Normal skin maddison   
isolated            Normal                                  [facility]  
   
                                        Comment on above:   Performed By: #### 2  
[id number] ####DU Microbiology   
[address] [place] [address]   
   
                                                    U BhCG Qlton 2017   
   
                      HCG.beta subunit Qn Negative   Normal     Neg        [facility]  
   
                                        Comment on above:   Performed By: #### 2  
[id number] ####DU Urinalysis Manual   
[address] [place] [address]   
   
                                                    UA Completeon 2017   
   
                      UA Blood   Negative   Normal     Negative   [facility]  
   
                                        Comment on above:   Performed By: #### 8  
[id number] ####DU Urinalysis Automated   
[address] [place] [address]   
   
                      UA Clarity Cloudy     Abnormal   Clear      [facility]  
   
                                        Comment on above:   Performed By: #### 8  
[id number] ####DU Urinalysis Automated   
[address] [place] [address]   
   
                      UA Leuk Est 1+         Abnormal   Negative   [facility]  
   
                                        Comment on above:   Performed By: #### 8  
[id number] ####DU Urinalysis Automated   
[address] [place] [address]   
   
                      UA Mucous  Many       Abnormal   Trace      [facility]  
   
                                        Comment on above:   Performed By: #### 8  
[id number] ####DU Urinalysis Automated   
[address] [place] [address]   
   
                      UA Nitrite Negative   Normal     Negative   [facility]  
   
                                        Comment on above:   Performed By: #### 8  
[id number] ####DU Urinalysis Automated   
[address] [place] [address]   
   
                      UA pH      6.0        Normal     4.6-8.0    [facility]  
   
                                        Comment on above:   Performed By: #### 8  
[id number] ####DU Urinalysis Automated   
[address] [place] [address]   
   
                      UA Protein Negative   Normal     Negative   [facility]  
   
                                        Comment on above:   Performed By: #### 8  
[id number] ####DU Urinalysis Automated   
[address] [place] [address]   
   
                      UA Spec Grav 1.021      Normal     1.003-1.030 [facility]  
   
                                        Comment on above:   Performed By: #### 8  
[id number] ####DU Urinalysis Automated   
[address] [place] [address]   
   
                      UA Squam Epithelial 5-10       Abnormal   0-5        [facility]  
   
                                        Comment on above:   Performed By: #### 8  
[id number] ####DU Urinalysis Automated   
[address] [place] [address]   
   
                      UA Urobilinogen 2.0 mg/dL  Abnormal              [facility]  
   
                                        Comment on above:   Performed By: #### 8  
[id number] ####DU Urinalysis Automated   
[address] [place] [address]   
   
                      UA WBC     10-20      Abnormal   0-5        [facility]  
   
                                        Comment on above:   Performed By: #### 8  
[id number] ####DU Urinalysis Automated   
[address] [place] [address]   
   
                      Urine, color Yellow     Normal     Yellow     [facility]  
   
                                        Comment on above:   Performed By: #### 8  
[id number] ####DU Urinalysis Automated   
[address] [place] [address]   
   
                      Urine, erythrocytes 0-3        Normal     0-3        [facility]  
   
                                        Comment on above:   Performed By: #### 8  
[id number] ####DU Urinalysis Automated   
[address] [place] [address]   
   
                      Urine, glucose Negative   Normal     Negative   [facility]  
   
                                        Comment on above:   Performed By: #### 8  
[id number] ####DU Urinalysis Automated   
[address] [place] [address]   
   
                      Urine, ketones presence Negative   Normal     Negative   [facility]  
   
                                        Comment on above:   Performed By: #### 8  
[id number] ####DU Urinalysis Automated   
[address] [place] [address]   
   
                      Urine, urobilinogen Negative   Normal     Negative   [facility]  
   
                                        Comment on above:   Performed By: #### 8  
[id number] ####DU Urinalysis Automated   
[address] [place] [address]   
  
  
  
Vital Signs  
  
  
                          Date Time    Vital Sign   Value        Performing   
Clinician                               Facility  
   
                                                    2024   
22:                              Diastolic blood   
pressure                  81 mm[Hg]                 Winston Gordillo MD  
Work Phone:   
[phone]                          [facility]  
   
                                                    2024   
22:          Heart rate          91 /min             Winston Gordillo MD  
Work Phone:   
[phone]                          [facility]  
   
                                                    2024   
22:          Respiratory rate    18 /min             Winston Gordillo MD  
Work Phone:   
[phone]                          [facility]  
   
                                                    2024   
22:                              SaO2% (BldA) [Mass   
fraction]                 100 %                     Winston Gordillo MD  
Work Phone:   
[phone]                          [facility]  
   
                                                    2024   
22:                              Systolic blood   
pressure                  112 mm[Hg]                Winston Gordillo MD  
Work Phone:   
[phone]                          [facility]  
   
                                                    2024   
21:          Body height         149.9 cm            Winston Gordillo MD  
Work Phone:   
[phone]                          [facility]  
   
                                                    2024   
21:                              Body mass index   
(BMI) [Ratio]             18.99 kg/m2               Winston Gordillo MD  
Work Phone:   
[phone]                          [facility]  
   
                                                    2024   
21:          Body temperature    98.2 [degF]         Winston Gordillo MD  
Work Phone:   
[phone]                          [facility]  
   
                                                    2024   
21:          Body weight         42.64 kg            Winston Gordillo MD  
Work Phone:   
[phone]                          [facility]  
   
                                                    2024   
18:          Body temperature    98.4 [degF]         Axel Radford MD  
Work Phone:   
[phone]                          [facility]  
   
                                                    2024   
18:                              Diastolic blood   
pressure                  78 mm[Hg]                 Axel Radford MD  
Work Phone:   
[phone]                          [facility]  
   
                                                    2024   
18:          Heart rate          84 /min             Axel Radford MD  
Work Phone:   
[phone]                          [facility]  
   
                                                    2024   
18:          Respiratory rate    16 /min             Axel Radford MD  
Work Phone:   
[phone]                          [facility]  
   
                                                    2024   
18:                              SaO2% (BldA) [Mass   
fraction]                 99 %                      Axel Radford MD  
Work Phone:   
[phone]                          [facility]  
   
                                                    2024   
18:                              Systolic blood   
pressure                  114 mm[Hg]                Axel Radford MD  
Work Phone:   
[phone]                          [facility]  
   
                                                    2024   
16:          Body temperature    99.1 [degF]         Christiano Knight MD  
Work Phone:   
[phone]                          [facility]  
   
                                                    2024   
16:                              Diastolic blood   
pressure                  77 mm[Hg]                 Christiano Knight MD  
Work Phone:   
[phone]                          [facility]  
   
                                                    2024   
16:          Heart rate          74 /min             Christiano Knight MD  
Work Phone:   
[phone]                          [facility]  
   
                                                    2024   
16:          Respiratory rate    14 /min             Christiano Knight MD  
Work Phone:   
[phone]                          [facility]  
   
                                                    2024   
16:                              SaO2% (BldA) [Mass   
fraction]                 100 %                     Christiano Knight MD  
Work Phone:   
[phone]                          [facility]  
   
                                                    2024   
16:                              Systolic blood   
pressure                  137 mm[Hg]                Christiano Knight MD  
Work Phone:   
[phone]                          [facility]  
   
                                                    2024   
20:          Body height         149.9 cm            Christiano Knight MD  
Work Phone:   
[phone]                          [facility]  
   
                                                    2024   
20:                              Body mass index   
(BMI) [Ratio]             22.81 kg/m2               Christiano Knight MD  
Work Phone:   
[phone]                          [facility]  
   
                                                    2024   
20:          Body weight         51.26 kg            Christiano Knight MD  
Work Phone:   
[phone]                          [facility]  
   
                                                    2022   
09:      Body temperature 98.24 [degF]    No Pcp Required [facility]  
   
                                                    2022   
09:                              Diastolic blood   
pressure            81 mm[Hg]           No Pcp Required     [facility]  
   
                                                    2022   
09:      Heart rate      94 /min         No Pcp Required [facility]  
   
                                                    2022   
09:      Respiratory rate 17 /min         No Pcp Required [facility]  
   
                                                    2022   
09:                              SaO2% (BldA) [Mass   
fraction]           98 %                No Pcp Required     [facility]  
   
                                                    2022   
09:                              Systolic blood   
pressure            117 mm[Hg]          No Pcp Required     [facility]  
   
                                                    2022   
21:                              Diastolic blood   
pressure            106 mm[Hg]          No Pcp Required     [facility]  
   
                                                    2022   
21:      Heart rate      79 /min         No Pcp Required [facility]  
   
                                                    2022   
21:      Respiratory rate 18 /min         No Pcp Required [facility]  
   
                                                    2022   
21:                              SaO2% (BldA) [Mass   
fraction]           98 %                No Pcp Required     [facility]  
   
                                                    2022   
21:                              Systolic blood   
pressure            131 mm[Hg]          No Pcp Required     [facility]  
   
                                                    2022   
19:      Body height     149.8 cm        No Pcp Required [facility]  
   
                                                    2022   
19:      Body temperature 98.78 [degF]    No Pcp Required [facility]  
   
                                                    2022   
19:      Body weight     45.5 kg         No Pcp Required [facility]  
   
                                                    2022   
10:      Body temperature 98.06 [degF]    No Pcp Required [facility]  
   
                                                    2022   
10:                              Diastolic blood   
pressure            92 mm[Hg]           No Pcp Required     [facility]  
   
                                                    2022   
10:      Heart rate      97 /min         No Pcp Required [facility]  
   
                                                    2022   
10:      Respiratory rate 16 /min         No Pcp Required [facility]  
   
                                                    2022   
10:                              SaO2% (BldA) [Mass   
fraction]           99 %                No Pcp Required     [facility]  
   
                                                    2022   
10:                              Systolic blood   
pressure            124 mm[Hg]          No Pcp Required     [facility]  
   
                                                    01-   
18:                              Diastolic blood   
pressure            81 mm[Hg]           No Pcp Required     [facility]  
   
                                                    01-   
18:      Heart rate      104 /min        No Pcp Required [facility]  
   
                                                    01-   
18:      Respiratory rate 16 /min         No Pcp Required [facility]  
   
                                                    01-   
18:                              SaO2% (BldA) [Mass   
fraction]           97 %                No Pcp Required     [facility]  
   
                                                    01-   
18:                              Systolic blood   
pressure            114 mm[Hg]          No Pcp Required     [facility]  
   
                                                    01-   
14:      Body height     149.8 cm        No Pcp Required [facility]  
   
                                                    01-   
14:      Body temperature 98.06 [degF]    No Pcp Required [facility]  
   
                                                    01-   
14:      Body weight     54.5 kg         No Pcp Required [facility]  
   
                                                    2021   
19:      Body height     149.8 cm        No Pcp Required [facility]  
   
                                                    2021   
19:      Body temperature 99.32 [degF]    No Pcp Required [facility]  
   
                                                    2021   
19:      Body weight     54.5 kg         No Pcp Required [facility]  
   
                                                    2021   
19:                              Diastolic blood   
pressure            69 mm[Hg]           No Pcp Required     [facility]  
   
                                                    2021   
19:      Heart rate      75 /min         No Pcp Required [facility]  
   
                                                    2021   
19:      Respiratory rate 16 /min         No Pcp Required [facility]  
   
                                                    2021   
19:                              SaO2% (BldA) [Mass   
fraction]           95 %                No Pcp Required     [facility]  
   
                                                    2021   
19:                              Systolic blood   
pressure            105 mm[Hg]          No Pcp Required     [facility]  
   
                                                    2021   
08:                              BMI (Body Mass   
Index)              23.11 kg/m2         [facility]  
   
                                                    2021   
08:      Body weight     51.9 kg         [facility]  
   
                                                    2021   
08:      Body Temperature 97.7 [degF]     [facility]  
   
                                                    2021   
08:      BP Diastolic    89 mm[Hg]       [facility]  
   
                                                    2021   
08:      BP Systolic     134 mm[Hg]      [facility]  
   
                                                    2021   
08:      Height          149.9 cm        [facility]  
   
                                                    2021   
08:      Pulse (Heart Rate) 72 /min         [facility]  
   
                                                    2021   
08:      Pulse Oximetry  99 %            [facility]  
   
                                                    2021   
08:      Respiratory Rate 19 /min         [facility]  
   
                                                    2020   
15:                              BMI (Body Mass   
Index)              20.2 kg/m2          Marguerite Steevnshi [facility]  
Work Phone:   
[phone]  
   
                                                    2020   
15:      Body weight     45.36 kg        Marguerite Stevenat   [facility]  
   
2nd Fl  
Work Phone:   
[phone]  
   
                                                    2020   
15:      BP Diastolic    61 mm[Hg]       Marguerite Marizol   [facility]  
   
2nd Fl  
Work Phone:   
[phone]  
   
                                                    2020   
15:      BP Systolic     96 mm[Hg]       Marguerite Marizol   [facility]  
   
2nd Fl  
Work Phone:   
[phone]  
   
                                                    2020   
15:                              BSA (Body Surface   
Area)               1.37 m2             Marguerite Marizol       [facility]   
2nd Fl  
Work Phone:   
[phone]  
   
                                                    2020   
15:      Height          149.86 cm       Marguerite Marizol   [facility]  
   
2nd Fl  
Work Phone:   
[phone]  
   
                                                    2020   
15:      Pulse (Heart Rate) 82 /min         Marguerite Marizol   [facility]  
own   
2nd Fl  
Work Phone:   
[phone]  
   
                                                    2020   
15:                      33 1            Marguerite Marizol   [facility]  
   
2nd Fl  
Work Phone:   
[phone]  
   
                                                    Comment on   
above:                                  BMI Percentile   
   
                                                    2020   
15:                      4 1             Marguerite Marizol   [facility]  
   
2nd Fl  
Work Phone:   
[phone]  
   
                                                    Comment on   
above:                                  2-20 Weight Percentile   
   
                                                    2020   
15:                      1 1             Marguerite Marizol   [facility]  
   
2nd Fl  
Work Phone:   
[phone]  
   
                                                    Comment on   
above:                                  2-20 Stature Percentile   
   
                                                    2020   
15:                      0 1             Marguerite Marizol   [facility]  
   
2nd Fl  
Work Phone:   
[phone]  
   
                                                    Comment on   
above:                                  Pain Scale   
   
                                                    2020   
15:                              BMI (Body Mass   
Index)              18.99 kg/m2         Marguerite Marizol       [facility]   
2nd Fl  
Work Phone:   
[phone]  
   
                                                    2020   
15:      Body weight     42.64 kg        Marguerite Stevenat   [facility]  
   
2nd Fl  
Work Phone:   
[phone]  
   
                                                    2020   
15:      BP Diastolic    70 mm[Hg]       Marguerite Marizol   [facility]  
   
2nd Fl  
Work Phone:   
[phone]  
   
                                                    2020   
15:      BP Systolic     110 mm[Hg]      Margueriteray Stevenat   [facility]  
   
2nd Fl  
Work Phone:   
[phone]  
   
                                                    2020   
15:                              BSA (Body Surface   
Area)               1.34 m2             Margueriteray Stevenat       [facility]   
2nd Fl  
Work Phone:   
[phone]  
   
                                                    2020   
15:      Height          149.86 cm       Marguerite Stevenat   [facility]  
   
2nd Fl  
Work Phone:   
[phone]  
   
                                                    2020   
15:      Pulse (Heart Rate) 81 /min         Marguerite Stevenat   [facility]  
own   
2nd Fl  
Work Phone:   
[phone]  
   
                                                    2020   
15:                      0 1             Margueriteray Stevenat   [facility]  
   
2nd Fl  
Work Phone:   
[phone]  
   
                                                    Comment on   
above:                                  Pain Scale   
   
                                                    2020   
15:                      17 1            Marguerite Marizol   [facility]  
   
2nd Fl  
Work Phone:   
[phone]  
   
                                                    Comment on   
above:                                  BMI Percentile   
   
                                                    2020   
15:                      1 1             Marguerite Stevenat   [facility]  
   
2nd Fl  
Work Phone:   
[phone]  
   
                                                    Comment on   
above:                                  2-20 Stature Percentile   
   
                                                            2-20 Weight Percenti  
le   
   
                                                    2020   
17:                              BMI (Body Mass   
Index)              18.38 kg/m2         Corinne Bazella     [facility]  
[address]  
Work Phone:   
[phone]  
   
                                                    2020   
17:      Body weight     41.27 kg        Corinne Bazella [facility]  
[place]  
[address]  
Work Phone:   
[phone]  
   
                                                    2020   
17:      BP Diastolic    73 mm[Hg]       Corinne Bazella [facility]  
[place]  
[address]  
Work Phone:   
[phone]  
   
                                                    2020   
17:      BP Systolic     105 mm[Hg]      Corinne Bazella [facility]  
[place]  
[address]  
Work Phone:   
[phone]  
   
                                                    2020   
17:                              BSA (Body Surface   
Area)               1.32 m2             Corinne Bazella     [facility]  
[address]  
Work Phone:   
[phone]  
   
                                                    2020   
17:      Height          149.86 cm       Corinne Bazella [facility]  
[place]  
[address]  
Work Phone:   
[phone]  
   
                                                    2020   
17:      Pulse (Heart Rate) 101 /min        Corinneraul Nixonla [facility]  
[address]  
[address]  
Work Phone:   
[phone]  
   
                                                    2020   
17:                      1 1             Corinne Bazella [facility]  
[place]  
[address]  
Work Phone:   
[phone]  
   
                                                    Comment on   
above:                                  2-20 Stature Percentile   
   
                                                            2-20 Weight Percenti  
le   
   
                                                    2020   
17:                      10 1            Corinne Bazella [facility]  
[place]  
[address]  
Work Phone:   
[phone]  
   
                                                    Comment on   
above:                                  BMI Percentile   
   
                                                    2020   
17:                      3 1             Corinne Bazella [facility]  
[place]  
[address]  
Work Phone:   
[phone]  
   
                                                    Comment on   
above:                                     
   
                                                    2020   
17:                      0 1             Corinne Bazella [facility]  
[place]  
[address]  
Work Phone:   
[phone]  
   
                                                    Comment on   
above:                                  Para   
   
                                                    2020   
18:      BP Diastolic    81 mm[Hg]       Eliot Aguirre    [facility]  
   
                                                    2020   
18:      BP Systolic     137 mm[Hg]      Eliot Aguirre    [facility]  
   
                                                    2020   
18:      Pulse Oximetry  100 %           Eliot Aguirre    [facility]  
   
                                                    2020   
17:      Pulse (Heart Rate) 124 /min        Eliotkendall Aguirre    [facility]  
   
                                                    2020   
17:      Body Temperature 97.9 [degF]     Eliot Aguirre    [facility]  
   
                                                    2020   
17:      Respiratory Rate 16 /min         Eliotkendall Aguirre    [facility]  
   
                                                    2020   
17:      Body weight     36.29 kg        Eliot Aguirre    [facility]  
   
                                                    2019   
14:                              BMI (Body Mass   
Index)              18.58 kg/m2         Chelsie Bowden          [facility] [address]   
[place]  
Work Phone:   
[phone]  
   
                                                    2019   
14:      Body weight     41.73 kg        Chelsie Bowden      [facility]  
0   
[place]  
Work Phone:   
[phone]  
   
                                                    2019   
14:      BP Diastolic    71 mm[Hg]       Chelsie Bowden      [facility]  
[facility]   
[place]  
Work Phone:   
[phone]  
   
                                                    2019   
14:      BP Systolic     103 mm[Hg]      Chelsie Bowden      [facility]  
[facility]   
[place]  
Work Phone:   
[phone]  
   
                                                    2019   
14:                              BSA (Body Surface   
Area)               1.33 m2             Chelsie Bowden          [facility] [address]   
[place]  
Work Phone:   
[phone]  
   
                                                    2019   
14:      Height          149.86 cm       Chelsie Bowden      [facility]  
[facility]   
[place]  
Work Phone:   
[phone]  
   
                                                    2019   
14:      Pulse (Heart Rate) 79 /min         Chelsie Bowden      [facility]   
[address]   
[place]  
Work Phone:   
[phone]  
   
                                                    2019   
14:                      0 1             Chelsie Bowden      [facility]  
[facility]   
[place]  
Work Phone:   
[phone]  
   
                                                    Comment on   
above:                                  Para   
   
                                                    2019   
14:                      1 1             Chelsie Bowden      [facility]  
[facility]   
[place]  
Work Phone:   
[phone]  
   
                                                    Comment on   
above:                                     
   
                                                            2-20 Weight Percenti  
le   
   
                                                    2019   
14:                      2 1             Chelsie Bowden      [facility]  
[facility]   
[place]  
Work Phone:   
[phone]  
   
                                                    Comment on   
above:                                  2-20 Stature Percentile   
   
                                                    2019   
14:                      14 1            Chelsie Bowden      [facility]  
[facility]   
[place]  
Work Phone:   
[phone]  
   
                                                    Comment on   
above:                                  BMI Percentile   
   
                                                    2019   
12:                              BMI (Body Mass   
Index)              17.37 kg/m2         Chelsie Bowden          [facility]   
2nd Fl  
Work Phone:   
[phone]  
   
                                                    2019   
12:      Body weight     39.01 kg        Chelsie Bowden      [facility]  
   
2nd Fl  
Work Phone:   
[phone]  
   
                                                    2019   
12:      BP Diastolic    86 mm[Hg]       Chelsie Bowden      [facility]  
   
2nd Fl  
Work Phone:   
[phone]  
   
                                                    2019   
12:      BP Systolic     120 mm[Hg]      Chelsie Bowden      [facility]  
   
2nd Fl  
Work Phone:   
[phone]  
   
                                                    2019   
12:                              BSA (Body Surface   
Area)               1.29 m2             Chelsie Bowden          [facility]   
2nd Fl  
Work Phone:   
[phone]  
   
                                                    2019   
12:      Height          149.86 cm       Chelsie Bowden      [facility]  
   
2nd Fl  
Work Phone:   
[phone]  
   
                                                    2019   
12:      Pulse (Heart Rate) 81 /min         Chelsie Bowden      [facility]  
own   
2nd Fl  
Work Phone:   
[phone]  
   
                                                    2019   
12:                      4 1             Chelsie Bowden      [facility]  
   
2nd Fl  
Work Phone:   
[phone]  
   
                                                    Comment on   
above:                                  BMI Percentile   
   
                                                    2019   
12:                      1 1             Chelsie Bowden      [facility]  
   
2nd Fl  
Work Phone:   
[phone]  
   
                                                    Comment on   
above:                                  2-20 Weight Percentile   
   
                                                               
   
                                                    2019   
12:                      0 1             Chelsie Bowden      [facility]  
   
2nd Fl  
Work Phone:   
[phone]  
   
                                                    Comment on   
above:                                  Pain Scale   
   
                                                            Para   
   
                                                    2019   
12:                      2 1             Chelsie Bowden      [facility]  
   
2nd Fl  
Work Phone:   
[phone]  
   
                                                    Comment on   
above:                                  2-20 Stature Percentile   
  
  
  
Encounters  
  
  
                          Encounter Date Encounter Type Care Provider Facility  
   
                                                    Start: 2024  
End: 2024                         Emergency department   
patient visit             WINSTON GORDILLO              [facility]  
   
                                                    Start: 2024  
End: 2024                         Emergency department   
patient visit                           Winston Gordillo MD  
Work Phone:   
[phone]                          [facility] Emergency   
Medicine  
   
                                        Comment on above:   Nausea and vomiting,  
 unspecified vomiting type (Primary Dx)   
   
                                                    Start: 02-  
End: 02-                         Emergency department   
patient visit             [facility]  
   
                                                    Start: 2024  
End: 02-                         Emergency department   
patient visit             UNKNOWN PROVIDER          Facility:[facility]  
   
                                                    Start: 2024  
End: 2024                         Emergency department   
patient visit                           Axel Radford MD  
Work Phone:   
[phone]                          [facility] Emergency   
Medicine  
   
                                        Comment on above:   Seizures (Family rep  
orts  months  of seizures without   
medications.   
Drove 1 hour to get here today with two seizures in the car.   
Endorses nausea and vomiting. )   
   
                                                    Start: 2024  
End: 2024                         Emergency department   
patient visit             CHRISTIANO SANTOS          [facility]  
   
                                                    Start: 2024  
End: 2024     ambulatory          BOB COOPER      [facility]  
al  
   
                                                    Start: 2024  
End: 2024                         Emergency department   
patient visit                           Christiano Knight MD  
Work Phone:   
[phone]                          [facility]   
2nd Floor  
   
                                        Comment on above:   Intractable nausea a  
nd vomiting   
   
                                                    Start: 2024  
End: 2024                         Emergency department   
patient visit             IVAN GODINEZ [facility]  
   
                                                    Start: 02-  
End: 02-                         Emergency department   
patient visit             PHYSICIAN KAYLEEN              [facility]  
   
                                                    Start: 02-  
End: 02-                         Emergency department   
patient visit             ELMIRA MANCINI            [facility]  
   
                                                    Start: 02-  
End: 02-                         Emergency department   
patient visit             PHYSICIAN KAYLEEN              [facility]  
   
                                                    Start: 2024  
End: 2024                         Emergency department   
patient visit             IVAN GODINEZ [facility]  
   
                                                    Start: 03-  
End: 2022                         Evaluation and   
management of inpatient   Mitra Camara                [facility] 3 [facility]   
[address] 01  
   
                                                    Start: 2022  
End: 2022                         Emergency department   
patient visit             Ivan HEATHER [facility] 09  
   
                                                    Start: 2022  
End: 2022                         Evaluation and   
management of inpatient   Nicolas Connolly        [facility] 2 ACS Med Surg 03  
   
                                                    Start: 01-  
End: 01-                         Emergency department   
patient visit             Priscilla I Morocaelw              [facility] Emergency 09  
   
                                                    Start: 2021  
End: 2021                         Emergency department   
patient visit             Priscilla I Moomaw              [facility] Emergency  
   
                                                    Start: 2021  
End: 2021                         Emergency department   
patient visit                           Leo Turner  
Work Phone:   
[phone]                          [facility]   
Emergency Department  
   
                                        Start: 2020   Patient encounter   
procedure                 Marguerite Marizol             [facility]  
Work Phone:   
[phone]  
   
                                        Start: 2020   Patient encounter   
procedure                 Marguerite Marizol             [facility]  
Work Phone:   
[phone]  
   
                                        Start: 2020   Patient encounter   
procedure                 Corinne Bazella           [id number]   
[address]  
Work Phone:   
[phone]  
   
                                                    Start: 2020  
End: 2020                         Emergency department   
patient visit                           Eliot Puentesreynaldo Aguirre  
Work Phone:   
[phone]                          [facility]   
Emergency Department  
   
                                        Start: 2020   Patient encounter   
procedure                 Corinne Bazella           [facility]   
[address]  
Work Phone:   
[phone]  
   
                                        Start: 2020   Patient encounter   
procedure                 Corinne Bazella           [facility]   
[address]  
Work Phone:   
[phone]  
   
                                        Start: 2019   Patient encounter   
procedure                 Corinne Bazella           [facility]   
[address]  
Work Phone:   
[phone]  
   
                                        Start: 2019   Patient encounter   
procedure                 Chelsie Bowden                [facility] [address]  
Work Phone:   
[phone]  
   
                                        Start: 2019   Patient encounter   
procedure                 Jerri Tabor              [facility]  
Work Phone:   
[phone]  
   
                                        Start: 2019   Patient encounter   
procedure                 Chelsie Bowden                [facility]  
Work Phone:   
[phone]  
   
                                        Start: 2019   Patient encounter   
procedure                 Chelsie Bowden                [facility]  
Work Phone:   
[phone]  
   
                                        Start: 2019   Patient encounter   
procedure                 Chelsie Bowden                [facility]  
Work Phone:   
[phone]  
   
                                                    Start: 2019  
End: 2019                         Patient encounter   
procedure                 SHAHANA [facility]  
   
                                                    Start: 2018  
End: 2018                         Patient encounter   
procedure                 SHAHANA [facility]  
   
                                                    Start: 06-  
End: 06-                         Patient encounter   
procedure                 SHAHANA [facility]  
   
                                                    Start: 2018  
End: 2018                         Patient encounter   
procedure                 SHAHANA [facility]  
   
                                                    Start: 2018  
End: 2018                         Emergency department   
patient visit             Shahana Mitchell         Facility:[facility]  
   
                                                    Start: 2018  
End: 2018     Ambulatory          Shahana Mitchell   Facility:[facility]  
   
                                                    Start: 2017  
End: 2017                         Emergency department   
patient visit             Shahana Mitchell         Facility:[facility]  
  
  
  
Procedures  
  
  
                          Date         Procedure    Procedure Detail Performing   
Clinician  
   
                                                    Start:   
2024                              Basic metabolic 2000 panel -   
Serum or Plasma                                     IVAN LEMASTERS  
   
                                                    Start:   
2024                              CBC W Auto Differential panel   
- Blood                                             IVAN LEMASTERS  
   
                                                    Start:   
2024                              Hepatic function 2000 panel -   
Serum or Plasma                                     IVAN LEMASTERS  
   
                                                    Start:   
2024          LIGHT BLUE TOP                          IVAN LEMASTERS  
   
                                                    Start:   
2024                              Lipase [Enzymatic   
activity/volume] in Serum or   
Plasma                                              IVAN LEMASTERS  
   
                                                    Start:   
2024                              Manual Differential panel -   
Blood                                               IVAN LEMASTERS  
   
                                                    Start:   
2024          RAINBOW DRAW                            IVAN LEMASTERS  
   
                                                    Start:   
2024          SST TOP                                 IVAN LEMASTERS  
   
                                                    Start:   
2024                              Basic metabolic panel calcium   
total                                               Winston Gordillo MD  
Work Phone:   
[phone]  
   
                                                    Start:   
2024                              Basic metabolic panel calcium   
total                                               June Kiel STEPHENN-CNP  
Work Phone:   
[phone]  
   
                                                    Start:   
2024                              Bacteria identified in Urine   
by Culture                                          IVAN LEMASTERS  
   
                                                    Start:   
2024          DRUG SCREEN,URINE                       IVAN LEMASTERS  
   
                                                    Start:   
2024          HCG, URINE, QUALITATIVE                     IVAN RAGHAV  
S  
   
                                                    Start:   
2024          MICROSCOPIC ONLY, URINE                     IVAN RAGHAV  
S  
   
                                                    Start:   
2024                              URINALYSIS WITH REFLEX CULTURE   
AND MICROSCOPIC                                     IVAN LEMASTERS  
   
                                                    Start:   
2024          CT HEAD WO IV CONTRAST                     IVAN LEMASTERS  
   
                                                    Start:   
2024          EXTRA TUBES                             IVAN LEMASTERS  
   
                                                    Start:   
2024          EXTRA URINE GRAY TUBE                     IVAN LEMASTERS  
   
                                                    Start:   
2024                              CBC W Auto Differential panel   
- Blood                                             IVAN LEMASTERS  
   
                                                    Start:   
2024                              Comprehensive metabolic 2000   
panel - Serum or Plasma                             IVAN LEMASTERS  
   
                                                    Start:   
2024                              Magnesium [Mass/volume] in   
Serum or Plasma                                     IVAN LEMASTERS  
   
                                                    Start:   
2024          PROLACTIN                               IVAN LEMASTERS  
   
                                                    Start:   
2024          ECG 12-LEAD                             IVAN LEMASTERS  
   
                                                    Start:   
2024                              Toxin/antitoxin assay tissue   
culture                                             Bob Cooper MD  
Work Phone:   
[phone]  
   
                                                    Start:   
2024                              Complete blood count with   
white cell differential,   
automated                                           Juancho Apodaca MD  
Work Phone:   
[phone]  
   
                                                    Start:   
2024          Comprehensive metabolic panel                     Juancho rivera MD  
Work Phone:   
[phone]  
   
                                                    Start:   
2024                              Drug tst prsmv instrmnt chem   
analyzers pr date                                   Christiano Knight MD  
Work Phone:   
[phone]  
   
                                                    Start:   
2024          Comprehensive metabolic panel                     Christiano Knight MD  
Work Phone:   
[phone]  
   
                                                    Start:   
2024                              CBC W Auto Differential panel   
- Blood                                             IVAN COLEY  
   
                                                    Start:   
2024                              Comprehensive metabolic 2000   
panel - Serum or Plasma                             IVAN LEMASTERS  
   
                                                    Start:   
2024                              Lipase [Enzymatic   
activity/volume] in Serum or   
Plasma                                              IVAN LEMASTERS  
   
                                                    Start:   
2024          INSERT PERIPHERAL IV                     IVAN LEMROBERT  
   
                                                    Start:   
2024          ECG 12-LEAD                             IVAN LEMASTERS  
   
                                                    Start:   
2024          HCG, URINE, QUALITATIVE                     IVAN RAGHAV  
S  
   
                                                    Start:   
2024                              Bacteria identified in Urine   
by Culture                                          IVAN BRIJESH  
   
                                                    Start:   
2024          EXTRA URINE GRAY TUBE                     IVAN LEMROBERT  
   
                                                    Start:   
2024          MICROSCOPIC ONLY, URINE                     IVAN RAGHAV  
S  
   
                                                    Start:   
2024                              URINALYSIS WITH REFLEX CULTURE   
AND MICROSCOPIC                                     IVAN LEMROBERT  
   
                                                    Start:   
2024          INFLUENZA A AND B PCR                     IVAN LEMROBERT  
   
                                                    Start:   
2024          SARS-COV-2 PCR                          IVAN LEMROBERT  
   
                                                    Start:   
2024                              INITIATE DROPLET PLUS   
ISOLATION                                           IVAN BRIJESH  
   
                                                    Start:   
2024                              CBC W Auto Differential panel   
- Blood                                             IVAN BRIJESH  
   
                                                    Start:   
2024                              Comprehensive metabolic 2000   
panel - Serum or Plasma                             IVAN BRIJESH  
   
                                                    Start:   
2024          INSERT PERIPHERAL IV                     IVAN LEMASTERS  
   
                                                    Start:   
2024                              Glucose [Mass/volume] in Serum   
or Plasma                                           IVAN LEMROBERT  
   
                                                    Start:   
03-  
End: 03-     Gas panel - Arterial blood                     Becky Dials  
   
                                                    Start:   
2022  
End: 2022     EKG impression                          Priscilla I Moomaw  
   
                                                    Start:   
2020          MAC Imaging Order                       Marguerite Marizol  
   
                                                    Start:   
2020          MAC Imaging Order                       Corinne Bazella  
   
                                                    Start:   
2020          COVID-19, MOLECULAR                     Octavia Odette Messina  
Work Phone:   
[phone]  
   
                                                    Start:   
2020                              Choriogonadotropin   
[Units/volume] in Serum or   
Plasma                                              Octavia Messina  
Work Phone:   
[phone]  
   
                                                    Start:   
2020                              Complete blood count with   
white cell differential,   
automated                                           Octavia Messina  
Work Phone:   
[phone]  
   
                                                    Start:   
2020                              Complete blood count with   
white cell differential,   
manual                                              Octavia Messina  
Work Phone:   
[phone]  
   
                                                    Start:   
2020                              Comprehensive metabolic 2000   
panel - Serum or Plasma                             Octavia Messina  
Work Phone:   
[phone]  
   
                                                    Start:   
2020          GREY TOP                                Octavia Messina  
Work Phone:   
[phone]  
   
                                                    Start:   
2020          LAVENDER TOP                            Octavia Messina  
Work Phone:   
[phone]  
   
                                                    Start:   
2020          LIGHT BLUE TOP                          Octavia Messina  
Work Phone:   
[phone]  
   
                                                    Start:   
2020          LIGHT GREEN TOP                         Octavia Messina  
Work Phone:   
[phone]  
   
                                                    Start:   
2020          MINT GREEN TOP                          Octavia Messina  
Work Phone:   
[phone]  
   
                                                    Start:   
2020          PINK TOP                                Octavia Messina  
Work Phone:   
[phone]  
   
                                                    Start:   
2020          RAINBOW DRAW                            Octavia Messina  
Work Phone:   
[phone]  
   
                                                    Start:   
2019          Surgical Pathology                      Chelsie Bowden  
   
                                                    Start:   
2019          Antibody rubella                        Chelsie Bowden  
   
                                                    Start:   
2019                              Complete Blood Count Pregnancy   
Anemia Panel with reflex                            Chelsie Bowden  
   
                                                    Start:   
2019                              Culture bacterial quanttative   
colony count urine                                  Chelsie Bowden  
   
                                                    Start:   
2019          Hemoglobin Identification                     Chelsie Bowden  
   
                                                    Start:   
2019          Hepatitis c antibody                     Chelsie Bowden  
   
                                                    Start:   
2019                              Iaad ia hepatitis b surface   
antigen                                             Chelsie Bowden  
   
                                                    Start:   
2019                              Iadna chlamydia trachomatis   
amplified probe tq                                  Chelsie Bowden  
   
                                                    Start:   
2019          SYPHILIS SCREENING WITH REFLEX                     Chelsie leone  
   
                                                    Start:   
2019          Type and Screen                         Chelsie Bowden  
  
  
  
Plan of Treatment  
  
  
                          Date         Care Activity Detail       Author  
   
                                        Start: 2051   Shingles (RZV)   
Vaccine (1 of 2)                        Shingles (RZV) Vaccine   
(1 of 2)                                [facility]  
   
                                        Start: 2051   Zoster Vaccines (1 o  
f   
2)                                      Zoster Vaccines (1 of   
2)                                      [facility]  
   
                                        Start: 2023   DTaP/Tdap/Td Vaccine  
s   
(7 - Td or Tdap)                        DTaP/Tdap/Td Vaccines   
(7 - Td or Tdap)                        [facility]  
   
                          Start: 2023 Tetanus vaccination              [facility]  
   
                                        Start: 2023   COVID-19 VACCINE ( season)                         COVID-19 VACCINE ( season)                         [facility]  
   
                          Start: 2023 Influenza vaccination INFLUENZA VACC  
INE (#1) [facility]  
   
                                        Start: 2022   Screening for   
malignant neoplasm of   
cervix                                              [facility]  
   
                                                    Start: 2022  
End: 2023                                     Sore Throat Lozenge 1   
Oral Lozenge Every 2   
Hours PRN ; LozengeDOSE   
= 1 lozenge(s) Oral   
Every 4 Hours, PRN Sore   
ThroatCa   
lozenge(s)/DOSE x 1 = 1   
lozenge(s)/Dose (Daily   
Total is 6 lozenge(s))   
Start: 11-Mar-2022 End:   
11-Mar-2023 Ordered:   
11-Mar-2022 Mitra Camara   
Intent                                  [facility]  
   
                                                    Start: 03-  
End: 2023                                     Acetaminophen 325 mg   
Oral Tablet Every 4   
Hours PRN ; Tablet   
(TYLENOL)DOSE = 650 mg   
Oral Every 4 Hours, PRN   
Pain - Mild (1-3)   
Start: 10-Mar-2022 End:   
10-Mar-2023 Ordered:   
10-Mar-2022 Becky Hyman   
Intent                                  [facility]  
   
                                                    Start: 2022  
End: 2022                                     Technetium Tc 99m   
Sulfur Colloid -   
(Radiology Contrast) .   
; DOSE = 500 microCurie   
Oral Once Start:   
2022 End:   
2022 Ordered:   
2022 Becky Hyman   
Intent                                  [facility]  
   
                                                    Start: 2022  
End: 2023                                             [facility]  
   
                                        Start: 2019   Screening for   
Chlamydia trachomatis                   STI Screening (Age   
18-24)                                  [facility]  
   
                                        Start: 10-   Hepatitis A (HAV)   
Vaccine (2 of 2 -   
2-dose series)                          Hepatitis A (HAV)   
Vaccine (2 of 2 -   
2-dose series)                          [facility]  
   
                                        Start: 10-   Hepatitis A Vaccines  
   
(2 of 2 - 2-dose   
series)                                 Hepatitis A Vaccines (2   
of 2 - 2-dose series)                   [facility]  
   
                                        Start: 2017   Screening for   
Chlamydia trachomatis     CHLAMYDIA SCREEN          [facility]  
   
                          Start: 2016 HIV screening              [facility]  
   
                          Start: 2002 COVID-19 Vaccine (#1) COVID-19 Vacci  
ne (#1) [facility]  
   
                                Start: 2001 Hepatitis C screening HEPATITI  
S C VIRUS   
SCREENING                               [facility]  
   
                          Start: 2001 Lipid panel  Lipid Panel  [facility]  
   
                                        Start: 2001   Screening for   
Chlamydia trachomatis     GONORRHEA SCREEN          [facility]  
   
                          Start: 2001 Yearly Adult Physical Yearly Adult P  
hysical [facility]  
   
                                                      
End: 2024     CALPROTECTIN, STOOL                     [facility]  
   
                                        Comment on above:   One Time for 1 Occur  
rences starting 2024 until   
2024   
   
                                                      
End: 2024     GI PANEL,PCR                            [facility]  
Work Phone:   
[phone]  
   
                                        Comment on above:   One Time for 1 Occur  
rences starting 2024 until   
2024   
   
                                                      
End: 2024     [facility]  
Work Phone:   
[phone]  
   
                                        Comment on above:   Once for 1 Occurrenc  
es starting 2024 until 2024   
   
                                                      
End: 2024     [facility]  
Work Phone:   
[phone]  
   
                                        Comment on above:   Once for 1 Occurrenc  
es starting 2024 until 2024   
   
                                                      
End: 2024     [place]                            [facility]  
Work Phone:   
[phone]  
   
                                        Comment on above:   Once (Lab) for 1 Occ  
urrences starting 2024 until   
2024   
   
                                                      
End: 2024     [facility]  
Work Phone:   
[phone]  
   
                                        Comment on above:   Once for 1 Occurrenc  
es starting 2024 until 2024   
   
                                                                 [id number]  
[address]   
[address]  
Work Phone:   
[phone]  
   
                                                    NEGATED: Highlighted   
row has been ruled   
out!                                                Planned Goals not   
documented                              [id number]   
[address]  
Work Phone:   
[phone]  
  
  
  
Immunizations  
  
  
                      Immunization Date Immunization Notes      Care Provider Fa  
vee  
   
                                        06-          meningococcal B vacc  
ine,   
recombinant, OMV,   
adjuvanted                                          Axel Radford MD  
Work Phone:   
[phone]                          [facility]  
   
                                        2018          hepatitis A vaccine,  
   
pediatric/adolescent   
dosage, 2 dose schedule                             Axel Radford MD  
Work Phone:   
[phone]                          [facility]  
   
                                        2018          meningococcal B vacc  
ine,   
recombinant, OMV,   
adjuvanted                                          Axel Radford MD  
Work Phone:   
[phone]                          [facility]  
   
                                        2018          meningococcal   
polysaccharide (groups   
A, C, Y and W-135)   
diphtheria toxoid   
conjugate vaccine   
(MCV4P)                                             Axel Radford MD  
Work Phone:   
[phone]                          [facility]  
   
                                        2018          hepatitis A and   
hepatitis B vaccine                                 Winston Gordillo MD  
Work Phone:   
[phone]                          [facility]  
Work Phone:   
[phone]  
   
                                        2014          human papilloma viru  
s   
vaccine, quadrivalent                               Axel Radford MD  
Work Phone:   
[phone]                          [facility]  
   
                                        2014          human papilloma viru  
s   
vaccine, quadrivalent                               Axel Radford MD  
Work Phone:   
[phone]                          [facility]  
   
                                        2013          human papilloma viru  
s   
vaccine, quadrivalent                               Axel Radford MD  
Work Phone:   
[phone]                          [facility]  
   
                                        2013          influenza, live,   
intranasal, quadrivalent                            Axel Radford MD  
Work Phone:   
[phone]                          [facility]  
   
                                        2013          meningococcal   
polysaccharide (groups   
A, C, Y and W-135)   
diphtheria toxoid   
conjugate vaccine   
(MCV4P)                                             Axel Radford MD  
Work Phone:   
[phone]                          [facility]  
   
                                        2013          tetanus toxoid, redu  
karina   
diphtheria toxoid, and   
acellular pertussis   
vaccine, adsorbed                                   Axel Radford MD  
Work Phone:   
[phone]                          [facility]  
   
                                        2013          influenza virus vacc  
ine,   
unspecified formulation                             Christiano Knight MD  
Work Phone:   
[phone]                          [facility]  
   
                                        2007          diphtheria, tetanus   
toxoids and acellular   
pertussis vaccine                                   Axel Radford MD  
Work Phone:   
[phone]                          [facility]  
   
                                        2007          measles, mumps and   
rubella virus vaccine                               Axel Radford MD  
Work Phone:   
[phone]                          [facility]  
   
                                        2007          poliovirus vaccine,   
inactivated                                         Axel Radford MD  
Work Phone:   
[phone]                          [facility]  
   
                          2007   varicella virus vaccine              Tej Radford MD  
Work Phone:   
[phone]                          [facility]  
   
                                        2004          diphtheria, tetanus   
toxoids and acellular   
pertussis vaccine                                   Axel Radford MD  
Work Phone:   
[phone]                          [facility]  
   
                                        2004          haemophilus influenz  
ae   
type b vaccine, PRP-T   
conjugate                                           Axel Radford MD  
Work Phone:   
[phone]                          [facility]  
   
                                        2004          measles, mumps and   
rubella virus vaccine                               Axel Radford MD  
Work Phone:   
[phone]                          [facility]  
   
                                        2004          poliovirus vaccine,   
inactivated                                         Axel Radford MD  
Work Phone:   
[phone]                          [facility]  
   
                          2004   varicella virus vaccine              Tej Radford MD  
Work Phone:   
[phone]                          [facility]  
   
                                        2002          diphtheria, tetanus   
toxoids and acellular   
pertussis vaccine                                   Axel Radford MD  
Work Phone:   
[phone]                          [facility]  
   
                                        2002          pneumococcal conjuga  
te   
vaccine, 7 valent                                   Axel Radford MD  
Work Phone:   
[phone]                          [facility]  
   
                                        2002          diphtheria, tetanus   
toxoids and acellular   
pertussis vaccine                                   Axel Radford MD  
Work Phone:   
[phone]                          [facility]  
   
                                        2002          haemophilus influenz  
ae   
type b vaccine, PRP-T   
conjugate                                           Axel Radford MD  
Work Phone:   
[phone]                          [facility]  
   
                                        2002          hepatitis B vaccine,  
   
pediatric or   
pediatric/adolescent   
dosage                                              Axel Radford MD  
Work Phone:   
[phone]                          [facility]  
   
                                        2002          pneumococcal conjuga  
te   
vaccine, 7 valent                                   Axel Radford MD  
Work Phone:   
[phone]                          [facility]  
   
                                        2002          poliovirus vaccine,   
inactivated                                         Axel Radford MD  
Work Phone:   
[phone]                          [facility]  
   
                                        2002          diphtheria, tetanus   
toxoids and acellular   
pertussis vaccine                                   Axel Radford MD  
Work Phone:   
[phone]                          [facility]  
   
                                        2002          haemophilus influenz  
ae   
type b vaccine, PRP-T   
conjugate                                           Axel Radford MD  
Work Phone:   
[phone]                          [facility]  
   
                                        2002          hepatitis B vaccine,  
   
pediatric or   
pediatric/adolescent   
dosage                                              Axel Radford MD  
Work Phone:   
[phone]                          [facility]  
   
                                        2002          pneumococcal conjuga  
te   
vaccine, 7 valent                                   Axel Radford MD  
Work Phone:   
[phone]                          [facility]  
   
                                        2002          poliovirus vaccine,   
inactivated                                         Axel Radford MD  
Work Phone:   
[phone]                          [facility]  
   
                                        2001          hepatitis B vaccine,  
   
pediatric or   
pediatric/adolescent   
dosage                                              Axel Radford MD  
Work Phone:   
[phone]                          [facility]  
  
  
  
Payers  
  
  
                          Date         Payer Category Payer        Policy ID  
   
                          2023   Medicaid                  [id number]  
[id number]  
[id number]  
   
                          2023   Medicaid                  [id number]  
   
                                2021      Medicaid        [place] [employer]  
[place] MEDICAID   
[place] [employer] MEDICAID   
[id number] 2021-Present            [id number]   
[id number]  
[id number]  
   
                          2017   Unknown                     
   
                          2001   Unknown                   [id number]   
[id number]  
   
                          2001   Unknown                   [id number]   
[id number]  
   
                          2001   Unknown                   [id number]   
[id number]  
   
                          2001   Unknown                   [id number]   
[id number]  
   
                          2001   Unknown                   [id number]   
[id number]  
   
                          2001   Unknown                   [id number]   
[id number]  
   
                          2001   Unknown                   [id number]   
[id number]  
   
                          2001   Unknown                   [id number]   
[id number]  
   
                          2001   Unknown                   [id number]   
[id number]  
   
                          2001   Unknown                   [id number]   
[id number]  
   
                          1981   Unknown                   [id number]   
[id number]  
   
                          1981   Unknown                   [id number]   
[id number]  
   
                          1981   Unknown                   [id number]   
[id number]  
   
                          1981   Unknown                   [id number]   
[id number]  
   
                          1981   Unknown                   [id number]   
[id number]  
   
                                       Unknown                   [id number]  
  
  
  
Social History  
  
  
                          Date         Type         Detail       Facility  
   
                                       -            -            [address]  
Work Phone:   
[phone]  
   
                                                    Start:   
2020  
End: 2024                         Tobacco smoking status   
NHIS                      Never smoker              [facility]  
   
                                                    Start:   
2020          Alcohol intake      Ex-drinker (finding) [facility]  
   
                                                    Start:   
2001          Sex Assigned At Birth Not on file         [facility]  
   
                                                    Start:   
02-  
End: 2024                         Exposure to SARS-CoV-2   
(event)                   Not sure                  [facility]  
   
                                                    Start:   
2021  
End: 2024     Tobacco use and exposure Never used          [facility]  
   
                                                                Tobacco smoking   
consumption unknown                     [facility]  
   
                                                    Start:   
2024  
End: 2024           Alcoholic beverage intake Lifetime non-drinker   
(finding)                               [facility]  
   
                                                    Start:   
2024  
End: 2024                         History of Social   
function                                            [facility]  
   
                                                    Start:   
2024  
End: 2024     [facility] [employer]                           [facility]  
   
                                                            Has the electric, ga  
s,   
oil, or water company   
threatened to shut off   
services in your home in   
past 12Mo                 No                        [facility]  
   
                                                            (I/We) worried viviana  
er   
(my/our) food would run   
out before (I/we) got   
money to buy more.        Sometimes true            [facility]  
   
                                                            In the past 12 month  
s,   
has lack of   
transportation kept you   
from medical appointments   
or from getting   
medications?              No                        [facility]  
   
                                                    Start:   
10-                Alcohol intake            Current non-drinker of   
alcohol (finding)                       [facility]  
  
  
  
Functional Status  
  
  
                          Date         Assessment   Result       Facility  
   
                                                    Functional observable [facility]  
   
                                                    NEGATED: Highlighted   
row                       Functional performance    Functional status   
health issues are not   
documented Disease                      [facility]   
[facility]  
Work Phone:   
[phone]  
  
  
  
Mental Status  
  
  
                          Date         Assessment   Result       Facility  
   
                                2022                      Cognitive functi  
ons   
12-Mar-202210:39                        [facility]  
   
                                2022                      Cognitive functi  
ons   
:58                        [facility]  
   
                                                    NEGATED: Highlighted   
row                                     Cognitive function   
[Interpretation]                        Cognitive status   
health issues are not   
documented Disease                      [facility]   
[address]  
Work Phone:   
[phone]  
  
  
  
Clinical Notes 2022 to 2024  
 Winston Gordillo MD - 2024 9:04 PM Molly Gordillo MD - 2024 9:04 PM   
ESTDischarge InstructionsAttaJune Jovel APRN-CNP - 2024 6:16 
PM EST<item><item>  
  
                                Note Date & Type Note            Facility  
   
                                                    2024 Emergency   
department Note                         Formatting of this note is   
different from the original.  
Patient is a 23-year-old female   
who describes a history of   
gastroparesis and marijuana use   
presents with a chief complaint   
of abdominal pain nausea   
vomiting starting this morning.   
She insist that her nausea and   
vomiting is not due to her   
marijuana use and that she does   
not have cannabis hyperemesis   
syndrome. This is the patient's   
10th visit in February. It is   
currently .  
  
  
  
Review of Systems  
  
Physical Exam  
Vitals and nursing note   
reviewed.  
Constitutional:  
General: She is not in acute   
distress.  
Appearance: She is   
well-developed.  
HENT:  
Head: Normocephalic and   
atraumatic.  
Eyes:  
Conjunctiva/sclera: Conjunctivae   
normal.  
Cardiovascular:  
Rate and Rhythm: Normal rate and   
regular rhythm.  
Heart sounds: No murmur heard.  
Pulmonary:  
Effort: Pulmonary effort is   
normal. No respiratory distress.  
Breath sounds: Normal breath   
sounds.  
Abdominal:  
Palpations: Abdomen is soft.  
Tenderness: There is no   
abdominal tenderness.  
Musculoskeletal:  
General: No swelling.  
Cervical back: Neck supple.  
Skin:  
General: Skin is warm and dry.  
Capillary Refill: Capillary   
refill takes less than 2   
seconds.  
Neurological:  
Mental Status: She is alert.  
Psychiatric:  
Mood and Affect: Mood normal.  
  
  
  
Labs Reviewed  
BASIC METABOLIC PANEL - Abnormal  
Result Value  
Glucose 97  
Sodium 136  
Potassium 3.4 (*)  
Chloride 102  
Bicarbonate 20 (*)  
Anion Gap 17  
Urea Nitrogen 4 (*)  
Creatinine 0.54  
eGFR >90  
Calcium 9.5  
MANUAL DIFFERENTIAL - Abnormal  
Neutrophils %, Manual 84.0  
Lymphocytes %, Manual 9.0  
Monocytes %, Manual 6.0  
Eosinophils %, Manual 0.0  
Basophils %, Manual 1.0  
Seg Neutrophils Absolute, Manual   
8.90 (*)  
Lymphocytes Absolute, Manual   
0.95 (*)  
Monocytes Absolute, Manual 0.64  
Eosinophils Absolute, Manual   
0.00  
Basophils Absolute, Manual 0.11   
(*)  
Total Cells Counted 100  
RBC Morphology No significant   
RBC morphology present  
CBC WITH AUTO DIFFERENTIAL -   
Normal  
WBC 10.6  
nRBC 0.0  
RBC 5.01  
Hemoglobin 14.9  
Hematocrit 44.1  
MCV 88  
MCH 29.7  
MCHC 33.8  
RDW 13.6  
Platelets 306  
Immature Granulocytes %,   
Automated 0.4  
Immature Granulocytes Absolute,   
Automated 0.04  
Narrative:  
The previously reported   
component Neutrophils % is no   
longer being reported.  
The previously reported   
component Lymphocytes % is no   
longer being reported.  
The previously reported   
component Monocytes % is no   
longer being reported.  
The previously  
reported component Eosinophils %   
is no longer being reported.  
The previously reported   
component Basophils % is no   
longer being reported.  
The previously reported   
component Absolute Neutrophils   
is no longer being reported.  
The previously reported  
component Absolute Lymphocytes   
is no longer being reported.  
The previously reported   
component Absolute Monocytes is   
no longer being reported.  
The previously reported   
component Absolute Eosinophils   
is no longer being reported.  
The previously reported  
component Absolute Basophils is   
no longer being reported.  
HEPATIC FUNCTION PANEL - Normal  
Albumin 4.9  
Bilirubin, Total 0.6  
Bilirubin, Direct 0.1  
Alkaline Phosphatase 39  
ALT 16  
AST 15  
Total Protein 7.4  
LIPASE - Normal  
Lipase 27  
Narrative:  
Venipuncture immediately after   
or during the administration of   
Metamizole may lead to falsely   
low results. Testing should be   
performed immediately prior to   
Metamizole dosing.  
[facility]  
  
  
No orders to display  
  
  
Procedures  
  
Medical Decision Making  
23-year-old female with multiple   
ER visits this month alone   
presented with nausea vomiting   
abdominal pain. She received a   
liter of fluids and 5 mg IVP   
Compazine and is feeling much   
better and request to be   
discharged home. Electrolytes   
are essentially unremarkable for   
acute findings. Lipase and   
biliary numbers are normal. She   
can be discharged.  
  
  
  
Diagnoses as of 24  
Nausea and vomiting, unspecified   
vomiting type  
  
  
Winston Gordillo MD  
24  
  
Electronically signed by Winston Gordillo MD at 2024 10:19 PM   
EST  
documented in this encounter            [facility]  
Work Phone:   
[phone]  
   
                                                    2024 Physician   
Emergency department Note               Formatting of this note is   
different from the original.  
Patient is a 23-year-old female   
who describes a history of   
gastroparesis and marijuana use   
presents with a chief complaint   
of abdominal pain nausea   
vomiting starting this morning.   
She insist that her nausea and   
vomiting is not due to her   
marijuana use and that she does   
not have cannabis hyperemesis   
syndrome. This is the patient's   
10th visit in February. It is   
currently .  
  
  
  
Review of Systems  
  
Physical Exam  
Vitals and nursing note   
reviewed.  
Constitutional:  
General: She is not in acute   
distress.  
Appearance: She is   
well-developed.  
HENT:  
Head: Normocephalic and   
atraumatic.  
Eyes:  
Conjunctiva/sclera: Conjunctivae   
normal.  
Cardiovascular:  
Rate and Rhythm: Normal rate and   
regular rhythm.  
Heart sounds: No murmur heard.  
Pulmonary:  
Effort: Pulmonary effort is   
normal. No respiratory distress.  
Breath sounds: Normal breath   
sounds.  
Abdominal:  
Palpations: Abdomen is soft.  
Tenderness: There is no   
abdominal tenderness.  
Musculoskeletal:  
General: No swelling.  
Cervical back: Neck supple.  
Skin:  
General: Skin is warm and dry.  
Capillary Refill: Capillary   
refill takes less than 2   
seconds.  
Neurological:  
Mental Status: She is alert.  
Psychiatric:  
Mood and Affect: Mood normal.  
  
  
  
Labs Reviewed  
BASIC METABOLIC PANEL - Abnormal  
Result Value  
Glucose 97  
Sodium 136  
Potassium 3.4 (*)  
Chloride 102  
Bicarbonate 20 (*)  
Anion Gap 17  
Urea Nitrogen 4 (*)  
Creatinine 0.54  
eGFR >90  
Calcium 9.5  
MANUAL DIFFERENTIAL - Abnormal  
Neutrophils %, Manual 84.0  
Lymphocytes %, Manual 9.0  
Monocytes %, Manual 6.0  
Eosinophils %, Manual 0.0  
Basophils %, Manual 1.0  
Seg Neutrophils Absolute, Manual   
8.90 (*)  
Lymphocytes Absolute, Manual   
0.95 (*)  
Monocytes Absolute, Manual 0.64  
Eosinophils Absolute, Manual   
0.00  
Basophils Absolute, Manual 0.11   
(*)  
Total Cells Counted 100  
RBC Morphology No significant   
RBC morphology present  
CBC WITH AUTO DIFFERENTIAL -   
Normal  
WBC 10.6  
nRBC 0.0  
RBC 5.01  
Hemoglobin 14.9  
Hematocrit 44.1  
MCV 88  
MCH 29.7  
MCHC 33.8  
RDW 13.6  
Platelets 306  
Immature Granulocytes %,   
Automated 0.4  
Immature Granulocytes Absolute,   
Automated 0.04  
Narrative:  
The previously reported   
component Neutrophils % is no   
longer being reported.  
The previously reported   
component Lymphocytes % is no   
longer being reported.  
The previously reported   
component Monocytes % is no   
longer being reported.  
The previously  
reported component Eosinophils %   
is no longer being reported.  
The previously reported   
component Basophils % is no   
longer being reported.  
The previously reported   
component Absolute Neutrophils   
is no longer being reported.  
The previously reported  
component Absolute Lymphocytes   
is no longer being reported.  
The previously reported   
component Absolute Monocytes is   
no longer being reported.  
The previously reported   
component Absolute Eosinophils   
is no longer being reported.  
The previously reported  
component Absolute Basophils is   
no longer being reported.  
HEPATIC FUNCTION PANEL - Normal  
Albumin 4.9  
Bilirubin, Total 0.6  
Bilirubin, Direct 0.1  
Alkaline Phosphatase 39  
ALT 16  
AST 15  
Total Protein 7.4  
LIPASE - Normal  
Lipase 27  
Narrative:  
Venipuncture immediately after   
or during the administration of   
Metamizole may lead to falsely   
low results. Testing should be   
performed immediately prior to   
Metamizole dosing.  
GRAY TOP  
  
  
No orders to display  
  
  
Procedures  
  
Medical Decision Making  
23-year-old female with multiple   
ER visits this month alone   
presented with nausea vomiting   
abdominal pain. She received a   
liter of fluids and 5 mg IVP   
Compazine and is feeling much   
better and request to be   
discharged home. Electrolytes   
are essentially unremarkable for   
acute findings. Lipase and   
biliary numbers are normal. She   
can be discharged.  
  
  
  
Diagnoses as of 24  
Nausea and vomiting, unspecified   
vomiting type  
  
  
Winston Gordillo MD  
24  
  
Electronically signed by Winston Gordillo MD at 2024 10:19 PM   
EST  
                                        [facility]  
Work Phone:   
[phone]  
   
                                                    2024 Hospital   
Discharge instructions                    
  
  
Axel Radford MD - 2024   
10:53 PM ESTFormatting of this   
note might be different from the   
original.  
  
  
Procedures done during this   
visit: None  
  
Electronically signed by Axel Radford MD at 2024 10:53   
PM EST  
  
  
  
  
The following attachments cannot   
be sent through Care   
Everywhere.Nausea and Vomiting   
Discharge Instructions, Adult   
(English)documented in this   
encounter                               [facility]  
   
                                        2024 Note     Physician Triage Not  
e  
The patient was seen by me in   
intake for a brief history and   
physical obtained  
for triage reasons only. My exam   
is intended to be an initial   
medical screening  
exam for disposition within our   
ED with limited initial orders   
placed, when  
appropriate, to expedite care by   
the treating team.  
HIPAA: Verbal permission granted   
from patient to discuss case,   
including  
protected health information, in   
front of family / friends in   
room at the time  
of the evaluation.  
Patient complains of had seizure   
on the way here - lives 1 hour   
away.+ nausea AND  
neck pains. No fevers. Had at   
least 20 seizures over the last   
week. Not on  
medications for the seizures.  
No pregnancy.  
Focused Exam: alert now,  
The patient is deemed   
appropriate for acute.  
Initial orders: iv, labs.  
The remainder of testing,   
treatment, and diagnostic plan   
will be assumed by the  
next clinician who will be   
seeing the patient as a primary   
patient, creating a  
plan and impression, and final   
disposition of the patient from   
the ED. I had a  
limited role in this case.  
PLEASE SEE OTHER   
ATTENDING/RESIDENT/PHYSICIAN/CNP  
/PA NOTATION  
FLYNN Araujo                 [facility]  
   
                                                    2024 Physician   
Emergency department Note               Formatting of this note might be   
different from the original.  
Physician Triage Note  
  
The patient was seen by me in   
intake for a brief history and   
physical obtained for triage   
reasons only. My exam is   
intended to be an initial   
medical screening exam for   
disposition within our ED with   
limited initial orders placed,   
when appropriate, to expedite   
care by the treating team.  
  
HIPAA: Verbal permission granted   
from patient to discuss case,   
including protected health   
information, in front of family   
/ friends in room at the time of   
the evaluation.  
  
Patient complains of had seizure   
on the way here - lives 1 hour   
away.+ nausea & neck pains. No   
fevers. Had at least 20 seizures   
over the last week. Not on   
medications for the seizures.  
No pregnancy.  
  
Focused Exam: alert now,  
  
The patient is deemed   
appropriate for acute.  
  
Initial orders: iv, labs.  
  
The remainder of testing,   
treatment, and diagnostic plan   
will be assumed by the next   
clinician who will be seeing the   
patient as a primary patient,   
creating a plan and impression,   
and final disposition of the   
patient from the ED. I had a   
limited role in this case.  
  
PLEASE SEE OTHER   
ATTENDING/RESIDENT/PHYSICIAN/CNP  
/PA NOTATION  
  
FLYNN Araujo  
  
Electronically signed by   
June Dyson APRN-CNP at   
2024 6:19 PM [facility]  
                                        [facility]  
Work Phone:   
[phone]  
   
                                                    2024 Emergency   
department Note                         Formatting of this note might be   
different from the original.  
Physician Triage Note  
  
The patient was seen by me in   
intake for a brief history and   
physical obtained for triage   
reasons only. My exam is   
intended to be an initial   
medical screening exam for   
disposition within our ED with   
limited initial orders placed,   
when appropriate, to expedite   
care by the treating team.  
  
HIPAA: Verbal permission granted   
from patient to discuss case,   
including protected health   
information, in front of family   
/ friends in room at the time of   
the evaluation.  
  
Patient complains of had seizure   
on the way here - lives 1 hour   
away.+ nausea & neck pains. No   
fevers. Had at least 20 seizures   
over the last week. Not on   
medications for the seizures.  
No pregnancy.  
  
Focused Exam: alert now,  
  
The patient is deemed   
appropriate for acute.  
  
Initial orders: iv, labs.  
  
The remainder of testing,   
treatment, and diagnostic plan   
will be assumed by the next   
clinician who will be seeing the   
patient as a primary patient,   
creating a plan and impression,   
and final disposition of the   
patient from the ED. I had a   
limited role in this case.  
  
PLEASE SEE OTHER   
ATTENDING/RESIDENT/PHYSICIAN/CNP  
/PA NOTATION  
  
FLYNN Araujo  
  
Electronically signed by   
June Dyson APRN-CNP at   
2024 6:19 PM EST  
documented in this encounter            [facility]  
   
                                        2024 Plan of care note Formatting   
of this note might be   
different from the original.  
  
Problem: Patient Care Overview  
Goal: Plan of Care Review  
Outcome: Adequate for Discharge  
Goal: Individualization &   
Mutuality  
Outcome: Adequate for Discharge  
Goal: Discharge Needs Assessment  
Outcome: Adequate for Discharge  
Goal: Interdisciplinary   
Rounds/Family Conf  
Outcome: Adequate for Discharge  
  
Problem: Skin Integrity   
Impairment, Risk/Actual (Adult)  
Goal: Identify Related Risk   
Factors and Signs and Symptoms  
Description: Related risk   
factors and signs and symptoms   
are identified upon initiation   
of Human Response Clinical   
Practice Guideline (CPG)  
Outcome: Adequate for Discharge  
Goal: Skin Integrity/Wound   
Healing  
Description: Patient will   
demonstrate the desired outcomes   
by discharge/transition of care.  
Outcome: Adequate for Discharge  
  
Problem: Nausea/Vomiting (Adult)  
Goal: Identify Related Risk   
Factors and Signs and Symptoms  
Description: Related risk   
factors and signs and symptoms   
are identified upon initiation   
of Human Response Clinical   
Practice Guideline (CPG)  
2024 by Janelle Short RN  
Outcome: Adequate for Discharge  
2024 by Janelle Short RN  
Outcome: Progressing  
Goal: Symptom Relief  
Description: Patient will   
demonstrate the desired outcomes   
by discharge/transition of care.  
2024 by Janelle Short RN  
Outcome: Adequate for Discharge  
2024 by Janelle Short RN  
Outcome: Progressing  
Goal: Adequate Hydration  
Description: Patient will   
demonstrate the desired outcomes   
by discharge/transition of care.  
2024 by Janelle Short RN  
Outcome: Adequate for Discharge  
2024 by Janelle Short RN  
Outcome: Progressing  
Intervention: Minimize Nausea   
Triggers/Manage Symptoms  
Flowsheets (Taken 2024   
1625)  
Nausea/Vomiting Interventions:  
cool cloth applied  
sips clear liquids given  
slow deep breathing encouraged  
stimuli minimized  
Environmental Support:  
calm environment promoted  
personal routine supported  
rest periods encouraged  
environmental consistency   
promoted  
Intervention: Position to   
Prevent Aspiration  
Flowsheets (Taken 2024   
1625)  
Body Position:   
positioned/repositioned   
independently  
Head of Bed (HOB): HOB 30-59   
degrees  
Intervention: Promote/Maintain   
Hydration  
Flowsheets (Taken 2024   
1625)  
Fluid/Electrolyte Management:  
fluids provided  
intravenous fluids adjusted  
electrolyte supplement initiated  
  
Electronically signed by Janelle Short RN at 2024 7:33   
PM [facility]  
   
                                                    2024 Miscellaneous   
Notes                                   Formatting of this note might be   
different from the original.  
  
Problem: Patient Care Overview  
Goal: Plan of Care Review  
Outcome: Adequate for Discharge  
Goal: Individualization &   
Mutuality  
Outcome: Adequate for Discharge  
Goal: Discharge Needs Assessment  
Outcome: Adequate for Discharge  
Goal: Interdisciplinary   
Rounds/Family Conf  
Outcome: Adequate for Discharge  
  
Problem: Skin Integrity   
Impairment, Risk/Actual (Adult)  
Goal: Identify Related Risk   
Factors and Signs and Symptoms  
Description: Related risk   
factors and signs and symptoms   
are identified upon initiation   
of Human Response Clinical   
Practice Guideline (CPG)  
Outcome: Adequate for Discharge  
Goal: Skin Integrity/Wound   
Healing  
Description: Patient will   
demonstrate the desired outcomes   
by discharge/transition of care.  
Outcome: Adequate for Discharge  
  
Problem: Nausea/Vomiting (Adult)  
Goal: Identify Related Risk   
Factors and Signs and Symptoms  
Description: Related risk   
factors and signs and symptoms   
are identified upon initiation   
of Human Response Clinical   
Practice Guideline (CPG)  
2024 by Janelle Short RN  
Outcome: Adequate for Discharge  
2024 by Janelle Short RN  
Outcome: Progressing  
Goal: Symptom Relief  
Description: Patient will   
demonstrate the desired outcomes   
by discharge/transition of care.  
2024 by Janelle Short RN  
Outcome: Adequate for Discharge  
2024 by Janelle Short RN  
Outcome: Progressing  
Goal: Adequate Hydration  
Description: Patient will   
demonstrate the desired outcomes   
by discharge/transition of care.  
2024 by Janelle Short RN  
Outcome: Adequate for Discharge  
2024 by Janelle Short RN  
Outcome: Progressing  
Intervention: Minimize Nausea   
Triggers/Manage Symptoms  
Flowsheets (Taken 2024   
1625)  
Nausea/Vomiting Interventions:  
cool cloth applied  
sips clear liquids given  
slow deep breathing encouraged  
stimuli minimized  
Environmental Support:  
calm environment promoted  
personal routine supported  
rest periods encouraged  
environmental consistency   
promoted  
Intervention: Position to   
Prevent Aspiration  
Flowsheets (Taken 2024   
1625)  
Body Position:   
positioned/repositioned   
independently  
Head of Bed (HOB): HOB 30-59   
degrees  
Intervention: Promote/Maintain   
Hydration  
Flowsheets (Taken 2024   
1625)  
Fluid/Electrolyte Management:  
fluids provided  
intravenous fluids adjusted  
electrolyte supplement initiated  
  
Electronically signed by Janelle Short RN at 2024 7:33   
PM EST  
Formatting of this note might be   
different from the original.  
  
Problem: Nausea/Vomiting (Adult)  
Goal: Identify Related Risk   
Factors and Signs and Symptoms  
Description: Related risk   
factors and signs and symptoms   
are identified upon initiation   
of Human Response Clinical   
Practice Guideline (CPG)  
Outcome: Progressing  
Goal: Symptom Relief  
Description: Patient will   
demonstrate the desired outcomes   
by discharge/transition of care.  
Outcome: Progressing  
Goal: Adequate Hydration  
Description: Patient will   
demonstrate the desired outcomes   
by discharge/transition of care.  
Outcome: Progressing  
Intervention: Minimize Nausea   
Triggers/Manage Symptoms  
Flowsheets (Taken 2024   
1625)  
Nausea/Vomiting Interventions:  
cool cloth applied  
sips clear liquids given  
slow deep breathing encouraged  
stimuli minimized  
Environmental Support:  
calm environment promoted  
personal routine supported  
rest periods encouraged  
environmental consistency   
promoted  
Intervention: Position to   
Prevent Aspiration  
Flowsheets (Taken 2024   
1625)  
Body Position:   
positioned/repositioned   
independently  
Head of Bed (HOB): HOB 30-59   
degrees  
Intervention: Promote/Maintain   
Hydration  
Flowsheets (Taken 2024   
1625)  
Fluid/Electrolyte Management:  
fluids provided  
intravenous fluids adjusted  
electrolyte supplement initiated  
  
Electronically signed by Janelle Short RN at 2024 4:26   
PM EST  
Formatting of this note might be   
different from the original.  
Afternoon assessment completed.   
No changes noted. Stool specimen   
obtained. Patient resting with   
no complaints. Call light and   
phone within reach. Rn continue   
to monitor.  
  
Electronically signed by Janelle Short RN at 2024 4:24   
PM EST  
Formatting of this note might be   
different from the original.  
No change noted from previous   
assessment by this RN unless   
otherwise detailed in   
coordinating flow sheets.   
Patient denies any needs at this   
time. Will continue to monitor.  
  
Electronically signed by Janelle Short RN at 2024   
12:52 PM EST  
Formatting of this note might be   
different from the original.  
Responded to patient's call   
light. She verbalizes she wants   
to leave AMA Dr. Cooper notified.   
Pulled IV out. Patient's best   
friend at bedside to drive   
patient home.  
Electronically signed by   
Jennifer Tolliver RN at   
2024 7:41 PM EST  
Formatting of this note might be   
different from the original.  
Resting in bed. Continues to be   
medicated for complaints of   
nausea and emesis which is   
yellow in color. Call light in   
reach.  
Electronically signed by   
Fadia Unger RN at   
2024 4:32 AM EST  
Formatting of this note might be   
different from the original.  
Patient awake in bed. Has many   
requests for this nurse   
including a second shower, extra   
pillows, warm blankets, drinks,   
and pain medication, and   
medication for nausea as soon as   
she can have something. No   
emesis observed this shift.   
Trying to meet all of patient   
requests and basic needs.   
Assessment unchanged from   
previous one. No seizure   
activity observed. Call light in   
reach.  
Electronically signed by   
Fadia Unger RN at   
2024 1:15 AM EST  
Formatting of this note might be   
different from the original.  
Patient to room 2755 from ED and   
assisted into bed x 1 assist.   
Patient oriented to room and   
call light system. Admission   
assessment initiated. Family   
with patient and will assist her   
with shower per her request. Is   
asking for nausea medication and   
will call  to get order   
for something else as she has   
had all she can have at this   
time. SR up x 4 due to seizure   
precautions being maintained as   
patient has active seizure   
history, having one in ED as   
reported by staff.  
Electronically signed by   
Fadia Unger RN at   
2024 10:28 PM EST  
documented in this encounter            [facility]  
   
                                        2024 Plan of care note Formatting   
of this note might be   
different from the original.  
  
Problem: Nausea/Vomiting (Adult)  
Goal: Identify Related Risk   
Factors and Signs and Symptoms  
Description: Related risk   
factors and signs and symptoms   
are identified upon initiation   
of Human Response Clinical   
Practice Guideline (CPG)  
Outcome: Progressing  
Goal: Symptom Relief  
Description: Patient will   
demonstrate the desired outcomes   
by discharge/transition of care.  
Outcome: Progressing  
Goal: Adequate Hydration  
Description: Patient will   
demonstrate the desired outcomes   
by discharge/transition of care.  
Outcome: Progressing  
Intervention: Minimize Nausea   
Triggers/Manage Symptoms  
Flowsheets (Taken 2024   
1625)  
Nausea/Vomiting Interventions:  
cool cloth applied  
sips clear liquids given  
slow deep breathing encouraged  
stimuli minimized  
Environmental Support:  
calm environment promoted  
personal routine supported  
rest periods encouraged  
environmental consistency   
promoted  
Intervention: Position to   
Prevent Aspiration  
Flowsheets (Taken 2024   
1625)  
Body Position:   
positioned/repositioned   
independently  
Head of Bed (HOB): HOB 30-59   
degrees  
Intervention: Promote/Maintain   
Hydration  
Flowsheets (Taken 2024   
0309)  
Fluid/Electrolyte Management:  
fluids provided  
intravenous fluids adjusted  
electrolyte supplement initiated  
  
Electronically signed by Janelle Short RN at 2024 4:26   
PM [facility]  
                                        [facility]  
   
                                        2024 Nurse Note Formatting of this  
 note might be   
different from the original.  
Afternoon assessment completed.   
No changes noted. Stool specimen   
obtained. Patient resting with   
no complaints. Call light and   
phone within reach. Rn continue   
to monitor.  
  
Electronically signed by Janelle Short RN at 2024 4:24   
PM [facility]  
                                        [facility]  
   
                                        2024 Nurse Note Formatting of this  
 note might be   
different from the original.  
No change noted from previous   
assessment by this RN unless   
otherwise detailed in   
coordinating flow sheets.   
Patient denies any needs at this   
time. Will continue to monitor.  
  
Electronically signed by Janelle Short RN at 2024   
12:52 PM EST  
                                        [facility]  
   
                                                    2024 History of   
Present illness Narrative               Formatting of this note is   
different from the original.  
LSW met with patient to discuss   
discharge plans. Patient states   
that she currently lives in a   
mobile home with her fiance. She   
does not have advance   
directives. She denies mental   
health history. She states that   
she is independent with all ADLs   
and does not use any DME. She   
has no PCP and states that she   
does not want one. She denies   
all questions and concerns.  
  
Discharge Plan:  
Patient will return home.  
  
24 1120  
Referral Information  
Arrived From home or self-care  
Readmission Information  
Was patient readmitted within 30   
Days? No  
Information Source  
Information Source patient  
Outpatient Providers  
Outpatient Providers Updated In   
[facility] Yes  
Contact Information  
/SW Added to Care   
Team Yes  
This Writer is Primary Case   
Manager/SW Yes  
Social Work Contact Name   
Krissy Davison  
's Phone Number   
[phone]  
Living Environment  
Lives With significant other  
Living Arrangements mobile home  
Provides Primary Care For no one  
Primary Care Provided By self  
Support System Immediate family  
Able to Return to Prior   
Arrangements yes  
Functional Status  
Patient's Functional Status   
Prior To This Admission?   
Independent  
Are There Status Changes This   
Admission? No  
Changes Observed Since   
Admission? No Changes Observed  
Concerns With Patient Being Able   
To Care For Themselves At   
Discharge? No  
Employment/Financial  
Employed? No  
Source Of Income public   
assistance  
Financial Concerns none  
Insurance  
Medical Insurance Verified Yes  
Prescription Coverage Yes  
Pharmacy updated in [facility] Yes  
Initial Discharge Planning  
Home Care Services (PTA) No  
Home Therapies (PTA) Home SLP  
DME (PTA) None  
Medical Supplies (PTA) None  
Patient Goal for Discharge   
Return home with assistance from   
family and friends  
Anticipated discharge   
disposition Home  
Anticipated Changes Related to   
Illness none  
Transportation Available car  
Assessment/Concerns to be   
Addressed  
Concerns To Be Addressed no   
discharge needs   
identified;denies needs/concerns   
at this time  
  
  
Electronically signed by   
KEARA Davis at   
2024 11:23 AM EST  
documented in this encounter            [facility]  
   
                                        2024 Nurse Note Formatting of this  
 note might be   
different from the original.  
Responded to patient's call   
light. She verbalizes she wants   
to leave AMA Dr. Cooper notified.   
Pulled IV out. Patient's best   
friend at bedside to drive   
patient home.  
Electronically signed by   
Jennifer Tolliver RN at   
2024 7:41 PM [facility]  
   
                                        2024 Nurse Note Formatting of this  
 note might be   
different from the original.  
Resting in bed. Continues to be   
medicated for complaints of   
nausea and emesis which is   
yellow in color. Call light in   
reach.  
Electronically signed by   
Fadia Unger RN at   
2024 4:32 AM [facility]  
                                        [facility]  
   
                                        2024 Nurse Note Formatting of this  
 note might be   
different from the original.  
Patient awake in bed. Has many   
requests for this nurse   
including a second shower, extra   
pillows, warm blankets, drinks,   
and pain medication, and   
medication for nausea as soon as   
she can have something. No   
emesis observed this shift.   
Trying to meet all of patient   
requests and basic needs.   
Assessment unchanged from   
previous one. No seizure   
activity observed. Call light in   
reach.  
Electronically signed by   
Fadia Unger RN at   
2024 1:15 AM EST  
                                        [facility]  
   
                                                    2024 Emergency   
department Note                         Formatting of this note might be   
different from the original.  
Pt transported to MS/ICU to room   
2755. Patient belongings with   
pt. Pt left in stable condition   
with call light within reach and   
family at bedside. Report sheet   
tubed to MS and bedside report   
given to nurse.  
Electronically signed by Marla Biggs RN at 2024 8:29 PM   
EST  
                                        [facility]  
   
                                                    2024 Emergency   
department Note                         Formatting of this note might be   
different from the original.  
Pt transported to MS/ICU to room   
2755. Patient belongings with   
pt. Pt left in stable condition   
with call light within reach and   
family at bedside. Report sheet   
tubed to MS and bedside report   
given to nurse.  
Electronically signed by Marla Biggs RN at 2024 8:29 PM   
EST  
Formatting of this note might be   
different from the original.  
Pt wishes to keep bottoms on at   
this time.  
Electronically signed by Pilar Clifford RN at 2024   
7:59 PM EST  
Formatting of this note might be   
different from the original.  
Pt requesting pain medication at   
this time. Provider aware. Pt   
ambulates to  with steady   
gait.  
Electronically signed by Pilar Clifford RN at 2024   
6:36 PM EST  
Formatting of this note is   
different from the original.  
DEPARTMENT OF EMERGENCY MEDICINE  
  
CHIEF COMPLAINT  
Vomiting, Abdominal Pain (Pt dx   
with colitis , unable to keep   
medications down with zofran and   
phenergan suppository/Neg flu   
covid and pregnancy test), and   
Seizure (X 4 days/Waiting to see   
neuro for seizures/[facility]   
in [place] last night)  
  
  
GLENDA Soares is a 23 y.o.   
female who presents with a   
complaint of nausea, vomiting,   
diarrhea, and abdominal pain.   
She has had symptoms for 4 days.   
She was seen twice yesterday at   
[facility]. She had laboratory   
testing and a CT scan of the   
abdomen and pelvis. The CT scan   
of the abdomen and pelvis showed   
possible early colitis. She was   
started on Augmentin. She was   
provided with Zofran and   
Phenergan. She states her throat   
is raw from vomiting. No fevers   
or chills. No cough. She also   
does complain of some   
intermittent seizures. This was   
worked up yesterday at   
[facility] as well. She has been   
referred to Neurology for   
follow-up.  
  
REVIEW OF SYSTEMS  
Review of Systems  
  
Constitutional: No fevers, no   
chills  
Eyes: No vision change, no   
drainage  
ENT: No ear pain, throat is raw   
from vomiting  
Cardiovascular: No chest pain,   
no edema  
Respiratory: No shortness of   
breath, no cough  
Gastrointestinal: Positive   
vomiting, Positive diarrhea  
Genitourinary: No dysuria, no   
frequency  
Musculoskeletal: No joint pain,   
no joint swelling  
Integumentary: No rash, no   
itching  
Neurologic: No headache, no   
confusion  
Psychiatric: No anxiety, no   
depression  
  
PAST MEDICAL HISTORY  
Past Medical History:  
Diagnosis Date  
Seizures  
  
  
SURGICAL HISTORY  
No past surgical history on   
file.  
  
CURRENT MEDICATIONS  
No current facility-administered   
medications for this encounter.  
  
No current outpatient   
medications on file.  
  
  
ALLERGIES  
Allergies  
Allergen Reactions  
Codeine  
Miralax [Polyethylene Glycol]  
  
  
FAMILY HISTORY  
History reviewed. No pertinent   
family history.  
  
SOCIAL HISTORY  
Social History  
  
Socioeconomic History  
Marital status: Not on file  
Spouse name: Not on file  
Number of children: Not on file  
Years of education: Not on file  
Highest education level: Not on   
file  
Occupational History  
Not on file  
Tobacco Use  
Smoking status: Never  
Smokeless tobacco: Never  
Vaping Use  
Vaping status: Never Used  
Substance and Sexual Activity  
Alcohol use: Never  
Drug use: Yes  
Types: Marijuana  
Sexual activity: Not on file  
Other Topics Concern  
Not on file  
Social History Narrative  
Not on file  
  
Social Determinants of Health  
  
Financial Resource Strain: Not   
on file  
Food Insecurity: Not on file  
Transportation Needs: Not on   
file  
Physical Activity: Not on file  
Stress: Not on file  
Social Connections: Not on file  
Intimate Partner Violence: Not   
on file  
Housing Stability: Not on file  
  
  
PHYSICAL EXAM  
/77   Pulse 80   Temp 99.4   
F (37.4 C) (Oral)   Resp 17   Ht   
1.499 m (4' 11 )   SpO2 100%     
Smoking Status Never  
Physical Exam  
  
The patient is well-developed,   
well-nourished, and in no acute   
distress.  
Head is atraumatic and   
normocephalic.  
Pupils are equal and reactive.  
Face is symmetric.  
Mucous membranes are moist.  
Neck is supple.  
Heart is regular rate and   
rhythm.  
Lungs are clear to auscultation.  
Abdomen is soft, nontender,   
nondistended.  
Skin is warm and dry.  
Extremities are warm and well   
perfused and without acute   
deformity.  
Neurologically, the patient is   
awake, alert, and without acute   
deficit.  
Psychiatrically, the patient is   
calm and cooperative.  
  
ED COURSE & MEDICAL DECISION   
MAKING  
  
Laboratory evaluation shows   
bicarb low at 14. She continues   
to complain of nausea despite   
receiving Reglan, Benadryl, and   
Zofran. Her drug screen is   
positive for cannabinoids. I did   
address possible cannabis   
hyperemesis syndrome with the   
patient but she denies any   
regular use of cannabis and   
states it is only very   
occasional use. Given her   
continued complaint of   
intractable nausea, along with   
her acidosis, I did contact Dr. Apodaca who has agreed to admit the   
patient  
  
Impression: 1. Intractable   
nausea and vomiting 2. Metabolic   
acidosis  
  
Disposition: Admission  
  
Christiano Knight MD  
[id number]  
  
Electronically signed by Christiano Knight MD at 2024 6:26   
PM EST  
documented in this encounter            [facility]  
   
                                        2024 Nurse Note Formatting of this  
 note might be   
different from the original.  
Patient to room 2755 from ED and   
assisted into bed x 1 assist.   
Patient oriented to room and   
call light system. Admission   
assessment initiated. Family   
with patient and will assist her   
with shower per her request. Is   
asking for nausea medication and   
will call  to get order   
for something else as she has   
had all she can have at this   
time. SR up x 4 due to seizure   
precautions being maintained as   
patient has active seizure   
history, having one in ED as   
reported by staff.  
Electronically signed by   
Fadia Unger RN at   
2024 10:28 PM EST  
                                        [facility]  
   
                                                    2024 Emergency   
department Note                         Formatting of this note might be   
different from the original.  
Pt wishes to keep bottoms on at   
this time.  
Electronically signed by Pilar Clifford RN at 2024   
7:59 PM EST  
                                        [facility]  
   
                                                    2024 Emergency   
department Note                         Formatting of this note might be   
different from the original.  
Pt requesting pain medication at   
this time. Provider aware. Pt   
ambulates to  with steady   
gait.  
Electronically signed by Pilar Clifford RN at 2024   
6:36 PM EST  
                                        [facility]  
   
                                                    2024 Physician   
Emergency department Note               Formatting of this note is   
different from the original.  
DEPARTMENT OF EMERGENCY MEDICINE  
  
CHIEF COMPLAINT  
Vomiting, Abdominal Pain (Pt dx   
with colitis , unable to keep   
medications down with zofran and   
phenergan suppository/Neg flu   
covid and pregnancy test), and   
Seizure (X 4 days/Waiting to see   
neuro for seizures/[facility]   
in [place] last night)  
  
  
GLENDA Soares is a 23 y.o.   
female who presents with a   
complaint of nausea, vomiting,   
diarrhea, and abdominal pain.   
She has had symptoms for 4 days.   
She was seen twice yesterday at   
[facility]. She had laboratory   
testing and a CT scan of the   
abdomen and pelvis. The CT scan   
of the abdomen and pelvis showed   
possible early colitis. She was   
started on Augmentin. She was   
provided with Zofran and   
Phenergan. She states her throat   
is raw from vomiting. No fevers   
or chills. No cough. She also   
does complain of some   
intermittent seizures. This was   
worked up yesterday at   
[facility] as well. She has been   
referred to Neurology for   
follow-up.  
  
REVIEW OF SYSTEMS  
Review of Systems  
  
Constitutional: No fevers, no   
chills  
Eyes: No vision change, no   
drainage  
ENT: No ear pain, throat is raw   
from vomiting  
Cardiovascular: No chest pain,   
no edema  
Respiratory: No shortness of   
breath, no cough  
Gastrointestinal: Positive   
vomiting, Positive diarrhea  
Genitourinary: No dysuria, no   
frequency  
Musculoskeletal: No joint pain,   
no joint swelling  
Integumentary: No rash, no   
itching  
Neurologic: No headache, no   
confusion  
Psychiatric: No anxiety, no   
depression  
  
PAST MEDICAL HISTORY  
Past Medical History:  
Diagnosis Date  
Seizures  
  
  
SURGICAL HISTORY  
No past surgical history on   
file.  
  
CURRENT MEDICATIONS  
No current facility-administered   
medications for this encounter.  
  
No current outpatient   
medications on file.  
  
  
ALLERGIES  
Allergies  
Allergen Reactions  
Codeine  
Miralax [Polyethylene Glycol]  
  
  
FAMILY HISTORY  
History reviewed. No pertinent   
family history.  
  
SOCIAL HISTORY  
Social History  
  
Socioeconomic History  
Marital status: Not on file  
Spouse name: Not on file  
Number of children: Not on file  
Years of education: Not on file  
Highest education level: Not on   
file  
Occupational History  
Not on file  
Tobacco Use  
Smoking status: Never  
Smokeless tobacco: Never  
Vaping Use  
Vaping status: Never Used  
Substance and Sexual Activity  
Alcohol use: Never  
Drug use: Yes  
Types: Marijuana  
Sexual activity: Not on file  
Other Topics Concern  
Not on file  
Social History Narrative  
Not on file  
  
Social Determinants of Health  
  
Financial Resource Strain: Not   
on file  
Food Insecurity: Not on file  
Transportation Needs: Not on   
file  
Physical Activity: Not on file  
Stress: Not on file  
Social Connections: Not on file  
Intimate Partner Violence: Not   
on file  
Housing Stability: Not on file  
  
  
PHYSICAL EXAM  
/77   Pulse 80   Temp 99.4   
F (37.4 C) (Oral)   Resp 17   Ht   
1.499 m (4' 11 )   SpO2 100%     
Smoking Status Never  
Physical Exam  
  
The patient is well-developed,   
well-nourished, and in no acute   
distress.  
Head is atraumatic and   
normocephalic.  
Pupils are equal and reactive.  
Face is symmetric.  
Mucous membranes are moist.  
Neck is supple.  
Heart is regular rate and   
rhythm.  
Lungs are clear to auscultation.  
Abdomen is soft, nontender,   
nondistended.  
Skin is warm and dry.  
Extremities are warm and well   
perfused and without acute   
deformity.  
Neurologically, the patient is   
awake, alert, and without acute   
deficit.  
Psychiatrically, the patient is   
calm and cooperative.  
  
ED COURSE & MEDICAL DECISION   
MAKING  
  
Laboratory evaluation shows   
bicarb low at 14. She continues   
to complain of nausea despite   
receiving Reglan, Benadryl, and   
Zofran. Her drug screen is   
positive for cannabinoids. I did   
address possible cannabis   
hyperemesis syndrome with the   
patient but she denies any   
regular use of cannabis and   
states it is only very   
occasional use. Given her   
continued complaint of   
intractable nausea, along with   
her acidosis, I did contact Dr. Apodaca who has agreed to admit the   
patient  
  
Impression: 1. Intractable   
nausea and vomiting 2. Metabolic   
acidosis  
  
Disposition: Admission  
  
Christiano Knight MD  
[id number]  
  
Electronically signed by Christiano Knight MD at 2024 6:26   
[facility]  
Work Phone:   
[phone]  
   
                                        2022 Note     Send Summary:  
Discharge Summary Providers:  
Provider RoleProvider Name  
Mitra Santos Hafiz  
Nurse PractitionerAlyssa Beckwith  
PrimaryRequired, No Pcp  
  
  
Note Recipients: Required, No   
Pcp, MD  
  
  
Discharge:  
  
Summary:  
Admission Date: .10-Mar-2022   
10:33:00  
Discharge Date: 12-Mar-2022  
Attending Physician at   
Discharge: Mitra Camara  
Admission Reason: influenza A   
severe dehydration(1)  
Final Discharge Diagnoses:   
Cannabinoid hyperemesis   
syndrome, Intractable nausea  
and vomiting  
Procedures: none  
Condition at Discharge:   
Satisfactory  
Disposition at Discharge: .Home  
Vital Signs:  
  
T PRBPSpO2  
[id number]/8198%  
Date/Time3/12 7: 7:   
7: 7: 7:46  
Range(36.6C - 36.8C ) (80 - 94 )   
(16 - 17 ) (112 - 117 )/ (72 -   
81 ) (96%  
- 99% )  
  
Date: Weight/Scale Type:Height:  
10-Mar-2022 16:4347 kg /   
ewh942.8 cm  
Physical Exam:  
Constitutional: Thin  
Eyes: PERRL, EOMI, clear sclera  
ENMT: mucous membranes moist, no   
apparent injury, no lesions seen  
Head/Neck: Neck supple, no   
apparent injury, No JVD, trachea   
midline,  
Respiratory/Thorax: Patent   
airways, CTAB, normal breath   
sounds with good chest  
expansion, thorax symmetric  
Cardiovascular: Regular, rate   
and rhythm, no murmurs, 2+ equal   
pulses of the  
extremities, normal S 1and S 2  
Gastrointestinal: Nondistended,   
soft, non-tender, no rebound   
tenderness or  
guarding, no masses palpable, no   
organomegaly, +BS, no bruits  
Musculoskeletal: ROM intact, no   
joint swelling, normal strength  
Extremities: normal extremities,   
no cyanosis edema, contusions or   
wounds, no  
clubbing  
Neurological: alert and oriented   
x3, intact senses, motor,   
response and  
reflexes, normal strength  
Psychological: Appropriate mood   
and behavior  
Skin: Warm and dry, no lesions,   
no rashes  
  
Hospital Course:  
HPI:  
DANNA SOARES is a 21   
year old Female with pmh of   
hyperemesis  
syndrome secondary to cannanboid   
use, major depression disorder   
who had  
hospitalization in 2022   
for similar presentation as   
today. She has 3  
emergency room visits in the   
past few days with today being   
the 3rd visit. She  
was seen in another ED on the   
 and our ED on the  she   
had n/v/diarrhea  
and abdominal pain. She has been   
having diarrhea nausea and   
vomiting for 3  
days. On the  she developed   
abdominal pain that hurts with   
movement and on  
inspiration. CT abdomen pelvis   
negative and did not reveal   
acute pathology. She  
presented today not feeling any   
better. she was treated   
yesterday with  
phenergan and bentyl. She was   
diagnosed with influenza A and   
dc to home with  
tamiflu, bentyl and phenergan.   
On presentation today she   
continued to vomit was  
unable to keep down any liquids   
or her nausea medicine so she   
presented to  
[facility]   
emergency department for further   
evaluation. She has  
had decrease in urination. She   
denies any recent fevers. She is   
currently on  
her menses with a negative   
pregnancy test 2 days ago. She   
had low-grade  
temperature 99.3, blood pressure   
134/99 heart rate 82   
respirations 18 pulse  
oxygen saturation 98% on room   
air. Her laboratory values   
showed a bicarbonate  
of 14 and yesterday was 12,   
normal creatinine, potassium 3.5   
sodium 135 and  
glucose 93. Urinalysis with   
ketones 80 and a large amount of   
blood white cells  
2, 1+ mucus. Urine toxicology   
screen showed cannabinoid   
positive. She states  
that she last smoked marijuana a   
week ago. In the emergency   
department she was  
treated with 2 L of normal   
saline and is being admitted for   
further evaluation.  
  
Past medical history: Major   
depressive disorder, hyperemesis   
syndrome secondary  
to cannabinoid  
Past surgical history:   
Appendectomy, right index finger   
surgery, ORIF of right  
wrist, tonsillectomy and   
adenoidectomy  
Family history: Father-diabetes  
Social history: Patient is   
single, she is cannabinoid   
dependent, denies  
nicotine or alcohol use.  
  
Allergies: Codeine MiraLAX  
Medications: Reviewed and   
reconciled  
  
Hospital course: Patient was   
started on Tamiflu on 3/9 which   
is 4/5 day  
treatment. She will continue 1   
day on discharge. She was given   
IV fluids and  
electrolytes replaced   
accordingly. Nausea/vomiting and   
abdominal pain likely  
related to hyperemesis syndrome   
secondary to cannabinoid use.   
She was seen by  
critical care Dr. Messina for   
normal anion gap metabolic   
acidosis which resolved.  
She has very low osmolar state   
and recommends a high protein   
diet on discharge.  
She indicates today she feels   
better than she has and requests   
to go home. She  
was counseled on marijuana   
cessation at time of discharge.   
She has not had any  
nausea or vomiting and has been   
able to tolerate her diet.   
Denies any abdominal  
pain.  
  
  
Discharge Information:  
  
and Continuing Care:  
Lab Results - Pending:  
None  
Radiology Results - Pending:   
None  
Discharge Instructions:  
Nutrition/Diet:  
regular, high protein  
  
Labs:  
Lab Test(s): Basic Metabolic   
Panel  
Date To Be Drawn: 3/14  
Call (more content not   
included)...                            [facility]  
   
                                        03- Note     History of Present I  
llness:  
Pregnant/Lactating:  
Are You Pregnantno (1)  
Are You Currently   
Breastfeedingno (1)  
  
Admission Reason: influenza A   
severe dehydration  
HPI:  
DANNA SOARES is a 21   
year old Female with pmh of   
hyperemesis  
syndrome secondary to cannanboid   
use, major depression disorder   
who had  
hospitalization in 2022   
for similar presentation as   
today. She has 3  
emergency room visits in the   
past few days with today being   
the 3rd visit. She  
was seen in another ED on the   
 and our ED on the  she   
had n/v/diarrhea  
and abdominal pain. She has been   
having diarrhea nausea and   
vomiting for 3  
days. On the  she developed   
abdominal pain that hurts with   
movement and on  
inspiration. CT abdomen pelvis   
negative and did not reveal   
acute pathology. She  
presented today not feeling any   
better. she was treated   
yesterday with  
phenergan and bentyl. She was   
diagnosed with influenza A and   
dc to home with  
tamiflu, bentyl and phenergan.   
On presentation today she   
continued to vomit was  
unable to keep down any liquids   
or her nausea medicine so she   
presented to  
[facility]   
emergency department for further   
evaluation. She has  
had decrease in urination. She   
denies any recent fevers. She is   
currently on  
her menses with a negative   
pregnancy test 2 days ago. She   
had low-grade  
temperature 99.3, blood pressure   
134/99 heart rate 82   
respirations 18 pulse  
oxygen saturation 98% on room   
air. Her laboratory values   
showed a bicarbonate  
of 14 and yesterday was 12,   
normal creatinine, potassium 3.5   
sodium 135 and  
glucose 93. Urinalysis with   
ketones 80 and a large amount of   
blood white cells  
2, 1+ mucus. Urine toxicology   
screen showed cannabinoid   
positive. She states  
that she last smoked marijuana a   
week ago. In the emergency   
department she was  
treated with 2 L of normal   
saline and is being admitted for   
further evaluation.  
  
Past medical history: Major   
depressive disorder, hyperemesis   
syndrome secondary  
to cannabinoid  
Past surgical history:   
Appendectomy, right index finger   
surgery, ORIF of right  
wrist, tonsillectomy and   
adenoidectomy  
Family history: Father-diabetes  
Social history: Patient is   
single, she is cannabinoid   
dependent, denies  
nicotine or alcohol use.  
  
Allergies: Codeine MiraLAX  
Medications: Reviewed and   
reconciled  
  
Review of systems: 12 point   
review systems reviewed with   
patient with pertinent  
positives listed in HPI   
otherwise review systems   
negative  
  
Social History:  
Social History:  
Smoking Statusnever smoker (2)  
Alcohol Usedenies(2)  
Drug Useoccasionally (3)  
  
  
  
Allergies:  
MiraLax: Hives/Urticaria  
codeine: Unknown  
  
Medications Prior to Admission:  
Admission Medication   
Reconciliation has not been   
completed for this patient.  
  
Objective:  
  
Objective Information:  
T PRBPSpO2  
[id number]/8698%  
Date/Time3/10 10:10   
16:003/10 16:003/10 16:003/10   
16:00  
Range(37.3C - 37.3C ) (72 - 100   
) (16 - 18 ) (112 - 136 )/ (68 -   
103 )  
(97% - 98% )  
Highest temp of 37.3 C was   
recorded at 3/10 10:41  
  
  
Pain reported at 3/10 14:00:   
unable to assess; sleeping  
  
  
  
Weights  
3/10 10:41: Weight in lbs   
((lbs)) 100.3  
3/10 10:: Weight in kg (Weight   
(kg)) 45.5  
3/10 10:: BMI (kg/m2) (BMI   
(kg/m2)) 20.276  
  
Physical Exam by System:  
  
Constitutional: Ill-appearing,   
pale  
Eyes: PERRL, EOMI, clear sclera  
ENMT: mucous membranes moist, no   
apparent injury, no lesions seen  
Head/Neck: Neck supple, no   
apparent injury,No JVD, trachea   
midline,  
Respiratory/Thorax: Patent   
airways, CTAB, normal breath   
sounds with good chest  
expansion, thorax symmetric  
Cardiovascular: Regular, rate   
and rhythm, no murmurs, 2+ equal   
pulses of the  
extremities, normal S 1and S 2  
Gastrointestinal: Nondistended,   
soft, non-tender, no rebound   
tenderness or  
guarding, no masses palpable, no   
organomegaly, +BS, no bruits  
Musculoskeletal: ROM intact, no   
joint swelling, normal strength  
Extremities: normal extremities,   
no cyanosis edema, contusions or   
wounds, no  
clubbing  
Neurological: alert and oriented   
x3, intact senses, motor,   
response and  
reflexes, normal strength  
Psychological: Appropriate mood   
and behavior  
Skin: Warm and dry, no lesions,   
no rashes  
  
Medications:  
  
Medications:  
  
Continuous Medications  
--------------------------------  
  
1. Lactated Ringers Infusion:   
1000 mL IntraVenous  
  
Scheduled Medications  
--------------------------------  
  
1. LORazepam Injectable: 1 mg   
IntraVenous Push Every 6 Hours  
2. Oseltamivir: 75 mg Oral Every   
12 Hours  
3. Pantoprazole Injectable: 40   
mg IntraVenous Push Every 12   
Hours  
  
PRN Medications  
--------------------------------  
  
1. Acetaminophen: 650 mg Oral   
Every 4 Hours  
2. Promethazine IV Piggy Back:   
12.5 mg IntraVenous Piggyback   
Every 6 Hours  
  
  
  
Recent Lab Results:  
  
Results:  
  
  
  
I have reviewed these laboratory   
results:  
  
Coronavirus 2019, Screen   
Asymptomatic 10-Mar-2022   
15:28:40  
  
ResultValue  
Fluid Source Nasal,   
Nasopharyngeal (more content not   
included)...                            [facility]  
   
                                        2022 Note     Send Summary:  
Discharge Summary Providers:  
Provider RoleProvider Name  
ReferringLeMaIvan rodriguez Alexander M ConsultingThomae Jonathan GEOVANI  
PrimaryRequired, No Pcp  
  
  
Note Recipients: Ivan Coley MD  
  
  
Discharge:  
  
Summary:  
Admission Date: .2022   
22:59:00  
Discharge Date: 2022  
Attending Physician at   
Discharge: Nicolas Connolly  
Admission Reason: nausea and   
vomiting(1)  
Final Discharge Diagnoses:   
Intractable nausea and vomiting  
Procedures: none  
Condition at Discharge:   
Satisfactory  
Disposition at Discharge: .Home  
Vital Signs:  
  
T PRBPSpO2  
[id number]/9299%  
Date/Time 8: 8:   
8: 8: 8:20  
Range(36.2C - 37.1C ) (89 - 100   
) (16 - 18 ) (118 - 124 )/ (79 -   
98 )  
(97% - 100% )  
Highest temp of 37.1 C was   
recorded at  4:08  
  
Date: Weight/Scale Type:Height:  
2022 05:1651.3 kg /   
cec185.8 cm  
Physical Exam:  
Constitutional: No acute   
distress  
HEENT: Moist oral mucosa  
Respiratory/thorax: Lung sounds   
clear throughout the lung fields   
even chest  
expansion  
CV: S1-S2 regular rate and   
rhythm  
GI: Soft nontender bowel sounds   
present x4  
Extremity: No peripheral edema  
Neuro: Alert and oriented x3 no   
focal deficit  
Hospital Course:  
21-year-old female who was   
diagnosed at [facility] with   
infectious colitis was  
prescribed ciprofloxacin and   
Flagyl had a 7-day course of   
intractable nausea  
vomiting, who Has cannabinoid   
use was admitted to the hospital   
secondary to  
severe dehydration, hypokalemia   
and hypomagnesemia. Patient was   
trialed on  
multiple different antiemetics   
with no relief in her nausea   
vomiting. After  
she was appropriately fluid   
resuscitated nausea vomiting did   
improve, we tried  
a diet and she immediately threw   
up. She was started on Reglan   
and still threw  
up after the Reglan. She was   
complaining of left upper   
quadrant abdominal pain  
and secondary to the nausea   
vomiting I started her on   
Carafate for concern for  
gastritis. She had 2 CT abdomen   
pelvis is this admission 1 in   
the emergency  
room and one recommended by Dr. Gallagher which did not reveal acute   
pathology.  
She had a right upper quadrant   
ultrasound which was   
unremarkable. She was  
started on IV Protonix twice a   
day as she had epigastric pain,   
acid reflux and  
pain with swallowing and concern   
for persistent nausea vomiting   
that she  
possibly also had esophagitis.   
Patient did not undergo   
endoscopic as we  
clinically were treating her   
symptoms. She improved with   
adding Carafate and  
was able to eat with no   
vomiting. She was instructed to   
stop cannabinoid and  
she is in agreement with the   
plan. She has been instructed to   
follow with her  
primary care physician. She had   
2 CT abdomen pelvis's which did   
not show  
infectious colitis so I   
discontinued antibiotics at time   
of discharge. She  
remained afebrile and   
hemodynamically stable. She has   
been prescribed a  
gastric emptying study that will   
be performed on Monday as   
outpatient. Her  
electrolytes have been   
corrected, her potassium is 3.4   
magnesium 2.00. She  
will have an additional dose of   
potassium prior to discharge of   
the hospital.  
Should her potassium will need   
to be followed as outpatient and   
she has been  
instructed to eat potassium rich   
foods and she loves spinach   
which will also  
help her magnesium. She is being   
discharged to home in stable   
condition.  
Discharge time greater than 30   
minutes.  
  
  
Discharge Information:  
  
and Continuing Care:  
Lab Results - Pending:  
None  
Radiology Results - Pending:   
None  
Discharge Instructions:  
Activity:  
activity as tolerated.  
Balance activity with rest,   
gradually increase your activity   
as  
tolerated  
Exercise as prescribed by your   
physician  
  
Nutrition/Diet:  
low cholesterol, low fat  
  
Follow Up Appointments:  
Follow-Up Appointment 01:  
Physician/Dept/Service: PCP  
Call to Schedule in: 1 week  
  
Follow-Up Appointment 02:  
Physician/Dept/Service: Dr Mathis  
Call to Schedule in: 2 weeks  
  
Discharge Medications: Home   
Medication  
Protonix 40 mg oral delayed   
release tablet - 1 tab(s) orally   
2 times a day  
sucralfate 1 g oral tablet - 1   
tab(s) orally 4 times a day   
-.Meds to Beds - 4  
Times a Day Before Meals  
  
PRN Medication  
Zofran 8 mg oral tablet - 1   
tab(s) orally 3 times, As Needed   
-for nausea and  
vomiting  
  
DNR Status:  
Code StatusCode Status order at   
time of discharge: Full Code  
  
  
Electronic Signatures:  
eBcky Hyman (APRN-CNS) (Signed   
2022 11:06)  
Authored: Send Summary, Summary   
Content, Ongoing Care, DNR   
Status, Note  
Completion  
  
  
Last Updated: 2022 11:06   
by Becky Hyman (APRN-CNS)  
  
References:  
1. Data Referenced From  Daily   
Progress Note-General Internal   
Medicine   
2022 13:35                       [facility]  
   
                                        2022 Note     History of Present I  
llness:  
Pregnant/Lactating:  
Are You Pregnantno (1)  
Are You Currently   
Breastfeedingno (1)  
  
Admission Reason: Nausea,   
vomiting and abdominal pain  
HPI:  
DANNA SOARES is a 21   
year old Female  
  
This is a 21-year-old white   
female who was recently   
diagnosed 8 days ago with  
colitis. She was at [facility] on [address] and a CT scan was done   
showing  
colitis. She was transferred to   
[facility]. She states she was   
admitted  
there for 2 days on antibiotics   
and discharged home on   
antibiotics. Since  
being discharged she has been   
having abdominal pain especially   
left side along  
with intractable nausea and   
vomiting. She says she cannot   
keep her antibiotics  
down and she was sent home with   
and she reports feeling hot and   
sweaty along  
with chills. She had diarrhea   
once a day for the last 2 days.   
Because of the  
pain and nausea and vomiting,   
she came into the ER to be   
evaluated. No sick  
contacts at home or in general   
and no change in diets.  
  
In the ER her blood pressure is   
125/95 with a pulse of 88 and a   
respiratory  
rate of 18. She is satting 98%   
on room air she has a   
temperature of 37.7. Her  
white count is normal at 8.1   
with no shift and her H&H are   
normal at 13.4 and  
40.2. Platelets of 272,000. She   
is COVID-19 negative. Her CMP is   
remarkable  
only for a potassium of 3.1.   
Urine pregnancy test is   
negative. And urinalysis  
shows 2+ protein with 3+ blood   
and 2+ ketones and greater than   
182 RBCs and 23  
WBCs. CT of the head without   
contrast is nonacute.  
  
In the ER, she was given oral   
Bentyl along with 4 mg of IV   
Zofran and started  
on IV fluids. She was given 40   
mg of p.o. potassium. Then she   
was given 50 mg  
of IV Toradol and then 12.5 mg   
IV piggyback Phenergan. She was   
then given 20  
mg of IM dicyclomine and then   
started on 10 mg of IV Reglan   
followed by 2 doses  
of 2 mg IV push Haldol. She is   
admitted to the medical service   
with working  
diagnosis of intractable nausea   
and vomiting.  
  
  
PAST MEDICAL HISTORY  
see above  
  
PAST SURGICAL HISTORY  
1. Appendectomy 8 6  
2. Surgery for her right index   
finger  
3. ORIF right wrist  
4. Tonsillectomy and   
adenoidectomy  
  
FAMILY HISTORY  
Mother is alive and well  
Father is alive with diabetes  
8 siblings alive and well  
1 daughter alive and well  
  
SOCIAL HISTORY  
Patient is single  
No tobacco or alcohol  
She denies any drug abuse   
history although she said she   
did do marijuana on New  
Year's  
  
Social History:  
Social History:  
Smoking Statusnever smoker (1)  
Alcohol Usedenies(1)  
Drug Usedenies (1)  
  
  
  
Allergies:  
MiraLax: Hives/Urticaria  
codeine: Unknown  
  
Medications Prior to Admission:  
  
promethazine 25 mg oral tablet:   
1 tab(s) orally 3 times a day,   
As Needed -for  
nausea and vomiting  
Phenadoz 25 mg rectal   
suppository: 1 suppository(ies)   
rectally 3 times a day,  
As Needed -for nausea and   
vomiting.  
  
Review of Systems:  
Constitutional: POSITIVE: Fever,   
Chills  
  
Eyes: NEGATIVE: Blurry Vision,   
Drainage, Diploplia, Redness,   
Vision Loss/  
Change  
  
ENMT: NEGATIVE: Nasal Discharge,   
Nasal Congestion, Ear Pain,   
Mouth Pain, Throat  
Pain  
  
Respiratory: NEGATIVE: Dry   
Cough, Productive Cough,   
Hemoptysis, Wheezing,  
Shortness of Breath  
  
Cardiac: NEGATIVE: Chest Pain,   
Dyspnea on Exertion, Orthopnea,   
Palpitations,  
Syncope  
  
Gastrointestinal: POSITIVE:   
Nausea, Vomiting, Diarrhea,   
Abdominal Pain;  
NEGATIVE: Constipation  
  
Genitourinary: NEGATIVE:   
Discharge, Dysuria, Flank Pain,   
Frequency, Hematuria  
  
Musculoskeletal: NEGATIVE:   
Decreased ROM, Pain, Swelling,   
Stiffness, Weakness  
  
Neurological: NEGATIVE:   
Dizziness, Confusion, Headache,   
Syncope  
  
Psychiatric: NEGATIVE: Mood   
Changes, Anxiety  
  
  
Objective:  
  
Objective Information:  
T PRBPSpO2  
[id number]/7697%  
Date/Time 7: 7:   
7: 7: 7:30  
Range(37.7C - 37.8C ) (84 - 94 )   
(16 - 23 ) (111 - 134 )/ (76 -   
95 ) (96%  
- 100% )  
Highest temp of 37.8 C was   
recorded at  7:30  
  
  
Pain reported at  4:00: 4 =   
Moderate  
  
Physical Exam by System:  
  
Constitutional: Awake and alert;   
oriented x3 with no apparent   
distress or  
respiratory distress  
Eyes: PERRL, EOMI, clear sclera  
ENMT: mucous membranes moist,   
oropharynx clear  
Head/Neck: Normocephalic; neck   
supple, no apparent injury,   
thyroid without mass  
or tenderness, No JVD, trachea   
midline, no bruits  
Respiratory/Thorax: Clear to   
auscultation bilaterally no   
wheezes or rhonchi  
noted  
Cardiovascular: Regular rate and   
rhythm; normal S1-S2 with no   
murmur; no  
pitting edema and 2+ pulses   
bilaterally  
Gastrointestinal: Soft,   
nondistended, positive bowel   
sounds; there is some mild  
tenderness with minimal   
palpation left lower quadrant   
with some guarding but no  
rebound  
Neurological: Nonfocal, intact   
senses, motor, response and   
reflexes, normal  
strength; cranial nerves II   
through XII appear intact  
Psychological: Pleasant affect  
  
Recent Lab Results:  
  
Results:  
  
  
I have reviewed these laboratory   
results (more content not   
included)...                            [facility]  
   
                                        Evaluation note     Psychological: Appro  
priate mood   
and behaviorExtremities: normal   
extremities, no cyanosis edema,   
contusions or wounds, no   
clubbingNeurological: alert and   
oriented x3, intact senses,   
motor, response and reflexes,   
normal strengthSkin: Warm and   
dry, no lesions, no   
rashesRespiratory/Thorax: Patent   
airways, CTAB, normal breath   
sounds with good chest   
expansion, thorax symmetricENMT:   
mucous membranes moist, no   
apparent injury, no lesions   
seenCardiovascular: Regular,   
rate and rhythm, no murmurs, 2+   
equal pulses of the extremities,   
normal S 1and S   
2Gastrointestinal: Nondistended,   
soft, non-tender, no rebound   
tenderness or guarding, no   
masses palpable, no   
organomegaly, +BS, no   
bruitsMusculoskeletal: ROM   
intact, no joint swelling,   
normal strengthEyes: PERRL,   
EOMI, clear   
scleraConstitutional: Well   
developed, awake/alert/oriented   
x3, no distress, alert and   
cooperative                             [facility]  
   
                                        Evaluation note     Skin: Warm and dryEy  
es:   
Extraocular muscles intactENMT:   
Moist mucous   
membranesPsychological:   
CooperativeNeurological: Awake,   
alert, orientedCardiovascular:   
Regular rate and   
rhythmExtremities: No peripheral   
edemaHead/Neck: Normocephalic,   
atraumaticRespiratory/Thorax:   
Bilateral equal breath   
soundsGastrointestinal: Soft,   
nontender, nondistended,   
positive bowel   
soundsMusculoskeletal: Moving   
all extremitiesConstitutional:   
Awake, alert, oriented, no acute   
distress                                [facility]  
  
  
  
                                                    Evaluation note   
  
  
  
                                                    Diagnosis  
   
                                                      
  
  
Intractable nausea and vomiting- Primary  
  
  
Persistent vomiting  
   
                                                      
  
  
Colitis  
  
  
Other and unspecified noninfectious gastroenteritis and colitis  
   
                                                      
  
  
Diarrhea of presumed infectious origin  
   
                                                      
  
  
Smoker  
  
  
Tobacco use disorder  
   
                                                      
  
  
Metabolic acidosis  
  
  
Acidosis  
  
documented in this encounter  
[facility]   
  
                                                    Diagnosis  
   
                                                      
  
  
Nausea and vomiting, unspecified vomiting type- Primary  
  
documented in this encounter  
[facility]   
  
                                                    Diagnosis  
   
                                                      
  
  
Nausea and vomiting, unspecified vomiting type- Primary  
  
documented in this encounter  
[facility]  
Work Phone: [phone] Discharge instructions* Activity:activity as 
  tolerated.  
* Call Provider If:Any new concerning symptoms.  
* Patient Instructions:- CALL 911 IF YOU HAVE ANY OF THE SIGNS AND SYMPTOMS OF 
  HEART FAILURE: 1. Chest pain 2. Significant Shortness of breath 3. Fainting. -
  Notify your physician immediately if you have shortness of breath; weight gain
  of 3 lbs. or more; fatigue and loss of energy; swelling of lowerextremities or
  abdomen; dizziness or fainting; change of appetite; and frequent coughing. - 
  Patientreceived Living With Heart Failure book. - Daily weight on the same 
  scale, same time after voiding and before eating. - Maintain daily weight log.  
* Activity (Heart Failure):- Balance activity with rest, gradually increase your
  activity as tolerated. - Exercise as prescribed by your physician.  
* Patient Instructions:- CALL 911 OR GO DIRECTLY TO THE EMERGENCY ROOM IF YOU 
  HAVE ANY OF THE SIGNS AND SYMPTOMS OF STROKE: 1. Sudden weakness or numbness 
  of the face, arm or leg, especially on one side of the body. 2. Sudden 
  difficulty speaking or understanding. 3. Sudden trouble seeing in one or both 
  eyes. 4. Sudden trouble walking, dizziness, loss of balance or coordination. 
  5. Sudden severe headache with no known cause. 6. Loss of consciousness or 
  decreased consciousness, fainting, or seizures. - Know the Risk Factors for 
  Stroke: high blood pressure, high cholesterol, diabetes, smoking, physical 
  inactivity, overweight, previous stroke or TIA, heart disease, atrial 
  fibrillation. - Always carry a medication list with you and take it to ALL 
  Healthcare Provider visits. - You may be contacted by a Hospital 
  Representative after discharge to evaluate your progress at home and to 
  discuss yourexper[facility].  
* Hospital Specific Instructions - [facility]:- For questions/problems/concerns call 
  the Discharge Physician at [phone] and have them paged.  
* Follow Up Appointment 1:Physician/Dept/Service: PCPCall to Schedule in: 1 week  
* Follow Up Appointment 2:Physician/Dept/Service: Dr Valverde for Referral: 
  Hospital Follow-upLocation: [address] [place] [address]Phone Number: 
  [phone]  
* Gold Form - Other Clinicians:Other Clinician Instructions: Please provide list
  of potassium rich inmagnesium rich foods. Spinach and broccoli is an excellent
  source. Please eat regularly to maintainnatural source of potassium and 
  magnesium.  
[facility]* Labs 1 (Modify 
  Template):Lab Test(s): Basic Metabolic PanelDate To Be Drawn: 3/14Call Results
  To: PCPFax Results To: PCPLab Instructions: Walk-in lab, no appointment 
  required.Comments: monitor potassium  
* Additional Orders:Additional Instructions: Follow up with PCP within 1 week of
  dischargeContinue Tamiflu for one more day (tomorrow) on dischargeContinue 
  home medicationsNo new medications at time ofdischargeHigh protein regular 
  diet  
* Call Provider If:Breathing faster than normal. Breathing harder than normal or
  having retractions. Fever of 100.4 F (38 C) or higher. Temperature is greater 
  than 102 degrees. Chills. Drinking less than normal. Not being able to go 4-6 
  hours between albuterol treatments. Urinating less than normal, over 1 day. 
  Urinating less than 4 times per day. Acting very sleepy and difficult to 
  awaken. Vomiting (throwing up) and not able to eat or drink for 12 hours. 3 or
  more loose, watery bowel movements in 24 hours (diarrhea). Any new concerning 
  symptoms.  
* Follow Up Appointment 1:Physician/Dept/Service: Cee for Referral: follow
  up post dischargeCall to Schedule in: 1 weekComments: You were given a list of
  primary care physicians please call one and get established  
[facility] ([facility]ative)* Unlisted Procedure 
  Code (Routine) - New Request  
  
                          Specialty    Diagnoses / Procedures Referred By Contac  
t Referred To Contact  
   
                                                              
  
  
Procedures  
  
  
INPATIENT ADMISSION   
NOTIFICATION                              
  
  
Juancho Apodaca MD  
  
  
[address] [place] [address]  
  
  
Phone: [phone]  
  
  
Fax: [phone]                         
  
  
  
  
  
                    Referral ID Status    Reason    Start Date Expiration Date V  
isits   
Requested                               Visits   
Authorized  
   
                [id number] New Request         2024 3/7/2025 1       1  
  
  
  
  
Electronically signed by Juancho Apodaca MD at 2024 6:29 PM [facility]  
  
Summary Purpose  
  
  
                                                      
  
  
  
Family History  
No Family History Records FoundNo Family History Records FoundNo Family History 
Records FoundNo Family History Records FoundNo Family History Records FoundNo 
Family History Records FoundNo Family History Records FoundNo Family History 
Records FoundNo Family History Records FoundNo Family History Records FoundNo 
Family History Records Found  
  
Advance Directives  
No Advanced Directives Records FoundDocuments on File  
  
                          Type         Date Recorded Patient Representative Expl  
anation  
   
                                                    Advance Directives and Livin  
g   
Will                2020 6:29 PM                         
  
                                Documents on File  
  
                          Type         Date Recorded Patient Representative Expl  
anation  
   
                                                    Advance Directives and Livin  
g   
Will                2020 6:29 PM                         
   
                                                    Advance Directives and Livin  
g   
Will                2021 8:35 AM                          
  
                           Latest Code Status on File  
  
                          Code Status  Date Activated Date Inactivated Comments  
   
                          Full Code    2024 7:39 PM                
  
  
  
Discharge Instructions  
* Instructions*   
  
Leo Turner MD - 2021  
  
  
  
If unable to follow-up with the physician/clinic recommended above, please see 
an Urgent Care Clinic for re-evaluation within the same number of days.  
Return to the nearest emergency department at any time if there is:  
any new, returning or worsening symptoms  
new or changing rash  
fever > 100.4 (or feeling like there is a high fever if you don't have a 
thermometer)  
uncontrollable shaking chills  
difficulty following up as recommended  
or any other concerns about your condition or treatment.  
  
best regards,  
Leo Turner MD  
  
  
  
  
  
* Attachments  
  
The following attachments cannot be sent through Care Everywhere.  
  
* Postpartum Breast Care: Non-Breastfeeding (English)  
* Rash (English)  
  
documented in this encounter  
  
Assessments  
  
  
                                                    Diagnosis  
   
                                                      
  
  
Postpartum breast pain- Primary  
  
  
Other and unspecified disorder of breast associated with childbirth, postpartum   
condition or complication  
   
                                                      
  
  
Postpartum bloody nipple discharge  
   
                                                      
  
  
Rash  
  
  
Rash and other nonspecific skin eruption  
  
  
  
Additional Source Comments  
  
  
  
                                                    INFORMATION SOURCE (unrecogn  
ized section and content)  
   
                                          
  
  
  
                                        DATE CREATED        AUTHOR  
   
                                2018                      [facility]  
  
  
  
                                DATE CREATED    AUTHOR          AUTHOR'S ORGANIZ  
ATION  
   
                                2019                      [facility]  
 [facility]  
  
  
  
                                DATE CREATED    AUTHOR          AUTHOR'S ORGANIZ  
ATION  
   
                                2019                      [facility]  
  
  
  
                                DATE CREATED    AUTHOR          AUTHOR'S ORGANIZ  
ATION  
   
                                2020                       [employer]  
  
  
  
                                DATE CREATED    AUTHOR          AUTHOR'S ORGANIZ  
ATION  
   
                                2022                      [facility]  
  
  
  
                                DATE CREATED    AUTHOR          AUTHOR'S ORGANIZ  
ATION  
   
                                02/15/2024                      [facility]  
  
  
  
                                DATE CREATED    AUTHOR          AUTHOR'S ORGANIZ  
ATION  
   
                                2024                      [facility]  
al  
  
  
  
                                DATE CREATED    AUTHOR          AUTHOR'S ORGANIZ  
ATION  
   
                                2024                      [facility]  
nter  
  
  
  
                                DATE CREATED    AUTHOR          AUTHOR'S ORGANIZ  
ATION  
   
                                2024                      [facility]   
[facility]  
  
  
  
                                DATE CREATED    AUTHOR          AUTHOR'S ORGANIZ  
ATION  
   
                                2024                      [facility]  
  
  
  
                                DATE CREATED    AUTHOR          AUTHOR'S ORGANIZ  
ATION  
   
                                2024                      [facility]  
[facility]  
  
  
  
  
  
                                                    Reason for Visit (unrecogniz  
ed section and content)  
   
                                          
  
  
  
                                        Reason              Comments  
   
                                        Emesis                
   
                                        Nausea                
  
  
  
                                        Reason              Comments  
   
                                        Breast Pain           
  
  
  
                                        Reason              Comments  
   
                                        Vomiting              
   
                                        Abdominal Pain      Pt dx with colitis ,  
 unable to keep medications down with zofran   
and   
phenergan suppositoryNeg flu covid and pregnancy test  
   
                                        Seizure             [facility] [place] [facility]   
[facility]  
  
  
  
                                        Reason              Comments  
   
                                        Seizures            Family reports  julio  
hs  of seizures without medications. Drove 1 hour   
to get   
here today with two seizures in the car. Endorses nausea and vomiting.  
  
  
  
                                        Reason              Comments  
   
                                        Abdominal Pain      Pt comes in for gene  
ralized abdominal pain x today. Pt was seen   
recently for a similar event. Pt states she was doing well for about   
1wk and then ate [employer] last pm, then woke up this am with vomiting.   
Pt took some reglan, but it increased the pain.  
  
  
  
    Jordan, Ashlee, RN - 2020 6:44 PM Ashlee Diaz RN -   
2020 6:34 PM Ashlee Diaz RN - 2020 5:16 PM Tish Kaye RN - 2021 8:47 AM EDT  
  
                                                    ED Notes (unrecognized secti  
on and content)  
   
                                                      
  
  
THIS RN SEES PT WALKING QUICKLY DOWN HALLWAY TO EMS EXIT. PT STOPPED BY THIS RN 
D/T   
PT STILL HAVING IV IN. WHEN ASKED WHERE SHE WAS GOING PT STATES  MY MOM CALLED 
AND WE   
HAVE TO TAKE MY SISTER TO THE CRISIS UNIT.  THIS RN STATES THAT SHE CANNOT LEAVE
WITH   
AN IV IN. PT AGREES TO COME BACK AND REMOVE IV. WHEN THIS RN WAS GETTING 
SUPPLIES TO   
TAKE IV OUT, PT RIPS IV OUT OF LEFT AC AND STARTS TO BLEED DOWN ARM AND ON 
FLOOR.   
THIS RN SECURES 2X2 WITH TAPE ON IV SITE. BLEEDING CONTROLLED. PT CONTINUES TO 
WALK   
OUT AFTER IV IS REMOVED. DR MESSINA AWARE. PT GCS 15 AND GAIT STEADY WITH AMBULATE
OUT   
OF ER.  
  
  
Electronically signed by Ashlee Ortega RN at 2020 6:48 PM EDT  
  
  
PT AMBULATES TO RR AND BACK WITHOUT DIFFICULTY. UPDATED ON POC. NO CONCERNS 
VOICED.   
RESPS EVEN AND UNLABORED  
  
  
Electronically signed by Ashlee Ortega RN at 2020 6:34 PM EDT  
  
  
PT PRESENTS TO THE ED FOR N/V X4 DAYS. STATES SHE IS 6 WEEKS PREGNANT  
  
Special isolation precautions are in place with signage outside this patient's 
room.   
This RN performs hand hygiene and enters the patient room wearing:  
? gloves  
? an appropriately fitting (N-95, PAPR, Aura) mask  
? face shield  
? protective gown  
to provide nursing care. See nursing documentation for the care provided.  
  
  
Electronically signed by Ashlee Ortega RN at 2020 6:20 PM 
EDTdocumented in   
this encounter  
  
  
Patient DC home with follow up to OB, as well as given referral for PCP here. 
NAD   
noted. Verbalized understanding of prescription. Ambulated independently out of 
ED.  
  
  
Electronically signed by Tish Angel RN at 2021 8:48 AM EDT  
  
  
Patient comes in for complaints of bilateral breast and nipple pain, with bloody
  
discharge that started today. Patient is appx 1 month post partum, vaginal 
delivery,   
not breastfeeding. States pain now is 7/10, when waking up it was 10/10 but did 
not   
take any medications at home. Patient also states having an increase in a  rash 
skin   
condition that she has had for 5 years is now all of a sudden rash has moved to 
her   
stomach. Patient takes no prescription medications at this time. Denies and   
n/v/f/d/cp/sob.  
  
  
Electronically signed by Tish Angel RN at 2021 8:30 AM EDT  
  
  
Formatting of this note might be different from the original.  
ED PROVIDER NOTE  
[facility] EMERGENCY DEPARTMENT  
  
NAME: Danna Soares AGE: 20 y.o. : 2000  
VISIT DATE: 2021  
MRN: [id number]  
CSN: [id number]  
PCP: Physician No  
  
Chief Complaint  
Patient presents with  
Breast Pain  
  
  
HPI  
  
HPI:  
20-year-old female previously healthy, now with bilateral breast pain 1 month 6 
days   
postpartum. Patient reports uncomplicated delivery and pregnancy. Not breast-
feeding.   
She began to develop the bilateral generalized breast discomfort about a week 
ago.   
Today she noted small amounts of blood leaking from both nipples. She also 
reports   
that she has a history of hyperpigmentation of her skin on her upper arms and   
bilateral thighs for about 5 years, but now has a dark discolored rash on her 
abdomen   
for the past several days as well. Not pruritic. She has never had the rash 
evaluated   
by a physician.  
No recent fever. She did have a  sinus infection  1 week ago, no recent 
antibiotic.   
Otherwise negative review of systems.  
  
Severity: Moderate  
Location: **as above*  
Radiating to: **only as above; otherwise none*  
Exacerbated by: **only as above; otherwise none*  
Relieved by: **only as above; otherwise none* patient has not tried any pain   
medicines yet  
Associated with: **only as above; otherwise none*  
  
Historian(s) deny any other concerns.  
ROS negative except as above.  
  
I have reviewed and agree with the available nursing notes except as otherwise   
reported.  
I have reviewed available medical records.  
  
  
REVIEW OF SYSTEMS:  
Const:  
No fever  
Eyes:  
No vision change  
ENT:  
No remaining congestion  
No sore throat  
CV:  
No CP  
No syncope  
Resp:  
No cough  
No SOB  
GI:  
No Abdo pain  
No nausea  
No vomiting  
No diarrhea  
No constipation  
:  
No dysuria  
No hematuria  
MSK:  
Negative except as noted in HPI  
Skin:  
rash. Not pruritic.  
Neuro:  
No HA  
Hem:  
No bleeding/clotting problems except as per HPI. No nosebleeds or GI bleeding or
  
hemoptysis or easy bruising/bleeding.  
Psych:  
Nl behavior  
  
except as otherwise noted  
  
  
PHYSICAL EXAM:  
Patient Vitals for the past 24 hrs:  
BP Temp Temp src Pulse Resp SpO2 Height Weight  
21 0835 51.9 kg (114 lb 6.7 oz)  
21 0820 134/89 97.7 F (36.5 C) Tympanic 72 (!) 19 99 % 4' 11  49.9 kg (110
lb)  
  
VS Reviewed.  
  
  
Constitutional:  
Non-toxic  
Head:  
Normocephalic  
Atraumatic  
Eyes:  
PERRL  
EOMi  
ENT:  
Mucus membranes moist  
No pharyngeal injection  
Neck:  
Nl ROM  
trachea midline  
Cardiovascular:  
RRR  
Respiratory:  
No resp distress  
Breasts  
Examined with female RN at the bedside assisting.  
Both breasts have a normal symmetric appearance, including the nipples. There is
no   
visible skin abnormality or apparent swelling. The skin is not hot to the touch.
  
Nipples appear normal. There is no active or expressible discharge or bleeding. 
There   
are no skin lesions. There is no palpable peau d'orange feel to the skin, nor 
any   
palpable mass  
Gastrointestinal:  
Non-distended  
Genitourinary:  
  
Back  
Nl inspection  
Upper Extremities:  
Nl inspection  
Lower Extremities:  
  
Neurologic:  
Alert  
answers questions appropriately  
no focal deficits  
Psych:  
Appropriate  
Skin:  
Warm  
Dry  
Nl color  
Coalesced hyperpigmented flat to slightly raised round plaques ranging from 0.5 
to 1   
cm diameter, located on the bilateral upper and lower arms, and the bilateral   
abdomen, extending across the left flank. The abdominal lesions have more of a 
dusky   
gray appearance to them, about the lesions on the arms and forearms have a 
slightly   
erythematous hyperpigmented skin color to them. Lesions are nontender.  
No crepitus or fluctuance or induration or excessive heat.  
  
No acute/emergency findings unless otherwise specified  
  
  
History reviewed. No pertinent past medical history.  
  
History reviewed. No pertinent surgical history.  
  
Family History  
Problem Relation Age of Onset  
Cancer Sister  
Cancer Maternal Grandmother  
Cancer Paternal Grandmother  
  
Social History  
  
Socioeconomic History  
Marital status: Single  
Spouse name: Not on file  
Number of children: Not on file  
Years of education: Not on file  
Highest education level: Not on file  
Occupational History  
Not on file  
Social Needs  
Financial resource strain: Not on file  
Food insecurity  
Worry: Not on file  
Inability: Not on file  
Transportation needs  
Medical: Not on file  
Non-medical: Not on file  
Tobacco Use  
Smoking status: Never Smoker  
Smokeless tobacco: Never Used  
Substance and Sexual Activity  
Alcohol use: Not on file  
Drug use: Never  
Sexual activity: Not on file  
Lifestyle  
Physical activity  
Days per week: Not on file  
Minutes per session: Not on file  
Stress: Not on file  
Relationships  
Social connections  
Talks on phone: Not on file  
Gets together: Not on file  
Attends Taoism service: Not on file  
Active member of club or organization: Not on file  
Attends meetings of clubs or organizations: Not on file  
Relationship status: Not on file  
Other Topics Concern  
Not on file  
Social History Narrative  
Not on file  
  
No current outpatient medications on file prior to encounter.  
  
  
Allergies  
Allergen Reactions  
Miralax [Polyethylene Glycol 3350] Hives  
Codeine Anxiety  
  
Review of Systems  
  
Patient Vitals for the past 24 hrs:  
BP Temp Temp src Pulse Resp SpO2 Height Weight  
21 0835 51.9 kg (114 lb 6.7 oz)  
21 0820 134/89 97.7 F (36.5 C) Tympanic 72 (!) 19 99 % 4' 11  49.9 kg (110
lb)  
  
Physical Exam  
  
Laboratory & Radiographic Imaging (if done):  
No results found for this visit on 21.  
No orders to display  
  
Procedures  
  
  
  
MDM  
  
Medical Decision Making  
  
DDx (including but not limited to):  
? Breast pain and discharge likely secondary to hormonal changes postpartum. She
will   
need follow-up with her OB/GYN/women's health clinic.  
? Nonspecific rash, acute on chronic. I encouraged patient to follow-up with a   
primary care clinic; she does not currently have one so I am providing contact 
info.   
Primary care will be able to refer her out to a dermatologist to address this   
evolving chronic rash.  
  
No indication of:  
? Mastitis/abscess  
? Breast lump/mass, though an ER examination cannot rule out breast masses and I
  
encouraged patient to follow-up closely with her OB/Gyn clinic for ongoing 
women's   
health care.  
? Systemic illness  
? Bleeding diathesis  
  
  
  
  
Considered appropriately wide DDx.  
  
At this time, acutely dangerous emergency conditions found to be unlikely based 
on   
history, exam, vitals and any testing, except as otherwise specified, and 
patient is   
appropriate for outpatient management. Pt will return to the ED if condition is   
worsening in any way, or if new sx arise.  
  
They understand, are appreciative and comfortable with outpatient plan including
  
follow-up and return ED recommendations as discussed.  
Pt appears nontoxic, well-hydrated and comfortable.  
  
The patient has been informed that they may have pre-hypertension or  
hypertension based on a blood pressure reading in the Emergency  
Department. I recommend that the patient call the primary care provider  
listed on their discharge instructions or a physician of their choice as  
soon as possible to arrange follow-up in the next 4 weeks for further  
evaluation of possible pre-hypertension or hypertension.  
  
.  
  
  
  
Clinical Impression:  
1. Postpartum breast pain  
2. Postpartum bloody nipple discharge  
3. Rash  
  
ED Disposition  
ED Disposition Condition Comment  
Discharge Stable Danna Soares discharged to home/self care in stable   
condition.  
  
  
  
  
Follow-up Information  
1. Natali Salcido MD.  
Specialty: Obstetrics/Gynecology  
Why: For further evaluation & treatment within 2 days  
[address]  
[address]  
[facility] [address]  
[phone]  
  
  
  
2. Mireille Rey MD.  
Specialty: Internal Medicine  
Why: To establish primary care within the next week. She will then be  
able to refer you to a dermatologist to evaluate the rash.  
[address] [address]  
[phone]  
  
  
  
  
Contact information for after-discharge care  
Follow-up information has not been specified.  
  
  
New Prescriptions  
  
  
ibuprofen (ADVIL,MOTRIN) 200 MG tablet Take 2 (two) tablets (400 mg total) by 
mouth   
every 8 (eight) hours as needed for pain .  
  
  
  
  
  
Leo Turner MD  
21 0852  
  
  
  
Electronically signed by Leo Turner MD at 2021 8:52 AM   
EDTdocumented in this encounter  
  
  
                         <item><item><item><item><item>  
  
                                                    Privacy Markings (unrecogniz  
ed section and content)  
   
                                                    Section Author: Teresa Ely   
PROHIBITION ON REDISCLOSURE OF CONFIDENTIAL   
INFORMATION   
This notice accompanies a disclosure of information concerning a client made to 
you   
with the consent of such client. This information has been disclosed to you from
  
records protected by federal confidentiality rules (42 C.F.R. Part 2). The 
federal   
rules prohibit you from making any further disclosure of this information unless
  
further disclosure is expressly permitted by the written consent of the person 
to   
whom it pertains or as otherwise permitted by 42 C.F.R. Part 2. A general   
authorization for the release of medical or other information is NOT sufficient 
for   
this purpose.Section Author: Teresa Ely PROHIBITION ON REDISCLOSURE OF 
CONFIDENTIAL   
INFORMATION This notice accompanies a disclosure of information concerning a 
client   
made to you with the consent of such client. This information has been disclosed
to   
you from records protected by federal confidentiality rules (42 C.F.R. Part 2). 
The   
federal rules prohibit you from making any further disclosure of this 
information   
unless further disclosure is expressly permitted by the written consent of the 
person   
to whom it pertains or as otherwise permitted by 42 C.F.R. Part 2. A general   
authorization for the release of medical or other information is NOT sufficient 
for   
this purpose.Section Author: Teresa Ely PROHIBITION ON REDISCLOSURE OF 
CONFIDENTIAL   
INFORMATION This notice accompanies a disclosure of information concerning a 
client   
made to you with the consent of such client. This information has been disclosed
to   
you from records protected by federal confidentiality rules (42 C.F.R. Part 2). 
The   
federal rules prohibit you from making any further disclosure of this 
information   
unless further disclosure is expressly permitted by the written consent of the 
person   
to whom it pertains or as otherwise permitted by 42 C.F.R. Part 2. A general   
authorization for the release of medical or other information is NOT sufficient 
for   
this purpose.Section Author: Teresa Ely PROHIBITION ON REDISCLOSURE OF 
CONFIDENTIAL   
INFORMATION This notice accompanies a disclosure of information concerning a 
client   
made to you with the consent of such client. This information has been disclosed
to   
you from records protected by federal confidentiality rules (42 C.F.R. Part 2). 
The   
federal rules prohibit you from making any further disclosure of this 
information   
unless further disclosure is expressly permitted by the written consent of the 
person   
to whom it pertains or as otherwise permitted by 42 C.F.R. Part 2. A general   
authorization for the release of medical or other information is NOT sufficient 
for   
this purpose.Section Author: Teresa Ely PROHIBITION ON REDISCLOSURE OF 
CONFIDENTIAL   
INFORMATION This notice accompanies a disclosure of information concerning a 
client   
made to you with the consent of such client. This information has been disclosed
to   
you from records protected by federal confidentiality rules (42 C.F.R. Part 2). 
The   
federal rules prohibit you from making any further disclosure of this 
information   
unless further disclosure is expressly permitted by the written consent of the 
person   
to whom it pertains or as otherwise permitted by 42 C.F.R. Part 2. A general   
authorization for the release of medical or other information is NOT sufficient 
for   
this purpose.  
  
  
                                    Scheduled  
  
                                                    Active and Recently Administ  
ered Medications (unrecognized section and content)  
   
                                          
  
  
  
                          Medication Order 02/10/2024   2024   2024  
   
                                                      
  
  
Ampicillin-Sulbactam   
Sodium (UNASYN) 3 g in   
sodium chloride 0.9% (MB   
PLUS) 100 mL (total   
volume) IVPB (COMPLETED)  
3 g, Intravenous,   
Administer over 30   
Minutes, ONCE, 1 dose, On   
Sun 2/11/24 at 1930,   
Contains a penicillin.  
                                                    193 ($$New Bag$$ -   
Provider: Marla Biggs RN)  
                                        1049 (Stopped - Provider:   
Janelle Short RN)  
  
   
                                                      
  
  
dicyclomine (BENTYL)   
injection 20 mg   
(COMPLETED)  
20 mg, Intramuscular,   
ONCE, 1 dose, On Sun   
2/11/24 at 1815  
                                                    1841 (Given - Provider:   
Pilar Clifford RN)  
                                          
   
                                                      
  
  
diphenhydrAMINE   
(BENADRYL) injection 12.5   
mg (COMPLETED)  
12.5 mg, Intravenous,   
ONCE, 1 dose, On Sun   
2/11/24 at 1715  
                                                    1710 (Given - Provider:   
Marla Biggs RN)  
                                          
   
                                                      
  
  
Enoxaparin Sodium   
(LOVENOX) injection 40 mg  
40 mg, Subcutaneous,   
DAILY AT BEDTIME, First   
dose on 24 at   
2100, Until Discontinued,   
Indications: DVT/PE   
prophylaxis  
                                                            2100 (Canceled Entry  
 -   
Provider: System Discharge   
- Comment: Automatically   
canceled at discontinue of   
medication order)  
  
   
                                                      
  
  
Ketorolac (TORADOL)   
injection 30 mg   
(COMPLETED)  
30 mg, Intravenous, ONCE,   
1 dose, On Sun 2/11/24 at   
1900  
                                                    1841 (Given - Provider:   
Pilar Clifford RN)  
                                          
   
                                                      
  
  
Magnesium sulfate 2 g/50   
ml in sterile water   
premix IVPB 2 g 50 mL   
(total volume)  
2 g, Intravenous,   
Administer over 4 Hours,   
DAILY, First dose on 24 at 0600, Until   
Discontinued, Give if   
magnesium is less than 2   
on morning labs. Infuse   
at a rate of 0.5 gm/hour.  
                                                              
   
                                                      
  
  
Metoclopramide (REGLAN)   
injection 10 mg   
(COMPLETED)  
10 mg, Intravenous, ONCE,   
1 dose, On Sun 2/11/24 at   
1715  
                                                    1710 (Given - Provider:   
Marla Biggs RN)  
                                          
   
                                                      
  
  
metroNIDAZOLE (FLAGYL)   
500 mg in NaCl premix   
IVPB  
500 mg, Intravenous, at   
200 mL/hr, Administer   
over 30 Minutes, EVERY 8   
HOURS NON-STANDARD, First   
dose on 24 at   
1500, Until Discontinued  
                                                              
   
                                                      
  
  
Ondansetron 4mg/2ml   
(ZOFRAN) injection 4 mg   
(COMPLETED)  
4 mg, Intravenous, ONCE,   
1 dose, On Sun 2/11/24 at   
1715  
                                                    1710 (Given - Provider:   
Marla Biggs RN)  
                                          
   
                                                      
  
  
Pantoprazole (PROTONIX)   
injection 40 mg  
40 mg, Intravenous, EVERY   
12 HOURS, First dose on   
24 at 1230,   
Until Discontinued,   
Dilute each 40 mg vial   
with 10 mL of NS. All   
bolus doses, whether 40   
mg or 80 mg, should be   
administered over at   
least two minutes.,   
Indications: Inpt Stress   
Ulcer Prophylaxis  
                                                            1542 (Given - Provid  
er:   
Janelle Short RN)2100   
(Canceled Entry -   
Provider: System Discharge   
- Comment: Automatically   
canceled at discontinue of   
medication order)  
  
   
                                                      
  
  
Potassium chloride   
(K-DUR) tablet ER 40   
mEq(Linked Group 1)  
40 mEq, Oral, DAILY,   
First dose on 24   
at 0600, Until   
Discontinued, Give if   
potassium is 3.1 to 3.4   
on morning labs and   
patient is able to   
tolerate oral medications   
Swallow tablets whole; do   
not crush, chew, or suck   
on tablet. Tablet may   
also be broken in half   
and each half swallowed   
separately.  
                                                              
   
                                                      
  
  
potassium chloride 40 mEq   
in 0.9% sodium chloride   
500 ml IVPB(Linked Group   
1)  
40 mEq, Intravenous, at   
125 mL/hr, Administer   
over 4 Hours, DAILY,   
First dose on 24   
at 0600, Until   
Discontinued, Give if   
potassium is less than   
3.1 on morning labs OR if   
potassium is 3.1 to 3.4   
on morning labs and   
patient is UNABLE to   
tolerate oral medications  
                                                              
   
                                                      
  
  
potassium chloride 40 mEq   
in 0.9% sodium chloride   
500 ml IVPB (COMPLETED)  
40 mEq, Intravenous, at   
125 mL/hr, Administer   
over 4 Hours, ONCE, 1   
dose, On 24 at   
1300  
                                                            1318 ($$New Bag$$ -   
Provider: Janelle Short RN)1730 (Stopped -   
Provider: Janelle Short RN)  
  
   
                                                      
  
  
Sodium chloride 0.9% IV   
solution 1,000 mL   
(COMPLETED)  
1,000 mL, Intravenous,   
ONCE, 1 dose, On Sun   
2/11/24 at 1715  
                                                    1709 ($$New Bag$$ -   
Provider: Marla Biggs RN)1909 (Stopped -   
Provider: Mary Abrams RN)  
                                          
  
                                   Continuous  
  
                          Medication Order 02/10/2024   2024   2024  
   
                                                      
  
  
Dextrose 5% 1,000 mL with   
Sodium bicarbonate 100 mEq   
IV solution  
Intravenous, CONTINUOUS,   
Starting on 24 at   
1200, Until 24 at   
2143  
                                                            1318 ($$New Bag$$ -   
Provider: Janelle Short RN)1743   
(Rate/Dose Verify -   
Provider: Janelle Short RN)  
  
   
                                                      
  
  
Sodium chloride 0.9% IV   
solution (CANCELED)  
Intravenous, at 125 mL/hr,   
CONTINUOUS, Starting on   
Sun 24 at 1945, Until   
24 at 1037  
                                                    2103 ($$New Bag$$ -   
Provider: Fadia Unger RN)  
                                        0515 ($$New Bag$$ -   
Provider: Fadia Unger RN)0925   
(Rate/Dose Verify -   
Provider: Janelle Short RN)1049   
(Stopped - Provider:   
Janelle Short RN)  
  
  
                                       PRN  
  
                          Medication Order 02/10/2024   2024   2024  
   
                                                      
  
  
Acetaminophen (TYLENOL)   
tablet 650 mg  
650 mg, Oral, EVERY 4 HOURS   
AS NEEDED, Starting on Sun   
24 at 1939, Until 24 at 2143, Mild Pain,   
Oral temp > 100.4 F  
                                                              
   
                                                      
  
  
Metoclopramide (REGLAN)   
injection 10 mg (CANCELED)  
10 mg, Intravenous, EVERY 6   
HOURS AS NEEDED, Starting   
on Sun 24 at 1939,   
Until 24 at 0919,   
Nausea / Vomiting  
                                                            0432 (Given - Provid  
er:   
Fadia Unger RN)  
  
   
                                                      
  
  
Metoclopramide (REGLAN)   
injection 10 mg  
10 mg, Intravenous, EVERY 6   
HOURS AS NEEDED, Starting   
on 24 at 0918,   
Until 24 at 2143,   
Nausea / Vomiting, 2nd line   
N/V  
                                                            1543 (Given - Provid  
er:   
Janelle Short RN)  
  
   
                                                      
  
  
Ondansetron 4mg/2ml   
(ZOFRAN) injection 4 mg  
4 mg, Intravenous, EVERY 4   
HOURS AS NEEDED, Starting   
on Sun 24 at 1939,   
Until 24 at 2143,   
Nausea / Vomiting  
                                                    1950 (Given - Provider:   
Marla Biggs RN)  
                                        0046 (Given - Provider:   
Fadia Unger RN)0909 (Given -   
Provider: Janelle Short RN)1415   
(Given - Provider: Teresa Brady RN)1827 (Given   
- Provider: Janelle Short RN)  
  
   
                                                      
  
  
Promethazine (PHENERGAN)   
12.5 mg in Sodium chloride   
0.9%, with overfill 60.5 mL   
(total volume) IVPB   
(CANCELED)  
12.5 mg, Intravenous, at   
121-242 mL/hr, Administer   
over 15-30 Minutes, EVERY 4   
HOURS AS NEEDED, Starting   
on Sun 2/11/24 at 2134,   
Until 24 at 0919,   
Nausea / Vomiting, Total   
dose is 25 mg, which will   
be two bags of 12.5mg each   
Extravasation Risk  
                                                    2210 ($$New Bag$$ -   
Provider: Fadia Unger RN)2211 ($$New   
Bag$$ - Provider:   
Fadia Unger RN)  
                                        0742 (Stopped -   
Provider: Janelle Short RN)  
  
   
                                                      
  
  
Promethazine (PHENERGAN)   
12.5 mg in Sodium chloride   
0.9%, with overfill 60.5 mL   
(total volume) IVPB(Linked   
Group 2)  
12.5 mg, Intravenous, at   
121-242 mL/hr, Administer   
over 15-30 Minutes, EVERY 4   
HOURS AS NEEDED, Starting   
on 24 at 0918,   
Until 24 at 2143,   
Refractory Nausea Vomiting,   
Total dose is 25 mg, which   
will be two bags of 12.5mg   
each Extravasation Risk  
                                                              
   
                                                      
  
  
Promethazine (PHENERGAN)   
12.5 mg in Sodium chloride   
0.9%, with overfill 60.5 mL   
(total volume) IVPB(Linked   
Group 2)  
12.5 mg, Intravenous, at   
121-242 mL/hr, Administer   
over 15-30 Minutes, EVERY 4   
HOURS AS NEEDED, Starting   
on 24 at 0918,   
Until 24 at 2143,   
Nausea / Vomiting, Total   
dose is 25mg which is two   
bags of 12.5mg each   
Extravasation Risk  
                                                              
  
                                  Linked Groups  
  
                                                    Order  
   
                                                      
  
  
Group 1:  
  
  
Potassium chloride (K-DUR) tablet ER 40 mEqJump to med  
40 mEq, Oral, DAILY, First dose on 24 at 0600, Until Discontinued, Give
if   
potassium is 3.1 to 3.4 on morning labs and patient is able to tolerate oral   
medications Swallow tablets whole; do not crush, chew, or suck on tablet. Tablet
may   
also be broken in half and each half swallowed separately.  
  
   
                                                      
  
  
Or  
  
  
potassium chloride 40 mEq in 0.9% sodium chloride 500 ml IVPBJump to med  
40 mEq, Intravenous, at 125 mL/hr, Administer over 4 Hours, DAILY, First dose on
24 at 0600, Until Discontinued, Give if potassium is less than 3.1 on 
morning   
labs OR if potassium is 3.1 to 3.4 on morning labs and patient is UNABLE to 
tolerate   
oral medications  
  
   
                                                      
  
  
Group 2:  
  
  
Promethazine (PHENERGAN) 12.5 mg in Sodium chloride 0.9%, with overfill 60.5 mL   
(total volume) IVPBJump to med  
12.5 mg, Intravenous, at 121-242 mL/hr, Administer over 15-30 Minutes, EVERY 4 
HOURS   
AS NEEDED, Starting on 24 at 0918, Until 24 at 2143, 
Refractory   
Nausea Vomiting, Total dose is 25 mg, which will be two bags of 12.5mg each   
Extravasation Risk  
  
   
                                                      
  
  
And  
  
  
Promethazine (PHENERGAN) 12.5 mg in Sodium chloride 0.9%, with overfill 60.5 mL   
(total volume) IVPBJump to med  
12.5 mg, Intravenous, at 121-242 mL/hr, Administer over 15-30 Minutes, EVERY 4 
HOURS   
AS NEEDED, Starting on 24 at 0918, Until 24 at 2143, Nausea /   
Vomiting, Total dose is 25mg which is two bags of 12.5mg each Extravasation Risk  
  
  
                                    Scheduled  
  
                          Medication Order 2024  
   
                                                      
  
  
ketorolac (TORADOL) 15 MG/ML   
injection (COMPLETED)  
15 mg, Intravenous Push, ONCE, 1   
dose, On 24 at 2323  
                                                            2300 (Given - Provid  
er:   
Arsenio Blanco RN)  
  
   
                                                      
  
  
ondansetron (ZOFRAN) 4 MG/2ML   
injection (COMPLETED)  
4 mg, Intravenous Push, STAT, 1   
dose, On 24 at 1925  
                                                            1859 (Given - Provid  
er: Zaira Allen RN)  
  
   
                                                      
  
  
ondansetron (ZOFRAN) 4 MG/2ML   
injection (COMPLETED)  
4 mg, Intravenous Push, STAT, 1   
dose, On 24 at 2323  
                                                            2259 (Given - Provid  
er:   
Arsenio Blanco RN)  
  
  
                                    Scheduled  
  
                          Medication Order 2024  
   
                                                      
  
  
prochlorperazine (Compazine)   
injection 5 mg (COMPLETED)  
5 mg, intravenous, Once, On 24 at 0, For 1 dose  
                                                             (Given - Provid  
er:   
Nicolas Ro RN)  
  
   
                                                      
  
  
sodium chloride 0.9 % bolus   
1,000 mL (COMPLETED)  
1,000 mL, intravenous, at 1,000   
mL/hr, Administer over 1 Hours,   
Once, On 24 at 0,   
For 1 dose  
                                                             (New Bag - Prov  
ider:   
Nicolas Ro RN) (Stopped   
- Provider: Tegan Lee RN)  
  
  
  
  
  
  
                                                    Care Teams (unrecognized sec  
tion and content)  
   
                                          
  
  
  
                      Team Member Relationship Specialty  Start Date End Date  
   
                                                      
  
  
Generic Provider, No Assigned Pcp, MD  
  
  
[address]  
  
  
[place] [address] PCP - General                   24            
  
  
FOR RECORDS PERTAINING TO PATIENTS WHO ARE OR HAVE BEEN ENROLLED IN A CHEMICAL 
DEPENDENCY/SUBSTANCEABUSE PROGRAM, SOME INFORMATION MAY BE OMITTED. This 
clinical summary was aggregated from multiple sources. Caution should be 
exercised in using it in the provision of clinical care. This summary normalizes
information from multiple sources, and as a consequence, information in this 
document may materially change the coding, format and clinical context of 
patient data. In addition, data may be omitted in some cases. CLINICAL DECISIONS
SHOULD BE BASED ON THE PRIMARY CLINICAL RECORDS. [facility] [employer] [facility]. provides 
no warranty or guarantee of the accuracy or completeness of information in this 
document.

## 2024-08-25 NOTE — EDS_ITS
HPI    
<ABBEY Flores - Last Filed: 08/25/24 17:31>    
History of Present Illness    
Chief Complaint: Nausea/Vomiting    
Narrative    
Narrative:     
Patient presenting today due to nausea and vomiting.  She reports that she is   
currently 13 to 15 weeks pregnant and is G4, P1.  She follows with Dr. Salcido.  She reports that over the past several days she has had increased   
nausea and vomiting.  This is her third time being seen in the emergency   
department.  She was here yesterday and was given a prescription for Reglan, her  
vomiting was under control when she left the ED.  However, today she began   
vomiting again.  She did try to take a Zofran but is still feeling nauseous.    
She does admit to a history of marijuana use, she reports that she has not used   
in about 1 week.  She denies fever, chills,  vaginal bleeding, and urinary   
symptoms.  She reports she has had pain across her abdomen over the past 3 days   
from vomiting that has not changed.    
    
[facility]    
<ABBEY Flores - Last Filed: 08/25/24 17:31>    
[facility]    
Medical History (Reviewed 08/25/24 @ 17:28 by ABBEY Flores)    
    
OCD (obsessive compulsive disorder)    
ADD (attention deficit disorder)    
Depression with anxiety    
bipolar    
    
    
                                Home Medications    
    
    
    
?Medication ?Instructions ?Recorded ?Last Taken ?Type    
     
docosahexaenoic acid 200 mg 200 mg PO DAILY 08/09/24 Unknown History    
    
capsule (Prenatal DHA)        
     
metoclopramide HCl 10 mg tablet 10 mg PO TID PRN PRN nausea and 08/24/24 Unknown  
Rx    
    
(Reglan) vomiting #20 tabs       
    
    
    
                                            
    
    
    
Allergy/AdvReac Type Severity Reaction Status Date / Time    
     
codeine Allergy  HALLUCINATI Verified 08/25/24 16:01    
    
   ONS      
     
polyethylene glycol 3350 AdvReac  Hives Verified 08/25/24 16:01    
    
(From Miralax)         
    
    
    
Family History (Reviewed 08/25/24 @ 17:28 by ABBEY Flores)    
Other   Colon cancer    
   Hypertension    
   Lung cancer    
   Ovarian cancer    
   Schizophrenia    
   Uterine cancer    
   bipolar    
    
    
    
Surgical History (Reviewed 08/25/24 @ 17:28 by ABBEY Flores)    
    
S/P tonsillectomy    
s/p finger surgery    
S/P appendectomy    
    
    
Social History (Reviewed 08/25/24 @ 17:28 by ABBEY Flores)    
adopted:  No     
household members:  family     
housing:  house     
number of children:  1     
current occupational status:  employed     
current occupation:  dancer     
current occupational exposures/hazards:  No     
pets and animals:  Yes pets and animals: cat(s) and dog(s)     
history of recent travel:  No     
sexually active:  Yes     
Smoking Status:  Never smoker     
alcohol intake:  never     
substance use type:  marijuana     
caffeine:  No     
what type of physical activity do you participate in:  aerobics     
frequency:  5-6 times per week     
seatbelt use:  never     
do you feel safe at home:  Yes     
additional social history:  Boyfriend- Alexander     
    
    
    
ROS    
<ABBEY Flores - Last Filed: 08/25/24 17:31>    
ROS ED    
Constitutional    
Constitutional ED: Denies chills or fever(s)    
Cardiovascular    
Cardiovascular: Denies chest pain    
Respiratory/Chest    
Respiratory/Chest: Denies cough or dyspnea    
Gastrointestinal    
Gastrointestinal: Reports abdominal pain, nausea and vomiting; Denies   
constipation or diarrhea    
Genitourinary    
Genitourinary ED: Denies dysuria, hematuria or urinary urgency    
Musculoskeletal    
Musculoskeletal: Denies arthralgias or myalgias    
Integumentary    
Denies rash    
Neurologic    
Neurologic: Denies weakness    
    
EXAM    
<ABBEY Flores - Last Filed: 08/25/24 17:31>    
Physical Exam    
Const    
Vital Signs:     
    
                                            
    
    
    
 08/25/24    
16:01    
     
Temperature 97.8 F    
     
Temperature Source Temporal    
     
Pulse Rate 82    
     
Respiratory Rate 18    
     
Blood Pressure 105/71    
     
Blood Pressure Mean 82    
     
Pulse Ox 98    
     
Oxygen Delivery Method Room Air    
    
    
    
    
Positive well nourished, well developed and no apparent distress    
General Appearance ED: well developed    
HEENT    
Reports normocephalic and head/scalp atraumatic    
Mouth ED: Yes moist mucous membranes normal    
Eyes    
PERRL and EOMs intact bilaterally    
Neck    
full ROM and supple    
Chest Wall    
inspection of chest normal    
Resp    
normal respiratory effort and clear to auscultation bilaterally    
Cardio    
regular rate and regular rhythm    
GI    
soft to palpation, non-tender, non-distended and no masses    
Back/Spine    
normal ROM and normal to inspection    
Extremity    
normal to inspection and full ROM    
Neuro    
oriented x3, CN's II-XII intact bilaterally, moves all extremities, no focal   
motor deficits and no sensory deficits noted    
Sensorium / Orientation: awake and alert    
Psych    
mental status grossly normal and thought process normal    
Skin    
no rashes or lesions noted and no wounds    
    
<Dr. Parish Gallagher DO - Last Filed: 08/25/24 18:37>    
Physical Exam    
Const    
Vital Signs:     
    
                                            
    
    
    
 08/25/24    
16:01    
     
Temperature 97.8 F    
     
Temperature Source Temporal    
     
Pulse Rate 82    
     
Respiratory Rate 18    
     
Blood Pressure 105/71    
     
Blood Pressure Mean 82    
     
Pulse Ox 98    
     
Oxygen Delivery Method Room Air    
    
    
    
    
    
[facility]    
<ABBEY Flores - Last Filed: 08/25/24 17:31>    
[facility] Narrative    
Medical decision making narrative:     
Patient presenting due to nausea and vomiting she has had over the last several   
days.  This is her third time being seen in the ED for this.  She was seen here   
yesterday and was given Reglan, her vomiting was under control until this   
morning when she began vomiting again.  Her abdomen is soft and nontender.  Labs  
will be obtained to assess for electrolyte abnormality, leukocytosis, and UTI.    
Patient was given IV fluids and Reglan.  Her WBC is 13.2, likely due to   
vomiting, potassium is 3.2, patient did request to be discharged prior to   
receiving her urine results or potassium replacement.  She will be leaving AMA,   
her  has to leave and she needs to be at home with her daughter.  She   
does understand the risks of leaving and is capable of making this decision.    
Strict return instructions were given.    
Lab Data    
Attestation: I reviewed the patient's lab results.    
Labs:     
    
                         Laboratory Results - last 24 hr    
    
    
    
  08/25/24 08/25/24    
    
  16:29 16:49    
     
WBC  13.2 H     
     
RBC  4.33     
     
Hgb  12.8     
     
Hct  37.6     
     
MCV  86.8     
     
MCH  29.6     
     
MCHC  34.0     
     
RDW Std Deviation  42.0     
     
RDW Coeff of Ivan  13.2     
     
Plt Count  267     
     
MPV  12.4 H     
     
Immature Gran % (Auto)  0.500     
     
Neut % (Auto)  83.2 H     
     
Lymph % (Auto)  9.0 L     
     
Mono % (Auto)  6.8     
     
Eos % (Auto)  0.2     
     
Baso % (Auto)  0.3     
     
Absolute Neuts (auto)  11.0 H     
     
Absolute Lymphs (auto)  1.18     
     
Nucleated RBC %  0     
     
Sodium  137     
     
Potassium  3.2 L     
     
Chloride  105     
     
Carbon Dioxide  19.0 L     
     
Anion Gap  13     
     
BUN  6 L     
     
Creatinine  0.48 L     
     
Estim Creat Clear Calc  132.05     
     
Est GFR (MDRD) Af Amer  208     
     
Est GFR (MDRD) Non-Af  172     
     
BUN/Creatinine Ratio  12.6     
     
Glucose  105     
     
Calcium  9.2     
     
Urine Color   Yellow    
     
Urine Clarity   Sl. Cloudy    
     
Urine pH   6.0    
     
Ur Specific Gravity   1.025    
     
Urine Protein   30 H    
     
Urine Glucose (UA)   Normal    
     
Urine Ketones   150 A*    
     
Urine Occult Blood   Negative    
     
Urine Nitrite   Negative    
     
Urine Bilirubin   1 H    
     
Urine Urobilinogen   1 H    
     
Ur Leukocyte Esterase   25 H    
     
Urine RBC   0-5 SEEN    
     
Urine WBC   5-10 SEEN    
     
Ur Squamous Epith Cells   5-10 SEEN    
     
Urine Bacteria   1+    
     
Urine Mucus   0 SEEN    
    
    
    
    
    
<Dr. Parish Gallagher, DO - Last Filed: 08/25/24 18:37>    
[facility] Narrative    
Medical decision making narrative:     
Patient presenting due to nausea and vomiting she has had over the last several   
days.  This is her third time being seen in the ED for this.  She was seen here   
yesterday and was given Reglan, her vomiting was under control until this   
morning when she began vomiting again.  Her abdomen is soft and nontender.  Labs  
will be obtained to assess for electrolyte abnormality, leukocytosis, and UTI.    
Patient was given IV fluids and Reglan.  Her WBC is 13.2, likely due to   
vomiting, potassium is 3.2, patient did request to be discharged prior to   
receiving her urine results or potassium replacement.  She will be leaving AMA,   
her  has to leave and she needs to be at home with her daughter.  She   
does understand the risks of leaving and is capable of making this decision.    
Strict return instructions were given.    
    
    
    
    
I have personally performed a face to face assessment of the patient and have   
reviewed the SHERICE note.  I personally made/approved the management plan and take   
responsibility for the patient management.  I performed a substantive portion of  
the visit including all aspects of the following.  My key findings include: G4,   
P1 reports 15-week gestation positive pregnancy at 13 weeks seen in her OB o  
ffice followed by Dr. Salcido.  Nausea and vomiting last 2 days.  History of  
hyperemesis gravidarum with her other pregnancies.  No vaginal bleeding.  No   
dysuria.  Prior to my evaluation nursing reported patient wanted to leave AMA.    
I evaluated the patient she states since medications no emesis.  She states her   
sitter cannot be there therefore she had to go home to her daughter.  Labs are   
still pending for return.  Discussed risk factors.  Patient understands patient   
pertinent x 3.  She signed out against medical vice and will follow-up with her   
OB team.    
    
Late review of labs ketones in the urine creatinine 0.48 hemoglobin 12.8.    
Lab Data    
Labs:     
    
                         Laboratory Results - last 24 hr    
    
    
    
  08/25/24 08/25/24    
    
  16:29 16:49    
     
WBC  13.2 H     
     
RBC  4.33     
     
Hgb  12.8     
     
Hct  37.6     
     
MCV  86.8     
     
MCH  29.6     
     
MCHC  34.0     
     
RDW Std Deviation  42.0     
     
RDW Coeff of Ivan  13.2     
     
Plt Count  267     
     
MPV  12.4 H     
     
Immature Gran % (Auto)  0.500     
     
Neut % (Auto)  83.2 H     
     
Lymph % (Auto)  9.0 L     
     
Mono % (Auto)  6.8     
     
Eos % (Auto)  0.2     
     
Baso % (Auto)  0.3     
     
Absolute Neuts (auto)  11.0 H     
     
Absolute Lymphs (auto)  1.18     
     
Nucleated RBC %  0     
     
Sodium  137     
     
Potassium  3.2 L     
     
Chloride  105     
     
Carbon Dioxide  19.0 L     
     
Anion Gap  13     
     
BUN  6 L     
     
Creatinine  0.48 L     
     
Estim Creat Clear Calc  132.05     
     
Est GFR (MDRD) Af Amer  208     
     
Est GFR (MDRD) Non-Af  172     
     
BUN/Creatinine Ratio  12.6     
     
Glucose  105     
     
Calcium  9.2     
     
Urine Color   Yellow    
     
Urine Clarity   Sl. Cloudy    
     
Urine pH   6.0    
     
Ur Specific Gravity   1.025    
     
Urine Protein   30 H    
     
Urine Glucose (UA)   Normal    
     
Urine Ketones   150 A*    
     
Urine Occult Blood   Negative    
     
Urine Nitrite   Negative    
     
Urine Bilirubin   1 H    
     
Urine Urobilinogen   1 H    
     
Ur Leukocyte Esterase   25 H    
     
Urine RBC   0-5 SEEN    
     
Urine WBC   5-10 SEEN    
     
Ur Squamous Epith Cells   5-10 SEEN    
     
Urine Bacteria   1+    
     
Urine Mucus   0 SEEN    
    
    
    
    
    
Discharge Plan    
Triage    
Chief Complaint: Nausea/Vomiting    
    
ED Midlevel Provider: Deepti Conway    
    
ED Provider: Parish Gallagher    
    
Dx/Rx/DC Orders    
Clinical Impression:    
 Marijuana abuse, Nausea and vomiting during pregnancy    
    
    
Prescriptions:    
No Action    
  Prenatal  mg capsule     
   200 mg PO DAILY     
  metoclopramide HCl [Reglan] 10 mg tablet     
   10 mg PO TID PRN PRN (Reason: nausea and vomiting) Qty: 20 0RF    
    
Primary Care Provider: Care Physician,No Primary    
    
Referrals:    
Care Physician,No Primary [Primary Care Provider] -     
    
Print Language: English    
    
Disposition    
***Disposition***: Against Medical Advice    
    
Discharge Date/Time: 08/25/24 17:14

## 2024-10-16 ENCOUNTER — HOSPITAL ENCOUNTER (OUTPATIENT)
Age: 24
Discharge: HOME | End: 2024-10-16
Payer: SELF-PAY

## 2024-10-16 DIAGNOSIS — Z3A.00: ICD-10-CM

## 2024-10-16 DIAGNOSIS — O09.90: Primary | ICD-10-CM

## 2024-10-16 DIAGNOSIS — N96: ICD-10-CM

## 2024-10-16 LAB
DEPRECATED RDW RBC: 46.2 FL (ref 35.1–43.9)
ERYTHROCYTE [DISTWIDTH] IN BLOOD: 13.8 % (ref 11.6–14.6)
HCT VFR BLD AUTO: 37 % (ref 37–47)
HEMOGLOBIN: 12.1 G/DL (ref 12–15)
HGB BLD-MCNC: 12.1 G/DL (ref 12–15)
IMMATURE GRANULOCYTES COUNT: 0.08 X10^3/UL (ref 0–0)
MCV RBC: 91.1 FL (ref 81–99)
MEAN CORP HGB CONC: 32.7 G/DL (ref 32–36)
MEAN PLATELET VOL.: 13 FL (ref 6.2–12)
NRBC FLAGGED BY ANALYZER: 0 % (ref 0–5)
PLATELET # BLD: 250 K/MM3 (ref 150–450)
PLATELET COUNT: 250 K/MM3 (ref 150–450)
RBC # BLD AUTO: 4.06 M/MM3 (ref 4.2–5.4)
RBC DISTRIBUTION WIDTH CV: 13.8 % (ref 11.6–14.6)
RBC DISTRIBUTION WIDTH SD: 46.2 FL (ref 35.1–43.9)
WBC # BLD AUTO: 8.1 K/MM3 (ref 4.4–11)
WHITE BLOOD COUNT: 8.1 K/MM3 (ref 4.4–11)

## 2024-10-16 PROCEDURE — 86850 RBC ANTIBODY SCREEN: CPT

## 2024-10-16 PROCEDURE — 86780 TREPONEMA PALLIDUM: CPT

## 2024-10-16 PROCEDURE — 86901 BLOOD TYPING SEROLOGIC RH(D): CPT

## 2024-10-16 PROCEDURE — 85025 COMPLETE CBC W/AUTO DIFF WBC: CPT

## 2024-10-16 PROCEDURE — 86900 BLOOD TYPING SEROLOGIC ABO: CPT

## 2024-10-16 PROCEDURE — 86703 HIV-1/HIV-2 1 RESULT ANTBDY: CPT

## 2024-10-16 PROCEDURE — 87340 HEPATITIS B SURFACE AG IA: CPT

## 2024-10-16 PROCEDURE — 86762 RUBELLA ANTIBODY: CPT

## 2024-10-16 PROCEDURE — 36415 COLL VENOUS BLD VENIPUNCTURE: CPT

## 2024-10-16 PROCEDURE — 86803 HEPATITIS C AB TEST: CPT

## 2024-11-13 ENCOUNTER — HOSPITAL ENCOUNTER (OUTPATIENT)
Dept: HOSPITAL 100 - BWCLAB | Age: 24
Discharge: HOME | End: 2024-11-13
Payer: SELF-PAY

## 2024-11-13 DIAGNOSIS — Z3A.00: ICD-10-CM

## 2024-11-13 DIAGNOSIS — O99.891: ICD-10-CM

## 2024-11-13 DIAGNOSIS — N96: ICD-10-CM

## 2024-11-13 DIAGNOSIS — O09.90: Primary | ICD-10-CM

## 2024-11-13 DIAGNOSIS — Z13.1: ICD-10-CM

## 2024-11-13 LAB — GLUCOSE 1H P 50 G GLC PO SERPL-MCNC: 162 MG/DL (ref 70–140)

## 2024-11-13 PROCEDURE — 36415 COLL VENOUS BLD VENIPUNCTURE: CPT

## 2024-11-13 PROCEDURE — 86147 CARDIOLIPIN ANTIBODY EA IG: CPT

## 2024-11-13 PROCEDURE — 86146 BETA-2 GLYCOPROTEIN ANTIBODY: CPT

## 2024-11-13 PROCEDURE — 82950 GLUCOSE TEST: CPT

## 2024-11-16 LAB
ANTI-CARDIOLIPIN AB, IGG, QN: < 9 GPL U/ML (ref 0–14)
ANTI-CARDIOLIPIN AB, IGM, QN: 13 MPL U/ML (ref 0–12)
B2 GLYCOPROT1 IGA SER QL: 13 (ref 0–25)
B2 GLYCOPROT1 IGG SER QL: <9 (ref 0–20)
B2 GLYCOPROT1 IGM SER QL: <9 (ref 0–32)
CARDIOLIPIN IGG SER IA-ACNC: < 9 GPL U/ML (ref 0–14)
DILUTE RUSSELL VIPER VENOM: 31.1 SEC (ref 0–47)
PTT-LA: 35 SEC (ref 0–43.5)
SCREEN DRVVT: 31.1 SEC (ref 0–47)

## 2024-11-18 ENCOUNTER — HOSPITAL ENCOUNTER (OUTPATIENT)
Dept: HOSPITAL 100 - LAB | Age: 24
Discharge: HOME | End: 2024-11-18
Payer: SELF-PAY

## 2024-11-18 DIAGNOSIS — Z13.1: Primary | ICD-10-CM

## 2024-11-18 PROCEDURE — 82962 GLUCOSE BLOOD TEST: CPT

## 2025-01-06 ENCOUNTER — HOSPITAL ENCOUNTER (OUTPATIENT)
Dept: HOSPITAL 100 - LABSPEC | Age: 25
Discharge: HOME | End: 2025-01-06
Payer: SELF-PAY

## 2025-01-06 DIAGNOSIS — Z3A.34: ICD-10-CM

## 2025-01-06 DIAGNOSIS — F12.10: ICD-10-CM

## 2025-01-06 DIAGNOSIS — O99.323: Primary | ICD-10-CM

## 2025-01-06 LAB
AMPHET UR-MCNC: NEGATIVE NG/ML
BARBITURATE URINE VISTA: NEGATIVE
BENZODIAZEPINE URINE VISTA: NEGATIVE
COCAINE URINE VISTA: NEGATIVE
DRUG CONFIRMATION TO FOLLOW?: (no result)
ECSTACY URINE VISTA: NEGATIVE
METHADONE URINE VISTA: NEGATIVE
PCP UR QL: NEGATIVE
PH UR: 6 [PH]
THC URINE VISTA: POSITIVE

## 2025-01-06 PROCEDURE — 80307 DRUG TEST PRSMV CHEM ANLYZR: CPT

## 2025-01-21 ENCOUNTER — HOSPITAL ENCOUNTER (OUTPATIENT)
Dept: HOSPITAL 100 - LABSPEC | Age: 25
Discharge: HOME | End: 2025-01-21
Payer: SELF-PAY

## 2025-01-21 DIAGNOSIS — O09.90: Primary | ICD-10-CM

## 2025-01-21 DIAGNOSIS — Z3A.00: ICD-10-CM

## 2025-01-21 PROCEDURE — 87081 CULTURE SCREEN ONLY: CPT

## 2025-01-31 ENCOUNTER — HOSPITAL ENCOUNTER (OUTPATIENT)
Dept: HOSPITAL 100 - US | Age: 25
Discharge: HOME | End: 2025-01-31
Payer: MEDICAID

## 2025-01-31 DIAGNOSIS — Z3A.36: ICD-10-CM

## 2025-01-31 DIAGNOSIS — O24.410: Primary | ICD-10-CM

## 2025-01-31 PROCEDURE — 76816 OB US FOLLOW-UP PER FETUS: CPT

## 2025-01-31 NOTE — US_ITS
PROCEDURE:  
OB LIMITED WITH BIOMETRICS  
   
REASON FOR EXAM:  
Fetal growth  
   
TECHNIQUE:  
Transabdominal obstetrical imaging was performed on 01/31/2025  
   
COMPARISON:  
No prior examinations are currently available for comparison.  
   
FINDINGS:  
Fetal Number: 1  
Fetal Position: Cephalic  
Placental Position: Grade 2 anterior placenta.  
Placental Abnormalities: None.  
Cervical length: Nonvisualized.  
   
   
FETAL DIMENSIONS:  
Biparietal Diameter:          9.37 cm/corresponds to 38 weeks and 1 day  
Head Circumference:        33.42 cm/corresponds to 39 weeks and 2 days  
Abdominal Circumference: 36.12 cm/corresponds to 40 weeks and 0 days  
Femur Length:                 7.03 cm/corresponds to 36 weeks and 0 days  
   
ESTIMATED FETAL WEIGHT: 3625 g +/-544 g  
ESTIMATED FETAL WEIGHT PERCENTILE (24+ weeks): 82nd percentile  
   
ESTIMATED GESTATIONAL AGE:  
Baseline: 38 weeks and 0 days  
By Ultrasound: 38 weeks and 1 day  
   
ESTIMATED DATE OF DELIVERY:  
Baseline: 02/14/2025  
By Ultrasound: 02/13/2025  
   
BIOPHYSICAL ASSESSMENT:  
Amniotic Fluid Volume: Largest vertical pocket measures 5.6 cm.  
Amniotic Fluid Index: 14.6 (8-24 cm normal range)  
   
Cardiac Motion: 141 beats per minute.  
   
   
Bilateral adnexa are not well visualized.  
   
ORDER #: [id number] US/OB Limited With Biometrics  
IMPRESSION:  
1. Single intrauterine pregnancy in vertex presentation. Fetal heart tones catrachito
ured at 141 beats per minute.  
2. Ultrasonic fetal measurements correspond to 38 weeks and 1 day for an ASHELY of
 02/13/2025.  
3. Estimated fetal weight corresponds to 82nd percentile  
   
Electronically Signed By: Aníbal Booker DO on 02/01/2025 03:32  
[facility]

## 2025-02-02 ENCOUNTER — HOSPITAL ENCOUNTER (OUTPATIENT)
Dept: HOSPITAL 100 - WPOUT | Age: 25
Discharge: HOME | End: 2025-02-02
Payer: MEDICAID

## 2025-02-02 VITALS — SYSTOLIC BLOOD PRESSURE: 125 MMHG | HEART RATE: 103 BPM | DIASTOLIC BLOOD PRESSURE: 79 MMHG | OXYGEN SATURATION: 97 %

## 2025-02-02 VITALS — RESPIRATION RATE: 24 BRPM | TEMPERATURE: 97.9 F

## 2025-02-02 VITALS — BODY MASS INDEX: 29 KG/M2

## 2025-02-02 DIAGNOSIS — Z3A.38: ICD-10-CM

## 2025-02-02 DIAGNOSIS — Z03.71: Primary | ICD-10-CM

## 2025-02-02 LAB — ROM INTERNAL CONTROL TEST: (no result)

## 2025-02-02 PROCEDURE — 59050 FETAL MONITOR W/REPORT: CPT

## 2025-02-02 PROCEDURE — G0378 HOSPITAL OBSERVATION PER HR: HCPCS

## 2025-02-02 PROCEDURE — 84112 EVAL AMNIOTIC FLUID PROTEIN: CPT

## 2025-02-02 PROCEDURE — 59025 FETAL NON-STRESS TEST: CPT

## 2025-02-02 PROCEDURE — 99221 1ST HOSP IP/OBS SF/LOW 40: CPT

## 2025-02-02 NOTE — XMS RPT_ITS
Comprehensive CCD (C-CDA v2.1)  
  
                          Created on: 2025  
  
  
DANNA SOARES  
External Reference #: CDR,PersonID:[id number]  
: 2000  
Sex: Female  
  
Demographics  
  
  
                                        Address             [address]  
[address]  
[place]  [address]  
   
                                        Home Phone          [phone]  
   
                                        Home Phone          [phone]  
   
                                        Home Phone          [phone]  
   
                                        Home Phone          [phone]  
   
                                        Home Phone          [phone]  
   
                                        Home Phone          [phone]  
   
                                        Home Phone          [phone]  
   
                                        Home Phone          [phone]  
   
                                        Preferred Language  en  
   
                                        Marital Status      Single  
   
                                        Temple Affiliation Unknown  
   
                                        Race                White  
   
                                        Ethnic Group        Not  or Lati  
no  
  
  
Author  
  
  
                                        Organization        [facility]  
  
  
Care Team Providers  
  
  
                                Care Team Member Name Role            Phone  
   
                                Jono Mitchell Unavailable     Unavailable  
   
                                Rings, Dylan Unavailable     Unavailable  
   
                                Rings Dylan Unavailable     Unavailable  
   
                                Jono Mitchell M Unavailable     Unavailable  
   
                                Jono Mitchell Unavailable     Unavailable  
   
                                Jono Mitchell Unavailable     Unavailable  
   
                                Jono Mitchell Unavailable     Unavailable  
   
                                Derrick Ravin W Unavailable     Unavailable  
   
                                Louise Mezashua W Unavailable     Unavailable  
   
                                Shaker, Jerri   Unavailable     Unavailable  
   
                                Dennise, Chelsie     Unavailable     Unavailable  
   
                                Haines Pooja Unavailable     Unavailable  
   
                                Memberg, Emma P Unavailable     Unavailable  
   
                                Jono Mitchell Unavailable     Unavailable  
   
                                No, Physician   Primary Care Provider Unavailabl  
e  
   
                                Unknown, Referring Provider Unavailable     Unav  
ailable  
   
                                Jono Mitchell Unavailable     Unavailable  
   
                                Memberg, Emma P Unavailable     Unavailable  
   
                                Marizol, Marguerite  Unavailable     Unavailable  
   
                                Kovalenko, Simran Unavailable     Unavailable  
   
                                No, Physician   Primary Care Provider Unavailabl  
e  
   
                                No, Physician   Unavailable     Unavailable  
   
                                Required, No Pcp Unavailable     Unavailable  
   
                                Moomaw, Priscilla I   Unavailable     Unavailable  
   
                                Jonathan Mathis  Unavailable     [phone]  
   
                                Nicolas Connolly Unavailable     [phone]  
[phone]  
   
                                Ivan Coley Unavailable     UnavailMitra Espinosa     Unavailable     Unavailable  
   
                                Unavailable     Primary Care Provider Unavailabl  
e  
   
                                Unavailable     Primary Care Provider Unavailabl  
e  
   
                                Generic Provider MD, No Assigned Pcp Primary Car  
e Provider Unavailable  
   
                                PROVIDER, UNKNOWN Admitting       Unavailable  
   
                                AXEL RADFORD  Attending       Unavailable  
   
                                IVAN COLEY Attending       Unavailable  
   
                                IVAN COLEY Attending       Unavailable  
   
                                CHRISTIANO SANTOS Attending       Unavailable  
   
                                WINSTON GORDILLO   Attending       Unavailable  
   
                                BOB COOPER Attending       Unavailable  
   
                                JUANCHO APODACA    Admitting       Unavailable  
   
                                CONSULT, NEUROLOGY Consulting      Unavailable  
   
                                ELMIRA MANCINI Attending       Unavailable  
   
                                NO, PHYSICIAN   Primary Care    Unavailable  
   
                                SHANAE HERRON Admitting       Unavailable  
   
                                NO, PHYSICIAN   Primary Care    Unavailable  
   
                                [facility], GENERIC Consulting      Unavai  
LARRY Cook     Attending       Unavailable  
   
                                NO, PHYSICIAN   Primary Care    Unavailable  
   
                                NO, PHYSICIAN   Primary Care    Unavailable  
   
                                ADAMS ASHRAF Attending       Unava  
ilable  
   
                                NO, PHYSICIAN   Primary Care    Unavailable  
   
                                JABARI MARIEE Attending       Unavailable  
   
                                NATALI PEREZ Referring       Unavailabl  
e  
   
                                [facility]       Primary Care    Unavailable  
   
                                SARATH HUTCHISON Attending       Unavailable  
  
  
  
Allergies  
  
  
                                                    Allergy   
Classification                          Reported   
Allergen(s)               Allergy Type              Date of   
Onset                     Reaction(s)               Facility  
   
                                                      
(18 sources)                            codeine;   
Translations:   
[codeine]                 Drug Allergy              10-  
3                                       Delirium,   
Anxiety,   
Psychosis,   
Unknown                                 [facility]   
Repository  
   
                                        Comment on above:    freaks out    
   
                                                      
(6 sources)                             polyethylene   
glycol 3350;   
Translations:   
[MiraLax]           Drug Allergy                            Hives/Urticari  
a                                       [facility]   
Repository  
   
                                                      
(8 sources)                             POLYETHYLENE   
GLYCOL 3350;   
Translations:   
[POLYETHYLENE   
GLYCOL 3350]              Drug Allergy                
9                         Hives                     [facility]  
   
                                                      
(3 sources)                             strawberry   
allergenic   
extract;   
Translations:   
[STRAWBERRY]              Drug Allergy                
6                         [facility]  
   
                                                      
(3 sources)                             polyethylene   
glycol 300;   
Translations:   
[POLYETHYLENE   
GLYCOL]                   Drug Allergy              10-  
3                                                   [facility]  
   
                                                      
(1 source)                Strawberry                Propensity to   
adverse   
reactions to   
drug                                      
6                         Rash                      [facility]  
Work Phone:   
[phone]  
   
                                                      
(2 sources)                             Other (Review   
Comments!);   
Translations:   
[OTHER (REVIEW   
COMMENTS!)]                             Propensity to   
adverse   
reactions to   
drug                                      
1                                                   [facility]  
Work Phone:   
[phone]  
  
  
  
Medications  
Current Medications  
  
  
  
                      Medication Drug Class(es) Dates      Sig (Normalized) Sig   
(Original)  
   
                                                    cyclobenzaprine   
hydrochloride 5 mg   
oral tablet  
(5 sources)               Muscle Relaxant           Start:   
  
1                                       take 1 tablet by   
mouth three times   
daily for pain                          cyclobenzaprine 5   
mg oral tablet ; 1   
tab(s) orally 3   
times a day, As   
Needed -for pain   
Quantity: 12   
Refills: 0 Ordered:   
7-Dec-2021 Priscilla Zepeda I Start:   
7-Dec-2021 Status:   
Other Generic   
Substitution   
Allowed Comments:   
Some   
non-prescription   
drugs may aggravate   
your condition.   
Read all labels   
carefully. If a   
warning appears,   
check with your   
doctor before   
taking.This drug   
may impair the   
ability to drive or   
operate machinery.   
Use care until you   
become familiar   
with its effects.  
   
                                        Comment on above:   Some non-prescriptio  
n drugs may aggravate your condition. Read  
  
all labels carefully. If a warning appears, check with your   
doctor before taking.This drug may impair the ability to drive or   
operate machinery. Use care until you become familiar with its   
effects.   
   
                                                    dicyclomine   
hydrochloride 20 mg   
oral tablet  
(8 sources)               Anticholinergic           Start:   
  
4  
End:   
  
4                                       inject 1 dose by   
intramuscular   
injection once                          20 mg,   
Intramuscular,   
ONCE, 1 dose, On   
Sun 24 at 1815  
  
  
  
                                                    Start: 2024  
End: 02-                         take 1 tablet by mouth four   
times daily before mealtime             dicyclomine (Bentyl) 20 mg tablet   
Indications: Abdominal pain,   
unspecified abdominal location Take   
1 tablet (20 mg) by mouth 4 times a   
day before meals. 120 tablet 0   
2024 02/10/2025 Active  
   
                                        Start: 2022   take 1 tablet by bjorn  
th four   
times daily                             dicyclomine 20 mg oral tablet ; 1   
tab(s) orally 4 times a day (1 hour   
before meals and at bedtime)   
Quantity: 20 Refills: 0 Ordered:   
9-Mar-2022 Lei Anderson   
Start: 9-Mar-2022 Status:   
Discontinued Generic Substitution   
Allowed Comments: May cause   
drowsiness. Alcohol may intensify   
this effect. Use care when   
operating dangerous machinery.  
   
                                                                dicyclomine 20 m  
g oral tablet ;   
orally 3 times a day Quantity: 0   
Refills: 0 Ordered: 30-Oct-2016   
Cooper Sierra Status: Discontinued   
Generic Substitution Allowed  
  
  
  
                                        Comment on above:   May cause drowsiness  
. Alcohol may intensify this effect. Use   
care when operating dangerous machinery.   
   
                                                    famotidine 20 mg oral   
tablet  
(5 sources)                             Histamine-2 Receptor   
Antagonist                                          take 1 tablet   
by mouth twice   
daily                                   famotidine 20 mg oral   
tablet ; 1 tab(s)   
orally 2 times a day   
Quantity: 0 Refills:   
0 Ordered:   
30-Oct-2016 Cooper Sierra Status:   
Discontinued Generic   
Substitution Allowed  
   
                                                    ferrous sulfate 325 mg   
oral tablet  
(5 sources)                                                     ferrous sulfate   
325   
mg (65 mg elemental   
iron) oral tablet ;   
orally once a day   
Quantity: 0 Refills:   
0 Ordered:   
30-Oct-2016 Cooper Sierra Status:   
Discontinued Generic   
Substitution Allowed  
   
                                                    hydrocortisone 10   
mg/ml vaginal cream  
(1 source)                Corticosteroid            Start:   
                                                 hydrocortisone 1 %   
cream Apply topically   
2 times daily. 2   
times daily 30 g 1   
2013 Active  
   
                                                    hyoscyamine sulfate   
0.125 mg oral tablet  
(1 source)                                          Start:   
                                     take 1 tablet   
by mouth every   
four hours                              hyoscyamine (Anaspaz,   
Levsin) 0.125 mg   
tablet Indications:   
Nausea and vomiting,   
unspecified vomiting   
type Take 1 tablet   
(0.125 mg) by mouth   
every 4 hours if   
needed for cramping   
for up to 7 days. 28   
tablet 0 2024   
Active  
   
                                                    ibuprofen 200 mg oral   
tablet  
(1 source)                              Nonsteroidal   
Anti-inflammatory   
Drug                                    Start:   
  
End:   
                                     take 2 tablets   
by mouth every   
eight hours as   
needed                                  ibuprofen   
(ADVIL,MOTRIN) 200 MG   
tablet Take 2 (two)   
tablets (400 mg   
total) by mouth every   
8 (eight) hours as   
needed for pain . 30   
tablet 0 2021 Active  
   
                                                    1 ml ketorolac   
tromethamine 15 mg/ml   
cartridge  
(2 sources)                             Nonsteroidal   
Anti-inflammatory   
Drug, Cyclooxygenase   
Inhibitor                               Start:   
  
End:   
                                                 ketorolac (TORADOL)   
15 MG/ML injection  
  
  
  
                                                    Start: 2024  
End: 2024                                     30 mg, Intravenous, ONCE, 1   
dose, On Sun 2/11/24 at 1900  
  
  
  
                                                    50 ml magnesium sulfate 40   
mg/ml injection  
(1 source)                                          Start: 2024  
End: 2024                                     2 g, Intravenous, Administer  
 over   
4 Hours, DAILY, First dose on 24 at 0600, Until   
Discontinued, Give if magnesium   
is less than 2 on morning labs.   
Infuse at a rate of 0.5 gm/hour.  
  
  
  
                                                    Start: 2024  
End: 2024                                     2 g, Intravenous, Administer  
 over 4 Hours, DAILY, First dose on  
  
24 at 0600, Until Discontinued, Give if magnesium is   
less than 2 on morning labs. Infuse at a rate of 0.5 gm/hour.  
  
  
  
                                                    melatonin 5 mg oral   
tablet  
(5 sources)                                                 take 1 tablet by   
mouth once daily   
at bedtime                              Melatonin 5 mg oral   
tablet ; 1 tab(s)   
orally once a day   
(at bedtime)   
Quantity: 0   
Refills: 0 Ordered:   
30-Oct-2016 Cooper Sierra Status:   
Discontinued   
Generic   
Substitution   
Allowed  
   
                                                    nitrofurantoin,   
macrocrystals 25 mg /   
nitrofurantoin,   
monohydrate 75 mg   
oral capsule  
(2 sources)                             Nitrofuran   
Antibacterial                           Start:   
20  
End:   
20                                      take 1 dose by   
mouth once at   
mealtime                                100 mg, Oral, ONCE,   
1 dose, On 24 at 2030,   
Administer with   
food  
  
  
  
                                                    Start: 2024  
End: 2024                         take 1 capsule by mouth   
twice daily                             Nitrofurantoin, macrocrystal-monohydrate  
,   
100 MG capsule Take 1 capsule by mouth 2   
times daily for 5 days. Take w/ food/milk   
10 capsule 2024 Active  
  
  
  
                                                    omeprazole 20 mg   
delayed release oral   
capsule  
(5 sources)                             Proton Pump   
Inhibitor                                           take 1 capsule   
by mouth once   
daily                                   omeprazole 20 mg   
oral delayed release   
capsule ; 1 cap(s)   
orally once a day   
Quantity: 0 Refills:   
0 Ordered:   
30-Oct-2016 Cooper Sierra Status:   
Discontinued Generic   
Substitution Allowed  
   
                                                    ondansetron 4 mg   
disintegrating oral   
tablet  
(20 sources)                            Serotonin-3   
Receptor Antagonist                     Start:   
20                                      take 1 tablet   
by mouth every   
eight hours as   
needed                                  Ondansetron 4 MG Tab   
Dispersible tablet   
Take 1 tablet by   
mouth every 8 hours   
as needed for   
Nausea. Place on   
tongue 10 tablet   
2024 Active  
  
  
  
                                                    Start: 2024  
End: 2024                                     ondansetron (ZOFRAN) 4 MG/2M  
L   
injection  
   
                                                    Start: 2024  
End: 2024                                     ondansetron (ZOFRAN) 4 MG/2M  
L   
injection  
   
                                                    Start: 2024  
End: 2024                         take 4 mg intravenously every   
four hours as needed                    4 mg, Intravenous, EVERY 4 HOURS   
AS NEEDED, Starting on Sun 24   
at 1939, Until Mon 24 at   
2143, Nausea / Vomiting  
   
                                                    Start: 2024  
End: 2024                                     4 mg, Intravenous, ONCE, 1 d  
ose,   
On Sun 24 at 1715  
   
                                        Start: 2024   take 1 tablet by bjorn  
th every   
eight hours for nausea                  ondansetron ODT (Zofran-ODT) 4 mg   
disintegrating tablet Indications:   
Nausea and vomiting, unspecified   
vomiting type Take 1 tablet (4 mg)   
by mouth every 8 hours if needed   
for nausea or vomiting. 30 tablet   
0 2024 Active  
   
                                                    Start: 2022  
End: 2022                         take 1 tablet by mouth three   
times daily                             ondansetron 4 mg oral tablet,   
disintegrating ; 1 tab(s) orally 3   
times a day Quantity: 0 Refills: 0   
Ordered: 10-Mar-2022 Meaghan Morales   
Start: 6-Mar-2022 End: 16-Mar-2022   
Generic Substitution Allowed  
   
                                Start: 2022                 Zofran 8 mg or  
al tablet ; 1 tab(s)   
orally 3 times, As Needed -for   
nausea and vomiting Quantity: 21   
Refills: 0 Ordered: 2022   
Becky Hyman Start: 2022   
Status: Other Generic Substitution   
Allowed  
   
                                        Start: 10-   take 1 tablet by bjorn  
th every   
eight hours as needed for nausea        Ondansetron 4 MG Oral Tablet   
Disintegrating TAKE 1 TABLET Every   
8 hours PRN Nausea Quantity: 20   
Refills: 0 Chelsie Cuevas   
Start : 18-Oct-2019 Active  
   
                                        Start: 10-   take 1 tablet by bjorn  
th four times   
daily as needed for nausea and   
vomiting                                ondansetron 4 mg oral tablet,   
disintegrating ; 1 tab(s) orally 4   
times a day, As Needed -for nausea   
and vomiting Quantity: 10 Refills:   
0 Ordered: 23-May-2020 Priscilla Zepeda Start: 23-May-2020 Status:   
Completed Generic Substitution   
Allowed  
  
  
  
                                                    oseltamivir 75 mg   
oral capsule  
(1 source)                              Neuraminidase   
Inhibitor                               Start:   
2022  
End: 2022                         take 1   
capsule by   
mouth twice   
daily                                   oseltamivir 75 mg   
oral capsule ; 1   
cap(s) orally 2   
times a day   
Quantity: 2 Refills:   
0 Ordered:   
12-Mar-2022 Alyssa Beckwith Start:   
13-Mar-2022 End:   
13-Mar-2022 Generic   
Substitution Allowed   
Comments: Check with   
your doctor before   
becoming   
pregnant.Finish all   
this medication   
unless otherwise   
directed by   
prescriber.  
   
                                        Comment on above:   Check with your doct  
or before becoming pregnant.Finish all   
this   
medication unless otherwise directed by prescriber.   
   
                                                    Potassium Chloride  
(1 source)                                          Start:   
2024  
End: 2024                                     Potassium chloride   
(K-DUR) tablet ER 40   
mEq  
   
                                                    promethazine   
hydrochloride 25 mg   
rectal suppository  
(19 sources)              Phenothiazine             Start:   
2022                                          Promethegan 25 mg   
rectal suppository ;   
1 suppository(ies)   
rectally every 6   
hours Quantity: 10   
Refills: 0 Ordered:   
9-Mar-2022 Lei Anderson Start:   
9-Mar-2022 Status:   
Discontinued Generic   
Substitution Allowed   
Comments: For rectal   
use only.Keep in   
refrigerator. Do not   
freeze.May cause   
drowsiness. Alcohol   
may intensify this   
effect. Use care   
when operating   
dangerous   
machinery.Obtain   
medical advice   
before taking any   
non-prescription   
drugs as some may   
affect the action of   
this medication.  
  
  
  
                                Start: 01-                 Phenadoz 25 mg  
 rectal suppository ; 1   
suppository(ies) rectally 3 times a   
day, As Needed -for nausea and   
vomiting Quantity: 12 Refills: 0   
Ordered: 10-Franco-2022 Keya, Priscilla I   
Start: 10-Franco-2022 Status:   
Discontinued Generic Substitution   
Allowed Comments: For rectal use   
only.Keep in refrigerator. Do not   
freeze.May cause drowsiness. Alcohol   
may intensify this effect. Use care   
when operating dangerous   
machinery.Obtain medical advice   
before taking any non-prescription   
drugs as some may affect the action   
of this medication.  
   
                                        Start: 01-   take 1 tablet by bjorn  
th three   
times daily for nausea and   
vomiting                                promethazine 25 mg oral tablet ; 1   
tab(s) orally 3 times a day, As   
Needed -for nausea and vomiting   
Quantity: 12 Refills: 0 Ordered:   
10-Franco-2022 Morocaelw, Priscilla I Start:   
10-Franco-2022 Status: Discontinued   
Generic Substitution Allowed   
Comments: May cause drowsiness.   
Alcohol may intensify this effect.   
Use care when operating dangerous   
machinery.Obtain medical advice   
before taking any non-prescription   
drugs as some may affect the action   
of this medication.  
   
                                                    Start: 2020  
End: 07-                         take 1 tablet by mouth every   
six hours                               promethazine 25 mg oral tablet ; 1   
tab(s) orally every 6 hours Quantity:   
12 Refills: 0 Ordered: 12-Jul-2020   
Aníbal Gustafson Start:   
2020 End: 15-Jul-2020 Status:   
Discontinued Generic Substitution   
Allowed Comments: May cause   
drowsiness. Alcohol may intensify   
this effect. Use care when operating   
dangerous machinery.Obtain medical   
advice before taking any   
non-prescription drugs as some may   
affect the action of this medication.  
   
                                                    Start: 2020  
End: 2020                         take 1 tablet by mouth three   
times daily for nausea                  promethazine 25 mg oral tablet ; 1   
tab(s) orally 3 times a day, As   
Needed for nausea/vomiting Quantity:   
21 Refills: 0 Ordered: 30-May-2020   
Hernesto Vazquez Start: 30-May-2020   
End: 2020 Status: Completed   
Generic Substitution Allowed   
Comments: May cause drowsiness.   
Alcohol may intensify this effect.   
Use care when operating dangerous   
machinery.Obtain medical advice   
before taking any non-prescription   
drugs as some may affect the action   
of this medication.  
  
  
  
                                        Comment on above:   May cause drowsiness  
. Alcohol may intensify this effect. Use   
care when operating dangerous machinery.Obtain medical advice before taking   
any non-prescription drugs as some may affect the action of this medication.   
   
                                                            For rectal use only.  
Keep in refrigerator. Do not freeze.May cause drowsiness.   
Alcohol may intensify this effect. Use care when operating   
dangerous machinery.Obtain medical advice before taking any non-prescription 
drugs as some may affect the action of this medication.   
  
  
  
Completed/Discontinued Medications  
  
  
  
                      Medication Drug Class(es) Dates      Sig (Normalized) Sig   
(Original)  
   
                                                    acetaminophen 325 mg   
oral tablet  
(1 source)                                          Start:   
2024  
End:   
2024                              take 1 tablet by   
mouth every four   
hours as needed                           
   
                                                    Ampicillin-Sulbactam   
Sodium (UNASYN) 3 g   
in sodium chloride   
0.9% (MB PLUS) 100 mL   
(total volume) IVPB  
(1 source)                                          Start:   
2024  
End:   
2024                                          3 g, Intravenous,   
Administer over   
30 Minutes, ONCE,   
1 dose, On Sun   
24 at 1930,   
Contains a   
penicillin.  
   
                                                    azithromycin 500 mg   
oral tablet  
(3 sources)                             Macrolide   
Antimicrobial                           Start:   
2019                                          Azithromycin 500   
MG Oral Tablet 2   
tablets p.o. x 1   
Quantity: 2   
Refills: 0 Chelsie Cuevas   
Start :   
2019   
Active  
   
                                                    cephalexin 500 mg   
oral capsule  
(10 sources)                            Cephalosporin   
Antibacterial                           Start:   
10-  
End:   
10-                              take 1 capsule by   
mouth twice daily                       Keflex 500 mg   
oral capsule ; 1   
cap(s) orally 2   
times a day x 5   
days Quantity: 10   
Refills: 0   
Ordered:   
17-Oct-2019   
Priscilla Zepeda   
Start:   
17-Oct-2019 End:   
21-Oct-2019   
Status:   
Discontinued   
Generic   
Substitution   
Allowed Comments:   
Finish all this   
medication unless   
otherwise   
directed by   
prescriber.  
   
                                        Comment on above:   Finish all this medi  
cation unless otherwise directed by   
prescriber.   
   
                                                    Dextrose 5% 1,000 mL   
with Sodium   
bicarbonate 100 mEq   
IV solution  
(1 source)                                          Start:   
2024  
End:   
2024                                          Intravenous,   
CONTINUOUS,   
Starting on 24 at 1200,   
Until 24   
at 2143  
   
                                                    diphenhydrAMINE  
(3 sources)                             Histamine-1   
Receptor Antagonist                     Start:   
2024  
End:   
2024                                          25 mg,   
Intravenous,   
ONCE, 1 dose, On   
24 at   
1830  
  
  
  
                                                    Start: 2024  
End: 2024                                     12.5 mg, Intravenous, ONCE,   
1 dose, On Sun 2/11/24 at 1715  
   
                                                    Start: 2020  
End: 2020                                     diphenhydrAMINE (BENADRYL) i  
njection 25 mg  
  
  
  
                                                    docusate sodium 100   
mg oral capsule  
(5 sources)                                         Start:   
2016  
End: 2017                         take 1 capsule by   
mouth once daily                        docusate sodium   
100 mg oral   
capsule ; 1   
cap(s) orally   
once a day   
Quantity: 60   
Refills: 0   
Ordered:   
3-Nov-2016 Tevin Pink Start:   
3-Nov-2016 End:   
2017   
Status:   
Discontinued   
Generic   
Substitution   
Allowed  
   
                                                    doxycycline hyclate   
100 mg oral tablet  
(3 sources)                             Tetracycline-cla  
ss Drug                                 Start:   
2019                              take 4 tablets by   
mouth once                              Doxycycline   
Hyclate 100 MG   
Oral Tablet TAKE   
4 TABLET Once   
take all pills at   
once today   
Quantity: 4   
Refills: 0   
Pooja Haines MD Start :   
2019   
Active  
   
                                                    doxylamine succinate   
25 mg oral tablet  
(1 source)                                          Start:   
2020                              take 0.5 tablet   
by mouth once   
daily as needed   
for nausea                              Unisom SleepTabs   
25 MG Oral Tablet   
Take 1/2 tablet   
for nausea once a   
day, as needed   
Quantity: 30   
Refills: 3   
Simran Hart MD Start :   
25-Aug-2020   
Active  
   
                                                    0.4 ml enoxaparin   
sodium 100 mg/ml   
prefilled syringe  
(1 source)                              Low Molecular   
Weight Heparin                          Start:   
2024  
End: 2024                         inject 40 mg by   
subcutaneous   
injection once   
daily at bedtime                        40 mg,   
Subcutaneous,   
DAILY AT BEDTIME,   
First dose on 24 at 2100,   
Until   
Discontinued,   
Indications:   
DVT/PE   
prophylaxis  
   
                                                    Flintstones Plus   
Iron CHEW  
(1 source)                                          Start:   
2020                                          Flintstones Plus   
Iron CHEW CHEW   
AND SWALLOW 1   
TABLET DAILY.   
Quantity: 30   
Refills: 3   
Simran Hart MD Start :   
25-Aug-2020   
Active  
   
                                                    2 ml metoclopramide   
5 mg/ml prefilled   
syringe  
(6 sources)                             Dopamine-2   
Receptor   
Antagonist                              Start:   
2024  
End: 2024                                     10 mg,   
Intravenous,   
ONCE, 1 dose, On   
24 at   
1800  
  
  
  
                                                    Start: 2024  
End: 2024                         take 10 mg intravenously every   
six hours as needed                     10 mg, Intravenous, EVERY 6 HOURS   
AS NEEDED, Starting on 24   
at 0918, Until 24 at   
2143, Nausea / Vomiting, 2nd line   
N/V  
   
                                                    Start: 2024  
End: 2024                                     10 mg, Intravenous, ONCE, 1   
dose,   
On Sun 2/11/24 at 1715  
   
                                        Start: 2020   take 1 tablet by bjorn  
 every six   
hours as needed                         Metoclopramide HCl - 10 MG Oral   
Tablet TAKE 1 TABLET EVERY 6 HOURS   
AS NEEDED. Quantity: 30 Refills: 3   
Simran Hart MD Start :   
25-Aug-2020 Active  
   
                                                    Start: 2020  
End: 2020                                     metoclopramide (REGLAN) inje  
ction   
10 mg  
  
  
  
                                                    100 ml   
metroNIDAZOLE 5   
mg/ml injection  
(1 source)                              Nitroimidazole   
Antimicrobial                           Start:   
2024  
End:   
2024                              take 500 mg   
intravenously   
every eight hours                       500 mg,   
Intravenous, at   
200 mL/hr,   
Administer over   
30 Minutes, EVERY   
8 HOURS   
NON-STANDARD,   
First dose on 24 at 1500,   
Until   
Discontinued  
   
                                                    Multivitamin   
preparation  
(5 sources)                                                 take 1 tablet by   
mouth once daily                        Vitamins for Hair   
oral tablet ; 1   
tab(s) orally   
once a day   
Quantity: 0   
Refills: 0   
Ordered:   
30-May-2020 Melva Nieto Status:   
Completed Generic   
Substitution   
Allowed  
  
  
  
                                                take 1 tablet by mouth once noah  
y Vitamins for Hair oral tablet ; 1 tab(s)   
orally   
once a day Quantity: 0 Refills: 0 Ordered:   
30-May-2020 Melva Nieto Generic Substitution   
Allowed  
  
  
  
                                                    pantoprazole 40 mg   
injection  
(4 sources)                             Proton Pump   
Inhibitor                               Start: 2024  
End: 2024                                     40 mg, Intravenous,   
EVERY 12 HOURS, First   
dose on 24 at   
1230, Until   
Discontinued, Dilute   
each 40 mg vial with 10   
mL of NS. All bolus   
doses, whether 40 mg or   
80 mg, should be   
administered over at   
least two minutes.,   
Indications: Inpt   
Stress Ulcer   
Prophylaxis  
  
  
  
                                        Start: 2022   take 1 tablet by bjorn  
th twice   
daily                                   Protonix 40 mg oral delayed release   
tablet ; 1 tab(s) orally 2 times a day   
Quantity: 28 Refills: 0 Ordered:   
2022 Becky Hyman Start:   
2022 Status: Other Generic   
Substitution Allowed Comments: It is   
very important that you take or use   
this exactly as directed. Do not skip   
doses or discontinue unless directed by   
your doctor.Obtain medical advice   
before taking any non-prescription   
drugs as some may affect the action of   
this medication.Swallow whole. Do not   
crush.  
  
  
  
                                        Comment on above:   It is very important  
 that you take or use this exactly as   
directed. Do not skip doses or discontinue unless directed by   
your doctor.Obtain medical advice before taking any   
non-prescription drugs as some may affect the action of this   
medication.Swallow whole. Do not crush.   
   
                                                    potassium   
bicarbonate 25 meq   
effervescent oral   
tablet  
(1 source)                                          Start:   
  
End:   
                                     take 1 dose by   
mouth once                              50 mEq, Oral, ONCE,   
1 dose, On 24 at 2030  
   
                                                    Potassium Chloride /   
Sodium Chloride  
(1 source)                                          Start:   
  
End:   
                                                 40 mEq,   
Intravenous, at 125   
mL/hr, Administer   
over 4 Hours, ONCE,   
1 dose, On 24 at 1300  
   
                                                    predniSONE 50 mg   
oral tablet  
(5 sources)                                         Start:   
  
End:   
                                     take 1 tablet   
by mouth once   
daily at   
mealtime                                predniSONE 50 mg   
oral tablet ; 1   
tab(s) orally once   
a day x 5 days   
Quantity: 5   
Refills: 0 Ordered:   
7-Dec-2021 Priscilla Zepeda Start:   
7-Dec-2021 End:   
11-Dec-2021 Status:   
Completed Generic   
Substitution   
Allowed Comments:   
It is very   
important that you   
take or use this   
exactly as   
directed. Do not   
skip doses or   
discontinue unless   
directed by your   
doctor.Obtain   
medical advice   
before taking any   
non-prescription   
drugs as some may   
affect the action   
of this   
medication.Take   
with food or milk.  
   
                                        Comment on above:   It is very important  
 that you take or use this exactly as   
directed. Do not skip doses or discontinue unless directed by   
your doctor.Obtain medical advice before taking any   
non-prescription drugs as some may affect the action of this   
medication.Take with food or milk.   
   
                                                    Prenatal   
Multivitamins with   
Folic Acid 1.25 mg   
oral capsule  
(5 sources)                                         Start:   
  
End:   
08-10-2  
020                                                 Prenatal   
Multivitamins with   
Folic Acid 1.25 mg   
oral capsule ; 1   
cap(s) orally once   
a day Quantity: 30   
Refills: 0 Ordered:   
2020   
Aníbal Gustafson   
Start: 2020   
End: 10-Aug-2020   
Status: Completed   
Generic   
Substitution   
Allowed Comments:   
May discolor urine   
or feces.  
  
  
  
                                                    Start: 2020  
End: 08-                                     Prenatal Multivitamins with   
Folic Acid 1.25 mg oral capsule ; 1  
  
cap(s) orally once a day Quantity: 30 Refills: 0 Ordered:   
2020 Aníbal Gustafson Start: 2020 End:   
10-Aug-2020 Generic Substitution Allowed Comments: May discolor   
urine or feces.  
  
  
  
                                        Comment on above:   May discolor urine o  
r feces.   
   
                                                    Prenatal Vitamin TABS  
(2 sources)                                                     Prenatal Vitamin  
 TABS Refills:   
0 Active  
  
  
  
                                                                Prenatal Vitamin  
 TABS Refills: 0 DO Active  
  
  
  
                                                    prochlorperazine 5 mg/ml   
injectable solution  
(6 sources)               Phenothiazine             Start: 2024  
End: 2024                                     prochlorperazine   
(Compazine) injection 5   
mg  
  
  
  
                                Start: 2020                 prochlorperazi  
ne 25 mg rectal suppository ; 1   
suppository(ies)   
rectally 2 times a day Quantity: 20 Refills: 4 Ordered:   
2020 Be Olson Start: 2020 Status: Completed   
Generic Substitution Allowed Comments: Avoid prolonged or   
excessive exposure to direct and/or artificial sunlight while   
taking this medication.For rectal use only.It is very important   
that you take or use this exactly as directed. Do not skip doses   
or discontinue unless directed by your doctor.May cause   
drowsiness or dizziness.Obtain medical advice before taking any   
non-prescription drugs as some may affect the action of this   
medication.  
  
  
  
                                        Comment on above:   Avoid prolonged or e  
xcessive exposure to direct and/or   
artificial   
sunlight while taking this medication.For rectal use only.It is   
very important that you take or use this exactly as directed. Do   
not skip doses or discontinue unless directed by your doctor.May   
cause drowsiness or dizziness.Obtain medical advice before taking   
any non-prescription drugs as some may affect the action of this   
medication.   
   
                                                    Promethazine   
(PHENERGAN) 12.5 mg   
in Sodium chloride   
0.9%, with overfill   
60.5 mL (total   
volume) IVPB  
(2 sources)                                         Start:   
2024  
End:   
2024                                    take 12.5 mg   
intravenously every   
four hours as   
needed                                  Promethazine   
(PHENERGAN) 12.5   
mg in Sodium   
chloride 0.9%,   
with overfill 60.5   
mL (total volume)   
IVPB  
  
  
  
                                                    Start: 2024  
End: 2024                         take 12.5 mg intravenously every   
four hours as needed                    12.5 mg, Intravenous, at 121-242   
mL/hr, Administer over 15-30   
Minutes, EVERY 4 HOURS AS NEEDED,   
Starting on Sun 24 at 2134,   
Until Mon 24 at 0919, Nausea   
/ Vomiting, Total dose is 25 mg,   
which will be two bags of 12.5mg   
each Extravasation Risk  
  
  
  
                                                    Senokot TABS  
(2 sources)                                                     Senokot TABS Ref  
ills: 0 Active  
  
  
  
                                                                Senokot TABS Ref  
ills: 0 DO Active  
  
  
  
                                                    sertraline 25 mg   
oral tablet  
(10 sources)                            Serotonin   
Reuptake   
Inhibitor                               Start: 2016  
End: 2017                         take 1 tablet   
by mouth once   
daily in the   
morning                                 sertraline 25 mg   
oral tablet ; 1   
tab(s) orally once   
a day (in the   
morning) Quantity:   
60 Refills: 0   
Ordered: 3-Nov-2016   
Tevin Pink   
Start: 3-Nov-2016   
End: 2017   
Status:   
Discontinued   
Generic   
Substitution   
Allowed  
  
  
  
                                                take 1 tablet by mouth once noah  
y Zoloft 50 mg oral tablet ; 1 tab(s) orally   
once a   
day Quantity: 0 Refills: 0 Ordered: 30-Oct-2016   
Rigo Cooper Status: Discontinued Generic   
Substitution Allowed  
  
  
  
                                                    1000 ml sodium chloride 9   
mg/ml injection  
(5 sources)                                         Start: 2024  
End: 2024                                     1,000 mL, Intravenous, Admin  
ister   
over 60 Minutes, ONCE, 1 dose, On   
24 at 1830  
  
  
  
                                                    Start: 2024  
End: 2024                                     sodium chloride 0.9 % bolus   
1,000 mL  
   
                                                    Start: 2024  
End: 2024                                     Intravenous, at 125 mL/hr, C  
ONTINUOUS, Starting on Sun 24   
at   
1945, Until 24 at 1037  
   
                                                    Start: 2024  
End: 2024                                     1,000 mL, Intravenous, ONCE,  
 1 dose, On Sun 24 at 1715  
  
  
  
                                                    sucralfate 1000 mg   
oral tablet  
(3 sources)               Aluminum Complex          Start: 2022  
End: 2022                                     sucralfate 1 g oral   
tablet ; 1 tab(s)   
orally 4 times a   
day -.Meds to Beds   
- 4 Times a Day   
Before Meals   
Quantity: 120   
Refills: 0 Ordered:   
2022 Becky Hyman Start:   
2022 End:   
2022 Status:   
Other Generic   
Substitution   
Allowed  
   
                                                    vitamin b6 100 mg   
oral tablet  
(1 source)                              Start: 2020   take 1 tablet   
by mouth once   
daily                                   B6 Natural 100 MG   
Oral Tablet TAKE 1   
TABLET DAILY AS   
DIRECTED. Quantity:   
30 Refills: 3   
Simran Hart MD   
Start : 25-Aug-2020   
Active  
  
  
  
Problems  
Active Problems  
  
  
                                                    Problem   
Classification  Problem         Date            Documented Date Episodic/Chronic  
   
                                                    Abdominal pain  
(2 sources)                             Unspecified   
abdominal pain;   
Translations:   
[Unspecified   
abdominal pain]                         Onset:   
2024                                          Episodic  
   
                                                    Bacterial infection;   
unspecified site  
(5 sources)                             Chlamydial   
infection;   
Translations:   
[Chlamydia   
infection]                                                  Episodic  
   
                                                    Developmental   
disorders  
(1 source)                              Dyslexia;   
Translations:   
[Dyslexia and   
alexia]                                 Onset:   
2013                Chronic  
   
                                                    Fluid and electrolyte   
disorders  
(10 sources)                            Acute hypokalemia;   
Translations:   
[Hypopotassemia]                        Onset:   
2024                Episodic  
   
                                                    Genitourinary   
symptoms and   
ill-defined   
conditions  
(2 sources)                             At risk of urinary   
tract infection;   
Translations: [At   
risk of UTI (urinary   
tract infection)]                                           Episodic  
   
                                                    Influenza  
(1 source)                              Influenza due to   
Influenza A virus;   
Translations:   
[Influenza with   
other respiratory   
manifestations]                         03-          Episodic  
   
                                                    Influenza  
(1 source)      Influenza                       03-        
   
                                                    Mood disorders  
(2 sources)                             Depressive disorder;   
Translations:   
[Depression]                                                Chronic  
   
                                                    Nonmalignant breast   
conditions  
(1 source)                              Pain of breast;   
Translations:   
[Postpartum breast   
pain]                                                       Episodic  
   
                                                    Other complications   
of birth; puerperium   
affecting management   
of mother  
(1 source)                              Disorder of breast   
associated with   
childbirth;   
Translations:   
[Postpartum bloody   
nipple discharge]                                           Episodic  
   
                                                    Other complications   
of pregnancy  
(6 sources)                             Missed miscarriage;   
Translations:   
[, missed]                                          Episodic  
   
                                                    Other complications   
of pregnancy  
(3 sources)                             Retained products of   
conception;   
Translations:   
[Retained products   
of conception]                                              Episodic  
   
                                                    Other complications   
of pregnancy  
(1 source)                              Psychological   
disorder during   
pregnancy;   
Translations:   
[Bipolar disease   
during pregnancy,   
antepartum]                                                 Episodic  
   
                                                    Other complications   
of pregnancy  
(1 source)                              Hyperemesis   
gravidarum;   
Translations: [Mild   
hyperemesis   
gravidarum]                             2024          Episodic  
   
                                                    Other complications   
of pregnancy  
(2 sources)                             Vomiting of   
pregnancy,   
unspecified;   
Translations:   
[Vomiting of   
pregnancy,   
unspecified]                            Onset:   
2024                                          Episodic  
   
                                                    Other nervous system   
disorders  
(1 source)                              Cognitive deficit in   
communication   
skills;   
Translations:   
[Cognitive   
communication   
deficit]                                Onset:   
2013                Chronic  
   
                                                    Other nutritional;   
endocrine; and   
metabolic disorders  
(2 sources)                             Hypomagnesemia;   
Translations:   
[Disorders of   
magnesium   
metabolism]                             2022          Chronic  
   
                                                    Other pregnancy and   
delivery including   
normal  
(12 sources)                            Teenage pregnancy;   
Translations: [Urine   
pregnancy test   
positive]                                                   Episodic  
   
                                                    Other skin disorders  
(1 source)                              Eruption;   
Translations: [Rash]                                         Episodic  
   
                                                    Spondylosis;   
intervertebral disc   
disorders; other back   
problems  
(1 source)                              Lumbago with   
sciatica;   
Translations:   
[Sciatica]                              12-          Episodic  
   
                                                    Substance-related   
disorders  
(3 sources)                             Smoker;   
Translations:   
[Nicotine   
dependence,   
unspecified,   
uncomplicated]                          Onset:   
2024                Chronic  
   
                                                    Syncope  
(6 sources)                             Syncope;   
Translations:   
[Syncope and   
collapse]                               Onset:   
2022                Episodic  
   
                                        Comment on above:   SYNCOPE   
   
                                                    Unclassified  
(2 sources)                             BACK PAIN NO KNOWN   
INJURY                                  2021            
   
                                        Comment on above:   BACK PAIN NO KNOWN I  
NJURY   
   
                                                    Unclassified  
(1 source)                              Low back pain with   
sciatica                                2021            
   
                                                    Unclassified  
(2 sources)                             Cannabinoid   
hyperemesis syndrome                     2022            
   
                                                    Unclassified  
(1 source)      Left sided colitis                 2022        
   
                                                    Unclassified  
(1 source)      Unilateral weakness                 2022        
   
                                                    Unclassified  
(2 sources)     FLU-LIKE SYMPTOMS                 03-        
   
                                        Comment on above:   FLU-LIKE SYMPTOMS   
   
                                                    Unclassified  
(2 sources)               Vomitting                 Onset:   
2024                                            
   
                                                    Urinary tract   
infections  
(1 source)                              Acute cystitis;   
Translations: [Acute   
cystitis without   
hematuria]                              2024          Episodic  
  
  
Past or Other Problems  
  
  
                      Problem Classification Problem    Date       Documented Da  
te Episodic/Chronic  
   
                                                    Epilepsy; convulsions  
(2 sources)                             Unspecified   
convulsions;   
Translations:   
[Unspecified   
convulsions]                            Onset:   
02-                                          Episodic  
   
                                                    Intracranial injury  
(1 source)                              Concussion injury of   
body structure;   
Translations:   
[Concussion]                            Onset:   
2013                Episodic  
   
                                                    Malaise and fatigue  
(2 sources)                             Asthenia;   
Translations: [Other   
malaise and fatigue]                    Onset:   
2013                Episodic  
   
                                                    Nausea and vomiting  
(20 sources)                            Nausea and vomiting;   
Translations:   
[Vomiting]                              Onset:   
02-                01-                Episodic  
   
                                        Comment on above:   VOMITING   
   
                                                            NAUSEA WITH VOMITING  
, UNSPECIFIED   
   
                                                    Noninfectious   
gastroenteritis  
(6 sources)                             Colitis;   
Translations: [Other   
and unspecified   
noninfectious   
gastroenteritis and   
colitis]                                Onset:   
2022                Episodic  
   
                                                    Other gastrointestinal   
disorders  
(3 sources)                             Diarrhea of presumed   
infectious origin;   
Translations:   
[Diarrhea,   
unspecified]                            Onset:   
2024                Episodic  
   
                                                    Other non-traumatic   
joint disorders  
(1 source)                              Patellofemoral stress   
syndrome;   
Translations: [Pain   
in joint, lower leg]                    Onset:   
2013                Episodic  
   
                                                    Rehabilitation care;   
fitting of prostheses;   
and adjustment of   
devices  
(2 sources)                             Patient encounter   
status; Translations:   
[Other physical   
therapy]                                Onset:   
2013                Episodic  
   
                                                    Substance-related   
disorders  
(2 sources)                             Cannabis use,   
unspecified,   
uncomplicated;   
Translations:   
[Cannabis use,   
unspecified,   
uncomplicated]                          Onset:   
02-                                          Episodic  
   
                                                    Syncope  
(1 source)      Syncope                         2022        
   
                                        Comment on above:   BLURRED VISION, ARM   
TINGLING, SYNCOPE, HIT HEAD AND LOC   
   
                                                    Unclassified  
(2 sources)                             Patient encounter   
status; Translations:   
[Screen for STD   
(sexually transmitted   
disease)]                                                     
   
                                                    NEGATED: Highlighted   
row has not   
occurred!Residual   
codes; unclassified  
(20 sources)    Disease                                         Episodic  
  
  
  
Results  
  
  
                          Test Name    Value        Interpretation Reference   
Range                                   Facility  
   
                                                    CBC, EDIF, PLATELETon 2024   
   
                          ABSOLUTE BASOPHIL COUNT 0.1 10*3/uL               0.0   
- 0.2   
10*3/uL                                 [facility]  
   
                      Basophils/100 WBC (Bld) 0.7 %                 0.0 - 2.0 %   
[facility]  
   
                                                    Differential cell count   
method Nom (Bld) AUTO DIFF                       %               [facility]  
   
                                                    Eosinophils (Bld)   
[#/Vol]             0.1 10*3/uL                             0.0 - 0.7   
10*3/uL                                 [facility]  
   
                                                    Eosinophils/100 WBC   
(Bld)           0.6 %                           0.0 - 11.0 %    [facility]  
   
                                                    Erythrocyte   
distribution width   
(RBC) [Ratio]       13.6 %                                  11.5 - 14.5   
%                                       [facility]  
   
                                                    Hematocrit (Bld)   
[Volume fraction]   39.2 %                                  36.0 - 48.0   
%                                       [facility]  
   
                                                    Hemoglobin (Bld)   
[Mass/Vol]      13.3 g/dL                                       [facility]  
   
                                                    Interpretation and   
review of laboratory   
results         Abnormal                                        [facility]  
   
                                                    Lymphocytes (Bld)   
[#/Vol]             1.8 10*3/uL                             1.2 - 3.4   
10*3/uL                                 [facility]  
   
                                                    Lymphocytes/100 WBC   
(Bld)               17.0 %              Low                 20.0 - 55.0   
%                                       [facility]  
   
                                                    MCH (RBC) [Entitic   
mass]               29.9 pg                                 26.0 - 35.0   
PG                                      [facility]  
   
                      MCHC (RBC) [Mass/Vol] 33.9 g/dL                        [facility]  
   
                      MCV (RBC) [Entitic vol] 88.1 fL                          [facility]  
   
                          Monocytes (Bld) [#/Vol] 1.1 10*3/uL  High         0.0   
- 0.7   
10*3/uL                                 [facility]  
   
                      Monocytes/100 WBC (Bld) 9.8 %                 0.0 - 10.0 %  
 [facility]  
   
                                                    Neutrophils (Bld)   
[#/Vol]             7.8 10*3/uL         High                1.4 - 6.5   
10*3/uL                                 [facility]  
   
                                                    Neutrophils/100 WBC   
(Bld)               71.9 %                                  37.0 - 75.0   
%                                       [facility]  
   
                                                    Platelet mean volume   
(Bld) [Entitic vol] 9.9 fL                                          [facility]  
   
                          Platelets (Bld) [#/Vol] 282 10*3/uL               130   
- 400   
10*3/uL                                 [facility]  
   
                          RBC (Bld) [#/Vol] 4.45 10*6/uL              4.0 - 5.4   
10*6/uL                                 [facility]  
   
                          WBC (Bld) [#/Vol] 10.8 10*3/uL              3.6 - 11.0  
   
10*3/uL                                 [facility]  
   
                                                    COMPREHENSIVE METABOLIC PANE  
[place] 2024   
   
                          Albumin [Mass/Vol] 4.1 G/dl                  3.5 - 5.0  
   
G/dl                                    [facility]  
   
                                                    Albumin/Globulin [Mass   
ratio]          1.3 {ratio}                     RATIO           [facility]  
   
                                                    ALP [Catalytic   
activity/Vol]   53 U/L                                          [facility]  
   
                                                    ALT [Catalytic   
activity/Vol]   32 U/L                          [facility]  
   
                                                    AST [Catalytic   
activity/Vol]   36 U/L                                          [facility]  
   
                      Bilirubin [Mass/Vol] 0.9 mg/dL                        [facility]  
   
                      Calcium [Mass/Vol] 9.2 mg/dL                        [facility]  
   
                      Chloride [Moles/Vol] 104 mmol/L                       [facility]  
   
                                        Comment on above:   Please note: Triglyc  
eride levels of 600mg/dL or higher may   
positively bias chloride results by approximately 2.1 mmol   
   
                      CO2 [Moles/Vol] 17 mmol/L  Critically low            [facility]  
   
                                        Comment on above:   Result called to and  
 read back by: CHEPE MCKEE 2024 @ 18:16   
by L   
   
                      Creatinine [Mass/Vol] 0.50 mg/dL Low                   [facility]  
   
                                        GFR COMMENT         Average GFR for 20-2  
9   
years old = 116.                                            [facility]  
   
                                        Comment on above:   Chronic Kidney disea  
se, GFR = <60.  
Kidney failure, GFR = <15.  
The GFR estimate is not adjusted for extreme body surface area   
or acute process, nor has it been validated for pregnant women   
or ethnic groups other than  and .  
  
   
   
                                                    GFR/1.73 sq M.predicted   
among blacks MDRD   
(S/P/Bld) [Vol   
rate/Area]          198 mL/min/{1.73_m2}                     ml/min/1.73s  
q.m                                     [facility]   
System  
   
                                                    GFR/1.73 sq M.predicted   
among non-blacks MDRD   
(S/P/Bld) [Vol   
rate/Area]          164 mL/min/{1.73_m2}                     ml/min/1.73s  
q.m                                     [facility]  
   
                                                    Glucose post fast   
[Mass/Vol]      90 mg/dL                                        [facility]  
   
                                        Comment on above:     
NORMAL <100 mg/dL  
PREDIABETES 101-126 mg/dL  
DIABETES 126 mg/dL or higher  
  
   
   
                                                    Interpretation and   
review of laboratory   
results         Abnormal                                        [facility]  
   
                      Potassium [Moles/Vol] 2.9 mmol/L Critically low             
 [facility]  
   
                                        Comment on above:   Result called to and  
 read back by: CHEPE MCKEE 2024 @ 18:15   
by [facility]   
   
                      Protein [Mass/Vol] 7.2 g/dL                         [facility]  
   
                      Sodium [Moles/Vol] 133 mmol/L Low                   [facility]  
   
                                                    Urea nitrogen   
[Mass/Vol]      7 mg/dL                                         [facility]  
   
                                                    LACTATE, BLOODon 2024   
   
                          Lactate [Moles/Vol] 1.7 mmol/L                0.7 - 2.  
0   
mmol/L                                  [facility]  
   
                                                    No Panel Informationon    
   
                                                    Interpretation and   
review of laboratory   
results         Abnormal                                        [facility]  
   
                                                    URINALYSIS, MACROon 20  
24   
   
                      Bilirubin Ql (U) MODERATE   Abnormal   NEGATIVE   [facility]   
System  
   
                      Clarity (U) CLEAR                 CLEAR      [facility]   
System  
   
                      Color (U)  YELLOW                YELLOW     [facility]   
System  
   
                                                    Glucose Test strip (U)   
[Mass/Vol]          Negative                                NEGATIVE   
mg/dl                                   [facility]  
   
                      Hemoglobin Ql (U) Negative              NEGATIVE   [facility]   
[facility]  
   
                          Ketones (U) [Mass/Vol] mg/dL        Abnormal     NEGAT  
BHAVANI   
mg/dl                                   [facility]  
   
                                                    Leukocyte esterase Test   
strip Ql (U)    TRACE           Abnormal        NEGATIVE        [facility]  
   
                      Nitrite Ql (U) Negative              NEGATIVE   [facility]   
System  
   
                      pH (U)     6.5 [pH]              5.0 - 7.0  [facility]  
   
                      Protein Ql (U) 30 mg/dl   Abnormal   NEGATIVE   [facility]   
System  
   
                                                    Specific gravity (U)   
[Rel density]       >1.030              High                1.010 -   
1.025                                   [facility]  
   
                                                    Urobilinogen (U)   
[Mass/Vol]      2.0 mg/dL       High                            [facility]  
   
                                                    URINE MICROSCOPICon 20  
24   
   
                                                    Bacteria LM.HPF (Urine   
sed) [#/Area]   2+              Abnormal        NEGATIVE        [facility]  
   
                                                    Casts LM.LPF (Urine   
sed) [#/Area]   NONE                            NONE /LPF       [facility]   
System  
   
                                                    Crystals LM Nom (Urine   
sed)            NONE                            NONE            [facility]   
System  
   
                                                    Epithelial cells LM Ql   
(Urine sed)     TOO NUMEROUS TO COUNT                 /HPF            [facility]  
th   
System  
   
                      Mucus Ql (Urine sed) 1+         Abnormal   NEGATIVE   [facility]   
System  
   
                                                    RBC LM.HPF (Urine sed)   
[#/Area]            Negative                                NEGATIVE   
/HPF                                    [facility]  
   
                                                    Urine sediment comments   
LM Kalpesh (Urine sed)                      POSSIBLY CONTAMINATED   
SPECIMEN, CULTURE MUST   
BE ORDERED SEPARATELY   
IF DEEMED NECESSARY.                                         [facility]  
   
                                                    WBC LM.HPF (Urine sed)   
[#/Area]            '5 TO 10                                NEGATIVE   
/HPF                                    [facility]  
   
                                                    ED Prov Noteon 2024   
   
                                        ED Prov Note        [place] [facility]  
   
Note:  
NAME: Danna Soares 23 y.o.   
CSN: [id number] MRN:   
[id number]  
PCP:  
Kayleen, Physician  
ED Course / Medical   
Decision Making:  
Patient is actively   
vomiting in the ER. She   
is approximately 14   
weeks  
pregnant. She cannot   
tell me when her last   
menstrual period was.   
She follows  
with an OB at   
[place]. She has had   
a history of nausea   
vomiting related to  
marijuana use. I   
discussed with patient   
antiemetics. She was   
concerned about  
Zofran because of the   
association between   
Zofran and cleft   
palate. I told her  
there is a association   
but this never been   
proven. She would have   
to give  
informed consent if she   
wants an antiemetic.   
Patient decided she   
wanted the  
Zofran. Patient is   
alert and oriented   
capable of making   
informed decision about  
her health care for her   
and her fetus. Patient   
has normal CBC.   
Chemistries are  
normal except for a low   
BUN of 5 and a CO2   
level of 18. Liver   
enzymes and  
amylase are normal. I   
felt this was most   
likely hyperemesis   
gravidarum  
secondary to pregnancy   
there could be a   
component of cyclic   
vomiting syndrome  
from marijuana use.   
Patient tells me she   
had a recent ultrasound   
done by her  
OB/GYN which showed   
intrauterine pregnancy.   
So there is no concern   
for ectopic  
pregnancy she has no   
pelvic pain. She has   
slight upper abdominal   
cramping  
related to the fact she   
has been vomiting. On   
recheck patient wanted   
to go  
home. She was not able   
to give us a urine.   
Apparently her ride   
needs to take  
her home. Patient   
discharged to follow-up   
with her OB/GYN. If   
worsening  
symptoms come back to   
the ER. Patient was   
able to hold down ice   
chips prior to  
discharge. Patient   
declined Zofran   
prescription  
Medical Decision Making  
Amount and/or   
Complexity of Data   
Reviewed  
Independent Historian:  
Details: Patient gave   
history  
Labs:  
Details: Reviewed  
Clinical Impression:  
1. Nausea/vomiting in   
pregnancy  
Disposition: Patient is   
being discharged to   
home  
History:  
Chief Complaint: Nausea   
(Pt to ER c/o nausea   
and vomiting for the   
past 24 hrs,  
pt states  Im 14 weeks   
pregnant, wanted to   
make sure I wasn't   
dehydrated,  
negative home covid   
test )  
HPI: The history was   
obtained from the   
patient. She is a 23   
y.o. female  
who presents with a   
chief complaint of   
Nausea (Pt to ER c/o   
nausea and vomiting  
for the past 24 hrs, pt   
states  Im 14 weeks   
pregnant, wanted to   
make sure I  
wasn't dehydrated,   
negative home covid   
test ).  
HPI is 14 weeks   
pregnant. She is a   
 4 para 1 with 2   
prior  
miscarriages. She is   
followed with an OB at   
[place]. She has had   
nausea  
vomiting for 2 days.   
She has not been able   
to stop with the   
vomiting. Patient  
has been seen in the   
past for cyclic   
vomiting syndrome   
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   
use.  
Patient states they   
always say that but she   
does not believe that   
is what causes  
her nausea vomiting.   
Patient cannot tell me   
when the last time she   
smoked  
marijuana. Patient   
denies any urinary   
symptoms. She has no   
vaginal bleeding or  
discharge. She has   
upper abdominal   
cramping after the   
vomiting. She had a  
recent ultrasound at   
her OBs office and she   
states she had   
intrauterine  
pregnancy.. She denies   
any vaginal bleeding or   
discharge. She has no   
urinary  
symptoms.  
PMHx: No past medical   
history on file.  
PMSx:  
Past Surgical History:  
Procedure Laterality   
Date  
ADENOIDECTOMY  
APPENDECTOMY  
ORTHOPEDIC SURGERY  
TONSILLECTOMY  
FAM. Hx:  
Family History  
Problem Relation Age of   
Onset  
Cancer Sister  
Cancer Maternal   
Grandmother  
Cancer Paternal   
Grandmother  
SOC. Hx:  
Social History  
Socioeconomic History  
Marital status: Single  
Tobacco Use  
Smoking status: Never  
Smokeless tobacco:   
Never  
Vaping Use  
Vaping status: Never   
Used  
Substance and Sexual   
Activity  
Alcohol use: Not   
Currently  
Drug use: Yes  
Types: Marijuana  
Social History   
Narrative  
** Merged History   
Encounter **  
Social Determinants of   
Health  
Food Insecurity: Food   
Insecurity Present   
(2024)  
Received from [facility],   
[facility]  
Hunger Vital Sign  
Worried About Running   
Out of Food in the Last   
Year: Sometimes true  
Ran Out of Food in the   
Last Year: Sometimes   
true  
Transportation Needs:   
No Transportation Needs   
(2024)  
Received from [facility],   
[facility]  
PRAPARE -   
Transportation  
Lack of Transportation   
(Medical): No  
Lack of Transportation   
(Non-Medical): No  
Housing Stability:   
Unknown (2024)  
Received from [facility],   
[facility]  
Housing Stability Vital   
Sign  
Unable to Pay for   
Housing in the Last   
Year: No  
Unstable Housing in the   
Last Year: No  
MEDs:  
Previous Medications  
Medication Sig  
dicyclomine (BENTYL) 20   
mg tablet Take 1 (one)   
tablet (20 mg total) by   
mouth 4  
(four) times a day as   
needed .  
famotidine (PEPCID) 20   
MG tablet Take 1 (one)   
tablet (20 mg total) by   
mouth 2 (more content   
not included)...    Normal                                  [facility]  
   
                                                    POC BASIC METABOLIC PANEL -   
[facility] 2024   
   
                      Chloride [Moles/Vol] 103 mmol/L Normal          [facility]  
   
                                        Comment on above:   Order Comment: [facility] has implemented   
the eGFR calculation approach that does not have a coefficient   
for race that conforms to the NKF-ASN Task Force   
Recommendations.   
   
                      CO2 [Moles/Vol] 18 mmol/L  Low        21-32      [facility]  
   
                                        Comment on above:   Order Comment: [facility] has implemented   
the eGFR calculation approach that does not have a coefficient   
for race that conforms to the NKF-ASN Task Force   
Recommendations.   
   
                      Creatinine [Mass/Vol] 0.54 mg/dL Normal     0.40-1.10  [facility]  
   
                                        Comment on above:   Order Comment: [facility] has implemented   
the eGFR calculation approach that does not have a coefficient   
for race that conforms to the NKF-ASN Task Force   
Recommendations.   
   
                      Glucose [Mass/Vol] 93 mg/dL   Normal     65-99      [facility]  
   
                                        Comment on above:   Order Comment: [facility] has implemented   
the eGFR calculation approach that does not have a coefficient   
for race that conforms to the NKF-ASN Task Force   
Recommendations.   
   
                      POC GFR    134 mL/min/1.73 m2 Normal     >=60       [facility]  
   
                                        Comment on above:   Order Comment: [facility] has implemented   
the eGFR calculation approach that does not have a coefficient   
for race that conforms to the NKF-ASN Task Force   
Recommendations.   
   
                                                            Result Comment: Xiao  
mated GFR was calculated using the    
CKD-EPI creatinine equation.   
   
                      POC IONIZED CALCIUM 4.5 mg/dL  Normal     4.5-5.3    [facility]  
   
                                        Comment on above:   Order Comment: [facility] has implemented   
the eGFR calculation approach that does not have a coefficient   
for race that conforms to the NKF-ASN Task Force   
Recommendations.   
   
                      Potassium [Moles/Vol] 3.6 mmol/L Normal     3.5-5.1    [facility]  
   
                                        Comment on above:   Order Comment: [facility] has implemented   
the eGFR calculation approach that does not have a coefficient   
for race that conforms to the NKF-ASN Task Force   
Recommendations.   
   
                      Sodium [Moles/Vol] 137 mmol/L Normal     135-145    [facility]  
   
                                        Comment on above:   Order Comment: [facility] has implemented   
the eGFR calculation approach that does not have a coefficient   
for race that conforms to the NKF-ASN Task Force   
Recommendations.   
   
                                                    Urea nitrogen   
[Mass/Vol]      5 mg/dL         Low                         [facility]  
   
                                        Comment on above:   Order Comment: [facility] has implemented   
the eGFR calculation approach that does not have a coefficient   
for race that conforms to the NKF-ASN Task Force   
Recommendations.   
   
                                                    POC CBC AND DIFFERENTIALon 0  
2024   
   
                                                    BASOPHILS ABSOLUTE   
COUNT           0.07 K/mcL      Normal          0.00-0.30       [facility]  
   
                      Basophils/100 WBC (Bld) 0.7 %      Normal                G  
[facility]  
   
                                                    Eosinophils (Bld)   
[#/Vol]         0.13 10*3/uL    Normal          0.00-0.50       [facility]  
   
                                                    Eosinophils/100 WBC   
(Bld)           1.3 %           Normal                          [facility]  
   
                                                    Erythrocyte   
distribution width   
(RBC) [Ratio]   13.2 %          Normal          11.6-14.8       [facility]  
   
                                                    Hematocrit (Bld)   
[Volume fraction] 38.3 %          Normal          36.0-46.0       [facility]  
   
                                                    Hemoglobin (Bld)   
[Mass/Vol]      12.9 g/dL       Normal          12.0-16.0       [facility]  
   
                      IG ABSOLUTE 0.04 K/mcL Normal     0.00-0.30  [facility]  
   
                      IG PERCENT 0.40 %     Normal                [facility]  
   
                                        Comment on above:   Result Comment: The   
IG parameter is the percentage of   
metamyelocytes, myelocytes and promyelocytes. An immature   
granulocyte count (IG) of 1% or more suggests the possibility   
of infection, an IG count of 3% is very likely related to an   
infection.   
   
                                                    Lymphocytes (Bld)   
[#/Vol]         1.90 10*3/uL    Normal          0.90-4.00       [facility]  
   
                                                    Lymphocytes/100 WBC   
(Bld)           18.6 %          Normal                          [facility]  
   
                                                    MCH (RBC) [Entitic   
mass]           30.1 pg         Normal          26.0-34.0       [facility]  
   
                      MCV (RBC) [Entitic vol] 89.3 fL    Normal     80.0-100.0 [facility]  
   
                                                    MEAN CORPUSCULAR   
HEMOGLOBIN CONC 33.7 g/dL       Normal          31.0-37.0       [facility]  
   
                      Monocytes (Bld) [#/Vol] 1.19 10*3/uL High       0.30-0.90   
 [facility]  
   
                      Monocytes/100 WBC (Bld) 11.6 %     Normal                [facility]  
   
                                                    NEUTROPHILS ABSOLUTE   
COUNT           6.89 K/mcL      Normal          1.70-7.00       [facility]  
   
                                                    Neutrophils/100 WBC   
(Bld)           67.4 %          Normal                          [facility]  
   
                                                    Platelet mean volume   
(Bld) [Entitic vol] 11.3 fL         Normal          9.4-12.4        [facility]  
   
                      Platelets (Bld) [#/Vol] 252 10*3/uL Normal     150-400      
[facility]  
   
                      RBC (Bld) [#/Vol] 4.29 10*6/uL Normal     4.00-5.20  [facility]  
   
                      WBC (Bld) [#/Vol] 10.22 10*3/uL Normal     4.50-11.00 [facility]  
   
                                                    POC LIVER PANEL PLUS [place]   
2024   
   
                      Albumin [Mass/Vol] 4.0 g/dL   Normal     3.2-5.2    [facility]  
   
                                                    ALP [Catalytic   
activity/Vol]   51 U/L          Normal                    [facility]  
   
                                                    Amylase [Catalytic   
activity/Vol]   84 U/L          Normal                    [facility]  
   
                                                    Amylase [Catalytic   
activity/Vol]   5 U/L           Low             7-33            [facility]  
   
                                                    AST [Catalytic   
activity/Vol]   18 U/L          Normal          0-45            [facility]  
   
                      Bilirubin [Mass/Vol] 0.6 mg/dL  Normal     0.0-1.3    [facility]  
   
                      POC ALT (SGPT) <          Normal     0-40       [facility]  
   
                      Protein [Mass/Vol] 7.3 g/dL   Normal     6.0-8.0    [facility]  
   
                                                    Basic metabolic 2000 panelon  
 2024   
   
                      Anion gap [Moles/Vol] 17 mmol/L  Normal     10-20      [facility]  
   
                                        Comment on above:   Performed By: #### 2  
4323-8 ####  
AIDEE RODRIGUES ([id number])  
[facility] ([facility])  
[address] [place] [address]   
   
                      Calcium [Mass/Vol] 9.5 mg/dL  Normal     8.6-10.3   [facility]  
   
                                        Comment on above:   Performed By: #### 2  
4323-8 ####  
AIDEE RODRIGUES ([id number])  
[facility] ([facility])  
[facility] [place] [address]   
   
                      Chloride [Moles/Vol] 102 mmol/L Normal          [facility]  
   
                                        Comment on above:   Performed By: #### 2  
4323-8 ####  
AIDEE RODRIGUES ([id number])  
[facility] ([facility])  
[facility] [place] [address]   
   
                      CO2 [Moles/Vol] 20 mmol/L  Low        21-32      [facility]  
   
                                        Comment on above:   Performed By: #### 2  
4323-8 ####  
AIDEE RODRIGUES ([id number])  
[facility] ([facility])  
[address] [place] [address]   
   
                      Creatinine [Mass/Vol] 0.54 mg/dL Normal     0.50-1.05  [facility]  
   
                                        Comment on above:   Performed By: #### 2  
4323-8 ####  
AIDEE RODRIGUES ([id number])  
[facility] ([facility])  
[address] [place] [address]   
   
                                                    GFR/1.73 sq M.predicted   
MDRD (S/P/Bld) [Vol   
rate/Area]      mL/min/{1.73_m2} Normal          >60             [facility]  
   
                                        Comment on above:   Result Comment: Calc  
ulations of estimated GFR are performed   
using the  CKD-EPI Study Refit equation without the race   
variable for the IDMS-Traceable creatinine methods.  
https://jasn.asnjournals.org/content/early//ASN.  
558416   
   
                                                            Performed By: #### 2  
4323-8 ####  
AIDEE RODRIGUES ([id number])  
[facility] ([facility])  
[address] [place] [address]   
   
                      Glucose [Mass/Vol] 97 mg/dL   Normal     74-99      [facility]  
   
                                        Comment on above:   Performed By: #### 2  
4323-8 ####  
AIDEE RODRIGUES ([id number])  
[facility] ([facility])  
[facility] [place] [address]   
   
                      Potassium [Moles/Vol] 3.4 mmol/L Low        3.5-5.3    [facility]  
   
                                        Comment on above:   Performed By: #### 2  
4323-8 ####  
AIDEE RODRIGUES ([id number])  
[facility] ([facility])  
[facility] [place] [address]   
   
                      Sodium [Moles/Vol] 136 mmol/L Normal     136-145    [facility]  
   
                                        Comment on above:   Performed By: #### 2  
4323-8 ####  
AIDEE RODRIGUES ([id number])  
[facility] ([facility])  
[address] [place] [address]   
   
                                                    Urea nitrogen   
[Mass/Vol]      4 mg/dL         Low             6-23            [facility]  
   
                                        Comment on above:   Performed By: #### 2  
4323-8 ####  
AIDEE RODRIGUES ([id number])  
[facility] ([facility])  
[address] [place] [address]   
   
                          Anion gap [Moles/Vol] 17 mmol/L                 10 - 2  
0   
mmol/L                                  [facility]  
   
                          Calcium [Mass/Vol] 9.5 mg/dL                 8.6 - 10.  
3   
mg/dL                                   [facility]  
   
                          Chloride [Moles/Vol] 102 mmol/L                98 - 10  
7   
mmol/L                                  [facility]  
   
                          CO2 [Moles/Vol] 20 mmol/L    Low          21 - 32   
mmol/L                                  [facility]  
   
                          Creatinine [Mass/Vol] 0.54 mg/dL                0.50 -  
 1.05   
mg/dL                                   [facility]  
   
                      eGFR                             - PINF     [facility]  
   
                                        Comment on above:   Calculations of xiao  
mated GFR are performed using the    
CKD-EPI Study Refit equation without the race variable for the   
IDMS-Traceable creatinine methods.  
https://jasn.asnjournals.org/content/early//ASN.  
037472  
   
   
                          Glucose [Mass/Vol] 97 mg/dL                  74 - 99   
mg/dL                                   [facility]  
   
                                                    Interpretation and   
review of laboratory   
results         Abnormal                                        [facility]  
   
                          Potassium [Moles/Vol] 3.4 mmol/L   Low          3.5 -   
5.3   
mmol/L                                  [facility]  
   
                          Sodium [Moles/Vol] 136 mmol/L                136 - 145  
   
mmol/L                                  [facility]  
   
                                                    Urea nitrogen   
[Mass/Vol]      4 mg/dL         Low             6 - 23 mg/dL    [facility]  
   
                                                    CBC W Auto Differential pane  
l (Bld)on 2024   
   
                                                    Erythrocyte   
distribution width   
(RBC) [Ratio]   13.6 %          Normal          11.5-14.5       [facility]  
   
                                        Comment on above:   Order Comment: The p  
reviously reported component Neutrophils %  
   
is no longer being reported.The previously reported component   
Lymphocytes % is no longer being reported.The previously   
reported component Monocytes % is no longer being reported.The   
previously reported component Eosinophils % is no longer being   
reported.The previously reported component Basophils % is no   
longer being reported.The previously reported component   
Absolute Neutrophils is no longer being reported.The   
previously reported component Absolute Lymphocytes is no   
longer being reported.The previously reported component   
Absolute Monocytes is no longer being reported.The previously   
reported component Absolute Eosinophils is no longer being   
reported.The previously reported component Absolute Basophils   
is no longer being reported.   
   
                                                            Performed By: #### 2  
4323-8 ####  
HOSKINS RAVI ([id number])  
[facility] ([facility])  
[address] [place] [address]   
   
                                                    Hematocrit (Bld)   
[Volume fraction] 44.1 %          Normal          36.0-46.0       [facility]  
   
                                        Comment on above:   Order Comment: The p  
reviously reported component Neutrophils %  
  
is no longer being reported.The previously reported component   
Lymphocytes % is no longer being reported.The previously   
reported component Monocytes % is no longer being reported.The   
previously reported component Eosinophils % is no longer being   
reported.The previously reported component Basophils % is no   
longer being reported.The previously reported component   
Absolute Neutrophils is no longer being reported.The   
previously reported component Absolute Lymphocytes is no   
longer being reported.The previously reported component   
Absolute Monocytes is no longer being reported.The previously   
reported component Absolute Eosinophils is no longer being   
reported.The previously reported component Absolute Basophils   
is no longer being reported.   
   
                                                            Performed By: #### 2  
4323-8 ####  
AIDEE RODRIGUES ([id number])  
[facility] ([facility])  
[address] [place] [address]   
   
                                                    Hemoglobin (Bld)   
[Mass/Vol]      14.9 g/dL       Normal          12.0-16.0       [facility]  
   
                                        Comment on above:   Order Comment: The p  
reviously reported component Neutrophils %  
   
is no longer being reported.The previously reported component   
Lymphocytes % is no longer being reported.The previously   
reported component Monocytes % is no longer being reported.The   
previously reported component Eosinophils % is no longer being   
reported.The previously reported component Basophils % is no   
longer being reported.The previously reported component   
Absolute Neutrophils is no longer being reported.The   
previously reported component Absolute Lymphocytes is no   
longer being reported.The previously reported component   
Absolute Monocytes is no longer being reported.The previously   
reported component Absolute Eosinophils is no longer being   
reported.The previously reported component Absolute Basophils   
is no longer being reported.   
   
                                                            Performed By: #### 2  
4323-8 ####  
AIDEE RODRIGUES ([id number])  
[facility] ([facility])  
[address] [place] [address]   
   
                                                    Immature granulocytes   
(Bld) [#/Vol]   0.04 x10*3/uL   Normal          0.00-0.70       [facility]  
   
                                        Comment on above:   Order Comment: The p  
reviously reported component Neutrophils %  
   
is no longer being reported.The previously reported component   
Lymphocytes % is no longer being reported.The previously   
reported component Monocytes % is no longer being reported.The   
previously reported component Eosinophils % is no longer being   
reported.The previously reported component Basophils % is no   
longer being reported.The previously reported component   
Absolute Neutrophils is no longer being reported.The   
previously reported component Absolute Lymphocytes is no   
longer being reported.The previously reported component   
Absolute Monocytes is no longer being reported.The previously   
reported component Absolute Eosinophils is no longer being   
reported.The previously reported component Absolute Basophils   
is no longer being reported.   
   
                                                            Performed By: #### 2  
4323-8 ####  
AIDEE RODRIGUES ([id number])  
[facility] ([facility])  
[facility] [place] [id number]   
   
                                                    Immature   
granulocytes/100 WBC   
(Bld)           0.4 %           Normal          0.0-0.9         [facility]  
   
                                        Comment on above:   Order Comment: The p  
reviously reported component Neutrophils %  
   
is no longer being reported.The previously reported component   
Lymphocytes % is no longer being reported.The previously   
reported component Monocytes % is no longer being reported.The   
previously reported component Eosinophils % is no longer being   
reported.The previously reported component Basophils % is no   
longer being reported.The previously reported component   
Absolute Neutrophils is no longer being reported.The   
previously reported component Absolute Lymphocytes is no   
longer being reported.The previously reported component   
Absolute Monocytes is no longer being reported.The previously   
reported component Absolute Eosinophils is no longer being   
reported.The previously reported component Absolute Basophils   
is no longer being reported.   
   
                                                            Result Comment: Jossie  
ture Granulocyte Count (IG) includes   
promyelocytes, myelocytes and metamyelocytes but does not   
include bands. Percent differential counts (%) should be   
interpreted in the context of the absolute cell counts   
(cells/UL).   
   
                                                            Performed By: #### 2  
4323-8 ####  
AIDEE RODRIGUES ([id number])  
[facility] ([facility])  
[facility] [place] [address]   
   
                                                    MCH (RBC) [Entitic   
mass]           29.7 pg         Normal          26.0-34.0       [facility]  
   
                                        Comment on above:   Order Comment: The p  
reviously reported component Neutrophils %  
  
is no longer being reported.The previously reported component   
Lymphocytes % is no longer being reported.The previously   
reported component Monocytes % is no longer being reported.The   
previously reported component Eosinophils % is no longer being   
reported.The previously reported component Basophils % is no   
longer being reported.The previously reported component   
Absolute Neutrophils is no longer being reported.The   
previously reported component Absolute Lymphocytes is no   
longer being reported.The previously reported component   
Absolute Monocytes is no longer being reported.The previously   
reported component Absolute Eosinophils is no longer being   
reported.The previously reported component Absolute Basophils   
is no longer being reported.   
   
                                                            Performed By: #### 2  
4323-8 ####  
AIDEE RODRIGUES ([id number])  
[facility] ([facility])  
[facility] [place] [address]   
   
                      MCHC (RBC) [Mass/Vol] 33.8 g/dL  Normal     32.0-36.0  [facility]  
[facility]  
   
                                        Comment on above:   Order Comment: The p  
reviously reported component Neutrophils %  
   
is no longer being reported.The previously reported component   
Lymphocytes % is no longer being reported.The previously   
reported component Monocytes % is no longer being reported.The   
previously reported component Eosinophils % is no longer being   
reported.The previously reported component Basophils % is no   
longer being reported.The previously reported component   
Absolute Neutrophils is no longer being reported.The   
previously reported component Absolute Lymphocytes is no   
longer being reported.The previously reported component   
Absolute Monocytes is no longer being reported.The previously   
reported component Absolute Eosinophils is no longer being   
reported.The previously reported component Absolute Basophils   
is no longer being reported.   
   
                                                            Performed By: #### 2  
4323-8 ####  
AIDEE RODRIGUES ([id number])  
[facility] ([facility])  
[address] [place] [address]   
   
                      MCV (RBC) [Entitic vol] 88 fL      Normal          U  
[place]  
   
                                        Comment on above:   Order Comment: The p  
reviously reported component Neutrophils %  
  
is no longer being reported.The previously reported component   
Lymphocytes % is no longer being reported.The previously   
reported component Monocytes % is no longer being reported.The   
previously reported component Eosinophils % is no longer being   
reported.The previously reported component Basophils % is no   
longer being reported.The previously reported component   
Absolute Neutrophils is no longer being reported.The   
previously reported component Absolute Lymphocytes is no   
longer being reported.The previously reported component   
Absolute Monocytes is no longer being reported.The previously   
reported component Absolute Eosinophils is no longer being   
reported.The previously reported component Absolute Basophils   
is no longer being reported.   
   
                                                            Performed By: #### 2  
4323-8 ####  
AIDEE RODRIGUES ([id number])  
[facility] ([facility])  
[facility] [place] [address]   
   
                                                    Nucleated RBC/100 WBC   
(Bld) [Ratio]   0.0 /100 WBCs   Normal          0.0-0.0         [facility]  
   
                                        Comment on above:   Order Comment: The p  
reviously reported component Neutrophils %  
   
is no longer being reported.The previously reported component   
Lymphocytes % is no longer being reported.The previously   
reported component Monocytes % is no longer being reported.The   
previously reported component Eosinophils % is no longer being   
reported.The previously reported component Basophils % is no   
longer being reported.The previously reported component   
Absolute Neutrophils is no longer being reported.The   
previously reported component Absolute Lymphocytes is no   
longer being reported.The previously reported component   
Absolute Monocytes is no longer being reported.The previously   
reported component Absolute Eosinophils is no longer being   
reported.The previously reported component Absolute Basophils   
is no longer being reported.   
   
                                                            Performed By: #### 2  
4323-8 ####  
AIDEE RODRIGUES ([id number])  
[facility] ([facility])  
[facility] [place] [address]   
   
                      Platelets (Bld) [#/Vol] 306 x10*3/uL Normal     150-450     
 [facility]  
   
                                        Comment on above:   Order Comment: The p  
reviously reported component Neutrophils %  
   
is no longer being reported.The previously reported component   
Lymphocytes % is no longer being reported.The previously   
reported component Monocytes % is no longer being reported.The   
previously reported component Eosinophils % is no longer being   
reported.The previously reported component Basophils % is no   
longer being reported.The previously reported component   
Absolute Neutrophils is no longer being reported.The   
previously reported component Absolute Lymphocytes is no   
longer being reported.The previously reported component   
Absolute Monocytes is no longer being reported.The previously   
reported component Absolute Eosinophils is no longer being   
reported.The previously reported component Absolute Basophils   
is no longer being reported.   
   
                                                            Performed By: #### 2  
4323-8 ####  
AIDEE RODRIGUES ([id number])  
[facility] ([facility])  
[address] [place] [address]   
   
                      RBC (Bld) [#/Vol] 5.01 x10*6/uL Normal     4.00-5.20  [facility]  
   
                                        Comment on above:   Order Comment: The p  
reviously reported component Neutrophils %  
   
is no longer being reported.The previously reported component   
Lymphocytes % is no longer being reported.The previously   
reported component Monocytes % is no longer being reported.The   
previously reported component Eosinophils % is no longer being   
reported.The previously reported component Basophils % is no   
longer being reported.The previously reported component   
Absolute Neutrophils is no longer being reported.The   
previously reported component Absolute Lymphocytes is no   
longer being reported.The previously reported component   
Absolute Monocytes is no longer being reported.The previously   
reported component Absolute Eosinophils is no longer being   
reported.The previously reported component Absolute Basophils   
is no longer being reported.   
   
                                                            Performed By: #### 2  
4323-8 ####  
AIDEE RODRIGUES ([id number])  
[facility] ([facility])  
[address] [place] [address]   
   
                      WBC (Bld) [#/Vol] 10.6 x10*3/uL Normal     4.4-11.3   [facility]  
   
                                        Comment on above:   Order Comment: The p  
reviously reported component Neutrophils %  
   
is no longer being reported.The previously reported component   
Lymphocytes % is no longer being reported.The previously   
reported component Monocytes % is no longer being reported.The   
previously reported component Eosinophils % is no longer being   
reported.The previously reported component Basophils % is no   
longer being reported.The previously reported component   
Absolute Neutrophils is no longer being reported.The   
previously reported component Absolute Lymphocytes is no   
longer being reported.The previously reported component   
Absolute Monocytes is no longer being reported.The previously   
reported component Absolute Eosinophils is no longer being   
reported.The previously reported component Absolute Basophils   
is no longer being reported.   
   
                                                            Performed By: #### 2  
4323-8 ####  
AIDEE RODRIGUES ([id number])  
[facility] ([facility])  
[address] [place] [address]   
   
                                                    Erythrocyte   
distribution width   
(RBC) [Ratio]       13.6 %                                  11.5 - 14.5   
%                                       [facility]  
   
                                                    Hematocrit (Bld)   
[Volume fraction]   44.1 %                                  36.0 - 46.0   
%                                       [facility]  
   
                                                    Hemoglobin (Bld)   
[Mass/Vol]          14.9 g/dL                               12.0 - 16.0   
g/dL                                    [facility]  
   
                                                    Immature granulocytes   
(Bld) [#/Vol]   0.04 10*3/uL                                    [facility]  
   
                                                    Immature   
granulocytes/100 WBC   
(Bld)           0.4 %                           0.0 - 0.9 %     [facility]  
   
                                        Comment on above:   Immature Granulocyte  
 Count (IG) includes promyelocytes,   
myelocytes and metamyelocytes but does not include bands.   
Percent differential counts (%) should be interpreted in the   
context of the absolute cell counts (cells/UL).   
   
                                                    Interpretation and   
review of laboratory   
results         Normal                                          [facility]  
   
                                                    MCH (RBC) [Entitic   
mass]               29.7 pg                                 26.0 - 34.0   
pg                                      [facility]  
   
                          MCHC (RBC) [Mass/Vol] 33.8 g/dL                 32.0 -  
 36.0   
g/dL                                    [facility]  
   
                      MCV (RBC) [Entitic vol] 88 fL                 80 - 100 fL   
[facility]  
   
                                                    Nucleated RBC/100 WBC   
(Bld) [Ratio]   0.0 %                                           [facility]  
   
                      Platelets (Bld) [#/Vol] 306 10*3/uL                         
[facility]  
   
                      RBC (Bld) [#/Vol] 5.01 10*6/uL                       [facility]  
[facility]  
   
                      WBC (Bld) [#/Vol] 10.6 10*3/uL                       [facility]  
   
                                                            The previously repor  
patricia   
component Neutrophils %   
is no longer being   
reported.The previously   
reported component   
Lymphocytes % is no   
longer being   
reported.The previously   
reported component   
Monocytes % is no   
longer being   
reported.The previously  
reported component   
Eosinophils % is no   
longer being   
reported.The previously   
reported component   
Basophils % is no   
longer being   
reported.The previously   
reported component   
Absolute Neutrophils is   
no longer being   
reported.The previously   
reported  
component Absolute   
Lymphocytes is no   
longer being   
reported.The previously   
reported component   
Absolute Monocytes is   
no longer being   
reported.The previously   
reported component   
Absolute Eosinophils is   
no longer being   
reported.The previously   
reported  
component Absolute   
Basophils is no longer   
being reported.                                             [facility]  
   
                                                    Hepatic function 2000 panelo  
n 2024   
   
                                                    Albumin BCP dye   
[Mass/Vol]      4.9 g/dL        Normal          3.4-5.0         [facility]  
   
                                        Comment on above:   Performed By: #### 2  
4323-8 ####  
AIDEE RODRIGUES ([id number])  
[facility] ([facility])  
[facility] [place] [address]   
   
                                                    ALP [Catalytic   
activity/Vol]   39 U/L          Normal                    [facility]  
   
                                        Comment on above:   Performed By: #### 2  
4323-8 ####  
AIDEE RODRIGUES ([id number])  
[facility] ([facility])  
[facility] [place] [address]   
   
                                                    ALT With P-5'-P   
[Catalytic   
activity/Vol]   16 U/L          Normal          7-45            [facility]  
   
                                        Comment on above:   Result Comment: Kaleigh  
ents treated with Sulfasalazine may   
generate falsely decreased results for ALT.   
   
                                                            Performed By: #### 2  
4323-8 ####  
AIDEE RODRIGUES ([id number])  
[facility] ([facility])  
[address] [place] [address]   
   
                                                    AST With P-5'-P   
[Catalytic   
activity/Vol]   15 U/L          Normal          9-39            [facility]  
   
                                        Comment on above:   Performed By: #### 2  
4323-8 ####  
AIDEE RODRIGUES ([id number])  
[facility] ([facility])  
[address] [place] [address]   
   
                      Bilirubin [Mass/Vol] 0.6 mg/dL  Normal     0.0-1.2    [facility]  
   
                                        Comment on above:   Performed By: #### 2  
4323-8 ####  
AIDEE RODRIGUES ([id number])  
[facility] ([facility])  
[address] [place] [address]   
   
                                                    Bilirubin.direct   
[Mass/Vol]      0.1 mg/dL       Normal          0.0-0.3         [facility]  
   
                                        Comment on above:   Performed By: #### 2  
4323-8 ####  
AIDEE RODRIGUES ([id number])  
[facility] ([facility])  
[address] [place], [place] [id number]   
   
                      Protein [Mass/Vol] 7.4 g/dL   Normal     6.4-8.2    [facility]  
   
                                        Comment on above:   Performed By: #### 2  
4323-8 ####  
AIDEE RODRIGUES ([id number])  
[facility] ([facility])  
[address] [place] [address]   
   
                                                    Albumin BCP dye   
[Mass/Vol]          4.9 g/dL                                3.4 - 5.0   
g/dL                                    [facility]  
   
                                                    ALP [Catalytic   
activity/Vol]   39 U/L                          33 - 110 U/L    [facility]  
   
                                                    ALT With P-5'-P   
[Catalytic   
activity/Vol]   16 U/L                          7 - 45 U/L      [facility]  
   
                                        Comment on above:   Patients treated wit  
h Sulfasalazine may generate falsely   
decreased results for ALT.   
   
                                                    AST With P-5'-P   
[Catalytic   
activity/Vol]   15 U/L                          9 - 39 U/L      [facility]  
   
                          Bilirubin [Mass/Vol] 0.6 mg/dL                 0.0 - 1  
.2   
mg/dL                                   [facility]  
   
                                                    Bilirubin.direct   
[Mass/Vol]          0.1 mg/dL                               0.0 - 0.3   
mg/dL                                   [facility]  
   
                          Protein [Mass/Vol] 7.4 g/dL                  6.4 - 8.2  
   
g/dL                                    [facility]  
   
                                                    Lipaseon 2024   
   
                                                    Lipase [Catalytic   
activity/Vol]   27 U/L                          9 - 82 U/L      [facility]  
   
                                                    Lipase [Catalytic activity/V  
ol]on 2024   
   
                                                            Venipuncture   
immediately after or   
during the   
administration of   
Metamizole may lead to   
falsely low results.   
Testing should be   
performed immediately   
prior to Metamizole   
dosing.                                                     [facility]  
   
                                                    Manual differential performe  
d Ql (Bld)on 2024   
   
                      Basophils (Bld) [#/Vol] 0.11 x10*3/uL High       0.00-0.10  
  [facility]  
   
                                        Comment on above:   Performed By: #### 2  
4323-8 ####  
AIDEE RODRIGUES ([id number])  
[facility] ([facility])  
[address] [place] [address]   
   
                      Basophils/100 WBC (Bld) 1.0 %      Normal     0.0-2.0    [facility]  
[facility]  
   
                                        Comment on above:   Performed By: #### 2  
4323-8 ####  
AIDEE RODRIGUES ([id number])  
[facility] ([facility])  
[address] [place] [address]   
   
                                                    Cells Counted Total   
(Bld) [#]       100             Normal                          [facility]  
   
                                        Comment on above:   Performed By: #### 2  
4323-8 ####  
AIDEE RODRIGUES ([id number])  
[facility] ([facility])  
[address] [place] [address]   
   
                                                    Eosinophils (Bld)   
[#/Vol]         0.00 x10*3/uL   Normal          0.00-0.70       [facility]  
   
                                        Comment on above:   Performed By: #### 2  
4323-8 ####  
AIDEE RODRIGUES ([id number])  
[facility] ([facility])  
[address] [place] [address]   
   
                                                    Eosinophils/100 WBC   
(Bld)           0.0 %           Normal          0.0-6.0         [facility]  
   
                                        Comment on above:   Performed By: #### 2  
4323-8 ####  
AIDEE RODRIGUES ([id number])  
[facility] ([facility])  
[address] [place] [address]   
   
                                                    Lymphocytes (Bld)   
[#/Vol]         0.95 x10*3/uL   Low             1.20-4.80       [facility]  
   
                                        Comment on above:   Performed By: #### 2  
4323-8 ####  
AIDEE RODRIGUES ([id number])  
[facility] ([facility])  
[address] [place] [address]   
   
                                                    Lymphocytes/100 WBC   
(Bld)           9.0 %           Normal          13.0-44.0       [facility]  
   
                                        Comment on above:   Performed By: #### 2  
4323-8 ####  
AIDEE RODRIGUES ([id number])  
[facility] ([facility])  
[address] [place] [address]   
   
                      Monocytes (Bld) [#/Vol] 0.64 x10*3/uL Normal     0.10-1.00  
  [facility]  
   
                                        Comment on above:   Performed By: #### 2  
4323-8 ####  
AIDEE RODRIGUES ([id number])  
[facility] ([facility])  
[address] [place] [address]   
   
                      Monocytes/100 WBC (Bld) 6.0 %      Normal     2.0-10.0   [facility]  
   
                                        Comment on above:   Performed By: #### 2  
4323-8 ####  
AIDEE RODRIGUES ([id number])  
[facility] ([facility])  
[address] [place] [address]   
   
                                                    RBC morphology finding   
Nom (Bld)                               No significant RBC   
morphology present  Normal                                  [facility]  
   
                                        Comment on above:   Performed By: #### 2  
4323-8 ####  
AIDEE RODRIGUES ([id number])  
[facility] ([facility])  
[address] [place] [address]   
   
                                                    Segmented neutrophils   
(Bld) [#/Vol]   8.90 x10*3/uL   High            1.20-7.00       [facility]  
   
                                        Comment on above:   Performed By: #### 2  
4323-8 ####  
AIDEE RODRIGUES ([id number])  
[facility] ([facility])  
[address] [place] [address]   
   
                                                    Segmented   
neutrophils/100 WBC   
(Bld)           84.0 %          Normal          40.0-80.0       [facility]  
   
                                        Comment on above:   Result Comment: Perc  
ent differential counts (%) should be   
interpreted in the context of the absolute cell counts   
(cells/uL).   
   
                                                            Performed By: #### 2  
4323-8 ####  
AIDEE RODRIGUES ([id number])  
[facility] ([facility])  
[address] [place] [address]   
   
                      Basophils (Bld) [#/Vol] 0.11 10*3/uL High                   
 [facility]  
   
                      Basophils/100 WBC (Bld) 1.0 %                 0.0 - 2.0 %   
[facility]  
   
                                                    Cells Counted Total   
(Bld) [#]       100 {cells}                                     [facility]  
   
                                                    Eosinophils (Bld)   
[#/Vol]         0.00 10*3/uL                                    [facility]  
   
                                                    Eosinophils/100 WBC   
(Bld)           0.0 %                           0.0 - 6.0 %     [facility]  
   
                                                    Interpretation and   
review of laboratory   
results         Abnormal                                        [facility]  
   
                                                    Lymphocytes (Bld)   
[#/Vol]         0.95 10*3/uL    Low                             [facility]  
   
                                                    Lymphocytes/100 WBC   
(Bld)               9.0 %                                   13.0 - 44.0   
%                                       [facility]  
   
                      Monocytes (Bld) [#/Vol] 0.64 10*3/uL                        
 [facility]  
   
                      Monocytes/100 WBC (Bld) 6.0 %                 2.0 - 10.0 %  
 [facility]  
   
                                                    RBC morphology finding   
Nom (Bld)                               No significant RBC   
morphology present                                          [facility]  
   
                                                    Segmented neutrophils   
(Bld) [#/Vol]   8.90 10*3/uL    High                            [facility]  
   
                                                    Segmented   
neutrophils/100 WBC   
(Bld)               84.0 %                                  40.0 - 80.0   
%                                       [facility]  
   
                                        Comment on above:   Percent differential  
 counts (%) should be interpreted in the   
context of the absolute cell counts (cells/uL).   
   
                                                                  [facility]  
   
                                                    No Panel Informationon    
   
                                                    Interpretation and   
review of laboratory   
results         Normal                                          [facility]  
   
                                                    Triacylglycerol lipaseon    
   
                                                    Lipase [Catalytic   
activity/Vol]   27 U/L          Normal          9-82            [facility]  
   
                                        Comment on above:   Order Comment: Venip  
uncture immediately after or during the   
administration of Metamizole may lead to falsely low results.   
Testing should be performed immediately prior to Metamizole   
dosing.   
   
                                                            Performed By: #### 2  
4323-8 ####  
HOSKINS RAVI ([id number])  
[facility] ([facility])  
[address] [place] [address]   
   
                                                    CALPROTECTIN, FECALon 2024   
   
                      CALPROTECTIN, FECAL 51         Normal                [facility]  
   
                                        Comment on above:   Result Comment: Refe  
rence range: 0 to 120  
Unit: ug/g  
(NOTE)  
Concentration Interpretation Follow-Up  
< 5 - 50 ug/g Normal None  
>50 -120 ug/g Borderline Re-evaluate in 4-6 weeks  
>120 ug/g Abnormal Repeat as clinically  
indicated  
PERFORMED AT [facility]   
   
                                                            Performed By: #### L  
CALP ####  
Testing performed at [facility]   
   
                                                    CT ABDOMEN PELVIS WITH IV CO  
NTRAST ONLYon 02-   
   
                                                    CT ABDOMEN PELVIS WITH   
IV CONTRAST ONLY                        EXAMINATION:  
CT ABDOMEN PELVIS WITH   
IV CONTRAST ONLY  
HISTORY:  
ORDERING SYSTEM   
PROVIDED HISTORY: n/v/d   
unable to keep anything   
down,  
recent diagnosis of   
colitis,  
TECHNOLOGIST PROVIDED   
HISTORY:  
Illness/Other  
Reason for exam:   
nausea/vomiting x 1   
week, pt states no   
injury/chance of  
pregnancy  
Encounter Type: Initial  
Additional signs and   
symptoms: .  
ORDERING SYSTEM   
PROVIDED DIAGNOSIS   
CODES:  
COMPARISON:  
CT abdomen and pelvis   
with contrast,   
02/10/2024.  
TECHNIQUE:  
Dose reduction   
techniques were   
achieved by using   
automated exposure  
control and/or   
adjustment of mA and/or   
kV according to patient   
size and/or  
use of iterative   
reconstruction   
technique.  
Postcontrast axial CT   
images obtained through   
the abdomen and pelvis.  
Reconstructions   
obtained in the   
sagittal and coronal   
planes.  
CONTRAST:  
IOPAMIDOL 370 MG   
IODINE/ML (76 %)   
INTRAVENOUS SOLUTION -   
75 mL,  
FINDINGS:  
Lung bases are clear.   
No pleural effusion.   
Heart size normal.   
Liver  
normal. Gallbladder   
normal. Common bile   
duct normal in size.   
Pancreas  
normal. Spleen normal.   
Adrenal glands normal.   
Kidneys normal. Stomach  
normal. Duodenum   
normal. No bowel   
obstruction.   
Appendectomy. No  
abnormal thickening or   
inflammation of the   
colon. Abdominal aorta   
is  
normal in size.   
Inferior vena cava   
normal in size. No   
ascites. No  
lymphadenopathy. No   
free air.  
In the pelvis, the   
bladder is normal.   
Uterus normal. Ovaries   
appear  
normal. Rectum normal.   
There is a small amount   
of free fluid in the  
pelvis that has   
increased from the   
prior.  
Osseous structures are   
unremarkable.  
IMPRESSION:  
1. Previously described   
diffuse colitis is no   
longer identified. The  
colon appears normal.  
2. Appendectomy.  
3. Small amount of free   
fluid in the pelvis is   
nonspecific. There is a  
greater amount of free   
fluid in the pelvis   
than on the prior.  
4. No small bowel   
obstruction. There is   
no bowel wall   
thickening.  
[facility]  
Workstation ID: [id number]  
Dictated by: ROSE CHANDRA on Thu Feb 15,   
2024 8:59:26 AM EST  
Transcribed by: JAS CESPEDES on Thu Feb 15,   
2024 9:09:40 AM EST  
Finalized by: ROSE CHANDRA on Thu Feb 15,   
2024 4:08:13 PM EST Normal                                  [facility]  
   
                                        Comment on above:   Order Comment: Injur  
y/Trauma or Illness?:Illness/Other  
How long have you had these symptoms (acute/chronic)?:Acute  
Reason for exam?:nausea/vomiting x 1 week, pt states no   
injury/chance of pregnancy  
Type of Exam?:Initial  
Additional signs and symptoms?:.   
   
                                                    ED Provider Noteson 02-15-20  
24   
   
                                                    Transcription   
Authentication   
Interface Message Text                  -----------------------  
------- HISTORY OF   
PRESENT ILLNESS  
-----------------------  
---  
2024, 10:37 PM.  
The history is provided   
by the Patient. Danna Soares is a 23   
year  
old female presenting   
to the ED for seizures.   
Pt states she has had   
seizures  
for the past 4 years   
but has never been   
evaluated for them.   
Reports they have  
been more frequent   
these past few days,   
with associated nausea,   
left eye pain,  
and neck pain. Family   
mentions pt's symptoms   
often resolve after she   
eats,  
noting they suspect she   
may be hypoglycemic. Pt   
requesting more nausea   
and pain  
medications. Pt   
declining further   
treatment at this time   
due to wanting to go  
back home to [place].   
Pt mentions she has an   
appointment with   
neurology  
tomorrow.  
-----------------------  
------------ REVIEW OF   
SYSTEMS  
-----------------------  
--------------  
Review of Systems  
Eyes: Positive for   
pain.  
Gastrointestinal:   
Positive for nausea.  
Musculoskeletal:   
Positive for neck pain.  
Neurological: Positive   
for seizures.  
-----------------------  
---------------- PAST   
HISTORY  
-----------------------  
-------------------  
Past Medical History:  
Past Medical History:  
Diagnosis Date  
Other  infants,   
unspecified (weight)  
Past Surgical History:  
Past Surgical History:  
Procedure Laterality   
Date  
NO PAST SURGICAL   
HISTORY  
Social History: Tobacco   
Use: denies  
Alcohol Use: denies  
Drug Use: denies  
Family History:  
Family History  
Problem Relation Age of   
Onset  
Good health Unknown  
The patient's home   
medications have been   
reviewed.  
Allergies: Strawberry   
and Other (review   
comments!)  
-----------------------  
-------------- PHYSICAL   
EXAM  
-----------------------  
------------------  
Vitals Recorded in This   
Encounter  
2024  
1827  
BP: 114/78  
Pulse: 84  
Resp: 16  
Temp: 98.4 ???F (36.9   
???C)  
Temp src: Oral  
SpO2: 99 %  
Pain Score: 0  
Constitutional: Well   
developed, well   
nourished. Awake AND   
alert. No distress.  
Eyes: PERRL. EOMI.   
Conjunctivae are not   
pale. No scleral   
icterus.  
ENT: Mucous membranes   
are moist.  
Neck: Supple. B/l neck   
tenderness.  
Cardiovascular: Regular   
rate. Regular rhythm.   
No murmurs, rubs, or   
gallops.  
Distal pulses are equal   
and 2+.  
Pulmonary/Chest: No   
evidence of respiratory   
distress. Clear to   
auscultation  
bilaterally. No   
wheezing, rales or   
rhonchi.  
Abdominal: Soft and   
non-distended. There is   
no tenderness. No   
rebound,  
guarding, or rigidity.  
Musculoskeletal: Full   
range of motion in all   
extremities. No edema.   
No calf  
tenderness.  
Skin: Skin is warm and   
dry. No rashes.  
Neurological: Alert,   
awake, and appropriate.   
Normal speech. Normal   
sensation.  
Normal gait.  
Psychiatric: Good eye   
contact. Appropriate in   
content/context. Normal   
affect.  
-----------------------  
----------- LABORATORY   
RESULTS  
-----------------------  
------------  
Results for orders   
placed or performed   
during the hospital   
encounter of 24  
COMPLETE BLOOD COUNT   
W/DIFF  
Narrative  
The following orders   
were created for panel   
order COMPLETE BLOOD   
COUNT W/DIFF.  
Procedure Abnormality   
Status  
--------- -----------   
------  
CBC WITH   
DIFFERENTIAL[[id number]]   
Abnormal Final result  
Please view results for   
these tests on the   
individual orders.  
BASIC METABOLIC PANEL  
Result Value Ref Range  
Glucose 62 (L) 74 - 109   
mg/dL  
Sodium 135 (L) 136 -   
145 mmol/L  
Potassium 3.8 3.5 - 5.0   
mmol/L  
Carbon Dioxide 19 (L)   
21 - 31 mmol/L  
Chloride 100 98 - 107   
mmol/L  
Blood Urea Nitrogen 6   
(L) 7 - 25 mg/dL  
Creatinine 0.57 (L)   
0.60 - 1.20 mg/dL  
Calcium 9.3 8.6 - 10.3   
mg/dL  
Anion Gap 20 10 - 20  
Estimated GFR (CKD-EPI)   
132 >=60 mL/min/1.73sqm  
CBC WITH DIFFERENTIAL  
Result Value Ref Range  
WBC 7.7 4.5 - 11.5 K/uL  
RBC 4.39 4.00 - 5.20   
M/uL  
Hemoglobin 13.2 12.0 -   
15.0 g/dL  
Hematocrit 39.5 36.0 -   
46.0 %  
MCV 90 80 - 100 fL  
MCH 30.0 26.0 - 34.0 pg  
MCHC 33.3 32.0 - 35.9   
g/dL  
Platelet 251 150 - 400   
K/uL  
RDW-CV 13.4 11.5 - 14.5   
%  
MPV 12.2 (H) 7.5 - 11.2   
fL  
Neutrophils 68.0 31.0 -   
76.0 %  
Neutrophil # 5.21 1.50   
- 8.00 K/uL  
Lymphocytes 20.5 (L)   
24.0 - 44.0 %  
Lymphocytes # 1.57 1.00   
- 4.80 K/uL  
Monocytes 9.8 2.0 -   
11.0 %  
Monocyte # 0.75 0.20 -   
1.00 K/uL  
Eosinophil 0.6 0.1 -   
4.0 %  
Eosinophil # 0.05 0.00   
- 0.70 K/uL  
Basophils 1.1 <=1.9 %  
Basophil # 0.09 0.00 -   
0.20 K/uL  
MDW 19 <=20  
-----------------------  
------------------- ED   
COURSE  
-----------------------  
----------------------  
HIPAA: Verbal   
permission granted from   
patient to discuss case   
, including  
protected health   
information, in front   
of family / friends in   
room at the time  
of the evaluation.  
ED Medications:  
Medications  
ondansetron (ZOFRAN) 4   
MG/2ML injection (has   
no administration in   
time range)  
ketorolac (TORADOL) 15   
MG/ML injection (has no   
administration in time   
range)  
ondansetron (ZOFRAN) 4   
MG/2ML injection (4 mg   
Intravenous Push Given   
24)  
10:54 PM. Shared   
decision making:   
Patient is appropriate   
for discharge. They  
were given follow up   
instructions and return   
precautions, (more   
content not   
included)...        Normal                                  [facility]   
[facility]  
   
                                                    BASIC METABOLIC PANELon    
   
                      Anion gap [Moles/Vol] 20 mmol/L  Normal     10-20      The  
   
[facility]  
   
                                        Comment on above:   Performed By: #### C  
H8 ####  
[facility]  
[address] [place] [address], [phone]   
   
                      Calcium [Mass/Vol] 9.3 mg/dL  Normal     8.6-10.3   The   
[facility]   
System  
   
                                        Comment on above:   Result Comment: Note  
 updated reference ranges.   
   
                                                            Performed By: #### C  
H8 ####  
[facility]  
 [place], [place],    
   
                      Chloride [Moles/Vol] 100 mmol/L Normal          The   
[facility]   
System  
   
                                        Comment on above:   Result Comment: Note  
 updated reference ranges.   
   
                                                            Performed By: #### C  
H8 ####  
[facility]  
 [place], [place],    
   
                      CO2 [Moles/Vol] 19 mmol/L  Low        21-31      The   
[facility]   
System  
   
                                        Comment on above:   Result Comment: Note  
 updated reference ranges.   
   
                                                            Performed By: #### C  
H8 ####  
[facility]  
 [place], [place],    
   
                      Creatinine [Mass/Vol] 0.57 mg/dL Low        0.60-1.20  The  
   
[facility]   
System  
   
                                        Comment on above:   Result Comment: Note  
 updated reference ranges.   
   
                                                            Performed By: #### C  
H8 ####  
[facility]  
 [place], [place],    
   
                      ESTIMATED GFR (CKD-EPI) 132 mL/min/1.73sqm Normal     >=60  
       The   
[facility]   
[facility]  
   
                                        Comment on above:   Result Comment:   
 CKD EPI Equation using Creatinine without  
   
Race  
Comment: Estimated glomerular filtration rate (eGFR) is   
calculated without a race coefficient. Values should be   
interpreted in the context of the patient's full clinical   
presentation.  
Reference:  
1. Nicolás C, Amado M, Geoffrey HODGES, et al.. A Unifying Approach   
for GFR Estimation: Recommendations of the NKF-ASN Task Force   
on Reassessing the Inclusion of Race in Diagnosing Kidney   
Disease. American Journal of Kidney Diseases   
202;79(2):268-88.e1.  
2. N Engl J Med 1 Vol. 385 Issue 19 Pages 1154-1169   
   
                                                            Performed By: #### C  
H8 ####  
[facility]  
 [place], [place],    
   
                      Glucose [Mass/Vol] 62 mg/dL   Low             The   
[facility]   
System  
   
                                        Comment on above:   Performed By: #### C  
H8 ####  
[facility]  
 [place], [place],    
   
                      Potassium [Moles/Vol] 3.8 mmol/L Normal     3.5-5.0    The  
   
[facility]   
System  
   
                                        Comment on above:   Result Comment: Note  
 updated reference ranges.  
Note updated reference ranges.   
   
                                                            Performed By: #### C  
H8 ####  
[facility]  
[address] [place] [address],    
   
                      Sodium [Moles/Vol] 135 mmol/L Low        136-145    The   
[facility]   
System  
   
                                        Comment on above:   Result Comment: Note  
 updated reference ranges.   
   
                                                            Performed By: #### C  
H8 ####  
[facility]  
[address] [place] [address],    
   
                                                    Urea nitrogen   
[Mass/Vol]      6 mg/dL         Low             7-25            The   
[facility]   
System  
   
                                        Comment on above:   Result Comment: Note  
 updated reference ranges.   
   
                                                            Performed By: #### C  
H8 ####  
[facility]  
[address] [place] [address],    
   
                                                    Bacteria identifiedon 2024   
   
                                                    Bacteria identified Cx   
Nom (U)                                 Test: Urine Culture  
Specimen Source: Clean   
Catch/Voided  
Specimen Type: Urine  
Specimen Date:   
2024 3:23 AM  
Result Date: 2/15/2024   
8:47 AM  
Result Status: Final   
result  
Abnormal: No  
Resulting Lab: [facility]   
LAB  
[address] [address]  
Tel: [phone]  
  
CULTURE  
------------------  
No significant growth Normal                                  [facility]  
   
                                        Comment on above:   Performed By: #### 2  
4323-8 ####  
HOSKINS RAVI ([id number])  
[facility] ([facility])  
[address] [place] [address]   
   
                                                    Basic metabolic 2000 panelon  
 2024   
   
                      Anion gap [Moles/Vol] 20 mmol/L             10 - 20    [facility]  
   
                          Calcium [Mass/Vol] 9.3 mg/dL                 8.6 - 10.  
3   
mg/dL                                   [facility]  
   
                                        Comment on above:   Note updated referen  
ce ranges.   
   
                          Chloride [Moles/Vol] 100 mmol/L                98 - 10  
7   
mmol/L                                  [facility]  
   
                                        Comment on above:   Note updated referen  
ce ranges.   
   
                          CO2 [Moles/Vol] 19 mmol/L    Low          21 - 31   
mmol/L                                  [facility]  
   
                                        Comment on above:   Note updated referen  
ce ranges.   
   
                          Creatinine [Mass/Vol] 0.57 mg/dL   Low          0.60 -  
 1.20   
mg/dL                                   [facility]  
   
                                        Comment on above:   Note updated referen  
ce ranges.   
   
                                                    GFR/1.73 sq M.predicted   
CKD-EPI (S/P/Bld) [Vol   
rate/Area]      132                             - PINF          [facility]  
   
                                        Comment on above:    CKD EPI Equatio  
n using Creatinine without Race  
Comment: Estimated glomerular filtration rate (eGFR) is   
calculated without a race coefficient. Values should be   
interpreted in the context of the patient's full clinical   
presentation.  
Reference:  
1. Nicolás C, Amado M, Geoffrey DC, et al.. A Unifying Approach   
for GFR Estimation: Recommendations of the NKF-ASN Task Force   
on Reassessing the Inclusion of Race in Diagnosing Kidney   
Disease. American Journal of Kidney Diseases   
;79(2):268-88.e1.  
2. N Engl J Med  Vol. 385 Issue 19 Pages 1732-0728  
  
   
   
                          Glucose [Mass/Vol] 62 mg/dL     Low          74 - 109   
mg/dL                                   [facility]  
   
                                                    Interpretation and   
review of laboratory   
results         Abnormal                                        [facility]  
   
                          Potassium [Moles/Vol] 3.8 mmol/L                3.5 -   
5.0   
mmol/L                                  [facility]  
   
                                        Comment on above:   Note updated referen  
ce ranges.  
Note updated reference ranges.  
   
   
                          Sodium [Moles/Vol] 135 mmol/L   Low          136 - 145  
   
mmol/L                                  [facility]  
   
                                        Comment on above:   Note updated referen  
ce ranges.   
   
                                                    Urea nitrogen   
[Mass/Vol]      6 mg/dL         Low             7 - 25 mg/dL    [facility]  
   
                                        Comment on above:   Note updated referen  
ce ranges.   
   
                                                                  [facility]  
   
                                                    CBC W Auto Differential pane  
l (Bld)on 2024   
   
                      Basophils (Bld) [#/Vol] 0.09 x10*3/uL Normal     0.00-0.10  
  [facility]  
   
                                        Comment on above:   Performed By: #### 5  
7021-8 ####  
AIDEE RODRIGUES ([id number])  
[facility] ([facility])  
[address] [place] [address]   
   
                      Basophils/100 WBC (Bld) 1.2 %      Normal     0.0-2.0    [facility]  
[place]  
   
                                        Comment on above:   Performed By: #### 5  
[id number] ####  
AIDEE RODRIGUES ([id number])  
[facility] ([facility])  
[address] [place] [address]   
   
                                                    Eosinophils (Bld)   
[#/Vol]         0.20 x10*3/uL   Normal          0.00-0.70       [facility]  
   
                                        Comment on above:   Performed By: #### 5  
7021-8 ####  
AIDEE RODRIGUES ([id number])  
[facility] ([facility])  
[address] [place] [address]   
   
                                                    Eosinophils/100 WBC   
(Bld)           2.7 %           Normal          0.0-6.0         [facility]  
   
                                        Comment on above:   Performed By: ####   
7021-8 ####  
AIDEE RODRIGUES ([id number])  
[facility] ([facility])  
[address] [place] [address]   
   
                                                    Erythrocyte   
distribution width   
(RBC) [Ratio]   12.4 %          Normal          11.5-14.5       [facility]  
   
                                        Comment on above:   Performed By: #### 5  
7021-8 ####  
AIDEE RODRIGUES ([id number])  
[facility] ([facility])  
[address] [place] [address]   
   
                                                    Hematocrit (Bld)   
[Volume fraction] 39.4 %          Normal          36.0-46.0       [facility]  
   
                                        Comment on above:   Performed By: #### 5  
7021-8 ####  
AIDEE RODRIGUES ([id number])  
[facility] ([facility])  
[address] [place] [address]   
   
                                                    Hemoglobin (Bld)   
[Mass/Vol]      13.5 g/dL       Normal          12.0-16.0       [facility]  
   
                                        Comment on above:   Performed By: #### 5  
7021-8 ####  
AIDEE RODRIGUES ([id number])  
[facility] ([facility])  
[address] [place] [address]   
   
                                                    Immature granulocytes   
(Bld) [#/Vol]   0.04 x10*3/uL   Normal          0.00-0.70       [facility]  
   
                                        Comment on above:   Performed By: #### 5  
7021-8 ####  
AIDEE RODRIGUES ([id number])  
[facility] ([facility])  
[address] [place] [address]   
   
                                                    Immature   
granulocytes/100 WBC   
(Bld)           0.5 %           Normal          0.0-0.9         [facility]  
   
                                        Comment on above:   Result Comment: Jossie  
ture Granulocyte Count (IG) includes   
promyelocytes, myelocytes and metamyelocytes but does not   
include bands. Percent differential counts (%) should be   
interpreted in the context of the absolute cell counts   
(cells/UL).   
   
                                                            Performed By: #### 5  
7021-8 ####  
AIDEE RODRIGUES ([id number])  
[facility] ([facility])  
[address] [place] [address]   
   
                                                    Lymphocytes (Bld)   
[#/Vol]         1.85 x10*3/uL   Normal          1.20-4.80       [facility]  
   
                                        Comment on above:   Performed By: #### 5  
7021-8 ####  
AIDEE RODRIGUES ([id number])  
[facility] ([facility])  
[address] [place] [address]   
   
                                                    Lymphocytes/100 WBC   
(Bld)           24.9 %          Normal          13.0-44.0       [facility]  
   
                                        Comment on above:   Performed By: #### 5  
7021-8 ####  
AIDEE RODRIGUES ([id number])  
[facility] ([facility])  
[address] [place] [address]   
   
                                                    MCH (RBC) [Entitic   
mass]           29.8 pg         Normal          26.0-34.0       [facility]  
   
                                        Comment on above:   Performed By: #### 5  
7021-8 ####  
AIDEE RODRIGUES ([id number])  
[facility] ([facility])  
[address] [place] [address]   
   
                      MCHC (RBC) [Mass/Vol] 34.3 g/dL  Normal     32.0-36.0  [facility]  
   
                                        Comment on above:   Performed By: #### 5  
7021-8 ####  
AIDEE RODRIGUES ([id number])  
[facility] ([facility])  
[address] [place] [address]   
   
                      MCV (RBC) [Entitic vol] 87 fL      Normal          U  
[place]  
   
                                        Comment on above:   Performed By: #### 5  
7021-8 ####  
AIDEE RODRIGUES (82379)  
[facility] ([facility])  
[address] [place] [address]   
   
                      Monocytes (Bld) [#/Vol] 0.73 x10*3/uL Normal     0.10-1.00  
  [facility]  
   
                                        Comment on above:   Performed By: #### 5  
7021-8 ####  
AIDEE RODRIGUES (92308)  
[facility] ([facility])  
[address] [place] [address]   
   
                      Monocytes/100 WBC (Bld) 9.8 %      Normal     2.0-10.0   [facility]  
   
                                        Comment on above:   Performed By: #### 5  
7021-8 ####  
AIDEE RODRIGUES (86053)  
[facility] ([facility])  
[address] [place] [address]   
   
                                                    Neutrophils (Bld)   
[#/Vol]         4.52 x10*3/uL   Normal          1.20-7.70       [facility]  
   
                                        Comment on above:   Result Comment: Perc  
ent differential counts (%) should be   
interpreted in the context of the absolute cell counts   
(cells/uL).   
   
                                                            Performed By: #### 5  
7021-8 ####  
AIDEE RODRIGUES (47669)  
[facility] ([facility])  
[address] [place] [address]   
   
                                                    Neutrophils/100 WBC   
(Bld)           60.9 %          Normal          40.0-80.0       [facility]  
   
                                        Comment on above:   Performed By: #### 5  
7021-8 ####  
AIDEE RODRIGUES (64939)  
[facility] ([facility])  
[address] [place] [address]   
   
                                                    Nucleated RBC/100 WBC   
(Bld) [Ratio]   0.0 /100 WBCs   Normal          0.0-0.0         [facility]  
   
                                        Comment on above:   Performed By: #### 5  
7021-8 ####  
AIDEE RODRIGUES (05834)  
[facility] ([facility])  
[address] [place] [address]   
   
                      Platelets (Bld) [#/Vol] 276 x10*3/uL Normal     150-450     
 [facility]  
   
                                        Comment on above:   Performed By: #### 5  
7021-8 ####  
AIDEE RODRIGUES (11580)  
[facility] ([facility])  
[facility] [place] [address]   
   
                      RBC (Bld) [#/Vol] 4.53 x10*6/uL Normal     4.00-5.20  [facility]  
   
                                        Comment on above:   Performed By: #### 5  
7021-8 ####  
AIDEE RODRIGUES (11542)  
[facility] ([facility])  
[address] [place] [address]   
   
                      WBC (Bld) [#/Vol] 7.4 x10*3/uL Normal     4.4-11.3   [facility]  
   
                                        Comment on above:   Performed By: #### 5  
7021-8 ####  
AIDEE RODRIGUES (99227)  
[facility] ([facility])  
[address] [place] [address]   
   
                                                    CBC WITH DIFFERENTIALon    
   
                      Basophils (Bld) [#/Vol] 0.09 10*3/uL Normal     0.00-0.20   
 The   
[facility]   
System  
   
                                        Comment on above:   Performed By: #### C  
BCDSAT ####  
S PATHOLOGY LABORATORY  
[address] [place] [address],    
   
                      Basophils/100 WBC (Bld) 1.1 %      Normal     <=1.9      T  
   
[facility]   
System  
   
                                        Comment on above:   Performed By: #### VADIM  
BCDSAT ####  
[facility] PATH[facility]  
[address] [place] [address],    
   
                                                    Eosinophils (Bld)   
[#/Vol]         0.05 10*3/uL    Normal          0.00-0.70       The   
[facility]   
System  
   
                                        Comment on above:   Performed By: #### C  
BCDSAT ####  
[facility]  
[address], [place],    
   
                                                    Eosinophils/100 WBC   
(Bld)           0.6 %           Normal          0.1-4.0         The   
[facility]   
System  
   
                                        Comment on above:   Performed By: #### C  
BCDSAT ####  
S PATH[facility] LAB[facility]  
[address] [place] [address],    
   
                                                    Erythrocyte   
distribution width   
(RBC) [Ratio]   13.4 %          Normal          11.5-14.5       The   
[facility]   
System  
   
                                        Comment on above:   Performed By: #### C  
BCDSAT ####  
[facility]  
[address] [place] [address],    
   
                                                    Hematocrit (Bld)   
[Volume fraction] 39.5 %          Normal          36.0-46.0       The   
[facility]   
System  
   
                                        Comment on above:   Performed By: #### C  
BCDSAT ####  
[facility] PATHOLOGY LABORATORY  
[address] [place] [address],    
   
                                                    Hemoglobin (Bld)   
[Mass/Vol]      13.2 g/dL       Normal          12.0-15.0       The   
[facility]   
System  
   
                                        Comment on above:   Performed By: #### C  
BCDSAT ####  
[facility] PATHOLOGY LABORATORY  
[address] [place] [address],    
   
                                                    Lymphocytes (Bld)   
[#/Vol]         1.57 10*3/uL    Normal          1.00-4.80       The   
[facility]   
System  
   
                                        Comment on above:   Performed By: #### C  
BCDSAT ####  
[facility] PATHOLOGY LABORATORY  
[address] [place] [address],    
   
                                                    Lymphocytes/100 WBC   
(Bld)           20.5 %          Low             24.0-44.0       The   
[facility]   
System  
   
                                        Comment on above:   Performed By: #### C  
BCDSAT ####  
[facility] PATHOLOGY LABORATORY  
[address] [place] [address],    
   
                                                    MCH (RBC) [Entitic   
mass]           30.0 pg         Normal          26.0-34.0       The   
[facility]   
System  
   
                                        Comment on above:   Performed By: #### C  
BCDSAT ####  
[facility] PATHOLOGY LABORATORY  
[address] [place] [address],    
   
                      MCHC (RBC) [Mass/Vol] 33.3 g/dL  Normal     32.0-35.9  The  
   
[facility]   
System  
   
                                        Comment on above:   Performed By: #### C  
BCDSAT ####  
[facility] PATHOLOGY LABORATORY  
[address] [place] [address],    
   
                      MCV (RBC) [Entitic vol] 90 fL      Normal          T  
he   
[facility]   
System  
   
                                        Comment on above:   Performed By: #### C  
BCDSAT ####  
[facility] PATHOLOGY LAB[facility]ORY  
[address] [place] [address],    
   
                                                    MONOCYTE DISTRIBUTION   
WIDTH           19              Normal          <=20            The   
[facility]   
System  
   
                                        Comment on above:   Performed By: #### C  
BCDSAT ####  
[facility]  
[address], [place],    
   
                      Monocytes (Bld) [#/Vol] 0.75 10*3/uL Normal     0.20-1.00   
 The   
[facility]   
System  
   
                                        Comment on above:   Performed By: #### C  
BCDSAT ####  
[facility] PATHOLOG[facility] LABORATORY  
[address], [place],    
   
                      Monocytes/100 WBC (Bld) 9.8 %      Normal     2.0-11.0   T  
[facility]   
System  
   
                                        Comment on above:   Performed By: #### C  
BCDSAT ####  
[facility] PATH[facility] LAB[facility]  
[address], [place],    
   
                                                    Neutrophils (Bld)   
[#/Vol]         5.21 10*3/uL    Normal          1.50-8.00       The   
[facility]   
System  
   
                                        Comment on above:   Performed By: #### C  
BCDSAT ####  
[facility] PATH[facility] LAB[facility]  
[address], [place],    
   
                                                    Neutrophils/100 WBC   
(Bld)           68.0 %          Normal          31.0-76.0       The   
[facility]   
System  
   
                                        Comment on above:   Performed By: #### C  
BCDSAT ####  
[facility] PATH[facility] LAB[facility]  
[address], [place],    
   
                                                    Platelet mean volume   
(Bld) [Entitic vol] 12.2 fL         High            7.5-11.2        The   
[facility]   
System  
   
                                        Comment on above:   Performed By: #### C  
BCDSAT ####  
[facility] PATHOLOGY LABOR[facility]ORY  
 [place], [place],    
   
                      Platelets (Bld) [#/Vol] 251 10*3/uL Normal     150-400      
The   
[facility]   
System  
   
                                        Comment on above:   Performed By: #### C  
BCDSAT ####  
[facility] PATHOLOGY LAB[facility]  
 [place], [place],    
   
                      RBC (Bld) [#/Vol] 4.39 10*6/uL Normal     4.00-5.20  The   
[facility]   
System  
   
                                        Comment on above:   Performed By: #### C  
BCDSAT ####  
[facility] PATH[facility] LAB[facility]  
 [place], [place],    
   
                      WBC (Bld) [#/Vol] 7.7 10*3/uL Normal     4.5-11.5   The   
[facility]   
System  
   
                                        Comment on above:   Performed By: #### C  
BCDSAT ####  
[facility]  
[address] [place], [address]   
   
                          Basophils (Bld) [#/Vol] 0.09 10*3/uL              0.00  
 - 0.20   
K/uL                                    [facility]  
   
                      Basophils/100 WBC (Bld) 1.1 %                 NINF - 1.9 %  
 [facility]  
   
                                                    Eosinophils (Bld)   
[#/Vol]             0.05 10*3/uL                            0.00 - 0.70   
K/uL                                    [facility]  
   
                                                    Eosinophils/100 WBC   
(Bld)           0.6 %                           0.1 - 4.0 %     [facility]  
   
                                                    Erythrocyte   
distribution width   
(RBC) [Ratio]       13.4 %                                  11.5 - 14.5   
%                                       [facility]  
   
                                                    Hematocrit (Bld)   
[Volume fraction]   39.5 %                                  36.0 - 46.0   
%                                       [facility]  
   
                                                    Hemoglobin (Bld)   
[Mass/Vol]          13.2 g/dL                               12.0 - 15.0   
g/dL                                    [facility]  
   
                                                    Interpretation and   
review of laboratory   
results         Abnormal                                        [facility]  
   
                                                    Lymphocytes (Bld)   
[#/Vol]             1.57 10*3/uL                            1.00 - 4.80   
K/uL                                    [facility]  
   
                                                    Lymphocytes/100 WBC   
(Bld)               20.5 %              Low                 24.0 - 44.0   
%                                       [facility]  
   
                                                    MCH (RBC) [Entitic   
mass]               30.0 pg                                 26.0 - 34.0   
pg                                      [facility]  
   
                          MCHC (RBC) [Mass/Vol] 33.3 g/dL                 32.0 -  
 35.9   
g/dL                                    [facility]  
   
                      MCV (RBC) [Entitic vol] 90 fL                 80 - 100 fL   
[facility]  
   
                                                    Monocyte distribution   
width Auto (Bld)   
[Entitic vol]   19                              NINF - 20       [facility]  
   
                          Monocytes (Bld) [#/Vol] 0.75 10*3/uL              0.20  
 - 1.00   
K/uL                                    [facility]  
   
                      Monocytes/100 WBC (Bld) 9.8 %                 2.0 - 11.0 %  
 [facility]  
   
                                                    Neutrophils (Bld)   
[#/Vol]             5.21 10*3/uL                            1.50 - 8.00   
K/uL                                    [facility]  
   
                                                    Neutrophils/100 WBC   
(Bld)               68.0 %                                  31.0 - 76.0   
%                                       [facility]  
   
                                                    Platelet mean volume   
(Bld) [Entitic vol] 12.2 fL             High                7.5 - 11.2   
fL                                      [facility]  
   
                          Platelets (Bld) [#/Vol] 251 10*3/uL               150   
- 400   
K/uL                                    [facility]  
   
                      RBC (Bld) [#/Vol] 4.39 10*6/uL                       [facility]  
   
                          WBC (Bld) [#/Vol] 7.7 10*3/uL               4.5 - 11.5  
   
K/uL                                    [facility]  
   
                                                                  [facility]  
   
                                                    CT HEAD WO IV CONTRASTon    
   
                                        CT HEAD WO IV CONTRAST Interpreted By:   
Zhou Simmons,  
STUDY:  
CT HEAD WO IV CONTRAST;   
2024 2:58 am  
  
INDICATION:  
Signs/Symptoms:altered   
loc.  
  
COMPARISON:  
None  
  
ACCESSION NUMBER(S):  
[id number]  
  
ORDERING CLINICIAN:  
CHRISTIANO SANTOS  
  
TECHNIQUE:  
Contiguous axial images   
of the head were   
obtained without   
intravenous  
contrast.  
  
FINDINGS:  
BRAIN PARENCHYMA: The   
gray white matter   
differentiation is  
preserved. No mass   
effect or midline   
shift.  
  
HEMORRHAGE: No evidence   
of acute intracranial   
hemorrhage.  
VENTRICLES AND   
EXTRA-AXIAL SPACES: The   
ventricles are within   
normal  
limits in size for   
brain volume. No   
evidence of abnormal   
extraaxial  
fluid collection.   
EXTRACRANIAL SOFT   
TISSUES: Within normal   
limits.  
PARANASAL   
SINUSES/MASTOIDS: The   
visualized paranasal   
sinuses and  
mastoid air cells are   
clear and well   
pneumatized. CALVARIUM:   
No  
evidence of depressed   
calvarial fracture.  
  
OTHER FINDINGS: None  
  
IMPRESSION:  
No evidence of acute   
intracranial hemorrhage   
or mass effect.  
  
  
  
MACRO:  
None  
  
Signed by: Zhou Simmons   
2024 3:03 AM  
Dictation workstation:   
[id number]        Normal                                  [facility]  
   
                                                    Comprehensive metabolic 2000  
 panelon 2024   
   
                                                    Albumin BCP dye   
[Mass/Vol]      4.5 g/dL        Normal          3.4-5.0         [facility]  
   
                                        Comment on above:   Performed By: #### 5  
7021-8 ####  
AIDEE RODRIGUES ([id number])  
[facility] ([facility])  
[address] [place] [address]   
   
                                                    ALP [Catalytic   
activity/Vol]   39 U/L          Normal                    [facility]  
   
                                        Comment on above:   Performed By: #### 5  
7021-8 ####  
AIDEE RODRIGUES (39707)  
[facility] ([facility])  
[address] [place] [address]   
   
                                                    ALT With P-5'-P   
[Catalytic   
activity/Vol]   9 U/L           Normal          7-45            [facility]  
   
                                        Comment on above:   Result Comment: Kaleigh  
ents treated with Sulfasalazine may   
generate falsely decreased results for ALT.   
   
                                                            Performed By: #### 5  
7021-8 ####  
AIDEE RODRIGUES (47858)  
[facility] ([facility])  
[address] [place] [address]   
   
                      Anion gap [Moles/Vol] 17 mmol/L  Normal     10-20      [facility]  
   
                                        Comment on above:   Performed By: #### 5  
7021-8 ####  
AIDEE RODRIGUES (52799)  
[facility] ([facility])  
[facility] [place] [address]   
   
                                                    AST With P-5'-P   
[Catalytic   
activity/Vol]   11 U/L          Normal          9-39            [facility]  
   
                                        Comment on above:   Performed By: #### 5  
7021-8 ####  
AIDEE RODRIGUES (40388)  
[facility] ([facility])  
[address] [place] [address]   
   
                      Bilirubin [Mass/Vol] 0.7 mg/dL  Normal     0.0-1.2    [facility]  
   
                                        Comment on above:   Performed By: #### 5  
7021-8 ####  
AIDEE RODRIGUES (76628)  
[facility] ([facility])  
[address] [place] [address]   
   
                      Calcium [Mass/Vol] 8.9 mg/dL  Normal     8.6-10.3   [facility]  
   
                                        Comment on above:   Performed By: #### 5  
7021-8 ####  
AIDEE RODRIGUES (98225)  
[facility] ([facility])  
[address] [place] [address]   
   
                      Chloride [Moles/Vol] 99 mmol/L  Normal          [facility]  
   
                                        Comment on above:   Performed By: #### 5  
7021-8 ####  
AIDEE RODRIGUES (86411)  
[facility] ([facility])  
[address] [place] [address]   
   
                      CO2 [Moles/Vol] 21 mmol/L  Normal     21-32      [facility]  
   
                                        Comment on above:   Performed By: #### 5  
7021-8 ####  
AIDEE RODRIGUES (65696)  
[facility] ([facility])  
[address] [place] [address]   
   
                      Creatinine [Mass/Vol] 0.59 mg/dL Normal     0.50-1.05  [facility]  
   
                                        Comment on above:   Performed By: #### 5  
7021-8 ####  
AIDEE RODRIGUES (78049)  
[facility] ([facility])  
[address] [place] [address]   
   
                                                    GFR/1.73 sq M.predicted   
MDRD (S/P/Bld) [Vol   
rate/Area]      mL/min/{1.73_m2} Normal          >60             [facility]  
   
                                        Comment on above:   Result Comment: Calc  
ulations of estimated GFR are performed   
using the  CKD-EPI Study Refit equation without the race   
variable for the IDMS-Traceable creatinine methods.  
https://jasn.asnjournals.org/content/early//ASN.  
747604   
   
                                                            Performed By: #### 5  
7021-8 ####  
AIDEE RODRIGUES (28384)  
[facility] ([facility])  
[address] [place] [address]   
   
                      Glucose [Mass/Vol] 84 mg/dL   Normal     74-99      [facility]  
   
                                        Comment on above:   Performed By: #### 5  
7021-8 ####  
AIDEE RODRIGUES (56830)  
[facility] ([facility])  
[address] [place] [address]   
   
                      Potassium [Moles/Vol] 3.2 mmol/L Low        3.5-5.3    [facility]  
   
                                        Comment on above:   Performed By: #### 5  
7021-8 ####  
AIDEE RODRIGUES (40775)  
[facility] ([facility])  
[address] [place] [address]   
   
                      Protein [Mass/Vol] 6.6 g/dL   Normal     6.4-8.2    [facility]  
   
                                        Comment on above:   Performed By: #### 5  
7021-8 ####  
AIDEE RODRIGUES (40158)  
[facility] ([facility])  
[address] [place] [address]   
   
                      Sodium [Moles/Vol] 134 mmol/L Low        136-145    [facility]  
   
                                        Comment on above:   Performed By: #### 5  
7021-8 ####  
AIDEE RODRIGUES (68523)  
[facility] ([facility])  
[facility] [place] [address]   
   
                                                    Urea nitrogen   
[Mass/Vol]      7 mg/dL         Normal          6-23            [facility]  
   
                                        Comment on above:   Performed By: #### 5  
7021-8 ####  
AIDEE RODRIGUES (06446)  
[facility] ([facility])  
[address] [place], [place] [address]   
   
                                                    DRUG SCREEN,URINEon 20  
24   
   
                                                    Amphetamines Screen Ql   
(U)                 Negative            Normal              Presumptive   
Negative                                [facility]  
   
                                        Comment on above:   Order Comment: Drug   
screen results are presumptive and should   
not be used to assesscompliance with prescribed medication.   
Contact the performing [facility] laboratoryto add-on definitive   
confirmatory testing if clinically indicated.Toxicology   
screening results are reported qualitatively. The   
concentration must???be greater than or equal to the cutoff to   
be reported as positive. The concentrationat which the   
screening test can detect an individual drug or metabolite   
varies.The absence of expected drug(s) and/or drug   
metabolite(s) may indicate non-compliance,inappropriate timing   
of specimen collection relative to drug administration, poor   
drugabsorption, diluted/adulterated urine, or limitations of   
testing. For medical purposesonly; not valid for forensic   
use.Interpretive questions should be directed to the   
laboratory medical directors.   
   
                                                            Result Comment: CUTO  
FF LEVEL: 500 NG/ML  
Cross-reactivity has been reported with high concentrations  
of the following drugs: buproprion, chloroquine,   
chlorpromazine,  
ephedrine, mephentermine, fenfluramine, phentermine,  
phenylpropanolamine, pseudoephedrine, and propranolol.   
   
                                                            Performed By: #### 5  
7021-8 ####  
AIDEE RODRIGUES ([id number])  
[facility] ([facility])  
[address] [place], [place] [id number]   
   
                                                    Barbiturates Screen Ql   
(U)                 Negative            Normal              Presumptive   
Negative                                [facility]  
   
                                        Comment on above:   Order Comment: Drug   
screen results are presumptive and should   
not be used to assesscompliance with prescribed medication.   
Contact the performing [facility] laboratoryto add-on definitive   
confirmatory testing if clinically indicated.Toxicology   
screening results are reported qualitatively. The   
concentration must???be greater than or equal to the cutoff to   
be reported as positive. The concentrationat which the   
screening test can detect an individual drug or metabolite   
varies.The absence of expected drug(s) and/or drug   
metabolite(s) may indicate non-compliance,inappropriate timing   
of specimen collection relative to drug administration, poor   
drugabsorption, diluted/adulterated urine, or limitations of   
testing. For medical purposesonly; not valid for forensic   
use.Interpretive questions should be directed to the   
laboratory medical directors.   
   
                                                            Result Comment: CUTO  
FF LEVEL: 200 NG/ML   
   
                                                            Performed By: #### 5  
7021-8 ####  
AIDEE RODRIGUES ([id number])  
[facility] ([facility])  
[address] [place] [address]   
   
                          Benzodiazepines Ql (U) Negative     Normal       Presu  
mptive   
Negative                                [facility]  
   
                                        Comment on above:   Order Comment: Drug   
screen results are presumptive and should   
not be used to assesscompliance with prescribed medication.   
Contact the performing [facility] laboratoryto add-on definitive   
confirmatory testing if clinically indicated.Toxicology   
screening results are reported qualitatively. The   
concentration must???be greater than or equal to the cutoff to   
be reported as positive. The concentrationat which the   
screening test can detect an individual drug or metabolite   
varies.The absence of expected drug(s) and/or drug   
metabolite(s) may indicate non-compliance,inappropriate timing   
of specimen collection relative to drug administration, poor   
drugabsorption, diluted/adulterated urine, or limitations of   
testing. For medical purposesonly; not valid for forensic   
use.Interpretive questions should be directed to the   
laboratory medical directors.   
   
                                                            Result Comment: CUTO  
FF LEVEL: 200 NG/ML   
   
                                                            Performed By: #### 5  
7021-8 ####  
AIDEE RODRIGUES ([id number])  
[facility] ([facility])  
[address] [place] [address]   
   
                                                    Benzoylecgonine Screen   
Ql (U)              Negative            Normal              Presumptive   
Negative                                [facility]  
   
                                        Comment on above:   Order Comment: Drug   
screen results are presumptive and should   
not be used to assesscompliance with prescribed medication.   
Contact the performing [facility] laboratoryto add-on definitive   
confirmatory testing if clinically indicated.Toxicology   
screening results are reported qualitatively. The   
concentration must???be greater than or equal to the cutoff to   
be reported as positive. The concentrationat which the   
screening test can detect an individual drug or metabolite   
varies.The absence of expected drug(s) and/or drug   
metabolite(s) may indicate non-compliance,inappropriate timing   
of specimen collection relative to drug administration, poor   
drugabsorption, diluted/adulterated urine, or limitations of   
testing. For medical purposesonly; not valid for forensic   
use.Interpretive questions should be directed to the   
laboratory medical directors.   
   
                                                            Result Comment: CUTO  
FF LEVEL: 150 NG/ML   
   
                                                            Performed By: #### 5  
7021-8 ####  
AIDEE RODRIGUES ([id number])  
[facility] ([facility])  
[facility] [place] [address]   
   
                                                    Cannabinoids Screen Ql   
(U)                 Positive            Abnormal            Presumptive   
Negative                                [facility]  
   
                                        Comment on above:   Order Comment: Drug   
screen results are presumptive and should   
not be used to assesscompliance with prescribed medication.   
Contact the performing [facility] laboratoryto add-on definitive   
confirmatory testing if clinically indicated.Toxicology   
screening results are reported qualitatively. The   
concentration must???be greater than or equal to the cutoff to   
be reported as positive. The concentrationat which the   
screening test can detect an individual drug or metabolite   
varies.The absence of expected drug(s) and/or drug   
metabolite(s) may indicate non-compliance,inappropriate timing   
of specimen collection relative to drug administration, poor   
drugabsorption, diluted/adulterated urine, or limitations of   
testing. For medical purposesonly; not valid for forensic   
use.Interpretive questions should be directed to the   
laboratory medical directors.   
   
                                                            Result Comment: CUTO  
FF LEVEL: 50 NG/ML   
   
                                                            Performed By: #### 5  
7021-8 ####  
AIDEE RODRIGUES ([id number])  
[facility] ([facility])  
[facility] [place] [address]   
   
                                                    fentaNYL+Norfentanyl   
Screen Ql (U)       Negative            Normal              Presumptive   
Negative                                [facility]  
   
                                        Comment on above:   Order Comment: Drug   
screen results are presumptive and should   
not be used to assesscompliance with prescribed medication.   
Contact the performing [facility] laboratoryto add-on definitive   
confirmatory testing if clinically indicated.Toxicology   
screening results are reported qualitatively. The   
concentration must???be greater than or equal to the cutoff to   
be reported as positive. The concentrationat which the   
screening test can detect an individual drug or metabolite   
varies.The absence of expected drug(s) and/or drug   
metabolite(s) may indicate non-compliance,inappropriate timing   
of specimen collection relative to drug administration, poor   
drugabsorption, diluted/adulterated urine, or limitations of   
testing. For medical purposesonly; not valid for forensic   
use.Interpretive questions should be directed to the   
laboratory medical directors.   
   
                                                            Result Comment: CUTO  
FF LEVEL: 5 NG/ML   
   
                                                            Performed By: #### 5  
7021-8 ####  
AIDEE RODRIGUES ([id number])  
[facility] ([facility])  
[facility] [place] [address]   
   
                          Opiates Screen Ql (U) Negative     Normal       Presum  
ptive   
Negative                                [facility]  
   
                                        Comment on above:   Order Comment: Drug   
screen results are presumptive and should   
not be used to assesscompliance with prescribed medication.   
Contact the performing [facility] laboratoryto add-on definitive   
confirmatory testing if clinically indicated.Toxicology   
screening results are reported qualitatively. The   
concentration must???be greater than or equal to the cutoff to   
be reported as positive. The concentrationat which the   
screening test can detect an individual drug or metabolite   
varies.The absence of expected drug(s) and/or drug   
metabolite(s) may indicate non-compliance,inappropriate timing   
of specimen collection relative to drug administration, poor   
drugabsorption, diluted/adulterated urine, or limitations of   
testing. For medical purposesonly; not valid for forensic   
use.Interpretive questions should be directed to the   
laboratory medical directors.   
   
                                                            Result Comment: CUTO  
FF LEVEL: 300 NG/ML  
The opiate screen does not detect fentanyl, meperidine, or  
tramadol. Oxycodone is not consistently detected (refer to  
Oxycodone Screen, Urine result).   
   
                                                            Performed By: #### 5  
7021-8 ####  
AIDEE RODRIGUES ([id number])  
[facility] ([facility])  
[facility] [place] [address]   
   
                                                    oxyCODONE+oxyMORphone   
Screen Ql (U)       Negative            Normal              Presumptive   
Negative                                [facility]  
   
                                        Comment on above:   Order Comment: Drug   
screen results are presumptive and should   
not be used to assesscompliance with prescribed medication.   
Contact the performing [facility] laboratoryto add-on definitive   
confirmatory testing if clinically indicated.Toxicology   
screening results are reported qualitatively. The   
concentration must???be greater than or equal to the cutoff to   
be reported as positive. The concentrationat which the   
screening test can detect an individual drug or metabolite   
varies.The absence of expected drug(s) and/or drug   
metabolite(s) may indicate non-compliance,inappropriate timing   
of specimen collection relative to drug administration, poor   
drugabsorption, diluted/adulterated urine, or limitations of   
testing. For medical purposesonly; not valid for forensic   
use.Interpretive questions should be directed to the   
laboratory medical directors.   
   
                                                            Result Comment: CUTO  
FF LEVEL: 100 NG/ML  
This test will accurately detect both oxycodone and   
oxymorphone.   
   
                                                            Performed By: #### 5  
7021-8 ####  
AIDEE RODRIGUES ([id number])  
[facility] ([facility])  
[address] [place] [address]   
   
                          Phencyclidine Ql (U) Negative     Normal       Presump  
tive   
Negative                                [facility]  
   
                                        Comment on above:   Order Comment: Drug   
screen results are presumptive and should   
not be used to assesscompliance with prescribed medication.   
Contact the performing [facility] laboratoryto add-on definitive   
confirmatory testing if clinically indicated.Toxicology   
screening results are reported qualitatively. The   
concentration must???be greater than or equal to the cutoff to   
be reported as positive. The concentrationat which the   
screening test can detect an individual drug or metabolite   
varies.The absence of expected drug(s) and/or drug   
metabolite(s) may indicate non-compliance,inappropriate timing   
of specimen collection relative to drug administration, poor   
drugabsorption, diluted/adulterated urine, or limitations of   
testing. For medical purposesonly; not valid for forensic   
use.Interpretive questions should be directed to the   
laboratory medical directors.   
   
                                                            Result Comment: CUTO  
FF LEVEL: 25 NG/ML  
Cross-reactivity has been reported with dextromethorphan.   
   
                                                            Performed By: #### 5  
7021-8 ####  
AIDEE RODRIGUES ([id number])  
[facility] ([facility])  
[address] [place] [id number]   
   
                                                    ECG 12-LEADon 2024   
   
                                        ECG 12-LEAD         Ventricular Rate  
75  
Atrial Rate  
75  
P-R Interval  
116  
QRS Duration  
78  
Q-T Interval  
380  
QTC Calculation(Bazett)  
424  
P Axis  
77  
R Axis  
80  
T Axis  
67  
QRS Count  
13  
Q Onset  
219  
P Onset  
161  
P Offset  
203  
T Offset  
409  
QTC Fredericia  
409  
Diagnosis  
Normal sinus rhythm  
Normal ECG  
When compared with ECG   
of 2024 14:49,  
Vent. rate has   
decreased BY 61 BPM  
See ED provider note   
for full interpretation   
and clinical   
correlation  
Confirmed by Ranjana Eason ([id number]) on   
2024 4:47:21 PM Normal                                  [facility]  
   
                                                    HCG (pregnancy test) IAchagoi  
d Ql (U)on 2024   
   
                                                    HCG (pregnancy test) Ql   
(U)             Negative        Normal          NEGATIVE        [facility]  
   
                                        Comment on above:   Performed By: #### 5  
7021-8 ####  
AIDEE RODRIGUES ([id number])  
[facility] ([facility])  
[address] [place] [address]   
   
                                                    Magnesiumon 2024   
   
                      Magnesium [Mass/Vol] 1.79 mg/dL Normal     1.60-2.40  [facility]  
   
                                        Comment on above:   Performed By: #### 5  
7021-8 ####  
AIDEE RODRIGUES ([id number])  
[facility] ([facility])  
[address] [place] [address]   
   
                                                    Prolactinon 2024   
   
                      Prolactin [Mass/Vol] 9.5 ug/L   Normal     3.0-20.0   [facility]  
   
                                        Comment on above:   Performed By: #### 2  
4323-8 ####  
AIDEE RODRIGUES ([id number])  
[facility] ([facility])  
[address] [place] [address]   
   
                                                    Urinalysis complete W Reflex  
 Culture panel (U)on 2024   
   
                      Appearance (U) Hazy       Normal     Clear      [facility]  
   
                                        Comment on above:   Performed By: #### 5  
7021-8 ####  
AIDEE RODRIGUES ([id number])  
[facility] ([facility])  
[address] [place] [address]   
   
                                                    Bilirubin (U)   
[Mass/Vol]      Negative        Normal          NEGATIVE        [facility]  
   
                                        Comment on above:   Performed By: #### 5  
7021-8 ####  
AIDEE RODRIGUES ([id number])  
[facility] ([facility])  
[address] [place] [address]   
   
                          Color (U)    Yellow       Normal       Straw,   
Yellow                                  [facility]  
   
                                        Comment on above:   Performed By: #### 5  
7021-8 ####  
AIDEE RODRIGUES ([id number])  
[facility] ([facility])  
[address] [place] [address]   
   
                                                    Glucose Auto test strip   
(U) [Mass/Vol]  Negative        Normal          NEGATIVE        [facility]  
   
                                        Comment on above:   Performed By: #### 5  
7021-8 ####  
AIDEE RODRIGUES ([id number])  
[facility] ([facility])  
[address] [place] [address]   
   
                      Ketones (U) [Mass/Vol] 80 (2+)    Abnormal   NEGATIVE   [facility]  
   
                                        Comment on above:   Performed By: #### 5  
7021-8 ####  
AIDEE RODRIGUES ([id number])  
[facility] ([facility])  
[address] [place] [address]   
   
                                                    Leukocyte esterase Auto   
test strip Ql (U) TRACE           Abnormal        NEGATIVE        [facility]  
   
                                        Comment on above:   Performed By: #### 5  
7021-8 ####  
AIDEE RODRIGUES ([id number])  
[facility] ([facility])  
[address] [place] [address]   
   
                                                    Nitrite Auto test strip   
Ql (U)          Negative        Normal          NEGATIVE        [facility]  
   
                                        Comment on above:   Performed By: #### 5  
7021-8 ####  
AIDEE RODRIGUES (20913)  
[facility] ([facility])  
[address] [place] [address]   
   
                          pH (U)       6.0 [pH]     Normal       5.0, 5.5,   
6.0, 6.5,   
7.0, 7.5,   
8.0                                     [facility]  
   
                                        Comment on above:   Performed By: ####   
7021-8 ####  
AIDEE RODRIGUES (34865)  
[facility] ([facility])  
[address] [place] [address]   
   
                      Protein (U) [Mass/Vol] 30 (1+)    Normal     NEGATIVE   [facility]  
   
                                        Comment on above:   Performed By: #### 5  
7021-8 ####  
AIDEE RODRIGUES (60654)  
[facility] ([facility])  
[address] [place] [address]   
   
                      RBC (U) [#/Vol] Negative   Normal     NEGATIVE   [facility]  
   
                                        Comment on above:   Performed By: ####   
7021-8 ####  
AIDEE RODRIGUES ([id number])  
[facility] ([facility])  
[address] [place] [address]   
   
                                                    Specific gravity (U)   
[Rel density]   1.023           Normal          1.005-1.035     [facility]  
   
                                        Comment on above:   Performed By: #### 5  
7021-8 ####  
AIDEE RODRIGUES ([id number])  
[facility] ([facility])  
[address] [place] [address]   
   
                                                    Urobilinogen (U)   
[Mass/Vol]      4.0 mg/dL       Normal          <2.0            [facility]  
   
                                        Comment on above:   Result Comment: Some  
 pigments and medications may cause a   
false positive urobilinogen.   
   
                                                            Performed By: #### 5  
7021-8 ####  
AIDEE RODRIGUES ([id number])  
[facility] ([facility])  
[address] [place] [address]   
   
                                                    Urinalysis microscopic panel  
 Auto Ql (U)on 2024   
   
                                                    Bacteria Auto (Urine   
sed) [#/Area]   1+ /HPF         Abnormal        NONE SEEN       [facility]  
   
                                        Comment on above:   Performed By: #### 5  
7021-8 ####  
AIDEE RODRIGUES (94607)  
[facility] ([facility])  
[address] [place] [address]   
   
                                                    Crystals.amorphous   
Computer assisted (U)   
[#/Area]        1+ /HPF         Normal          NONE, 1+, 2+    [facility]  
   
                                        Comment on above:   Performed By: #### 5  
7021-8 ####  
AIDEE RODRIGUES (93037)  
[facility] ([facility])  
[address] [place] [address]   
   
                                                    Epithelial   
cells.squamous Auto   
(Urine sed) [#/Area] 1-9 (SPARSE)        Normal              Reference   
range not   
established.                            [facility]  
   
                                        Comment on above:   Performed By: #### 5  
7021-8 ####  
AIDEE RODRIGUES (43802)  
[facility] ([facility])  
[address] [place] [address]   
   
                                                    Mucus Auto (Urine sed)   
[#/Area]            3+ /LPF             Normal              Reference   
range not   
established.                            [facility]  
   
                                        Comment on above:   Performed By: #### 5  
7021-8 ####  
AIDEE RODRIGUES (67727)  
[facility] ([facility])  
[address] [place] [address]   
   
                                                    RBC Auto (Urine sed)   
[#/Area]            1-2                 Normal              NONE, 1-2,   
3-5                                     [facility]  
   
                                        Comment on above:   Performed By: #### 5  
7021-8 ####  
AIDEE RODRIGUES ([id number])  
[facility] ([facility])  
[facility] [place] [address]   
   
                                                    WBC Auto (Urine sed)   
[#/Area]        1-5             Normal          1-5, NONE       [facility]  
   
                                        Comment on above:   Performed By: #### 5  
7021-8 ####  
AIDEE RODRIGUES (49790)  
[facility] ([facility])  
[address] [place] [address]   
   
                                                    GI PANELon 2024   
   
                      ADENOVIRUS F 40/41 Not detected Normal     NOT DETECTED [facility]  
   
                                        Comment on above:   Result Comment: Limi  
tations:  
Nucleic acid may persist in vivo independently of organism   
viability.  
Additionally, some organisms may be carried asymptomatically.  
Detection of target organisms does not imply that the  
corresponding organisms are infectious or are the causative   
agent of clinical symptoms.  
Results must be correlated with clinical history,   
epidemiological data and other clinical information available.  
Testing performed at [place] [address]   
   
                                                            Performed By: #### G  
IPAN ####  
[place] [address]  
#### CDDNAT ####  
Testing performed at [place], [place] [address]   
   
                      ASTROVIRUS Not detected Normal     NOT DETECTED [facility]  
   
                                        Comment on above:   Performed By: #### G  
IPAN ####  
[place] [address]  
#### CDDNAT ####  
[place] [address]   
   
                      CAMPYLOBACTER Not detected Normal     NOT DETECTED [facility]  
   
                                        Comment on above:   Performed By: #### G  
IPAN ####  
[place] [address]  
#### CDDNAT ####  
Testing performed at [place] [id number]   
   
                      CRYPTOSPORIDIUM Not detected Normal     NOT DETECTED [facility]  
   
                                        Comment on above:   Performed By: #### G  
IPAN ####  
Testing performed at [address] [place] [address]  
#### CDDNAT ####  
Testing performed at [address] [place] [address]   
   
                      CYCLOSPORA CAYETANENSIS Not detected Normal     NOT DETECT  
ED [facility]  
   
                                        Comment on above:   Performed By: #### G  
IPAN ####  
Testing performed at [address] [place] [address]  
#### CDDNAT ####  
Testing performed at [address] [place] [address]   
   
                      ENTAMOEBA HISTOLYTICA Not detected Normal     NOT DETECTED  
 [facility]  
   
                                        Comment on above:   Performed By: #### G  
IPAN ####  
Testing performed at [address] [place] [address]  
#### CDDNAT ####  
Testing performed at [address] [place] [address]   
   
                                                    ENTEROAGGREGATIVE E   
COLI            Not detected    Normal          NOT DETECTED    [facility]  
   
                                        Comment on above:   Performed By: #### G  
IPAN ####  
Testing performed at [address] [place] [address]  
#### CDDNAT ####  
Testing performed at [address] [place] [address]   
   
                      ENTEROTOXIGENIC E COLI Not detected Normal     NOT DETECTE  
D [facility]  
   
                                        Comment on above:   Performed By: #### G  
IPAN ####  
Testing performed at [address] [place] [address]  
#### CDDNAT ####  
Testing performed at [address] [place] [address]   
   
                      ENTROPATHOGENIC E COLI Not detected Normal     NOT DETECTE  
D [facility]  
   
                                        Comment on above:   Performed By: #### G  
IPAN ####  
Testing performed at [address] [place] [address]  
#### CDDNAT ####  
Testing performed at [address] [place] [address]   
   
                      GIARDIA LAMBLIA Not detected Normal     NOT DETECTED [facility]  
   
                                        Comment on above:   Performed By: #### G  
IPAN ####  
Testing performed at [address] [place] [address]  
#### CDDNAT ####  
Testing performed at [address] [place] [address]   
   
                      NOROVIRUS GI/GII Not detected Normal     NOT DETECTED [facility]  
   
                                        Comment on above:   Performed By: #### G  
IPAN ####  
Testing performed at [address] [place] [address]  
#### CDDNAT ####  
Testing performed at [address] [place] [address]   
   
                                                    PLESIOMONAS   
SHIGELLOIDES    Not detected    Normal          NOT DETECTED    [facility]  
   
                                        Comment on above:   Performed By: #### G  
IPAN ####  
Testing performed at [address] [place] [address]  
#### CDDNAT ####  
Testing performed at [address] [place] [address]   
   
                      ROTAVIRUS A Not detected Normal     NOT DETECTED [facility]  
   
                                        Comment on above:   Performed By: #### G  
IPAN ####  
Testing performed at [address] [place] [address]  
#### CDDNAT ####  
Testing performed at [address] [place] [address]   
   
                      SALMONELLA Not detected Normal     NOT DETECTED [facility]  
   
                                        Comment on above:   Performed By: #### G  
IPAN ####  
Testing performed at [address] [place] [address]  
#### CDDNAT ####  
Testing performed at [address] [place] [address]   
   
                      SAPOVIRUS  Not detected Normal     NOT DETECTED [facility]  
   
                                        Comment on above:   Performed By: #### G  
IPAN ####  
Testing performed at [address] [place] [address]  
#### CDDNAT ####  
Testing performed at [address] [place] [address]   
   
                                                    SHIGA-LIKE   
TOXIN-PRODUCING E COLI Not detected    Normal          NOT DETECTED    [facility]  
   
                                        Comment on above:   Performed By: #### G  
IPAN ####  
Testing performed at [address] [place] [address]  
#### CDDNAT ####  
Testing performed at [address] [place] [address]   
   
                                                    SHIGELLA/ENTEROINVASIVE   
E COLI          Not detected    Normal          NOT DETECTED    [facility]  
   
                                        Comment on above:   Performed By: #### G  
IPAN ####  
Testing performed at [address] [place] [address]  
#### CDDNAT ####  
Testing performed at [address] [address]   
   
                      VIBRIO     Not detected Normal     NOT DETECTED [facility]  
   
                                        Comment on above:   Performed By: #### G  
IPAN ####  
Testing performed at [address] [address]  
#### CDDNAT ####  
[place] [address]   
   
                      VIBRIO CHOLERAE Not detected Normal     NOT DETECTED [facility]  
   
                                        Comment on above:   Performed By: #### G  
IPAN ####  
Testing performed at [address] [address]  
#### CDDNAT ####  
Testing performed at [address] [place] [address]   
   
                      YESINIA ENTEROCOLITICA Not detected Normal     NOT DETECTE  
D [facility]  
   
                                        Comment on above:   Performed By: #### G  
IPAN ####  
Testing performed at [address] [place] [address]  
#### CDDNAT ####  
Testing performed at [address] [place] [address]   
   
                                                    C DIFFICILE BY PCR (CLOSTRID  
IUM DIFFICILE TOXIN)on 2024   
   
                      C. Diff 027 Negative              NEGATIVE   [facility]  
   
                                        Comment on above:   Hypervirulent C. dif  
ficile strain 027/NAP1/BI  
TESTING PERFORMED BY PCR  
  
   
   
                      C.DIFFICILE TOXIN,PCR Negative              NEGATIVE   [facility]  
   
                                                    C DIFFICILE DNAon 2024  
   
   
                      C. DIFF 027 Negative   Normal     NEGATIVE   [facility]  
   
                                        Comment on above:   Result Comment: Hype  
rvirulent C. difficile strain 027/NAP1/BI  
TESTING PERFORMED BY PCR   
   
                                                            Performed By: #### G  
IPAN ####  
Testing performed at [address] [place] [address]  
#### CDDNAT ####  
Testing performed at [address] [address]   
   
                      TOXIGENIC C. DIFF Negative   Normal     NEGATIVE   [facility]  
   
                                        Comment on above:   Performed By: #### G  
IPAN ####  
Testing performed at [address] [address]  
#### CDDNAT ####  
Testing performed at [address] [address]   
   
                                                    CBCon 2024   
   
                      ABSOLUTE BAS 0.1 10*3/uL Normal     0.0-0.2    [facility]  
   
                                        Comment on above:   Performed By: #### C  
MPF, ACBC ####  
Testing performed at [address] [address]   
   
                      ABSOLUTE EOS 0.1 10*3/uL Normal     0.0-0.7    [facility]  
   
                                        Comment on above:   Performed By: #### C  
MPF, ACBC ####  
Testing performed at [address] [address]   
   
                                                    ABSOLUTE NEUTROPHIL   
COUNT           6.5 10*3/uL     Normal          1.4-6.5         [facility]  
   
                                        Comment on above:   Performed By: #### C  
MPF, ACBC ####  
Testing performed at [address] [address]   
   
                      Basophils/100 WBC (Bld) 0.7 %      Normal     0.0-2.0    [facility]  
   
                                        Comment on above:   Performed By: #### C  
MPF, ACBC ####  
Testing performed at [address] [address]   
   
                      DTYPE      AUTO DIFF  Normal                [facility]  
   
                                        Comment on above:   Performed By: #### C  
MPF, ACBC ####  
Testing performed at [address] [address]   
   
                                                    Eosinophils/100 WBC   
(Bld)           0.7 %           Normal          0.0-11.0        [facility]  
   
                                        Comment on above:   Performed By: #### C  
MPF, ACBC ####  
Testing performed at [address] [address]   
   
                                                    Lymphocytes (Bld)   
[#/Vol]         1.9 10*3/uL     Normal          1.2-3.4         [facility]  
   
                                        Comment on above:   Performed By: #### C  
MPF, ACBC ####  
Testing performed at [address] [place] [address]   
   
                                                    Lymphocytes/100 WBC   
(Bld)           20.1 %          Normal          20.0-55.0       [facility]  
   
                                        Comment on above:   Performed By: #### C  
MPF, ACBC ####  
Testing performed at [address] [address]   
   
                      Monocytes (Bld) [#/Vol] 0.8 10*3/uL High       0.0-0.7      
[facility]  
   
                                        Comment on above:   Performed By: #### C  
MPF, ACBC ####  
Testing performed at [address] [address]   
   
                      Monocytes/100 WBC (Bld) 9.0 %      Normal     0.0-10.0   [facility]  
   
                                        Comment on above:   Performed By: #### C  
MPF, ACBC ####  
Testing performed at [address] [address]   
   
                                                    Neutrophils/100 WBC   
(Bld)           69.5 %          Normal          37.0-75.0       [facility]  
   
                                        Comment on above:   Performed By: #### C  
MPF, ACBC ####  
Testing performed at [address] [place] [address]   
   
                                                    Erythrocyte   
distribution width   
(RBC) [Ratio]   13.2 %          Normal          11.5-14.5       [facility]  
   
                                        Comment on above:   Performed By: #### C  
MPF, ACBC ####  
Testing performed at [address] [place] [address]   
   
                                                    Hematocrit (Bld)   
[Volume fraction] 37.8 %          Normal          36.0-48.0       [facility]  
   
                                        Comment on above:   Performed By: #### C  
MPF, ACBC ####  
Testing performed at [address] [place] [address]   
   
                                                    Hemoglobin (Bld)   
[Mass/Vol]      12.8 g/dL       Normal          12.0-16.0       [facility]  
   
                                        Comment on above:   Performed By: #### C  
MPF, ACBC ####  
Testing performed at [address] [address]   
   
                                                    MCH (RBC) [Entitic   
mass]           30.2 pg         Normal          26.0-35.0       [facility]  
   
                                        Comment on above:   Performed By: #### C  
MPF, ACBC ####  
Testing performed at [address] [address]   
   
                      MCHC (RBC) [Mass/Vol] 33.9 g/dL  Normal     27.0-37.0  [facility]  
   
                                        Comment on above:   Performed By: #### C  
MPF, ACBC ####  
Testing performed at [address] [address]   
   
                      MCV (RBC) [Entitic vol] 88.9 fL    Normal     80.0-100.0 [facility]  
   
                                        Comment on above:   Performed By: #### C  
MPF, ACBC ####  
Testing performed at [address] [address]   
   
                                                    Platelet mean volume   
(Bld) [Entitic vol] 11.3 fL         High            7.4-11.0        [facility]  
   
                                        Comment on above:   Performed By: #### C  
MPF, ACBC ####  
Testing performed at [address] [address]   
   
                      Platelets (Bld) [#/Vol] 228 10*3/uL Normal     130-400      
[facility]  
   
                                        Comment on above:   Performed By: #### C  
MPF, ACBC ####  
Testing performed at [address] [address]   
   
                      RBC (Bld) [#/Vol] 4.25 10*6/uL Normal     4.0-5.4    [facility]  
   
                                        Comment on above:   Performed By: #### C  
MPF, ACBC ####  
Testing performed at [address] [address]   
   
                      WBC (Bld) [#/Vol] 9.4 10*3/uL Normal     3.6-11.0   [facility]  
   
                                        Comment on above:   Performed By: #### C  
MPF, ACBC ####  
Testing performed at [address] [address]   
   
                                                    CBC, EDIF, PLATELETon 2024   
   
                          ABSOLUTE BASOPHIL COUNT 0.1 10*3/uL               0.0   
- 0.2   
10*3/uL                                 [facility]  
   
                      Basophils/100 WBC (Bld) 0.7 %                 0.0 - 2.0 %   
[facility]  
   
                                                    Differential cell count   
method Nom (Bld) AUTO DIFF                       %               [facility]  
   
                                                    Eosinophils (Bld)   
[#/Vol]             0.1 10*3/uL                             0.0 - 0.7   
10*3/uL                                 [facility]   
System  
   
                                                    Eosinophils/100 WBC   
(Bld)           0.7 %                           0.0 - 11.0 %    [facility]  
   
                                                    Erythrocyte   
distribution width   
(RBC) [Ratio]       13.2 %                                  11.5 - 14.5   
%                                       [facility]  
   
                                                    Hematocrit (Bld)   
[Volume fraction]   37.8 %                                  36.0 - 48.0   
%                                       [facility]  
   
                                                    Hemoglobin (Bld)   
[Mass/Vol]      12.8 g/dL                                       [facility]  
   
                                                    Interpretation and   
review of laboratory   
results         Abnormal                                        [facility]  
   
                                                    Lymphocytes (Bld)   
[#/Vol]             1.9 10*3/uL                             1.2 - 3.4   
10*3/uL                                 [facility]  
   
                                                    Lymphocytes/100 WBC   
(Bld)               20.1 %                                  20.0 - 55.0   
%                                       [facility]  
   
                                                    MCH (RBC) [Entitic   
mass]               30.2 pg                                 26.0 - 35.0   
PG                                      [facility]  
   
                      MCHC (RBC) [Mass/Vol] 33.9 g/dL                        [facility]  
   
                      MCV (RBC) [Entitic vol] 88.9 fL                          A  
[facility]  
   
                          Monocytes (Bld) [#/Vol] 0.8 10*3/uL  High         0.0   
- 0.7   
10*3/uL                                 [facility]  
   
                      Monocytes/100 WBC (Bld) 9.0 %                 0.0 - 10.0 %  
 [facility]  
   
                                                    Neutrophils (Bld)   
[#/Vol]             6.5 10*3/uL                             1.4 - 6.5   
10*3/uL                                 [facility]  
   
                                                    Neutrophils/100 WBC   
(Bld)               69.5 %                                  37.0 - 75.0   
%                                       [facility]  
   
                                                    Platelet mean volume   
(Bld) [Entitic vol] 11.3 fL         High                            [facility]  
   
                          Platelets (Bld) [#/Vol] 228 10*3/uL               130   
- 400   
10*3/uL                                 [facility]  
   
                          RBC (Bld) [#/Vol] 4.25 10*6/uL              4.0 - 5.4   
10*6/uL                                 [facility]  
   
                          WBC (Bld) [#/Vol] 9.4 10*3/uL               3.6 - 11.0  
   
10*3/uL                                 [facility]  
   
                                                    CMP FASTINGon 02-   
   
                                        GFR Information     Average GFR for 20-2  
9   
years old = 116.    Normal                                  [facility]  
   
                                        Comment on above:   Result Comment:   
enrico Kidney disease, GFR = <60.  
Kidney failure, GFR = <15.  
The GFR estimate is not adjusted for extreme body surface area   
or acute process, nor has it been validated for pregnant women   
or ethnic groups other than  and .   
   
                                                            Performed By: #### C  
MPF, ACBC ####  
Testing performed at [address] [address]   
   
                      Glucose [Mass/Vol] 78 mg/dL   Normal          [facility]  
   
                                        Comment on above:   Result Comment:  
NORMAL <100 mg/dL  
PREDIABETES 101-126 mg/dL  
DIABETES 126 mg/dL or higher   
   
                                                            Performed By: #### C  
MPF, ACBC ####  
Testing performed at [address] [address]   
   
                      A:G RATIO  1.6 RATIO  Normal                [facility]  
   
                                        Comment on above:   Performed By: #### C  
MPF, ACBC ####  
Testing performed at [address] [address]   
   
                      ALBUMIN    3.9 G/dl   Normal     3.5-5.0    [facility]  
   
                                        Comment on above:   Performed By: #### C  
MPF, ACBC ####  
Testing performed at [address] [address]   
   
                                                    ALP [Catalytic   
activity/Vol]   51 U/L          Normal                    [facility]  
   
                                        Comment on above:   Performed By: #### C  
MPF, ACBC ####  
Testing performed at [address] [address]   
   
                                                    ALT [Catalytic   
activity/Vol]   14 U/L          Normal          <35             [facility]  
   
                                        Comment on above:   Performed By: #### C  
MPF, ACBC ####  
Testing performed at [address] [address]   
   
                                                    AST [Catalytic   
activity/Vol]   23 U/L          Normal          14-36           [facility]  
   
                                        Comment on above:   Performed By: #### C  
MPF, ACBC ####  
Testing performed at [address] [address]   
   
                      Bilirubin [Mass/Vol] 1.0 mg/dL  Normal     0.2-1.3    [facility]  
   
                                        Comment on above:   Performed By: #### C  
MPF, ACBC ####  
Testing performed at [address] [address]   
   
                      Calcium [Mass/Vol] 8.7 mg/dL  Normal     8.4-10.2   [facility]  
   
                                        Comment on above:   Performed By: #### C  
MPF, ACBC ####  
Testing performed at [address] [address]   
   
                      Chloride [Moles/Vol] 108 mmol/L High            [facility]  
   
                                        Comment on above:   Result Comment: Farzana mccabe note: Triglyceride levels of 600mg/dL   
or higher may positively bias chloride results by   
approximately 2.1 mmol   
   
                                                            Performed By: #### C  
MPF, ACBC ####  
Testing performed at [address] [address]   
   
                      CO2 [Moles/Vol] 12 mmol/L  Critically low 22-30      [facility]  
   
                                        Comment on above:   Result Comment: Resu  
lt called to read back by: ABEBE MARTÍNEZ   
2024 @ 05:52 by TAS   
   
                                                            Performed By: #### C  
MPF, ACBC ####  
Testing performed at [address] [address]   
   
                      Creatinine [Mass/Vol] 0.56 mg/dL Low        0.70-1.20  [facility]  
   
                                        Comment on above:   Performed By: #### C  
MPF, ACBC ####  
Testing performed at [address] [place] [address]   
   
                                                    EST. GFR,   
American        174 ml/min/1.73sq.m [facility]  
   
                                        Comment on above:   Performed By: #### C  
MPF, ACBC ####  
Testing performed at [address] [place] [address]   
   
                                                    EST. GFR,Non    
American        144 ml/min/1.73sq.m [facility]  
   
                                        Comment on above:   Performed By: #### C  
MPF, ACBC ####  
Testing performed at [address] [address]   
   
                      Potassium [Moles/Vol] 3.5 mmol/L Normal     3.5-5.1    [facility]  
   
                                        Comment on above:   Performed By: #### C  
MPF, ACBC ####  
Testing performed at [address] [address]   
   
                      Protein [Mass/Vol] 6.3 g/dL   Normal     6.3-8.2    [facility]  
   
                                        Comment on above:   Performed By: #### C  
MPF, ACBC ####  
Testing performed at [address] [address]   
   
                      Sodium [Moles/Vol] 136 mmol/L Low        137-145    [facility]  
   
                                        Comment on above:   Performed By: #### C  
MPF, ACBC ####  
Testing performed at [address] [address]   
   
                                                    Urea nitrogen   
[Mass/Vol]      4 mg/dL         Low             7-20            [facility]  
   
                                        Comment on above:   Performed By: #### C  
MPF, ACBC ####  
Testing performed at [address] [address]   
   
                                                    COMPREHENSIVE METABOLIC PANE  
Benjamin 2024   
   
                          Albumin [Mass/Vol] 3.9 G/dl                  3.5 - 5.0  
   
G/dl                                    [facility]  
   
                                                    Albumin/Globulin [Mass   
ratio]          1.6 {ratio}                     RATIO           [facility]  
   
                                                    ALP [Catalytic   
activity/Vol]   51 U/L                                          [facility]  
   
                                                    ALT [Catalytic   
activity/Vol]   14 U/L                          NINF            [facility]  
   
                                                    AST [Catalytic   
activity/Vol]   23 U/L                                          [facility]  
   
                      Bilirubin [Mass/Vol] 1.0 mg/dL                        [facility]  
   
                      Calcium [Mass/Vol] 8.7 mg/dL                        [facility]  
   
                      Chloride [Moles/Vol] 108 mmol/L High                  [facility]  
   
                                        Comment on above:   Please note: Triglyc  
eride levels of 600mg/dL or higher may   
positively bias chloride results by approximately 2.1 mmol   
   
                      CO2 [Moles/Vol] 12 mmol/L  Critically low            [facility]  
   
                                        Comment on above:   Result called to jeimy godinez back by: ABEBE MARTÍNEZ 2024 @ 05:52   
by TAS   
   
                      Creatinine [Mass/Vol] 0.56 mg/dL Low                   [facility]  
   
                                        GFR COMMENT         Average GFR for 20-2  
9   
years old = 116.                                            [facility]  
   
                                        Comment on above:   Chronic Kidney disea  
se, GFR = <60.  
Kidney failure, GFR = <15.  
The GFR estimate is not adjusted for extreme body surface area   
or acute process, nor has it been validated for pregnant women   
or ethnic groups other than  and .  
  
   
   
                                                    GFR/1.73 sq M.predicted   
among blacks MDRD   
(S/P/Bld) [Vol   
rate/Area]          174 mL/min/{1.73_m2}                     ml/min/1.73s  
q.m                                     [facility]  
   
                                                    GFR/1.73 sq M.predicted   
among non-blacks MDRD   
(S/P/Bld) [Vol   
rate/Area]          144 mL/min/{1.73_m2}                     ml/min/1.73s  
q.m                                     [facility]  
   
                      Glucose [Mass/Vol] 78 mg/dL                         [facility]  
   
                                        Comment on above:     
NORMAL <100 mg/dL  
PREDIABETES 101-126 mg/dL  
DIABETES 126 mg/dL or higher  
  
   
   
                                                    Interpretation and   
review of laboratory   
results         Abnormal                                        [facility]  
   
                      Potassium [Moles/Vol] 3.5 mmol/L                       [facility]  
   
                      Protein [Mass/Vol] 6.3 g/dL                         [facility]  
   
                      Sodium [Moles/Vol] 136 mmol/L Low                   [facility]  
   
                                                    Urea nitrogen   
[Mass/Vol]      4 mg/dL         Low                             [facility]  
   
                                                    CBCon 2024   
   
                      ABSOLUTE BAS 0.1 10*3/uL Normal     0.0-0.2    [facility]  
   
                                        Comment on above:   Performed By: #### C  
MPF, ACBC, LIPA2 ####  
Testing performed at [address] [address]   
   
                      ABSOLUTE EOS 0.0 10*3/uL Normal     0.0-0.7    [facility]  
   
                                        Comment on above:   Performed By: #### C  
MPF, ACBC, LIPA2 ####  
Testing performed at [address] [address]   
   
                                                    ABSOLUTE NEUTROPHIL   
COUNT           6.2 10*3/uL     Normal          1.4-6.5         [facility]  
   
                                        Comment on above:   Performed By: #### C  
MPF, ACBC, LIPA2 ####  
Testing performed at [address] [address]   
   
                      Basophils/100 WBC (Bld) 1.0 %      Normal     0.0-2.0    [facility]  
   
                                        Comment on above:   Performed By: #### C  
MPF, ACBC, LIPA2 ####  
Testing performed at [address] [place] [address]   
   
                      DTYPE      AUTO DIFF  Normal                [facility]  
   
                                        Comment on above:   Performed By: #### C  
MPF, ACBC, LIPA2 ####  
Testing performed at [address] [place] [address]   
   
                                                    Eosinophils/100 WBC   
(Bld)           0.4 %           Normal          0.0-11.0        [facility]  
   
                                        Comment on above:   Performed By: #### C  
MPF, ACBC, LIPA2 ####  
Testing performed at [address] [place] [address]   
   
                                                    Lymphocytes (Bld)   
[#/Vol]         1.4 10*3/uL     Normal          1.2-3.4         [facility]  
   
                                        Comment on above:   Performed By: #### C  
MPF, ACBC, LIPA2 ####  
Testing performed at [address] [address]   
   
                                                    Lymphocytes/100 WBC   
(Bld)           16.1 %          Low             20.0-55.0       [facility]  
   
                                        Comment on above:   Performed By: #### C  
MPF, ACBC, LIPA2 ####  
Testing performed at [address] [address]   
   
                      Monocytes (Bld) [#/Vol] 0.7 10*3/uL Normal     0.0-0.7      
[facility]  
   
                                        Comment on above:   Performed By: #### C  
MPF, ACBC, LIPA2 ####  
Testing performed at [address] [address]   
   
                      Monocytes/100 WBC (Bld) 8.8 %      Normal     0.0-10.0   [facility]  
   
                                        Comment on above:   Performed By: #### C  
MPF, ACBC, LIPA2 ####  
Testing performed at [address] [place] [address]   
   
                                                    Neutrophils/100 WBC   
(Bld)           73.7 %          Normal          37.0-75.0       [facility]  
   
                                        Comment on above:   Performed By: #### C  
MPF, ACBC, LIPA2 ####  
Testing performed at [address] [place] [address]   
   
                                                    Erythrocyte   
distribution width   
(RBC) [Ratio]   13.6 %          Normal          11.5-14.5       [facility]  
   
                                        Comment on above:   Performed By: #### C  
MPF, ACBC, LIPA2 ####  
Testing performed at [address] [address]   
   
                                                    Hematocrit (Bld)   
[Volume fraction] 40.1 %          Normal          36.0-48.0       [facility]  
   
                                        Comment on above:   Performed By: #### C  
MPF, ACBC, LIPA2 ####  
Testing performed at [address] [address]   
   
                                                    Hemoglobin (Bld)   
[Mass/Vol]      13.3 g/dL       Normal          12.0-16.0       [facility]  
   
                                        Comment on above:   Performed By: #### C  
MPF, ACBC, LIPA2 ####  
Testing performed at [address] [address]   
   
                                                    MCH (RBC) [Entitic   
mass]           29.8 pg         Normal          26.0-35.0       [facility]  
   
                                        Comment on above:   Performed By: #### C  
MPF, ACBC, LIPA2 ####  
Testing performed at [address] [address]   
   
                      MCHC (RBC) [Mass/Vol] 33.1 g/dL  Normal     27.0-37.0  [facility]  
   
                                        Comment on above:   Performed By: #### C  
MPF, ACBC, LIPA2 ####  
Testing performed at [address] [address]   
   
                      MCV (RBC) [Entitic vol] 90.2 fL    Normal     80.0-100.0 [facility]  
   
                                        Comment on above:   Performed By: #### C  
MPF, ACBC, LIPA2 ####  
Testing performed at [address] [address]   
   
                                                    Platelet mean volume   
(Bld) [Entitic vol] 10.2 fL         Normal          7.4-11.0        [facility]  
   
                                        Comment on above:   Performed By: #### C  
MPF, ACBC, LIPA2 ####  
Testing performed at [address] [address]   
   
                      Platelets (Bld) [#/Vol] 237 10*3/uL Normal     130-400      
[facility]  
   
                                        Comment on above:   Performed By: #### C  
MPF, ACBC, LIPA2 ####  
Testing performed at [address] [address]   
   
                      RBC (Bld) [#/Vol] 4.45 10*6/uL Normal     4.0-5.4    [facility]  
   
                                        Comment on above:   Performed By: #### C  
MPF, ACBC, LIPA2 ####  
Testing performed at [address] [address]   
   
                      WBC (Bld) [#/Vol] 8.4 10*3/uL Normal     3.6-11.0   [facility]  
   
                                        Comment on above:   Performed By: #### C  
MPF, ACBC, LIPA2 ####  
Testing performed at [address] [address]   
   
                                                    CBC W Auto Differential pane  
l (Bld)on 2024   
   
                      Basophils (Bld) [#/Vol] 0.10 x10*3/uL Normal     0.00-0.10  
  [facility]  
   
                                        Comment on above:   Performed By: #### 5  
7021-8 ####  
AIDEE RODRIGUES (60198)  
[facility] ([facility])  
[address] [place] [address]   
   
                      Basophils/100 WBC (Bld) 1.2 %      Normal     0.0-2.0    [facility]  
   
                                        Comment on above:   Performed By: #### 5  
7021-8 ####  
AIDEE RODRIGUES (36975)  
[facility] ([facility])  
[address] [place] [address]   
   
                                                    Eosinophils (Bld)   
[#/Vol]         0.03 x10*3/uL   Normal          0.00-0.70       [facility]  
   
                                        Comment on above:   Performed By: #### 5  
7021-8 ####  
AIDEE RODRIGUES (06015)  
[facility] ([facility])  
[address] [place] [address]   
   
                                                    Eosinophils/100 WBC   
(Bld)           0.3 %           Normal          0.0-6.0         [facility]  
   
                                        Comment on above:   Performed By: #### 5  
7021-8 ####  
AIDEE RODRIGUES (75554)  
[facility] ([facility])  
[address] [place] [address]   
   
                                                    Erythrocyte   
distribution width   
(RBC) [Ratio]   12.4 %          Normal          11.5-14.5       [facility]  
   
                                        Comment on above:   Performed By: #### 5  
7021-8 ####  
AIDEE RODRIGUES (74197)  
[facility] ([facility])  
[address] [place] [address]   
   
                                                    Hematocrit (Bld)   
[Volume fraction] 39.0 %          Normal          36.0-46.0       [facility]  
   
                                        Comment on above:   Performed By: #### 5  
7021-8 ####  
AIDEE RODRIGUES (13671)  
[facility] ([facility])  
[address] [place] [address]   
   
                                                    Hemoglobin (Bld)   
[Mass/Vol]      13.1 g/dL       Normal          12.0-16.0       [facility]  
   
                                        Comment on above:   Performed By: #### 5  
7021-8 ####  
AIDEE RODRIGUES (81161)  
[facility] ([facility])  
[address] [place] [address]   
   
                                                    Immature granulocytes   
(Bld) [#/Vol]   0.02 x10*3/uL   Normal          0.00-0.70       [facility]  
   
                                        Comment on above:   Performed By: #### 5  
7021-8 ####  
AIDEE RODRIGUES (43818)  
[facility] ([facility])  
[address] [place] [address]   
   
                                                    Immature   
granulocytes/100 WBC   
(Bld)           0.2 %           Normal          0.0-0.9         [facility]  
   
                                        Comment on above:   Result Comment: Jossie  
ture Granulocyte Count (IG) includes   
promyelocytes, myelocytes and metamyelocytes but does not   
include bands. Percent differential counts (%) should be   
interpreted in the context of the absolute cell counts   
(cells/UL).   
   
                                                            Performed By: #### 5  
7021-8 ####  
AIDEE RODRIGUES (85139)  
[facility] ([facility])  
[address] [place], [place] [id number]   
   
                                                    Lymphocytes (Bld)   
[#/Vol]         1.25 x10*3/uL   Normal          1.20-4.80       [facility]  
   
                                        Comment on above:   Performed By: #### 5  
7021-8 ####  
AIDEE RODRIGUES (31283)  
[facility] ([facility])  
[address] [place] [address]   
   
                                                    Lymphocytes/100 WBC   
(Bld)           14.5 %          Normal          13.0-44.0       [facility]  
   
                                        Comment on above:   Performed By: #### 5  
7021-8 ####  
AIDEE RODRIGUES (95241)  
[facility] ([facility])  
[address] [place] [address]   
   
                                                    MCH (RBC) [Entitic   
mass]           29.8 pg         Normal          26.0-34.0       [facility]  
   
                                        Comment on above:   Performed By: #### 5  
7021-8 ####  
AIDEE RODRIGUES (96539)  
[facility] ([facility])  
[address] [place] [address]   
   
                      MCHC (RBC) [Mass/Vol] 33.6 g/dL  Normal     32.0-36.0  [facility]  
   
                                        Comment on above:   Performed By: #### 5  
7021-8 ####  
AIEDE RODRIGUES (47578)  
[facility] ([facility])  
[address] [place] [address]   
   
                      MCV (RBC) [Entitic vol] 89 fL      Normal          U  
[place]  
   
                                        Comment on above:   Performed By: #### 5  
7021-8 ####  
AIDEE RODRIGUES (01776)  
[facility] ([facility])  
[address] [place] [address]   
   
                      Monocytes (Bld) [#/Vol] 0.64 x10*3/uL Normal     0.10-1.00  
  [facility]  
   
                                        Comment on above:   Performed By: #### 5  
7021-8 ####  
AIDEE RODRIGUES (70444)  
[facility] ([facility])  
[address] [place] [address]   
   
                      Monocytes/100 WBC (Bld) 7.4 %      Normal     2.0-10.0   U  
[place]  
   
                                        Comment on above:   Performed By: #### 5  
7021-8 ####  
AIDEE RODRIGUES (10965)  
[facility] ([facility])  
[address] [place] [address]   
   
                                                    Neutrophils (Bld)   
[#/Vol]         6.56 x10*3/uL   Normal          1.20-7.70       [facility]  
   
                                        Comment on above:   Result Comment: Perc  
ent differential counts (%) should be   
interpreted in the context of the absolute cell counts   
(cells/uL).   
   
                                                            Performed By: #### 5  
7021-8 ####  
AIDEE RODRIGUES (84852)  
[facility] ([facility])  
[address] [place] [address]   
   
                                                    Neutrophils/100 WBC   
(Bld)           76.4 %          Normal          40.0-80.0       [facility]  
   
                                        Comment on above:   Performed By: #### 5  
7021-8 ####  
AIDEE RODRIGUES (21912)  
[facility] ([facility])  
[address] [place] [address]   
   
                                                    Nucleated RBC/100 WBC   
(Bld) [Ratio]   0.0 /100 WBCs   Normal          0.0-0.0         [facility]  
   
                                        Comment on above:   Performed By: #### 5  
7021-8 ####  
AIDEE RODRIGUES (13115)  
[facility] ([facility])  
[address] [place] [address]   
   
                      Platelets (Bld) [#/Vol] 278 x10*3/uL Normal     150-450     
 [facility]  
   
                                        Comment on above:   Performed By: #### 5  
7021-8 ####  
AIDEE RODRIGUES (58517)  
[facility] ([facility])  
[address] [place] [address]   
   
                      RBC (Bld) [#/Vol] 4.39 x10*6/uL Normal     4.00-5.20  [facility]  
   
                                        Comment on above:   Performed By: #### 5  
7021-8 ####  
AIDEE RODRIGUES (56916)  
[facility] ([facility])  
[address] [place] [address]   
   
                      WBC (Bld) [#/Vol] 8.6 x10*3/uL Normal     4.4-11.3   [facility]  
   
                                        Comment on above:   Performed By: #### 5  
7021-8 ####  
AIDEE RODRIGUES (43832)  
[facility] ([facility])  
[address] [place] [address]   
   
                                                    CBC, EDIF, PLATELETon 2024   
   
                          ABSOLUTE BASOPHIL COUNT 0.1 10*3/uL               0.0   
- 0.2   
10*3/uL                                 [facility]  
   
                      Basophils/100 WBC (Bld) 1.0 %                 0.0 - 2.0 %   
[facility]  
   
                                                    Differential cell count   
method Nom (Bld) AUTO DIFF                       %               [facility]  
   
                                                    Eosinophils (Bld)   
[#/Vol]             0.0 10*3/uL                             0.0 - 0.7   
10*3/uL                                 [facility]  
   
                                                    Eosinophils/100 WBC   
(Bld)           0.4 %                           0.0 - 11.0 %    [facility]  
   
                                                    Erythrocyte   
distribution width   
(RBC) [Ratio]       13.6 %                                  11.5 - 14.5   
%                                       [facility]  
   
                                                    Hematocrit (Bld)   
[Volume fraction]   40.1 %                                  36.0 - 48.0   
%                                       [facility]  
   
                                                    Hemoglobin (Bld)   
[Mass/Vol]      13.3 g/dL                                       [facility]  
   
                                                    Interpretation and   
review of laboratory   
results         Abnormal                                        [facility]  
   
                                                    Lymphocytes (Bld)   
[#/Vol]             1.4 10*3/uL                             1.2 - 3.4   
10*3/uL                                 [facility]  
   
                                                    Lymphocytes/100 WBC   
(Bld)               16.1 %              Low                 20.0 - 55.0   
%                                       [facility]  
   
                                                    MCH (RBC) [Entitic   
mass]               29.8 pg                                 26.0 - 35.0   
PG                                      [facility]  
   
                      MCHC (RBC) [Mass/Vol] 33.1 g/dL                        [facility]  
   
                      MCV (RBC) [Entitic vol] 90.2 fL                          A  
[facility]  
   
                          Monocytes (Bld) [#/Vol] 0.7 10*3/uL               0.0   
- 0.7   
10*3/uL                                 [facility]  
   
                      Monocytes/100 WBC (Bld) 8.8 %                 0.0 - 10.0 %  
 [facility]  
   
                                                    Neutrophils (Bld)   
[#/Vol]             6.2 10*3/uL                             1.4 - 6.5   
10*3/uL                                 [facility]  
   
                                                    Neutrophils/100 WBC   
(Bld)               73.7 %                                  37.0 - 75.0   
%                                       [facility]  
   
                                                    Platelet mean volume   
(Bld) [Entitic vol] 10.2 fL                                         [facility]  
   
                          Platelets (Bld) [#/Vol] 237 10*3/uL               130   
- 400   
10*3/uL                                 [facility]  
   
                          RBC (Bld) [#/Vol] 4.45 10*6/uL              4.0 - 5.4   
10*6/uL                                 [facility]  
   
                          WBC (Bld) [#/Vol] 8.4 10*3/uL               3.6 - 11.0  
   
10*3/uL                                 [facility]  
   
                                                    CMP FASTINGon 2024   
   
                      A:G RATIO  1.7 RATIO  Normal                [facility]  
   
                                        Comment on above:   Performed By: #### C  
MPF, ACBC, LIPA2 ####  
Testing performed at [address] [address]   
   
                      ALBUMIN    4.5 G/dl   Normal     3.5-5.0    [facility]  
   
                                        Comment on above:   Performed By: #### C  
MPF, ACBC, LIPA2 ####  
Testing performed at [address] [address]   
   
                                                    ALP [Catalytic   
activity/Vol]   53 U/L          Normal                    [facility]  
   
                                        Comment on above:   Performed By: #### C  
MPF, ACBC, LIPA2 ####  
Testing performed at [address] [address]   
   
                                                    ALT [Catalytic   
activity/Vol]   15 U/L          Normal          <35             [facility]  
   
                                        Comment on above:   Performed By: #### C  
MPF, ACBC, LIPA2 ####  
Testing performed at [address] [address]   
   
                                                    AST [Catalytic   
activity/Vol]   26 U/L          Normal          14-36           [facility]  
   
                                        Comment on above:   Performed By: #### C  
MPF, ACBC, LIPA2 ####  
Testing performed at [address] [address]   
   
                      Bilirubin [Mass/Vol] 1.2 mg/dL  Normal     0.2-1.3    [facility]  
   
                                        Comment on above:   Performed By: #### C  
MPF, ACBC, LIPA2 ####  
Testing performed at [address] [address]   
   
                      Calcium [Mass/Vol] 8.8 mg/dL  Normal     8.4-10.2   [facility]  
   
                                        Comment on above:   Performed By: #### C  
MPF, ACBC, LIPA2 ####  
Testing performed at [address] [address]   
   
                      Chloride [Moles/Vol] 106 mmol/L Normal          [facility]  
   
                                        Comment on above:   Result Comment: Farzana mccabe note: Triglyceride levels of 600mg/dL   
or higher may positively bias chloride results by   
approximately 2.1 mmol   
   
                                                            Performed By: #### C  
MPF, ACBC, LIPA2 ####  
[place] [address]   
   
                      CO2 [Moles/Vol] 14 mmol/L  Critically low 22-30      [facility]  
   
                                        Comment on above:   Result Comment: Resu  
lt called to read back by: ESPINOZA SUTTON RN   
2024 @ 17:10 by [facility]   
   
                                                            Performed By: #### C  
MPF, ACBC, LIPA2 ####  
[place] [address]   
   
                      Creatinine [Mass/Vol] 0.60 mg/dL Low        0.70-1.20  [facility]  
   
                                        Comment on above:   Performed By: #### C  
MPF, ACBC, LIPA2 ####  
Testing performed at [place] [id number]   
   
                                                    EST. GFR,   
American        161 ml/min/1.73sq.m [facility]  
   
                                        Comment on above:   Performed By: #### C  
MPF, ACBC, LIPA2 ####  
Testing performed at [place] [id number]   
   
                                                    EST. GFR,Non    
American        133 ml/min/1.73sq.m [facility]  
   
                                        Comment on above:   Performed By: #### C  
MPF, ACBC, LIPA2 ####  
[place] [address]   
   
                                        GFR Information     Average GFR for 20-2  
9   
years old = 116.    Normal                                  [facility]  
   
                                        Comment on above:   Result Comment:   
enrico Kidney disease, GFR = <60.  
Kidney failure, GFR = <15.  
The GFR estimate is not adjusted for extreme body surface area   
or acute process, nor has it been validated for pregnant women   
or ethnic groups other than  and .   
   
                                                            Performed By: #### C  
MPF, ACBC, LIPA2 ####  
[place] [address]   
   
                      Glucose [Mass/Vol] 83 mg/dL   Normal          [facility]  
   
                                        Comment on above:   Result Comment:  
NORMAL <100 mg/dL  
PREDIABETES 101-126 mg/dL  
DIABETES 126 mg/dL or higher   
   
                                                            Performed By: #### C  
MPF, ACBC, LIPA2 ####  
Testing performed at [address] [address]   
   
                      Potassium [Moles/Vol] 3.5 mmol/L Normal     3.5-5.1    [facility]  
   
                                        Comment on above:   Performed By: #### C  
MPF, ACBC, LIPA2 ####  
Testing performed at [address] [address]   
   
                      Protein [Mass/Vol] 7.1 g/dL   Normal     6.3-8.2    [facility]  
   
                                        Comment on above:   Performed By: #### C  
MPF, ACBC, LIPA2 ####  
Testing performed at [address] [address]   
   
                      Sodium [Moles/Vol] 136 mmol/L Low        137-145    [facility]  
   
                                        Comment on above:   Performed By: #### C  
MPF, ACBC, LIPA2 ####  
Testing performed at [address] [address]   
   
                                                    Urea nitrogen   
[Mass/Vol]      3 mg/dL         Low             7-20            [facility]  
   
                                        Comment on above:   Performed By: #### C  
MPF, ACBC, LIPA2 ####  
Testing performed at [address] [address]   
   
                                                    COMPREHENSIVE METABOLIC PANE  
[place] 2024   
   
                          Albumin [Mass/Vol] 4.5 G/dl                  3.5 - 5.0  
   
G/dl                                    [facility]  
   
                                                    Albumin/Globulin [Mass   
ratio]          1.7 {ratio}                     RATIO           [facility]  
   
                                                    ALP [Catalytic   
activity/Vol]   53 U/L                                          [facility]  
   
                                                    ALT [Catalytic   
activity/Vol]   15 U/L                          NINF            [facility]  
   
                                                    AST [Catalytic   
activity/Vol]   26 U/L                                          [facility]  
   
                      Bilirubin [Mass/Vol] 1.2 mg/dL                        [facility]  
   
                      Calcium [Mass/Vol] 8.8 mg/dL                        [facility]  
   
                      Chloride [Moles/Vol] 106 mmol/L                       [facility]  
   
                                        Comment on above:   Please note: Triglyc  
eride levels of 600mg/dL or higher may   
positively bias chloride results by approximately 2.1 mmol   
   
                      CO2 [Moles/Vol] 14 mmol/L  Critically low            [facility]  
   
                                        Comment on above:   Result called to jeimy godinez back by: ESPINOZA SUTTON RN 2024 @ 17:10 by   
[facility]   
   
                      Creatinine [Mass/Vol] 0.60 mg/dL Low                   [facility]   
System  
   
                                        GFR COMMENT         Average GFR for 20-2  
9   
years old = 116.                                            [facility]  
   
                                        Comment on above:   Chronic Kidney disea  
se, GFR = <60.  
Kidney failure, GFR = <15.  
The GFR estimate is not adjusted for extreme body surface area   
or acute process, nor has it been validated for pregnant women   
or ethnic groups other than  and .  
  
   
   
                                                    GFR/1.73 sq M.predicted   
among blacks MDRD   
(S/P/Bld) [Vol   
rate/Area]          161 mL/min/{1.73_m2}                     ml/min/1.73s  
q.m                                     [facility]   
System  
   
                                                    GFR/1.73 sq M.predicted   
among non-blacks MDRD   
(S/P/Bld) [Vol   
rate/Area]          133 mL/min/{1.73_m2}                     ml/min/1.73s  
q.m                                     [facility]   
System  
   
                                                    Glucose post fast   
[Mass/Vol]      83 mg/dL                                        [facility]  
   
                                        Comment on above:     
NORMAL <100 mg/dL  
PREDIABETES 101-126 mg/dL  
DIABETES 126 mg/dL or higher  
  
   
   
                                                    Interpretation and   
review of laboratory   
results         Abnormal                                        [facility]   
System  
   
                      Potassium [Moles/Vol] 3.5 mmol/L                       [facility]   
System  
   
                      Protein [Mass/Vol] 7.1 g/dL                         [facility]   
System  
   
                      Sodium [Moles/Vol] 136 mmol/L Low                   [facility]   
System  
   
                                                    Urea nitrogen   
[Mass/Vol]      3 mg/dL         Low                             [facility]  
   
                                                    Comprehensive metabolic 2000  
 panelon 2024   
   
                                                    Albumin BCP dye   
[Mass/Vol]      4.6 g/dL        Normal          3.4-5.0         [facility]  
   
                                        Comment on above:   Performed By: #### 5  
7021-8 ####  
AIDEE RODRIGUES ([id number])  
[facility] ([facility])  
[address] [place] [address]   
   
                                                    ALP [Catalytic   
activity/Vol]   38 U/L          Normal                    [facility]  
   
                                        Comment on above:   Performed By: #### 5  
7021-8 ####  
AIDEE RODRIGUES ([id number])  
[facility] ([facility])  
[address] [place] [address]   
   
                                                    ALT With P-5'-P   
[Catalytic   
activity/Vol]   10 U/L          Normal          7-45            [facility]  
   
                                        Comment on above:   Result Comment: Kaleigh  
ents treated with Sulfasalazine may   
generate falsely decreased results for ALT.   
   
                                                            Performed By: #### 5  
7021-8 ####  
AIDEE RODRIGUES ([id number])  
[facility] ([facility])  
[address] [place] [address]   
   
                      Anion gap [Moles/Vol] 18 mmol/L  Normal     10-20      [facility]  
   
                                        Comment on above:   Performed By: #### 5  
7021-8 ####  
AIDEE RODRIGUES ([id number])  
[facility] ([facility])  
[address] [place] [address]   
   
                                                    AST With P-5'-P   
[Catalytic   
activity/Vol]   13 U/L          Normal          9-39            [facility]  
   
                                        Comment on above:   Performed By: #### 5  
7021-8 ####  
AIDEE RODRIGUES ([id number])  
[facility] ([facility])  
[address] [place] [address]   
   
                      Bilirubin [Mass/Vol] 0.8 mg/dL  Normal     0.0-1.2    [facility]  
   
                                        Comment on above:   Performed By: #### 5  
7021-8 ####  
AIDEE RODRIGUES ([id number])  
[facility] ([facility])  
[address] [place] [address]   
   
                      Calcium [Mass/Vol] 9.0 mg/dL  Normal     8.6-10.3   [facility]  
   
                                        Comment on above:   Performed By: #### 5  
7021-8 ####  
AIDEE RODRIGUES ([id number])  
[facility] ([facility])  
[address] [place] [address]   
   
                      Chloride [Moles/Vol] 102 mmol/L Normal          [facility]  
   
                                        Comment on above:   Performed By: #### 5  
7021-8 ####  
AIDEE RODRIGUES ([id number])  
[facility] ([facility])  
[address] [place] [address]   
   
                      CO2 [Moles/Vol] 19 mmol/L  Low        21-32      [facility]  
   
                                        Comment on above:   Performed By: #### 5  
7021-8 ####  
AIDEE RODRIGUES ([id number])  
[facility] ([facility])  
[address] [place] [address]   
   
                      Creatinine [Mass/Vol] 0.63 mg/dL Normal     0.50-1.05  [facility]  
   
                                        Comment on above:   Performed By: #### 5  
7021-8 ####  
AIDEE RODRIGUES ([id number])  
[facility] ([facility])  
[address] [place] [address]   
   
                                                    GFR/1.73 sq M.predicted   
MDRD (S/P/Bld) [Vol   
rate/Area]      mL/min/{1.73_m2} Normal          >60             [facility]  
   
                                        Comment on above:   Result Comment: Calc  
ulations of estimated GFR are performed   
using the  CKD-EPI Study Refit equation without the race   
variable for the IDMS-Traceable creatinine methods.  
https://jasn.asnjournals.org/content/early//ASN.  
774506   
   
                                                            Performed By: #### 5  
7021-8 ####  
AIDEE RODRIGUES ([id number])  
[facility] ([facility])  
[address] [place] [address]   
   
                      Glucose [Mass/Vol] 85 mg/dL   Normal     74-99      [facility]  
   
                                        Comment on above:   Performed By: #### 5  
7021-8 ####  
AIDEE RODRIGUES ([id number])  
[facility] ([facility])  
[address] [place] [address]   
   
                      Potassium [Moles/Vol] 3.5 mmol/L Normal     3.5-5.3    [facility]  
   
                                        Comment on above:   Performed By: #### 5  
7021-8 ####  
AIDEE RODRIGUES ([id number])  
[facility] ([facility])  
[address] [place] [address]   
   
                      Protein [Mass/Vol] 6.9 g/dL   Normal     6.4-8.2    [facility]  
   
                                        Comment on above:   Performed By: #### 5  
7021-8 ####  
AIDEE RODRIGUES ([id number])  
[facility] ([facility])  
[address] [place] [address]   
   
                      Sodium [Moles/Vol] 135 mmol/L Low        136-145    [facility]  
   
                                        Comment on above:   Performed By: #### 5  
7021-8 ####  
AIDEE RODRIGUES ([id number])  
[facility] ([facility])  
[address] [place] [address]   
   
                                                    Urea nitrogen   
[Mass/Vol]      7 mg/dL         Normal          6-23            [facility]  
   
                                        Comment on above:   Performed By: #### 5  
7021-8 ####  
AIDEE RODRIGUES ([id number])  
[facility] ([facility])  
[address] [place] [address]   
   
                                                    LIPASEon 2024   
   
                                                    Lipase [Catalytic   
activity/Vol]   80 U/L                          23 - 300 U/L    [facility]  
   
                                                    LIPASE,SERUMon 2024   
   
                      LIPASE,SERUM 80 U/L     Normal          [facility]  
   
                                        Comment on above:   Performed By: #### C  
MPF, ACBC, LIPA2 ####  
Testing performed at [address] [address]   
   
                                                    No Panel Informationon    
   
                                                                  [facility]  
   
                                                    RAPID TOX SCREEN,URINEon    
   
                      BUPRENORPHINE Negative   Normal     NEGATIVE   [facility]  
   
                                        Comment on above:   Result Comment: <12.  
5 ng/ml CUTOFF   
   
                                                            Performed By: #### R  
TOX ####  
Testing performed at [address] [address]   
   
                      MDMA       Negative   Normal     NEGATIVE   [facility]  
   
                                        Comment on above:   Result Comment: <100  
0 ng/ml CUTOFF   
   
                                                            Performed By: #### R  
TOX ####  
Testing performed at [address] [address]   
   
                      OXYCODONE  Negative   Normal     NEGATIVE   [facility]  
   
                                        Comment on above:   Result Comment: <100  
 ng/ml CUTOFF   
   
                                                            Performed By: #### R  
TOX ####  
Testing performed at [address] [address]   
   
                      AMPHETAMINE Negative   Normal     NEGATIVE   [facility]  
   
                                        Comment on above:   Result Comment: <500  
 ng/ml CUTOFF   
   
                                                            Performed By: #### R  
TOX ####  
Testing performed at [facility]  
[address] [place] [address]   
   
                      BARBITURATES Negative   Normal     NEGATIVE   [facility]  
   
                                        Comment on above:   Result Comment: <200  
 ng/ml CUTOFF   
   
                                                            Performed By: #### R  
TOX ####  
Testing performed at [address] [place] [address]   
   
                      BENZODIAZEPINES Negative   Normal     NEGATIVE   [facility]  
   
                                        Comment on above:   Result Comment: <200  
 ng/ml CUTOFF   
   
                                                            Performed By: #### R  
TOX ####  
Testing performed at [address] [place] [address]   
   
                      CANNABINOIDS Positive   Abnormal   NEGATIVE   [facility]  
   
                                        Comment on above:   Result Comment: <50   
ng/ml CUTOFF  
*Unconfirmed Screening Result* Unconfirmed screening results   
are to be used only for medical treatment purposes.   
   
                                                            Performed By: #### R  
TOX ####  
Testing performed at [address] [place] [address]   
   
                      COCAINE    Negative   Normal     NEGATIVE   [facility]  
   
                                        Comment on above:   Result Comment: <150  
 ng/ml CUTOFF   
   
                                                            Performed By: #### R  
TOX ####  
Testing performed at [address] [place] [address]   
   
                      METHADONE  Negative   Normal     NEGATIVE   [facility]  
   
                                        Comment on above:   Result Comment: Meth  
adone Metabolite  
<100 ng/ml CUTOFF   
   
                                                            Performed By: #### R  
TOX ####  
Testing performed at [address] [place] [address]   
   
                      METHAMPHETAMINE Negative   Normal     NEGATIVE   [facility]  
   
                                        Comment on above:   Result Comment: <500  
 ng/ml CUTOFF   
   
                                                            Performed By: #### R  
TOX ####  
Testing performed at [address] [place] [address]   
   
                      OPIATES    Negative   Normal     NEGATIVE   [facility]  
   
                                        Comment on above:   Result Comment: <300  
 ng/ml CUTOFF   
   
                                                            Performed By: #### R  
TOX ####  
Testing performed at [address] [place] [address]   
   
                                                    TRICYCLIC   
ANTIDEPRESSANTS Negative        Normal          NEGATIVE        [facility]  
   
                                        Comment on above:   Result Comment: <100  
0 ng/ml CUTOFF   
   
                                                            Performed By: #### R  
TOX ####  
Testing performed at [address] [place] [address]   
   
                      FENTANYL   Negative   Normal     NEGATIVE   [facility]  
   
                                        Comment on above:   Performed By: #### R  
TOX ####  
Testing performed at [address] [place] [address]   
   
                                                    TOXICOLOGY DRUG SCREEN, URIN  
[place] 2024   
   
                                                    Amphetamine (U)   
[Mass/Vol]          Negative                                NEGATIVE   
NG/ML                                   [facility]   
[facility]  
   
                                        Comment on above:   <500 ng/ml CUTOFF   
   
                                                    Barbiturates Screen Ql   
(U)                 Negative                                NEGATIVE   
NG/ML                                   [employer]   
System  
   
                                        Comment on above:   <200 ng/ml CUTOFF   
   
                          Benzodiazepines Ql (U) Negative                  NEGAT  
BHAV[facility]   
NG/ML                                   [facility]   
[facility]  
   
                                        Comment on above:   <200 ng/ml CUTOFF   
   
                          Benzoylecgonine Ql (U) Negative                  NEGAT  
[facility]AV[facility]   
NG/ML                                   [facility]   
[facility]  
   
                                        Comment on above:   <150 ng/ml CUTOFF   
   
                          Buprenorphine Ql (U) Negative                  NEGATIV  
E   
NG/ML                                   [facility]   
[facility]  
   
                                        Comment on above:   <12.5 ng/ml CUTOFF   
   
                                                    Cannabinoids Screen Ql   
(U)                 Positive            Abnormal            NEGATIVE   
NG/ML                                   [employer]   
System  
   
                                        Comment on above:   <50 ng/ml CUTOFF  
*Unconfirmed Screening Result* Unconfirmed screening results   
are to be used only for medical treatment purposes.  
  
   
   
                          Fentanyl     Negative                  NEGATIVE   
NG/ML                                   [employer]  
   
                                                    Interpretation and   
review of laboratory   
results         Abnormal                                        [employer]   
System  
   
                          Methadone Screen Ql (U) Negative                  NEGA  
TIVE   
NG/ML                                   [facility]   
[facility]  
   
                                        Comment on above:   Methadone Metabolite  
<100 ng/ml CUTOFF  
  
   
   
                                                    Methamphetamine (U)   
[Mass/Vol]          Negative                                NEGATIVE   
NG/ML                                   [employer]   
System  
   
                                        Comment on above:   <500 ng/ml CUTOFF   
   
                                                    Methylenedioxymethamphe  
tamine Ql (Unsp spec) Negative                                NEGATIVE   
NG/ML                                   [facility]   
[facility]  
   
                                        Comment on above:   <1000 ng/ml CUTOFF   
   
                          Opiates Screen Ql (U) Negative                  NEGATI  
VE   
NG/ML                                   [facility]  
   
                                        Comment on above:   <300 ng/ml CUTOFF   
   
                          oxyCODONE Ql (U) Negative                  NEGATIVE   
NG/ML                                   [employer]   
System  
   
                                        Comment on above:   <100 ng/ml CUTOFF   
   
                                                    Tricyclic   
antidepressants Screen   
Ql (U)              Negative                                NEGATIVE   
NG/ML                                   [employer]   
System  
   
                                        Comment on above:   <1000 ng/ml CUTOFF   
   
                                                                  [employer]   
System  
   
                                                    Triacylglycerol lipaseon    
   
                                                    Lipase [Catalytic   
activity/Vol]   15 U/L          Normal          9-82            [facility]  
   
                                        Comment on above:   Order Comment: Venip  
uncture immediately after or during the   
administration of Metamizole may lead to falsely low results.   
Testing should be performed immediately prior to Metamizole   
dosing.   
   
                                                            Performed By: #### 5  
7021-8 ####  
HOSKINS RAVI ([id number])  
[facility] ([facility])  
[address] [place] [address]   
   
                                                    CT ABDOMEN PELVIS WITH IV CO  
NTRAST ONLYon 02-   
   
                                                    CT ABDOMEN PELVIS WITH   
IV CONTRAST ONLY                        EXAMINATION:  
CT ABDOMEN PELVIS WITH   
IV CONTRAST ONLY  
HISTORY:  
ORDERING SYSTEM   
PROVIDED HISTORY: Upper   
and periumbilical   
abdominal pain,  
TECHNOLOGIST PROVIDED   
HISTORY:  
Illness/Other  
Reason for exam:   
seizure yesterday,   
vomiting since  
Encounter Type: Initial  
Additional signs and   
symptoms: .  
ORDERING SYSTEM   
PROVIDED DIAGNOSIS   
CODES:  
COMPARISON:  
2022  
TECHNIQUE:  
CT examination of the   
abdomen and pelvis   
following the   
administration of  
intravenous contrast.   
Coronal and sagittal   
reformations were   
performed.  
Dose reduction   
techniques were   
achieved by using   
automated exposure  
control and/or   
adjustment of mA and/or   
kV according to patient   
size and/or  
use of iterative   
reconstruction   
technique.  
CONTRAST:  
IOPAMIDOL 370 MG   
IODINE/ML (76 %)   
INTRAVENOUS SOLUTION -   
75 mL,  
FINDINGS:  
Lung bases:The   
visualized lung bases   
are clear. Small hiatal   
hernia.  
Liver: Unremarkable.  
Gallbladder:   
Unremarkable.  
Pancreas: Unremarkable.  
Spleen: Unremarkable.  
Adrenal glands:   
Unremarkable.  
Kidneys: The kidneys   
enhance   
symmetrically.There is   
no hydronephrosis.No  
focal renal lesions.   
Evaluation for   
nephrolithiasis is   
limited due to  
contrast in the renal   
collecting systems   
bilaterally.  
Bladder:   
Underdistended.  
Bowel: Mild diffuse   
colonic wall thickening   
which may be secondary   
to mild  
colitis. No evidence of   
bowel obstruction.   
Status post   
appendectomy.  
Retroperitoneum: No   
lymphadenopathy or   
mass.  
Vasculature: The   
abdominal aorta is   
normal in caliber.  
Mesentery: No abdominal   
ascites or   
lymphadenopathy. No   
free air.  
Pelvis: Small amount of   
free fluid in the   
pelvis. No   
lymphadenopathy.  
Body Wall: Small fat   
containing umbilical   
hernia.  
Bones: No acute   
abnormality.  
IMPRESSION:  
1. Mild diffuse   
colitis. No evidence of   
bowel obstruction.  
2. Small hiatal hernia.  
3. Small amount of free   
fluid in the   
cul-de-sac.  
Workstation ID: [id number]  
Dictated by: CEDRICK COOK on Sat Feb 10,   
2024 2:41:42 PM EST  
Transcribed by:   
CEDRICK COOK on Sat   
Feb 10, 2024 2:41:42 PM   
EST  
Finalized by: CEDRICK COOK on Sat Feb 10,   
2024 2:41:42 PM EST Normal                                  [facility]  
   
                                        Comment on above:   Order Comment: Injur  
y/Trauma or Illness?:Illness/Other  
How long have you had these symptoms (acute/chronic)?:Acute  
Reason for exam?:seizure yesterday, vomiting since  
Type of Exam?:Initial  
Additional signs and symptoms?:.   
   
                                                    CT HEAD OR BRAIN WITHOUT CON  
TRASTon 02-   
   
                                                    CT HEAD OR BRAIN   
WITHOUT CONTRAST                        EXAMINATION:  
CT HEAD OR BRAIN   
WITHOUT CONTRAST,   
02/10/2024  
COMPARISON:  
None.  
HISTORY:  
Dx: R56.9 (Seizure   
(HCC))  
Injury/Trauma or   
Illness?: Illness/Other  
How long have you had   
these symptoms   
(acute/chronic)?: Acute  
Seizure  
TECHNIQUE:  
3 mm axial images   
performed through the   
head. 3 mm axial,   
sagittal and  
coronal MPR   
reconstructions   
performed.  
Dose reduction   
techniques were   
achieved by using   
automated exposure  
control and/or   
adjustment of mA and/or   
kV according to patient   
size and/or  
use of iterative   
reconstruction   
technique.  
FINDINGS:  
The third, fourth, and   
lateral ventricles are   
normal in size, shape   
and  
position. There is no   
evidence of acute   
hemorrhage, mass effect   
or  
midline shift.   
Visualized paranasal   
sinuses, mastoid air   
cells and bony  
structures are   
unremarkable.  
IMPRESSION:  
No acute intracranial   
abnormality identified.   
If this is patient's   
1st  
seizure workup, an MRI   
of the head, without   
and with contrast, is a   
much  
more sensitive modality   
in assessing for   
seizure foci.  
[employer]  
Workstation ID: [id number]  
Dictated by: COREEN SAMPSON on Sat Feb 10,   
2024 7:38:50 PM EST  
Transcribed by:   
KIRA GREEN on   
Sat Feb 10, 2024   
7:56:11 PM EST  
Finalized by: COREEN SAMPSON on Sat Feb 10,   
2024 8:15:26 PM EST Normal                                  [facility]  
   
                                        Comment on above:   Order Comment: Injur  
y/Trauma or Illness?:Illness/Other  
How long have you had these symptoms (acute/chronic)?:Acute  
Reason for exam?:seizures  
Type of Exam?:Initial  
Additional signs and symptoms?:vomiting, chills   
   
                                                    Bacteria identifiedon 2024   
   
                                                    Bacteria identified Cx   
Nom (U)                                 Test: Urine Culture  
Specimen Source: Clean   
Catch/Voided  
Specimen Type: Urine  
Specimen Date: 2024   
5:02 PM  
Result Date: 2024   
8:24 AM  
Result Status: Final   
result  
Abnormal: No  
Resulting Lab: [facility]   
LAB  
[address] [address]  
Tel: [phone]  
  
CULTURE  
------------------  
Normal genitourinary   
maddison               Normal                                  [facility]  
   
                                        Comment on above:   Performed By: #### 6  
30-4 ####  
KATIA RIVERA ([id number])  
[facility] ([facility])  
[address] [place] [address]   
   
                                                    CBC W Auto Differential pane  
l (Bld)on 2024   
   
                      Basophils (Bld) [#/Vol] 0.13 x10*3/uL High       0.00-0.10  
  [facility]  
   
                                        Comment on above:   Performed By: #### 5  
7021-8 ####  
AIDEE RODRIGUES ([id number])  
[facility] ([facility])  
[address] [place] [address]   
   
                      Basophils/100 WBC (Bld) 1.1 %      Normal     0.0-2.0    [facility]  
   
                                        Comment on above:   Performed By: #### 5  
7021-8 ####  
AIDEE RODRIGUES ([id number])  
[facility] ([facility])  
[address] [place] [address]   
   
                                                    Eosinophils (Bld)   
[#/Vol]         0.05 x10*3/uL   Normal          0.00-0.70       [facility]  
   
                                        Comment on above:   Performed By: #### 5  
7021-8 ####  
AIDEE RODRIGUES ([id number])  
[facility] ([facility])  
[address] [place] [address]   
   
                                                    Eosinophils/100 WBC   
(Bld)           0.4 %           Normal          0.0-6.0         [facility]  
   
                                        Comment on above:   Performed By: #### 5  
7021-8 ####  
AIDEE RODRIGUES ([id number])  
[facility] ([facility])  
[address] [place] [address]   
   
                                                    Erythrocyte   
distribution width   
(RBC) [Ratio]   12.3 %          Normal          11.5-14.5       [facility]  
   
                                        Comment on above:   Performed By: #### 5  
7021-8 ####  
AIDEE RODRIGUES ([id number])  
[facility] ([facility])  
[address] [place] [address]   
   
                                                    Hematocrit (Bld)   
[Volume fraction] 41.1 %          Normal          36.0-46.0       [facility]  
   
                                        Comment on above:   Performed By: #### 5  
7021-8 ####  
AIDEE RODRIGUES ([id number])  
[facility] ([facility])  
[address] [place] [address]   
   
                                                    Hemoglobin (Bld)   
[Mass/Vol]      14.0 g/dL       Normal          12.0-16.0       [facility]  
   
                                        Comment on above:   Performed By: #### 5  
7021-8 ####  
AIDEE RODRIGUES ([id number])  
[facility] ([facility])  
[address] [place] [address]   
   
                                                    Immature granulocytes   
(Bld) [#/Vol]   0.03 x10*3/uL   Normal          0.00-0.70       [facility]  
   
                                        Comment on above:   Performed By: #### 5  
7021-8 ####  
AIDEE RODRIGUES ([id number])  
[facility] ([facility])  
[address] [place] [address]   
   
                                                    Immature   
granulocytes/100 WBC   
(Bld)           0.3 %           Normal          0.0-0.9         [facility]  
   
                                        Comment on above:   Result Comment: Jossie  
ture Granulocyte Count (IG) includes   
promyelocytes, myelocytes and metamyelocytes but does not   
include bands. Percent differential counts (%) should be   
interpreted in the context of the absolute cell counts   
(cells/UL).   
   
                                                            Performed By: #### 5  
7021-8 ####  
AIDEE RODRIGUES (48830)  
[facility] ([facility])  
[address] [place] [address]   
   
                                                    Lymphocytes (Bld)   
[#/Vol]         1.82 x10*3/uL   Normal          1.20-4.80       [facility]  
   
                                        Comment on above:   Performed By: #### 5  
7021-8 ####  
AIDEE RODRIGUES ([id number])  
[facility] ([facility])  
[address] [place] [address]   
   
                                                    Lymphocytes/100 WBC   
(Bld)           15.9 %          Normal          13.0-44.0       [facility]  
   
                                        Comment on above:   Performed By: #### 5  
7021-8 ####  
AIDEE RODRIGUES ([id number])  
[facility] ([facility])  
[address] [place] [address]   
   
                                                    MCH (RBC) [Entitic   
mass]           29.6 pg         Normal          26.0-34.0       [facility]  
   
                                        Comment on above:   Performed By: #### 5  
7021-8 ####  
AIDEE RODRIGUES (56954)  
[facility] ([facility])  
[address] [place] [address]   
   
                      MCHC (RBC) [Mass/Vol] 34.1 g/dL  Normal     32.0-36.0  [facility]  
   
                                        Comment on above:   Performed By: #### 5  
7021-8 ####  
AIDEE RODRIGUES (34854)  
[facility] ([facility])  
[address] [place] [address]   
   
                      MCV (RBC) [Entitic vol] 87 fL      Normal          U  
[place]  
   
                                        Comment on above:   Performed By: #### 5  
7021-8 ####  
AIDEE RODRIGUES (33165)  
[facility] ([facility])  
[address] [place] [address]   
   
                      Monocytes (Bld) [#/Vol] 0.80 x10*3/uL Normal     0.10-1.00  
  [facility]  
   
                                        Comment on above:   Performed By: #### 5  
7021-8 ####  
AIDEE RODRIGUES ([id number])  
[facility] ([facility])  
[address] [place] [address]   
   
                      Monocytes/100 WBC (Bld) 7.0 %      Normal     2.0-10.0   [facility]  
   
                                        Comment on above:   Performed By: #### 5  
7021-8 ####  
AIDEE RODRIGUES (66088)  
[facility] ([facility])  
[address] [place] [address]   
   
                                                    Neutrophils (Bld)   
[#/Vol]         8.59 x10*3/uL   High            1.20-7.70       [facility]  
   
                                        Comment on above:   Result Comment: Perc  
ent differential counts (%) should be   
interpreted in the context of the absolute cell counts   
(cells/uL).   
   
                                                            Performed By: #### 5  
7021-8 ####  
AIDEE RODRIGUES (35009)  
[facility] ([facility])  
[address] [place] [address]   
   
                                                    Neutrophils/100 WBC   
(Bld)           75.3 %          Normal          40.0-80.0       [facility]  
   
                                        Comment on above:   Performed By: #### 5  
7021-8 ####  
AIDEE RODRIGUES ([id number])  
[facility] ([facility])  
[address] [place] [address]   
   
                                                    Nucleated RBC/100 WBC   
(Bld) [Ratio]   0.0 /100 WBCs   Normal          0.0-0.0         [facility]  
   
                                        Comment on above:   Performed By: #### 5  
7021-8 ####  
AIDEE RODRIGUES ([id number])  
[facility] ([facility])  
[address] [place] [address]   
   
                      Platelets (Bld) [#/Vol] 347 x10*3/uL Normal     150-450     
 [facility]  
   
                                        Comment on above:   Performed By: #### 5  
7021-8 ####  
AIDEE RODRIGUES ([id number])  
[facility] ([facility])  
[address] [place], [place] [address]   
   
                      RBC (Bld) [#/Vol] 4.73 x10*6/uL Normal     4.00-5.20  [facility]  
   
                                        Comment on above:   Performed By: #### 5  
7021-8 ####  
AIDEE RODRIGUES (13837)  
[facility] ([facility])  
[address] [place] [address]   
   
                      WBC (Bld) [#/Vol] 11.4 x10*3/uL High       4.4-11.3   [facility]  
   
                                        Comment on above:   Performed By: #### 5  
7021-8 ####  
AIDEE RODRIGUES ([id number])  
[facility] ([facility])  
[address] [place] [address]   
   
                                                    Comprehensive metabolic 2000  
 panelon 2024   
   
                                                    Albumin BCP dye   
[Mass/Vol]      5.0 g/dL        Normal          3.4-5.0         [facility]  
   
                                        Comment on above:   Performed By: #### 2  
4323-8 ####  
AIDEE RODRIGUES (98907)  
[facility] ([facility])  
[address] [place] [address]   
   
                                                    ALP [Catalytic   
activity/Vol]   49 U/L          Normal                    [facility]  
   
                                        Comment on above:   Performed By: #### 2  
4323-8 ####  
AIDEE RODRIGUES (10484)  
[facility] ([facility])  
[address] [place] [address]   
   
                                                    ALT With P-5'-P   
[Catalytic   
activity/Vol]   11 U/L          Normal          7-45            [facility]  
   
                                        Comment on above:   Result Comment: Kaleigh  
ents treated with Sulfasalazine may   
generate falsely decreased results for ALT.   
   
                                                            Performed By: #### 2  
4323-8 ####  
AIDEE RODRIGUES (26052)  
[facility] ([facility])  
[address] [place] [address]   
   
                      Anion gap [Moles/Vol] 23 mmol/L  High       10-20      [facility]  
   
                                        Comment on above:   Performed By: #### 2  
4323-8 ####  
AIDEE RODRIGUES (72376)  
[facility] ([facility])  
[address] [place] [address]   
   
                                                    AST With P-5'-P   
[Catalytic   
activity/Vol]   15 U/L          Normal          9-39            [facility]  
   
                                        Comment on above:   Performed By: #### 2  
4323-8 ####  
AIDEE RODRIGUES (39973)  
[facility] ([facility])  
[address] [place] [address]   
   
                      Bilirubin [Mass/Vol] 1.3 mg/dL  High       0.0-1.2    [facility]  
   
                                        Comment on above:   Performed By: #### 2  
4323-8 ####  
AIDEE RODRIGUES (34060)  
[facility] ([facility])  
[address] [place] [address]   
   
                      Calcium [Mass/Vol] 10.1 mg/dL Normal     8.6-10.3   [facility]  
   
                                        Comment on above:   Performed By: #### 2  
4323-8 ####  
AIDEE RODRIGUES (30935)  
[facility] ([facility])  
[address] [place] [address]   
   
                      Chloride [Moles/Vol] 102 mmol/L Normal          [facility]  
   
                                        Comment on above:   Performed By: #### 2  
4323-8 ####  
AIDEE RODRIGUES (78864)  
[facility] ([facility])  
[facility] [place] [address]   
   
                      CO2 [Moles/Vol] 18 mmol/L  Low        21-32      [facility]  
   
                                        Comment on above:   Performed By: #### 2  
4323-8 ####  
AIDEE RODRIGUES (34471)  
[facility] ([facility])  
[address] [place] [address]   
   
                      Creatinine [Mass/Vol] 0.79 mg/dL Normal     0.50-1.05  [facility]  
   
                                        Comment on above:   Performed By: #### 2  
4323-8 ####  
AIDEE RODRIGUES (81455)  
[facility] ([facility])  
[address] [place] [address]   
   
                                                    GFR/1.73 sq M.predicted   
MDRD (S/P/Bld) [Vol   
rate/Area]      mL/min/{1.73_m2} Normal          >60             [facility]  
   
                                        Comment on above:   Result Comment: Calc  
ulations of estimated GFR are performed   
using the  CKD-EPI Study Refit equation without the race   
variable for the IDMS-Traceable creatinine methods.  
https://jasn.asnjournals.org/content/early//ASN.  
245876   
   
                                                            Performed By: #### 2  
4323-8 ####  
AIDEE RODRIGUES ([id number])  
[facility] ([facility])  
[address] [place] [address]   
   
                      Glucose [Mass/Vol] 123 mg/dL  High       74-99      [facility]  
   
                                        Comment on above:   Performed By: #### 2  
4323-8 ####  
AIDEE RODRIGUES (68535)  
[facility] ([facility])  
[address] [place] [address]   
   
                      Potassium [Moles/Vol] 3.7 mmol/L Normal     3.5-5.3    [facility]  
   
                                        Comment on above:   Performed By: #### 2  
4323-8 ####  
AIDEE RODRIGUES (21450)  
[facility] ([facility])  
[address] [place] [address]   
   
                      Protein [Mass/Vol] 8.1 g/dL   Normal     6.4-8.2    [facility]  
   
                                        Comment on above:   Performed By: #### 2  
4323-8 ####  
AIDEE RODRIGUES (14712)  
[facility] ([facility])  
[address] [place] [address]   
   
                      Sodium [Moles/Vol] 139 mmol/L Normal     136-145    [facility]  
   
                                        Comment on above:   Performed By: #### 2  
4323-8 ####  
AIDEE RODRIGUES (87540)  
[facility] ([facility])  
[address] [place] [address]   
   
                                                    Urea nitrogen   
[Mass/Vol]      13 mg/dL        Normal          6-23            [facility]  
   
                                        Comment on above:   Performed By: #### 2  
4323-8 ####  
AIDEE RODRIGUES ([id number])  
[facility] ([facility])  
[facility] [place] [address]   
   
                                                    ECG 12-LEADon 2024   
   
                                        ECG 12-LEAD         Ventricular Rate  
136  
Atrial Rate  
136  
P-R Interval  
118  
QRS Duration  
76  
Q-T Interval  
312  
QTC Calculation(Bazett)  
469  
P Axis  
83  
R Axis  
91  
T Axis  
72  
QRS Count  
22  
Q Onset  
220  
P Onset  
161  
P Offset  
205  
T Offset  
376  
QTC Fredericia  
409  
Diagnosis  
Sinus tachycardia  
Rightward axis  
Borderline ECG  
When compared with ECG   
of 2022 17:28,  
Previous ECG has   
undetermined rhythm,   
needs review  
See ED provider note   
for full interpretation   
and clinical   
correlation  
Confirmed by Ranjana Eason ([id number]) on   
2024 6:58:48 PM Normal                                  [facility]  
   
                                                    FLUAV and FLUBV RNA KIRSTEN+prob  
e Nom (Unsp spec)on 2024   
   
                                                    FLUAV RNA KIRSTEN+probe Ql   
(Resp)          Not detected    Normal          Not Detected    [facility]  
   
                                        Comment on above:   Order Comment: This   
assay is an in vitro diagnostic multiplex   
nucleic acid amplification test for the detection and   
discrimination of Influenza A & B from nasopharyngeal   
specimens, and has been validated for use at [facility]. Negative results do not preclude   
Influenza A/B infections, and should not be used as the sole   
basis for diagnosis, treatment, or other management decisions.   
If Influenza A/B and RSV PCR results are negative, testing for   
Parainfluenza virus, Adenovirus and Metapneumovirus is   
routinely performed for [facility] pediatric oncology and intensive   
care inpatients, and is available on other patients by placing   
an add-on request.   
   
                                                            Performed By: #### 4  
8509-4 ####  
AIDEE RODRIGUES ([id number])  
[facility] ([facility])  
[address] [place] [address]   
   
                                                    FLUBV RNA KIRSTEN+probe Ql   
(Resp)          Not detected    Normal          Not Detected    [facility]  
   
                                        Comment on above:   Order Comment: This   
assay is an in vitro diagnostic multiplex   
nucleic acid amplification test for the detection and   
discrimination of Influenza A & B from nasopharyngeal   
specimens, and has been validated for use at [facility]. Negative results do not preclude   
Influenza A/B infections, and should not be used as the sole   
basis for diagnosis, treatment, or other management decisions.   
If Influenza A/B and RSV PCR results are negative, testing for   
Parainfluenza virus, Adenovirus and Metapneumovirus is   
routinely performed for [facility] pediatric oncology and intensive   
care inpatients, and is available on other patients by placing   
an add-on request.   
   
                                                            Performed By: #### 4  
8509-4 ####  
AIDEE RODRIGUES ([id number])  
[facility] ([facility])  
[address] [place] [address]   
   
                                                    Glucose Test strip manual (B  
ld) [Mass/Vol]on 2024   
   
                      Glucose [Mass/Vol] 129 mg/dL  High       74-99      [facility]  
   
                                        Comment on above:   Performed By: #### 2  
341-6 ####  
AIDEE RODRIGUES ([id number])  
[facility] ([facility])  
[address] [place] [address]   
   
                                                    HCG (pregnancy test) IA.rapi  
d Ql (U)on 2024   
   
                                                    HCG (pregnancy test) Ql   
(U)             Negative        Normal          NEGATIVE        [facility]  
   
                                        Comment on above:   Performed By: #### 8  
0384-1 ####  
AIDEE RODRIGUES ([id number])  
[facility] ([facility])  
[address] [place], [place] [address]   
   
                                                    SARS coronavirus 2 RNAon    
   
                                                    SARS-CoV-2 (COVID-19)   
RNA KIRSTEN+probe Ql (Resp) Not detected    Normal          Not Detected    [facility]  
   
                                        Comment on above:   Order Comment: This   
assay has received FDA Emergency Use   
Authorization (EUA) and is only authorized for the duration of   
time that circumstances exist to justify the authorization of   
the emergency use of in vitro diagnostic tests for the   
detection of SARS-CoV-2 virus and/or diagnosis of COVID-19   
infection under section 564(b)(1) of the Act, 21 U.S.C.   
360bbb-3(b)(1). This assay is an in vitro diagnostic nucleic   
acid amplification test for the qualitative detection of   
SARS-CoV-2 from nasopharyngeal specimens and has been   
validated for use at [facility].   
Negative results do not preclude COVID-19 infections and   
should not be used as the sole basis for diagnosis, treatment,   
or other management decisions.   
   
                                                            Performed By: #### 9  
4500-6 ####  
AIDEE RODRIGUES ([id number])  
[facility] ([facility])  
[address] [place] [address]   
   
                                                    Urinalysis complete W Reflex  
 Culture panel (U)on 2024   
   
                      Appearance (U) Hazy       Normal     Clear      [facility]  
   
                                        Comment on above:   Performed By: #### 5  
8077-9 ####  
AIDEE RODRIGUES ([id number])  
[facility] ([facility])  
[address] [place] [address]   
   
                                                    Bilirubin (U)   
[Mass/Vol]      Negative        Normal          NEGATIVE        [facility]  
   
                                        Comment on above:   Performed By: #### 5  
8077-9 ####  
AIDEE RODRIGUES (10083)  
[facility] ([facility])  
[address] [place] [address]   
   
                          Color (U)    Yellow       Normal       Straw,   
Yellow                                  [facility]  
   
                                        Comment on above:   Performed By: #### 5  
8077-9 ####  
AIDEE RODRIGUES ([id number])  
[facility] ([facility])  
[address] [place] [address]   
   
                                                    Glucose Auto test strip   
(U) [Mass/Vol]  Negative        Normal          NEGATIVE        [facility]  
   
                                        Comment on above:   Performed By: #### 5  
8077-9 ####  
AIDEE RODRIGUES ([id number])  
[facility] ([facility])  
[address] [place] [address]   
   
                      Ketones (U) [Mass/Vol] 80 (2+)    Abnormal   NEGATIVE   [facility]  
   
                                        Comment on above:   Performed By: #### 5  
8077-9 ####  
AIDEE RODRIGUES ([id number])  
[facility] ([facility])  
[address] [place] [address]   
   
                                                    Leukocyte esterase Auto   
test strip Ql (U) TRACE           Abnormal        NEGATIVE        [facility]  
   
                                        Comment on above:   Performed By: #### 5  
8077-9 ####  
AIDEE RODRIGUES (56144)  
[facility] ([facility])  
[address] [place] [address]   
   
                                                    Nitrite Auto test strip   
Ql (U)          Negative        Normal          NEGATIVE        [facility]  
   
                                        Comment on above:   Performed By: #### 5  
8077-9 ####  
AIDEE RODRIGUES ([id number])  
[facility] ([facility])  
[address] [place] [address]   
   
                          pH (U)       5.0 [pH]     Normal       5.0, 5.5,   
6.0, 6.5,   
7.0, 7.5,   
8.0                                     [facility]  
   
                                        Comment on above:   Performed By: #### 5  
8077-9 ####  
AIDEE RODRIGUES ([id number])  
[facility] ([facility])  
[address] [place] [address]   
   
                      Protein (U) [Mass/Vol] 100 (2+)   Normal     NEGATIVE   [facility]  
   
                                        Comment on above:   Performed By: #### 5  
8077-9 ####  
AIDEE RODRIGUES (49975)  
[facility] ([facility])  
[address] [place] [address]   
   
                      RBC (U) [#/Vol] Negative   Normal     NEGATIVE   [facility]  
   
                                        Comment on above:   Performed By: #### 5  
8077-9 ####  
AIDEE RODRIGUES ([id number])  
[facility] ([facility])  
[address] [place] [address]   
   
                                                    Specific gravity (U)   
[Rel density]   1.030           Normal          1.005-1.035     [facility]  
   
                                        Comment on above:   Performed By: #### 5  
8077-9 ####  
AIDEE RODRIGUES (26069)  
[facility] ([facility])  
[address] [place] [address]   
   
                                                    Urobilinogen (U)   
[Mass/Vol]      mg/dL           Normal          <2.0            [facility]  
   
                                        Comment on above:   Performed By: #### 5  
8077-9 ####  
AIDEE RODRIGUES (06097)  
[facility] ([facility])  
[address] [place] [address]   
   
                                                    Urinalysis microscopic panel  
 Auto Ql (U)on 2024   
   
                                                    Bacteria Auto (Urine   
sed) [#/Area]   1+ /HPF         Abnormal        NONE SEEN       [facility]  
   
                                        Comment on above:   Performed By: #### 5  
3315-8 ####  
AIDEE RODRIGUES (74478)  
[facility] ([facility])  
[address] [place] [address]   
   
                                                    Epithelial   
cells.squamous Auto   
(Urine sed) [#/Area] 1-9 (SPARSE)        Normal              Reference   
range not   
established.                            [facility]  
   
                                        Comment on above:   Performed By: #### 5  
3315-8 ####  
AIDEE RODRIGUES (40058)  
[facility] ([facility])  
[address] [place] [address]   
   
                                                    Mucus Auto (Urine sed)   
[#/Area]            2+ /LPF             Normal              Reference   
range not   
established.                            [facility]  
   
                                        Comment on above:   Performed By: #### 5  
3315-8 ####  
AIDEE RODRIGUES ([id number])  
[facility] ([facility])  
[address] [place] [address]   
   
                                                    RBC Auto (Urine sed)   
[#/Area]            1-2                 Normal              NONE, 1-2,   
3-5                                     [facility]  
   
                                        Comment on above:   Performed By: #### 5  
3315-8 ####  
AIDEE RODRIGUES (05705)  
[facility] ([facility])  
[address] [place], [place] [address]   
   
                                                    WBC Auto (Urine sed)   
[#/Area]        1-5             Normal          1-5, NONE       [facility]  
   
                                        Comment on above:   Performed By: #### 5  
3315-8 ####  
AIDEE RODRIGUES (66435)  
[facility] ([facility])  
[address] [place] [address]   
   
                                                    BASIC METABOLIC PANELon    
   
                      Anion gap [Moles/Vol] 13 mmol/L  Normal     10 - 20    [facility]  
   
                                        Comment on above:   Performed By: #### B  
MP ####[address] [address]   
   
                      Calcium [Mass/Vol] 8.6 mg/dL  Normal     8.6 - 10.3 [facility]  
   
                                        Comment on above:   Performed By: #### B  
MP ####[address] [address]   
   
                      Chloride [Moles/Vol] 104 mmol/L Normal     98 - 107   [facility]  
   
                                        Comment on above:   Performed By: #### B  
MP ####[address] [address]   
   
                      Creatinine [Mass/Vol] 0.45 mg/dL Low        0.50 - 1.05 [facility]  
   
                                        Comment on above:   Performed By: #### B  
MP ####[address] [address]   
   
                      eGFR FEMALE >90        Normal     >90        [facility]  
   
                                        Comment on above:   Result Comment: CALC  
ULATIONS OF ESTIMATED GFR ARE PERFORMED  
USING THE  CKD-EPI STUDY REFIT EQUATION  
WITHOUT THE RACE VARIABLE FOR THE IDMS-TRACEABLE  
CREATININE METHODS.  
https://jasn.asnjournals.org/content/early//ASN.  
605573   
   
                                                            Performed By: #### B  
MP ####[address] [address]   
   
                      Glucose [Mass/Vol] 79 mg/dL   Normal     74 - 99    [facility]  
   
                                        Comment on above:   Performed By: #### B  
MP ####[address] [address]   
   
                      HCO3 (Bld) [Moles/Vol] 22 mmol/L  Normal     21 - 32    [facility]  
   
                                        Comment on above:   Performed By: #### B  
MP ####[address] [address]   
   
                      Potassium [Moles/Vol] 3.4 mmol/L Low        3.5 - 5.3  [facility]  
   
                                        Comment on above:   Performed By: #### B  
MP ####[place] [id number]   
   
                      Sodium [Moles/Vol] 136 mmol/L Normal     136 - 145  [facility]  
   
                                        Comment on above:   Performed By: #### B  
MP ####[place] [id number]   
   
                                                    Urea nitrogen   
[Mass/Vol]      5 mg/dL         Low             6 - 23          [facility]  
   
                                        Comment on above:   Performed By: #### B  
MP ####[place] [id number]   
   
                                                    CBCon 2022   
   
                                                    Erythrocyte   
distribution width   
(RBC) [Ratio]   14.5 %          Normal          11.5 - 14.5     [facility]  
   
                                        Comment on above:   Performed By: #### P  
TINR ####  
[place] [id number]   
   
                                                    Hematocrit (Bld)   
[Volume fraction] 35.5 %          Low             36.0 - 46.0     [facility]  
   
                                        Comment on above:   Performed By: #### P  
TINR ####  
[place] [id number]   
   
                                                    Hemoglobin (Bld)   
[Mass/Vol]      11.6 g/dL       Low             12.0 - 16.0     [facility]  
   
                                        Comment on above:   Performed By: #### P  
TINR ####  
[place] [id number]   
   
                      MCHC (RBC) [Mass/Vol] 32.6 g/dL  Normal     32.0 - 36.0 [facility]  
   
                                        Comment on above:   Performed By: #### P  
TINR ####  
[place] [address]   
   
                      MCV (RBC) [Entitic vol] 85 fL      Normal     80 - 100   S  
[facility]  
   
                                        Comment on above:   Performed By: #### P  
TINR ####  
[place] [id number]   
   
                      Platelets (Bld) [#/Vol] 156 10*3/uL Normal     150 - 450    
[facility]  
   
                                        Comment on above:   Performed By: #### P  
TINR ####  
[place] [id number]   
   
                      RBC        4.18 x10E12/L Normal     4.00 - 5.20 [facility]  
   
                                        Comment on above:   Performed By: #### P  
TINR ####  
[place] [address] [place] [address]   
   
                      WBC (Bld) [#/Vol] 3.3 10*3/uL Low        4.4 - 11.3 [facility]  
   
                                        Comment on above:   Performed By: #### P  
TINR ####  
[address] [address]   
   
                                                    Daily Progress Note-Nephrolo  
gyon 2022   
   
                                                    Daily Progress   
Note-Nephrology                         Service: Nephrology  
  
Subjective Data:  
DANNA SOARES   
is a 21 year old Female   
who is Hospital Day #   
3.  
  
Patient seen and   
examined at the bedside   
this morning  
She is resting   
comfortably in bed  
She states that she is   
feeling better  
She is not having any   
more nausea vomiting or   
diarrhea however she   
also has not  
eaten  
No other major   
complaints at this   
time.  
  
Objective Data:  
  
Objective Information:  
T PRBPSpO2  
[id number]/7296%  
Date/Time3/11 21:   
21: 21:   
21: 21:29  
Range(36.6C - 36.8C )   
(80 - 87 ) (16 - 17 )   
(112 - 117 )/ (72 - 78   
) (96%  
- 100% )  
  
  
Pain reported at 3/11   
21:29: 6 = Moderate  
  
Physical Exam by   
System:  
  
Constitutional: Awake,   
alert, oriented, no   
acute distress  
Eyes: Extraocular   
muscles intact  
ENMT: Moist mucous   
membranes  
Head/Neck:   
Normocephalic,   
atraumatic  
Respiratory/Thorax:   
Bilateral equal breath   
sounds  
Cardiovascular: Regular   
rate and rhythm  
Gastrointestinal: Soft,   
nontender,   
nondistended, positive   
bowel sounds  
Musculoskeletal: Moving   
all extremities  
Extremities: No   
peripheral edema  
Neurological: Awake,   
alert, oriented  
Psychological:   
Cooperative  
Skin: Warm and dry  
  
Medication:  
  
Medications:  
  
Continuous Medications  
-----------------------  
---------  
  
1. Lactated Ringers   
Infusion: 1000 mL   
IntraVenous  
  
Scheduled Medications  
-----------------------  
---------  
  
1. LORazepam   
Injectable: 1 mg   
IntraVenous Push Every   
6 Hours  
2. Oseltamivir: 75 mg   
Oral Every 12 Hours  
3. Pantoprazole   
Injectable: 40 mg   
IntraVenous Push Every   
12 Hours  
4. Potassium Chloride   
Extended Release: 40   
mEq Oral 2 Times a Day  
  
PRN Medications  
-----------------------  
---------  
  
1. Acetaminophen: 650   
mg Oral Every 4 Hours  
2. Ondansetron   
Injectable: 4 mg   
IntraVenous Push Every   
6 Hours  
3. oxyCODONE Immediate   
Release: 5 mg Oral   
Every 6 Hours  
4. Promethazine IV   
Piggy Back: 12.5 mg   
IntraVenous Piggyback   
Every 6 Hours  
  
5. Sore Throat Lozenge:   
1 lozenge(s) Oral Every   
4 Hours  
  
  
Recent Lab Results:  
  
Results:  
CBC: 3/12/2022 05:40  
  
\ Hgb / \ 11.6 L /  
WBC ----------------   
Plt 3.3 L   
---------------- 156  
/ Hct \ / 35.5 L \  
  
RBC: 4.18 MCV: 85  
  
  
BMP: 3/12/2022 05:40  
NA+   Cl-   BUN / 136     
104   5 L /  
-----------------------  
--------- Glucose  
-----------------------  
---- 79  
K+   HCO3-   Creat \   
3.4 L  22   0.45 L \  
Calcium : 8.6 Anion Gap   
: 13  
  
  
Assessment and Plan:  
Code Status:  
Code StatusFull Code  
  
Assessment:  
Normal anion gap   
metabolic acidosis:   
Resolved  
Hypokalemia  
Very low osmolar state  
Nausea/vomiting/diarrhe  
a: Currently resolved  
Depression  
History of marijuana   
abuse  
Influenza A  
Leukopenia  
Malnourishment  
  
Plan:  
Her metabolic acidosis   
has resolved as   
suspected likely   
secondary to her  
diarrhea that she was   
having  
I will stop her IV   
fluids  
We will replace her   
potassium  
She needs a   
high-protein diet and   
better osmole intake   
with her malnourishment  
and very low osmolar   
state  
I will sign off at this   
time  
Please call with any   
further issues or needs  
  
  
Electronic Signatures:  
Srini Messina ()   
(Signed 12-Mar-2022   
07:42)  
Authored: Service,   
Subjective Data,   
Objective Data,   
Assessment and Plan,   
Note  
Completion  
  
  
Last Updated:   
12-Mar-2022 07:42 by   
Srini Messina (DO) Normal                                  [facility]  
   
                                                    Discharge [id number]   
022   
   
                                        Discharge Profile2  Discharge Orders:  
Anticipated Discharge   
Date:  
Anticipated Discharge   
[id number]  
  
Anticipated Discharge   
Time12:00  
  
DNAR:  
Code Status at   
Discharge: Full Code  
  
Diet:  
Dietregular, high   
protein  
  
Labs 1:  
Lab Test(s)Basic   
Metabolic Panel  
Date To Be Drawn3/14  
Call Results ToPCP  
Fax Results ToPCP  
Lab InstructionsWalk-in   
lab, no appointment   
required.  
Commentsmonitor   
potassium  
  
Additional Orders:  
Additional Instructions  
Follow up with PCP   
within 1 week of   
discharge  
Continue Tamiflu for   
one more day (tomorrow)   
on discharge  
Continue home   
medications  
No new medications at   
time of discharge  
High protein regular   
diet  
  
Call Provider If   
(Homegoing Patients):  
Breathing faster than   
normal.  
  
Breathing harder than   
normal or having   
retractions.  
  
Fever of 100.4 F (38 C)   
or higher.  
  
Temperature is greater   
than 102 degrees.  
  
Chills.  
  
Drinking less than   
normal.  
  
Not being able to go   
4-6 hours between   
albuterol treatments.  
  
Urinating less than   
normal, over 1 day.  
  
Urinating less than 4   
times per day.  
  
Acting very sleepy and   
difficult to awaken.  
  
Vomiting (throwing up)   
and not able to eat or   
drink for 12 hours.  
  
3 or more loose, watery   
bowel movements in 24   
hours (diarrhea).  
  
Any new concerning   
symptoms.  
  
Hospital Course (Home   
Care/Gold Form):  
Hospital Course:  
Hospital Course:   
include significant   
abnormal lab values  
HPI:  
GÉNESISSDDANNA CASTRO   
is a 21 year old Female   
with pmh of hyperemesis  
syndrome secondary to   
cannanboid use, major   
depression disorder who   
had  
hospitalization in   
2022 for   
similar presentation as   
today. She has 3  
emergency room visits   
in the past few days   
with today being the   
3rd visit. She  
was seen in another ED   
on the  and our ED   
on the  she had   
n/v/diarrhea  
and abdominal pain. She   
has been having   
diarrhea nausea and   
vomiting for 3  
days. On the  she   
developed abdominal   
pain that hurts with   
movement and on  
inspiration. CT abdomen   
pelvis negative and did   
not reveal acute   
pathology. She  
presented today not   
feeling any better. she   
was treated yesterday   
with  
phenergan and bentyl.   
She was diagnosed with   
influenza A and dc to   
home with  
tamiflu, bentyl and   
phenergan. On   
presentation today she   
continued to vomit was  
unable to keep down any   
liquids or her nausea   
medicine so she   
presented to  
[facility] emergency   
department for further   
evaluation. She has  
had decrease in   
urination. She denies   
any recent fevers. She   
is currently on  
her menses with a   
negative pregnancy test   
2 days ago. She had   
low-grade  
temperature 99.3, blood   
pressure 134/99 heart   
rate 82 respirations 18   
pulse  
oxygen saturation 98%   
on room air. Her   
laboratory values   
showed a bicarbonate  
of 14 and yesterday was   
12, normal creatinine,   
potassium 3.5 sodium   
135 and  
glucose 93. Urinalysis   
with ketones 80 and a   
large amount of blood   
white cells  
2, 1+ mucus. Urine   
toxicology screen   
showed cannabinoid   
positive. She states  
that she last smoked   
marijuana a week ago.   
In the emergency   
department she was  
treated with 2 L of   
normal saline and is   
being admitted for   
further evaluation.  
  
Past medical history:   
Major depressive   
disorder, hyperemesis   
syndrome secondary  
to cannabinoid  
Past surgical history:   
Appendectomy, right   
index finger surgery,   
ORIF of right  
wrist, tonsillectomy   
and adenoidectomy  
Family history:   
Father-diabetes  
Social history: Patient   
is single, she is   
cannabinoid dependent,   
denies  
nicotine or alcohol   
use.  
  
Allergies: Codeine   
MiraLAX  
Medications: Reviewed   
and reconciled  
  
Hospital course:   
Patient was started on   
Tamiflu on 3/9 which is   
4/5 day  
treatment. She will   
continue 1 day on   
discharge. She was   
given IV fluids and  
electrolytes replaced   
accordingly.   
Nausea/vomiting and   
abdominal pain likely  
related to hyperemesis   
syndrome secondary to   
cannabinoid use. She   
was seen by  
critical care Dr. Messina   
for normal anion gap   
metabolic acidosis   
which resolved.  
She has very low   
osmolar state and   
recommends a high   
protein diet on   
discharge.  
She indicates today she   
feels better than she   
has and requests to go   
home. She  
was counseled on   
marijuana cessation at   
time of discharge.  
  
Provider FINAL REVIEW   
of Orders:  
Final Review:  
Final Review of   
Medication   
Reconciliation and   
Orders Completedby APRN  
Reviewing   
ProviderFLYNN Graff at 12-Mar-2022   
10:53:01  
  
Appointments:  
Follow-Up Appointment   
01:  
Physician/Dept/ServiceP  
JOSLYN  
Reason for   
Referralfollow up post   
discharge  
Call to Schedule in1   
week  
CommentsYou were given   
a list of primary care   
physicians please call   
one and  
get established  
  
  
Electronic Signatures:  
Brittany Krishnamurthy ([facility]   
SECT) (Signed   
12-Mar-2022 11:23)  
Authored: Discharge   
Orders, Appointments  
Alyssa Beckwith   
(APRN-CNP) (Signed   
12-Mar-2022 10:53)  
Authored: Discharge   
Orders, Hospital Course   
(Home Care/Gold Form),   
Provider  
FINAL REVIEW of Orders,   
Appointments, Gold Form   
-    
Summary  
  
  
Last Updated: 12-Mar-20   
(more content not   
included)...        Normal                                  [facility]  
   
                                                    MAGNESIUMon 2022   
   
                      Magnesium [Mass/Vol] 1.87 mg/dL Normal     1.60 - 2.40 [facility]  
   
                                        Comment on above:   Performed By: #### M  
G ####  
[facility]  
[address] [place] [address]   
   
                                                    Order Reconciliationon    
   
                                        Order Reconciliation Page 1  
  
Discharge   
Reconciliation Document  
  
  
  
Reconciliation Type:   
Discharge requested on   
behalf of Alyssa Beckwith (Advanced  
Practice Nurse) done by   
Alyssa Beckwith   
(APRN-CNP)  
  
Discharge - Partial   
Reconciliation:   
12-Mar-2022 10:42 by:   
Alyssa Beckwith  
(APRN-CNP)  
Discharge -   
Reconciliation:   
12-Mar-2022 10:51 by:   
Alyssa Beckwith   
(APRN-CNP)  
  
Home Medications   
EnteredHOME MEDICATIONS   
AT DISCHARGE   
DateReconciliation  
Comment/ Additional   
Information  
dicyclomine 20 mg oral   
tablet 1 tab(s) orally   
4 times a day (1 hour   
before  
meals and at bedtime)   
09-Mar-2022 12:08   
Discontinued;   
Discontinue  
from ORM  
  
dicyclomine 20 mg oral   
tablet is not required  
ondansetron 4 mg oral   
tablet, disintegrating   
1 tab(s) orally 3 times   
a day  
06-Mar-2022 16:21   
ondansetron 4 mg oral   
tablet, disintegrating   
1 tab(s)  
orally 3 times a day   
06-Mar-2022 16:21   
ondansetron 4 mg oral   
tablet,  
disintegrating is   
continued as   
ondansetron 4 mg oral   
tablet, disintegrating  
oseltamivir 75 mg oral   
capsule 1 cap(s) orally   
2 times a day   
09-Mar-2022  
12:12 oseltamivir 75 mg   
oral capsule 1 cap(s)   
orally 2 times a day  
13-Mar-2022 10:50   
Discontinued;   
Copy/Discontinue  
  
oseltamivir 75 mg oral   
capsule is continued   
and modified  
Promethegan 25 mg   
rectal suppository 1   
suppository(ies)   
rectally every 6 hours  
09-Mar-2022 12:09   
Discontinued;   
Discontinue from ORM  
  
Promethegan 25 mg   
rectal suppository is   
not required  
  
  
Current OrdersDateHOME   
MEDICATIONS AT   
DISCHARGE   
DateReconciliation   
Comment/  
Additional Information  
Acetaminophen Tablet   
(TYLENOL)DOSE = 650 mg   
Oral Every 4 Hours, PRN   
Pain -  
Mild (1-3) 10-Mar-2022   
16:15 Acetaminophen is   
not required  
LORazepam Injectable   
(ATIVAN)DOSE = 1 mg   
IntraVenous Push Every   
6 Hours  
10-Mar-2022 16:15   
LORazepam Injectable is   
not required  
Ondansetron Injectable   
(ZOFRAN)DOSE = 4 mg   
IntraVenous Push Every   
6 Hours,  
PRN Nausea and/or   
Vomiting 10-Mar-2022   
19:57 Ondansetron   
Injectable  
is not required  
Oseltamivir Capsule   
(TAMIFLU)DOSE = 75 mg   
Oral Every 12 HoursStop   
After 5  
Days 10-Mar-2022 16:22   
Oseltamivir is not   
required  
oxyCODONE Immediate   
Release Tablet (OXYIR,   
ROXICODONE)DOSE = 5 mg   
Oral Every  
6 Hours, PRN Pain - Mod   
(4-6) 11-Mar-2022 17:51   
oxyCODONE Immediate  
Release is not required  
Pantoprazole Injectable   
(PROTONIX)DOSE = 40 mg   
IntraVenous Push Every   
12  
Hours 10-Mar-2022 16:15   
Pantoprazole Injectable   
is not required  
Potassium Chloride   
Extended Release   
Tablet, Extended   
ReleaseDOSE = 40 mEq   
Oral  
2 Times a DayStop After   
2 Doses 12-Mar-2022   
06:47 Potassium   
Chloride  
Extended Release is not   
required  
Promethazine IV Piggy   
Back in Sodium Chloride   
0.9% 50 mL   
(PHENERGAN)DOSE =  
12.5 mg Every 6 Hours,   
PRN NauseaRecommended   
Infusion Time: 15   
minute(s)  
10-Mar-2022 16:20   
Promethazine IV Piggy   
Back is not required  
Sore Throat Lozenge   
LozengeDOSE = 1   
lozenge(s) Oral Every 4   
Hours, PRN Sore  
ThroatCa   
lozenge(s)/DOSE x 1 = 1   
lozenge(s)/Dose (Daily   
Total is 6  
lozenge(s)) 11-Mar-2022   
17:52 Sore Throat   
Lozenge is not required  
  
  
All Active Home   
Medications at time of   
Discharge   
Reconciliation:   
12-Mar-2022  
10:51  
ondansetron 4 mg oral   
tablet, disintegrating   
1 tab(s) orally 3 times   
a day  
oseltamivir 75 mg oral   
capsule 1 cap(s) orally   
2 times a day       Normal                                  [facility]  
   
                                                    PHOSPHORUSon 2022   
   
                      Phosphate [Mass/Vol] 2.9 mg/dL  Normal     2.5 - 4.9  [facility]  
   
                                        Comment on above:   Result Comment: The   
performance characteristics of phosphorus   
testing in  
heparinized plasma have been validated by the individual  
 laboratory site where testing is performed. Testing  
on heparinized plasma is not approved by the FDA;  
however, such approval is not necessary.   
   
                                                            Performed By: #### P  
HOS ####  
[address] [address]   
   
                                                    BASIC METABOLIC PANELon    
   
                      Anion gap [Moles/Vol] 13 mmol/L  Normal     10 - 20    [facility]  
   
                                        Comment on above:   Performed By: #### P  
TINR ####  
[address] [address]   
   
                      Calcium [Mass/Vol] 8.3 mg/dL  Low        8.6 - 10.3 [facility]  
   
                                        Comment on above:   Performed By: #### P  
TINR ####  
[address] [address]   
   
                      Chloride [Moles/Vol] 106 mmol/L Normal     98 - 107   [facility]  
   
                                        Comment on above:   Performed By: #### P  
TINR ####  
[address] [address]   
   
                      Creatinine [Mass/Vol] 0.40 mg/dL Low        0.50 - 1.05 [facility]  
   
                                        Comment on above:   Performed By: #### P  
TINR ####  
[address] [address]   
   
                      eGFR FEMALE >90        Normal     >90        [facility]  
   
                                        Comment on above:   Result Comment: CALC  
ULATIONS OF ESTIMATED GFR ARE PERFORMED  
USING THE  CKD-EPI STUDY REFIT EQUATION  
WITHOUT THE RACE VARIABLE FOR THE IDMS-TRACEABLE  
CREATININE METHODS.  
https://jasn.asnjournals.org/content/early/ASN.  
265136   
   
                                                            Performed By: #### P  
TINR ####  
[address] [address]   
   
                      Glucose [Mass/Vol] 89 mg/dL   Normal     74 - 99    [facility]  
   
                                        Comment on above:   Performed By: #### P  
TINR ####  
[address] [address]   
   
                      HCO3 (Bld) [Moles/Vol] 20 mmol/L  Low        21 - 32    [facility]  
   
                                        Comment on above:   Performed By: #### P  
TINR ####  
[address] [address]   
   
                      Potassium [Moles/Vol] 3.0 mmol/L Low        3.5 - 5.3  [facility]  
   
                                        Comment on above:   Performed By: #### P  
TINR ####  
[address] [address]   
   
                      Sodium [Moles/Vol] 136 mmol/L Normal     136 - 145  [facility]  
   
                                        Comment on above:   Performed By: #### P  
TINR ####  
[place] [id number]   
   
                                                    Urea nitrogen   
[Mass/Vol]      2 mg/dL         Low             6 - 23          [facility]  
   
                                        Comment on above:   Performed By: #### P  
TINR ####  
[address] [address]   
   
                                                    CBCon 2022   
   
                                                    Erythrocyte   
distribution width   
(RBC) [Ratio]   14.4 %          Normal          11.5 - 14.5     [facility]  
   
                                        Comment on above:   Performed By: #### H  
EPFP ####  
[address] [address]   
   
                                                    Hematocrit (Bld)   
[Volume fraction] 35.3 %          Low             36.0 - 46.0     [facility]  
   
                                        Comment on above:   Performed By: #### H  
EPFP ####  
[address] [address]   
   
                                                    Hemoglobin (Bld)   
[Mass/Vol]      11.8 g/dL       Low             12.0 - 16.0     [facility]  
   
                                        Comment on above:   Performed By: #### H  
EPFP ####  
[address] [address]   
   
                      MCHC (RBC) [Mass/Vol] 33.3 g/dL  Normal     32.0 - 36.0 [facility]  
   
                                        Comment on above:   Performed By: #### H  
EPFP ####  
[address] [address]   
   
                      MCV (RBC) [Entitic vol] 85 fL      Normal     80 - 100   S  
[facility]  
   
                                        Comment on above:   Performed By: #### H  
EPFP ####  
[address] [address]   
   
                      Platelets (Bld) [#/Vol] 151 10*3/uL Normal     150 - 450    
[facility]  
   
                                        Comment on above:   Performed By: #### H  
EPFP ####  
[address] [address]   
   
                      RBC        4.14 x10E12/L Normal     4.00 - 5.20 [facility]  
   
                                        Comment on above:   Performed By: #### H  
EPFP ####  
[address] [address]   
   
                      WBC (Bld) [#/Vol] 3.4 10*3/uL Low        4.4 - 11.3 [facility]  
   
                                        Comment on above:   Performed By: #### H  
EPFP ####  
[address] [address]   
   
                                                    Consult-Nephrologyon   
022   
   
                                        Consult-Nephrology  Service:  
Service: Nephrology  
  
Consult:  
Consult requested by   
(Attending Name): Mitra Camara  
Reason: ACUTE NON ANION   
GAP METABOLIC ACIDOSIS-   
BICARB 14- RECEIVED 2   
LITERS  
NORMAL SALINE IN ED.   
REPEAT 12 NOW ON LR AT   
200 ML/HR  
  
History of Present   
Illness:  
HPI:  
DANNA SOARES ANTONIO   
is a 21 year old Female  
  
She presented to the   
emergency room   
secondary to complaints   
of nausea vomiting  
and diarrhea. She also   
was having abdominal   
pain. She presented   
multiple  
times to the emergency   
room and was also seen   
back in January for   
very similar  
complaints  
  
I was consulted to see   
her secondary to   
metabolic acidosis  
  
She was found to have a   
severe metabolic   
acidosis likely   
secondary to her acute  
illness of nausea   
vomiting and diarrhea  
  
She has a past medical   
history of depression,   
hyperemesis, marijuana   
use  
  
Her past surgical   
history includes an   
appendectomy, right   
index finger surgery,  
wrist surgery,   
tonsillectomy,   
adenoidectomy  
  
Her family history   
includes diabetes in   
her father  
  
Her social history   
includes that she is   
single. She does use   
marijuana  
  
This a.m. when I met   
her she is resting   
comfortably in bed  
She states that she is   
tired  
She denies nausea   
vomiting and diarrhea   
at this time she states   
that since she  
has been admitted here   
this is all much better   
for her and much   
improved  
  
She denies abdominal   
pain currently  
She states that she is   
having some bowel   
movements and also   
urinating well  
  
A full 10 point review   
of systems was obtained   
is negative except HPI   
as above  
  
Review Family/Social   
History and ROS:  
Social History:  
  
Smoking Status: never   
smoker (1)  
Alcohol Use: denies(1)  
Drug Use: occasionally   
(2)  
  
  
Allergies:  
MiraLax:   
Hives/Urticaria  
codeine: Unknown  
  
Objective:  
  
Objective Information:  
  
T PRBPSpO2  
Value36.86215045/17952%  
Date/Time3/10 23:113/10   
23:113/10 23:113/10   
23:113/10 23:11  
Range(36.7C - 37.3C )   
(71 - 100 ) (16 - 18 )   
(112 - 136 )/ (68 - 103   
)  
(97% - 100% )  
Highest temp of 37.3 C   
was recorded at 3/10   
10:41  
  
Physical Exam by   
System:  
  
Constitutional: Awake,   
alert, oriented, no   
acute distress  
Eyes: Extraocular   
muscles intact  
ENMT: Moist mucous   
membranes  
Head/Neck:   
Normocephalic,   
atraumatic  
Respiratory/Thorax:   
Bilateral equal breath   
sounds  
Cardiovascular: Regular   
rate and rhythm  
Gastrointestinal: Soft,   
nontender,   
nondistended, positive   
bowel sounds  
Musculoskeletal: Moving   
all extremities  
Extremities: No   
peripheral edema  
Neurological: Awake,   
alert, oriented  
Psychological:   
Cooperative  
Skin: Warm and dry  
  
Medications:  
  
Medications:  
  
Continuous Medications  
-----------------------  
---------  
  
1. Lactated Ringers   
Infusion: 1000 mL   
IntraVenous  
  
Scheduled Medications  
-----------------------  
---------  
  
1. LORazepam   
Injectable: 1 mg   
IntraVenous Push Every   
6 Hours  
2. Oseltamivir: 75 mg   
Oral Every 12 Hours  
3. Pantoprazole   
Injectable: 40 mg   
IntraVenous Push Every   
12 Hours  
  
PRN Medications  
-----------------------  
---------  
  
1. Acetaminophen: 650   
mg Oral Every 4 Hours  
2. Ondansetron   
Injectable: 4 mg   
IntraVenous Push Every   
6 Hours  
3. Promethazine IV   
Piggy Back: 12.5 mg   
IntraVenous Piggyback   
Every 6 Hours  
  
  
  
Recent Lab Results:  
  
Results:  
  
  
I have reviewed these   
laboratory results:   
Basic Metabolic Panel   
[Drawn  
11-Mar-2022 05:38:00],   
Basic Metabolic Panel   
[Drawn 10-Mar-2022   
19:37:00],  
Basic Metabolic Panel   
[Drawn 10-Mar-2022   
17:32:00], Complete   
Blood Count [Drawn  
11-Mar-2022 05:38:00],   
Lactate, Level [Drawn   
10-Mar-2022 19:37:00],   
Blood Gas,  
Arterial [Drawn   
10-Mar-2022 19:09:00],   
Drug Screen, Urine   
[Drawn 10-Mar-2022  
13:27:00], Urinalysis   
[Drawn 10-Mar-2022   
13:27:00], Urinalysis,   
Microscopic  
[Drawn 10-Mar-2022   
13:27:00], Urinalysis   
with Culture if   
Indicated [Drawn  
09-Mar-2022 10:18:00],   
Influenza A/B,Covid   
2019 PCR,Symptomatic   
[Drawn  
09-Mar-2022 10:18:00].  
  
  
Assessment:  
Normal anion gap   
metabolic acidosis  
Hypokalemia  
Very low osmolar state  
Nausea/vomiting/diarrhe  
a  
Depression  
History of marijuana   
abuse  
Influenza A  
  
Plan:  
At this time her   
metabolic acidosis is   
greatly improved  
Her bicarb this morning   
is up to 20 with   
supportive measures  
Her metabolic acidosis   
from history is likely   
secondary to her acute   
illness  
with diarrhea  
We could obtain a urine   
anion gap but currently   
her acidosis is much   
improved  
with supportive   
measures and so I do   
not think necessary at   
this time  
I will back down on her   
lactated Ringer's  
She needs a better diet  
She has a very low   
osmolar state and so   
she needs a better   
well-balanced diet  
with good protein   
intake  
Her potassium needs   
replaced  
Thanks for the consult  
  
Consult Status:  
Consult Order ID:   
[id number]  
  
  
Electronic Signatures:  
Srini Messina ()   
(Signed 11-Mar-2022   
08:18)  
Authored: Service,   
History of Present   
Illness, Review   
Family/Social History  
and ROS, Allergies,   
Objective,   
Assessment/Recommendati  
ons, Note Completion  
  
  
Last Updated: 11-Mar-2   
(more content not   
included)...        Normal                                  [facility]  
   
                                                    Daily Progress Note-General   
Internal Medicineon 2022   
   
                                                    Daily Progress   
Note-General Internal   
Medicine                                Consult Type:   
subsequent visit/care  
  
Service: General   
Internal Medicine  
  
Subjective Data:  
DANNA SOARES   
is a 21 year old Female   
who is Hospital Day #   
2.  
  
Patient seen and   
examined  
Lying in bed  
Has had some nausea,   
tolerating clears  
No other chief   
complaints.  
  
Overnight Events:   
Patient had an   
uneventful night.  
  
Objective Data:  
  
Objective Information:  
T PRBPSpO2  
[id number]/71156%  
Date/Time3/10 23:113/10   
23:113/10 23:113/10   
23:113/10 23:11  
Range(36.7C - 37.3C )   
(71 - 100 ) (16 - 18 )   
(112 - 136 )/ (68 - 103   
)  
(97% - 100% )  
Highest temp of 37.3 C   
was recorded at 3/10   
10:41  
  
  
Pain reported at 3/10   
20:19: 5 = Moderate  
  
Physical Exam by   
System:  
  
Constitutional:   
Ill-appearing, pale  
Eyes: PERRL, EOMI,   
clear sclera  
ENMT: mucous membranes   
moist, no apparent   
injury, no lesions seen  
Head/Neck: Neck supple,   
no apparent injury,No   
JVD, trachea midline,  
Respiratory/Thorax:   
Patent airways, CTAB,   
normal breath sounds   
with good chest  
expansion, thorax   
symmetric  
Cardiovascular:   
Regular, rate and   
rhythm, no murmurs, 2+   
equal pulses of the  
extremities, normal S   
1and S 2  
Gastrointestinal:   
Nondistended, soft,   
non-tender, no rebound   
tenderness or  
guarding, no masses   
palpable, no   
organomegaly, +BS, no   
bruits  
Musculoskeletal: ROM   
intact, no joint   
swelling, normal   
strength  
Extremities: normal   
extremities, no   
cyanosis edema,   
contusions or wounds,   
no  
clubbing  
Neurological: alert and   
oriented x3, intact   
senses, motor, response   
and  
reflexes, normal   
strength  
Psychological:   
Appropriate mood and   
behavior  
Skin: Warm and dry, no   
lesions, no rashes  
  
Medication:  
  
Medications:  
  
Continuous Medications  
-----------------------  
---------  
  
1. Lactated Ringers   
Infusion: 1000 mL   
IntraVenous  
  
Scheduled Medications  
-----------------------  
---------  
  
1. LORazepam   
Injectable: 1 mg   
IntraVenous Push Every   
6 Hours  
2. Oseltamivir: 75 mg   
Oral Every 12 Hours  
3. Pantoprazole   
Injectable: 40 mg   
IntraVenous Push Every   
12 Hours  
  
PRN Medications  
-----------------------  
---------  
  
1. Acetaminophen: 650   
mg Oral Every 4 Hours  
2. Ondansetron   
Injectable: 4 mg   
IntraVenous Push Every   
6 Hours  
3. Promethazine IV   
Piggy Back: 12.5 mg   
IntraVenous Piggyback   
Every 6 Hours  
  
  
  
Recent Lab Results:  
  
Results:  
  
  
I have reviewed these   
laboratory results:  
  
Basic Metabolic Panel   
11-Mar-2022 05:38:00  
  
ResultValue  
Glucose, Serum 89  
  
K 3.0 L  
  
Bicarbonate, Serum 20 L  
Anion Gap, Serum 13  
BUN 2 L  
CREAT 0.40 L  
GFR Female >90  
Calcium, Serum 8.3 L  
  
Complete Blood Count   
11-Mar-2022 05:38:00  
  
ResultValue  
White Blood Cell Count   
3.4 L  
Red Blood Cell Count   
4.14  
HGB 11.8 L  
HCT 35.3 L  
MCV 85  
MCHC 33.3  
  
RDW-CV 14.4  
  
  
Radiology Results:  
  
Results:  
  
  
Conclusion:  
 Please see physician   
note for formal   
interpretation   
confirmed by Scribe   
Confirmed by CARLOS MOODY   
() on 2022   
12:18:50 PM  
  
Electrocardiogram 12   
Lead [2022   
12:19PM]  
  
  
Assessment and Plan:  
Code Status:  
Code StatusFull Code  
  
Assessment:  
21-year-old female with   
significant past   
medical history of   
chronic depression,  
hyperemesis syndrome   
secondary to  
  
Cannabinoid, bipolar   
disorder who presented   
to the emergency   
department after 3  
ED visits with nausea   
vomiting diarrhea   
weakness. She was   
diagnosed with  
influenza A on 2022 and is being   
admitted for severe   
dehydration  
  
Impression  
Severe dehydration with   
acute non-anion gap   
metabolic acidosis   
secondary to GI  
loss  
Hyperemesis syndrome   
secondary to   
cannabinoid use  
Influenza A  
Chronic   
depression/bipolar   
disorder  
GI prophylaxis  
DVT prophylaxis  
  
Assessment and plan  
Patient seen and   
examined in the   
emergency department  
She is pale dehydrated  
clear liquids advance   
as tolerated- last   
admission took about 48   
hours to  
settle her vomiting   
down. she did well with   
carafate can add if no   
improvement  
on current regimen  
Oral mucosa is   
moist-her bicarb   
yesterday was 12 and   
today is 14 on  
presentation she is   
status post 2 L of   
normal saline IV fluid   
boluses  
I will add lactated   
Ringer's at 200 cc an   
hour  
Repeat BMP now  
Repeat BMP at 2000  
BMP in a.m.  
Tamiflu 75 mg p.o.   
twice a day for total   
of 5 days  
iv protonix 40 mg twice   
a day  
phenergan 12.5 mg iv   
every 6 hours as needed  
potassium 3.6-   
potassium 20 iv  
she has been counseled   
prior on marijuana and   
risk with dehydration   
causing  
hyperemesis. she is   
severely dehydrated so   
this may just be   
dehydration from  
the flu. will see how   
she does on the ativan  
plan of care discussed   
with patient  
time spent 45 minutes  
  
3/11  
Patient seen and   
examined  
Afebrile, VSS, 100 Sp02   
room air  
Has had some nausea,   
tolerating clears  
Consult to Dr. Messina:   
Metabolic acidosis   
improving, Maintain LR   
at 75 ml hour  
Hyperemesis syndrome   
secondary to   
cannabinoid use:   
Metabolic acidosis   
improving  
this am, will continue   
LR and await Dr. Messina   
recommendations  
Clear liquids (more   
content not   
included)...        Normal                                  [facility]  
   
                                                    ARTERIAL BLOOD GASon 03-10-  
022   
   
                      BASE EXCESS-BLOOD -9.9 mmol/L Low        -2.0 - 3.0 [facility]  
   
                                        Comment on above:   Performed By: #### P  
TINR ####  
[place] [address]   
   
                      BICARB, CALCULATED 13.6 mmol/L Low        22.0 - 26.0 [facility]  
   
                                        Comment on above:   Performed By: #### P  
TINR ####  
[place] [address]   
   
                                                    Oxygen (Bld) [Partial   
pressure]       111 mm[Hg]      High            85 - 95         [facility]  
   
                                        Comment on above:   Performed By: #### P  
TINR ####  
[place] [address]   
   
                      PCO2       24 mmHg    Low        38 - 42    [facility]  
   
                                        Comment on above:   Performed By: #### P  
TINR ####  
[place] [address]   
   
                      pH (Bld)   7.36 [pH]  Low        7.38 - 7.42 [facility]  
   
                                        Comment on above:   Performed By: #### P  
TINR ####  
[place] [address]   
   
                      SO2        100 %      Normal     94 - 100   [facility]  
   
                                        Comment on above:   Performed By: #### P  
TINR ####  
[facility]  
[address] [id number]   
   
                                                    Admission Risk Screen - Adul  
ton 03-   
   
                                                    Admission Risk Screen -   
Adult                                   Allergies:  
Allergies:  
MiraLax:   
Hives/Urticaria  
codeine: Unknown  
  
Patient Verification:  
New W ID Band Applied   
in my Departmentno  
Type of ID Patient is   
WearingW wristband, but   
not applied here  
Patient Transferred   
from Other  Facility   
([facility], [place]   
House,etc)no  
Patient Identity   
Verified Bypatient  
ID Band FULL Name,   
include Middle,   
spelling matches   
patient's ID used for  
verificationyes  
ID Band  Matches   
Patient ID used for   
Verficationyes  
ID Band MRN Matches EMR   
MRNyes  
  
Visitor Restriction:  
Coronavirus Visitor   
Restriction: Reasonable   
restrictions to   
in-person  
visitors will be   
observed due to current   
coronavirus pandemic.  
  
Travel History:  
COVID-19 Screening   
Completedno exposure or   
symptoms(1)  
Travel or Exposure Past   
30 DaysNO travel to   
International locations   
in the  
past 30 days  
Ebola AlertFor   
Ebola-like Symptoms:   
Isolate Patient and   
Notify  
Provider/Infection   
Preventionist  
  
For Contact: Notify   
Provider/Infection   
Preventionist  
  
Advance Directive:  
Advance Directive/DNRno   
(2)  
Advance Directive   
Information   
Givenpatient/family   
declined  
  
Avila Fall Screen:  
History of falling   
(immediate or   
previous)no (0)  
Secondary Diagnosisyes   
(15)  
Intravenous Therapy/   
Heparin/Saline Lockyes   
(20)  
Gait/Transferringnormal  
/bedrest/wheelchair (0)  
Ambulatory   
Aidsnone/bedrest/nurse   
assist (0)  
Mental Statusoriented   
to own ability (0)  
Score: Low risk (<25).   
Moderate risk (25-44).   
High risk (>44).35  
Avila   
InterventionsMODERATE   
INTERVENTIONS:  
*Low Interventions   
Plus:  
* falls risk   
band/sticker applied to   
patient,  
*yellow non-skid   
footwear,  
*instruct to call for   
assistance before  
getting out of bed,  
*bed/chair/bedside   
commode/toilet alarms,  
*sensory   
devices/ambulatory   
aides  
available and in reach,  
*medications reviewed   
for potential  
side effects and care   
planning.  
  
  
Family Violence Screen:  
Are you or have you   
been threatened or   
abused physically,   
emotionally, or  
sexually by anyoneno  
Do you feel UNSAFE   
going back to the place   
where you are livingno  
Clinical assessment:   
Are there any apparent   
signs of   
injuries/behaviors that  
could be related to   
abuse/neglectno  
Social Service Consult   
for abuse/neglect   
needed this visitno  
  
Functional Screen:  
Functional Screen: In   
the recent/past 2-4   
weeks, patient or   
family have  
noticedno issues that   
require a   
speech/language consult   
at this time  
  
AM-PAC- Basic   
Mobility/Daily   
Activity:  
Patient baseline   
bedboundno  
  
Learning Assessment   
(Patient):  
Patient is Able to be   
Assessed for   
Learningyes  
Factors Influencing   
Readiness to   
Learnacuteness of   
illness; anxiety  
Factors that Impact   
Ability to Learnnone  
Devices/Methods Used to   
Communicatenone  
Learning   
Preferencesskill   
demonstration; verbal   
instruction; written  
material; video  
Cultural   
Considerationsnone  
Developmental   
Considerationsnone  
Temple   
Considerationsnone  
  
Learning Assessment   
(Other Learner):  
Other learner   
availableno  
  
Depression Screen:  
During the past month,   
have you often been   
bothered by feeling   
down,  
depressed or hopelessno  
During the past month,   
have you often had   
little interest or   
pleasure in  
doing thingsno  
Have you had any   
thoughts of harming   
anyone elseno (1)  
  
Grandin Suicide:  
Risk Screen Not   
Applicable/Able to   
Answerable to be   
screened  
In the Past Month: Have   
you wished you were   
dead or could go to   
sleep and not  
wake upno(1)  
In the Past Month: Have   
you had any actual   
thoughts of killing   
yourself  
no(1)  
Lifetime: Have you ever   
done, started to do, or   
prepared to do anything   
to  
end your lifeno  
Grandin Suicide   
Risknegative  
  
Adult Nutrition Screen:  
Have you recently lost   
weight without tryingno  
Have you been eating   
poorly because of a   
decreased appetiteyes  
Malnutrition Screening   
Tool Score1  
Malnutrition Screening   
Tool RiskMST = 0 or 1   
Not at risk. Eating   
well with  
little or no weight   
loss  
Nutrition Consult   
needed this visitno  
Can Patient Participate   
in Room Serviceyes  
Patient requires Paper   
Dishes/Plastic   
Utensilsno  
  
Pain Screen:  
Pain Scalenumerical   
0-10  
Pain Scale   
Educationteaching   
provided  
Current Pain Level7 =   
Severe  
Acceptable Pain Level5   
= Moderate  
Expression of Pain   
(nonverbal)verbalizatio  
n  
Chronic Painno  
  
Spiritual Screen:  
Are there any cultural,   
spiritual, Restoration   
practices/values/needs   
that are  
important for us to   
knowno  
  
CAGE:  
Is this an injured   
patient at a Trauma   
Center   
([facility]/[place]/[facility]  
a/[place]): no (2)  
  
Vaccinations:  
Vaccination - Influenza   
Vaccination Screen:  
Is it flu season   
(between  and   
)Yes  
Screening for   
identified   
contraindications to   
influenza vaccination  
patient/caregiver   
refusal  
  
Vaccination - Pneumonia   
Vaccination Screen:  
Patient has received a   
previous pneumonia   
vaccine:no/unknown...  
Immunocompetent persons   
with underlying chronic   
conditions or reside in   
long  
term care   
facilitiesnone of these   
conditions  
Persons with Functional   
or Anatomic   
Asplenianone of t (more   
content not   
included)...        Normal                                  [facility]  
   
                                                    BASIC METABOLIC PANELon    
   
                      Anion gap [Moles/Vol] 17 mmol/L  Normal     10 - 20    [facility]  
   
                                        Comment on above:   Performed By: #### P  
TINR ####  
[address] [address]   
   
                      Calcium [Mass/Vol] 8.4 mg/dL  Low        8.6 - 10.3 [facility]  
   
                                        Comment on above:   Performed By: #### P  
TINR ####  
[address] [address]   
   
                      Chloride [Moles/Vol] 108 mmol/L High       98 - 107   [facility]  
   
                                        Comment on above:   Performed By: #### P  
TINR ####  
[address] [address]   
   
                      Creatinine [Mass/Vol] 0.49 mg/dL Low        0.50 - 1.05 [facility]  
   
                                        Comment on above:   Performed By: #### P  
TINR ####  
[address] [address]   
   
                      eGFR FEMALE >90        Normal     >90        [facility]  
   
                                        Comment on above:   Result Comment: CALC  
ULATIONS OF ESTIMATED GFR ARE PERFORMED  
USING THE  CKD-EPI STUDY REFIT EQUATION  
WITHOUT THE RACE VARIABLE FOR THE IDMS-TRACEABLE  
CREATININE METHODS.  
https://jasn.asnjournals.org/content/early//ASN.  
504869   
   
                                                            Performed By: #### P  
TINR ####  
[address] [address]   
   
                      Glucose [Mass/Vol] 80 mg/dL   Normal     74 - 99    [facility]  
   
                                        Comment on above:   Performed By: #### P  
TINR ####  
[address] [address]   
   
                      HCO3 (Bld) [Moles/Vol] 12 mmol/L  Low        21 - 32    [facility]  
   
                                        Comment on above:   Performed By: #### P  
TINR ####  
[address] [address]   
   
                      Potassium [Moles/Vol] 3.2 mmol/L Low        3.5 - 5.3  [facility]  
   
                                        Comment on above:   Performed By: #### P  
TINR ####  
[address] [address]   
   
                      Sodium [Moles/Vol] 134 mmol/L Low        136 - 145  [facility]  
   
                                        Comment on above:   Performed By: #### P  
TINR ####  
[address] [address]   
   
                                                    Urea nitrogen   
[Mass/Vol]      4 mg/dL         Low             6 - 23          [facility]  
   
                                        Comment on above:   Performed By: #### P  
TINR ####  
[address] [address]   
   
                      Anion gap [Moles/Vol] 17 mmol/L  Normal     10 - 20    [facility]  
   
                                        Comment on above:   Performed By: #### M  
G ####  
[address] [address]   
   
                      Calcium [Mass/Vol] 8.3 mg/dL  Low        8.6 - 10.3 [facility]  
   
                                        Comment on above:   Performed By: #### M  
G ####  
[address] [address]   
   
                      Chloride [Moles/Vol] 109 mmol/L High       98 - 107   [facility]  
   
                                        Comment on above:   Performed By: #### M  
G ####  
[address] [address]   
   
                      Creatinine [Mass/Vol] 0.45 mg/dL Low        0.50 - 1.05 [facility]  
   
                                        Comment on above:   Performed By: #### M  
G ####  
[address] [address]   
   
                      eGFR FEMALE >90        Normal     >90        [facility]  
   
                                        Comment on above:   Result Comment: CALC  
ULATIONS OF ESTIMATED GFR ARE PERFORMED  
USING THE  CKD-EPI STUDY REFIT EQUATION  
WITHOUT THE RACE VARIABLE FOR THE IDMS-TRACEABLE  
CREATININE METHODS.  
https://jasn.asnjournals.org/content/early//ASN709   
   
                                                            Performed By: #### M  
G ####  
[address] [address]   
   
                      Glucose [Mass/Vol] 87 mg/dL   Normal     74 - 99    [facility]  
   
                                        Comment on above:   Performed By: #### M  
G ####  
[address] [address]   
   
                      HCO3 (Bld) [Moles/Vol] 12 mmol/L  Low        21 - 32    [facility]  
   
                                        Comment on above:   Performed By: #### M  
G ####  
[address] [address]   
   
                      Potassium [Moles/Vol] 3.2 mmol/L Low        3.5 - 5.3  [facility]  
   
                                        Comment on above:   Performed By: #### M  
G ####  
[address] [address]   
   
                      Sodium [Moles/Vol] 135 mmol/L Low        136 - 145  [facility]  
   
                                        Comment on above:   Performed By: #### M  
G ####  
[address] [address]   
   
                                                    Urea nitrogen   
[Mass/Vol]      4 mg/dL         Low             6 - 23          [facility]  
   
                                        Comment on above:   Performed By: #### M  
G ####  
[address] [address]   
   
                                                    CBC AND DIFFERENTIALon 03-10  
-2022   
   
                      DIFFERENTIAL SEE MANUAL DIFF Normal                [facility]  
   
                                        Comment on above:   Performed By: #### H  
EPFP ####  
[address] [address]   
   
                                                    Erythrocyte   
distribution width   
(RBC) [Ratio]   14.7 %          High            11.5 - 14.5     [facility]  
   
                                        Comment on above:   Performed By: #### H  
EPFP ####  
[address] [address]   
   
                                                    Hematocrit (Bld)   
[Volume fraction] 41.6 %          Normal          36.0 - 46.0     [facility]  
   
                                        Comment on above:   Performed By: #### H  
EPFP ####  
[address] [address]   
   
                                                    Hemoglobin (Bld)   
[Mass/Vol]      13.5 g/dL       Normal          12.0 - 16.0     [facility]  
   
                                        Comment on above:   Performed By: #### H  
EPFP ####  
[address] [address]   
   
                      MCHC (RBC) [Mass/Vol] 32.4 g/dL  Normal     32.0 - 36.0 [facility]  
   
                                        Comment on above:   Performed By: #### H  
EPFP ####  
[address] [address]   
   
                      MCV (RBC) [Entitic vol] 86 fL      Normal     80 - 100   S  
[facility]  
   
                                        Comment on above:   Performed By: #### H  
EPFP ####  
[address] [address]   
   
                      NUCLEATED RBC 0.2 /100 WBC Normal                [facility]  
   
                                        Comment on above:   Performed By: #### H  
EPFP ####  
[address] [address]   
   
                      Platelets (Bld) [#/Vol] 194 10*3/uL Normal     150 - 450    
[facility]  
   
                                        Comment on above:   Performed By: #### H  
EPFP ####  
[address] [address]   
   
                      RBC        4.84 x10E12/L Normal     4.00 - 5.20 [facility]  
   
                                        Comment on above:   Performed By: #### H  
EPFP ####  
[address] [address]   
   
                      WBC (Bld) [#/Vol] 3.1 10*3/uL Low        4.4 - 11.3 [facility]  
   
                                        Comment on above:   Performed By: #### H  
EPFP ####  
[address] [address]   
   
                                                    COMPREHENSIVE PANELon 03-10-  
2022   
   
                      Albumin [Mass/Vol] 4.5 g/dL   Normal     3.4 - 5.0  [facility]  
   
                                        Comment on above:   Performed By: #### C  
BCDF ####  
[address] [address]   
   
                                                    ALP [Catalytic   
activity/Vol]   42 U/L          Normal          33 - 110        [facility]  
   
                                        Comment on above:   Performed By: #### C  
BCDF ####  
[address] [address]   
   
                                                    ALT [Catalytic   
activity/Vol]   10 U/L          Normal          7 - 45          [facility]  
   
                                        Comment on above:   Result Comment: Kaleigh  
ents treated with Sulfasalazine may   
generate  
falsely decreased results for ALT.   
   
                                                            Performed By: #### C  
BCDF ####  
[address] [address]   
   
                      Anion gap [Moles/Vol] 19 mmol/L  Normal     10 - 20    [facility]  
   
                                        Comment on above:   Performed By: #### C  
BCDF ####  
[address] [address]   
   
                                                    AST [Catalytic   
activity/Vol]   21 U/L          Normal          9 - 39          [facility]  
   
                                        Comment on above:   Performed By: #### C  
BCDF ####  
[address] [address]   
   
                      Bilirubin [Mass/Vol] 0.3 mg/dL  Normal     0.0 - 1.2  [facility]  
   
                                        Comment on above:   Performed By: #### C  
BCDF ####  
[address] [address]   
   
                      Calcium [Mass/Vol] 9.0 mg/dL  Normal     8.6 - 10.3 [facility]  
   
                                        Comment on above:   Performed By: #### C  
BCDF ####  
[address] [address]   
   
                      Chloride [Moles/Vol] 106 mmol/L Normal     98 - 107   [facility]  
   
                                        Comment on above:   Performed By: #### C  
BCDF ####  
[address] [address]   
   
                      Creatinine [Mass/Vol] 0.52 mg/dL Normal     0.50 - 1.05 [facility]  
   
                                        Comment on above:   Performed By: #### C  
BCDF ####  
[address] [address]   
   
                      eGFR FEMALE >90        Normal     >90        [facility]  
   
                                        Comment on above:   Result Comment: CALC  
ULATIONS OF ESTIMATED GFR ARE PERFORMED  
USING THE  CKD-EPI STUDY REFIT EQUATION  
WITHOUT THE RACE VARIABLE FOR THE IDMS-TRACEABLE  
CREATININE METHODS.  
https://jasn.asnjournals.org/content/early//ASN.  
738503   
   
                                                            Performed By: #### C  
BCDF ####  
[address] [address]   
   
                      Glucose [Mass/Vol] 93 mg/dL   Normal     74 - 99    [facility]  
   
                                        Comment on above:   Performed By: #### C  
BCDF ####  
[address] [address]   
   
                      HCO3 (Bld) [Moles/Vol] 14 mmol/L  Low        21 - 32    [facility]  
   
                                        Comment on above:   Performed By: #### C  
BCDF ####  
[address] [address]   
   
                      Potassium [Moles/Vol] 3.5 mmol/L Normal     3.5 - 5.3  [facility]  
   
                                        Comment on above:   Performed By: #### C  
BCDF ####  
[address] [address]   
   
                      Protein [Mass/Vol] 7.3 g/dL   Normal     6.4 - 8.2  [facility]  
   
                                        Comment on above:   Performed By: #### C  
BCDF ####  
[address] [address]   
   
                      Sodium [Moles/Vol] 135 mmol/L Low        136 - 145  [facility]  
   
                                        Comment on above:   Performed By: #### C  
BCDF ####  
[address] [address]   
   
                                                    Urea nitrogen   
[Mass/Vol]      4 mg/dL         Low             6 - 23          [facility]  
   
                                        Comment on above:   Performed By: #### C  
BCDF ####  
[address] [address]   
   
                                                    CORONAVIRUS 2019, SCREEN ASY  
MPTOMATICon 03-   
   
                                                    DATE OF SYMPTOM ONSET   
[[id number]]?     Canceled        [facility]  
   
                                        Comment on above:   Order Comment: TEST   
CORONAVIRUS 2019, SCREEN ASYMPTOMATIC WAS   
CANCELLED, 03/10/2022 15:28   
   
                                                            Performed By: #### C  
OVSC ####[address] [address]   
   
                                                    SARS-CoV-2 (COVID-19)   
RNA KIRSTEN+probe Ql (Unsp   
spec)           Canceled        Normal                          [facility]  
   
                                        Comment on above:   Order Comment: TEST   
CORONAVIRUS , SCREEN ASYMPTOMATIC WAS   
CANCELLED, 03/10/2022 15:28   
   
                                                            Result Comment: .  
This test has received FDA Emergency Use Authorization (EUA)   
and has been  
verified by [facility].   
This test is only  
authorized for the duration of time that circumstances exist   
to justify the  
authorization of the emergency use of in vitro diagnostic   
tests for the  
detection of SARS-CoV-2 virus and/or diagnosis of COVID-19   
infection under  
section 564(b)(1) of the Act, 21 U.S.C. 360bbb-3(b)(1), unless   
the  
authorization is terminated or revoked sooner.  
[facility] is certified   
under CLIA-88 as  
qualified to perform high complexity testing. Testing is   
performed in the  
[facility] laboratory located at [address] [place] [address].  
SARS-CoV-2/Flu/RSV Multiplex Test:  
Fact sheet for providers:   
https://www.fda.gov/media/780329/download  
Fact sheet for patients:   
https://www.fda.gov/media/055837/download   
   
                                                            Performed By: #### C  
OVSC ####[place] [address]   
   
                      Lab Specimen Source Nasal, Nasopharyngeal [facility]  
   
                                        Comment on above:   Order Comment: TEST   
CORONAVIRUS , SCREEN ASYMPTOMATIC WAS   
CANCELLED, 03/10/2022 15:28   
   
                                                            Performed By: #### C  
OVSC ####[place] [address]   
   
                                                    DRUG SCREEN,URINEon 03-10-20  
22   
   
                      AMPHETAMINE SCREEN,U Negative   Normal     NEGATIVE   [facility]  
   
                                        Comment on above:   Result Comment: CUTO  
FF LEVEL: 500 NG/ML  
Cross-reactivity has been reported with high concentrations  
of the following drugs: buproprion, chloroquine,   
chlorpromazine,  
ephedrine, mephentermine, fenfluramine, phentermine,  
phenylpropanolamine, pseudoephedrine, and propranolol.   
   
                                                            Performed By: #### U  
AMIC ####  
[place] [address]   
   
                      BARBITURATES SCREEN,U Negative   Normal     NEGATIVE   [facility]  
   
                                        Comment on above:   Result Comment: CUTO  
FF LEVEL: 200 NG/ML   
   
                                                            Performed By: #### U  
AMIC ####  
[place] [address]   
   
                                                    BENZODIAZEPINES   
SCREEN,U        Negative        Normal          NEGATIVE        [facility]  
   
                                        Comment on above:   Result Comment: CUTO  
FF LEVEL: 200 NG/ML   
   
                                                            Performed By: #### U  
AMIC ####  
[place] [address]   
   
                      CANNABINOIDS SCREEN,U Positive   Abnormal   NEGATIVE   [facility]  
   
                                        Comment on above:   Result Comment: CUTO  
FF LEVEL: 50 NG/ML   
   
                                                            Performed By: #### U  
AMIC ####  
[place] [address]   
   
                                                    COCAINE METABOLITE   
SCREEN,U        Negative        Normal          NEGATIVE        [facility]  
   
                                        Comment on above:   Result Comment: CUTO  
FF LEVEL: 150 NG/ML   
   
                                                            Performed By: #### U  
AMIC ####  
[place] [address]   
   
                      DRUG SCREEN COMMENT SEE BELOW  Normal                [facility]  
   
                                        Comment on above:   Result Comment: Drug  
 screen results are presumptive and should  
   
not be used to assess  
compliance with prescribed medication. Contact the performing   
[facility]  
laboratory to add-on definitive confirmatory testing if   
clinically  
indicated.  
.  
Toxicology screening results are reported qualitatively. The   
concentration  
must be greater than or equal to the cutoff to be reported as   
positive. The  
concentration at which the screening test can detect an   
individual drug or  
metabolite varies. The absence of expected drug(s) and/or drug   
metabolite(s)  
may indicate non-compliance, inappropriate timing of specimen   
collection  
relative to drug administration, poor drug absorption,   
diluted/adulterated  
urine, or limitations of testing. For medical purposes only;   
not valid for  
forensic use.  
.  
Interpretive questions should be directed to the laboratory   
medical  
directors.   
   
                                                            Performed By: #### U  
AMIC ####  
[place] [address]   
   
                      FENTANYL SCREEN,URINE Negative   Normal     NEGATIVE   [facility]  
   
                                        Comment on above:   Result Comment: CUTO  
FF LEVEL: 1 NG/ML  
The performance characteristics of this  
test have been determined by the individual  
 laboratory site where testing is performed.  
This test has not been cleared or approved  
by the FDA; however, the FDA has determined  
that such clearance is not necessary.   
   
                                                            Performed By: #### U  
AMIC ####  
[place] [address]   
   
                      METHADONE SCREEN,U Negative   Normal     NEGATIVE   [facility]  
   
                                        Comment on above:   Result Comment: CUTO  
FF LEVEL: 150 NG/ML  
The metabolite L-alpha-acetylmethadol (LAAM) is not  
detected by this method in concentrations that would  
be found in the urine of patients on LAAM therapy.   
   
                                                            Performed By: #### U  
AMIC ####  
[place] [address]   
   
                      OPIATES SCREEN,U Negative   Normal     NEGATIVE   [facility]  
   
                                        Comment on above:   Result Comment: CUTO  
FF LEVEL: 300 NG/ML  
The opiate screen does not detect fentanyl, meperidine, or  
tramadol. Oxycodone is not consistently detected (refer to  
Oxycodone Screen, Urine result).   
   
                                                            Performed By: #### U  
AMIC ####  
[place] [address]   
   
                      OXYCODONE SCREEN,U Negative   Normal     NEGATIVE   [facility]  
   
                                        Comment on above:   Result Comment: CUTO  
FF LEVEL: 100 NG/ML  
This test will accurately detect both oxycodone and   
oxymorphone.   
   
                                                            Performed By: #### U  
AMIC ####  
[place] [address]   
   
                      PCP SCREEN,U Negative   Normal     NEGATIVE   [facility]  
   
                                        Comment on above:   Result Comment: CUTO  
FF LEVEL: 25 NG/ML  
Cross-reactivity has been reported with dextromethorphan.   
   
                                                            Performed By: #### U  
AMIC ####  
[place] [address]   
   
                                                    EMR ADDONon 03-   
   
                      ADDON CONFIRMATION REQUEST REC'D Normal                [facility]  
   
                                        Comment on above:   Performed By: #### P  
TINR ####  
[place] [address]   
   
                                                    HCG,URINEon 03-   
   
                                                    Beta HCG (pregnancy   
test) Ql (U)    Negative        Normal          Negative        [facility]  
   
                                        Comment on above:   Performed By: #### U  
AMIC ####  
[place] [address]   
   
                                                    LACTATEon 03-   
   
                      Lactate [Moles/Vol] 1.1 mmol/L Normal     0.4 - 2.0  [facility]  
   
                                        Comment on above:   Result Comment: [place]  
puncture immediately after or during the  
administration of Metamizole may lead to falsely  
low results. Testing should be performed immediately  
prior to Metamizole dosing.   
   
                                                            Performed By: #### P  
TINR ####  
[address] [address]   
   
                                                    LIPASEon 03-   
   
                                                    Lipase [Catalytic   
activity/Vol]   63 U/L          Normal          9 - 82          [facility]  
   
                                        Comment on above:   Result Comment: [place]  
puncture immediately after or during the  
administration of Metamizole may lead to falsely  
low results. Testing should be performed immediately  
prior to Metamizole dosing.  
N-acetyl-p-benzoquinone imine (metabolite of Acetaminophen)  
will generate erroneously low results in samples for patients  
that have taken toxic doses of acetaminophen.   
   
                                                            Performed By: #### U  
AMIC ####  
[address] [address]   
   
                                                    MANUAL DIFFERENTIALon 03-10-  
2022   
   
                      % BAND NEUTROPHIL 2.0 %      Normal     0.0 - 5.0  [facility]  
   
                                        Comment on above:   Performed By: #### U  
AMIC ####  
[address] [address]   
   
                      % BASOPHIL 0.0 %      Normal     0.0 - 2.0  [facility]  
   
                                        Comment on above:   Performed By: #### U  
AMIC ####  
[address] [address]   
   
                      % EOSINOPHIL 0.0 %      Normal     0.0 - 6.0  [facility]  
   
                                        Comment on above:   Performed By: #### U  
AMIC ####  
[address] [address]   
   
                      % LYMPH-ATYPICAL 1.0 %      Normal     0.0 - 2.0  [facility]  
   
                                        Comment on above:   Performed By: #### U  
AMIC ####  
[address] [address]   
   
                      % LYMPHOCYTE 28.0 %     Normal     13.0 - 44.0 [facility]  
   
                                        Comment on above:   Performed By: #### U  
AMIC ####  
[address] [address]   
   
                      % MONOCYTE 27.0 %     Normal     2.0 - 10.0 [facility]  
   
                                        Comment on above:   Performed By: #### U  
AMIC ####  
[place] [id number]   
   
                      % SEG NEUTROPHIL 42.0 %     Normal     40.0 - 80.0 [facility]  
   
                                        Comment on above:   Result Comment: Perc  
ent differential counts (%) should be   
interpreted in the  
context of the absolute cell counts (cells/L).   
   
                                                            Performed By: #### U  
AMIC ####  
[place] [address]   
   
                      ANC        1.36 x10E9/L Normal     1.20 - 7.70 [facility]  
   
                                        Comment on above:   Performed By: #### U  
AMIC ####  
[place] [address]   
   
                      BAND NEUTROPHIL 0.06 x10E9/L Normal     0.00 - 0.70 [facility]  
   
                                        Comment on above:   Performed By: #### U  
AMIC ####  
[place] [id number]   
   
                      BASOPHIL   0.00 x10E9/L Normal     0.00 - 0.10 [facility]  
   
                                        Comment on above:   Performed By: #### U  
AMIC ####  
[place] [id number]   
   
                      EOSINOPHIL 0.00 x10E9/L Normal     0.00 - 0.70 [facility]  
   
                                        Comment on above:   Performed By: #### U  
AMIC ####  
[place] [address]   
   
                      LYMPH-ATYPICAL 0.03 x10E9/L Normal     0.00 - 0.50 [facility]  
   
                                        Comment on above:   Performed By: #### U  
AMIC ####  
[place] [id number]   
   
                      LYMPHOCYTE 0.87 x10E9/L Low        1.20 - 4.80 [facility]  
   
                                        Comment on above:   Performed By: #### U  
AMIC ####  
[place] [address]   
   
                      MONOCYTE   0.84 x10E9/L Normal     0.10 - 1.00 [facility]  
   
                                        Comment on above:   Performed By: #### U  
AMIC ####  
[id number] [place] [address]   
   
                      SEG NEUTROPHIL 1.30 x10E9/L Normal     1.20 - 7.00 [facility]  
   
                                        Comment on above:   Performed By: #### U  
AMIC ####  
[id number] [place] [address]   
   
                                                    Patient Profile - Adult v2on  
 03-   
   
                                                    Patient Profile - Adult   
v2                                      Profile:  
Initial Info:  
How to be   
Addressed DANNA (1)  
Spoken Language   
PreferredEnglish (2)  
Source of   
Informationpatient  
Stated Reason for   
Admission nausea   
Wants Family/Rep   
Notified of   
Admissionn/a; family   
present  
Notify PCPnotify PCP  
Informed of Patient   
Visiting Rightsyes  
Arrived Fromemergency   
department  
Patient   
Belongingsremains with   
patient  
Patient Belongings   
Remaining with   
Patientcell   
phone/electronics;   
clothing  
Medications Brought to   
Hospitalno  
  
General Health:  
Weight in kg47   
kilogram(s)  
Weight in azw122.6   
pound(s)  
Weight Methodactual   
(measured)  
Scale Typebed  
Height in cm149.8   
centimeter(s)(3)  
Height in feet4 feet  
Height in lotiao46.98   
inch(es)  
Height Methodstated  
BMI (kg/m2)20.944   
square meter  
  
RSP Based Care:  
How would you like to   
participate in your   
care as much as I can   
What is the number one   
concern for you during   
this   
hospitalization nausea   
What is the most   
important thing we can   
do to support you   
during this  
hospitalization help me   
feel better   
Is there anything we   
need to know to best   
care for you no   
  
Substance:  
Smoking Statusnever   
smoker (4)  
Alcohol Usedenies(4)  
  
Health Mgmt:  
Symptoms/Conditions   
Managed at   
Homemusculoskeletal  
Are You Pregnantno (5)  
Are You Currently   
Breastfeedingno (5)  
Musculoskeletal   
Symptoms/Conditionsscol  
iosis  
Musculoskeletal   
Managementmanaged  
  
Relationship/Environ:  
Resource/Environmental   
Concernsnone  
Primary Source of   
Support/Comfortsignific  
ant other  
Lives Withsignificant   
other; dependent   
child(melia)  
Living   
Arrangementsmobile home  
Services Anticipated at   
Transitionnone  
Anticipated Transition   
[place]  
Significant   
IndicatorsComplete  
  
  
  
Information Review:  
Allergies, Home Meds   
and Significant Events   
have been Reviewed and   
Verified  
with Patient/Familyyes  
  
ALLERGY, INTOLERANCE,   
ADVERSE EVENT:  
Allergies:  
MiraLax: Drug,   
Hives/Urticaria, Active  
codeine: Drug, Unknown,   
Active  
  
  
Electronic Signatures:  
Rosa Elena Thao (RN)   
(Signed 10-Mar-2022   
16:46)  
Authored: Initial Info,   
General Health, RSP   
Based Care, Substance,   
Health  
Mgmt,   
Relationship/Environ,   
Additional Information  
  
  
Last Updated:   
10-Mar-2022 16:46 by   
Rosa Elena Thao (RN)  
  
References:  
1. Data Referenced From   
 Patient Profile -   
Adult v2  2022   
05:16  
2. Data Referenced From   
 Triage - ED    
2022 17:34  
3. Data Referenced From   
 1. Vital Signs    
10-Mar-2022 10:41  
4. Data Referenced From   
 Risk Screen - Adult   
Emergency  10-Mar-2022   
15:45  
5. Data Referenced From   
 Triage - ED    
10-Mar-2022 10:41   Normal                                  [facility]  
   
                                                    Provider Note - ED v3on 03   
   
                                        Provider Note - ED v3 Provider Note:  
Chart Review:  
ED NOTES  
ED NOTES:  
HPI:  
Patient complains of   
nausea vomiting no   
abdominal pain which   
has been ongoing  
for the last several   
days. She states the   
pain in her abdomen is   
worsened last  
night. She was seen   
 at [facility] ER for   
patient had  
negative lab work and a   
normal CT scan of the   
abdomen. She was seen  
subsequently here  with normal lab work   
otherwise and was   
tested  
positive for influenza   
A and negative for   
Covid. She states that   
she continues  
to vomit multiple times   
and is unable to keep   
down any liquids or her  
antiemetics. She states   
that she has been   
unable to urinate.   
Denies any  
recent fevers. Denies   
any chance of pregnancy   
stating that she had a   
negative  
pregnancy test 2 days   
ago and is currently on   
her period.  
  
  
ROS:  
All systems are   
negative other than as   
noted in HPI.  
  
  
Physical Exam  
  
I have reviewed the   
triage vital signs.  
Const: Thin young   
female , appears stated   
age, no acute distress  
Eyes: PERRL, EOM   
intact, no conjunctival   
injection, vision   
grossly normal  
HENT: Neck supple   
without meningismus ,   
Moist mucous membranes,   
no pharyengeal  
swelling or exudate  
CV: Regular rate and   
rhythm, Warm,   
well-perfused   
extremities. Chest non   
tender  
RESP: Lungs clear   
bilaterally, Unlabored   
respiratory effort  
GI: soft, left lower   
quadrant tenderness,   
non-distended, no   
masses  
:  
MSK: No gross   
deformities appreciated  
Back: Non tender, no   
pain with ROM  
Skin: Warm, dry. No   
rashes  
Neuro: Alert and   
oriented x4, GCS 15 ,   
CNs II-XII grossly   
intact. Sensation and  
motor function of   
extremities grossly   
intact.  
Psych: Appropriate mood   
and affect.  
  
I have reviewed and   
confirmed nurses/medics   
notes for patient past,   
social  
and family history.  
  
  
Portions of this note   
were dictated by speech   
recognition. An attempt   
at proof  
reading was made to   
minimize errors. Minor   
errors in transcription   
may be  
present.  
  
  
HISTORY OF PRESENTING   
ILLNESS  
DANNA is a 21 year old   
Female and was seen by   
me at 10-Mar-2022 10:37   
for a  
chief complaint of   
flu-like symptoms   
(Patient to ED   
[id number]   
nausea/vomiting  
with abdominal pain.   
Patient was diagnosed   
with influenza x 1 day   
prior.)(1).  
  
Triage Information:   
Most recent Vital Sign   
Value Date  
Temp (F): 99.3   
03- 10:41  
Temp (C): 37.3   
03- 10:41  
Heart Rate (beats/min):   
82 03- 10:41  
Respirations   
(breaths/min): 18   
03- 10:41  
SpO2 (%): 98 03-   
10:41  
BP Systolic (mm Hg):   
134 03- 10:41  
BP Diastolic (mm Hg):   
99 03- 10:41  
  
  
  
  
  
PAST MEDICAL HISTORY  
ALLERGIES/INTOLERANCES:   
Allergy  
Allergen: MiraLax  
Type: Drug  
Reaction:   
Hives/Urticaria  
  
Allergen: codeine  
Type: Drug  
Reaction: Unknown  
  
HEALTH HISTORY: No   
documented data.  
  
OUTPATIENT MEDICATIONS:   
Home Medications Review   
Status for   
Reconciliation:  
Incomplete  
Med Status: Incomplete   
Medication History  
  
Drug Name: ondansetron   
4 mg oral tablet,   
disintegrating  
Instructions: 1 tab(s)   
orally 3 times a day  
  
Drug Name: oseltamivir   
75 mg oral capsule  
Instructions: 1 cap(s)   
orally 2 times a day  
  
SIGNIFICANT EVENTS:   
Past Medical History  
Description:premature  
  
  
Description:delayed   
bone growth  
  
  
Description:Miscarriage  
  
  
Description:scoliosis  
  
  
Past Surgical History  
Description:Appendectom  
y  
  
  
  
MDM  
MDM/ED COURSE:  
1145-patient was   
initially given 4 mg   
oral Zofran and 2 L   
normal saline were  
ordered. On   
reevaluation at this   
time patient notes that   
symptoms have not  
improved and she will   
be given 10 mg Reglan   
and 50 mg IV Benadryl.  
  
1215-on reevaluation   
patient again complains   
of generalized lower   
abdominal  
pain. As she had a   
negative CAT scan  I do not feel that it   
would merit  
repeating a CAT scan   
and she was given 40 mg   
IV Protonix.  
  
1250-patient resting in   
bed asleep.  
  
1315-patient continues   
to complain of nausea   
and has urinated   
approximately 2  
times after the 2 L of   
fluids. She will be   
given 10 mg IV   
Compazine. As  
patient has been seen   
here 3 times in 3 days   
I discussed with her   
possible  
admission to the   
hospital however she   
prefers to try the   
Compazine first.  
  
1400-patient sleeping   
in bed.  
  
1445-on reevaluation   
patient does look   
better and she was   
willing to attempt  
oral intake. Patient   
given ginger ale.  
  
1430-patient again   
vomiting. I discussed   
with her that as she   
has had 3 visits  
in 3 days and currently   
unable to tolerate oral   
intake I have felt that   
she  
would benefit with   
hospitalization for   
intractable vomiting.   
Urine tox was  
positive for marijuana.   
Patient states that she   
does usually smoke   
marijuana  
about once per day but   
has not had any in   
approximately 1 week.  
  
1500-patient vomiting   
and unable to tolerate   
oral intake. Case   
reviewed with  
Dr. Camara who agrees to   
admit patient for   
intractable vomiting.  
  
DISPOSITION  
Diagnosis/Annotation:   
ED Dx  
Name:Nausea and   
vomiting  
Code:R11.2  
  
Name:Influenza A  
Code:J10.1  
  
Name:Intractable vomit   
(more content not   
included)...        Normal                                  [facility]  
   
                                                    RED CELL MORPHOLOGYon 03-10-  
2022   
   
                                                    RBC morphology finding   
Nom (Bld)       NORMAL          Normal                          [facility]  
   
                                        Comment on above:   Performed By: #### U  
[facility] ####  
[facility]  
[address] [id number]   
   
                                                    Risk Screen - Adult Emergenc  
yon 03-   
   
                                                    Risk Screen - Adult   
Emergency                               Preferred Language:  
Preferred Language:  
Preferred Language for   
Discussing Health Care   
(patient/designee)Charleen patel  
  
Advanced Directives:  
Advance Directive/DNRno  
  
Family Violence Adult:  
Abuse Screen:  
Are you or have you   
been threatened or   
abused physically,   
emotionally, or  
sexually by anyoneno  
  
Learning Assessment   
(Patient):  
Learning Assessment   
(Patient):  
Patient is Able to be   
Assessed for   
Learningyes  
Factors Influencing   
Readiness to   
Learnacuteness of   
illness  
Factors that Impact   
Ability to Learnnone  
Devices/Methods Used to   
Communicatenone  
Learning   
Preferencesaudio  
Cultural   
Considerationsnone  
Developmental   
Considerationsnone  
Temple   
Considerationsnone  
  
Learning Assessment   
(Other Learner):  
Learning Assessment   
(Other Learner):  
Other learner   
availableno  
  
Pressure   
Injury/TB/Substance:  
Pressure Injury:  
Do you have a coughno  
Smoking Statusnever   
smoker  
Alcohol Usedenies  
  
  
Admission Risk Screen:  
Significant   
IndicatorsComplete  
  
CAGE:  
CAGE:  
Is this an injured   
patient at a Trauma   
Center   
([facility]/[place]  
[place] no  
  
  
Electronic Signatures:  
Becky Lee (RN)   
(Signed 10-Mar-2022   
15:45)  
Authored: Preferred   
Language, Advanced   
Directives, Family   
Violence Adult,  
Learning Assessment   
(Patient), Learning   
Assessment (Other   
Learner), Pressure  
Injury/TB/Substance,   
Pressure Injury, CAGE  
  
  
Last Updated:   
10-Mar-2022 15:45 by   
Becky Lee (RN)    [facility]  
   
                                                    Triage - EDon 03-   
   
                                        Triage - ED         Quick Triage:  
Are You Pregnantno  
Have You Given Birth In   
The Last 6 Weeksno  
Are You Currently   
Breastfeedingno  
The patient and/or   
guardian verbally   
acknowledges placement   
for services into  
the following (when   
Urgent Care Service   
hours are   
operating):emergency  
department  
  
Chart Review:  
ARRIVAL INFORMATION  
  
Mode of Arrival:   
private vehicle  
  
  
  
CHIEF COMPLAINT  
  
DANNA SOARES   
is a Female patient   
with a chief complaint   
of flu-like  
symptoms (Patient to ED   
[id number]   
nausea/vomiting with   
abdominal pain. Patient  
was diagnosed with   
influenza x 1 day   
prior.).  
Onset of the Complaint:   
06-Mar-2022 09:00  
Triage Date/Time:   
10-Mar-2022 10:41  
DIPIKA: 3  
Pain Rating (0-10): 7 =   
Severe  
Vital Signs:  
Temperature: 99.3F (   
37.3C) taken temporal  
Blood Pressure: 134/99   
Mean:  
Heart Rate: 82  
Respiratory Rate: 18  
Pulse Oximetry: 98% on   
room air, no   
respiratory support.   
Height: 4 feet 11.00  
inches. 149.8 CM  
Weight: 100.3 pounds.   
Calculated 45.5 kg.  
Calculated BMI (kg/m2):   
20.276 Calculated BSA   
(m2) 1.38  
  
Sujit Coma Scale:  
Best Eye Response: (E4)   
spontaneous  
Best Motor Response:   
(M6) obeys commands  
Best Verbal Response:   
(V5) oriented  
[place] Score: 15  
Sujit Assessment   
Qualifiers: patient not   
sedated/intubated  
  
Cough lasting greater   
than 3 weeks: no  
Allergies: yes  
Mask applied: yes  
Last menstrual period:   
10-Mar-2022  
Patient has homicidal   
thoughts: no  
  
  
  
  
Risk Screens  
Suicide Risk Screen  
  
In the Past Month: Have   
you wished you were   
dead or wished you   
could go to  
sleep and not wake up   
no  
In the Past Month: Have   
you had any actual   
thoughts of killing   
yourself no  
In Your Lifetime: Have   
you ever done anything,   
started to do anything,   
or  
prepared to do anything   
to end your life no  
  
  
  
Avila Fall Scale   
Screening  
Has the patient fallen   
before (or is the   
patient in the ED as a   
result of a  
fall) has not had a   
fall  
Does the patient have   
an impaired gait does   
not have impaired gait  
Is the patient   
cognitively impaired   
not cognitively   
impaired  
  
  
Interventions:  
Avila Fall   
Interventions: LOW   
INTERVENTIONS:  
*patient oriented to   
surroundings and call   
system,  
* patient/family falls   
education completed  
and documented,  
*patients fall status   
communicated  
during bedside handoff,  
*whiteboard updated,  
*mode of toileting   
discussed with patient,  
*bed in low position   
with brakes locked,  
*call light in reach,  
* non-skid footwear  
  
  
TRAVEL HISTORY  
Travel History  
Coronavirus Screening:   
no exposure or symptoms  
Travel Exposure   
History: NO travel to   
International locations   
in the past 30  
days  
  
  
PAIN  
  
Pain Scale Used: ASHLEY  
Pain Rating (0-10): 7 =   
Severe  
  
  
  
  
  
Past Medical History:  
Past Medical History   
Reviewedyes  
  
  
Appendectomy: Past   
Surgical History,   
Active  
scoliosis: Past Medical   
History, Active  
Miscarriage: Past   
Medical History, Active  
delayed bone growth:   
Past Medical History,   
Active  
premature: Past Medical   
History, Active  
  
  
Electronic Signatures:  
Andrew Guerra   
(EMT-P) (Signed   
10-Mar-2022 10:44)  
Entered: Risk Screens,   
Pain, Travel History,   
Chart Review, Scores  
Authored: Quick Triage,   
Risk Screens, Pain,   
Travel History, Chart   
Review,  
Scores  
Becky Rice (ERNA)   
(Signed 10-Mar-2022   
11:05)  
Authored: Quick Triage,   
Chart Review, Past   
Medical History  
  
  
Last Updated:   
10-Mar-2022 11:05 by   
Becky Rice (ERNA) Normal                                  [facility]  
   
                                                    UA MICROSCOPICon 03-   
   
                      Mucus Ql (Urine sed) 1+ /LPF    Normal                [facility]  
   
                                        Comment on above:   Performed By: #### U  
AMIC ####[id number]   
[place] [address]   
   
                      RBC        66 /HPF    Abnormal   0-5        [facility]  
   
                                        Comment on above:   Performed By: #### U  
AMIC ####[place] [address]   
   
                      SQUAMOUS EPITH. CELLS 3 /HPF     Normal                [facility]  
   
                                        Comment on above:   Performed By: #### U  
AMIC ####[id number]   
   
                      WBC        2 /HPF     Normal     0-5        [facility]  
   
                                        Comment on above:   Performed By: #### U  
AMIC ####[place] [address]   
   
                                                    URINALYSISon 03-   
   
                      Appearance (U) HAZY       Normal     CLEAR      [facility]  
   
                                        Comment on above:   Performed By: #### C  
BCDF ####  
[place] [address]   
   
                      Bilirubin Ql (U) Negative   Normal     NEGATIVE   [facility]  
   
                                        Comment on above:   Performed By: #### C  
BCDF ####  
[place] [address]   
   
                      Color (U)  Straw      Normal     STRAW,YELLOW [facility]  
   
                                        Comment on above:   Performed By: #### C  
BCDF ####  
[place] [address]   
   
                      Glucose Ql (U) Negative   Normal     NEGATIVE   [facility]  
   
                                        Comment on above:   Performed By: #### C  
BCDF ####  
[place] [address]   
   
                      Hemoglobin Ql (U) LARGE(3+)  Abnormal   NEGATIVE   [facility]  
   
                                        Comment on above:   Performed By: #### C  
BCDF ####  
[place] [address]   
   
                      Ketones Ql (U) 80(2+)     Abnormal   NEGATIVE   [facility]  
   
                                        Comment on above:   Performed By: #### C  
BCDF ####  
[address] [address]   
   
                                                    Leukocyte esterase Test   
strip Ql (U)    Negative        Normal          NEGATIVE        [facility]  
   
                                        Comment on above:   Performed By: #### C  
BCDF ####  
[address] [address]   
   
                      Nitrite Ql (U) Negative   Normal     NEGATIVE   [facility]  
   
                                        Comment on above:   Performed By: #### C  
BCDF ####  
[address] [address]   
   
                      pH (U)     6.0 [pH]   Normal     5.0 - 8.0  [facility]  
   
                                        Comment on above:   Performed By: #### C  
BCDF ####  
[address] [address]   
   
                      Protein Ql (U) Negative   Normal     NEGATIVE   [facility]  
   
                                        Comment on above:   Performed By: #### C  
BCDF ####  
[address] [place] [id number]   
   
                                                    Specific gravity (U)   
[Rel density]       1.012               Normal              1.005 -   
1.035                                   [facility]  
   
                                        Comment on above:   Performed By: #### C  
BCDF ####  
[address] [place] [id number]   
   
                                                    Urobilinogen (U)   
[Mass/Vol]      mg/dL           Normal          0.0 - 1.9       [facility]  
   
                                        Comment on above:   Performed By: #### C  
BCDF ####  
[address] [id number]   
   
                                                    CBC AND DIFFERENTIALon    
   
                      Basophils (Bld) [#/Vol] 0.00 10*3/uL Normal     0.00 - 0.1  
0 [facility]  
   
                                        Comment on above:   Performed By: #### C  
BCDF ####  
[id number]   
   
                      Basophils/100 WBC (Bld) 0.7 %      Normal     0.0 - 2.0  S  
[facility]  
   
                                        Comment on above:   Performed By: #### C  
BCDF ####  
[address] [address]   
   
                                                    Eosinophils (Bld)   
[#/Vol]         0.00 10*3/uL    Normal          0.00 - 0.70     [facility]  
   
                                        Comment on above:   Performed By: #### C  
BCDF ####  
[id number]   
   
                                                    Eosinophils/100 WBC   
(Bld)           0.0 %           Normal          0.0 - 6.0       [facility]  
   
                                        Comment on above:   Performed By: #### C  
BCDF ####  
[id number]   
   
                                                    Erythrocyte   
distribution width   
(RBC) [Ratio]   14.6 %          High            11.5 - 14.5     [facility]  
   
                                        Comment on above:   Performed By: #### C  
BCDF ####  
[address] [address]   
   
                                                    Hematocrit (Bld)   
[Volume fraction] 39.0 %          Normal          36.0 - 46.0     [facility]  
   
                                        Comment on above:   Performed By: #### C  
BCDF ####  
[address] [address]   
   
                                                    Hemoglobin (Bld)   
[Mass/Vol]      12.6 g/dL       Normal          12.0 - 16.0     [facility]  
   
                                        Comment on above:   Performed By: #### C  
BCDF ####  
[address] [address]   
   
                                                    Lymphocytes (Bld)   
[#/Vol]         0.70 10*3/uL    Low             1.20 - 4.80     [facility]  
   
                                        Comment on above:   Performed By: #### C  
BCDF ####  
[address] [address]   
   
                                                    Lymphocytes/100 WBC   
(Bld)           21.9 %          Normal          13.0 - 44.0     [facility]  
   
                                        Comment on above:   Performed By: #### C  
BCDF ####  
[address] [address]   
   
                      MCHC (RBC) [Mass/Vol] 32.3 g/dL  Normal     32.0 - 36.0 [facility]  
   
                                        Comment on above:   Performed By: #### C  
BCDF ####  
[address] [address]   
   
                      MCV (RBC) [Entitic vol] 86 fL      Normal     80 - 100   S  
[facility]  
   
                                        Comment on above:   Performed By: #### C  
BCDF ####  
[address] [address]   
   
                      Monocytes (Bld) [#/Vol] 0.60 10*3/uL Normal     0.10 - 1.0  
0 [facility]  
   
                                        Comment on above:   Performed By: #### C  
BCDF ####  
[address] [address]   
   
                      Monocytes/100 WBC (Bld) 19.0 %     Normal     2.0 - 10.0 S  
[facility]  
   
                                        Comment on above:   Performed By: #### C  
BCDF ####  
[address] [address]   
   
                                                    Neutrophils (Bld)   
[#/Vol]         1.80 10*3/uL    Normal          1.20 - 7.70     [facility]  
   
                                        Comment on above:   Result Comment: Perc  
ent differential counts (%) should be   
interpreted in the  
context of the absolute cell counts (cells/L).   
   
                                                            Performed By: #### C  
BCDF ####  
[address] [address]   
   
                                                    Neutrophils/100 WBC   
(Bld)           58.4 %          Normal          40.0 - 80.0     [facility]  
   
                                        Comment on above:   Performed By: #### C  
BCDF ####  
[address] [address]   
   
                      NUCLEATED RBC 0.1 /100 WBC Normal                [facility]  
   
                                        Comment on above:   Performed By: #### C  
BCDF ####  
[address] [address]   
   
                      Platelets (Bld) [#/Vol] 187 10*3/uL Normal     150 - 450    
[facility]  
   
                                        Comment on above:   Performed By: #### C  
BCDF ####  
[address] [address]   
   
                      RBC        4.53 x10E12/L Normal     4.00 - 5.20 [facility]  
   
                                        Comment on above:   Performed By: #### C  
BCDF ####  
[address] [address]   
   
                      WBC (Bld) [#/Vol] 3.2 10*3/uL Low        4.4 - 11.3 [facility]  
   
                                        Comment on above:   Performed By: #### C  
BCDF ####  
[address] [address]   
   
                                                    COMPREHENSIVE PANELon 2022   
   
                      Albumin [Mass/Vol] 5.0 g/dL   Normal     3.4 - 5.0  [facility]  
   
                                        Comment on above:   Performed By: #### U  
AMIC ####  
[address] [address]   
   
                                                    ALP [Catalytic   
activity/Vol]   50 U/L          Normal          33 - 110        [facility]  
   
                                        Comment on above:   Performed By: #### U  
AMIC ####  
[address] [address]   
   
                                                    ALT [Catalytic   
activity/Vol]   9 U/L           Normal          7 - 45          [facility]  
   
                                        Comment on above:   Result Comment: Kaleigh  
ents treated with Sulfasalazine may   
generate  
falsely decreased results for ALT.   
   
                                                            Performed By: #### U  
AMIC ####  
[address] [address]   
   
                      Anion gap [Moles/Vol] 24 mmol/L  High       10 - 20    [facility]  
   
                                        Comment on above:   Performed By: #### U  
AMIC ####  
[address] [address]   
   
                                                    AST [Catalytic   
activity/Vol]   16 U/L          Normal          9 - 39          [facility]  
   
                                        Comment on above:   Performed By: #### U  
AMIC ####  
[address] [address]   
   
                      Bilirubin [Mass/Vol] 0.4 mg/dL  Normal     0.0 - 1.2  [facility]  
   
                                        Comment on above:   Performed By: #### U  
AMIC ####  
[address] [address]   
   
                      Calcium [Mass/Vol] 9.4 mg/dL  Normal     8.6 - 10.3 [facility]  
   
                                        Comment on above:   Performed By: #### U  
AMIC ####  
[address] [address]   
   
                      Chloride [Moles/Vol] 106 mmol/L Normal     98 - 107   [facility]  
   
                                        Comment on above:   Performed By: #### U  
AMIC ####  
[address] [address]   
   
                      Creatinine [Mass/Vol] 0.68 mg/dL Normal     0.50 - 1.05 [facility]  
   
                                        Comment on above:   Performed By: #### U  
AMIC ####  
[address] [address]   
   
                      eGFR FEMALE >90        Normal     >90        [facility]  
   
                                        Comment on above:   Result Comment: CALC  
ULATIONS OF ESTIMATED GFR ARE PERFORMED  
USING THE  CKD-EPI STUDY REFIT EQUATION  
WITHOUT THE RACE VARIABLE FOR THE IDMS-TRACEABLE  
CREATININE METHODS.  
https://jasn.asnjournals.org/content/early//ASN.  
356384   
   
                                                            Performed By: #### U  
AMIC ####  
[address] [address]   
   
                      Glucose [Mass/Vol] 93 mg/dL   Normal     74 - 99    [facility]  
   
                                        Comment on above:   Performed By: #### U  
AMIC ####  
[address] [address]   
   
                      HCO3 (Bld) [Moles/Vol] 12 mmol/L  Low        21 - 32    [facility]  
   
                                        Comment on above:   Performed By: #### U  
AMIC ####  
[address] [address]   
   
                      Potassium [Moles/Vol] 3.9 mmol/L Normal     3.5 - 5.3  [facility]  
   
                                        Comment on above:   Performed By: #### U  
AMIC ####  
[address] [address]   
   
                      Protein [Mass/Vol] 8.1 g/dL   Normal     6.4 - 8.2  [facility]  
   
                                        Comment on above:   Performed By: #### U  
AMIC ####  
[address] [address]   
   
                      Sodium [Moles/Vol] 138 mmol/L Normal     136 - 145  [facility]  
   
                                        Comment on above:   Performed By: #### U  
AMIC ####  
[address] [address]   
   
                                                    Urea nitrogen   
[Mass/Vol]      6 mg/dL         Normal          6 - 23          [facility]  
   
                                        Comment on above:   Performed By: #### U  
AMIC ####  
[address] [address]   
   
                                                    Covid 19 Resultson   
2   
   
                                                    SARS-CoV-2 (COVID-19)   
RNA KIRSTEN+probe Ql (Unsp   
spec)                                   NEGATIVE COVID-19 Test  
  
Coronaviruses are   
common world-wide and   
are the cause of many   
common colds.  
SARS-COV2 is a new   
coronavirus that began   
circulating worldwide   
in 2019 so we  
are calling it   
COVID-19. It has been   
estimated that four out   
of five patients  
with COVID-19 will   
recover at home without   
the need for medical   
attention.  
Symptoms of COVID-19   
may include cough,   
fever, shortness of   
breath, loss of  
taste or smell and   
other flu-like symptoms   
including chills, sore   
muscles, sore  
throat, and headache.   
Severe illness is more   
common in older people   
and people  
with other health   
problems such as high   
blood pressure,   
obesity, and immune  
system problems.  
  
If the test is   
positive, you have   
COVID-19. You will be   
contacted by the  
ordering physicians   
office and instructed   
to remain on home   
isolation, in  
accordance with CDC   
guidelines. You may   
also be contacted by   
the [facility] to   
see if any of your   
close contacts may have   
been exposed  
to the virus and need   
to quarantine.  
  
If the test is   
negative, you likely do   
not have COVID-19 at   
this time, but you  
still may have a   
different illness that   
can spread to other   
people (like  
Influenza, or the Flu)   
and could still be at   
risk for getting   
COVID-19. We  
recommend that you stay   
away from other people   
to limit the spread of   
illness  
until your symptoms are   
improving and you are   
fever-free for 24 hours   
without  
the use of fever   
lowering medications   
such as acetaminophen   
or ibuprofen. No  
test is 100% accurate   
so if you are still   
concerned you may have   
COVID-19, talk  
to your doctor about   
the need to continue to   
stay away from others.  
  
Medicines  
  
Unless your provider   
told you not to use the   
following:   
Acetaminophen (Tylenol  
and others) is   
generally safe.   
Anti-inflammatory   
medications, such as   
Ibuprofen  
(Advil or Motrin) or   
Naproxen (Aleve) can   
also be used.   
Over-the-counter  
cough and cold   
medicines can be used   
according to the   
instructions on the  
package. Some   
over-the-counter   
medicines also contain   
acetaminophen. Make   
sure  
you are not taking more   
than your recommended   
dose. For those not   
hospitalized,  
there is no specific   
treatment available for   
this illness.   
Antibiotics do not  
treat Coronaviruses.  
  
Follow-Up  
  
Follow up with your   
doctor by scheduling a   
virtual visit or   
consider follow-up  
at one of our urgent   
care fever clinics. If   
you are having   
difficulty  
breathing, or are very   
weak and having   
difficulty standing,   
this is a medical  
emergency. Call 911 or   
have someone take you   
to the nearest   
emergency room  
immediately. If   
possible, wear a   
facemask.  
  
Additional guidance   
from the CDC for   
patients who tested   
POSITIVE for COVID-19  
  
How to isolate:  
Isolate yourself in a   
specific room at home   
and limit your contact   
with others.  
Use a separate bathroom   
from other members of   
the household, when   
possible.  
Leave home only to get   
essential medical care.   
Do not go to work,   
school or  
public areas.  
Avoid using public   
transportation,   
ride-sharing, or taxis.  
Restrict contact with   
pets and other animals.   
If you must care for   
your pet or  
be around animals while   
you are sick, wash your   
hands before and after   
your  
interaction and wear a   
facemask.  
Make sure that shared   
spaces in the home have   
good airflow, such as   
by an air  
conditioner or an   
opened window, weather   
permitting.  
  
Personal Hygiene   
Procedures:  
Wear a face mask when   
in the same room as   
other people or pets.   
If a face mask  
interferes with your   
breathing, others   
should wear a mask when   
sharing space  
with you.  
Frequent hand-washing:   
wash your hands with   
soap and water for at   
least 20  
seconds. If soap and   
water are not   
available, use   
alcohol-based hand   
.  
Avoid touching your   
eyes, nose, and mouth   
with unwashed hands.  
  
Household Hygiene   
Procedures:  
Avoid sharing personal   
household items such as   
dishes, glassware,   
cups, eating  
utensils, towels or   
bedding with other   
people or pets in your   
home. After use,  
these items should be   
washed with soap and   
hot water.  
Disinfect all   
high-touch surfaces   
every day with   
antibacterial cleaning  
solutions such as Lysol   
wipes, bleach,   
cleansers, etc.   
High-touch surfaces  
include tabletops,   
doorknobs, bathroom   
fixtures, toilets,   
phones, keyboards,  
tablets and bedside   
tables.  
Immediately clean any   
surfaces that may have   
blood, poop or body   
fluids on  
them, using   
antibacterial cleaning   
solutions such as Lysol   
wipes, bleach,  
cleansers, etc.  
If clothing or bedding   
come into contact with   
blood, poop or body   
fluids, they  
should be washed   
immediately. Follow the   
directions on the   
laundry detergent  
and clothing labels but   
hot water is   
recommended when   
possible.  
  
Stopping home isolation   
precautions:  
If possible, consult   
your doctor before   
stopping home isolation   
precautions.  
According to the CDC,   
you can discontinue   
home isolation   
precautions when you  
have met both of these   
criteria:  
Your fever and   
respiratory symptoms   
have been gone for 24   
[place] (more content not   
included)...        Normal                                  [facility]  
   
                                                    HCG,URINEon 2022   
   
                                                    Beta HCG (pregnancy   
test) Ql (U)    Negative        Normal          Negative        [facility]  
   
                                        Comment on above:   Performed By: #### H  
EPFP ####  
[place] [id number]   
   
                                                    INFLUENZA A/B, COVID 2019 PC  
R,SYMPTOMATICon 2022   
   
                      INFLUENZA A, PCR Detected   Abnormal   Not Detected [facility]  
   
                                        Comment on above:   Result Comment: Resp  
iratory virus testing is performed   
routinely by PCR  
for Influenza A/B and RSV.  
Not Detected results do not preclude Influenza A/B or RSV  
infections since the adequacy of sample collection or low  
viral burden may impact the clinical sensitivity of this  
test method.   
   
                                                            Performed By: #### C  
BCDF ####  
[place] [address]   
   
                      INFLUENZA B, PCR Not detected Normal     Not Detected [facility]  
   
                                        Comment on above:   Result Comment: Resp  
iratory virus testing is performed   
routinely by PCR  
for Influenza A/B and RSV.  
Not Detected results do not preclude Influenza A/B or RSV  
infections since the adequacy of sample collection or low  
viral burden may impact the clinical sensitivity of this  
test method.   
   
                                                            Performed By: #### C  
BCDF ####  
[place] [address]   
   
                                                    SARS-CoV-2 (COVID-19)   
RNA KIRSTEN+probe Ql (Unsp   
spec)           Not detected    Normal          Not Detected    [facility]  
   
                                        Comment on above:   Result Comment: .  
This test has received FDA Emergency Use Authorization (EUA)   
and has been  
verified by [facility].   
This test is only  
authorized for the duration of time that circumstances exist   
to justify the  
authorization of the emergency use of in vitro diagnostic   
tests for the  
detection of SARS-CoV-2 virus and/or diagnosis of COVID-19   
infection under  
section 564(b)(1) of the Act, 21 U.S.C. 360bbb-3(b)(1), unless   
the  
authorization is terminated or revoked sooner.  
[facility] is certified   
under CLIA-88 as  
qualified to perform high complexity testing. Testing is   
performed in the  
[facility] laboratory located at [address] [place] [address].  
SARS-CoV-2/Flu/RSV Multiplex Test:  
Fact sheet for providers:   
https://www.fda.gov/media/746908/download  
Fact sheet for patients:   
https://www.fda.gov/media/207585/download   
   
                                                            Performed By: #### C  
BCDF ####  
[address] [address]   
   
                      Lab Specimen Source Nasal, Nasopharyngeal Normal            
      [facility]  
   
                                        Comment on above:   Performed By: #### C  
BCDF ####  
[address] [address]   
   
                                                    DATE OF SYMPTOM ONSET   
[[id number]]?     2022        Normal                          [facility]  
   
                                        Comment on above:   Performed By: #### C  
BCDF ####  
[place] [id number]   
   
                                                    LIPASEon 2022   
   
                                                    Lipase [Catalytic   
activity/Vol]   28 U/L          Normal                    [facility]  
   
                                        Comment on above:   Result Comment: [place]  
puncture immediately after or during the  
administration of Metamizole may lead to falsely  
low results. Testing should be performed immediately  
prior to Metamizole dosing.  
N-acetyl-p-benzoquinone imine (metabolite of Acetaminophen)  
will generate erroneously low results in samples for patients  
that have taken toxic doses of acetaminophen.   
   
                                                            Performed By: #### U  
AMIC ####  
[place] [id number]   
   
                                                    Provider Note - ED v3on 03   
   
                                        Provider Note - ED v3 Provider Note:  
Chart Review:  
  
ED NOTES  
ED NOTES:  
Source of Information:   
Patient. EMR was   
reviewed for previous   
records.  
-----------------------  
-----------  
-----------------------  
-----------------------  
---------------  
HPI: Nausea, vomiting,   
diarrhea, abdominal   
pain. This 21-year-old   
white female  
presents to the ED   
complaint of nausea,   
vomiting and diarrhea   
that began 3 days  
ago she states that   
abdominal pain symptoms   
started yesterday.   
Patient states  
that she was seen and   
evaluated at [facility] yesterday. She   
states that she  
had blood work   
performed and was told   
that something was low   
and also had  
imaging of her abdomen   
and pelvis with a CT   
scan that was negative.   
Patient  
states that she has not   
seen a GI specialist   
for symptoms in the   
past. She did  
treat herself with   
Zofran at midnight   
without any improvement   
of her symptoms.  
She states that she is   
unable to keep anything   
down. She denies any   
ill  
contacts.  
-----------------------  
-----------  
-----------------------  
-----------------------  
---------------  
  
PMH: Irregular   
menstrual cycle  
PSH: Appendectomy, ORIF   
of right wrist  
Social Hx: The patient   
denies any use of   
cigarettes or alcohol.   
Occasional use  
of marijuana, denies   
any recent use of   
marijuana.  
Fam:  
MEDS: Zofran  
ALLERGIES: Codeine,   
MiraLAX  
-----------------------  
-----------  
-----------------------  
-----------------------  
---------------  
PHYSICAL EXAM:  
General: Patient alert,   
awake, oriented X3,   
appears be in mild   
distress,  
nontoxic, cooperative  
Skin: Warm. Dry.   
Intact. No rash.  
Eyes: PEARTLA, EOMIs   
intact, sclera white,   
conjunctiva clear  
HEENT: Atraumatic.   
Normo-cephalic. Oral   
nasal mucosa pink and   
moist.  
Neck: Supple without   
meningismus, no   
lymphadenopathy.  
CV: Regular rate and   
rhythm without murmurs,   
heaves, lifts or   
thrills.  
Respiratory: Nonlabored   
breathing. There are no   
retractions or   
tachypnea.  
Lungs are clear to   
auscultation   
bilaterally. She is   
noted to be coughing.  
GI: Soft, tenderness in   
the epigastrium without   
gross distention, bowel   
sounds  
present in all 4   
quadrants. There is no   
pulsatile masses. There   
is no CVA  
tenderness. No rebound,   
rigidity or guarding.  
MUSC: There is no joint   
swelling or bony   
tenderness on exam.  
Neuro: Cranial nerves   
II - XII grossly   
intact. Speech is   
fluent. No focal  
neurologic deficits are   
noted on exam.  
Lower extremities:   
There is no peripheral   
edema bilaterally,   
negative Homans  
sign. No palpable   
cords. Distal pulses   
are present in both   
lower extremities.  
Psych: Maintains eye   
contact. Cooperative.  
-----------------------  
-----------  
-----------------------  
-----------------------  
---------------  
ED course: CT scan   
imaging that was   
performed at [facility] yesterday   
revealed  
free fluid in the   
pelvis which may be due   
to a ruptured ovarian   
cyst although  
no adnexal masses seen.   
Prior appendectomy. No   
acute process involving   
the  
bowel. Patient   
continues to complain   
of nausea with   
epigastric abdominal   
pain.  
Lab work is   
unremarkable with   
normal liver enzymes   
she does have evidence   
of  
dehydration. Patient   
was ordered Phenergan   
and Bentyl for   
discomfort. Patient  
was diagnosed with   
influenza A. She was   
doing much better after   
Phenergan and  
was discharged home   
with a prescription for   
Phenergan   
suppositories, Tamiflu,  
Bentyl. She was   
provided a role to see   
a primary care doctor   
and a GI  
specialist.  
  
This chart was dictated   
with the use of Dragon   
software within the   
framework of  
the current electronic   
medical records   
software. Attempts were   
made to edit in  
real time, given time   
constraints there is   
the potential for   
inaccuracies in my  
dictation.  
  
Lei Anderson DO  
  
  
HISTORY OF PRESENTING   
ILLNESS  
DANNA is a 21 year old   
Female and was seen by   
me at 09-Mar-2022 08:25   
for a  
chief complaint of   
abdominal pain (N/V/D   
started 3 days ago,   
today abd pain  
started was seen   
yesterday [place] [facility] said   
electrolytes were   
abnormal.).  
  
Triage Information:   
Most recent Vital Sign   
Value Date  
Temp (F): 99.9   
2022 08:29  
Temp (C): 37.7   
2022 08:29  
Heart Rate (beats/min):   
95 2022 08:29  
Respirations   
(breaths/min): 18   
2022 08:29  
SpO2 (%): 99 2022   
08:29  
BP Systolic (mm Hg):   
117 2022 08:29  
BP Diastolic (mm Hg):   
85 2022 08:29  
  
  
  
  
  
PAST MEDICAL HISTORY  
CURRENT OR FORMER   
SUBSTANCE USE:  
Tobacco/Nicotine Use:   
never smoker  
Alcohol Use: denies  
Drug Use:   
occasionally,Substance   
Comment: Marijuana  
ALLERGIES/INTOLERANCES:   
Allergy  
Allergen: MiraLax  
Type: Drug  
Reaction:   
Hives/Urticaria  
  
Allergen: codeine  
Type: Drug  
Reaction: Unknown  
  
HEALTH HISTORY: No   
documented data.  
  
OUTPATIENT MEDICATIONS:   
Home Medications Review   
Status for   
Reconciliation: Not  
Done  
Med Status: Incomplete   
Medication History  
  
Drug Name: Prot (more   
content not   
included)...        Normal                                  [facility]  
   
                                                    Risk Screen - Adult Emergenc  
yon 2022   
   
                                                    Risk Screen - Adult   
Emergency                               Preferred Language:  
Preferred Language:  
Preferred Language for   
Discussing Health Care   
(patient/designee)Charleen patel  
  
Advanced Directives:  
Advance Directive/DNRno  
  
Family Violence Adult:  
Abuse Screen:  
Are you or have you   
been threatened or   
abused physically,   
emotionally, or  
sexually by anyoneno  
  
Learning Assessment   
(Patient):  
Learning Assessment   
(Patient):  
Patient is Able to be   
Assessed for   
Learningyes  
Factors Influencing   
Readiness to   
Learnacuteness of   
illness  
Factors that Impact   
Ability to Learnnone  
Devices/Methods Used to   
Communicatenone  
Learning   
Preferencesaudio  
Cultural   
Considerationsnone  
Developmental   
Considerationsnone  
Temple   
Considerationsnone  
  
Learning Assessment   
(Other Learner):  
Learning Assessment   
(Other Learner):  
Other learner   
availableno  
  
Pressure   
Injury/TB/Substance:  
Pressure Injury:  
Pressure Injury Present   
on Admissionno  
Do you have a coughno  
Smoking Statusnever   
smoker  
Alcohol Usedenies  
Drug Usedenies  
  
  
Admission Risk Screen:  
Significant   
IndicatorsComplete  
  
CAGE:  
CAGE:  
Is this an injured   
patient at a Trauma   
Center   
([facility]/[place]/[facility]  
a/[place]): no  
  
  
Electronic Signatures:  
Becky Lee (RN)   
(Signed 09-Mar-2022   
08:34)  
Authored: Preferred   
Language, Advanced   
Directives, Family   
Violence Adult,  
Learning Assessment   
(Patient), Learning   
Assessment (Other   
Learner), Pressure  
Injury/TB/Substance,   
Pressure Injury, CAGE  
  
  
Last Updated:   
09-Mar-2022 08:34 by   
Becky Lee (RN)    [facility]  
   
                                                    Triage - EDon 2022   
   
                                        Triage - ED         Quick Triage:  
Are You Pregnantno  
Are You Currently   
Breastfeedingno  
  
Chart Review:  
ARRIVAL INFORMATION  
  
Mode of Arrival:   
private vehicle  
  
  
CHIEF COMPLAINT  
  
DANNA SOARES   
is a Female patient   
with a chief complaint   
of abdominal  
pain (N/V/D started 3   
days ago, today abd   
pain started was seen   
yesterday [place] [facility]  
said electrolytes were   
abnormal.).  
Triage Date/Time:   
09-Mar-2022 08:29  
DIPIKA: 3  
Pain Rating (0-10): 10   
= Severe  
Pain location: abd  
Vital Signs:  
Temperature: 99.9F (   
37.7C)  
Blood Pressure: 117/85   
Mean:  
Heart Rate: 95  
Respiratory Rate: 18  
Pulse Oximetry: 99% on   
room air, no   
respiratory support.   
Height: 4 feet 11.00  
inches. 149.8 CM  
Weight: 100.3 pounds.   
Calculated 45.5 kg.  
Calculated BMI (kg/m2):   
20.276 Calculated BSA   
(m2) 1.38  
  
[place] Coma Scale:  
Best Motor Response:   
(M6) obeys commands  
Best Verbal Response:   
(V5) oriented  
  
  
Allergies: yes  
Last menstrual period:   
09-Mar-2022  
Patient has homicidal   
thoughts: no  
  
  
  
  
Risk Screens  
Suicide Risk Screen  
  
In the Past Month: Have   
you wished you were   
dead or wished you   
could go to  
sleep and not wake up   
no  
In the Past Month: Have   
you had any actual   
thoughts of killing   
yourself no  
In Your Lifetime: Have   
you ever done anything,   
started to do anything,   
or  
prepared to do anything   
to end your life no  
  
  
  
Avila Fall Scale   
Screening  
Has the patient fallen   
before (or is the   
patient in the ED as a   
result of a  
fall) has not had a   
fall  
Does the patient have   
an impaired gait does   
not have impaired gait  
Is the patient   
cognitively impaired   
not cognitively   
impaired  
  
  
Interventions:  
Avila Fall   
Interventions: LOW   
INTERVENTIONS:  
*patient oriented to   
surroundings and call   
system,  
* patient/family falls   
education completed  
and documented,  
*patients fall status   
communicated  
during bedside handoff,  
*whiteboard updated,  
*mode of toileting   
discussed with patient,  
*bed in low position   
with brakes locked,  
*call light in reach,  
* non-skid footwear  
  
  
TRAVEL HISTORY  
Travel History  
Coronavirus Screening:   
no exposure or symptoms  
Travel Exposure   
History: NO travel to   
International locations   
in the past 30  
days  
  
  
PAIN  
  
Pain Scale Used: ASHLEY  
Pain Rating (0-10): 10   
= Severe  
  
  
  
  
  
Past Medical History:  
Past Medical History   
Reviewedyes  
  
Appendectomy: Past   
Surgical History,   
Active  
  
  
Electronic Signatures:  
Becky Lee)   
(Signed 09-Mar-2022   
08:33)  
Entered: Risk Screens,   
Pain, Travel History,   
Chart Review, Scores,   
Past  
Medical History  
Authored: Quick Triage,   
Risk Screens, Pain,   
Travel History, Chart   
Review,  
Scores, Past Medical   
History  
  
  
Last Updated:   
09-Mar-2022 08:33 by   
Becky Lee)    Normal                                  [facility]  
   
                                                    UA MICROSCOPICon 2022   
   
                      Mucus Ql (Urine sed) 1+ /LPF    Normal                [facility]  
   
                                        Comment on above:   Performed By: #### U  
AMIC ####  
[place] [address]   
   
                      RBC        2 /HPF     Normal     0-5        [facility]  
   
                                        Comment on above:   Performed By: #### U  
AMIC ####  
[address] [address]   
   
                      SQUAMOUS EPITH. CELLS 1 /HPF     Normal                [facility]  
   
                                        Comment on above:   Performed By: #### U  
AMIC ####  
[address] [address]   
   
                      WBC        2 /HPF     Normal     0-5        [facility]  
   
                                        Comment on above:   Performed By: #### U  
AMIC ####  
[place] [id number]   
   
                                                    URINALYSIS WITH CULTURE IF I  
NDICATEDon 2022   
   
                      Appearance (U) CLEAR      Normal     CLEAR      [facility]  
   
                                        Comment on above:   Performed By: #### U  
AMIC ####  
[address] [address]   
   
                      Bilirubin Ql (U) Negative   Normal     NEGATIVE   [facility]  
   
                                        Comment on above:   Performed By: #### U  
AMIC ####  
[address] [address]   
   
                      Color (U)  Yellow     Normal     STRAW,YELLOW [facility]  
   
                                        Comment on above:   Performed By: #### U  
AMIC ####  
[address] [address]   
   
                      Glucose Ql (U) Negative   Normal     NEGATIVE   [facility]  
   
                                        Comment on above:   Performed By: #### U  
AMIC ####  
[address] [address]   
   
                      Hemoglobin Ql (U) LARGE(3+)  Abnormal   NEGATIVE   [facility]  
   
                                        Comment on above:   Performed By: #### U  
AMIC ####  
[address] [address]   
   
                      Ketones Ql (U) 80(2+)     Abnormal   NEGATIVE   [facility]  
   
                                        Comment on above:   Performed By: #### U  
AMIC ####  
[address] [address]   
   
                                                    Leukocyte esterase Test   
strip Ql (U)    Negative        Normal          NEGATIVE        [facility]  
   
                                        Comment on above:   Performed By: #### U  
AMIC ####  
[address] [address]   
   
                      Nitrite Ql (U) Negative   Normal     NEGATIVE   [facility]  
   
                                        Comment on above:   Performed By: #### U  
AMIC ####  
[address] [address]   
   
                      pH (U)     5.0 [pH]   Normal     5.0 - 8.0  [facility]  
   
                                        Comment on above:   Performed By: #### U  
AMIC ####  
[address] [address]   
   
                      Protein Ql (U) 30(1+)     Abnormal   NEGATIVE   [facility]  
   
                                        Comment on above:   Performed By: #### U  
AMIC ####  
[address] [address]   
   
                                                    Specific gravity (U)   
[Rel density]       1.023               Normal              1.005 -   
1.035                                   [facility]  
   
                                        Comment on above:   Performed By: #### U  
AMIC ####  
[address] [address]   
   
                                                    Urobilinogen (U)   
[Mass/Vol]      mg/dL           Normal          0.0 - 1.9       [facility]  
   
                                        Comment on above:   Performed By: #### U  
AMI ####  
[address] [address]   
   
                                                    Clinical Event Note-Stroke F  
ollow-Up Call Backon 2022   
   
                                                    Clinical Event   
Note-Stroke Follow-Up   
Call Back                               Clinical Event:  
Clinical Event Note:  
TopicStroke Follow-Up   
Call Back  
Details  
phone # listed is not a   
working number  
  
  
  
Electronic Signatures:  
Penny Vallejo)   
(Signed 2022   
16:09)  
Authored: Clinical   
Event Note  
  
  
Last Updated:   
2022 16:09 by   
Penny Vallejo (ERNA)  Normal                                  [facility]  
   
                                                    CBC AND DIFFERENTIALon    
   
                      Basophils (Bld) [#/Vol] 0.10 10*3/uL Normal     0.00 - 0.1  
0 [facility]  
   
                                        Comment on above:   Performed By: #### C  
BCDF ####  
[place] [id number]   
   
                      Basophils/100 WBC (Bld) 0.9 %      Normal     0.0 - 2.0  S  
[facility]  
   
                                        Comment on above:   Performed By: #### C  
BCDF ####  
[address] [address]   
   
                                                    Eosinophils (Bld)   
[#/Vol]         0.20 10*3/uL    Normal          0.00 - 0.70     [facility]  
   
                                        Comment on above:   Performed By: #### C  
BCDF ####  
[address] [address]   
   
                                                    Eosinophils/100 WBC   
(Bld)           2.0 %           Normal          0.0 - 6.0       [facility]  
   
                                        Comment on above:   Performed By: #### C  
BCDF ####  
[address] [address]   
   
                                                    Erythrocyte   
distribution width   
(RBC) [Ratio]   15.0 %          High            11.5 - 14.5     [facility]  
   
                                        Comment on above:   Performed By: #### C  
BCDF ####  
[address] [address]   
   
                                                    Hematocrit (Bld)   
[Volume fraction] 39.6 %          Normal          36.0 - 46.0     [facility]  
   
                                        Comment on above:   Performed By: #### C  
BCDF ####  
[address] [address]   
   
                                                    Hemoglobin (Bld)   
[Mass/Vol]      13.2 g/dL       Normal          12.0 - 16.0     [facility]  
   
                                        Comment on above:   Performed By: #### C  
BCDF ####  
[address] [address]   
   
                                                    Lymphocytes (Bld)   
[#/Vol]         2.60 10*3/uL    Normal          1.20 - 4.80     [facility]  
   
                                        Comment on above:   Performed By: #### C  
BCDF ####  
[address] [address]   
   
                                                    Lymphocytes/100 WBC   
(Bld)           29.4 %          Normal          13.0 - 44.0     [facility]  
   
                                        Comment on above:   Performed By: #### C  
BCDF ####  
[address] [address]   
   
                      MCHC (RBC) [Mass/Vol] 33.2 g/dL  Normal     32.0 - 36.0 [facility]  
   
                                        Comment on above:   Performed By: #### C  
BCDF ####  
[address] [address]   
   
                      MCV (RBC) [Entitic vol] 86 fL      Normal     80 - 100   S  
[facility]  
   
                                        Comment on above:   Performed By: #### C  
BCDF ####  
[address] [address]   
   
                      Monocytes (Bld) [#/Vol] 1.10 10*3/uL High       0.10 - 1.0  
0 [facility]  
   
                                        Comment on above:   Performed By: #### C  
BCDF ####  
[address] [address]   
   
                      Monocytes/100 WBC (Bld) 12.6 %     Normal     2.0 - 10.0 S  
[facility]  
   
                                        Comment on above:   Performed By: #### C  
BCDF ####  
[address] [address]   
   
                                                    Neutrophils (Bld)   
[#/Vol]         4.80 10*3/uL    Normal          1.20 - 7.70     [facility]  
   
                                        Comment on above:   Result Comment: Perc  
ent differential counts (%) should be   
interpreted in the  
context of the absolute cell counts (cells/L).   
   
                                                            Performed By: #### C  
BCDF ####  
[address] [address]   
   
                                                    Neutrophils/100 WBC   
(Bld)           55.1 %          Normal          40.0 - 80.0     [facility]  
   
                                        Comment on above:   Performed By: #### C  
BCDF ####  
[address] [address]   
   
                      NUCLEATED RBC 0.1 /100 WBC Normal                [facility]  
   
                                        Comment on above:   Performed By: #### C  
BCDF ####  
[address] [address]   
   
                      Platelets (Bld) [#/Vol] 298 10*3/uL Normal     150 - 450    
[facility]  
   
                                        Comment on above:   Performed By: #### C  
BCDF ####  
[address] [address]   
   
                      RBC        4.59 x10E12/L Normal     4.00 - 5.20 [facility]  
   
                                        Comment on above:   Performed By: #### C  
BCDF ####  
[address] [address]   
   
                      WBC (Bld) [#/Vol] 8.7 10*3/uL Normal     4.4 - 11.3 [facility]  
   
                                        Comment on above:   Performed By: #### C  
BCDF ####  
[address] [address]   
   
                                                    CHEST 1 VIEWon 2022   
   
                                        CHEST 1 VIEW        MRN: [id number]  
Patient Name:   
DANNA SOARES  
  
STUDY:  
NR CT BRAIN ATTACK   
ANGIO HEAD W CONTRAST   
AND POST PROC; NR CT   
BRAIN  
ATTACK ANGIO NECK W   
CONTRAST AND POST PROC;   
CHEST 1 VIEW; 2022  
7:15 pm; 2022 7:03   
pm  
  
INDICATION:  
syncoep and L sided   
weakness ; syncope and   
L sided weakness ;   
syncope  
.  
  
Concern for new onset   
left-sided weakness.  
  
COMPARISON:  
Same day CT head  
  
ACCESSION NUMBER(S):  
[id number]; [id number];   
[id number]  
  
ORDERING CLINICIAN:  
PRISCILLA ZEPEDA  
  
TECHNIQUE:  
Unenhanced CT images of   
the head were obtained.   
Subsequently, 68 mL  
Omnipaque 350 was   
administered   
intravenously and axial   
images of the  
head and neck were   
acquired. Coronal,   
sagittal, and 3-D  
reconstructions were   
provided for review.   
Modified NASCET   
criteria  
were utilized for the   
evaluation of carotid   
stenoses.  
  
FINDINGS:  
  
  
CTA [id number]:  
  
Anterior circulation:   
The bilateral   
intracranial internal   
carotid  
arteries, carotid   
terminals, and proximal   
anterior and middle  
cerebral arteries are   
normal in caliber   
without significant  
narrowing, large vessel   
occlusion, or aneurysm.  
  
Posterior circulation:   
The bilateral   
intracranial vertebral   
arteries,  
vertebrobasilar   
junction, basilar   
artery, and proximal   
posterior  
cerebral arteries are   
normal in caliber   
without significant  
narrowing, large vessel   
occlusion, or aneurysm.  
  
  
CTA NECK:  
  
There is a normal   
three-vessel branch   
pattern of the aortic   
arch.  
  
Right carotid: The   
right common carotid   
artery, carotid   
bifurcation,  
and cervical internal   
carotid artery are   
normal in caliber   
without  
significant narrowing,   
dissection, or   
aneurysm. There is 0%   
stenosis  
by NASCET criteria.  
  
Left carotid: The left   
common carotid artery,   
carotid bifurcation,  
and cervical internal   
carotid artery are   
normal in caliber   
without  
significant narrowing,   
dissection, or   
aneurysm. There is 0%   
stenosis  
by NASCET criteria.  
  
Vertebral arteries: The   
cervical vertebral   
arteries are normal in  
caliber bilaterally   
without significant   
narrowing, dissection,   
or  
aneurysm.  
  
IMPRESSION:  
1. No hemodynamically   
significant stenosis of   
the anterior or  
posterior circulation   
of the brain.  
2. Normal CT angiogram   
of the neck.  
  
  
Electronically signed   
by: NUNO GANN MD  Normal                                  [facility]  
   
                                                    COMPREHENSIVE PANELon 2022   
   
                      Albumin [Mass/Vol] 4.2 g/dL   Normal     3.4 - 5.0  [facility]  
   
                                        Comment on above:   Performed By: #### U  
AMIC ####  
[address] [address]   
   
                                                    ALP [Catalytic   
activity/Vol]   42 U/L          Normal          33 - 110        [facility]  
   
                                        Comment on above:   Performed By: #### U  
AMIC ####  
[address] [place] [address]   
   
                                                    ALT [Catalytic   
activity/Vol]   17 U/L          Normal          7 - 45          [facility]  
   
                                        Comment on above:   Result Comment: Kaleigh  
ents treated with Sulfasalazine may   
generate  
falsely decreased results for ALT.   
   
                                                            Performed By: #### U  
AMIC ####  
[address] [address]   
   
                      Anion gap [Moles/Vol] 18 mmol/L  Normal     10 - 20    [facility]  
   
                                        Comment on above:   Performed By: #### U  
AMIC ####  
[address] [address]   
   
                                                    AST [Catalytic   
activity/Vol]   19 U/L          Normal          9 - 39          [facility]  
   
                                        Comment on above:   Performed By: #### U  
AMIC ####  
[address] [address]   
   
                      Bilirubin [Mass/Vol] 0.6 mg/dL  Normal     0.0 - 1.2  [facility]  
   
                                        Comment on above:   Performed By: #### U  
AMIC ####  
[address] [address]   
   
                      Calcium [Mass/Vol] 9.0 mg/dL  Normal     8.6 - 10.3 [facility]  
   
                                        Comment on above:   Performed By: #### U  
AMIC ####  
[address] [address]   
   
                      Chloride [Moles/Vol] 104 mmol/L Normal     98 - 107   [facility]  
   
                                        Comment on above:   Performed By: #### U  
AMIC ####  
[address] [address]   
   
                      Creatinine [Mass/Vol] 0.52 mg/dL Normal     0.50 - 1.05 [facility]  
   
                                        Comment on above:   Performed By: #### U  
AMIC ####  
[address] [address]   
   
                      eGFR FEMALE >90        Normal     >90        [facility]  
   
                                        Comment on above:   Result Comment: CALC  
ULATIONS OF ESTIMATED GFR ARE PERFORMED  
USING THE  CKD-EPI STUDY REFIT EQUATION  
WITHOUT THE RACE VARIABLE FOR THE IDMS-TRACEABLE  
CREATININE METHODS.  
https://jasn.asnjournals.org/content/early/ASN.2021  
287569   
   
                                                            Performed By: #### U  
AMIC ####  
[address] [address]   
   
                      Glucose [Mass/Vol] 99 mg/dL   Normal     74 - 99    [facility]  
   
                                        Comment on above:   Performed By: #### U  
AMIC ####  
[address] [address]   
   
                      HCO3 (Bld) [Moles/Vol] 20 mmol/L  Low        21 - 32    [facility]  
   
                                        Comment on above:   Performed By: #### U  
AMIC ####  
[address] [address]   
   
                      Potassium [Moles/Vol] 3.2 mmol/L Low        3.5 - 5.3  [facility]  
   
                                        Comment on above:   Performed By: #### U  
AMIC ####  
[address] [address]   
   
                      Protein [Mass/Vol] 6.4 g/dL   Normal     6.4 - 8.2  [facility]  
   
                                        Comment on above:   Performed By: #### U  
AMIC ####  
[address] [address]   
   
                      Sodium [Moles/Vol] 139 mmol/L Normal     136 - 145  [facility]  
   
                                        Comment on above:   Performed By: #### U  
AMIC ####  
[address] [address]   
   
                                                    Urea nitrogen   
[Mass/Vol]      5 mg/dL         Low             6 - 23          [facility]  
   
                                        Comment on above:   Performed By: #### U  
AMIC ####  
[address] [address]   
   
                                                    CT BRAIN ATTACK HEAD WO CONT  
RASTon 2022   
   
                                                    CT BRAIN ATTACK HEAD WO   
CONTRAST                                Addendum Begins  
MRN: [id number]  
Patient Name:   
DANNA SOARES  
  
ADDENDUM:  
Negative results   
verbally confirmed with   
Priscilla Zepdea by RAD OP   
team  
member Abel Greer.  
Electronically signed   
by: NUNO GANN MD   
Addendum Ends  
MRN: [id number]  
Patient Name:   
DANNA SOARES  
  
STUDY:  
NR CT BRAIN ATTACK HEAD   
WO CONTRAST; 2022   
6:09 pm  
  
INDICATION:  
L sided weakness  
  
COMPARISON:  
Prior CT head   
2022.  
  
ACCESSION NUMBER(S):  
[id number]  
  
ORDERING CLINICIAN:  
PRISCILLA ZEPEDA  
  
TECHNIQUE:  
Multiple contiguous   
noncontrast axial CT   
images of the brain   
were  
obtained from the skull   
base to the vertex   
utilizing 5 mm slice  
thickness. Coronal   
reformatted images were   
also obtained.  
  
FINDINGS:  
  
  
VENTRICLES AND CSF   
SPACES:  
Age-appropriate size   
and configuration of   
the ventricles and   
sulci.  
  
Brain parenchyma:  
Gray-white matter   
interface is   
well-maintained. No   
masses are seen.  
No midline shift or   
mass effect.  
  
HEMORRHAGE:  
None.  
  
CALVARIUM:  
No destructive lesion   
or depressed calvarial   
fracture. The  
extracranial soft   
tissues are grossly   
unremarkable.  
  
ADDITIONAL FINDINGS:  
Visualized paranasal   
sinuses and bilateral   
mastoids are grossly   
clear.  
  
IMPRESSION:  
No acute intracranial   
abnormality.  
  
Electronically signed   
by: NUNO GANN MD  [facility]  
   
                                                    CT C-SPINE WO CONTRASTon    
   
                                        CT C-SPINE WO CONTRAST STUDY:  
CT Maxillofacial Bones   
without IV Contrast; CT   
Cervical Spine without  
IV contrast; 2022   
5:25 PM  
  
INDICATION:  
Right eye, right jaw,   
and right neck pain.   
Left arm and leg   
weakness.  
Syncope and   
collapse/fall. Found on   
floor. Vomiting   
yesterday.  
  
COMPARISON:  
2022 CT Head.  
  
ACCESSION NUMBER(S):  
[id number], [id number]  
  
ORDERING CLINICIAN:  
PRISCILLA ZEPEDA CNP  
  
TECHNIQUE: CT of the   
maxillofacial bones was   
performed without  
contrast. CT of the   
cervical spine was   
performed without   
intravenous  
or intrathecal   
contrast. Sagittal and   
coronal reconstructions   
were  
generated.  
  
  
Automated mA/kV   
exposure control was   
utilized and patient   
examination  
was performed in strict   
accordance with   
principles of ALARA.  
  
FINDINGS:  
  
CT MAXILLOFACIAL BONES:  
There are no facial   
bone fractures. Mild   
leftward directed nasal  
septal deviation  
  
Scant opacification in   
the ethmoid air cells.   
There are no air-fluid  
levels.  
  
Globes, orbits, and   
extraocular muscles are   
intact. No retrobulbar  
hematoma is   
demonstrated. The   
temporomandibular   
joints are  
unremarkable. The   
visualized mastoid air   
cells are clear.  
  
CT CERVICAL SPINE:  
The alignment is   
anatomic. There is no   
fracture or traumatic  
subluxation.  
  
The vertebral body   
heights are well   
maintained. Disc spaces   
are  
preserved. No   
significant central   
canal stenosis is   
demonstrated.  
The neural foramina are   
patent throughout.  
  
The paravertebral soft   
tissues are within   
normal limits. The  
visualized upper chest   
is unremarkable.  
  
IMPRESSION:  
  
  
1. No evidence of   
facial bone fracture.  
  
2. Scattered   
opacifications in the   
ethmoid air cells.  
  
3. Chronic appearing   
leftward directed nasal   
septal deviation.  
  
4. No acute vertebral   
body fracture or   
subluxation.  
  
  
Signed by Nguyễn Vences MD  
Electronically signed   
by: NGUYỄN SIERRA MD     Normal                                  [facility]  
   
                                                    CT FACIAL BONES W/O CONTRAST  
on 2022   
   
                                                    CT FACIAL BONES W/O   
CONTRAST                                STUDY:  
CT Maxillofacial Bones   
without IV Contrast; CT   
Cervical Spine without  
IV contrast; 2022   
5:25 PM  
  
INDICATION:  
Right eye, right jaw,   
and right neck pain.   
Left arm and leg   
weakness.  
Syncope and   
collapse/fall. Found on   
floor. Vomiting   
yesterday.  
  
COMPARISON:  
2022 CT Head.  
  
ACCESSION NUMBER(S):  
[id number], [id number]  
  
ORDERING CLINICIAN:  
PRISCILLA ZEPEDA CNP  
  
TECHNIQUE: CT of the   
maxillofacial bones was   
performed without  
contrast. CT of the   
cervical spine was   
performed without   
intravenous  
or intrathecal   
contrast. Sagittal and   
coronal reconstructions   
were  
generated.  
  
  
Automated mA/kV   
exposure control was   
utilized and patient   
examination  
was performed in strict   
accordance with   
principles of ALARA.  
  
FINDINGS:  
  
CT MAXILLOFACIAL BONES:  
There are no facial   
bone fractures. Mild   
leftward directed nasal  
septal deviation  
  
Scant opacification in   
the ethmoid air cells.   
There are no air-fluid  
levels.  
  
Globes, orbits, and   
extraocular muscles are   
intact. No retrobulbar  
hematoma is   
demonstrated. The   
temporomandibular   
joints are  
unremarkable. The   
visualized mastoid air   
cells are clear.  
  
CT CERVICAL SPINE:  
The alignment is   
anatomic. There is no   
fracture or traumatic  
subluxation.  
  
The vertebral body   
heights are well   
maintained. Disc spaces   
are  
preserved. No   
significant central   
canal stenosis is   
demonstrated.  
The neural foramina are   
patent throughout.  
  
The paravertebral soft   
tissues are within   
normal limits. The  
visualized upper chest   
is unremarkable.  
  
IMPRESSION:  
  
  
1. No evidence of   
facial bone fracture.  
  
2. Scattered   
opacifications in the   
ethmoid air cells.  
  
3. Chronic appearing   
leftward directed nasal   
septal deviation.  
  
4. No acute vertebral   
body fracture or   
subluxation.  
  
  
Signed by Nguyễn Vences MD  
Electronically signed   
by: NGUỄYN SIERRA MD     Normal                                  [facility]  
   
                                                    DRUG SCREEN,URINEon 20   
   
                      AMPHETAMINE SCREEN,U Canceled   Normal                [facility]  
   
                                        Comment on above:   Order Comment: TEST   
DRUG SCREEN,URINE WAS CANCELLED,   
2022 20:45 PATIENT DISCHARGED.   
   
                                                            Result Comment: CUTO  
FF LEVEL: 500 NG/ML  
Cross-reactivity has been reported with high concentrations  
of the following drugs: buproprion, chloroquine,   
chlorpromazine,  
ephedrine, mephentermine, fenfluramine, phentermine,  
phenylpropanolamine, pseudoephedrine, and propranolol.   
   
                                                            Performed By: #### H  
EPFP ####  
[place] [address]   
   
                      BARBITURATES SCREEN,U Canceled   Normal                [facility]  
   
                                        Comment on above:   Order Comment: TEST   
DRUG SCREEN,URINE WAS CANCELLED,   
2022 20:45 PATIENT DISCHARGED.   
   
                                                            Result Comment: CUTO  
FF LEVEL: 200 NG/ML   
   
                                                            Performed By: #### H  
EPFP ####  
[place] [address]   
   
                                                    BENZODIAZEPINES   
SCREEN,U        Canceled        [facility]  
   
                                        Comment on above:   Order Comment: TEST   
DRUG SCREEN,URINE WAS CANCELLED,   
2022 20:45 PATIENT DISCHARGED.   
   
                                                            Result Comment: CUTO  
FF LEVEL: 200 NG/ML   
   
                                                            Performed By: #### H  
EPFP ####  
[place] [address]   
   
                      CANNABINOIDS SCREEN,U Canceled   [facility]  
   
                                        Comment on above:   Order Comment: TEST   
DRUG SCREEN,URINE WAS CANCELLED,   
2022 20:45 PATIENT DISCHARGED.   
   
                                                            Result Comment: CUTO  
FF LEVEL: 50 NG/ML   
   
                                                            Performed By: #### H  
EPFP ####  
[place] [address]   
   
                                                    COCAINE METABOLITE   
SCREEN,U        Canceled        [facility]  
   
                                        Comment on above:   Order Comment: TEST   
DRUG SCREEN,URINE WAS CANCELLED,   
2022 20:45 PATIENT DISCHARGED.   
   
                                                            Result Comment: CUTO  
FF LEVEL: 150 NG/ML   
   
                                                            Performed By: #### H  
EPFP ####  
[place] [address]   
   
                      DRUG SCREEN COMMENT Canceled   [facility]  
   
                                        Comment on above:   Order Comment: TEST   
DRUG SCREEN,URINE WAS CANCELLED,   
2022 20:45 PATIENT DISCHARGED.   
   
                                                            Result Comment: Drug  
 screen results are presumptive and should   
not be used to assess  
compliance with prescribed medication. Contact the performing   
[facility]  
laboratory to add-on definitive confirmatory testing if   
clinically  
indicated.  
.  
Toxicology screening results are reported qualitatively. The   
concentration  
must be greater than or equal to the cutoff to be reported as   
positive. The  
concentration at which the screening test can detect an   
individual drug or  
metabolite varies. The absence of expected drug(s) and/or drug   
metabolite(s)  
may indicate non-compliance, inappropriate timing of specimen   
collection  
relative to drug administration, poor drug absorption,   
diluted/adulterated  
urine, or limitations of testing. For medical purposes only;   
not valid for  
forensic use.  
.  
Interpretive questions should be directed to the laboratory   
medical  
directors.   
   
                                                            Performed By: #### H  
EPFP ####  
[place] [address]   
   
                      FENTANYL SCREEN,URINE Canceled   [facility]  
   
                                        Comment on above:   Order Comment: TEST   
DRUG SCREEN,URINE WAS CANCELLED,   
2022 20:45 PATIENT DISCHARGED.   
   
                                                            Result Comment: CUTO  
FF LEVEL: 1 NG/ML  
The performance characteristics of this  
test have been determined by the individual  
 laboratory site where testing is performed.  
This test has not been cleared or approved  
by the FDA; however, the FDA has determined  
that such clearance is not necessary.   
   
                                                            Performed By: #### H  
EPFP ####  
[place] [address]   
   
                      METHADONE SCREEN,U Canceled   [facility]  
   
                                        Comment on above:   Order Comment: TEST   
DRUG SCREEN,URINE WAS CANCELLED,   
2022 20:45 PATIENT DISCHARGED.   
   
                                                            Result Comment: CUTO  
FF LEVEL: 150 NG/ML  
The metabolite L-alpha-acetylmethadol (LAAM) is not  
detected by this method in concentrations that would  
be found in the urine of patients on LAAM therapy.   
   
                                                            Performed By: #### H  
EPFP ####  
[place] [id number]   
   
                      OPIATES SCREEN,U Canceled   Normal                [facility]  
   
                                        Comment on above:   Order Comment: TEST   
DRUG SCREEN,URINE WAS CANCELLED,   
2022 20:45 PATIENT DISCHARGED.   
   
                                                            Result Comment: CUTO  
FF LEVEL: 300 NG/ML  
The opiate screen does not detect fentanyl, meperidine, or  
tramadol. Oxycodone is not consistently detected (refer to  
Oxycodone Screen, Urine result).   
   
                                                            Performed By: #### H  
EPFP ####  
[address] [address]   
   
                      OXYCODONE SCREEN,U Canceled   Normal                [facility]  
   
                                        Comment on above:   Order Comment: TEST   
DRUG SCREEN,URINE WAS CANCELLED,   
2022 20:45 PATIENT DISCHARGED.   
   
                                                            Result Comment: CUTO  
FF LEVEL: 100 NG/ML  
This test will accurately detect both oxycodone and   
oxymorphone.   
   
                                                            Performed By: #### H  
EPFP ####  
[place] [id number]   
   
                      PCP SCREEN,U Canceled   Normal                [facility]  
   
                                        Comment on above:   Order Comment: TEST   
DRUG SCREEN,URINE WAS CANCELLED,   
2022 20:45 PATIENT DISCHARGED.   
   
                                                            Result Comment: CUTO  
FF LEVEL: 25 NG/ML  
Cross-reactivity has been reported with dextromethorphan.   
   
                                                            Performed By: #### H  
EPFP ####  
[place] [id number]   
   
                                                    GLUCOSE-POCTon 2022   
   
                      Glucose [Mass/Vol] 87 mg/dL   Normal     74 - 99    [facility]  
   
                                        Comment on above:   Performed By: #### H  
EPFP ####  
[address] [address]   
   
                                                    HCG,SERUM QUALITATIVEon    
   
                      HCG,SERUM QUALITATIVE Negative   Normal     Negative   [facility]  
   
                                        Comment on above:   Performed By: #### H  
EPFP ####  
[place] [id number]   
   
                                                    NR CT BRAIN ATTACK ANGIO HEA  
D W CONTRAST AND POST PROCon 2022   
   
                                                    NR CT BRAIN ATTACK   
ANGIO HEAD W CONTRAST   
AND POST PROC                           MRN: [id number]  
Patient Name:   
DANNA SOARES  
  
STUDY:  
NR CT BRAIN ATTACK   
ANGIO HEAD W CONTRAST   
AND POST PROC; NR CT   
BRAIN  
ATTACK ANGIO NECK W   
CONTRAST AND POST PROC;   
CHEST 1 VIEW; 2022  
7:15 pm; 2022 7:03   
pm  
  
INDICATION:  
syncoep and L sided   
weakness ; syncope and   
L sided weakness ;   
syncope  
.  
  
Concern for new onset   
left-sided weakness.  
  
COMPARISON:  
Same day CT head  
  
ACCESSION NUMBER(S):  
[id number]; [id number];   
[id number]  
  
ORDERING CLINICIAN:  
PRISCILLA ZEPEDA  
  
TECHNIQUE:  
Unenhanced CT images of   
the head were obtained.   
Subsequently, 68 mL  
Omnipaque 350 was   
administered   
intravenously and axial   
images of the  
head and neck were   
acquired. Coronal,   
sagittal, and 3-D  
reconstructions were   
provided for review.   
Modified NASCET   
criteria  
were utilized for the   
evaluation of carotid   
stenoses.  
  
FINDINGS:  
  
  
CTA [id number]:  
  
Anterior circulation:   
The bilateral   
intracranial internal   
carotid  
arteries, carotid   
terminals, and proximal   
anterior and middle  
cerebral arteries are   
normal in caliber   
without significant  
narrowing, large vessel   
occlusion, or aneurysm.  
  
Posterior circulation:   
The bilateral   
intracranial vertebral   
arteries,  
vertebrobasilar   
junction, basilar   
artery, and proximal   
posterior  
cerebral arteries are   
normal in caliber   
without significant  
narrowing, large vessel   
occlusion, or aneurysm.  
  
  
CTA NECK:  
  
There is a normal   
three-vessel branch   
pattern of the aortic   
arch.  
  
Right carotid: The   
right common carotid   
artery, carotid   
bifurcation,  
and cervical internal   
carotid artery are   
normal in caliber   
without  
significant narrowing,   
dissection, or   
aneurysm. There is 0%   
stenosis  
by NASCET criteria.  
  
Left carotid: The left   
common carotid artery,   
carotid bifurcation,  
and cervical internal   
carotid artery are   
normal in caliber   
without  
significant narrowing,   
dissection, or   
aneurysm. There is 0%   
stenosis  
by NASCET criteria.  
  
Vertebral arteries: The   
cervical vertebral   
arteries are normal in  
caliber bilaterally   
without significant   
narrowing, dissection,   
or  
aneurysm.  
  
IMPRESSION:  
1. No hemodynamically   
significant stenosis of   
the anterior or  
posterior circulation   
of the brain.  
2. Normal CT angiogram   
of the neck.  
  
  
Electronically signed   
by: NUNO GANN MD  [facility]  
   
                                                    NR CT BRAIN ATTACK ANGIO NEC  
K W CONTRAST AND POST PROCon 2022   
   
                                                    NR CT BRAIN ATTACK   
ANGIO NECK W CONTRAST   
AND POST PROC                           MRN: [id number]  
Patient Name:   
DANNA SOARES  
  
STUDY:  
NR CT BRAIN ATTACK   
ANGIO HEAD W CONTRAST   
AND POST PROC; NR CT   
BRAIN  
ATTACK ANGIO NECK W   
CONTRAST AND POST PROC;   
CHEST 1 VIEW; 2022  
7:15 pm; 2022 7:03   
pm  
  
INDICATION:  
syncoep and L sided   
weakness ; syncope and   
L sided weakness ;   
syncope  
.  
  
Concern for new onset   
left-sided weakness.  
  
COMPARISON:  
Same day CT head  
  
ACCESSION NUMBER(S):  
[id number]; [id number];   
[id number]  
  
ORDERING CLINICIAN:  
PRISCILLA ZEPEDA  
  
TECHNIQUE:  
Unenhanced CT images of   
the head were obtained.   
Subsequently, 68 mL  
Omnipaque 350 was   
administered   
intravenously and axial   
images of the  
head and neck were   
acquired. Coronal,   
sagittal, and 3-D  
reconstructions were   
provided for review.   
Modified NASCET   
criteria  
were utilized for the   
evaluation of carotid   
stenoses.  
  
FINDINGS:  
  
  
CTA [id number]:  
  
Anterior circulation:   
The bilateral   
intracranial internal   
carotid  
arteries, carotid   
terminals, and proximal   
anterior and middle  
cerebral arteries are   
normal in caliber   
without significant  
narrowing, large vessel   
occlusion, or aneurysm.  
  
Posterior circulation:   
The bilateral   
intracranial vertebral   
arteries,  
vertebrobasilar   
junction, basilar   
artery, and proximal   
posterior  
cerebral arteries are   
normal in caliber   
without significant  
narrowing, large vessel   
occlusion, or aneurysm.  
  
  
CTA NECK:  
  
There is a normal   
three-vessel branch   
pattern of the aortic   
arch.  
  
Right carotid: The   
right common carotid   
artery, carotid   
bifurcation,  
and cervical internal   
carotid artery are   
normal in caliber   
without  
significant narrowing,   
dissection, or   
aneurysm. There is 0%   
stenosis  
by NASCET criteria.  
  
Left carotid: The left   
common carotid artery,   
carotid bifurcation,  
and cervical internal   
carotid artery are   
normal in caliber   
without  
significant narrowing,   
dissection, or   
aneurysm. There is 0%   
stenosis  
by NASCET criteria.  
  
Vertebral arteries: The   
cervical vertebral   
arteries are normal in  
caliber bilaterally   
without significant   
narrowing, dissection,   
or  
aneurysm.  
  
IMPRESSION:  
1. No hemodynamically   
significant stenosis of   
the anterior or  
posterior circulation   
of the brain.  
2. Normal CT angiogram   
of the neck.  
  
  
Electronically signed   
by: NUNO GANN MD  Normal                                  [facility]  
   
                                                    PT/INRon 2022   
   
                      PT Coag (PPP) [Time] 15.2 s     High       9.8 - 13.4 [facility]  
   
                                        Comment on above:   Result Comment: Note  
 new reference range as of 2021 at   
10:00am.   
   
                                                            Performed By: #### P  
TINR ####  
[address] [address]   
   
                      PT, INR    1.3        High       0.9 - 1.1  [facility]  
   
                                        Comment on above:   Performed By: #### P  
TINR ####  
[address] [address]   
   
                                                    Provider Note - ED v3on    
   
                                        Provider Note - ED v3 Provider Note:  
Chart Review:  
ED NOTES  
ED NOTES:  
HPI:  
Admitted to the   
hospital here for 5   
days and discharged   
today for vomiting.  
Apparently patient had   
gone to the bathroom   
had a syncopal episode   
and fell  
forward hitting her   
head on the ground. She   
complains of pain to   
the right jaw  
and around the right   
orbital bones. Patient   
also states that since   
her  
syncopal episode her   
left side feels weak.   
She also complains of   
some  
left-sided chest pain.   
She does note that her   
vomiting is better.  
  
  
ROS:  
All systems are   
negative other than as   
noted in HPI.  
  
  
Physical Exam  
  
I have reviewed the   
triage vital signs.  
Const: Well nourished,   
well developed, appears   
stated age, no acute   
distress  
Eyes: PERRL, EOM   
intact, no conjunctival   
injection, vision   
grossly normal  
HENT: Neck supple   
without meningismus ,   
diffuse cervical   
vertebral tenderness,  
moist mucous membranes,   
no pharyengeal swelling   
or exudate  
CV: Regular rate and   
rhythm, Warm,   
well-perfused   
extremities. Chest non   
tender  
RESP: Lungs clear   
bilaterally, Unlabored   
respiratory effort  
GI: soft, non-tender,   
non-distended, no   
masses  
:  
MSK: No gross   
deformities   
appreciated, tenderness   
over the right orbital   
bones.  
Back: Non tender, no   
pain with ROM  
Skin: Warm, dry. No   
rashes  
Neuro: Alert and   
oriented x4, GCS 15 ,   
patient has difficulty   
lifting left leg  
off the bed patient has   
decreased push and   
pulls with left foot.   
Left arm  
movement is weak..  
Psych: Appropriate mood   
and affect.  
  
I have reviewed and   
confirmed nurses/medics   
notes for patient past,   
social  
and family history.  
  
  
Portions of this note   
were dictated by speech   
recognition. An attempt   
at proof  
reading was made to   
minimize errors. Minor   
errors in transcription   
may be  
present.  
  
  
HISTORY OF PRESENTING   
ILLNESS  
DANNA is a 21 year old   
Female and was seen by   
me at 2022 17:27   
for a  
chief complaint of   
syncope (to er per   
justin ems with c/o   
after going to the  
br, she had a syncopal   
episode. family found   
her on the floor. pt   
c/o right  
eye, right jaw and neck   
pain. c/o left arm and   
leg weakness after the   
syncopal  
episode. pt is alert   
and oriented now. pt   
was just in the   
hospital for vomiting  
and d/c yesterday.) .  
  
Triage Information:   
Most recent Vital Sign   
Value Date  
Temp (F): 98.8   
2022 17:34  
Temp (C): 37.1   
2022 17:34  
Heart Rate (beats/min):   
99 2022 17:34  
Respirations   
(breaths/min): 18   
2022 17:34  
SpO2 (%): 99 2022   
17:34  
BP Systolic (mm Hg):   
123 2022 17:34  
BP Diastolic (mm Hg):   
83 2022 17:34  
  
  
  
  
PAST MEDICAL HISTORY  
ALLERGIES/INTOLERANCES:   
Allergy  
Allergen: MiraLax  
Type: Drug  
Reaction:   
Hives/Urticaria  
  
Allergen: codeine  
Type: Drug  
Reaction: Unknown  
  
HEALTH HISTORY: No   
documented data.  
  
OUTPATIENT MEDICATIONS:   
Home Medications Review   
Status for   
Reconciliation: N/A  
Med Status: Incomplete   
Medication History  
  
Drug Name: Protonix 40   
mg oral delayed release   
tablet  
Instructions: 1 tab(s)   
orally 2 times a day  
  
Drug Name: Zofran 8 mg   
oral tablet  
Instructions: 1 tab(s)   
orally 3 times, As   
Needed -for nausea and   
vomiting  
  
Drug Name: sucralfate 1   
g oral tablet  
Instructions: 1 tab(s)   
orally 4 times a day   
-.Meds to Beds - 4   
Times a Day  
Before Meals  
  
SIGNIFICANT EVENTS:   
Past Medical History  
Description:premature  
  
  
Description:delayed   
bone growth  
  
  
Description:Miscarriage  
  
  
Description:scoliosis  
  
  
  
CRITICAL CARE  
RESULTS:  
Recent Lab Results:  
  
I have reviewed these   
laboratory results:  
  
Complete Blood Count +   
Differential   
2022 17:45:00  
  
ResultValue  
White Blood Cell Count   
8.7  
Nucleated Erythrocyte   
Count 0.1  
Red Blood Cell Count   
4.59  
HGB 13.2  
HCT 39.6  
MCV 86  
MCHC 33.2  
  
RDW-CV 15.0 H  
Neutrophil % 55.1  
Lymphocyte % 29.4  
Monocyte % 12.6  
Eosinophil % 2.0  
Basophil % 0.9  
Neutrophil Count 4.80  
Lymphocyte Count 2.60  
Monocyte Count 1.10 H  
Eosinophil Count 0.20  
Basophil Count 0.10  
  
Comprehensive Metabolic   
Panel 2022   
17:45:00  
  
ResultValue  
Glucose, Serum 99  
  
K 3.2 L  
  
Bicarbonate, Serum 20 L  
Anion Gap, Serum 18  
BUN 5 L  
CREAT 0.52  
GFR Female >90  
Calcium, Serum 9.0  
ALB 4.2  
ALKP 42  
T Pro 6.4  
T Bili 0.6  
Alanine   
Aminotransferase, Serum   
17  
Aspartate Transaminase,   
Serum 19  
  
PT + INR, Plasma   
2022 17:45:00  
  
ResultValue  
Prothrombin Time,   
Plasma 15.2 H  
International   
Normalized Ratio,   
Plasma 1.3 H  
  
RBC Morphology   
2022 17:45:00  
  
ResultValue  
Red Blood Cell   
Morphology SEE BELOW  
Ovalocytes FEW  
  
Troponin I, Serum   
2022 17:45:00  
  
ResultValue  
Troponin I, Serum <0.02  
  
HCG, Serum 2022   
17:45:00  
  
ResultValue  
HCG, Serum NEGATIVE  
  
Glucose_POCT   
2022 17:42:00  
  
ResultValue  
Glucose-POCT 87  
  
Radiology Results:  
  
Impression:  
  
1. No hemodynamically   
significant stenosis of   
the anterior or  
posterior circulation   
of the brain.  
2. Normal CT angiogram   
of the neck.  
  
(more content not   
included)...        Normal                                  [facility]  
   
                                                    RED CELL MORPHOLOGYon 2022   
   
                      OVALOCYTES FEW        Normal                [facility]  
   
                                        Comment on above:   Performed By: #### C  
BCDF ####  
[place] [address]   
   
                                                    RBC morphology finding   
Nom (Bld)       SEE BELOW       Normal                          [facility]  
   
                                        Comment on above:   Performed By: #### C  
BCDF ####  
[place] [id number]   
   
                                                    Risk Screen - Adult Emergenc  
yon 2022   
   
                                                    Risk Screen - Adult   
Emergency                               Preferred Language:  
Preferred Language:  
Preferred Language for   
Discussing Health Care   
(patient/designee)Charleen patel  
  
Advanced Directives:  
Advance Directive/DNRno  
  
Family Violence Adult:  
Abuse Screen:  
Are you or have you   
been threatened or   
abused physically,   
emotionally, or  
sexually by anyoneno  
  
Learning Assessment   
(Patient):  
Learning Assessment   
(Patient):  
Patient is Able to be   
Assessed for   
Learningyes  
Factors Influencing   
Readiness to Learnn/a  
Factors that Impact   
Ability to Learnnone  
Devices/Methods Used to   
Communicatenone  
Learning   
Preferencesverbal   
instruction; written   
material  
Cultural   
Considerationsnone  
Developmental   
Considerationsnone  
Temple   
Considerationsnone  
  
Learning Assessment   
(Other Learner):  
Learning Assessment   
(Other Learner):  
Other learner   
availableno  
  
Pressure   
Injury/TB/Substance:  
Pressure Injury:  
Do you have a coughno  
Smoking Statusnever   
smoker  
Alcohol Usedenies  
Drug Useoccasionally  
  
  
Admission Risk Screen:  
Significant   
IndicatorsComplete  
  
CAGE:  
CAGE:  
Is this an injured   
patient at a Trauma   
Center   
([facility]/[place]  
a/[place]): no  
  
  
Electronic Signatures:  
Becky Rice)   
(Signed 2022   
17:49)  
Authored: Preferred   
Language, Advanced   
Directives, Family   
Violence Adult,  
Learning Assessment   
(Patient), Learning   
Assessment (Other   
Learner), Pressure  
Injury/TB/Substance,   
Pressure Injury, CAGE  
  
  
Last Updated:   
2022 17:49 by   
Becky Rice (RN) Normal                                  [facility]  
   
                                                    TROPONIN Ion 2022   
   
                                                    Troponin I.cardiac   
[Mass/Vol]      ng/mL           Normal          0.00 - 0.03     [facility]  
   
                                        Comment on above:   Result Comment: LESS  
 THAN 0.04 NG/ML: NEGATIVE  
REPEAT TESTING IN THREE TO SIX HOURS  
IF CLINICALLY INDICATED.  
0.04 - 0.5 NG/ML: CONSISTENT WITH POSSIBLE  
CARDIAC DAMAGE AND POSSIBLE INCREASED  
CLINICAL RISK.  
SERIAL MEASUREMENTS MAY HELP ASSESS EXTENT OF  
MYOCARDIAL DAMAGE.  
>0.5 NG/ML: CONSISTENT WITH CARDIAC DAMAGE,  
INCREASED CLINICAL RISK AND MYOCARDIAL  
INFARCTION. SERIAL MEASUREMENTS MAY HELP  
ASSESS EXTENT OF MYOCARDIAL DAMAGE.  
.  
Note: Troponin I testing is performed using different  
testing methodology at [facility] than at other  
[facility]. Direct result comparisons should only  
be made within the same method.   
   
                                                            Performed By: #### H  
EPFP ####  
[facility]  
[address] [place] [address]   
   
                                                    Triage - EDon 2022   
   
                                        Triage - ED         Quick Triage:  
Are You Pregnantno  
Have You Given Birth In   
The Last 6 Weeksno  
Are You Currently   
Breastfeedingno  
  
Chart Review:  
PRIMARY ASSESSMENT  
Team Activation  
Team Activated: STROKE  
  
ABCD Normal Findings:   
airway open and patent,   
circulation normal and   
alert and  
oriented  
  
  
ARRIVAL INFORMATION  
  
Means of Arrival:   
stretcher Mode of   
Arrival: ambulance   
Agency: [facility]  
Name: [facility] Arrival   
From: home Accompanied   
By: self  
Language:  
Spoken Language   
Preferred: English   
Reading Language   
Preferred: English  
  
  
CHIEF COMPLAINT  
  
DANNA SOARES   
is a Female patient   
with a chief complaint   
of syncope  
(to er per [facility] ems   
with c/o after going to   
the , she had a   
syncopal  
episode. family found   
her on the floor. pt   
c/o right eye, right   
jaw and neck  
pain. c/o left arm and   
leg weakness after the   
syncopal episode. pt is   
alert and  
oriented now. pt was   
just in the hospital   
for vomiting and d/c   
yesterday.).  
Triage Date/Time:   
2022 17:26  
DIPIKA: 2  
Pain Rating (0-10): 9 =   
Severe  
Pain location: right   
eye  
Vital Signs:  
Temperature: 98.8F (   
37.1C) taken temporal  
Blood Pressure: 123/83   
Mean:  
Heart Rate: 99  
Respiratory Rate: 18  
Pulse Oximetry: 99% on   
room air, no   
respiratory support.   
Height: 4 feet 11.00  
inches. 149.8 CM  
Weight: 100.3 pounds.   
Calculated 45.5 kg.   
(stated)  
Calculated BMI (kg/m2):   
20.276 Calculated BSA   
(m2) 1.38  
  
Sujit Coma Scale:  
Best Eye Response: (E4)   
spontaneous  
Best Motor Response:   
(M6) obeys commands  
Best Verbal Response:   
(V5) oriented  
[place] Score: 15  
  
  
Allergies: yes  
Last menstrual period:   
2022  
Patient has homicidal   
thoughts: no  
  
  
  
  
Risk Screens  
Suicide Risk Screen  
  
In the Past Month: Have   
you wished you were   
dead or wished you   
could go to  
sleep and not wake up   
no  
In the Past Month: Have   
you had any actual   
thoughts of killing   
yourself no  
In Your Lifetime: Have   
you ever done anything,   
started to do anything,   
or  
prepared to do anything   
to end your life no  
  
  
  
Avila Fall Scale   
Screening  
Has the patient fallen   
before (or is the   
patient in the ED as a   
result of a  
fall) has had a fall  
Does the patient have   
an impaired gait does   
not have impaired gait  
Is the patient   
cognitively impaired   
not cognitively   
impaired  
  
Avila Fall Scale  
History of falling   
(immediate or previous)   
yes (25)  
Secondary Diagnosis yes   
(15)  
Intravenous Therapy/   
Heparin/Saline Lock no   
(0)  
Gait/Transferring   
normal/bedrest/wheelcha  
ir (0)  
Ambulatory Aids   
none/bedrest/nurse   
assist (0)  
Mental Status oriented   
to own ability (0)  
Avila Fall Risk Score:   
40  
  
Interventions:  
Avila Fall   
Interventions: LOW   
INTERVENTIONS:  
*patient oriented to   
surroundings and call   
system,  
* patient/family falls   
education completed  
and documented,  
*patients fall status   
communicated  
during bedside handoff,  
*whiteboard updated,  
*mode of toileting   
discussed with patient,  
*bed in low position   
with brakes locked,  
*call light in reach,  
* non-skid footwear and   
MODERATE INTERVENTIONS:  
*Low Interventions   
Plus:  
* falls risk   
band/sticker applied to   
patient,  
*yellow non-skid   
footwear,  
*instruct to call for   
assistance before  
getting out of bed,  
*bed/chair/bedside   
commode/toilet alarms,  
*sensory   
devices/ambulatory   
aides  
available and in reach,  
*medications reviewed   
for potential  
side effects and care   
planning.  
  
  
TRAVEL HISTORY  
Travel History  
Coronavirus Screening:   
no exposure or symptoms  
Travel Exposure   
History: NO travel to   
International locations   
in the past 30  
days  
  
  
PAIN  
  
Pain Scale Used: ASHLEY  
Pain Rating (0-10): 9 =   
Severe  
  
  
  
  
  
Past Medical History:  
Past Medical History   
Reviewedyes  
  
scoliosis: Past Medical   
History, Active  
Miscarriage: Past   
Medical History, Active  
delayed bone growth:   
Past Medical History,   
Active  
premature: Past Medical   
History, Active  
  
  
Electronic Signatures:  
Becky Rice (ERNA)   
(Signed 2022   
17:47)  
Entered: Risk Screens,   
Pain, Arrival, ABCD,   
Travel History, Chart   
Review, Past  
Medical History  
Authored: Quick Triage,   
Risk Screens, Pain,   
Arrival, ABCD, Travel   
History,  
Chart Review, Past   
Medical History  
  
  
Last Updated:   
2022 17:47 by   
Becky Rice (ERNA) [facility]  
   
                                                    URINALYSISon 2022   
   
                      Appearance (U) Canceled   [facility]  
   
                                        Comment on above:   Order Comment: TEST   
URINALYSIS WAS CANCELLED, 2022 20:45  
   
PATIENT DISCHARGED.   
   
                                                            Performed By: #### C  
BCDF ####  
[place] [address]   
   
                      ASCORBIC ACID Canceled   [facility]  
   
                                        Comment on above:   Order Comment: TEST   
URINALYSIS WAS CANCELLED, 2022 20:45  
   
PATIENT DISCHARGED.   
   
                                                            Result Comment: Conc  
entrations > = 20 mg/dL of ascorbic acid   
can be expected to cause strong  
interference in the reactions testing for glucose, nitrite and   
blood. It is  
recommended to discontinue Vitamin C administration and retest   
in 10 hours.   
   
                                                            Performed By: #### C  
BCDF ####  
[address] [address]   
   
                      Bilirubin Ql (U) Canceled   [facility]  
   
                                        Comment on above:   Order Comment: TEST   
URINALYSIS WAS CANCELLED, 2022 20:45  
   
PATIENT DISCHARGED.   
   
                                                            Performed By: #### C  
BCDF ####  
[address] [address]   
   
                      Color (U)  Canceled   [facility]  
   
                                        Comment on above:   Order Comment: TEST   
URINALYSIS WAS CANCELLED, 2022 20:45  
   
PATIENT DISCHARGED.   
   
                                                            Performed By: #### C  
BCDF ####  
[address] [address]   
   
                      Glucose Ql (U) Canceled   [facility]  
   
                                        Comment on above:   Order Comment: TEST   
URINALYSIS WAS CANCELLED, 2022 20:45  
   
PATIENT DISCHARGED.   
   
                                                            Performed By: #### C  
BCDF ####  
[address] [address]   
   
                      Hemoglobin Ql (U) Canceled   [facility]  
   
                                        Comment on above:   Order Comment: TEST   
URINALYSIS WAS CANCELLED, 2022 20:45  
   
PATIENT DISCHARGED.   
   
                                                            Performed By: #### C  
BCDF ####  
[address] [address]   
   
                      Ketones Ql (U) Canceled   [facility]  
   
                                        Comment on above:   Order Comment: TEST   
URINALYSIS WAS CANCELLED, 2022 20:45  
   
PATIENT DISCHARGED.   
   
                                                            Performed By: #### C  
BCDF ####  
[address] [address]   
   
                                                    Leukocyte esterase Test   
strip Ql (U)    Canceled        [facility]  
   
                                        Comment on above:   Order Comment: TEST   
URINALYSIS WAS CANCELLED, 2022 20:45  
   
PATIENT DISCHARGED.   
   
                                                            Performed By: #### C  
BCDF ####  
[address] [address]   
   
                      Nitrite Ql (U) Canceled   [facility]  
   
                                        Comment on above:   Order Comment: TEST   
URINALYSIS WAS CANCELLED, 2022 20:45  
   
PATIENT DISCHARGED.   
   
                                                            Performed By: #### C  
BCDF ####  
[address] [address]   
   
                      pH         Canceled   [facility]  
   
                                        Comment on above:   Order Comment: TEST   
URINALYSIS WAS CANCELLED, 2022 20:45  
   
PATIENT DISCHARGED.   
   
                                                            Performed By: #### C  
BCDF ####  
[address] [address]   
   
                      Protein Ql (U) Canceled   [facility]  
   
                                        Comment on above:   Order Comment: TEST   
URINALYSIS WAS CANCELLED, 2022 20:45  
   
PATIENT DISCHARGED.   
   
                                                            Performed By: #### C  
BCDF ####  
[address] [address]   
   
                                                    Specific gravity (U)   
[Rel density]   Canceled        Normal                          [facility]  
   
                                        Comment on above:   Order Comment: TEST   
URINALYSIS WAS CANCELLED, 2022 20:45  
  
PATIENT DISCHARGED.   
   
                                                            Performed By: #### C  
BCDF ####  
[address] [address]   
   
                      UROBILINOGEN Canceled   Normal                [facility]  
   
                                        Comment on above:   Order Comment: TEST   
URINALYSIS WAS CANCELLED, 2022 20:45  
  
PATIENT DISCHARGED.   
   
                                                            Performed By: #### C  
BCDF ####  
[address] [address]   
   
                                                    BASIC METABOLIC PANELon    
   
                                                    Urea nitrogen   
[Mass/Vol]      mg/dL           Low             6 - 23          [facility]  
   
                                        Comment on above:   Performed By: #### C  
BCDF ####  
[address] [address]   
   
                      Anion gap [Moles/Vol] 13 mmol/L  Normal     10 - 20    [facility]  
   
                                        Comment on above:   Performed By: #### C  
BCDF ####  
[address] [address]   
   
                      Calcium [Mass/Vol] 8.3 mg/dL  Low        8.6 - 10.3 [facility]  
   
                                        Comment on above:   Performed By: #### C  
BCDF ####  
[address] [address]   
   
                      Chloride [Moles/Vol] 102 mmol/L Normal     98 - 107   [facility]  
   
                                        Comment on above:   Performed By: #### C  
BCDF ####  
[address] [address]   
   
                      Creatinine [Mass/Vol] 0.40 mg/dL Low        0.50 - 1.05 [facility]  
   
                                        Comment on above:   Performed By: #### C  
BCDF ####  
[address] [address]   
   
                      eGFR FEMALE >90        Normal     >90        [facility]  
   
                                        Comment on above:   Result Comment: CALC  
ULATIONS OF ESTIMATED GFR ARE PERFORMED  
USING THE  CKD-EPI STUDY REFIT EQUATION  
WITHOUT THE RACE VARIABLE FOR THE IDMS-TRACEABLE  
CREATININE METHODS.  
https://jasn.asnjournals.org/content/early//ASN.  
720260   
   
                                                            Performed By: #### C  
BCDF ####  
[address] [address]   
   
                      Glucose [Mass/Vol] 85 mg/dL   Normal     74 - 99    [facility]  
   
                                        Comment on above:   Performed By: #### C  
BCDF ####  
[address] [address]   
   
                      HCO3 (Bld) [Moles/Vol] 23 mmol/L  Normal     21 - 32    [facility]  
   
                                        Comment on above:   Performed By: #### C  
BCDF ####  
[address] [address]   
   
                      Potassium [Moles/Vol] 3.4 mmol/L Low        3.5 - 5.3  [facility]  
   
                                        Comment on above:   Performed By: #### C  
BCDF ####  
[address] [address]   
   
                      Sodium [Moles/Vol] 135 mmol/L Low        136 - 145  [facility]  
   
                                        Comment on above:   Performed By: #### C  
BCDF ####  
[address] [address]   
   
                                                    Discharge [id number]   
022   
   
                                        Discharge Profile2  Discharge Orders:  
Anticipated Discharge   
Date:  
Anticipated Discharge   
[id number]  
  
Anticipated Discharge   
Time10:58  
  
DNAR:  
Code Status at   
Discharge: Full Code  
  
Activity:  
activity as tolerated.  
  
Diet:  
Dietlow cholesterol,   
low fat  
  
Call Provider If   
(Homegoing Patients):  
Any new concerning   
symptoms.  
  
Heart Failure:  
Patient Instructions:  
- CALL 911 IF YOU HAVE   
ANY OF THE SIGNS AND   
SYMPTOMS OF HEART   
FAILURE: 1. Chest  
pain 2. Significant   
Shortness of breath 3.   
Fainting.  
  
- Notify your physician   
immediately if you have   
shortness of breath;   
weight  
gain of 3 lbs. or more;   
fatigue and loss of   
energy; swelling of   
lower  
extremities or abdomen;   
dizziness or fainting;   
change of appetite; and   
frequent  
coughing.  
  
- Patient received   
Living With Heart   
Failure book.  
  
- Daily weight on the   
same scale, same time   
after voiding and   
before eating.  
  
- Maintain daily weight   
log.  
  
Activity:  
- Balance activity with   
rest, gradually   
increase your activity   
as tolerated.  
  
- Exercise as   
prescribed by your   
physician.  
  
Stroke:  
Patient Instructions:  
- CALL 911 OR GO   
DIRECTLY TO THE   
EMERGENCY ROOM IF YOU   
HAVE ANY OF THE SIGNS  
AND SYMPTOMS OF STROKE:   
1. Sudden weakness or   
numbness of the face,   
arm or leg,  
especially on one side   
of the body. 2. Sudden   
difficulty speaking or  
understanding. 3.   
Sudden trouble seeing   
in one or both eyes. 4.   
Sudden trouble  
walking, dizziness,   
loss of balance or   
coordination. 5. Sudden   
severe headache  
with no known cause. 6.   
Loss of consciousness   
or decreased   
consciousness,  
fainting, or seizures.  
  
- Know the Risk Factors   
for Stroke: high blood   
pressure, high   
cholesterol,  
diabetes, smoking,   
physical inactivity,   
overweight, previous   
stroke or TIA,  
heart disease, atrial   
fibrillation.  
  
- Always carry a   
medication list with   
you and take it to ALL   
Healthcare  
Provider visits.  
  
- You may be contacted   
by a Hospital   
Representative after   
discharge to evaluate  
your progress at home   
and to discuss your   
experience at   
[facility].  
  
Hospital Specific   
Instructions - [facility]:  
- For   
questions/problems/conc  
erns call the Discharge   
Physician at   
[phone]  
and have them paged.  
  
Hospital Course (Home   
Care/Gold Form):  
Hospital Course:  
Hospital Course:   
include significant   
abnormal lab values  
21-year-old female who   
was diagnosed at [facility] with infectious   
colitis was  
prescribed   
ciprofloxacin and   
Flagyl had a 7-day   
course of intractable   
nausea  
vomiting, who Has   
cannabinoid use was   
admitted to the   
hospital secondary to  
severe dehydration,   
hypokalemia and   
hypomagnesemia. Patient   
was trialed on  
multiple different   
antiemetics with no   
relief in her nausea   
vomiting. After  
she was appropriately   
fluid resuscitated   
nausea vomiting did   
improve, we tried  
a diet and she   
immediately threw up.   
She was started on   
Reglan and still threw  
up after the Reglan.   
She was complaining of   
left upper quadrant   
abdominal pain  
and secondary to the   
nausea vomiting I   
started her on Carafate   
for concern for  
gastritis. She had 2 CT   
abdomen pelvis is this   
admission 1 in the   
emergency  
room and one   
recommended by Dr. Gallagher which did not   
reveal acute pathology.  
She had a right upper   
quadrant ultrasound   
which was unremarkable.   
She was  
started on IV Protonix   
twice a day as she had   
epigastric pain, acid   
reflux and  
pain with swallowing   
and concern for   
persistent nausea   
vomiting that she  
possibly also had   
esophagitis. Patient   
did not undergo   
endoscopic as we  
clinically were   
treating her symptoms.   
She improved with   
adding Carafate and  
was able to eat with no   
vomiting. She was   
instructed to stop   
cannabinoid and  
she is in agreement   
with the plan. She has   
been instructed to   
follow with her  
primary care physician.   
She had 2 CT abdomen   
pelvis's which did not   
show  
infectious colitis so I   
discontinued   
antibiotics at time of   
discharge. She  
remained afebrile and   
hemodynamically stable.   
She has been prescribed   
a  
gastric emptying study   
that will be performed   
on Monday as   
outpatient. She is  
being discharged to   
home in stable   
condition. Discharge   
time greater than 30  
minutes. Her   
electrolytes have been   
corrected, her   
potassium is 3.4   
magnesium  
2.00. She will have an   
additional dose of   
potassium prior to   
discharge of the  
hospital. Should her   
potassium will need to   
be followed as   
outpatient and she  
has been instructed to   
eat potassium rich   
foods and she loves   
spinach which  
will also help her   
magnesium.  
  
Provider FINAL REVIEW   
of Orders:  
Final Review:  
Final Review of   
Medication   
Reconciliation and   
Orders Completedby VAL Dickens at   
2022 11:05:05  
  
Appointments:  
Follow-Up Appointment   
01:  
Physician/Dept/ServiceCECI JORGENSEN  
Call to Schedule in1   
week  
  
Follow-Up Appointment   
02:  
Physician/Dept/Ignacio Mathis  
Reason for   
ReferralHospital   
Follow-up  
Call to Schedule in2   
weeks  
[place] [address]  
Phone   
[phone]  
  
Other Clinician   
Instructions: (more   
content not   
included)...        Normal                                  [facility]  
   
                                                    LACTATEon 2022   
   
                      Lactate [Moles/Vol] 0.6 mmol/L Normal     0.4 - 2.0  [facility]  
   
                                        Comment on above:   Result Comment: [place]  
puncture immediately after or during the  
administration of Metamizole may lead to falsely  
low results. Testing should be performed immediately  
prior to Metamizole dosing.   
   
                                                            Performed By: #### C  
BCDF ####  
[address] [address]   
   
                                                    MAGNESIUMon 2022   
   
                      Magnesium [Mass/Vol] 2.00 mg/dL Normal     1.60 - 2.40 [facility]  
   
                                        Comment on above:   Performed By: #### H  
EPFP ####  
[place] [id number]   
   
                                                    BASIC METABOLIC PANELon    
   
                      Anion gap [Moles/Vol] 13 mmol/L  Normal     10 - 20    [facility]  
   
                                        Comment on above:   Performed By: #### H  
EPFP ####  
[address] [address]   
   
                      Calcium [Mass/Vol] 8.3 mg/dL  Low        8.6 - 10.3 [facility]  
   
                                        Comment on above:   Performed By: #### H  
EPFP ####  
[address] [address]   
   
                      Chloride [Moles/Vol] 102 mmol/L Normal     98 - 107   [facility]  
   
                                        Comment on above:   Performed By: #### H  
EPFP ####  
[address] [address]   
   
                      Creatinine [Mass/Vol] 0.40 mg/dL Low        0.50 - 1.05 [facility]  
   
                                        Comment on above:   Performed By: #### H  
EPFP ####  
[address] [address]   
   
                      eGFR FEMALE >90        Normal     >90        [facility]  
   
                                        Comment on above:   Result Comment: CALC  
ULATIONS OF ESTIMATED GFR ARE PERFORMED  
USING THE  CKD-EPI STUDY REFIT EQUATION  
WITHOUT THE RACE VARIABLE FOR THE IDMS-TRACEABLE  
CREATININE METHODS.  
https://jasn.asnjournals.org/content/early/ASN.  
578206   
   
                                                            Performed By: #### H  
EPFP ####  
[address] [address]   
   
                      Glucose [Mass/Vol] 102 mg/dL  High       74 - 99    [facility]  
   
                                        Comment on above:   Performed By: #### H  
EPFP ####  
[address] [address]   
   
                      HCO3 (Bld) [Moles/Vol] 23 mmol/L  Normal     21 - 32    [facility]  
   
                                        Comment on above:   Performed By: #### H  
EPFP ####  
[address] [address]   
   
                      Potassium [Moles/Vol] 3.5 mmol/L Normal     3.5 - 5.3  [facility]  
   
                                        Comment on above:   Performed By: #### H  
EPFP ####  
[address] [address]   
   
                      Sodium [Moles/Vol] 134 mmol/L Low        136 - 145  [facility]  
   
                                        Comment on above:   Performed By: #### H  
EPFP ####  
[address] [address]   
   
                                                    Urea nitrogen   
[Mass/Vol]      2 mg/dL         Low             6 - 23          [facility]  
   
                                        Comment on above:   Performed By: #### H  
EPFP ####  
[address] [address]   
   
                                                    Urea nitrogen   
[Mass/Vol]      2 mg/dL         Low             6 - 23          [facility]  
   
                                        Comment on above:   Result Comment: repe  
ated and verified   
   
                                                            Performed By: #### P  
TINR ####  
[address] [address]   
   
                      Anion gap [Moles/Vol] 8 mmol/L   Low        10 - 20    [facility]  
   
                                        Comment on above:   Performed By: #### P  
TINR ####  
[address] [address]   
   
                      Calcium [Mass/Vol] 8.2 mg/dL  Low        8.6 - 10.3 [facility]  
   
                                        Comment on above:   Performed By: #### P  
TINR ####  
[address] [address]   
   
                      Chloride [Moles/Vol] 103 mmol/L Normal     98 - 107   [facility]  
   
                                        Comment on above:   Performed By: #### P  
TINR ####  
[address] [address]   
   
                      Creatinine [Mass/Vol] 0.36 mg/dL Low        0.50 - 1.05 [facility]  
   
                                        Comment on above:   Performed By: #### P  
TINR ####  
[address] [address]   
   
                      eGFR FEMALE >90        Normal     >90        [facility]  
   
                                        Comment on above:   Result Comment: CALC  
ULATIONS OF ESTIMATED GFR ARE PERFORMED  
USING THE  CKD-EPI STUDY REFIT EQUATION  
WITHOUT THE RACE VARIABLE FOR THE IDMS-TRACEABLE  
CREATININE METHODS.  
https://jasn.asnjournals.org/content/early//ASN.  
993188   
   
                                                            Performed By: #### P  
TINR ####  
[address] [address]   
   
                      Glucose [Mass/Vol] 91 mg/dL   Normal     74 - 99    [facility]  
   
                                        Comment on above:   Performed By: #### P  
TINR ####  
[address] [address]   
   
                      HCO3 (Bld) [Moles/Vol] 26 mmol/L  Normal     21 - 32    [facility]  
   
                                        Comment on above:   Performed By: #### P  
TINR ####  
[address] [address]   
   
                      Potassium [Moles/Vol] 3.1 mmol/L Low        3.5 - 5.3  [facility]  
   
                                        Comment on above:   Performed By: #### P  
TINR ####  
[address] [address]   
   
                      Sodium [Moles/Vol] 134 mmol/L Low        136 - 145  [facility]  
   
                                        Comment on above:   Performed By: #### P  
TINR ####  
[address] [address]   
   
                      Anion gap [Moles/Vol] 10 mmol/L  Normal     10 - 20    [facility]  
   
                                        Comment on above:   Order Comment: quiles  
d/rb to 2nd floor, 2022 09:47   
   
                                                            Performed By: #### B  
MP ####[address] [address]   
   
                      Calcium [Mass/Vol] 8.1 mg/dL  Low        8.6 - 10.3 [facility]  
   
                                        Comment on above:   Order Comment: quiles  
d/rb to 2nd floor, 2022 09:47   
   
                                                            Performed By: #### B  
MP ####[address] [address]   
   
                      Chloride [Moles/Vol] 103 mmol/L Normal     98 - 107   [facility]  
   
                                        Comment on above:   Order Comment: quiles  
d/rb to 2nd floor, 2022 09:47   
   
                                                            Performed By: #### B  
MP ####[address] [address]   
   
                      Creatinine [Mass/Vol] 0.41 mg/dL Low        0.50 - 1.05 [facility]  
   
                                        Comment on above:   Order Comment: quiles  
d/rb to 2nd floor, 2022 09:47   
   
                                                            Performed By: #### B  
MP ####[address] [address]   
   
                      eGFR FEMALE >90        Normal     >90        [facility]  
   
                                        Comment on above:   Order Comment: quiles  
d/rb to 2nd floor, 2022 09:47   
   
                                                            Result Comment: CALC  
ULATIONS OF ESTIMATED GFR ARE PERFORMED  
USING THE  CKD-EPI STUDY REFIT EQUATION  
WITHOUT THE RACE VARIABLE FOR THE IDMS-TRACEABLE  
CREATININE METHODS.  
https://jasn.asnjournals.org/content/early//ASN.  
950926   
   
                                                            Performed By: #### B  
MP ####[address] [address]   
   
                      Glucose [Mass/Vol] 135 mg/dL  High       74 - 99    [facility]  
   
                                        Comment on above:   Order Comment: quiles  
d/rb to 2nd floor, 2022 09:47   
   
                                                            Performed By: #### B  
MP ####[address] [address]   
   
                      HCO3 (Bld) [Moles/Vol] 25 mmol/L  Normal     21 - 32    [facility]  
   
                                        Comment on above:   Order Comment: quiles  
d/rb to 2nd floor, 2022 09:47   
   
                                                            Performed By: #### B  
MP ####[address] [address]   
   
                      Potassium [Moles/Vol] 2.9 mmol/L Critically low 3.5 - 5.3   
 [facility]  
   
                                        Comment on above:   Order Comment: quiles  
d/rb to 2nd floor, 2022 09:47   
   
                                                            Result Comment: call  
ed/rb to 2nd floor, 2022 09:47   
   
                                                            Performed By: #### B  
MP ####[address] [address]   
   
                      Sodium [Moles/Vol] 135 mmol/L Low        136 - 145  [facility]  
   
                                        Comment on above:   Order Comment: quiles  
d/rb to 2nd floor, 2022 09:47   
   
                                                            Performed By: #### B  
MP ####[place] [address] [place] [address]   
   
                                                    Urea nitrogen   
[Mass/Vol]      2 mg/dL         Low             6 - 23          [facility]  
   
                                        Comment on above:   Order Comment: etta godinez/tarik to 2nd floor, 2022 09:47   
   
                                                            Performed By: #### B  
MP ####[place] [address] [place] [address]   
   
                                                    Daily Progress Note-General   
Internal Medicineon 2022   
   
                                                    Daily Progress   
Note-General Internal   
Medicine                                Consult Type:   
subsequent visit/care  
  
Service: General   
Internal Medicine  
  
History of Present   
Illness:  
History Present   
Illness:  
Admission Reason:   
nausea and vomiting  
HPI:  
patient was feeling   
better this morning.   
diet was advanced. She   
took a few  
bites of her lunch meat   
sandwich and started   
vomiting.She had no   
abdominal  
pain. Her epigastric   
pain resolved. She   
denies ruq pain. She   
just is not  
keeping food down. She   
tried lunch meat   
sandwich after i seen   
her and vomited.  
  
Subjective Data:  
DANNA SOARES   
is a 21 year old Female   
who is Hospital Day #   
5.  
  
Overnight Events:   
Patient had an   
uneventful night.  
  
Objective Data:  
  
Objective Information:  
T PRBPSpO2  
[id number]/9599%  
Date/Time 8:   
8: 8:   
8: 8:20  
Range(36.8C - 37.5C )   
(95 - 98 ) (15 - 18 )   
(108 - 124 )/ (84 - 95   
) (98%  
- 99% )  
Highest temp of 37.5 C   
was recorded at    
8:20  
  
  
Pain reported at    
20:08: 0 = None  
  
Physical Exam by   
System:  
  
Constitutional: Well   
developed,   
awake/alert/oriented   
x3, no distress, alert   
and  
cooperative  
Eyes: PERRL, EOMI,   
clear sclera  
ENMT: mucous membranes   
moist, no apparent   
injury, no lesions seen  
Respiratory/Thorax:   
Patent airways, CTAB,   
normal breath sounds   
with good chest  
expansion, thorax   
symmetric  
Cardiovascular:   
Regular, rate and   
rhythm, no murmurs, 2+   
equal pulses of the  
extremities, normal S   
1and S 2  
Gastrointestinal:   
Nondistended, soft,   
non-tender, no rebound   
tenderness or  
guarding, no masses   
palpable, no   
organomegaly, +BS, no   
bruits  
Musculoskeletal: ROM   
intact, no joint   
swelling, normal   
strength  
Extremities: normal   
extremities, no   
cyanosis edema,   
contusions or wounds,   
no  
clubbing  
Neurological: alert and   
oriented x3, intact   
senses, motor, response   
and  
reflexes, normal   
strength  
Psychological:   
Appropriate mood and   
behavior  
Skin: Warm and dry, no   
lesions, no rashes  
  
Medication:  
  
Medications:  
  
Continuous Medications  
-----------------------  
---------  
  
1. Lactated Ringers   
Infusion: 1000 mL   
IntraVenous  
  
Scheduled Medications  
-----------------------  
---------  
  
1. Ciprofloxacin 400 mg   
IVPB/ Premixed Soln 200   
mL: 200 mL IntraVenous  
Piggyback Every 12   
Hours  
2. Pantoprazole   
Injectable: 40 mg   
IntraVenous Push Every   
12 Hours  
3. Potassium Chloride   
20 mEq/Sterile Water   
100 mL Premix IVPB: 20   
mEq  
IntraVenous Piggyback   
Every 2 Hours  
  
PRN Medications  
-----------------------  
---------  
  
1. Acetaminophen: 650   
mg Oral Every 4 Hours  
2. Acetaminophen: 650   
mg Oral Every 4 Hours  
3. Docusate: 100 mg   
Oral 2 Times a Day  
4. LORazepam   
Injectable: 1 mg   
IntraVenous Push Every   
8 Hours  
5. Magnesium Hydroxide   
-Al Hydrox -Simethicone   
Oral Liquid: 30 mL Oral  
Every 6 Hours  
  
  
Recent Lab Results:  
  
Results:  
  
  
  
  
I have reviewed these   
laboratory results:  
  
Basic Metabolic Panel   
2022 09:02:00  
  
ResultValue  
Lab Comment: called/rb   
to 2nd floor,   
2022 09:47  
Glucose, Serum 135 H  
 L  
K 2.9 LL  
  
Bicarbonate, Serum 25  
Anion Gap, Serum 10  
BUN 2 L  
CREAT 0.41 L  
GFR Female >90  
Calcium, Serum 8.1 L  
  
Magnesium, Serum   
2022 09:02:00  
  
ResultValue  
Magnesium, Serum 1.88  
  
  
Radiology Results:  
  
Results:  
  
  
Impression:  
  
1. Nonspecific free   
fluid within the   
pelvis.  
2. No evidence of bowel   
obstruction, free   
intraperitoneal air or  
abnormal   
intra-abdominal fluid   
collection.  
  
CT Abdomen and Pelvis   
with IV Contrast [2022 11:06AM]  
  
  
Assessment and Plan:  
Code Status:  
Code StatusFull Code  
  
Assessment:  
21-year-old female   
admitted for   
intractable nausea and   
vomiting and recent  
colitis. She also has a   
positive urine tox   
screen with   
cannabinoids and   
opiates  
so she may have   
cannabinoid hyperemesis   
syndrome. There is a   
documented history  
of major depressive   
disorder and bipolar   
disorder.  
  
PLAN:  
1. Continue fluid   
hydration with IV   
fluids  
2. Clear liquids as   
tolerated  
3. Zofran is not   
helping and she said   
Compazine did help in   
the past; I will  
discontinue the Zofran   
for now and add   
Compazine IV every 6   
hours as needed;  
may also discontinue   
the around-the-clock   
metoclopramide  
4. Lab for tomorrow   
morning  
5. We will consult GI  
6. This is day 2 of the   
IV metronidazole and   
ciprofloxacin for her   
recent  
diagnosis of colitis  
7. Pain control as   
needed  
8. DVT prophylaxis with   
early ambulation  
9. Patient will   
maintain inpatient   
status at this time as   
she is still having  
significant nausea and   
vomiting and unable to   
take much in the way of   
p.o.  
  
  
seen and examined  
afebrile  
hemodynamically stable  
change ivf to lr 150   
ml/hr - i ordered 1   
liter bolus this   
morning- clinically  
looks dry- Lips very   
dry and cracked.   
Slightly dry  
Change Flagyl to 250 mg   
IV every 6 hours-she is   
likely intolerant to   
the 500  
mg-if she continues to   
vomit despite changing   
to this dose and adding   
Ativan  
for cyclic vomiting   
along with Protonix 40   
mg IV twice daily we   
will consider  
changing to Augmentin.   
A (more content not   
included)...        Normal                                  [facility]  
   
                                                    MAGNESIUMon 2022   
   
                      Magnesium [Mass/Vol] 1.90 mg/dL Normal     1.60 - 2.40 [facility]  
   
                                        Comment on above:   Performed By: #### H  
EPFP ####  
[address] [address]   
   
                      Magnesium [Mass/Vol] 1.63 mg/dL Normal     1.60 - 2.40 [facility]  
   
                                        Comment on above:   Performed By: #### M  
G ####  
[address] [address]   
   
                      Magnesium [Mass/Vol] 1.88 mg/dL Normal     1.60 - 2.40 [facility]  
   
                                        Comment on above:   Performed By: #### M  
G ####  
[address] [address]   
   
                                                    Nutrition Therapy-Assessment  
on 2022   
   
                                                    Nutrition   
Therapy-Assessment                      Assessment   
Subjective/Objective:  
Note Type: Assessment  
  
Note Authored by:   
Registered Dietitian   
Nutritionist  
Pager Number: Doc Halo  
  
Nutrition Note:  
The patient is a 21   
year old Female   
hospital day #5   
admitted for   
intractable  
N/V.  
  
Nutrition assessment   
completed today for   
NPO/Clear Liquids x 5   
days.  
Pt with diagnosis of   
colitis PTA.  
  
Clear Liquid diet x 4   
days. NPO status prior.   
Pt to be advanced to   
Regular diet  
prior to lunch.  
No oral intakes   
documented. States that   
she has had minimal   
oral intakes for  
the past 10 days.  
Pt reported that she is   
tolerating Clear   
Liquids and is ready   
for diet  
advancement as she is   
hungry. No N/V today.  
Lactated Ringers   
Infusion @150mL/hr x 24   
hours.  
GI following.  
MST=1 on admission risk   
screen for eating   
poorly.  
UBW ~120lb per pt.   
Weight upon admission:   
113lb. Weight obtained   
during  
assessment: 104lb.  
Pt with significant   
weight loss of 8.0% in   
less than one week.  
No recent weight   
history available for   
review.  
Completed NFPA. Minimal   
muscle or fat wasting   
noted.  
With significant weight   
loss and decreased oral   
intakes 2/2 N/V, the pt   
meets  
criteria for severe   
malnutrition in the   
setting of acute   
disease.  
Will monitor oral   
intakes of Regular diet   
and implement ONS only   
as needed.  
Obtained food   
preferences and   
discussed with the diet   
office.  
Encouraged taking   
solids slowly and   
making sure to get   
enough fluids.  
  
No nutritional   
interventions at this   
time. Will continue to   
follow the pt.  
  
Medical Surgical   
History: PMHx: MDD,   
bipolar disorder  
  
  
Objective Information:  
  
T PRBPSpO2  
[id number]  
%  
Date/Time 17:   
17: 17:   
17: 17:36  
Range(36.6C - 37.5C )   
(95 - 100 ) (15 - 18 )   
(108 - 118 )/ (84 - 98   
)  
(98% - 100% )  
Highest temp of 37.5 C   
was recorded at    
8:20  
  
  
  
Pain reported at    
8:00: 0 = None  
  
  
---- Intake and Output   
-----  
Mn/Dy/Year   
TimeIntakeOutputNet  
2022 2:00   
[id number]  
2022 6:00   
[id number]  
2022 10:00   
[id number]  
  
The Intake and Output   
Totals for the last 24   
hours are:  
IntakeOutputNet  
[id number]  
  
Intake Output  
IV Fluids 1800 mL Urine   
2000 mL  
  
Height/Weight:  
Height in cm: 149.8   
centimeter(s)  
Weight (kg): 51.3  
BMI (kg/m2): 22.86   
square meter  
Significant Weight   
Loss: yes  
Interpretation of   
Weight Loss: >2% in 1   
week  
  
Recent Lab Results:  
  
Results:  
  
  
I have reviewed these   
laboratory results:  
  
Basic Metabolic Panel   
Trending View  
  
[id number]   
14:03:00 2022   
09:02:00 2022   
13:33:00  
Glucose, Serum91 135 H   
121 H  
 L 135 L 134 L  
K3.1 L 2.9 LL 2.8 LL  
 103 105  
Bicarbonate, Serum26 25   
20 L  
Anion Gap, Serum8 L 10   
12  
BUN2 L 2 L 3 L  
CREAT0.36 L 0.41 L 0.42   
L  
GFR Female>90 >90 >90  
Calcium, Serum8.2 L 8.1   
L 7.9 L  
Lab Comment: called/rb   
to 2nd floor,   
2022 09:47   
called/rb to radhika harden, 2022   
14:11  
  
Magnesium, Serum   
Trending View  
  
[id number]   
14:03:00 2022   
09:02:00  
Magnesium, Serum1.63   
1.88  
  
  
Current Active   
Medications/PN:  
Acetaminophen, Tablet   
(TYLENOL)  
DOSE = 650 mg Oral   
Every 4 Hours, PRN Pain   
- Mild (1-3),   
2022  
Acetaminophen, Tablet   
(TYLENOL)  
DOSE = 650 mg Oral   
Every 4 Hours, PRN Temp   
Greater Than or Equal   
to 38.0 C,  
2022  
Docusate, Capsule   
(COLACE)  
DOSE = 100 mg Oral 2   
Times a Day, PRN   
Constipation,   
2022  
Magnesium Hydroxide -Al   
Hydrox -Simethicone   
Oral Liquid, (MAALOX)  
DOSE = 30 mL Oral Every   
6 Hours, PRN Dyspepsia,   
2022  
Ciprofloxacin 400 mg   
IVPB/ Premixed Soln 200   
mL, (CIPRO)  
Every 12 Hours  
Recommended Infusion   
Time: 60 minute(s),   
2022  
LORazepam Injectable,   
(ATIVAN)  
DOSE = 1 mg IntraVenous   
Push Every 8 Hours, PRN   
Nausea and/or Vomiting,  
2022  
Pantoprazole   
Injectable, (PROTONIX)  
DOSE = 40 mg   
IntraVenous Push Every   
12 Hours, 2022  
Potassium Chloride   
Extended Release,   
Tablet, Extended   
Release  
DOSE = 40 mEq Oral Once  
Stop After 2 Doses,   
2022  
Metoclopramide   
Injectable, (REGLAN)  
DOSE = 5 mg IntraVenous   
Push Every 6 Hours,   
2022  
LORazepam Injectable,   
(ATIVAN)  
DOSE = 1 mg IntraVenous   
Push Once, 2022  
Lactated Ringers   
Infusion, IV Bag Volume   
= 1,000 mL  
Run at: 150 mL/hr   
IntraVenous ,   
2022  
Lactated Ringers IV   
Bolus, DOSE = 1,000 mL   
Once  
Infuse over 60   
minute(s), 2022  
  
Nutrition Orders:  
Diet May Not   
Participate in Room   
Service, No  
Order entered from   
Admission Screens.,   
2022  
Full Liquid Diet,   
Routine, 2022  
  
Food/Nutrition Related   
History:  
Energy Intake: No oral   
intakes documented. Pt   
has had minimal oral   
intakes for  
the past 10 days she   
reports.  
GI Symptoms: anorexia,   
nausea, vomiting, No   
N/V today. 22 bm   
documented.  
Pt reports that she is   
hungry today. Ordered   
Colace PRN, Protonix   
and Flagyl.  
Oral Problems: denies  
  
Food Allergies Comment:   
  
  
Nutrition Focused   
Physical Findings:  
Subcutaneous Fat Loss:  
Orbital Fat Pads: Well   
No (more content not   
included)...        Normal                                  [facility]  
   
                                                    Order Reconciliationon    
   
                                        Order Reconciliation Page 1  
  
Discharge   
Reconciliation Document  
  
  
  
Reconciliation Type:   
Discharge requested on   
behalf of Becky Hyman   
(Advanced  
Practice Nurse) done by   
Becky Hyman (APRN-CNS)  
  
Discharge -   
Reconciliation:   
2022 16:25 by:   
Becky Hyman (APRN-CNS)  
Discharge - Reset to   
Incomplete: 2022   
10:54 by: Becky Hyman   
(APRN-CNS)  
Discharge -   
Reconciliation:   
2022 10:56 by:   
Becky Hyman (APRN-CNS)  
  
Home Medications   
EnteredHOME MEDICATIONS   
AT DISCHARGE   
DateReconciliation  
Comment/ Additional   
Information  
Phenadoz 25 mg rectal   
suppository 1   
suppository(ies)   
rectally 3 times a day,  
As Needed -for nausea   
and vomiting   
10-Franco-2022 16:20   
Discontinued;  
Discontinue from ORM  
  
Phenadoz 25 mg rectal   
suppository is not   
required  
promethazine 25 mg oral   
tablet 1 tab(s) orally   
3 times a day, As   
Needed -for  
nausea and vomiting   
10-Franco-2022 16:20   
Discontinued;   
Discontinue from  
ORM  
  
promethazine 25 mg oral   
tablet is not required  
  
  
Current OrdersDateHOME   
MEDICATIONS AT   
DISCHARGE   
DateReconciliation   
Comment/  
Additional Information  
Acetaminophen Tablet   
(TYLENOL)DOSE = 650 mg   
Oral Every 4 Hours, PRN   
Pain -  
Mild (1-3) 2022   
10:03 Acetaminophen is   
not required  
Acetaminophen Tablet   
(TYLENOL)DOSE = 650 mg   
Oral Every 4 Hours, PRN   
Temp  
Greater Than or Equal   
to 38.0 C 2022   
10:03 Acetaminophen is  
not required  
Ciprofloxacin 400 mg   
IVPB/ Premixed Soln 200   
mL (CIPRO)Every 12  
HoursRecommended   
Infusion Time: 60   
minute(s) 2022   
10:34  
Ciprofloxacin 400 mg   
IVPB/ Premixed Soln 200   
mL is not required  
Docusate Capsule   
(COLACE)DOSE = 100 mg   
Oral 2 Times a Day, PRN   
Constipation  
2022 10:03   
Docusate is not   
required  
Lactated Ringers   
Infusion IV Bag Volume   
= 1,000 mL Run at: 150   
mL/hr  
IntraVenous 2022   
12:48 Lactated Ringers   
Infusion  
is not required  
LORazepam Injectable   
(ATIVAN)DOSE = 1 mg   
IntraVenous Push Every   
8 Hours, PRN  
Nausea and/or Vomiting   
2022 08:05   
LORazepam Injectable is   
not  
required  
Magnesium Hydroxide -Al   
Hydrox -Simethicone   
Oral Liquid   
(MAALOX)DOSE = 30 mL  
Oral Every 6 Hours, PRN   
Dyspepsia 2022   
10:03 Magnesium  
Hydroxide -Al Hydrox   
-Simethicone Oral   
Liquid is not required  
Magnesium Sulfate 2   
gram/Sterile Water 50   
mL Premix Soln   
OnceRecommended  
Infusion Time: 2   
hour(s)Stop After 1   
Doses 2022 10:53  
Magnesium Sulfate 2   
gram/Sterile Water 50   
mL Premix Soln is not   
required  
Metoclopramide   
Injectable (REGLAN)DOSE   
= 5 mg IntraVenous Push   
Every 6 Hours  
2022 16:30   
Metoclopramide   
Injectable is not   
required  
Pantoprazole Injectable   
(PROTONIX)DOSE = 40 mg   
IntraVenous Push Every   
12  
Hours 2022 12:37   
Pantoprazole Injectable   
is not required  
Potassium Chloride   
Extended Release   
Tablet, Extended   
ReleaseDOSE = 40 mEq   
Oral  
OnceStop After 2 Doses   
2022 10:53   
Potassium Chloride   
Extended  
Release is not required  
Sucralfate Tablet   
(CARAFATE)DOSE = 1   
gram(s) Oral 4 Times a   
Day Before Meals  
2022 22:26   
sucralfate 1 g oral   
tablet 1 tab(s) orally   
4 times a day  
-.Meds to Beds - 4   
Times a Day Before   
Meals 2022 10:54   
Prescription  
is created for   
sucralfate 1 g oral   
tablet  
Technetium Tc 99m   
Sulfur Colloid -   
(Radiology Contrast)   
DOSE = 500 microCurie  
Oral Once 2022   
16:29 Technetium Tc 99m   
Sulfur Colloid -  
(Radiology Contrast) is   
not required  
  
  
Home Medications Added   
During Discharge   
Reconciliation  
Discharge Discharge   
Diagnosis< R11.2   
Cannabinoid hyperemesis   
syndrome;R11.2  
Intractable nausea and   
vomiting;E87.6 Acute   
hypokalemia;E83.42   
Hypomagnesemia  
Discharge Provider,   
Nicolas Connolly   
Discharge Disposition :   
.Home Condition  
at Discharge:   
Satisfactory  
Discharge Communication   
Instructions for   
Nursing Only: Discharge   
patient  
TODAY.  
Discharge Communication   
Instructions for   
Nursing Only: Remove IV   
prior to  
discharge from   
hospital. Do not remove   
any midline, if   
present, without an  
order from the   
provider.  
Discharge Instructions   
- PHR After your   
discharge from the   
hospital, two  
Summary of Care   
Documents will be   
available online in   
your  Personal Health  
Record (PHR).   
1.Consolidated-Clinical   
Document Architecture   
(C-CDA) Patient  
Discharge Summary This   
document is a summary   
of your hospital stay   
to be kept  
for your   
reference.2.C-CDA Visit   
Summary This document   
is a summary of your  
hospital stay to be   
shared with your   
follow-up providers   
(doctor, dietician,  
physical therapist,   
etc.).  
Guidelines for a   
Healthy Lifestyle  
Protonix 40 mg oral   
delayed release tablet   
1 tab(s) orally 2 times   
a day  
Zofran 8 mg oral tablet   
1 tab(s) orally 3   
times, As Needed -for   
nausea and  
vomiting  
  
All Active Home   
Medications at time of   
Discharge   
Reconciliation:   
2022  
10:56  
Discharge Discharge   
Diagnosis< R11.2   
Cannabinoid hyperemesis   
syndrome;R11.2  
Intractable nausea and   
vomiting;E87.6 Acute   
hypokalemia;E83.42   
Hypomagnesemia  
Discharge Provider,   
Nicolas Connolly   
Discharge Disposition :   
.Home Condition  
at Discharge:   
Satisfactory  
Discharge Communicati   
(more content not   
included)...        Normal                                  [facility]  
   
                                                    US RIGHT UPPER QUADRANTon    
   
                                        US RIGHT UPPER QUADRANT MRN: [id number]  
Patient Name:   
DANNA SOARES  
  
STUDY:  
US RUQ ABDOMEN;   
2022 3:15 pm  
  
INDICATION:  
emesis .  
  
COMPARISON:  
None.  
  
ACCESSION NUMBER(S):  
[id number]  
  
ORDERING CLINICIAN:  
BECKY HYMAN  
  
TECHNIQUE:  
Multiple images of the   
abdomen were obtained.  
  
FINDINGS:  
LIVER:  
The echogenicity of the   
liver is within normal   
limits.  
There is no hepatic   
mass.  
The sagittal dimension   
of the right lobe of   
the liver is 14 cm, not  
enlarged.  
  
GALLBLADDER:  
The gallbladder is   
nondistended.  
The gallbladder wall is   
not thickened.  
There are no   
gallstones.  
There is no sludge.  
There is no   
pericholecystic fluid.  
  
  
BILE DUCTS:  
There is no   
intrahepatic biliary   
dilatation.  
The common bile duct is   
nondilated measuring   
0.5 cm.  
  
PANCREAS:  
The pancreas is   
unremarkable.  
  
RIGHT KIDNEY:  
The right kidney is   
normal in size   
measuring 11.2 cm in   
length.  
  
The echogenicity of the   
cortex is within normal   
limits.  
There is no renal mass.  
There is no intrarenal   
calculus or   
hydronephrosis.  
  
IMPRESSION:  
Unremarkable right   
upper quadrant   
ultrasound.  
Electronically signed   
by: CEE SHIRLEY MD                  Normal                                  [facility]  
   
                                                    BASIC METABOLIC PANELon    
   
                      Anion gap [Moles/Vol] 12 mmol/L  Normal     10 -     [facility]  
   
                                        Comment on above:   Order Comment: quiles  
d/rb to radhika basimrid, 2022 14:11   
   
                                                            Performed By: #### C  
BCDF ####  
[address] [address]   
   
                      Calcium [Mass/Vol] 7.9 mg/dL  Low        8.6 - 10.3 [facility]  
   
                                        Comment on above:   Order Comment: quiles  
[facility]   
   
                                                            Performed By: #### C  
BCDF ####  
[address] [address]   
   
                      Chloride [Moles/Vol] 105 mmol/L Normal     98 - 107   [facility]  
   
                                        Comment on above:   Order Comment: quiles  
d/rb to radhika oxclementerider, 2022 14:11   
   
                                                            Performed By: #### C  
BCDF ####  
[address] [address]   
   
                      Creatinine [Mass/Vol] 0.42 mg/dL Low        0.50 - 1.05 [facility]  
   
                                        Comment on above:   Order Comment: quiles  
d/rb to radhika oxenrider, 2022 14:11   
   
                                                            Performed By: #### C  
BCDF ####  
[address] [address]   
   
                      eGFR FEMALE >90        Normal     >90        [facility]  
   
                                        Comment on above:   Order Comment: quiles  
d/rb to radhika oxenrider, 2022 14:11   
   
                                                            Result Comment: CALC  
ULATIONS OF ESTIMATED GFR ARE PERFORMED  
USING THE  CKD-EPI STUDY REFIT EQUATION  
WITHOUT THE RACE VARIABLE FOR THE IDMS-TRACEABLE  
CREATININE METHODS.  
https://jasn.asnjournals.org/content/early//ASN.  
412343   
   
                                                            Performed By: #### C  
BCDF ####  
[address] [address]   
   
                      Glucose [Mass/Vol] 121 mg/dL  High       74 - 99    [facility]  
   
                                        Comment on above:   Order Comment: quiles  
d/rb to radhika oxenrider, 2022 14:11   
   
                                                            Performed By: #### C  
BCDF ####  
[address] [address]   
   
                      HCO3 (Bld) [Moles/Vol] 20 mmol/L  Low        21 - 32    [facility]  
   
                                        Comment on above:   Order Comment: quiles  
d/rb to radhika oxenrider, 2022 14:11   
   
                                                            Performed By: #### C  
BCDF ####  
[address] [address]   
   
                      Potassium [Moles/Vol] 2.8 mmol/L Critically low 3.5 - 5.3   
 [facility]  
   
                                        Comment on above:   Order Comment: qiules  
d/rb to radhika oxenrider, 2022 14:11   
   
                                                            Result Comment: MILD  
 HEMOLYSIS DETECTED. The result may be   
falsely elevated due to  
hemolysis or other interferents. Clinical correlation is   
recommended.  
Repeat testing may be considered.  
called/rb to radhika harden, 2022 14:11   
   
                                                            Performed By: #### C  
BCDF ####  
[place] [id number]   
   
                      Sodium [Moles/Vol] 134 mmol/L Low        136 - 145  [facility]  
   
                                        Comment on above:   Order Comment: quiles  
d/rb to radhikaora harden, 2022 14:11   
   
                                                            Performed By: #### C  
BCDF ####  
[place] [id number]   
   
                                                    Urea nitrogen   
[Mass/Vol]      3 mg/dL         Low             6 - 23          [facility]  
   
                                        Comment on above:   Order Comment: quiles  
d/rb to radhikaora carlos, 2022 14:11   
   
                                                            Performed By: #### C  
BCDF ####  
[place] [id number]   
   
                                                    CBCon 2022   
   
                                                    Erythrocyte   
distribution width   
(RBC) [Ratio]   14.6 %          High            11.5 - 14.5     [facility]  
   
                                        Comment on above:   Performed By: #### M  
G ####  
[address] [address]   
   
                                                    Hematocrit (Bld)   
[Volume fraction] 36.0 %          Normal          36.0 - 46.0     [facility]  
   
                                        Comment on above:   Performed By: #### M  
G ####  
[address] [address]   
   
                                                    Hemoglobin (Bld)   
[Mass/Vol]      11.9 g/dL       Low             12.0 - 16.0     [facility]  
   
                                        Comment on above:   Performed By: #### M  
G ####  
[address] [address]   
   
                      MCHC (RBC) [Mass/Vol] 33.1 g/dL  Normal     32.0 - 36.0 [facility]  
   
                                        Comment on above:   Performed By: #### M  
G ####  
[address] [address]   
   
                      MCV (RBC) [Entitic vol] 87 fL      Normal     80 - 100   S  
[facility]  
   
                                        Comment on above:   Performed By: #### M  
G ####  
[address] [address]   
   
                      Platelets (Bld) [#/Vol] 233 10*3/uL Normal     150 - 450    
[facility]  
   
                                        Comment on above:   Performed By: #### M  
G ####  
[address] [address]   
   
                      RBC        4.16 x10E12/L Normal     4.00 - 5.20 [facility]  
   
                                        Comment on above:   Performed By: #### M  
G ####  
[address] [address]   
   
                      WBC (Bld) [#/Vol] 6.3 10*3/uL Normal     4.4 - 11.3 [facility]  
   
                                        Comment on above:   Performed By: #### M  
G ####  
[place] [id number]   
   
                                                    Clinical Event Noteon 2022   
   
                                        Clinical Event Note Clinical Event:  
Clinical Event Note:  
Details  
Patient still having   
intractable nausea and   
vomiting. I gave 1 dose   
of IV  
Phenergan with no   
relief. She is now   
complaining from chest   
pain. I will  
order EKG and troponin.   
She is on 4 different   
types of antiemetics.   
Given  
that she is not having   
any relief of that, I   
am suspecting now a   
central  
sensitization/central   
etiology. I will go   
ahead first and try   
morphine 2 mg IV  
1 dose stat to see if   
this will help the   
patient relax. If not,   
then I may  
consider giving IV   
Ativan.  
  
  
  
Electronic Signatures:  
Sal Ibrahim)   
(Signed 2022   
03:14)  
Authored: Clinical   
Event Note  
  
  
Last Updated:   
2022 03:14 by Sal Ibrahim) [facility]  
   
                                                    Consult-Gastroenterologyon 0  
2022   
   
                                                    Consult-Gastroenterolog  
y                                       Service:  
Service:   
Gastroenterology  
  
Consult:  
Consult requested by   
(Attending Name):   
Nicolas Connolly  
Reason: Recalcitrant   
nausea and vomiting  
  
History of Present   
Illness:  
HPI:  
DANNA SOARES AA   
is a 21 year old Female   
presents to ER where   
she is  
admitted with diffuse   
abdominal pain. She is   
admitted for   
recalcitrant  
vomiting. History of   
abdominal problems   
since adolescence   
largely constipation  
has seen multiple   
doctors in the past.   
Had been struggling   
with daily nausea  
and anorexia beginning   
just before [place]   
worsened after New   
Year's and the  
became acutely worse   
after eating venison.   
She states that she had   
some meat  
that she had not   
processed her uncle   
admitted later that the   
animal had hung  
out and 60 degree   
weather for 4 days   
prior to being   
processed. She states   
that  
she her grandfather and   
uncle all ate the meat   
and only she became   
ill. She  
initially presented to   
the freestanding ER   
with [facility] where   
CT scan was  
done showing diffuse   
colitis she was   
referred to [facility] and  
admitted she was   
treated with 2 days of   
IV antibiotics and sent   
home. Upon  
arrival home she had   
more vomiting and   
presented to our ER   
where she was  
readmitted. She was   
then discharged and   
readmitted with   
recalcitrant vomiting  
and hypokalemia.  
  
She states that   
multiple family members   
throughout her   
knowledge of had their  
gallbladders removed   
after pregnancy and she   
is concerned that some   
of this is  
her gallbladder.  
  
She states she had a   
normal bowel movement   
this afternoon it was   
soft dark in  
color but no blood. She   
denies any worsening   
abdominal pain with   
defecation.  
She states the pain is   
rather constant seems   
to be worse with moving   
or with  
any sudden vibration.   
She states she feels   
somewhat better with   
vomiting feels  
worse with eating. Has   
pain in her left upper   
quadrant rating to her   
left  
lower quadrant with any   
ingestion of food. She   
denies any hematemesis   
enlarged  
with the vomitus is   
bile and undigested   
food.  
  
Past medical history is   
unremarkable other than   
the constipation.  
  
Past surgical history   
is denied.  
  
Family history no one   
with inflammatory bowel   
disease or colon   
cancer.  
  
Social history she   
smokes marijuana   
socially last use was   
New Year's Catar[place]. She  
does not use drugs does   
not drink alcohol or   
smoke. She has sole   
custody of  
her younger sister   
stating her mom just   
walked out. She does   
receive state  
assistance for her   
sister and is her   
primary guardian.  
  
10 system review is   
negative set that noted   
above  
  
Review Family/Social   
History and ROS:  
Social History:  
  
Smoking Status: never   
smoker (1)  
Alcohol Use: denies(1)  
Drug Use: denies (1)  
  
  
Allergies:  
MiraLax:   
Hives/Urticaria  
codeine: Unknown  
  
Objective:  
  
Objective Information:  
  
T PRBPSpO2  
[id number]/9299%  
Date/Time 15:251/19   
15: 15:   
15: 15:25  
Range(36.7C - 37.4C )   
(97 - 133 ) (18 - 20 )   
(117 - 139 )/ (86 - 101   
)  
(97% - 99% )  
Highest temp of 37.4 C   
was recorded at    
15:25  
  
Physical Exam by   
System:  
  
Constitutional: Well   
developed,   
awake/alert/oriented   
x3, no distress, alert   
and  
cooperative  
Eyes: PERRL, EOMI,   
clear sclera  
ENMT: mucous membranes   
moist, no apparent   
injury, no lesions seen  
Head/Neck: Neck supple,   
no apparent injury,   
thyroid without mass or   
tenderness,  
No JVD, trachea   
midline, no bruits  
Respiratory/Thorax:   
Patent airways, CTAB,   
normal breath sounds   
with good chest  
expansion, thorax   
symmetric  
Cardiovascular:   
Regular, rate and   
rhythm, no murmurs, 2+   
equal pulses of the  
extremities, normal S   
1and S 2  
Gastrointestinal:   
Abdomen soft mildly   
tender left lower   
quadrant and  
periumbilical region   
without rebound or   
guarding no palpable   
mass or bruit  
Musculoskeletal: ROM   
intact, no joint   
swelling, normal   
strength  
Neurological: alert and   
oriented x3, intact   
senses, motor, response   
and  
reflexes, normal   
strength  
Lymphatic: No   
significant   
lymphadenopathy  
Psychological:   
Appropriate mood and   
behavior  
Skin: Warm and dry, no   
lesions, no rashes  
  
Recent Lab Results:  
  
Results:  
  
  
I have reviewed these   
laboratory results:  
  
Basic Metabolic Panel   
2022 13:33:00  
  
ResultValue  
Lab Comment: called/rb   
to radhika harden,   
2022 14:11  
Glucose, Serum 121 H  
 L  
K 2.8 LL  
  
Bicarbonate, Serum 20 L  
Anion Gap, Serum 12  
BUN 3 L  
CREAT 0.42 L  
GFR Female >90  
Calcium, Serum 7.9 L  
  
Complete Blood Count   
2022 13:33:00  
  
ResultValue  
White Blood Cell Count   
6.3  
Red Blood Cell Count   
4.16  
HGB 11.9 L  
HCT 36.0  
MCV 87  
MCHC 33.1  
  
RDW-CV 14.6 H  
  
Comprehensive Metabolic   
Panel 2022   
12:11:00  
  
ResultValue  
Glucose, Serum 78  
  
K 3.2 L  
  
Bicarbonate, Serum 19 L  
Anion Gap, Serum 16  
BUN 4 L  
CREAT 0.54  
GFR Female >90  
Calcium, Serum 8.5 L  
ALB 3.9  
ALKP 38  
T Pro 6.2 L  
T Bili 0.5  
Alanine   
Aminotransferase, Serum   
10  
Aspartate Transaminase,   
Serum 17  
  
Drug Screen, Urine 1   
(more content not   
included)...        Normal                                  [facility]  
   
                                                    Daily Progress Note-General   
Internal Medicineon 2022   
   
                                                    Daily Progress   
Note-General Internal   
Medicine                                Consult Type:   
subsequent visit/care  
  
Service: General   
Internal Medicine  
  
History of Present   
Illness:  
History Present   
Illness:  
Admission Reason:   
INTRACTABLE NAUSEA AND   
VOMITING  
HPI:  
SHE IS STILL NAUSEAS   
AND VOMITING. SHE HAS   
EPIGASTRIC PAIN. SHE IS   
INTOLERANT  
TO FLAGYL AND HAS   
VOMITING SWALLOWING THE   
PILL. SHE FELT BETTER   
AFTER SHE GOT  
LITER BOLUS THIS   
MORNING. SHE HAS NOT   
SMOKED MARIJUANA FOR   
ABOUT 10 DAYS SAID  
SHE VOMITED WHEN SHE   
TRIED. SHE STILL HAS   
LEFT LOWER QUADRANT   
ABDOMINAL PAIN  
  
Subjective Data:  
DANNA SOARES   
is a 21 year old Female   
who is Hospital Day #   
4.  
  
Overnight Events:   
Patient had an   
uneventful night.  
  
Objective Data:  
  
Objective Information:  
T PRBPSpO2  
[id number]%  
Date/Time 8:   
8: 8:   
8: 8:00  
Range(36.7C - 37C ) (97   
- 133 ) (18 - 20 ) (117   
- 139 )/ (86 - 101 )   
(97%  
- 99% )  
Highest temp of 37 C   
was recorded at    
17:37  
  
  
Pain reported at    
8:00: 0 = None  
  
Physical Exam by   
System:  
  
Constitutional: Well   
developed,   
awake/alert/oriented   
x3, no distress, alert   
and  
cooperative  
Eyes: PERRL, EOMI,   
clear sclera  
ENMT: mucous membranes   
slightly dry, lips   
cracked, no apparent   
injury, no  
lesions seen  
Head/Neck: Neck supple,   
no apparent injury,   
thyroid without mass or   
tenderness,  
No JVD, trachea   
midline, no bruits  
Respiratory/Thorax:   
Patent airways, CTAB,   
normal breath sounds   
with good chest  
expansion, thorax   
symmetric  
Cardiovascular:   
Regular, rate and   
rhythm, no murmurs, 2+   
equal pulses of the  
extremities, normal S   
1and S 2  
Gastrointestinal:   
Nondistended, soft,   
non-tender, no rebound   
tenderness or  
guarding, no masses   
palpable, no   
organomegaly, +BS, no   
bruits  
Musculoskeletal: ROM   
intact, no joint   
swelling, normal   
strength  
Extremities: normal   
extremities, no   
cyanosis edema,   
contusions or wounds,   
no  
clubbing  
Neurological: alert and   
oriented x3, intact   
senses, motor, response   
and  
reflexes, normal   
strength  
Psychological:   
Appropriate mood and   
behavior  
Skin: Warm and dry, no   
lesions, no rashes  
  
Medication:  
  
Medications:  
  
Continuous Medications  
-----------------------  
---------  
  
1. Lactated Ringers   
Infusion: 1000 mL   
IntraVenous  
  
Scheduled Medications  
-----------------------  
---------  
  
1. Ciprofloxacin 400 mg   
IVPB/ Premixed Soln 200   
mL: 200 mL IntraVenous  
Piggyback Every 12   
Hours  
2. metroNIDAZOLE   
(FLAGYL) 500 mg IVPB/   
Premixed Soln 100 mL:   
100 mL  
IntraVenous Piggyback   
Every 8 Hours  
3. Pantoprazole   
Injectable: 40 mg   
IntraVenous Push Every   
12 Hours  
  
PRN Medications  
-----------------------  
---------  
  
1. Acetaminophen: 650   
mg Oral Every 4 Hours  
2. Acetaminophen: 650   
mg Oral Every 4 Hours  
3. Docusate: 100 mg   
Oral 2 Times a Day  
4. LORazepam   
Injectable: 1 mg   
IntraVenous Push Every   
8 Hours  
5. Magnesium Hydroxide   
-Al Hydrox -Simethicone   
Oral Liquid: 30 mL Oral  
Every 6 Hours  
  
  
Recent Lab Results:  
  
Results:  
  
  
I have reviewed these   
laboratory results:  
  
Troponin I, Serum   
2022 04:26:00  
  
ResultValue  
Troponin I, Serum <0.02  
  
Complete Blood Count +   
Differential   
2022 12:11:00  
  
ResultValue  
White Blood Cell Count   
9.4  
Nucleated Erythrocyte   
Count 0.2  
Red Blood Cell Count   
4.53  
HGB 13.1  
HCT 39.7  
MCV 88  
MCHC 32.9  
  
RDW-CV 14.7 H  
Neutrophil % 61.2  
Lymphocyte % 25.4  
Monocyte % 10.0  
Eosinophil % 2.3  
Basophil % 1.1  
Neutrophil Count 5.70  
Lymphocyte Count 2.40  
Monocyte Count 0.90  
Eosinophil Count 0.20  
Basophil Count 0.10  
  
Comprehensive Metabolic   
Panel 2022   
12:11:00  
  
ResultValue  
Glucose, Serum 78  
  
K 3.2 L  
  
Bicarbonate, Serum 19 L  
Anion Gap, Serum 16  
BUN 4 L  
CREAT 0.54  
GFR Female >90  
Calcium, Serum 8.5 L  
ALB 3.9  
ALKP 38  
T Pro 6.2 L  
T Bili 0.5  
Alanine   
Aminotransferase, Serum   
10  
Aspartate Transaminase,   
Serum 17  
  
  
Radiology Results:  
  
Results:  
  
  
Impression:  
  
Nondiagnostic gastric   
emptying study due   
toemesis.  
  
NM Gastric Empty Solid   
Only [2022   
9:27AM]  
  
  
Conclusion:  
 Please see physician   
note for formal   
interpretation   
confirmed by Scribe   
Confirmed by CARLOS MOODY   
() on 2022   
5:11:03 AM  
  
Electrocardiogram 12   
Lead [2022   
5:11AM]  
  
  
Assessment and Plan:  
Code Status:  
Code StatusFull Code  
  
Assessment:  
21-year-old female   
admitted for   
intractable nausea and   
vomiting and recent  
colitis. She also has a   
positive urine tox   
screen with   
cannabinoids and   
opiates  
so she may have   
cannabinoid hyperemesis   
syndrome. There is a   
documented history  
of major depressive   
disorder and bipolar   
disorder.  
  
PLAN:  
1. Continue fluid   
hydration with IV   
fluids  
2. Clear liquids as   
tolerated  
3. Zofran is not   
helping and she said   
Compazine did help in   
the past; I will  
discontinue the Zofran   
for now and add   
Compazine IV every 6   
hours as needed;  
may also discontinue   
the around-the-clock   
metoclopramide  
4. Lab for tomorrow   
morning  
5. We will consult GI  
6. This is day 2 of the   
IV metr (more content   
not included)...    Normal                                  [facility]  
   
                                                    TROPONIN Ion 2022   
   
                                                    Troponin I.cardiac   
[Mass/Vol]      ng/mL           Normal          0.00 - 0.03     [facility]  
   
                                        Comment on above:   Result Comment: LESS  
 THAN 0.04 NG/ML: NEGATIVE  
REPEAT TESTING IN THREE TO SIX HOURS  
IF CLINICALLY INDICATED.  
0.04 - 0.5 NG/ML: CONSISTENT WITH POSSIBLE  
CARDIAC DAMAGE AND POSSIBLE INCREASED  
CLINICAL RISK.  
SERIAL MEASUREMENTS MAY HELP ASSESS EXTENT OF  
MYOCARDIAL DAMAGE.  
>0.5 NG/ML: CONSISTENT WITH CARDIAC DAMAGE,  
INCREASED CLINICAL RISK AND MYOCARDIAL  
INFARCTION. SERIAL MEASUREMENTS MAY HELP  
ASSESS EXTENT OF MYOCARDIAL DAMAGE.  
.  
Note: Troponin I testing is performed using different  
testing methodology at [facility] than at other  
[facility] hospitals. Direct result comparisons should only  
be made within the same method.   
   
                                                            Performed By: #### C  
BCDF ####  
[id number] [place] [address]   
   
                                                    CBC AND DIFFERENTIALon    
   
                      Basophils (Bld) [#/Vol] 0.10 10*3/uL Normal     0.00 - 0.1  
0 [facility]  
   
                                        Comment on above:   Performed By: #### P  
TINR ####  
[address] [address]   
   
                      Basophils/100 WBC (Bld) 1.1 %      Normal     0.0 - 2.0  S  
[facility]  
   
                                        Comment on above:   Performed By: #### P  
TINR ####  
[address] [address]   
   
                                                    Eosinophils (Bld)   
[#/Vol]         0.20 10*3/uL    Normal          0.00 - 0.70     [facility]  
   
                                        Comment on above:   Performed By: #### P  
TINR ####  
[address] [address]   
   
                                                    Eosinophils/100 WBC   
(Bld)           2.3 %           Normal          0.0 - 6.0       [facility]  
   
                                        Comment on above:   Performed By: #### P  
TINR ####  
[address] [address]   
   
                                                    Erythrocyte   
distribution width   
(RBC) [Ratio]   14.7 %          High            11.5 - 14.5     [facility]  
   
                                        Comment on above:   Performed By: #### P  
TINR ####  
[address] [address]   
   
                                                    Hematocrit (Bld)   
[Volume fraction] 39.7 %          Normal          36.0 - 46.0     [facility]  
   
                                        Comment on above:   Performed By: #### P  
TINR ####  
[address] [address]   
   
                                                    Hemoglobin (Bld)   
[Mass/Vol]      13.1 g/dL       Normal          12.0 - 16.0     [facility]  
   
                                        Comment on above:   Performed By: #### P  
TINR ####  
[address] [address]   
   
                                                    Lymphocytes (Bld)   
[#/Vol]         2.40 10*3/uL    Normal          1.20 - 4.80     [facility]  
   
                                        Comment on above:   Performed By: #### P  
TINR ####  
[address] [address]   
   
                                                    Lymphocytes/100 WBC   
(Bld)           25.4 %          Normal          13.0 - 44.0     [facility]  
   
                                        Comment on above:   Performed By: #### P  
TINR ####  
[address] [address]   
   
                      MCHC (RBC) [Mass/Vol] 32.9 g/dL  Normal     32.0 - 36.0 [facility]  
   
                                        Comment on above:   Performed By: #### P  
TINR ####  
[address] [address]   
   
                      MCV (RBC) [Entitic vol] 88 fL      Normal     80 - 100   S  
[facility]  
   
                                        Comment on above:   Performed By: #### P  
TINR ####  
[address] [address]   
   
                      Monocytes (Bld) [#/Vol] 0.90 10*3/uL Normal     0.10 - 1.0  
0 [facility]  
   
                                        Comment on above:   Performed By: #### P  
TINR ####  
[address] [address]   
   
                      Monocytes/100 WBC (Bld) 10.0 %     Normal     2.0 - 10.0 S  
[facility]  
   
                                        Comment on above:   Performed By: #### P  
TINR ####  
[address] [address]   
   
                                                    Neutrophils (Bld)   
[#/Vol]         5.70 10*3/uL    Normal          1.20 - 7.70     [facility]  
   
                                        Comment on above:   Result Comment: Perc  
ent differential counts (%) should be   
interpreted in the  
context of the absolute cell counts (cells/L).   
   
                                                            Performed By: #### P  
TINR ####  
[address] [address]   
   
                                                    Neutrophils/100 WBC   
(Bld)           61.2 %          Normal          40.0 - 80.0     [facility]  
   
                                        Comment on above:   Performed By: #### P  
TINR ####  
[address] [address]   
   
                      NUCLEATED RBC 0.2 /100 WBC Normal                [facility]  
   
                                        Comment on above:   Performed By: #### P  
TINR ####  
[address] [address]   
   
                      Platelets (Bld) [#/Vol] 265 10*3/uL Normal     150 - 450    
[facility]  
   
                                        Comment on above:   Performed By: #### P  
TINR ####  
[address] [address]   
   
                      RBC        4.53 x10E12/L Normal     4.00 - 5.20 [facility]  
   
                                        Comment on above:   Performed By: #### P  
TINR ####  
[id number]   
   
                      WBC (Bld) [#/Vol] 9.4 10*3/uL Normal     4.4 - 11.3 [facility]  
   
                                        Comment on above:   Performed By: #### P  
TINR ####  
[place] [address]   
   
                                                    COMPREHENSIVE PANELon 2022   
   
                      Albumin [Mass/Vol] 3.9 g/dL   Normal     3.4 - 5.0  [facility]  
   
                                        Comment on above:   Performed By: #### P  
TINR ####  
[place] [address]   
   
                                                    ALP [Catalytic   
activity/Vol]   38 U/L          Normal          33 - 110        [facility]  
   
                                        Comment on above:   Performed By: #### P  
TINR ####  
[address] [address]   
   
                                                    ALT [Catalytic   
activity/Vol]   10 U/L          Normal          7 - 45          [facility]  
   
                                        Comment on above:   Result Comment: Kaleigh  
ents treated with Sulfasalazine may   
generate  
falsely decreased results for ALT.   
   
                                                            Performed By: #### P  
TINR ####  
[address] [address]   
   
                      Anion gap [Moles/Vol] 16 mmol/L  Normal     10 - 20    [facility]  
   
                                        Comment on above:   Performed By: #### P  
TINR ####  
[address] [address]   
   
                                                    AST [Catalytic   
activity/Vol]   17 U/L          Normal          9 - 39          [facility]  
   
                                        Comment on above:   Performed By: #### P  
TINR ####  
[address] [address]   
   
                      Bilirubin [Mass/Vol] 0.5 mg/dL  Normal     0.0 - 1.2  [facility]  
   
                                        Comment on above:   Performed By: #### P  
TINR ####  
[address] [address]   
   
                      Calcium [Mass/Vol] 8.5 mg/dL  Low        8.6 - 10.3 [facility]  
   
                                        Comment on above:   Performed By: #### P  
TINR ####  
[address] [address]   
   
                      Chloride [Moles/Vol] 105 mmol/L Normal     98 - 107   [facility]  
   
                                        Comment on above:   Performed By: #### P  
TINR ####  
[address] [address]   
   
                      Creatinine [Mass/Vol] 0.54 mg/dL Normal     0.50 - 1.05 [facility]  
   
                                        Comment on above:   Performed By: #### P  
TINR ####  
[address] [address]   
   
                      eGFR FEMALE >90        Normal     >90        [facility]  
   
                                        Comment on above:   Result Comment: CALC  
ULATIONS OF ESTIMATED GFR ARE PERFORMED  
USING THE  CKD-EPI STUDY REFIT EQUATION  
WITHOUT THE RACE VARIABLE FOR THE IDMS-TRACEABLE  
CREATININE METHODS.  
https://jasn.asnjournals.org/content/early//ASN.  
227331   
   
                                                            Performed By: #### P  
TINR ####  
[address] [address]   
   
                      Glucose [Mass/Vol] 78 mg/dL   Normal     74 - 99    [facility]  
   
                                        Comment on above:   Performed By: #### P  
TINR ####  
[address] [address]   
   
                      HCO3 (Bld) [Moles/Vol] 19 mmol/L  Low        21 - 32    [facility]  
   
                                        Comment on above:   Performed By: #### P  
TINR ####  
[address] [address]   
   
                      Potassium [Moles/Vol] 3.2 mmol/L Low        3.5 - 5.3  [facility]  
   
                                        Comment on above:   Performed By: #### P  
TINR ####  
[address] [address]   
   
                      Protein [Mass/Vol] 6.2 g/dL   Low        6.4 - 8.2  [facility]  
   
                                        Comment on above:   Performed By: #### P  
TINR ####  
[address] [address]   
   
                      Sodium [Moles/Vol] 137 mmol/L Normal     136 - 145  [facility]  
   
                                        Comment on above:   Performed By: #### P  
TINR ####  
[address] [address]   
   
                                                    Urea nitrogen   
[Mass/Vol]      4 mg/dL         Low             6 - 23          [facility]  
   
                                        Comment on above:   Performed By: #### P  
TINR ####  
[address] [address]   
   
                                                    Daily Progress Note-Medicine  
on 2022   
   
                                                    Daily Progress   
Note-Medicine                           Service: Medicine  
  
Review of Systems:  
Review of Systems:  
Constitutional:   
NEGATIVE: Fever, Chills  
  
ENMT: NEGATIVE: Nasal   
Discharge, Nasal   
Congestion, Ear Pain,   
Mouth Pain, Throat  
Pain  
  
Respiratory: NEGATIVE:   
Dry Cough, Productive   
Cough, Hemoptysis,   
Wheezing,  
Shortness of Breath  
  
Cardiac: NEGATIVE:   
Chest Pain, Dyspnea on   
Exertion, Orthopnea,   
Palpitations,  
Syncope  
  
Gastrointestinal:   
POSITIVE: Nausea,   
Vomiting, Abdominal   
Pain; NEGATIVE:  
Diarrhea, Constipation  
  
Genitourinary:   
NEGATIVE: Discharge,   
Dysuria, Flank Pain,   
Frequency, Hematuria  
  
Musculoskeletal:   
NEGATIVE: Decreased   
ROM, Pain, Swelling,   
Stiffness, Weakness  
  
Neurological: NEGATIVE:   
Dizziness, Confusion,   
Headache, Syncope  
  
Psychiatric: NEGATIVE:   
Mood Changes, Anxiety  
  
  
Subjective Data:  
DANNA SOARES   
is a 21 year old Female   
who is Hospital Day #   
3.  
  
Day 2 IV ciprofloxacin   
and metronidazole.  
  
Additional Information:  
Patient still having   
nausea and vomiting;   
she cannot keep some   
water down  
  
Attempted the upper GI   
this morning but   
vomited within 10   
minutes after taking  
the contrast  
  
Objective Data:  
  
Objective Information:  
T PRBPSpO2  
[id number]/9797%  
Date/Time 0:001   
0:001 0:001   
0:001 0:00  
Range(37.1C - 37.6C )   
(93 - 101 ) (16 - 16 )   
(130 - 133 )/ (97 - 97   
)  
(97% - 97% )  
Highest temp of 37.6 C   
was recorded at    
16:13  
  
  
Pain reported at    
8:17: 1 = Mild  
  
Physical Exam by   
System:  
  
Constitutional: Awake   
and alert; oriented x3   
with no apparent   
distress or  
respiratory distress  
Head/Neck: Neck supple   
with no palpable   
lymphadenopathy, bruits   
or masses;  
trachea midline  
Respiratory/Thorax:   
Clear to auscultation   
bilaterally no wheezes   
or rhonchi  
noted  
Cardiovascular: Regular   
rate and rhythm; normal   
S1-S2 with no murmur;   
no  
pitting edema and 2+   
pulses bilaterally  
Gastrointestinal: Soft,   
nondistended, positive   
bowel sounds; there is   
some mild  
tenderness with minimal   
palpation left lower   
quadrant with some   
guarding but no  
rebound  
Neurological: Nonfocal;   
cranial nerves II   
through XII appear   
intact  
Psychological: Pleasant   
affect  
  
Recent Lab Results:  
  
Results:  
  
  
I have reviewed these   
laboratory results:  
  
Complete Blood Count +   
Differential   
2022 10:29:00  
  
ResultValue  
White Blood Cell Count   
9.3  
Nucleated Erythrocyte   
Count 0.1  
Red Blood Cell Count   
4.53  
HGB 12.8  
HCT 39.2  
MCV 87  
MCHC 32.7  
  
RDW-CV 14.8 H  
Neutrophil % 66.2  
Lymphocyte % 20.3  
Monocyte % 9.5  
Eosinophil % 2.6  
Basophil % 1.4  
Neutrophil Count 6.20  
Lymphocyte Count 1.90  
Monocyte Count 0.90  
Eosinophil Count 0.20  
Basophil Count 0.10  
  
Basic Metabolic Panel   
2022 10:29:00  
  
ResultValue  
Glucose, Serum 74  
  
K 3.6  
  
Bicarbonate, Serum 17 L  
Anion Gap, Serum 18  
BUN 7  
CREAT 0.51  
GFR Female >90  
Calcium, Serum 9.1  
  
Drug Screen, Urine   
2022 10:19:00  
  
ResultValue  
Comments. SEE BELOW   
Drug screen results are   
presumptive and should   
not be used  
to assess compliance   
with prescribed   
medication. Contact the   
performing [facility]  
laboratory to add-on   
definitive confirmatory   
testing if clinically   
indicated.  
.Toxicology scre  
Amphetamine Screen,   
Urine PRESUMPTIVE   
NEGATIVE CUTOFF LEVEL:   
500 NG/ML  
Cross-reactivity has   
been reported with high   
concentrations of the   
following  
drugs: buproprion,   
chloroquine,   
chlorpromazine,   
ephedrine,   
mephentermine,  
fenfluramine,   
phentermine,   
phenylpropanolamine  
Barbiturate Screen,   
Urine PRESUMPTIVE   
NEGATIVE PRESUMPTIVE   
NEGATIVE CUTOFF  
LEVEL: 200 NG/ML  
Benzodiazepine Screen,   
Urine PRESUMPTIVE   
NEGATIVE PRESUMPTIVE   
NEGATIVE CUTOFF  
LEVEL: 200 NG/ML  
Cannabinoid Screen,   
Urine PRESUMPTIVE   
POSITIVE PRESUMPTIVE   
POSITIVE CUTOFF  
LEVEL: 50 NG/ML A  
Cocaine Metabolite   
Screen, Urine   
PRESUMPTIVE NEGATIVE   
PRESUMPTIVE NEGATIVE  
CUTOFF LEVEL: 150 NG/ML  
Fentanyl Screen, Urine   
PRESUMPTIVE NEGATIVE   
CUTOFF LEVEL: 1 NG/ML   
The  
performance   
characteristics of this   
test have been   
determined by the   
individual  
 laboratory site   
where testing is   
performed. This test   
has not been cleared  
or approved by the FDA;   
however,  
Methadone Screen, Urine   
PRESUMPTIVE NEGATIVE   
CUTOFF LEVEL: 150 NG/ML   
The  
metabolite   
L-alpha-acetylmethadol   
(LAAM) is not detected   
by this method in  
concentrations that   
would be found in the   
urine of patients on   
LAAM therapy.  
Opiate Screen, Urine   
PRESUMPTIVE POSITIVE   
CUTOFF LEVEL: 300 NG/ML   
The opiate  
screen does not detect   
fentanyl, meperidine,   
or tramadol. Oxycodone   
is not  
consistently detected   
(refer to Oxycodone   
Screen, Urine result).   
A  
Oxycodone Screen, Urine   
(item) PRESUMPTIVE   
NEGATIVE CUTOFF LEVEL:   
100 NG/ML  
This test will   
accurately detect both   
oxycodone and   
oxymorphone.  
PCP Screen, Urine   
PRESUMPTIVE NEGATIVE   
CUTOFF LEVEL: 25 NG/ML  
Cross-reactivity has   
been reported with   
dextromethorphan.  
  
  
Assessment and Plan:  
Additional Dx:  
Left sided colitis:   
Entered Date:   
2022 12:07  
Cannabinoid hyperemesis   
(more content not   
included)...        [facility]  
   
                                                    GASTRIC EMPTY SOLID ONLYon 0  
2022   
   
                                                    GASTRIC EMPTY SOLID   
ONLY                                    MRN: [id number]  
Patient Name:   
DANNA SOARES  
  
STUDY:  
GASTRIC EMPTY SOLID   
ONLY; 2022 6:35 am  
  
INDICATION:  
vomiting .  
  
COMPARISON:  
None.  
  
ACCESSION NUMBER(S):  
[id number]  
  
ORDERING CLINICIAN:  
SAL MCDERMOTT  
  
TECHNIQUE:  
DIVISION OF NUCLEAR   
MEDICINE  
GASTRIC EMPTYING   
QUANTIFICATION  
  
The patient received an   
oral radiolabeled   
solid-phase meal   
utilizing  
1.2 mCi of Tc-99m   
sulfur colloid in   
cooked egg. Sequential   
anterior  
and posterior images of   
the abdomen were then   
acquired. 10 minutes  
after eating, the   
patient vomited and the   
study was discontinued.  
  
FINDINGS:  
The image series shows   
normal filling of the   
stomach in the  
immediate images. An   
image was taken after   
emesis which showed  
retained radiotracer   
material within the   
stomach.  
  
IMPRESSION  
Nondiagnostic gastric   
emptying study due to   
emesis.  
  
I personally reviewed   
the images/study and I   
agree with the findings  
as stated. This study   
was interpreted at   
[facility], [place], [place].  
Electronically signed   
by: SUMIT WETZEL MD           Normal                                  [facility]  
   
                                                    Admission Risk Screen - Adul  
ton 2022   
   
                                                    Admission Risk Screen -   
Adult                                   Allergies:  
Allergies:  
MiraLax:   
Hives/Urticaria  
codeine: Unknown  
  
Patient Verification:  
New W ID Band Applied   
in my Departmentyes  
Patient Identity   
Verified Bypatient  
ID Band FULL Name,   
include Middle,   
spelling matches   
patient's ID used for  
verificationyes  
ID Band  Matches   
Patient ID used for   
Verficationyes  
ID Band MRN Matches EMR   
MRNyes  
  
Visitor Restriction:  
Coronavirus Visitor   
Restriction: Reasonable   
restrictions to   
in-person  
visitors will be   
observed due to current   
coronavirus pandemic.  
  
Travel History:  
COVID-19 Screening   
Completedno exposure or   
symptoms(1)  
Travel or Exposure Past   
30 DaysNO travel to   
International locations   
in the  
past 30 days  
Ebola AlertFor   
Ebola-like Symptoms:   
Isolate Patient and   
Notify  
Provider/Infection   
Preventionist  
  
For Contact: Notify   
Provider/Infection   
Preventionist  
  
Advance Directive:  
Advance Directive/DNRno  
Advance Directive   
Information   
Givenpatient/family   
declined  
  
[place] Fall Screen:  
History of falling   
(immediate or   
previous)no (0)  
Secondary Diagnosisno   
(0)  
Intravenous Therapy/   
Heparin/Saline Lockyes   
(20)  
Gait/Transferringnormal  
/bedrest/wheelchair (0)  
Ambulatory   
Aidsnone/bedrest/nurse   
assist (0)  
Mental Statusoriented   
to own ability (0)  
Score: Low risk (<25).   
Moderate risk (25-44).   
High risk (>44).20  
[place]ila InterventionsLOW   
INTERVENTIONS:  
*patient oriented to   
surroundings and call   
system,  
* patient/family falls   
education completed  
and documented,  
*patients fall status   
communicated  
during bedside handoff,  
*whiteboard updated,  
*mode of toileting   
discussed with patient,  
*bed in low position   
with brakes locked,  
*call light in reach,  
* non-skid footwear  
  
  
Family Violence Screen:  
Are you or have you   
been threatened or   
abused physically,   
emotionally, or  
sexually by anyoneno  
Has anyone ever   
threatened to hurt your   
family or your petsno  
Does anyone try to keep   
you from   
having/contacting other   
friends or doing  
things outside your   
homeno  
Do you feel UNSAFE   
going back to the place   
where you are livingno  
Do you feel anyone has   
exploited or taken   
advantage of you   
financially or of  
your personal   
propertyno  
Clinical assessment:   
Are there any apparent   
signs of   
injuries/behaviors that  
could be related to   
abuse/neglectno  
Social Service Consult   
for abuse/neglect   
needed this visitno  
  
Functional Screen:  
Functional Screen: In   
the recent/past 2-4   
weeks, patient or   
family have  
noticedno issues that   
require a   
speech/language consult   
at this time  
  
AM-PAC- Basic   
Mobility/Daily   
Activity:  
Patient baseline   
bedboundno  
Turning from your back   
to your side while in a   
flat bed without using  
bedrailsnone  
Moving from lying on   
your back to sitting on   
the side of a flat bed   
without  
using bedrailsnone  
Moving to and from bed   
to chair (including a   
wheelchair)none  
Standing up from a   
chair using your arms   
(e.g. wheelchair or   
bedside chair)  
none  
To walk in hospital   
roomnone  
Climbing 3-5 steps with   
railingnone  
Basic Mobility - Total   
Score24  
Putting on and taking   
off regular lower body   
clothingnone  
Bathing (including   
washing, rinsing,   
drying)none  
Putting on and taking   
off regular upper body   
clothingnone  
Toileting, which   
includes using toilet,   
bedpan or urinalnone  
Taking care of personal   
grooming such as   
brushing teethnone  
Eating Mealsnone  
Daily Activity - Total   
Score24  
  
Learning Assessment   
(Patient):  
Patient is Able to be   
Assessed for   
Learningyes  
Factors Influencing   
Readiness to   
Learnfatigue  
Factors that Impact   
Ability to Learnnone  
Devices/Methods Used to   
Communicatenone  
Learning   
Preferenceswritten   
material; video; verbal   
instruction; individual  
instruction  
Cultural   
Considerationsnone  
Developmental   
Considerationsnone  
Temple   
Considerationsnone  
Other   
Learnerssignificant   
other  
  
Learning Assessment   
(Other Learner):  
Other learner   
availableno  
  
Depression Screen:  
During the past month,   
have you often been   
bothered by feeling   
down,  
depressed or hopelessno  
During the past month,   
have you often had   
little interest or   
pleasure in  
doing thingsno  
Have you had any   
thoughts of harming   
anyone elseno (1)  
  
Grandin Suicide:  
Risk Screen Not   
Applicable/Able to   
Answerable to be   
screened  
In the Past Month: Have   
you wished you were   
dead or could go to   
sleep and not  
wake upno(1)  
In the Past Month: Have   
you had any actual   
thoughts of killing   
yourself  
no(1)  
Lifetime: Have you ever   
done, started to do, or   
prepared to do anything   
to  
end your lifeno  
Grandin Suicide   
Risknegative  
  
Adult Nutrition Screen:  
Have you recently lost   
weight without tryingno  
Have you been eating   
poorly because of a   
decreased appetiteyes  
Malnutrition Screening   
Tool Score1  
Malnutrition Screening   
Tool RiskMST = 0 or 1   
Not at risk. Eating   
well with  
little or no weight   
loss  
Nutrition Consult   
needed this visitno  
Can Patient Participate   
in Room Serviceno  
Patient requires Paper   
Dishes/Plastic   
Utensilsno  
CommentsNPO FOR NOW  
  
Pain Screen:  
Joel (more content not   
included)...        Normal                                  [facility]  
   
                                                    BASIC METABOLIC PANELon    
   
                      Anion gap [Moles/Vol] 18 mmol/L  Normal     10 - 20    [facility]  
   
                                        Comment on above:   Performed By: #### B  
MP ####[address] [address]   
   
                      Calcium [Mass/Vol] 9.1 mg/dL  Normal     8.6 - 10.3 [facility]  
   
                                        Comment on above:   Performed By: #### B  
MP ####[address] [address]   
   
                      Creatinine [Mass/Vol] 0.51 mg/dL Normal     0.50 - 1.05 [facility]  
   
                                        Comment on above:   Performed By: #### B  
MP ####[address] [address]   
   
                      eGFR FEMALE >90        Normal     >90        [facility]  
   
                                        Comment on above:   Result Comment: CALC  
ULATIONS OF ESTIMATED GFR ARE PERFORMED  
USING THE  CKD-EPI STUDY REFIT EQUATION  
WITHOUT THE RACE VARIABLE FOR THE IDMS-TRACEABLE  
CREATININE METHODS.  
https://jasn.asnjournals.org/content/early//ASN.  
086530   
   
                                                            Performed By: #### B  
MP ####[address] [address]   
   
                      Glucose [Mass/Vol] 74 mg/dL   Normal     74 - 99    [facility]  
   
                                        Comment on above:   Performed By: #### B  
MP ####[address] [address]   
   
                      HCO3 (Bld) [Moles/Vol] 17 mmol/L  Low        21 - 32    [facility]  
   
                                        Comment on above:   Performed By: #### B  
MP ####[address] [address]   
   
                      Potassium [Moles/Vol] 3.6 mmol/L Normal     3.5 - 5.3  [facility]  
   
                                        Comment on above:   Result Comment: MILD  
 HEMOLYSIS DETECTED. The result may be   
falsely elevated due to  
hemolysis or other interferents. Clinical correlation is   
recommended.  
Repeat testing may be considered.   
   
                                                            Performed By: #### B  
MP ####[place] [address]   
   
                      Sodium [Moles/Vol] 136 mmol/L Normal     136 - 145  [facility]  
   
                                        Comment on above:   Performed By: #### B  
MP ####[place] [address]   
   
                                                    Urea nitrogen   
[Mass/Vol]      7 mg/dL         Normal          6 - 23          [facility]  
   
                                        Comment on above:   Performed By: #### B  
MP ####[place] [address]   
   
                      Chloride [Moles/Vol] 105 mmol/L Normal     98 - 107   [facility]  
   
                                        Comment on above:   Performed By: #### B  
MP ####[place] [address]   
   
                                                            Performed By: #### E  
LECT ####[place] [address]   
   
                                                    CBC AND DIFFERENTIALon    
   
                      Basophils (Bld) [#/Vol] 0.10 10*3/uL Normal     0.00 - 0.1  
0 [facility]  
   
                                        Comment on above:   Performed By: #### C  
BCDF ####[place] [id number]   
   
                      Basophils/100 WBC (Bld) 1.4 %      Normal     0.0 - 2.0  S  
[facility]  
   
                                        Comment on above:   Performed By: #### C  
BCDF ####[place] [id number]   
   
                                                    Eosinophils (Bld)   
[#/Vol]         0.20 10*3/uL    Normal          0.00 - 0.70     [facility]  
   
                                        Comment on above:   Performed By: #### C  
BCDF ####[place] [id number]   
   
                                                    Eosinophils/100 WBC   
(Bld)           2.6 %           Normal          0.0 - 6.0       [facility]  
   
                                        Comment on above:   Performed By: #### C  
BCDF ####[place] [address]   
   
                                                    Erythrocyte   
distribution width   
(RBC) [Ratio]   14.8 %          High            11.5 - 14.5     [facility]  
   
                                        Comment on above:   Performed By: #### C  
BCDF ####[address] [address]   
   
                                                    Hematocrit (Bld)   
[Volume fraction] 39.2 %          Normal          36.0 - 46.0     [facility]  
   
                                        Comment on above:   Performed By: #### C  
BCDF ####[address] [address]   
   
                                                    Hemoglobin (Bld)   
[Mass/Vol]      12.8 g/dL       Normal          12.0 - 16.0     [facility]  
   
                                        Comment on above:   Performed By: #### C  
BCDF ####[address] [address]   
   
                                                    Lymphocytes (Bld)   
[#/Vol]         1.90 10*3/uL    Normal          1.20 - 4.80     [facility]  
   
                                        Comment on above:   Performed By: #### C  
BCDF ####[address] [address]   
   
                                                    Lymphocytes/100 WBC   
(Bld)           20.3 %          Normal          13.0 - 44.0     [facility]  
   
                                        Comment on above:   Performed By: #### C  
BCDF ####[address] [address]   
   
                      MCHC (RBC) [Mass/Vol] 32.7 g/dL  Normal     32.0 - 36.0 [facility]  
   
                                        Comment on above:   Performed By: #### C  
BCDF ####[address] [address]   
   
                      MCV (RBC) [Entitic vol] 87 fL      Normal     80 - 100   S  
[facility]  
   
                                        Comment on above:   Performed By: #### C  
BCDF ####[address] [address]   
   
                      Monocytes (Bld) [#/Vol] 0.90 10*3/uL Normal     0.10 - 1.0  
0 [facility]  
   
                                        Comment on above:   Performed By: #### C  
BCDF ####[address] [address]   
   
                      Monocytes/100 WBC (Bld) 9.5 %      Normal     2.0 - 10.0 S  
[facility]  
   
                                        Comment on above:   Performed By: #### C  
BCDF ####[address] [address]   
   
                                                    Neutrophils (Bld)   
[#/Vol]         6.20 10*3/uL    Normal          1.20 - 7.70     [facility]  
   
                                        Comment on above:   Result Comment: Perc  
ent differential counts (%) should be   
interpreted in the  
context of the absolute cell counts (cells/L).   
   
                                                            Performed By: #### C  
BCDF ####[address] [address]   
   
                                                    Neutrophils/100 WBC   
(Bld)           66.2 %          Normal          40.0 - 80.0     [facility]  
   
                                        Comment on above:   Performed By: #### C  
BCDF ####[address] [address]   
   
                      NUCLEATED RBC 0.1 /100 WBC Normal                [facility]  
   
                                        Comment on above:   Performed By: #### C  
BCDF ####[address] [address]   
   
                      Platelets (Bld) [#/Vol] 275 10*3/uL Normal     150 - 450    
[facility]  
   
                                        Comment on above:   Performed By: #### C  
BCDF ####[address] [address]   
   
                      RBC        4.53 x10E12/L Normal     4.00 - 5.20 [facility]  
   
                                        Comment on above:   Performed By: #### C  
BCDF ####[address] [address]   
   
                      WBC (Bld) [#/Vol] 9.3 10*3/uL Normal     4.4 - 11.3 [facility]  
   
                                        Comment on above:   Performed By: #### C  
BCDF ####[address] [address]   
   
                      Basophils (Bld) [#/Vol] 0.10 10*3/uL Normal     0.00 - 0.1  
0 [facility]  
   
                                        Comment on above:   Performed By: #### P  
TINR ####  
[address] [address]   
   
                      Basophils/100 WBC (Bld) 1.5 %      Normal     0.0 - 2.0  S  
[facility]  
   
                                        Comment on above:   Performed By: #### P  
TINR ####  
[address] [address]   
   
                                                    Eosinophils (Bld)   
[#/Vol]         0.20 10*3/uL    Normal          0.00 - 0.70     [facility]  
   
                                        Comment on above:   Performed By: #### P  
TINR ####  
[address] [address]   
   
                                                    Eosinophils/100 WBC   
(Bld)           2.1 %           Normal          0.0 - 6.0       [facility]  
   
                                        Comment on above:   Performed By: #### P  
TINR ####  
[address] [address]   
   
                                                    Erythrocyte   
distribution width   
(RBC) [Ratio]   14.5 %          Normal          11.5 - 14.5     [facility]  
   
                                        Comment on above:   Performed By: #### P  
TINR ####  
[address] [address]   
   
                                                    Hematocrit (Bld)   
[Volume fraction] 40.2 %          Normal          36.0 - 46.0     [facility]  
   
                                        Comment on above:   Performed By: #### P  
TINR ####  
[address] [address]   
   
                                                    Hemoglobin (Bld)   
[Mass/Vol]      13.4 g/dL       Normal          12.0 - 16.0     [facility]  
   
                                        Comment on above:   Performed By: #### P  
TINR ####  
[address] [address]   
   
                                                    Lymphocytes (Bld)   
[#/Vol]         1.30 10*3/uL    Normal          1.20 - 4.80     [facility]  
   
                                        Comment on above:   Performed By: #### P  
TINR ####  
[address] [address]   
   
                                                    Lymphocytes/100 WBC   
(Bld)           16.6 %          Normal          13.0 - 44.0     [facility]  
   
                                        Comment on above:   Performed By: #### P  
TINR ####  
[address] [address]   
   
                      MCHC (RBC) [Mass/Vol] 33.4 g/dL  Normal     32.0 - 36.0 [facility]  
   
                                        Comment on above:   Performed By: #### P  
TINR ####  
[address] [address]   
   
                      MCV (RBC) [Entitic vol] 86 fL      Normal     80 - 100   S  
[facility]  
   
                                        Comment on above:   Performed By: #### P  
TINR ####  
[address] [address]   
   
                      Monocytes (Bld) [#/Vol] 0.80 10*3/uL Normal     0.10 - 1.0  
0 [facility]  
   
                                        Comment on above:   Performed By: #### P  
TINR ####  
[address] [address]   
   
                      Monocytes/100 WBC (Bld) 9.9 %      Normal     2.0 - 10.0 S  
[facility]  
   
                                        Comment on above:   Performed By: #### P  
TINR ####  
[address] [address]   
   
                                                    Neutrophils (Bld)   
[#/Vol]         5.60 10*3/uL    Normal          1.20 - 7.70     [facility]  
   
                                        Comment on above:   Result Comment: Perc  
ent differential counts (%) should be   
interpreted in the  
context of the absolute cell counts (cells/L).   
   
                                                            Performed By: #### P  
TINR ####  
[address] [address]   
   
                                                    Neutrophils/100 WBC   
(Bld)           69.9 %          Normal          40.0 - 80.0     [facility]  
   
                                        Comment on above:   Performed By: #### P  
TINR ####  
[address] [address]   
   
                      Platelets (Bld) [#/Vol] 272 10*3/uL Normal     150 - 450    
[facility]  
   
                                        Comment on above:   Performed By: #### P  
TINR ####  
[address] [address]   
   
                      RBC        4.69 x10E12/L Normal     4.00 - 5.20 [facility]  
   
                                        Comment on above:   Performed By: #### P  
TINR ####  
[address] [address]   
   
                      WBC (Bld) [#/Vol] 8.1 10*3/uL Normal     4.4 - 11.3 [facility]  
   
                                        Comment on above:   Performed By: #### P  
TINR ####  
[place] [id number]   
   
                                                    COMPREHENSIVE PANELon 2022   
   
                      Albumin [Mass/Vol] 4.1 g/dL   Normal     3.4 - 5.0  [facility]  
   
                                        Comment on above:   Performed By: #### C  
MP ####[place] [address]   
   
                                                    ALP [Catalytic   
activity/Vol]   39 U/L          Normal          33 - 110        [facility]  
   
                                        Comment on above:   Performed By: #### C  
MP ####[place] [id number]   
   
                                                    ALT [Catalytic   
activity/Vol]   9 U/L           Normal          7 - 45          [facility]  
   
                                        Comment on above:   Result Comment: Kaleigh  
ents treated with Sulfasalazine may   
generate  
falsely decreased results for ALT.   
   
                                                            Performed By: #### C  
MP ####[place] [address]   
   
                      Anion gap [Moles/Vol] 18 mmol/L  Normal     10 - 20    [facility]  
   
                                        Comment on above:   Performed By: #### C  
MP ####[place] [id number]   
   
                                                    AST [Catalytic   
activity/Vol]   13 U/L          Normal          9 - 39          [facility]  
   
                                        Comment on above:   Performed By: #### C  
MP ####[address] [address]   
   
                      Bilirubin [Mass/Vol] 0.5 mg/dL  Normal     0.0 - 1.2  [facility]  
   
                                        Comment on above:   Performed By: #### C  
MP ####[address] [address]   
   
                      Calcium [Mass/Vol] 8.8 mg/dL  Normal     8.6 - 10.3 [facility]  
   
                                        Comment on above:   Performed By: #### C  
MP ####[place] [id number]   
   
                      Chloride [Moles/Vol] 100 mmol/L Normal     98 - 107   [facility]  
   
                                        Comment on above:   Performed By: #### C  
MP ####[address] [address]   
   
                      Creatinine [Mass/Vol] 0.56 mg/dL Normal     0.50 - 1.05 [facility]  
   
                                        Comment on above:   Performed By: #### C  
MP ####[address] [address]   
   
                      eGFR FEMALE >90        Normal     >90        [facility]  
   
                                        Comment on above:   Result Comment: CALC  
ULATIONS OF ESTIMATED GFR ARE PERFORMED  
USING THE  CKD-EPI STUDY REFIT EQUATION  
WITHOUT THE RACE VARIABLE FOR THE IDMS-TRACEABLE  
CREATININE METHODS.  
https://jasn.asnjournals.org/content/early/ASN.  
995634   
   
                                                            Performed By: #### C  
MP ####[address] [address]   
   
                      Glucose [Mass/Vol] 86 mg/dL   Normal     74 - 99    [facility]  
   
                                        Comment on above:   Performed By: #### C  
MP ####[address] [address]   
   
                      HCO3 (Bld) [Moles/Vol] 22 mmol/L  Normal     21 - 32    [facility]  
   
                                        Comment on above:   Performed By: #### C  
MP ####[address] [address]   
   
                      Potassium [Moles/Vol] 3.1 mmol/L Low        3.5 - 5.3  [facility]  
   
                                        Comment on above:   Performed By: #### C  
MP ####[address] [address]   
   
                      Protein [Mass/Vol] 6.4 g/dL   Normal     6.4 - 8.2  [facility]  
   
                                        Comment on above:   Performed By: #### C  
MP ####[address] [address]   
   
                      Sodium [Moles/Vol] 137 mmol/L Normal     136 - 145  [facility]  
   
                                        Comment on above:   Performed By: #### C  
MP ####[address] [address]   
   
                                                    Urea nitrogen   
[Mass/Vol]      6 mg/dL         Normal          6 - 23          [facility]  
   
                                        Comment on above:   Performed By: #### C  
MP ####[place] [address]   
   
                                                    CORONAVIRUS 2019 BY PCRon    
   
                                                    SARS-CoV-2 (COVID-19)   
RNA KIRSTEN+probe Ql (Unsp   
spec)           Not detected    Normal          Not Detected    [facility]  
   
                                        Comment on above:   Result Comment: .  
This test has received FDA Emergency Use Authorization (EUA)   
and has been  
verified by [facility].   
This test is only  
authorized for the duration of time that circumstances exist   
to justify the  
authorization of the emergency use of in vitro diagnostic   
tests for the  
detection of SARS-CoV-2 virus and/or diagnosis of COVID-19   
infection under  
section 564(b)(1) of the Act, 21 U.S.C. 360bbb-3(b)(1), unless   
the  
authorization is terminated or revoked sooner.  
[facility] is certified   
under CLIA-88 as  
qualified to perform high complexity testing. Testing is   
performed in the  
[facility] laboratory located at [address] [place] [address].  
SARS-CoV-2/Flu/RSV Multiplex Test:  
Fact sheet for providers:   
https://www.fda.gov/media/190989/download  
Fact sheet for patients:   
https://www.fda.gov/media/081520/download   
   
                                                            Performed By: #### C  
OV19 ####[place] [address]   
   
                      Lab Specimen Source Nasal, Nasopharyngeal Normal            
      [facility]  
   
                                        Comment on above:   Performed By: #### C  
OV19 ####[place] [address]   
   
                                                    CT HEAD WO CONTRASTon 2022   
   
                                        CT HEAD WO CONTRAST MRN: [id number]  
Patient Name:   
DANNA SOARES  
  
STUDY:  
CT HEAD WO CONTRAST;   
2022 12:15 am  
  
INDICATION:  
head injury .  
  
COMPARISON:  
None.  
  
ACCESSION NUMBER(S):  
[id number]  
  
ORDERING CLINICIAN:  
IVAN COLEY  
  
TECHNIQUE:  
Noncontrast axial CT   
scan of head was   
performed. Angled   
reformats in  
brain and bone windows   
were generated. The   
images were reviewed in  
bone, brain, blood and   
soft tissue windows.  
  
FINDINGS:  
CSF Spaces: The   
ventricles, sulci and   
basal cisterns are   
within  
normal limits. There is   
no extraaxial fluid   
collection.  
  
Parenchyma: The   
grey-white   
differentiation is   
intact. There is no  
mass effect or midline   
shift. There is no   
intracranial   
hemorrhage.  
  
Calvarium: The   
calvarium is   
unremarkable.  
  
Paranasal sinuses and   
mastoids: Visualized   
paranasal sinuses and  
mastoids are clear.  
  
IMPRESSION:  
No evidence of acute   
intracranial   
abnormality.  
Electronically signed   
by: KENZIE HINOJOSA MD           [facility]  
   
                                                    Covid 19 Resultson   
2   
   
                                                    SARS-CoV-2 (COVID-19)   
RNA KIRSTEN+probe Ql (Unsp   
spec)                                   NEGATIVE COVID-19 Test  
  
Coronaviruses are   
common world-wide and   
are the cause of many   
common colds.  
SARS-COV2 is a new   
coronavirus that began   
circulating worldwide   
in 2019 so we  
are calling it   
COVID-19. It has been   
estimated that four out   
of five patients  
with COVID-19 will   
recover at home without   
the need for medical   
attention.  
Symptoms of COVID-19   
may include cough,   
fever, shortness of   
breath, loss of  
taste or smell and   
other flu-like symptoms   
including chills, sore   
muscles, sore  
throat, and headache.   
Severe illness is more   
common in older people   
and people  
with other health   
problems such as high   
blood pressure,   
obesity, and immune  
system problems.  
  
If the test is   
positive, you have   
COVID-19. You will be   
contacted by the  
ordering physicians   
office and instructed   
to remain on home   
isolation, in  
accordance with CDC   
guidelines. You may   
also be contacted by   
the [facility] to   
see if any of your   
close contacts may have   
been exposed  
to the virus and need   
to quarantine.  
  
If the test is   
negative, you likely do   
not have COVID-19 at   
this time, but you  
still may have a   
different illness that   
can spread to other   
people (like  
Influenza, or the Flu)   
and could still be at   
risk for getting   
COVID-19. We  
recommend that you stay   
away from other people   
to limit the spread of   
illness  
until your symptoms are   
improving and you are   
fever-free for 24 hours   
without  
the use of fever   
lowering medications   
such as acetaminophen   
or ibuprofen. No  
test is 100% accurate   
so if you are still   
concerned you may have   
COVID-19, talk  
to your doctor about   
the need to continue to   
stay away from others.  
  
Medicines  
  
Unless your provider   
told you not to use the   
following:   
Acetaminophen (Tylenol  
and others) is   
generally safe.   
Anti-inflammatory   
medications, such as   
Ibuprofen  
(Advil or Motrin) or   
Naproxen (Aleve) can   
also be used.   
Over-the-counter  
cough and cold   
medicines can be used   
according to the   
instructions on the  
package. Some   
over-the-counter   
medicines also contain   
acetaminophen. Make   
sure  
you are not taking more   
than your recommended   
dose. For those not   
hospitalized,  
there is no specific   
treatment available for   
this illness.   
Antibiotics do not  
treat Coronaviruses.  
  
Follow-Up  
  
Follow up with your   
doctor by scheduling a   
virtual visit or   
consider follow-up  
at one of our urgent   
care fever clinics. If   
you are having   
difficulty  
breathing, or are very   
weak and having   
difficulty standing,   
this is a medical  
emergency. Call 911 or   
have someone take you   
to the nearest   
emergency room  
immediately. If   
possible, wear a   
facemask.  
  
Additional guidance   
from the CDC for   
patients who tested   
POSITIVE for COVID-19  
  
How to isolate:  
Isolate yourself in a   
specific room at home   
and limit your contact   
with others.  
Use a separate bathroom   
from other members of   
the household, when   
possible.  
Leave home only to get   
essential medical care.   
Do not go to work,   
school or  
public areas.  
Avoid using public   
transportation,   
ride-sharing, or taxis.  
Restrict contact with   
pets and other animals.   
If you must care for   
your pet or  
be around animals while   
you are sick, wash your   
hands before and after   
your  
interaction and wear a   
facemask.  
Make sure that shared   
spaces in the home have   
good airflow, such as   
by an air  
conditioner or an   
opened window, weather   
permitting.  
  
Personal Hygiene   
Procedures:  
Wear a face mask when   
in the same room as   
other people or pets.   
If a face mask  
interferes with your   
breathing, others   
should wear a mask when   
sharing space  
with you.  
Frequent hand-washing:   
wash your hands with   
soap and water for at   
least 20  
seconds. If soap and   
water are not   
available, use   
alcohol-based hand   
.  
Avoid touching your   
eyes, nose, and mouth   
with unwashed hands.  
  
Household Hygiene   
Procedures:  
Avoid sharing personal   
household items such as   
dishes, glassware,   
cups, eating  
utensils, towels or   
bedding with other   
people or pets in your   
home. After use,  
these items should be   
washed with soap and   
hot water.  
Disinfect all   
high-touch surfaces   
every day with   
antibacterial cleaning  
solutions such as Lysol   
wipes, bleach,   
cleansers, etc.   
High-touch surfaces  
include tabletops,   
doorknobs, bathroom   
fixtures, toilets,   
phones, keyboards,  
tablets and bedside   
tables.  
Immediately clean any   
surfaces that may have   
blood, poop or body   
fluids on  
them, using   
antibacterial cleaning   
solutions such as Lysol   
wipes, bleach,  
cleansers, etc.  
If clothing or bedding   
come into contact with   
blood, poop or body   
fluids, they  
should be washed   
immediately. Follow the   
directions on the   
laundry detergent  
and clothing labels but   
hot water is   
recommended when   
possible.  
  
Stopping home isolation   
precautions:  
If possible, consult   
your doctor before   
stopping home isolation   
precautions.  
According to the CDC,   
you can discontinue   
home isolation   
precautions when you  
have met both of these   
criteria:  
Your fever and   
respiratory symptoms   
have been gone for 24   
[place] (more content not   
included)...        Normal                                  [facility]  
   
                                                    DRUG SCREEN,URINEon 20  
22   
   
                      AMPHETAMINE SCREEN,U Negative   Normal     NEGATIVE   [facility]  
   
                                        Comment on above:   Result Comment: CUTO  
FF LEVEL: 500 NG/ML  
Cross-reactivity has been reported with high concentrations  
of the following drugs: buproprion, chloroquine,   
chlorpromazine,  
ephedrine, mephentermine, fenfluramine, phentermine,  
phenylpropanolamine, pseudoephedrine, and propranolol.   
   
                                                            Performed By: #### D  
RUG3 ####[place] [address]   
   
                      BARBITURATES SCREEN,U Negative   Normal     NEGATIVE   [facility]  
   
                                        Comment on above:   Result Comment: CUTO  
FF LEVEL: 200 NG/ML   
   
                                                            Performed By: #### D  
RUG3 ####[place] [address]   
   
                                                    BENZODIAZEPINES   
SCREEN,U        Negative        Normal          NEGATIVE        [facility]  
   
                                        Comment on above:   Result Comment: CUTO  
FF LEVEL: 200 NG/ML   
   
                                                            Performed By: #### D  
RUG3 ####[place] [address]   
   
                      CANNABINOIDS SCREEN,U Positive   Abnormal   NEGATIVE   [facility]  
   
                                        Comment on above:   Result Comment: CUTO  
FF LEVEL: 50 NG/ML   
   
                                                            Performed By: #### D  
RUG3 ####[place] [address]   
   
                                                    COCAINE METABOLITE   
SCREEN,U        Negative        Normal          NEGATIVE        [facility]  
   
                                        Comment on above:   Result Comment: CUTO  
FF LEVEL: 150 NG/ML   
   
                                                            Performed By: #### D  
RUG3 ####[place] [address]   
   
                      DRUG SCREEN COMMENT SEE BELOW  Normal                [facility]  
   
                                        Comment on above:   Result Comment: Drug  
 screen results are presumptive and should  
   
not be used to assess  
compliance with prescribed medication. Contact the performing   
[facility]  
laboratory to add-on definitive confirmatory testing if   
clinically  
indicated.  
.  
Toxicology screening results are reported qualitatively. The   
concentration  
must be greater than or equal to the cutoff to be reported as   
positive. The  
concentration at which the screening test can detect an   
individual drug or  
metabolite varies. The absence of expected drug(s) and/or drug   
metabolite(s)  
may indicate non-compliance, inappropriate timing of specimen   
collection  
relative to drug administration, poor drug absorption,   
diluted/adulterated  
urine, or limitations of testing. For medical purposes only;   
not valid for  
forensic use.  
.  
Interpretive questions should be directed to the laboratory   
medical  
directors.   
   
                                                            Performed By: #### D  
RUG3 ####[place] [address]   
   
                      FENTANYL SCREEN,URINE Negative   Normal     NEGATIVE   [facility]  
   
                                        Comment on above:   Result Comment: CUTO  
FF LEVEL: 1 NG/ML  
The performance characteristics of this  
test have been determined by the individual  
 laboratory site where testing is performed.  
This test has not been cleared or approved  
by the FDA; however, the FDA has determined  
that such clearance is not necessary.   
   
                                                            Performed By: #### D  
RUG3 ####[place] [address]   
   
                      METHADONE SCREEN,U Negative   Normal     NEGATIVE   [facility]  
   
                                        Comment on above:   Result Comment: CUTO  
FF LEVEL: 150 NG/ML  
The metabolite L-alpha-acetylmethadol (LAAM) is not  
detected by this method in concentrations that would  
be found in the urine of patients on LAAM therapy.   
   
                                                            Performed By: #### D  
RUG3 ####[place] [address]   
   
                      OPIATES SCREEN,U Positive   Abnormal   NEGATIVE   [facility]  
   
                                        Comment on above:   Result Comment: CUTO  
FF LEVEL: 300 NG/ML  
The opiate screen does not detect fentanyl, meperidine, or  
tramadol. Oxycodone is not consistently detected (refer to  
Oxycodone Screen, Urine result).   
   
                                                            Performed By: #### D  
RUG3 ####[address]   
[place] [address]   
   
                      OXYCODONE SCREEN,U Negative   Normal     NEGATIVE   [facility]  
   
                                        Comment on above:   Result Comment: CUTO  
FF LEVEL: 100 NG/ML  
This test will accurately detect both oxycodone and   
oxymorphone.   
   
                                                            Performed By: #### D  
RUG3 ####[address] [address]   
   
                      PCP SCREEN,U Negative   Normal     NEGATIVE   [facility]  
   
                                        Comment on above:   Result Comment: CUTO  
FF LEVEL: 25 NG/ML  
Cross-reactivity has been reported with dextromethorphan.   
   
                                                            Performed By: #### D  
RUG3 ####[place] [id number]   
   
                                                    Discharge Planning [id number] 0  
2022   
   
                                                    Discharge Planning   
Note2                                   Discharge Planning:  
Discharge Barriersnone  
Planned Dispositionhome  
Discharge   
Destinationhome  
Patient/Representative   
Stated Goalhome  
Anticipated Discharge   
[id number]  
  
Discharge Planning  
22 CHEPE STARK RN [facility]  
TRANSITIONAL CARE   
COORDINATOR  
I met with the pt,   
introduced myself, and   
explained my role.   
Reviewed  
demographics, phone   
number, insurance, and   
RX coverage. Pt lives   
with  
significant other and   
her children. The Phone   
# she provided to [facility]   
was  
[phone] . Pt   
denies the use of any   
assistance devices ie:   
walker, cane, or  
wheelchair. Pt is able   
to drive. Pt not   
utilizing any [facility]   
agencies. Pt is able  
to obtain home meds   
without difficulty and   
denies any questions.   
Pt states  
significant other will   
take home when   
discharged. She had no   
PCP she is  
uncomfortable at moment   
and will follow to   
provide option of PCP   
list.. Denies  
home oxygen use. Pt   
feels will have the   
help needs after   
discharge at home.  
Dary FAIR, RN,   
[facility]  
  
  
22 CHEPE VALENCIA  
  
Spoke with patient this   
AM and she is feeling   
better. The medical   
team is  
anticipating discharge   
in next 24 hrs as they   
have begun new   
medications trx  
for patient abdominal   
issues. plan is for   
home with no   
anticipated discharge  
needs. kb  
  
22 CHEPE VALENCIA  
patient advises that   
she willl not be   
discharged today as   
planned as she has  
begun experiencing   
nausea and vomiting   
despite medications to   
combat. kb  
  
Discharge Note:   
2022 1133   
Discharge instructions   
reviewed by K. Bryte RN  
with pt. Declines   
wheelchair, ambulates   
to private car   
accompanied by self,  
personal belongings   
taken by patient, no   
distress noted, no   
complaints voiced.  
Yvonne RN  
  
  
  
Assessment:  
Discharge Planning   
Assessment   
[id number]  
Primary Contact Name   
and NumberTERE MOONEY   
[phone](1)  
Lives Withsignificant   
other(1)  
Living   
Arrangementsmobile   
home(1)  
Stated Reason for   
AdmissionNAUSEA &   
VOMITING(1)  
Arrived Fromhome (1)  
Resource/Environmental   
Concernsnone(1)  
Anticipated Transition   
[place](1)  
Services Anticipated at   
Transitionnon(1)  
  
Nursing Checklist:  
Lines/Cathetersremoved/  
appropriate for next   
level of care  
Discharge Med Rec   
Reconciled with Miguel  
  
Discharge   
Documentation:  
Discharge/Transfer   
Date/[id number]   
11:33  
Discharged Accompanied   
Byfamily member  
Discharge   
Modeambulatory  
Transportation   
Methodprivate car  
Code StatusCode Status   
order at time of   
discharge: Full Code  
[place] DNR Form Sent with   
Patient and/or   
Familyn/a  
Valuables/Medications/B  
elongings Returnedyes  
Final DispositionHome  
  
  
  
Electronic Signatures:  
Dary Stark (CLIN   
COOR) (Signed   
2022 14:37)  
Authored: Discharge   
Planning  
Kourtney Toney (RN)   
(Signed 2022   
18:04)  
Authored: Discharge   
Planning, Discharge   
Documentation  
Tonie Hunter (RN) (Signed   
2022 16:05)  
Authored: Discharge   
Planning, Nursing   
Checklist, Discharge   
Documentation  
Remi Decker (SUPV)   
(Signed 2022   
05:33)  
Authored: Assessment  
  
  
Last Updated:   
2022 18:04 by   
Kourtney Toney (RN)  
  
References:  
1. Data Referenced From   
 Patient Profile -   
Adult v2  2022   
05:16               Normal                                  [facility]  
   
                                                    ELECTROLYTE PANELon 20  
22   
   
                      Anion gap [Moles/Vol] 17 mmol/L  Normal     10 -     [facility]  
   
                                        Comment on above:   Performed By: #### E  
LECT ####[facility]   
[place] [address]   
   
                      HCO3 (Bld) [Moles/Vol] 18 mmol/L  Low         -     [facility]  
   
                                        Comment on above:   Performed By: #### E  
LECT ####[address] [address]   
   
                      Potassium [Moles/Vol] 3.2 mmol/L Low        3.5 - 5.3  [facility]  
   
                                        Comment on above:   Performed By: #### E  
LECT ####[address] [address]   
   
                      Sodium [Moles/Vol] 137 mmol/L Normal     136 - 145  [facility]  
   
                                        Comment on above:   Performed By: #### E  
LECT ####[address] [address]   
   
                                                    HCG,URINEon 2022   
   
                                                    Beta HCG (pregnancy   
test) Ql (U)    Negative        Normal          Negative        [facility]  
   
                                        Comment on above:   Performed By: #### P  
HOS ####  
[address] [address]   
   
                                                    LIPASEon 2022   
   
                                                    Lipase [Catalytic   
activity/Vol]   37 U/L          Normal          9 - 82          [facility]  
   
                                        Comment on above:   Result Comment: [place]  
puncture immediately after or during the  
administration of Metamizole may lead to falsely  
low results. Testing should be performed immediately  
prior to Metamizole dosing.  
N-acetyl-p-benzoquinone imine (metabolite of Acetaminophen)  
will generate erroneously low results in samples for patients  
that have taken toxic doses of acetaminophen.   
   
                                                            Performed By: #### L  
IPAS ####[address] [address]   
   
                                                    MAGNESIUMon 2022   
   
                      Magnesium [Mass/Vol] 1.85 mg/dL Normal     1.60 - 2.40 [facility]  
   
                                        Comment on above:   Performed By: #### M  
G ####  
[address] [address]   
   
                                                    Order Reconciliationon    
   
                                        Order Reconciliation Page 1  
  
Admission   
Reconciliation Document  
  
  
Reconciliation Type: ED   
to Observation   
requested on behalf of   
Nicolas Connolly (Physician) done by   
Nicolas Connolly   
()  
  
ED to Observation -   
Reconciliation:   
2022 09:50 by:   
Nicolas Connolly  
()  
ED to Observation -   
AutoLinked: 2022   
09:50 by: Mohsen,   
Nicolas M (DO)  
  
Home   
MedicationsEnteredLast   
Dose TakenReconciled   
with current Order  
Reconciliation Comment/   
Additional Information  
Phenadoz 25 mg rectal   
suppository 1   
suppository(ies)   
rectally 3 times a day,   
As  
Needed -for nausea and   
vomiting   
[id number]   
PM Reviewed and  
Held  
promethazine 25 mg oral   
tablet 1 tab(s) orally   
3 times a day, As   
Needed -for  
nausea and vomiting   
[id number]   
UNKNOWN Reviewed and   
Held  
  
  
Additional Current   
Orders  
Metoclopramide   
Injectable (REGLAN)DOSE   
= 5 mg IntraVenous Push   
Every 8 Hours  
Technetium Tc 99m   
Sulfur Colloid -   
(Radiology Contrast)   
DOSE = 500 microCurie  
Oral Once           Normal                                  [facility]  
   
                                                    Patient Profile - Adult v2on  
 2022   
   
                                                    Patient Profile - Adult   
v2                                      Profile:  
Initial Info:  
How to be   
Addressed DANNA   
Spoken Language   
PreferredEnglish (1)  
Stated Reason for   
AdmissionNAUSEA &   
VOMITING  
Primary Contact Name   
and NumberTERE MOONEY   
[phone]  
Wants Family/Rep   
Notified of   
Admissionyes, primary   
contact  
Notify PCPnotify PCP  
Informed of Patient   
Visiting Rightsyes  
[place]  
Patient   
Belongingsremains with   
patient  
Patient Belongings   
Remaining with   
Patientclothing; cell   
phone/electronics;  
purse/wallet  
Medications Brought to   
Hospitalno  
  
General Health:  
Weight in kg51.3   
kilogram(s)(2)  
Weight in [id number]   
pound(s)  
Weight Methodactual   
(measured)  
Scale Typebed  
Height in cm149.8   
centimeter(s)  
Height in feet4 feet(2)  
Height in [id number]   
inch(es)(2)  
Height Methodstated  
BMI (kg/m2)22.86 square   
meter  
  
[facility] Based Care:  
How would you like to   
participate in your   
careJUST GET MY NAUSEA   
AND VOMITING  
BETTER  
What is the number one   
concern for you during   
this   
hospitalization KEEPING  
DOWN MY ANTIBIOTICS,   
FIRST TIME I HAVE EVER   
HAD COLITIS    
What is the most   
important thing we can   
do to support you   
during this  
hospitalization HELP MY   
NAUSEA   
Is there anything we   
need to know to best   
care for you NOT   
REALLY'  
  
Substance:  
Smoking Statusnever   
smoker  
Alcohol Usedenies  
Drug Usedenies  
  
Health Mgmt:  
Symptoms/Conditions   
[facility] at [facility];   
obstetric/gynecologic  
Are You Pregnantno (3)  
Are You Currently   
Breastfeedingno (3)  
OB/GYN   
Managementmanaged  
OB/GYN   
Symptoms/Conditions   
CommentMISCARRIAGE X 2  
Skin Managementmanaged  
Skin   
Symptoms/Conditions   
CommentHYPER   
PIGMENTATION  
Barriers to Managing   
Healthnone  
  
Relationship/Environ:  
Resource/Environmental   
Concernsnone  
Primary Source of   
Support/Comfortgrandpar  
ent  
Lives Withsignificant   
other  
Living   
Arrangementsmobile home  
Services Anticipated at   
Transitionnone  
Anticipated Transition   
[place]  
Significant   
IndicatorsComplete  
  
  
  
Information Review:  
Allergies, Home Meds   
and Significant Events   
have been Reviewed and   
Verified  
with Patient/Familyyes  
  
ALLERGY, INTOLERANCE,   
ADVERSE EVENT:  
Allergies:  
MiraLax: Drug,   
Hives/Urticaria, Active  
codeine: Drug, Unknown,   
Active  
  
  
Electronic Signatures:  
Remi Decker (SUPV)   
(Signed 2022   
05:24)  
Authored: Initial Info,   
General Health, RSP   
Based Care, Substance,   
Health  
Mgmt,   
Relationship/Environ,   
Additional Information  
  
  
Last Updated:   
2022 05:24 by   
Remi Decker (SUPV)  
  
References:  
1. Data Referenced From   
 Triage - ED    
2022 19:46  
2. Data Referenced From   
 1. Vital Signs    
2022 23:14  
3. Data Referenced From   
 Triage - ED    
2022 23:14   [facility]  
   
                                                    Provider Note - ED v3on    
   
                                        Provider Note - ED v3 Provider Note:  
Chart Review:  
ED NOTES  
ED NOTES:  
History of Present   
Illness:  
21 year-old female   
presents with concern   
for presents with   
concern for syncope.  
Patient recently   
diagnosed with colitis.   
States that she has had   
vomiting and  
diarrhea. States that   
she passed out while   
vomiting over the edge   
of her bed.  
States that she fell   
off the bed and struck   
her head. States that   
she is  
having abdominal   
cramping. Denies any   
fever, chills, chest   
pain, shortness of  
breath, urinary   
symptoms. States that   
she has been having   
irregular periods  
with heavier bleeding.  
  
Past Medical History:   
None  
Past surgical History:   
None  
Family history:   
Reviewed and not   
pertinent to complaint  
Social history: Denies   
any drugs, alcohol,   
tobacco abuse.  
  
_______________________  
_____  
REVIEW OF SYSTEMS:  
  
Pertinent negatives and   
positives noted in the   
HPI. Otherwise, a   
complete  
review of system was   
negative.  
_______________________  
_____  
PHYSICAL EXAM:  
Appearance: Alert,   
oriented , cooperative,   
in no acute distress.  
Skin: Intact, dry skin,   
no lesions, rash,   
petechiae or purpura.   
Pale.  
Eyes: PERRLA, EOMs   
intact, Conjunctiva   
pink with no redness or   
exudates.  
Eyelids without   
lesions. No scleral   
icterus.  
HENT: Normocephalic,   
atraumatic. Nares   
patent  
Neck: Supple, without   
meningismus. Trachea at   
midline. No   
lymphadenopathy.  
Pulmonary: Clear   
bilaterally with good   
chest wall excursion.   
No rales, rhonchi  
or wheezing. No   
accessory muscle use or   
stridor.  
Cardiac: Regular rate   
and rhythm, no rubs,   
murmurs, or gallops. No   
JVD,  
Carotids without   
bruits.  
Abdomen: Abdomen is   
soft, nontender, and   
nondistended. No   
palpable  
organomegaly. No   
rebound or guarding. No   
CVA tenderness.   
Nonsurgical abdomen  
Genitourinary: Exam   
deferred.  
Musculoskeletal: Full   
range of motion. Pulses   
full and equal. No   
cyanosis,  
clubbing, or edema.  
Neurological: Cranial   
nerves are grossly   
intact, grossly normal   
sensation, no  
weakness, no focal   
findings identified.  
Psychiatric:   
Appropriate mood and   
affect.  
  
HISTORY OF PRESENTING   
ILLNESS  
DANNA is a 21 year old   
Female and was seen by   
me at 2022   
23:03.  
  
Triage Information:   
Most recent Vital Sign   
Value Date  
Temp (F): 100   
2022 23:14  
Temp (C): 37.7   
2022 23:14  
Heart Rate (beats/min):   
88 2022 23:14  
Respirations   
(breaths/min): 18   
2022 23:14  
SpO2 (%): 98 2022   
23:14  
BP Systolic (mm Hg):   
125 2022 23:14  
BP Diastolic (mm Hg):   
95 2022 23:14  
  
  
  
  
  
PAST MEDICAL HISTORY  
ALLERGIES/INTOLERANCES:   
Allergy  
Allergen: MiraLax  
Type: Drug  
Reaction:   
Hives/Urticaria  
  
Allergen: codeine  
Type: Drug  
Reaction: Unknown  
  
HEALTH HISTORY: No   
documented data.  
  
OUTPATIENT MEDICATIONS:   
Home Medications Review   
Status for   
Reconciliation:  
Complete  
Med Status: Patient   
Currently Takes   
Medications  
  
Drug Name: promethazine   
25 mg oral tablet  
Instructions: 1 tab(s)   
orally 3 times a day,   
As Needed -for nausea   
and  
vomiting  
  
Drug Name: Phenadoz 25   
mg rectal suppository  
Instructions: 1   
suppository(ies)   
rectally 3 times a day,   
As Needed -for  
nausea and vomiting  
  
SIGNIFICANT EVENTS:   
Past Medical History  
Description:premature  
  
  
Description:delayed   
bone growth  
  
  
Description:Miscarriage  
  
  
Description:scoliosis  
  
  
  
CRITICAL CARE  
RESULTS:  
Recent Lab Results:  
  
I have reviewed these   
laboratory results:  
  
Complete Blood Count +   
Differential   
2022 23:33:00  
  
ResultValue  
White Blood Cell Count   
8.1  
Red Blood Cell Count   
4.69  
HGB 13.4  
HCT 40.2  
MCV 86  
MCHC 33.4  
  
RDW-CV 14.5  
Neutrophil % 69.9  
Lymphocyte % 16.6  
Monocyte % 9.9  
Eosinophil % 2.1  
Basophil % 1.5  
Neutrophil Count 5.60  
Lymphocyte Count 1.30  
Monocyte Count 0.80  
Eosinophil Count 0.20  
Basophil Count 0.10  
  
Comprehensive Metabolic   
Panel 2022   
23:33:00  
  
ResultValue  
Glucose, Serum 86  
  
K 3.1 L  
  
Bicarbonate, Serum 22  
Anion Gap, Serum 18  
BUN 6  
CREAT 0.56  
GFR Female >90  
Calcium, Serum 8.8  
ALB 4.1  
ALKP 39  
T Pro 6.4  
T Bili 0.5  
Alanine   
Aminotransferase, Serum   
9  
Aspartate Transaminase,   
Serum 13  
  
Lipase, Serum   
2022 23:33:00  
  
ResultValue  
Lipase, Serum 37  
  
Radiology Results:  
  
Impression:  
  
No evidence of acute   
intracranial   
abnormality.  
  
CT Head without   
Contrast [2022   
12:20AM]  
  
VITAL SIGNS:  
  
T PRBP SpO2O2(LPM)   
%FiO2 Method  
2022   
23:14:[id number]/95   
98 room air, no   
respiratory  
support  
  
  
  
  
MDM  
MDM/ED COURSE:  
Patient appears well   
and nontoxic. Vital   
signs within normal   
limits. EKG  
nonischemic. No   
significant anemia. CT   
brain negative. Patient   
treated with  
Zofran and fluid.   
Patient remained   
nauseous and was   
treated with Phenergan   
and  
Toradol given her   
abdominal cramping.   
Reviewed patient's   
charts from previous  
ER visits over the past   
week. Initial CT did   
show some evidence of   
mild  
c (more content not   
included)...        Normal                                  [facility]  
   
                                                    Triage - EDon 2022   
   
                                        Triage - ED         Quick Triage:  
Are You Pregnantno  
Have You Given Birth In   
The Last 6 Weeksno  
Are You Currently   
Breastfeedingno  
The patient and/or   
guardian verbally   
acknowledges placement   
for services into  
the following (when   
Urgent Care Service   
hours are   
operating):emergency  
department  
  
Chart Review:  
ARRIVAL INFORMATION  
  
Mode of Arrival:   
private vehicle  
  
  
CHIEF COMPLAINT  
  
DANNA SOARES   
is a Female patient   
with a chief complaint   
of multiple  
medical complaints (pt   
here from home with c/o   
syncope while trying to   
get out  
of bed, pt reports   
hitting head on floor,   
+LOC. states  I was hot   
and dizzy and  
hit the floor.  pt   
reports having low   
blood glucose at times.   
pt reports  
currently being treated   
for bacterial colitis   
but is unable to keep   
meds down.  
reports N/V/D, fever   
and headache. pt   
reports since falling   
has right hand  
numbness).  
Triage Date/Time:   
2022 23:14  
DIPIKA: 3  
Pain Rating (0-10): 9 =   
Severe  
Vital Signs:  
Temperature: 100.0F (   
37.7C) taken forehead  
Blood Pressure: 125/95   
Mean:  
Heart Rate: 88  
Respiratory Rate: 18  
Pulse Oximetry: 98% on   
room air, no   
respiratory support.   
Height: 4 feet 11.00  
inches. 149.8 CM  
Weight: 113.0 pounds.   
Calculated 51.3 kg.   
(stated)  
Calculated BMI (kg/m2):   
22.860 Calculated BSA   
(m2) 1.46  
  
Sujit Coma Scale:  
Best Eye Response: (E4)   
spontaneous  
Best Motor Response:   
(M6) obeys commands  
Best Verbal Response:   
(V5) oriented  
Sujit Score: 15  
  
  
Allergies: yes  
Last menstrual period:   
2022  
Patient has homicidal   
thoughts: no  
  
  
Symptoms Are POSITIVE   
For:  
headache, nausea,   
numbness and pain  
Symptoms Are Negative   
For:  
anxiety, chills,   
diaphoresis, dyspnea,   
loss of consciousness,   
tingling and  
weakness  
  
  
Risk Screens  
Suicide Risk Screen  
  
In the Past Month: Have   
you wished you were   
dead or wished you   
could go to  
sleep and not wake up   
no  
In the Past Month: Have   
you had any actual   
thoughts of killing   
yourself no  
In Your Lifetime: Have   
you ever done anything,   
started to do anything,   
or  
prepared to do anything   
to end your life no  
  
  
  
Avila Fall Scale   
Screening  
Has the patient fallen   
before (or is the   
patient in the ED as a   
result of a  
fall) has had a fall  
Does the patient have   
an impaired gait does   
not have impaired gait  
Is the patient   
cognitively impaired   
not cognitively   
impaired  
  
Avila Fall Scale  
History of falling   
(immediate or previous)   
yes (25)  
Secondary Diagnosis yes   
(15)  
Intravenous Therapy/   
Heparin/Saline Lock yes   
(20)  
Gait/Transferring weak   
(10)  
Ambulatory Aids   
none/bedrest/nurse   
assist (0)  
Mental Status oriented   
to own ability (0)  
Avila Fall Risk Score:   
70  
  
Interventions:  
Avila Fall   
Interventions: HIGH   
INTERVENTIONS  
*Low and Moderate   
Interventions Plus:  
* supervised toileting   
at all times  
  
  
TRAVEL HISTORY  
Travel History  
Coronavirus Screening:   
no exposure or symptoms  
Travel Exposure   
History: NO travel to   
International locations   
in the past 30  
days  
  
  
PAIN  
  
Pain Scale Used: ASHLEY  
Pain Rating (0-10): 9 =   
Severe  
  
  
  
  
  
Past Medical History:  
Past Medical History   
Reviewedyes  
  
  
Electronic Signatures:  
Robel Sauer (ERNA)   
(Signed 2022   
23:19)  
Entered: Risk Screens,   
Pain, Travel History,   
Chart Review, Scores,   
Past  
Medical History  
Authored: Quick Triage,   
Risk Screens, Pain,   
Travel History, Chart   
Review,  
Scores, Past Medical   
History  
  
  
Last Updated:   
2022 23:19 by   
Robel Sauer (ERNA) Normal                                  [facility]  
   
                                                    UA MICROSCOPICon 2022   
   
                      Mucus Ql (Urine sed) 2+ /LPF    Normal                [facility]  
   
                                        Comment on above:   Performed By: #### P  
HOS ####  
[place] [address]   
   
                      RBC (U) [#/Vol] /uL        Abnormal   0-5        [facility]  
   
                                        Comment on above:   Performed By: #### P  
HOS ####  
[address] [address]   
   
                      SQUAMOUS EPITH. CELLS 7 /HPF     Normal                [facility]  
   
                                        Comment on above:   Performed By: #### P  
HOS ####  
[address] [address]   
   
                      WBC        23 /HPF    Abnormal   0-5        [facility]  
   
                                        Comment on above:   Performed By: #### P  
HOS ####  
[place] [id number]   
   
                                                    URINALYSISon 2022   
   
                      Appearance (U) HAZY       Normal     CLEAR      [facility]  
   
                                        Comment on above:   Performed By: #### U  
A ####[place] [address]   
   
                      Bilirubin Ql (U) Negative   Normal     NEGATIVE   [facility]  
   
                                        Comment on above:   Performed By: #### U  
A ####[place] [address]   
   
                      Color (U)  Yellow     Normal     STRAW,YELLOW [facility]  
   
                                        Comment on above:   Performed By: #### U  
A ####[place] [address]   
   
                      Glucose Ql (U) Negative   Normal     NEGATIVE   [facility]  
   
                                        Comment on above:   Performed By: #### U  
A ####[place] [address]   
   
                      Hemoglobin Ql (U) LARGE(3+)  Abnormal   NEGATIVE   [facility]  
   
                                        Comment on above:   Performed By: #### U  
A ####[place] [address]   
   
                      Ketones Ql (U) 80(2+)     Abnormal   NEGATIVE   [facility]  
   
                                        Comment on above:   Performed By: #### U  
A ####[place] [address]   
   
                                                    Leukocyte esterase Test   
strip Ql (U)    Negative        Normal          NEGATIVE        [facility]  
   
                                        Comment on above:   Performed By: #### U  
A ####[place] [address]   
   
                      Nitrite Ql (U) Negative   Normal     NEGATIVE   [facility]  
   
                                        Comment on above:   Performed By: #### U  
A ####[place] [address]   
   
                      pH (U)     6.0 [pH]   Normal     5.0 - 8.0  [facility]  
   
                                        Comment on above:   Performed By: #### U  
A ####[place] [address]   
   
                      Protein Ql (U) 100(2+)    Abnormal   NEGATIVE   [facility]  
   
                                        Comment on above:   Performed By: #### U  
A ####[place] [address]   
   
                                                    Specific gravity (U)   
[Rel density]       1.023               Normal              1.005 -   
1.035                                   [facility]  
   
                                        Comment on above:   Performed By: #### U  
A ####[address] [address]   
   
                                                    Urobilinogen (U)   
[Mass/Vol]      mg/dL           Normal          0.0 - 1.9       [facility]  
   
                                        Comment on above:   Performed By: #### U  
A ####[address] [address]   
   
                                                    CBC AND DIFFERENTIALon 01-10  
-2022   
   
                      Basophils (Bld) [#/Vol] 0.10 10*3/uL Normal     0.00 - 0.1  
0 [facility]  
   
                                        Comment on above:   Performed By: #### C  
BCDF ####[place] [id number]   
   
                      Basophils/100 WBC (Bld) 1.1 %      Normal     0.0 - 2.0  [facility]  
   
                                        Comment on above:   Performed By: #### C  
BCDF ####[place] [id number]   
   
                                                    Eosinophils (Bld)   
[#/Vol]         0.00 10*3/uL    Normal          0.00 - 0.70     [facility]  
   
                                        Comment on above:   Performed By: #### C  
BCDF ####[address] [address]   
   
                                                    Eosinophils/100 WBC   
(Bld)           0.0 %           Normal          0.0 - 6.0       [facility]  
   
                                        Comment on above:   Performed By: #### C  
BCDF ####[address] [address]   
   
                                                    Erythrocyte   
distribution width   
(RBC) [Ratio]   14.6 %          High            11.5 - 14.5     [facility]  
   
                                        Comment on above:   Performed By: #### C  
BCDF ####[address] [address]   
   
                                                    Hematocrit (Bld)   
[Volume fraction] 39.2 %          Normal          36.0 - 46.0     [facility]  
   
                                        Comment on above:   Performed By: #### C  
BCDF ####[address] [address]   
   
                                                    Hemoglobin (Bld)   
[Mass/Vol]      12.7 g/dL       Normal          12.0 - 16.0     [facility]  
   
                                        Comment on above:   Performed By: #### C  
BCDF ####[address] [address]   
   
                                                    Lymphocytes (Bld)   
[#/Vol]         1.10 10*3/uL    Low             1.20 - 4.80     [facility]  
   
                                        Comment on above:   Performed By: #### C  
BCDF ####[address] [address]   
   
                                                    Lymphocytes/100 WBC   
(Bld)           9.9 %           Normal          13.0 - 44.0     [facility]  
   
                                        Comment on above:   Performed By: #### C  
BCDF ####[address] [address]   
   
                      MCHC (RBC) [Mass/Vol] 32.3 g/dL  Normal     32.0 - 36.0 [facility]  
   
                                        Comment on above:   Performed By: #### C  
BCDF ####[address] [address]   
   
                      MCV (RBC) [Entitic vol] 88 fL      Normal     80 - 100   S  
[facility]  
   
                                        Comment on above:   Performed By: #### C  
BCDF ####[address] [address]   
   
                      Monocytes (Bld) [#/Vol] 1.10 10*3/uL High       0.10 - 1.0  
0 [facility]  
   
                                        Comment on above:   Performed By: #### C  
BCDF ####[address] [address]   
   
                      Monocytes/100 WBC (Bld) 10.0 %     Normal     2.0 - 10.0 S  
[facility]  
   
                                        Comment on above:   Performed By: #### C  
BCDF ####[address] [address]   
   
                                                    Neutrophils (Bld)   
[#/Vol]         8.80 10*3/uL    High            1.20 - 7.70     [facility]  
   
                                        Comment on above:   Result Comment: Perc  
ent differential counts (%) should be   
interpreted in the  
context of the absolute cell counts (cells/L).   
   
                                                            Performed By: #### C  
BCDF ####[address] [address]   
   
                                                    Neutrophils/100 WBC   
(Bld)           79.0 %          Normal          40.0 - 80.0     [facility]  
   
                                        Comment on above:   Performed By: #### C  
BCDF ####[address] [address]   
   
                      NUCLEATED RBC 0.1 /100 WBC Normal                [facility]  
   
                                        Comment on above:   Performed By: #### C  
BCDF ####[address] [address]   
   
                      Platelets (Bld) [#/Vol] 262 10*3/uL Normal     150 - 450    
[facility]  
   
                                        Comment on above:   Performed By: #### C  
BCDF ####[place] [id number]   
   
                      RBC        4.46 x10E12/L Normal     4.00 - 5.20 [facility]  
   
                                        Comment on above:   Performed By: #### C  
BCDF ####[address] [address]   
   
                      WBC (Bld) [#/Vol] 11.2 10*3/uL Normal     4.4 - 11.3 [facility]  
   
                                        Comment on above:   Performed By: #### C  
BCDF ####[place] [id number]   
   
                                                    COMPREHENSIVE PANELon 01-10-  
2022   
   
                      Albumin [Mass/Vol] 4.2 g/dL   Normal     3.4 - 5.0  [facility]  
   
                                        Comment on above:   Performed By: #### C  
MP ####[address] [address]   
   
                                                    ALP [Catalytic   
activity/Vol]   50 U/L          Normal          33 - 110        [facility]  
   
                                        Comment on above:   Performed By: #### C  
MP ####[address] [address]   
   
                                                    ALT [Catalytic   
activity/Vol]   7 U/L           Normal          7 - 45          [facility]  
   
                                        Comment on above:   Result Comment: Kaleigh  
ents treated with Sulfasalazine may   
generate  
falsely decreased results for ALT.   
   
                                                            Performed By: #### C  
MP ####[id number] [place] [address]   
   
                      Anion gap [Moles/Vol] 20 mmol/L  Normal     10 - 20    [facility]  
   
                                        Comment on above:   Performed By: #### C  
MP ####[address] [address]   
   
                                                    AST [Catalytic   
activity/Vol]   11 U/L          Normal          9 - 39          [facility]  
   
                                        Comment on above:   Performed By: #### C  
MP ####[address] [address]   
   
                      Bilirubin [Mass/Vol] 0.5 mg/dL  Normal     0.0 - 1.2  [facility]  
   
                                        Comment on above:   Performed By: #### C  
MP ####[address] [address]   
   
                      Calcium [Mass/Vol] 9.5 mg/dL  Normal     8.6 - 10.3 [facility]  
   
                                        Comment on above:   Performed By: #### C  
MP ####[address] [address]   
   
                      Chloride [Moles/Vol] 107 mmol/L Normal     98 - 107   [facility]  
   
                                        Comment on above:   Performed By: #### C  
MP ####[address] [address]   
   
                      Creatinine [Mass/Vol] 0.66 mg/dL Normal     0.50 - 1.05 [facility]  
   
                                        Comment on above:   Performed By: #### C  
MP ####[address] [address]   
   
                      eGFR FEMALE >90        Normal     >90        [facility]  
   
                                        Comment on above:   Result Comment: CALC  
ULATIONS OF ESTIMATED GFR ARE PERFORMED  
USING THE  CKD-EPI STUDY REFIT EQUATION  
WITHOUT THE RACE VARIABLE FOR THE IDMS-TRACEABLE  
CREATININE METHODS.  
https://jasn.asnjournals.org/content/early//ASN.  
697093   
   
                                                            Performed By: #### C  
MP ####[address] [address]   
   
                      Glucose [Mass/Vol] 91 mg/dL   Normal     74 - 99    [facility]  
   
                                        Comment on above:   Performed By: #### C  
MP ####[address] [address]   
   
                      HCO3 (Bld) [Moles/Vol] 14 mmol/L  Low        21 - 32    [facility]  
   
                                        Comment on above:   Performed By: #### C  
MP ####[place] [address]   
   
                      Potassium [Moles/Vol] 3.4 mmol/L Low        3.5 - 5.3  [facility]  
   
                                        Comment on above:   Performed By: #### C  
MP ####[place] [address]   
   
                      Protein [Mass/Vol] 6.8 g/dL   Normal     6.4 - 8.2  [facility]  
   
                                        Comment on above:   Performed By: #### C  
MP ####[place] [address]   
   
                      Sodium [Moles/Vol] 138 mmol/L Normal     136 - 145  [facility]  
   
                                        Comment on above:   Performed By: #### C  
MP ####[place] [address]   
   
                                                    Urea nitrogen   
[Mass/Vol]      8 mg/dL         Normal          6 - 23          [facility]  
   
                                        Comment on above:   Performed By: #### C  
MP ####[place] [id number]   
   
                                                    GROUP A STREP,PCRon 01-10-20  
22   
   
                      GROUP A STREP,PCR Not detected Normal     Not Detected [facility]  
   
                                        Comment on above:   Result Comment: This  
 test was performed utilizing an   
FDA-cleared rapid nucleic  
acid amplification by PCR to qualitatively detect Group A  
Streptococci from throat swab specimens without the need for  
culture confirmation of negative results.   
   
                                                            Performed By: #### G  
APC1 ####[place] [id number]   
   
                      Lab Specimen Source Throat     Normal                [facility]  
   
                                        Comment on above:   Performed By: #### G  
APC1 ####[place] [id number]   
   
                                                    HCG,URINEon 01-   
   
                                                    Beta HCG (pregnancy   
test) Ql (U)    Negative        Normal          Negative        [facility]  
   
                                        Comment on above:   Performed By: #### H  
CGU ####[place] [id number]   
   
                                                    Beta HCG (pregnancy   
test) Ql (U)    Canceled        Normal                          [facility]  
   
                                        Comment on above:   Order Comment: TEST   
HCG,URINE WAS CANCELLED, 2022 22:53   
PATIENT DISCHARGED.   
   
                                                            Performed By: #### M  
G ####  
[facility]  
[address] [place] [address]   
   
                                                    Provider Note - ED v3on    
   
                                        Provider Note - ED v3 Provider Note:  
Chart Review:  
ED NOTES  
ED NOTES:  
HPI:  
Patient complains of 3   
days of vomiting. She   
states she has Zofran   
at home but  
it is not helping at   
all. Patient otherwise   
denies any fever   
urinary burning  
or frequency and does   
not think that she is   
pregnant. And   
improvement in her  
symptoms at that time.   
An appendectomy when   
she was a child.  
  
  
ROS:  
All systems are   
negative other than as   
noted in HPI.  
  
  
Physical Exam  
  
I have reviewed the   
triage vital signs.  
Const: Well nourished,   
well developed, appears   
stated age, no acute   
distress  
Eyes: PERRL, EOM   
intact, no conjunctival   
injection, vision   
grossly normal  
HENT: Neck supple   
without meningismus ,   
Moist mucous membranes,   
no pharyengeal  
swelling or exudate  
CV: Regular rate and   
rhythm, Warm,   
well-perfused   
extremities. Chest non   
tender  
RESP: Lungs clear   
bilaterally, Unlabored   
respiratory effort  
GI: soft, diffuse   
tenderness,   
non-distended, no   
masses  
:  
MSK: No gross   
deformities appreciated  
Back: Non tender, no   
pain with ROM  
Skin: Warm, dry. No   
rashes  
Neuro: Alert and   
oriented x4, GCS 15 ,   
CNs II-XII grossly   
intact. Sensation and  
motor function of   
extremities grossly   
intact.  
Psych: Appropriate mood   
and affect.  
  
I have reviewed and   
confirmed nurses/medics   
notes for patient past,   
social  
and family history.  
  
  
Portions of this note   
were dictated by speech   
recognition. An attempt   
at proof  
reading was made to   
minimize errors. Minor   
errors in transcription   
may be  
present.  
  
  
HISTORY OF PRESENTING   
ILLNESS  
DANNA is a 21 year old   
Female and was seen by   
me at 10-Franco-2022 12:58   
for a  
chief complaint of   
nausea (Pt states that   
she has been vomiting x   
3 days.  
initally it was a white   
foamy substance not is   
brown/ black. Pt states   
that she  
is unable fluids / meds   
down. Pt is post   
paratum - 10   
months)(1).  
  
Triage Information:   
Most recent Vital Sign   
Value Date  
Temp (F): 98.1   
01- 12:27  
Temp (C): 36.7   
01- 12:27  
Heart Rate (beats/min):   
110 01- 12:27  
Respirations   
(breaths/min): 19   
01- 12:27  
SpO2 (%): 100   
01- 12:27  
BP Systolic (mm Hg):   
120 01- 12:27  
BP Diastolic (mm Hg):   
72 01- 12:27  
  
  
  
  
PAST MEDICAL HISTORY  
ALLERGIES/INTOLERANCES:   
Allergy  
Allergen: MiraLax  
Type: Drug  
Reaction:   
Hives/Urticaria  
  
Allergen: codeine  
Type: Drug  
Reaction: Unknown  
  
HEALTH HISTORY: No   
documented data.  
  
OUTPATIENT MEDICATIONS:   
Home Medications Review   
Status for   
Reconciliation:  
Complete  
Med Status: Patient   
Currently Takes   
Medications  
  
Drug Name:   
cyclobenzaprine 5 mg   
oral tablet  
Instructions: 1 tab(s)   
orally 3 times a day,   
As Needed -for pain  
  
SIGNIFICANT EVENTS:   
Past Medical History  
Description:premature  
  
  
Description:delayed   
bone growth  
  
  
Description:Miscarriage  
  
  
Description:scoliosis  
  
OB/GYN: Is Pregnant: no   
Is Breastfeeding: no  
  
MDM  
MDM/ED COURSE:  
1620-patient initially   
was hydrated with a   
liter of fluids and   
given 25 mg  
Phenergan with minimal   
relief of symptoms. She   
was subsequently given   
another  
liter of fluids and   
Reglan and Benadryl.   
She did complain of   
pain in her  
throat and a strep swab   
was obtained which was   
unremarkable.   
Urinalysis was  
unremarkable and   
pregnancy was negative.   
I did reevaluate her   
midway through  
her visit and patient   
was denying any   
abdominal pain other   
than when she was  
vomiting. Abdomen was   
soft with mild diffuse   
tenderness. On   
reevaluation  
patient denies any   
regular marijuana use.   
And on reevaluation at   
this time  
patient states she is   
feeling better and is   
comfortable with   
discharge home.  
She was given   
prescriptions for   
Phenergan oral and   
suppository and patient   
is  
comfortable with this.   
Patient is tolerating a   
small amount of oral   
intake at  
this time.  
  
  
Your lab work,   
urinalysis, and strep   
test were normal today.   
As your symptoms  
have improved here in   
the emergency   
department I feel that   
you are stable for  
discharge home and you   
have been given   
prescriptions for   
Phenergan oral and  
suppository. Please   
slowly reintroduce   
fluids and solids into   
your diet,  
follow-up with your   
family doctor, and   
otherwise return to the   
nearest ER for  
any new or worsening   
concerns.  
  
  
  
DISPOSITION  
Diagnosis/Annotation:   
ED Dx  
Name:Vomiting  
Code:R11.10  
  
Disposition: discharged  
Type: home  
  
  
CONSULT  
CRITICAL CARE TIME  
Is this a critically   
ill patient: no  
  
  
  
  
  
Electronic Signatures:  
Priscilla Zepeda   
(APRN-CNP) (Signed   
10-Franco-2022 16:27)  
Authored: ED Notes,   
HPI, PMH, MDM/ED   
Course, Clinical   
Impression,   
Attestation,  
Chart Review, Scores  
  
  
Last Updated:   
10-Franco-2022 16:27 by   
Priscilla Zepeda   
(APRN-CNP)  
  
References:  
1. Data Referenced From   
 Triage - ED    
10-Franco-2022 12:27   Normal                                  [facility]  
   
                                                    UA MICROSCOPICon 01-   
   
                      Mucus Ql (Urine sed) 1+ /LPF    Normal                [facility]  
   
                                        Comment on above:   Performed By: #### U  
AMIC ####  
[place] [address]   
   
                      RBC (U) [#/Vol] /uL        Normal     0-5        [facility]  
   
                                        Comment on above:   Performed By: #### U  
AMIC ####  
[place] [address]   
   
                      SQUAMOUS EPITH. CELLS 2 /HPF     Normal                [facility]  
   
                                        Comment on above:   Performed By: #### U  
AMIC ####  
[place] [address]   
   
                      WBC        1 /HPF     Normal     0-5        [facility]  
   
                                        Comment on above:   Performed By: #### U  
AMIC ####  
[place] [address]   
   
                                                    URINALYSISon 01-   
   
                      Appearance (U) CLEAR      Normal     CLEAR      [facility]  
   
                                        Comment on above:   Performed By: #### U  
A ####[place] [address]   
   
                      Bilirubin Ql (U) Negative   Normal     NEGATIVE   [facility]  
   
                                        Comment on above:   Performed By: #### U  
A ####[place] [address]   
   
                      Color (U)  Yellow     Normal     STRAW,YELLOW [facility]  
   
                                        Comment on above:   Performed By: #### U  
A ####[place] [address]   
   
                      Glucose Ql (U) Negative   Normal     NEGATIVE   [facility]  
   
                                        Comment on above:   Performed By: #### U  
A ####[address] [address]   
   
                      Hemoglobin Ql (U) Negative   Normal     NEGATIVE   [facility]  
   
                                        Comment on above:   Performed By: #### U  
A ####[address] [address]   
   
                      Ketones Ql (U) 80(2+)     Abnormal   NEGATIVE   [facility]  
   
                                        Comment on above:   Performed By: #### U  
A ####[place] [id number]   
   
                                                    Leukocyte esterase Test   
strip Ql (U)    Negative        Normal          NEGATIVE        [facility]  
   
                                        Comment on above:   Performed By: #### U  
A ####[place] [id number]   
   
                      Nitrite Ql (U) Negative   Normal     NEGATIVE   [facility]  
   
                                        Comment on above:   Performed By: #### U  
A ####[place] [address]   
   
                      pH (U)     5.0 [pH]   Normal     5.0 - 8.0  [facility]  
   
                                        Comment on above:   Performed By: #### U  
A ####[place] [id number]   
   
                      Protein Ql (U) 30(1+)     Abnormal   NEGATIVE   [facility]  
   
                                        Comment on above:   Performed By: #### U  
A ####[address] [address]   
   
                                                    Specific gravity (U)   
[Rel density]       1.025               Normal              1.005 -   
1.035                                   [facility]  
   
                                        Comment on above:   Performed By: #### U  
A ####[address] [address]   
   
                                                    Urobilinogen (U)   
[Mass/Vol]      mg/dL           Normal          0.0 - 1.9       [facility]  
   
                                        Comment on above:   Performed By: #### U  
A ####[place] [id number]   
   
                      Appearance (U) Canceled   Normal                [facility]  
   
                                        Comment on above:   Order Comment: TEST   
URINALYSIS WAS CANCELLED, 2022 22:53  
   
PATIENT DISCHARGED.   
   
                                                            Performed By: #### U  
A ####[address] [address]   
   
                      ASCORBIC ACID Canceled   [facility]  
   
                                        Comment on above:   Order Comment: TEST   
URINALYSIS WAS CANCELLED, 2022 22:53  
   
PATIENT DISCHARGED.   
   
                                                            Result Comment: Conc  
entrations > = 20 mg/dL of ascorbic acid   
can be expected to cause strong  
interference in the reactions testing for glucose, nitrite and   
blood. It is  
recommended to discontinue Vitamin C administration and retest   
in 10 hours.   
   
                                                            Performed By: #### U  
A ####[address] [address]   
   
                      Bilirubin Ql (U) Canceled   [facility]  
   
                                        Comment on above:   Order Comment: TEST   
URINALYSIS WAS CANCELLED, 2022 22:53  
   
PATIENT DISCHARGED.   
   
                                                            Performed By: #### U  
A ####[address] [address]   
   
                      Color (U)  Canceled   [facility]  
   
                                        Comment on above:   Order Comment: TEST   
URINALYSIS WAS CANCELLED, 2022 22:53  
   
PATIENT DISCHARGED.   
   
                                                            Performed By: #### U  
A ####[address] [address]   
   
                      Glucose Ql (U) Canceled   [facility]  
   
                                        Comment on above:   Order Comment: TEST   
URINALYSIS WAS CANCELLED, 2022 22:53  
   
PATIENT DISCHARGED.   
   
                                                            Performed By: #### U  
A ####[address] [address]   
   
                      Hemoglobin Ql (U) Canceled   [facility]  
   
                                        Comment on above:   Order Comment: TEST   
URINALYSIS WAS CANCELLED, 2022 22:53  
   
PATIENT DISCHARGED.   
   
                                                            Performed By: #### U  
A ####[address] [address]   
   
                      Ketones Ql (U) Canceled   [facility]  
   
                                        Comment on above:   Order Comment: TEST   
URINALYSIS WAS CANCELLED, 2022 22:53  
   
PATIENT DISCHARGED.   
   
                                                            Performed By: #### U  
A ####[address] [address]   
   
                                                    Leukocyte esterase Test   
strip Ql (U)    Canceled        [facility]  
   
                                        Comment on above:   Order Comment: TEST   
URINALYSIS WAS CANCELLED, 2022 22:53  
   
PATIENT DISCHARGED.   
   
                                                            Performed By: #### U  
A ####[address] [address]   
   
                      Nitrite Ql (U) Canceled   [facility]  
   
                                        Comment on above:   Order Comment: TEST   
URINALYSIS WAS CANCELLED, 2022 22:53  
   
PATIENT DISCHARGED.   
   
                                                            Performed By: #### U  
A ####[address] [address]   
   
                      pH         Canceled   [facility]  
   
                                        Comment on above:   Order Comment: TEST   
URINALYSIS WAS CANCELLED, 2022 22:53  
   
PATIENT DISCHARGED.   
   
                                                            Performed By: #### U  
A ####[address] [address]   
   
                      Protein Ql (U) Canceled   [facility]  
   
                                        Comment on above:   Order Comment: TEST   
URINALYSIS WAS CANCELLED, 2022 22:53  
   
PATIENT DISCHARGED.   
   
                                                            Performed By: #### U  
A ####[address] [address]   
   
                                                    Specific gravity (U)   
[Rel density]   Canceled        Normal                          [facility]  
   
                                        Comment on above:   Order Comment: TEST   
URINALYSIS WAS CANCELLED, 2022 22:53  
  
PATIENT DISCHARGED.   
   
                                                            Performed By: #### U  
A ####[address] [address]   
   
                      UROBILINOGEN Canceled   [facility]  
   
                                        Comment on above:   Order Comment: TEST   
URINALYSIS WAS CANCELLED, 2022 22:53  
  
PATIENT DISCHARGED.   
   
                                                            Performed By: #### U  
A ####[address] [address]   
   
                                                    BASIC METABOLIC PANELon -0  
   
   
                      Anion gap [Moles/Vol] 25 mmol/L  High       10 - 20    [facility]  
   
                                        Comment on above:   Performed By: #### M  
G ####  
[address] [address]   
   
                      Calcium [Mass/Vol] 10.4 mg/dL High       8.6 - 10.3 [facility]  
   
                                        Comment on above:   Performed By: #### M  
G ####  
[address] [address]   
   
                      Chloride [Moles/Vol] 99 mmol/L  Normal     98 - 107   [facility]  
   
                                        Comment on above:   Performed By: #### M  
G ####  
[address] [address]   
   
                      Creatinine [Mass/Vol] 0.75 mg/dL Normal     0.50 - 1.05 [facility]  
   
                                        Comment on above:   Performed By: #### M  
G ####  
[address] [address]   
   
                      eGFR FEMALE >90        Normal     >90        [facility]  
   
                                        Comment on above:   Result Comment: CALC  
ULATIONS OF ESTIMATED GFR ARE PERFORMED  
USING THE  CKD-EPI STUDY REFIT EQUATION  
WITHOUT THE RACE VARIABLE FOR THE IDMS-TRACEABLE  
CREATININE METHODS.  
https://jasn.asnjournals.org/content/early/ASN.  
919485   
   
                                                            Performed By: #### M  
G ####  
[address] [address]   
   
                      Glucose [Mass/Vol] 102 mg/dL  High       74 - 99    [facility]  
   
                                        Comment on above:   Performed By: #### M  
G ####  
[address] [address]   
   
                      HCO3 (Bld) [Moles/Vol] 17 mmol/L  Low        21 - 32    [facility]  
   
                                        Comment on above:   Performed By: #### M  
G ####  
[address] [address]   
   
                      Potassium [Moles/Vol] 3.4 mmol/L Low        3.5 - 5.3  [facility]  
   
                                        Comment on above:   Performed By: #### M  
G ####  
[address] [address]   
   
                      Sodium [Moles/Vol] 138 mmol/L Normal     136 - 145  [facility]  
   
                                        Comment on above:   Performed By: #### M  
G ####  
[address] [address]   
   
                                                    Urea nitrogen   
[Mass/Vol]      11 mg/dL        Normal          6 - 23          [facility]  
   
                                        Comment on above:   Performed By: #### M  
G ####  
[address] [address]   
   
                                                    CBC AND DIFFERENTIALon    
   
                      Basophils (Bld) [#/Vol] 0.10 10*3/uL Normal     0.00 - 0.1  
0 [facility]  
   
                                        Comment on above:   Performed By: #### M  
G ####  
[address] [address]   
   
                      Basophils/100 WBC (Bld) 0.6 %      Normal     0.0 - 2.0  S  
[facility]  
   
                                        Comment on above:   Performed By: #### M  
G ####  
[address] [address]   
   
                                                    Eosinophils (Bld)   
[#/Vol]         0.10 10*3/uL    Normal          0.00 - 0.70     [facility]  
   
                                        Comment on above:   Performed By: #### M  
G ####  
[address] [address]   
   
                                                    Eosinophils/100 WBC   
(Bld)           0.7 %           Normal          0.0 - 6.0       [facility]  
   
                                        Comment on above:   Performed By: #### M  
G ####  
[address] [address]   
   
                                                    Erythrocyte   
distribution width   
(RBC) [Ratio]   14.3 %          Normal          11.5 - 14.5     [facility]  
   
                                        Comment on above:   Performed By: #### M  
G ####  
[address] [address]   
   
                                                    Hematocrit (Bld)   
[Volume fraction] 46.0 %          Normal          36.0 - 46.0     [facility]  
   
                                        Comment on above:   Performed By: #### M  
G ####  
[address] [address]   
   
                                                    Hemoglobin (Bld)   
[Mass/Vol]      15.1 g/dL       Normal          12.0 - 16.0     [facility]  
   
                                        Comment on above:   Performed By: #### M  
G ####  
[address] [address]   
   
                                                    Lymphocytes (Bld)   
[#/Vol]         1.10 10*3/uL    Low             1.20 - 4.80     [facility]  
   
                                        Comment on above:   Performed By: #### M  
G ####  
[address] [address]   
   
                                                    Lymphocytes/100 WBC   
(Bld)           12.2 %          Normal          13.0 - 44.0     [facility]  
   
                                        Comment on above:   Performed By: #### M  
G ####  
[address] [address]   
   
                      MCHC (RBC) [Mass/Vol] 32.7 g/dL  Normal     32.0 - 36.0 [facility]  
   
                                        Comment on above:   Performed By: #### M  
G ####  
[address] [address]   
   
                      MCV (RBC) [Entitic vol] 85 fL      Normal     80 - 100   S  
[facility]  
   
                                        Comment on above:   Performed By: #### M  
G ####  
[address] [address]   
   
                      Monocytes (Bld) [#/Vol] 0.80 10*3/uL Normal     0.10 - 1.0  
0 [facility]  
   
                                        Comment on above:   Performed By: #### BASILIA  
G ####  
[address] [address]   
   
                      Monocytes/100 WBC (Bld) 8.4 %      Normal     2.0 - 10.0 S  
[facility]  
   
                                        Comment on above:   Performed By: #### M  
G ####  
[address] [address]   
   
                                                    Neutrophils (Bld)   
[#/Vol]         7.30 10*3/uL    Normal          1.20 - 7.70     [facility]  
   
                                        Comment on above:   Result Comment: Perc  
ent differential counts (%) should be   
interpreted in the  
context of the absolute cell counts (cells/L).   
   
                                                            Performed By: #### M  
G ####  
[address] [address]   
   
                                                    Neutrophils/100 WBC   
(Bld)           78.1 %          Normal          40.0 - 80.0     [facility]  
   
                                        Comment on above:   Performed By: #### M  
G ####  
[address] [address]   
   
                      NUCLEATED RBC 0.1 /100 WBC Normal                [facility]  
   
                                        Comment on above:   Performed By: #### M  
G ####  
[address] [address]   
   
                      Platelets (Bld) [#/Vol] 314 10*3/uL Normal     150 - 450    
[facility]  
   
                                        Comment on above:   Performed By: #### M  
G ####  
[address] [address]   
   
                      RBC        5.38 x10E12/L High       4.00 - 5.20 [facility]  
   
                                        Comment on above:   Performed By: #### M  
G ####  
[address] [address]   
   
                      WBC (Bld) [#/Vol] 9.3 10*3/uL Normal     4.4 - 11.3 [facility]  
   
                                        Comment on above:   Performed By: #### M  
G ####  
[address] [address]   
   
                                                    HEPATIC FUNCTION PANELon    
   
                      Albumin [Mass/Vol] 5.2 g/dL   High       3.4 - 5.0  [facility]  
   
                                        Comment on above:   Performed By: #### H  
EPFP ####  
[address] [address]   
   
                                                    ALP [Catalytic   
activity/Vol]   69 U/L          Normal          33 - 110        [facility]  
   
                                        Comment on above:   Performed By: #### H  
EPFP ####  
[address] [address]   
   
                                                    ALT [Catalytic   
activity/Vol]   10 U/L          Normal          7 - 45          [facility]  
   
                                        Comment on above:   Result Comment: Kaleigh  
ents treated with Sulfasalazine may   
generate  
falsely decreased results for ALT.   
   
                                                            Performed By: #### H  
EPFP ####  
[address] [address]   
   
                                                    AST [Catalytic   
activity/Vol]   14 U/L          Normal          9 - 39          [facility]  
   
                                        Comment on above:   Performed By: #### H  
EPFP ####  
[address] [address]   
   
                      Bilirubin [Mass/Vol] 0.6 mg/dL  Normal     0.0 - 1.2  [facility]  
   
                                        Comment on above:   Performed By: #### H  
EPFP ####  
[address] [address]   
   
                                                    Bilirubin.indirect   
[Mass/Vol]      0.1 mg/dL       Normal          0.0 - 0.3       [facility]  
   
                                        Comment on above:   Performed By: #### H  
EPFP ####  
[address] [address]   
   
                      Protein [Mass/Vol] 8.5 g/dL   High       6.4 - 8.2  [facility]  
   
                                        Comment on above:   Performed By: #### H  
EPFP ####  
[address] [address]   
   
                                                    LIPASEon 2022   
   
                                                    Lipase [Catalytic   
activity/Vol]   16 U/L          Normal          9 - 82          [facility]  
   
                                        Comment on above:   Result Comment: [place]  
puncture immediately after or during the  
administration of Metamizole may lead to falsely  
low results. Testing should be performed immediately  
prior to Metamizole dosing.  
N-acetyl-p-benzoquinone imine (metabolite of Acetaminophen)  
will generate erroneously low results in samples for patients  
that have taken toxic doses of acetaminophen.   
   
                                                            Performed By: #### M  
G ####  
[address] [address]   
   
                                                    Provider Note - ED v3on    
   
                                        Provider Note - ED v3 Provider Note:  
Chart Review:  
ED NOTES  
ED NOTES:  
HPI:  
Patient is a   
21-year-old female with   
a 2-day history of   
nausea vomiting and  
left lower quadrant   
abdominal pain. No   
longer vomiting any   
substance now it is  
just dry heaving.   
States she has had a   
long history of   
 stomach issues  . She  
tried to take some   
Zofran at home but it   
did not work. No fevers   
or chills.  
No neurologic   
complaints. No focal   
weakness or rashes.  
  
  
ROS:  
All systems are   
negative other than as   
noted in HPI.  
  
  
Physical Exam  
  
I have reviewed the   
triage vital signs.  
Const: Well nourished,   
well developed, appears   
stated age, no acute   
distress  
Eyes: PERRL, EOM   
intact, no conjunctival   
injection, vision   
grossly normal  
HENT: Neck supple   
without meningismus ,   
Moist mucous membranes,   
no pharyengeal  
swelling or exudate  
CV: Regular rate and   
rhythm, Warm,   
well-perfused   
extremities. Chest non   
tender  
RESP: Lungs clear   
bilaterally, Unlabored   
respiratory effort  
GI: soft, non-tender,   
non-distended, no   
masses  
:  
MSK: No gross   
deformities appreciated  
Skin: Warm, dry. No   
rashes  
Neuro: Alert and   
oriented x4, GCS 15 ,   
CNs II-XII grossly   
intact. Sensation and  
motor function of   
extremities grossly   
intact.  
Psych: Appropriate mood   
and affect.  
  
  
HISTORY OF PRESENTING   
ILLNESS  
DANNA is a 21 year old   
Female and was seen by   
me at 2022 19:24   
for a  
chief complaint of   
vomiting (Patient   
complains of nausea,   
vomiting, fever and  
left sided abdominal   
pain for 2 days)(1).  
  
Triage Information:   
Most recent Vital Sign   
Value Date  
Temp (F): 99.8   
2022 19:46  
Temp (C): 37.6   
2022 19:46  
Heart Rate (beats/min):   
116 2022 19:46  
Respirations   
(breaths/min): 18   
2022 19:46  
SpO2 (%): 98 2022   
19:46  
BP Systolic (mm Hg):   
126 2022 19:46  
BP Diastolic (mm Hg):   
100 2022 19:46  
  
  
  
  
PAST MEDICAL HISTORY  
ALLERGIES/INTOLERANCES:   
Allergy  
Allergen: MiraLax  
Type: Drug  
Reaction:   
Hives/Urticaria  
  
Allergen: codeine  
Type: Drug  
Reaction: Unknown  
  
HEALTH HISTORY: No   
documented data.  
  
OUTPATIENT MEDICATIONS:   
Home Medications Review   
Status for   
Reconciliation: N/A  
Med Status: Patient   
Currently Takes   
Medications  
  
Drug Name: Vitamins for   
Hair oral tablet  
Instructions: 1 tab(s)   
orally once a day  
  
Drug Name: Prenatal   
Multivitamins with   
Folic Acid 1.25 mg oral   
capsule  
Instructions: 1 cap(s)   
orally once a day  
  
Drug Name:   
prochlorperazine 25 mg   
rectal suppository  
Instructions: 1   
suppository(ies)   
rectally 2 times a day  
  
Drug Name:   
cyclobenzaprine 5 mg   
oral tablet  
Instructions: 1 tab(s)   
orally 3 times a day,   
As Needed -for pain  
  
Drug Name: predniSONE   
50 mg oral tablet  
Instructions: 1 tab(s)   
orally once a day x 5   
days  
  
SIGNIFICANT EVENTS:   
Past Medical History  
Description:premature  
  
  
Description:delayed   
bone growth  
  
  
Description:Miscarriage  
  
  
Description:scoliosis  
  
  
  
  
CRITICAL CARE  
RESULTS:  
Recent Lab Results:  
  
I have reviewed these   
laboratory results:  
  
Hepatic Function Panel   
2022 20:16:00  
  
ResultValue  
Aspartate Transaminase,   
Serum 14  
ALB 5.2 H  
T Bili 0.6  
Bilirubin, Serum Direct   
- Conjugated 0.1  
ALKP 69  
Alanine   
Aminotransferase, Serum   
10  
T Pro 8.5 H  
  
Complete Blood Count +   
Differential   
2022 20:16:00  
  
ResultValue  
White Blood Cell Count   
9.3  
Nucleated Erythrocyte   
Count 0.1  
Red Blood Cell Count   
5.38 H  
HGB 15.1  
HCT 46.0  
MCV 85  
MCHC 32.7  
  
RDW-CV 14.3  
Neutrophil % 78.1  
Lymphocyte % 12.2  
Monocyte % 8.4  
Eosinophil % 0.7  
Basophil % 0.6  
Neutrophil Count 7.30  
Lymphocyte Count 1.10 L  
Monocyte Count 0.80  
Eosinophil Count 0.10  
Basophil Count 0.10  
  
Basic Metabolic Panel   
2022 20:16:00  
  
ResultValue  
Glucose, Serum 102 H  
  
K 3.4 L  
CL 99  
Bicarbonate, Serum 17 L  
Anion Gap, Serum 25 H  
BUN 11  
CREAT 0.75  
GFR Female >90  
Calcium, Serum 10.4 H  
  
Lipase, Serum   
2022 20:16:00  
  
ResultValue  
Lipase, Serum 16  
  
VITAL SIGNS:  
  
T PRBP SpO2O2(LPM)   
%FiO2 Method  
2022   
20:30:[id number]/67 96   
room air, no   
respiratory  
support  
2022   
20:00:[id number]/ 100   
room air, no   
respiratory  
support  
2022   
19:46:[id number]/  
00 98 room air, no   
respiratory  
support  
2022   
19:40:[id number]/  
00 98 room air, no   
respiratory  
support  
  
  
  
  
MDM  
MDM/ED COURSE:  
Patient has received 2   
L of fluid and some   
Phenergan. She has   
Zofran at home.  
Heart rate is improved.   
She is ready for   
discharge.  
  
  
  
  
DISPOSITION  
Diagnosis/Annotation:   
ED Dx  
Name:Nausea and   
vomiting  
Code:R11.2  
  
Name:Dehydration  
Code:E86.0  
  
Disposition: discharged  
Type: home  
  
  
CONSULT  
CRITICAL CARE TIME  
Is this a critically   
ill patient: no  
  
  
  
  
  
Electronic Signatures:  
Winston Gordillo)   
(Signed 2022   
22:27)  
Authored: ED Notes,   
HPI, PMH, PE,   
Results/Vital Signs,   
MDM/ED Course, Clinical  
Impression,   
Attestation, Chart   
Review, Scores  
  
  
Last Updated:   
2022 22:27 by   
Rand, Winston W (M (more   
content not   
included)...        Normal                                  [facility]  
   
                                                    Risk Screen - Adult Emergenc  
yon 2022   
   
                                                    Risk Screen - Adult   
Emergency                               Preferred Language:  
Preferred Language:  
Preferred Language for   
Discussing Health Care   
(patient/designee)Charleen patel  
  
Advanced Directives:  
Advance Directive/DNRno  
Advance Directive   
Information   
Givenpatient/family   
declined  
  
Family Violence Adult:  
Abuse Screen:  
Are you or have you   
been threatened or   
abused physically,   
emotionally, or  
sexually by anyoneno  
  
Learning Assessment   
(Patient):  
Learning Assessment   
(Patient):  
Patient is Able to be   
Assessed for   
Learningyes  
Factors Influencing   
Readiness to   
Learninterest in   
learning  
Factors that Impact   
Ability to Learnnone  
Devices/Methods Used to   
Communicatenone  
Learning   
Preferencesverbal   
instruction  
Cultural   
Considerationsnone  
Developmental   
Considerationsnone  
Temple   
Considerationsnone  
  
Learning Assessment   
(Other Learner):  
Learning Assessment   
(Other Learner):  
Other learner   
availableno  
  
Pressure   
Injury/TB/Substance:  
Pressure Injury:  
Pressure Injury Present   
on Admissionno  
Do you have a coughno  
Smoking Statusnever   
smoker  
Alcohol Usedenies  
Drug Usedenies  
  
  
Admission Risk Screen:  
Significant   
IndicatorsComplete  
  
CAGE:  
CAGE:  
Is this an injured   
patient at a Trauma   
Center   
([facility]/[place]  
a/[place]): no  
  
  
Electronic Signatures:  
Ashwini Wells (ERNA)   
(Signed 2022   
19:50)  
Authored: Preferred   
Language, Advanced   
Directives, Family   
Violence Adult,  
Learning Assessment   
(Patient), Learning   
Assessment (Other   
Learner), Pressure  
Injury/TB/Substance,   
Pressure Injury, CAGE  
  
  
Last Updated:   
2022 19:50 by   
Ashwini Wells (ERNA)  [facility]  
   
                                                    Triage - EDon 2022   
   
                                        Triage - ED         Quick Triage:  
Are You Pregnantno  
Have You Given Birth In   
The Last 6 Weeksno  
Are You Currently   
Breastfeedingno  
  
Chart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        SK-HKQNH-Sxf  
[address]  
Work Phone:   
[phone]  
   
                                        Comment on above:   SOURCE: Urine   
   
                                                    HIV Antigen/Antibody Screeno  
n 2020   
   
                                                    HIV Antigen/Antibody   
Screen          NONREACTIVE                     See Below       BR-CLYIU-Sri  
[address]  
Work Phone:   
[phone]  
   
                                        Comment on above:   SOURCE: Reference Ra  
nge: NONREACTIVE HIV Ag/Ab screen is   
performed using the [employer] HIV Ag/Ab Combo assay   
which detects the presence of HIV p24 antigen as well as   
antibodies to HIV-1 (Group M and O) and HIV-2.   
   
                                                            SOURCE: Reference Ra  
nge: NONREACTIVE Biotin interference may   
cause falsely decreased results. Patients taking a Biotin dose   
of up to 5 mg/day should refrain from taking Biotin for 24   
hours before sample collection. Providers may contact their   
local laboratory for further information.   
   
                                                            SOURCE: Reference Ra  
nge: NONREACTIVE Results from patients   
taking biotin supplements or receiving high-dose biotin   
therapy should be interpreted with caution due to possible   
interference with this test. Providers may contact their local   
laboratory for further information.   
   
                                                    Hematologyon 2020   
   
                      ABO group Nom (Bld) O                                MG-OB  
GYN-Scott  
[address]  
Work Phone:   
[phone]  
   
                                                    Blood group antibody   
screen Ql       Negative                                        JM-LDVFE-Sgv  
[address]  
Work Phone:   
[phone]  
   
                                                    Hemoglobin (Bld)   
[Mass/Vol]      96.8 %                                          GF-VZMWQ-Otn  
[address]  
Work Phone:   
[phone]  
   
                                                    Rh immune globulin   
screen (Bld) [Interp] Positive                                        MG-OBGYN-L  
an  
[address]  
Work Phone:   
[phone]  
   
                                                    Otheron 2020   
   
                                 2.8 %                            PH-NJYHH-Rge  
[address]  
Work Phone:   
[phone]  
   
                                        Comment on above:   HGB A2 values may be  
 falsely elevated in the presence of HGB   
S.   
   
                                 SEE COMMENT                       RW-ENVLA-Wwg  
[address]  
Work Phone:   
[phone]  
   
                                        Comment on above:   Normal   
   
                                 0.4 %                            HK-XMSOA-Gqb  
[address]  
Work Phone:   
[phone]  
   
                                 Not depressed                       MG-OBGYN-La  
n  
[address]  
Work Phone:   
[phone]  
   
                                 Never                            LC-TCGLH-Fre  
[address]  
Work Phone:   
[phone]  
   
                                 0 1                              HC-SJRUC-Awl  
[address]  
Work Phone:   
[phone]  
   
                                        Comment on above:   Q1: As much as I alw  
ays couldQ2: As much as I ever didQ3: No,   
neverQ4: No, not at allQ5: No, not at allQ6: No, I have been   
coping as well as everQ7: No, not at allQ8: No, not all allQ9:   
No, [id number]: Never   
   
                                 Negative              Negative   VT-BTIDB-Moi  
[address]  
Work Phone:   
[phone]  
   
                                        Comment on above:   SOURCE: Urine   
   
                                                    Rubella IgG Antibodyon    
   
                      Rubella virus IgG IA Ql Positive                         M  
G-OBGYN-Scott  
[address]  
Work Phone:   
[phone]  
   
                                        Comment on above:   SOURCE: INTERPRETATI  
VE COMMENT NEGATIVE: No IgG antibodies   
specific to Rubella detected. It is likely that the patient   
has not had a previous exposure to Rubella through infection   
or vaccination. Alternatively, the patient may have been   
exposed to Rubella but a failure to respond may indicate   
immunodeficiency. EQUIVOCAL:Equivocal results; obtain   
additional sample for retesting. POSITIVE: IgG antibody to   
Rubella detected. This may indicate that the patient was   
exposed to Rubella through infection or vaccination.The   
interpretation of serological tests should take into   
accountthe immunological status of the patient. Test results   
forpatients, including immunocompromised patients, neonates,   
andpediatric patients, reflect their capacity to   
respondimmunologically to the virus as well as their exposure   
to thepathogen. Patients treated with IVIG may demonstrate   
alteredresults in serological assays.   
   
                                                    SYPHILIS SCREENING WITH REFL  
EXon 2020   
   
                                                    T. pallidum IgG+IgM IA   
Ql (S)          NONREACTIVE                     See Below       [facility] [address]  
Work Phone:   
[phone]  
   
                                        Comment on above:   SOURCE: Reference Ra  
nge: NONREACTIVENo significant level of   
Treponema pallidum antibody detected. Repeat testing in 2 to 4   
weeks may be considered if early infection or incubating   
syphilis infection is suspected.   
   
                                                    Complete Blood Count + Diffe  
rentialon 2020   
   
                                                    Erythrocyte   
distribution width   
(RBC) [Ratio]   13.7 %                          See Below       [facility]  
Work Phone:   
[phone]  
   
                                        Comment on above:   Reference Range: 11.  
5 - 14.5   
   
                                                            Ordering Provider: BASILIA JACOBSON [id number]   
   
                                                    Hematocrit (Bld)   
[Volume fraction] 44.1 %                          See Below       BY-CHPHM-Cxv  
[facility]  
Work Phone:   
[phone]  
   
                                        Comment on above:   Reference Range: 36.  
0 - 46.0   
   
                                                            Ordering Provider: BASILIA JACOBSON [id number]   
   
                                                    Hemoglobin (Bld)   
[Mass/Vol]      14.6 g/dL                       See Below       HC-BGKAZ-Jad  
[facility]  
Work Phone:   
[phone]  
   
                                        Comment on above:   Reference Range: 12.  
0 - 16.0   
   
                                                            Ordering Provider: BASILIA Rivera146   
   
                      MCHC (RBC) [Mass/Vol] 33.1 g/dL             See Below  [facility]  
[facility]  
Work Phone:   
[phone]  
   
                                        Comment on above:   Reference Range: 32.  
0 - 36.0   
   
                                                            Ordering Provider: BASILIA JACOBSON [id number]   
   
                      MCV (RBC) [Entitic vol] 90 fL                 80 - 100   M  
[facility]  
[facility]  
Work Phone:   
[phone]  
   
                                        Comment on above:   Ordering Provider: BASILIA Rivera146   
   
                      Platelets (Bld) [#/Vol] 316 {x10E9/L}            150 - 450  
  KB-ZPADA-Ncb  
[facility]  
Work Phone:   
[phone]  
   
                                        Comment on above:   Ordering Provider: BASILIA Fong   
   
                      RBC (Bld) [#/Vol] 4.92 {x10E12/L}            See Below  MG  
-OBGYN-Scott  
[address]  
Work Phone:   
[phone]  
   
                                        Comment on above:   Reference Range: 4.0  
0 - 5.20   
   
                                                            Ordering Provider: BASILIA DELA CRUZANA ROSA KYLIE Fong   
   
                                WBC (Bld) [#/Vol] 17.4 {x10E9/L}  above high   
threshold                 4.4 - 11.3                ZE-UDQYX-Dvf  
[address]  
Work Phone:   
[phone]  
   
                                        Comment on above:   Ordering Provider: BASILIA Fong   
   
                                                    Complete Blood Count +   
Differential    SEE MANUAL DIFF                                 IH-SIQNO-Ugd  
[address]  
Work Phone:   
[phone]  
   
                                        Comment on above:   Ordering Provider: BASILIA DELA CRUZANA ROSA KYLIE Fong   
   
                                                    Coronavirus 2019 RNA by PCR,  
 Symptomaticon 2020   
   
                                                    Coronavirus 2019 RNA by   
PCR, Symptomatic NOT DETECTED                    See Below       KE-OOTME-Cgx  
[address]  
Work Phone:   
[phone]  
   
                                        Comment on above:   SOURCE: Nasal, Nasop  
haryngealReference Range: Not DetectedThis  
   
assay is designed to detect the N2 and E genes of SARS-CoV-2   
via nucleic acid amplification. A Not Detected result does not   
preclude COVID-19 infection since the adequacy of sample   
collection and/or low viral burden may result in presence of   
viral nucleic acids below the clinical sensitivity of this   
test method. Fact sheet for providers:   
www.fda.gov/media/156036/downloadFact sheet for patients:   
www.fda.gov/media/781473/downloadThis test has received FDA   
Emergency Use Authorization (EUA) and has been verified by   
[facility]. This test is   
only authorized for the duration of time that circumstances   
exist to justify the authorization of the emergency use of in   
vitro diagnostic tests for the detection of SARS-CoV-2 virus   
and/or diagnosis of COVID-19 infection under section 564(b)(1)   
of the Act, 21 U.S.C. 360bbb-3(b)(1), unless the authorization   
is terminated or revoked sooner. [facility] is certified under CLIA-88 as   
qualified to perform high complexity testing. Testing is   
performed in the [facility] laboratory located   
at [address] [place] [address].   
   
                                                            Ordering Provider: BASILIA JACOBSON [id number]   
   
                                                    HCG, Beta Quantitativeon    
   
                      [place] Qn [address] {mIU/mL} Abnormal              M  
G-OBGYN-Scott  
[address]  
Work Phone:   
[phone]  
   
                                        Comment on above:   Low-level positive H  
CG results can be seen in early pregnancy,  
   
in venus- or post-menopausal females due to normal pituitary   
HCG production, or with analytic interference. Repeat testing   
in 48-72 hours can aid in assessing for pregnancy as results   
should double in this time period. FSH measurement is   
recommended in venus- or post-menopausal females as concurrent   
elevation of FSH can support pituitary production as the   
source of the HCG elevation.. Total HCG measurement is   
performed using the [employer] Access Immunoassay which   
detects intact HCG and free beta HCG subunit. This test is not   
indicated for use as a tumor marker. HCG testing is performed   
using a different test methodology at [facility]   
than other [facility]. Direct result comparison should   
only be made within the same method. REF VALUESNONPREGNANT   
FEMALE <5MALES <5   
   
                                                            Ordering Provider: BASILIA JACOBSON [id number]   
   
                                                    Hematologyon 2020   
   
                                                    Band form   
neutrophils/100 WBC   
(Bld)           2.0 %                           0.0 - 5.0       EC-OQOPE-Agj  
[address]  
Work Phone:   
[phone]  
   
                                        Comment on above:   Ordering Provider: BASILIA Rivera146   
   
                      Basophils (Bld) [#/Vol] 0.00 {x10E9/L}            See Adelina oseguera  DO-PFQOM-Fto  
[address]  
Work Phone:   
[phone]  
   
                                        Comment on above:   Reference Range: 0.0  
0 - 0.10   
   
                                                            Ordering Provider: BASILIA JACOBSON 11399   
   
                      Basophils/100 WBC (Bld) 0.0 %                 0.0 - 2.0    
G-[facility]N-[facility]  
[address]  
Work Phone:   
[phone]  
   
                                        Comment on above:   Ordering Provider: BASILIA Rivera146   
   
                                                    Eosinophils (Bld)   
[#/Vol]         0.00 {x10E9/L}                  See Below       LI-IWMUR-Zun  
[address]  
Work Phone:   
[phone]  
   
                                        Comment on above:   Reference Range: 0.0  
0 - 0.70   
   
                                                            Ordering Provider: BASILIA Fong   
   
                                                    Eosinophils/100 WBC   
(Bld)           0.0 %                           0.0 - 6.0       AC-JOYEM-Gwn  
[address]  
Work Phone:   
[phone]  
   
                                        Comment on above:   Ordering Provider: BASILIA Fong   
   
                                                    Lymphocytes (Bld)   
[#/Vol]         1.22 {x10E9/L}                  See Below       VW-LTFNY-Vdb  
[address]  
Work Phone:   
[phone]  
   
                                        Comment on above:   Reference Range: 1.2  
0 - 4.80   
   
                                                            Ordering Provider: BASILIA Fong   
   
                                                    Lymphocytes/100 WBC   
(Bld)           7.0 %                           See Below       FC-AWXYR-Lam  
[address]  
Work Phone:   
[phone]  
   
                                        Comment on above:   Reference Range: 13.  
0 - 44.0   
   
                                                            Ordering Provider: BASILIA Fong   
   
                      Monocytes (Bld) [#/Vol] 0.70 {x10E9/L}            See Belo  
w  HY-AXSTS-Cdq  
[address]  
Work Phone:   
[phone]  
   
                                        Comment on above:   Reference Range: 0.1  
0 - 1.00   
   
                                                            Ordering Provider: BASILIA Fong   
   
                      Monocytes/100 WBC (Bld) 4.0 %                 2.0 - 10.0 M  
[facility]  
[address]  
Work Phone:   
[phone]  
   
                                        Comment on above:   Ordering Provider: BASILIA Fong   
   
                                                    Lactate, Levelon 2020   
   
                      Lactate [Moles/Vol] 1.7 mmol/L            0.4 - 2.0  MG-OB  
GYN-[facility]  
[address]  
Work Phone:   
[phone]  
   
                                        Comment on above:   Venipuncture immedia  
tely after or during the administration of  
   
Metamizole may lead to falsely low results. Testing should be   
performed immediately prior to Metamizole dosing.   
   
                                                            Ordering Provider: BASILIA Fong   
   
                                                    Metabolic Panelon 2020  
   
   
                                                    ALP [Catalytic   
activity/Vol]   48 U/L                          33 - 110        NE-MDWKX-Nsp  
[address]  
Work Phone:   
[phone]  
   
                                        Comment on above:   Ordering Provider: BASILIA Fong   
   
                                Anion gap [Moles/Vol] 24 mmol/L       above high  
   
threshold                 10 - 20                   MY-HTNJY-Soh  
[address]  
Work Phone:   
[phone]  
   
                                        Comment on above:   Ordering Provider: BASILIA  
CARMEN JACOBSON [address]   
   
                      Bilirubin [Mass/Vol] 0.8 mg/dL             0.0 - 1.2  MG-O  
BGYN-[facility]  
[address]  
Work Phone:   
[phone]  
   
                                        Comment on above:   Ordering Provider: BASILIA  
CARMEN JACOBSON [address]   
   
                                Calcium [Mass/Vol] 11.3 mg/dL      above high   
threshold                 8.6 - 10.3                NQ-PLEAJ-Dcl  
[address]  
Work Phone:   
[phone]  
   
                                        Comment on above:   Ordering Provider: BASILIA  
CARMEN JACOBSON [address]   
   
                      Chloride [Moles/Vol] 100 mmol/L            98 - 107   MG-O  
[facility]  
[address]  
Work Phone:   
[phone]  
   
                                        Comment on above:   Ordering Provider: BASILIA  
CARMEN JACOBSON [address]   
   
                                CO2 [Moles/Vol] 12 mmol/L       below low   
threshold                 21 - 32                   CI-GVILC-Dhs  
[address]  
Work Phone:   
[phone]  
   
                                        Comment on above:   Ordering Provider: BASILIA  
CARMEN JACOBSON [address]   
   
                      Creatinine [Mass/Vol] 0.73 mg/dL            See Below  MG-  
OB[facility]  
[address]  
Work Phone:   
[phone]  
   
                                        Comment on above:   Reference Range: 0.5  
0 - 1.05   
   
                                                            Ordering Provider: BASILIA  
CARMEN JACOBSON [address]   
   
                                Glucose [Mass/Vol] 123 mg/dL       above high   
threshold                 74 - 99                   HV-UMWTJ-Qyb  
[address]  
Work Phone:   
[phone]  
   
                                        Comment on above:   Ordering Provider: BASILIA  
CARMEN JACOBSON [address]   
   
                      Potassium [Moles/Vol] 3.7 mmol/L            3.5 - 5.3  MG-  
OB[facility]N-[facility]  
[address]  
Work Phone:   
[phone]  
   
                                        Comment on above:   Ordering Provider: BASILIA  
CARMEN JACOBSON [address]   
   
                                Protein [Mass/Vol] 8.6 g/dL        above high   
threshold                 6.4 - 8.2                 TN-LCIOU-Rat  
[address]  
Work Phone:   
[phone]  
   
                                        Comment on above:   Ordering Provider: BASILIA  
CARMEN Rivera146   
   
                                Sodium [Moles/Vol] 132 mmol/L      below low   
threshold                 136 - 145                 HQ-BQCFS-Jkx  
[address]  
Work Phone:   
[phone]  
   
                                        Comment on above:   Ordering Provider: BASILIA JACOBSON [address]   
   
                                                    Urea nitrogen   
[Mass/Vol]      11 mg/dL                        6 - 23          CU-VGTIZ-Ofr  
[address]  
Work Phone:   
[phone]  
   
                                        Comment on above:   Ordering Provider: BASILIA MORALES JACOBSON [address]56   
   
                                                    Otheron 2020   
   
                                                            Interpreted by: OLIVIA BREWER20   
08:56MRN:   
[id number] Name:   
DANNA SOARES   
STUDY:US PELVIS OB   
TRANSABDOMINAL WITH   
TRANSVAGINAL; ;   
2020 6:53 am   
INDICATION:Abdominal   
pain in pregnancy.   
Vomiting and   
right-sided pelvic   
pain.   
COMPARISON:2020.   
ACCESSION   
NUMBER(S):[id number]   
ORDERING   
CLINICIAN:MUNA JACOBSON   
TECHNIQUE:Grayscale   
ultrasound.   
FINDINGS:Uterus is   
retroflexed and   
measures 8.8 x 5.3 x   
6.7 cm. There is   
asingle live   
intrauterine pregnancy.   
Heart rate is 115 beats   
perminute. Gestational   
sac diameter is 1.75   
mm. Crown-rump length   
is0.32 cm. These   
correspond to a   
composite age of 6   
weeks and 1 day+ / -3   
days. LMP is unknown. A   
normal-size yolk sac is   
noted.Gestational sac   
has a normal   
configuration. There is   
a smallelongated   
hypoechoic area in the   
upper cervical canal   
measured at 9 x5 x 10   
mm probably   
representing small   
amount of blood. Cervix   
isotherwise close.   
Right ovary measures   
2.9 x 1.7 x 2.4 cm and   
contains a 1.8 x 1.3   
x1.6 cm slightly   
isoechoic area. This   
may represent a corpus   
luteum orhemorrhagic   
cyst. Vascular flow is   
present in the right   
ovary Left ovary   
measures 2.2 x 1.4 x   
1.2 cm and appears   
unremarkable.Vascular   
flow is present. No   
adnexal masses or free   
fluid. IMPRESSION:A   
single live   
intrauterine pregnancy   
corresponding to 6   
weeks and 1day + / -3   
days. A small   
hypoechoic area in the   
upper cervical canal   
suggesting asmall   
amount of blood. Cervix   
is otherwise close.   
Gestational sac hasa   
normal configuration.   
Approximately 1.8 cm   
isoechoic structure in   
the right ovary   
probablya corpus luteum   
or hemorrhagic cyst.   
Electronically signed   
by: OLIVIA BREWER 20   
08:56               Normal                                  ZC-HHVQH-Tgs  
[address]  
Work Phone:   
[phone]  
   
                                        Comment on above:   Ordering Provider: BASILIA Fong   
   
                                                    Albumin BCP dye   
[Mass/Vol]                5.4 g/dL                  above high   
threshold                 3.4 - 5.0                 CG-DHFGJ-Dtf  
[address]  
Work Phone:   
[phone]  
   
                                        Comment on above:   Ordering Provider: BASILIA Fong   
   
                                                    ALT With P-5'-P   
[Catalytic   
activity/Vol]   14 U/L                          7 - 45          US-KDGWF-Qcg  
[address]  
Work Phone:   
[phone]  
   
                                        Comment on above:   Patients treated wit  
h Sulfasalazine may generate falsely   
decreased results for ALT.   
   
                                                            Ordering Provider: BASILIA Fong   
   
                                                    AST With P-5'-P   
[Catalytic   
activity/Vol]   33 U/L                          9 - 39          RM-CHCGN-Yux  
[address]  
Work Phone:   
[phone]  
   
                                        Comment on above:   Ordering Provider: BASILIA Fong   
   
                                                    Segmented   
neutrophils/100 WBC   
(Bld)           87.0 %                          See Below       ML-EOOVG-Rkt  
[address]  
Work Phone:   
[phone]  
   
                                        Comment on above:   Reference Range: 40.  
0 - 80.0 Percent differential counts (%)   
should be interpreted in the context of the absolute cell   
counts (cells/L).   
   
                                                            Ordering Provider: BASILIA Fong   
   
                                                15.49 {x10E9/L} above high   
threshold                 See Below                 IB-HPLHR-Dns  
[address]  
Work Phone:   
[phone]  
   
                                        Comment on above:   Reference Range: 1.2  
0 - 7.70   
   
                                                            Ordering Provider: BASILIA Fong   
   
                                                15.14 {x10E9/L} above high   
threshold                 See Below                 VZ-IAVUE-Gba  
[address]  
Work Phone:   
[phone]  
   
                                        Comment on above:   Reference Range: 1.2  
0 - 7.00   
   
                                                            Ordering Provider: BASILIA Fong   
   
                                 0.35 {x10E9/L}            See Below  MG-OBGYN-L  
an  
[address]  
Work Phone:   
[phone]  
   
                                        Comment on above:   Reference Range: 0.0  
0 - 0.70   
   
                                                            Ordering Provider: BASILIA DELA CRUZANA ROSA JACOBSON [address]   
   
                                 NORMAL                           YQ-BUTWF-Loa  
[address]  
Work Phone:   
[phone]  
50  
   
                                        Comment on above:   Ordering Provider: BASILIA Fong   
   
                                 >60                   >60        NA-VJBKB-Vhv  
[address]  
Work Phone:   
[phone]  
50  
   
                                        Comment on above:   CALCULATIONS OF XIAO  
MATED GFR ARE PERFORMED USING THE MDRD   
STUDY EQUATION FOR THE IDMS-TRACEABLE CREATININE METHODS. CLIN   
CHEM 2007;53:766-72   
   
                                                            Ordering Provider: BASILIA MORALES JACOBSON [address]   
   
                                                            [email or web address]  
[email or web address]  
[email or web address]  
.dll?RetrieveTestByDate  
Time?[id number]  
9                                                           VQ-JWMHF-Gkv  
[address]  
Work Phone:   
[phone]  
50  
   
                                        Comment on above:   Ordering Provider: BASILIA dela cruzana rosa Jacobson Ajit   
   
                                                             Please see physicia  
n   
note for formal   
interpretation   
confirmed by Scribe                                          GO-WFQEH-Okh  
[address]  
Work Phone:   
[phone]  
50  
   
                                        Comment on above:   Ordering Provider: BASILIA dela cruzana rosa Jacobson Ajit   
   
                                 126 1                            ES-JOJRW-Vja  
[address]  
Work Phone:   
[phone]  
50  
   
                                        Comment on above:   Ordering Provider: BASILIA morales Jacobson Ajit   
   
                                 76 1                             BH-ZAAKZ-Neo  
[address]  
Work Phone:   
[phone]  
50  
   
                                        Comment on above:   Ordering Provider: BASILIA dela cruzana rosa Jacobson [address]   
   
                                 290 1                            TS-YCXBA-Fcr  
[address]  
Work Phone:   
[phone]  
50  
   
                                        Comment on above:   Ordering Provider: BASILIA dela cruzana rosa Jacobson [address]   
   
                                 420 1                            FX-PYVVL-Syz  
[address]  
Work Phone:   
[phone]  
50  
   
                                        Comment on above:   Ordering Provider: BASILIA dela cruzana rosa Jacobson [address]   
   
                                 74 1                             AE-AKRJI-Efa  
[address]  
Work Phone:   
[phone]  
50  
   
                                        Comment on above:   Ordering Provider: BASILIA dela cruzana rosa Jacobson [address]   
   
                                 89 1                             FA-UHGNK-Cwz  
[address]  
Work Phone:   
[phone]  
50  
   
                                        Comment on above:   Ordering Provider: BASILIA osunatrent Fong   
   
                                 31 1                             DB-PJEIP-Qfo  
[address]  
Work Phone:   
[phone]  
50  
   
                                        Comment on above:   Ordering Provider: BASILIA  
carmen Fong   
   
                                 21 1                             EV-ANNDU-Jpv  
[address]  
Work Phone:   
[phone]  
50  
   
                                        Comment on above:   Ordering Provider: BASILIA  
carmen Fong   
   
                                 219 1                            QJ-GZEBS-Hsi  
[address]  
Work Phone:   
[phone]  
50  
   
                                        Comment on above:   Ordering Provider: BASILIA  
carmen Fong   
   
                                 166 1                            PG-KOUKW-Mdt  
[address]  
Work Phone:   
[phone]  
50  
   
                                        Comment on above:   Ordering Provider: BASILIA  
carmen Fong   
   
                                 212 1                            FH-ZQSAO-Bgg  
[address]  
Work Phone:   
[phone]  
50  
   
                                        Comment on above:   Ordering Provider: BASILIA  
carmen Fong   
   
                                 364 1                            VN-HBFZM-Thr  
[address]  
Work Phone:   
[phone]  
50  
   
                                        Comment on above:   Ordering Provider: BASILIA  
carmen Fong   
   
                                 371 1                            RB-ZADUV-Pfb  
[address]  
Work Phone:   
[phone]  
50  
   
                                        Comment on above:   Ordering Provider: BASILIA Fong   
   
                                                    URINALYSIS WITH CULTURE IF I  
NDICATEDon 2020   
   
                      Appearance (U) CLEAR                 CLEAR      MG-OBGYN-L  
an  
[address]  
Work Phone:   
[phone]  
50  
   
                                        Comment on above:   Ordering Provider: BASILIA Fong   
   
                      Color (U)  Straw                 See Below  HJ-LGRKN-Dyz  
[address]  
Work Phone:   
[phone]  
50  
   
                                        Comment on above:   SOURCE: Reference Ra  
nge: STRAW,YELLOW   
   
                                                            Ordering Provider: BASILIA Rivera146   
   
                      Glucose Ql (U) Negative              NEGATIVE   MG-OBGYN-L  
an  
[address]  
Work Phone:   
[phone]  
50  
   
                                        Comment on above:   Ordering Provider: BASILIA JACOBSON [address]   
   
                      Ketones Ql (U) 80(2+)     Abnormal   NEGATIVE   MG-OBGYN-L  
an  
[address]  
Work Phone:   
[phone]  
50  
   
                                        Comment on above:   Ordering Provider: BASILIA Rivera146   
   
                                                    Leukocyte esterase Test   
strip Ql (U)    Negative                        NEGATIVE        LM-QPOJH-Pkb  
[address]  
Work Phone:   
[phone]  
50  
   
                                        Comment on above:   Ordering Provider: BASILIA Rivera146   
   
                      pH (U)     6.0 [pH]              5.0 - 8.0  EB-ANNAB-Qxa  
[address]  
Work Phone:   
[phone]  
   
                                        Comment on above:   Ordering Provider: BASILIA Fong   
   
                      Protein (U) [Mass/Vol] Negative              NEGATIVE   MG  
-OBGYN-Scott  
[address]  
Work Phone:   
[phone]  
   
                                        Comment on above:   Ordering Provider: BASILIA Rivera146   
   
                      RBC (U) [#/Vol] Negative              NEGATIVE   MG-OBGYN-  
Scott  
[address]  
Work Phone:   
[phone]  
   
                                        Comment on above:   Ordering Provider: BASILIA Fong   
   
                                                    Specific gravity (U)   
[Rel density]   1.014 1                         See Below       CQ-DUJRC-Dmw  
[address]  
Work Phone:   
[phone]  
   
                                        Comment on above:   Reference Range: 1.0  
05 - 1.035   
   
                                                            Ordering Provider: BASILIA Rivera146   
   
                                                    URINALYSIS WITH CULTURE   
IF INDICATED    Negative                        NEGATIVE        BD-OYWTU-Dds  
[address]  
Work Phone:   
[phone]  
   
                                        Comment on above:   Ordering Provider: BASILIA Rivera146   
   
                                                    URINALYSIS WITH CULTURE   
IF INDICATED    <2.0                            0.0 - 1.9       GK-WCHKX-Jwe  
[address]  
Work Phone:   
[phone]  
   
                                        Comment on above:   Ordering Provider: BASILIA Rivera146   
   
                                                    CBC WITH AUTO DIFFERENTIALon  
 2020   
   
                      Basophils (Bld) [#/Vol] 0.07 10*3/uL                        
 [facility]  
   
                      Basophils/100 WBC (Bld) 0.6 %                            [facility]  
   
                                                    Eosinophils (Bld)   
[#/Vol]         0.01 10*3/uL                                    [facility]  
   
                                                    Eosinophils/100 WBC   
(Bld)           0.1 %                                           [facility]  
   
                                                    Erythrocyte   
distribution width   
(RBC) [Entitic vol] 12.5 %                                  11.6 - 14.8   
%                                       [facility]  
   
                                                    Hematocrit (Bld)   
[Volume fraction] 43.3 %                          36 - 46 %       [facility]  
   
                                                    Hemoglobin (Bld)   
[Mass/Vol]      14.5 g/dL                       12 - 16 g/dL    [facility]  
   
                                                    Immature granulocytes   
(Bld) [#/Vol]   0.06 10*3/uL                                    [facility]  
   
                                                    Immature   
granulocytes/100 WBC   
(Bld)           0.50 %                                          [facility]  
   
                                        Comment on above:   The IG parameter is   
the percentage of metamyelocytes,   
myelocytes and promyelocytes. An immature granulocyte count   
(IG) of 1% or more suggests the possibility of infection, an   
IG count of 3% is very likely related to an infection.   
   
                                                    Interpretation and   
review of laboratory   
results         Abnormal                                        [facility]  
   
                                                    Lymphocytes (Bld)   
[#/Vol]         0.47 10*3/uL    Low                             [facility]  
   
                                                    Lymphocytes/100 WBC   
(Bld)           3.8 %                                           [facility]  
   
                                                    MCH (RBC) [Entitic   
mass]           29.2 pg                         25 - 35 pg      [facility]  
   
                      MCHC (RBC) [Mass/Vol] 33.5 g/dL             31 - 37 g/dL [facility]  
[facility]  
   
                      MCV (RBC) [Entitic vol] 87.3 fL               78 - 102 fL   
[facility]  
   
                      Monocytes (Bld) [#/Vol] 0.29 10*3/uL Low                    
 [facility]  
   
                      Monocytes/100 WBC (Bld) 2.4 %                            [facility]  
[facility]  
   
                                                    Neutrophils (Bld)   
[#/Vol]         11.39 10*3/uL   High                            [facility]  
   
                                                    Neutrophils/100 WBC   
(Bld)           92.6 %                                          [facility]  
   
                                                    Nucleated RBC (Bld)   
[#/Vol]         0.00 10*3/uL                                    [facility]  
   
                                                    Nucleated RBC/100 WBC   
(Bld) [Ratio]   0.0 %                                           [facility]  
   
                                                    Platelet mean volume   
(Bld) [Entitic vol] 13.2 fL             High                9.4 - 12.4   
fL                                      [facility] [facility]  
   
                      Platelets (Bld) [#/Vol] 307 10*3/uL                         
[facility]  
   
                      RBC (Bld) [#/Vol] 4.96 10*6/uL                       [facility]  
ealth  
   
                      WBC (Bld) [#/Vol] 12.29 10*3/uL [facility]  
   
                                                    COVID-19, Molecularon 2020   
   
                                                    Interpretation and   
review of laboratory   
results         Normal                                          [facility]  
   
                      SARS-CoV-2 Not Detected            Not Detected [facility]  
   
                                        Comment on above:   This test was perfor  
med under the FDA's Emergency Use   
Authorization (EUA). Testing was performed using the Abbott ID   
NOW COVID-19 assay on the ID NOW platform.  
  
This test has not been approved for use in asymptomatic   
patients and its performance in this patient population has   
not been evaluated. Negative results do not rule out the   
presence of SARS-CoV-2/COVID-19.  
  
Fact sheets for the EUA can be found at the following links:  
For Healthcare Providers:   
https://www.fda.gov/media/029669/download  
For Patients: https://www.fda.gov/media/321841/download  
  
   
   
                                                    Comprehensive Metabolic Pane  
benjamin 2020   
   
                          Albumin [Mass/Vol] 4.6 g/dL     High         3.2 - 4.5  
   
g/dL                                    [facility]  
   
                                                    ALP [Catalytic   
activity/Vol]   56 U/L                          40 - 140 U/L    [facility]  
   
                                                    ALT [Catalytic   
activity/Vol]   20 U/L                          14 - 65 U/L     [facility]  
   
                          Anion gap [Moles/Vol] 22 mmol/L    High         10 - 2  
0   
mmol/L                                  [facility]  
   
                                                    AST [Catalytic   
activity/Vol]   42 U/L                          0 - 45 U/L      [facility]  
   
                          Bilirubin [Mass/Vol] 0.8 mg/dL                 0 - 1.3  
   
mg/dL                                   [facility]  
   
                          Calcium [Mass/Vol] 9.7 mg/dL                 8.4 - 10.  
2   
mg/dL                                   [facility]  
   
                          Chloride [Moles/Vol] 106 mmol/L                98 - 10  
8   
mmol/L                                  [facility]  
   
                      Creatinine [Mass/Vol] 0.64 mg/dL            0.50 - 1.00 [facility]  
   
                                                    GFR/1.73 sq M predicted   
among non-blacks MDRD   
(S/P/Bld) [Vol   
rate/Area]                              The eGFR should be used   
for monitoring renal   
function only and not   
for medication dosing.                                         [facility]  
   
                                                    GFR/1.73 sq M.predicted   
CKD-EPI (S/P/Bld) [Vol   
rate/Area]          131                                     >=60   
mL/min/1.73   
m2                                      [facility]  
   
                          Glucose [Mass/Vol] 96 mg/dL                  65 - 99   
mg/dL                                   [facility]  
   
                          HCO3 [Moles/Vol] 10 mmol/L    Low          21 - 32   
mmol/L                                  [facility]  
   
                          Potassium [Moles/Vol] 4.3 mmol/L                3.5 -   
5.1   
mmol/L                                  [facility]  
   
                      Protein [Mass/Vol] 8.6 g/dL   High       6 - 8 g/dL [facility]  
alth  
   
                          Sodium [Moles/Vol] 134 mmol/L   Low          135 - 145  
   
mmol/L                                  [facility]  
   
                                                    Urea nitrogen   
[Mass/Vol]      6 mg/dL         Low             8 - 25 mg/dL    [facility]  
   
                                                    Urea   
nitrogen/Creatinine   
[Mass ratio]    9.4 mg/mg       Low                             [facility]  
   
                                                    Otheron 2020   
   
                      Extra Tube Hold for add-ons.                       [facility]  
   
                                        Comment on above:   Auto resulted.   
   
                                                    Interpretation and   
review of laboratory   
results         Abnormal                                        [facility]  
   
                                                            Interpreted by: MAC STEARNS20   
04:26STUDY:Pelvic   
Ultrasound; 2020   
4:03 AM   
INDICATION:RLQ/right   
pelvic pain, nausea,   
vomiting. COMPARISON:US   
pelvis 2020, CT   
AP 2020.   
ACCESSION   
NUMBER(S):[id number]   
ORDERING   
CLINICIAN:HARMAN RIOS MD TECHNIQUE:   
Ultrasound imaging of   
the pelvis was   
performed   
bothtransabdominally   
and transvaginally.   
FINDINGS: The uterus   
measures 8.4 x 5.9 x   
6.2 cm. It demonstrates   
anormal, homogeneous   
echotexture. Single   
intrauterine gestation   
isidentified. Fetal   
pole is identified   
measuring 2 mm in   
diametercorresponding   
to a gestational age of   
5 weeks 5 days with   
fetal heartrate of 106   
bpm. Yolk sac is   
identified. Small   
subchorionichemorrhage   
is again noted,   
unchanged. The right   
ovary measures 3.5 x   
1.7 x 2.3 cm. The right   
ovary onceagain   
demonstrates an   
echogenic 1.8 x 1.5 x   
1.8 cm lesion. The   
rightovary demonstrates   
normal color Doppler   
flow. Small amount of   
simplefree fluid is   
seen in the region of   
the right ovary. The   
left ovary measures 2.7   
x 1.6 x 1.5 cm. It   
demonstrates a   
normalechotexture. The   
left ovary demonstrates   
normal color Doppler   
flow. No significant   
fluid is present within   
the cul-de-sac.   
IMPRESSION: No   
significant interval   
change. Single viable   
intrauterine   
gestation,approximately   
5 weeks 5 days   
gestational age with   
fetal heart rate of106   
bpm.Small subchorionic   
hemorrhage.Stable 1.8   
cm hypoechoic lesion in   
the right ovary,   
possiblyendometrioma or   
hemorrhagic cyst.   
Signed by Mac StearnsElectronically   
signed by: MAC STEARNS   
20 04:26      Normal                                  [facility]  
Work Phone:   
[phone]  
   
                                        Comment on above:   Ordering Provider: BASILIA RIOS [address]   
   
                                                    hCG, Blood, QUANTitativeon 0  
2020   
   
                                                    Beta HCG (pregnancy   
test) Ql (U)                            Males and nonpregnant   
females: <5 mIU/mL   
Females during   
pregnancy: 3-4 weeks   
9-130 mIU/mL 4-5 weeks   
 mIU/mL 5-6   
weeks 850-20,800 mIU/mL   
6-7 weeks 4000-100,200   
mIU/mL 7-12 weeks   
11,500-289,000 mIU/mL   
12-16 weeks   
18,300-137,000 mIU/mL   
16-29 weeks   
1,400-53,000 mIU/mL   
29-41 weeks 940-60,000   
mIU/mL                                                      [facility]  
   
                      HCG Qn     [address]72 m[IU]/mL High                  [facility]  
   
                                                    Otheron 2020   
   
                                                            Interpreted by: YO NELSON20   
00:38MRN:   
[id number] Name:   
DANNA SOARES   
STUDY:US PELVIS OB   
TRANSABDOMINAL WITH   
TRANSVAGINAL; 2020   
12:19 am   
INDICATION:pelvic pain.   
COMPARISON:None.   
ACCESSION   
NUMBER(S):[id number]   
ORDERING   
CLINICIAN:ANÍBAL GUSTAFSON   
TECHNIQUE:Grayscale and   
color Doppler   
transabdominal and   
transvaginal imagesof   
the pelvis.   
FINDINGS:Transabdominal   
images are essentially   
nondiagnostic due to   
lack ofbladder   
distention.Uterus: The   
uterus measures 8.6 x   
5.2 x 5.3 cm. There is   
anintrauterine   
gestational sac with   
normal appearing yolk   
sac. Mean sacdiameter   
is 15 mm, corresponding   
to 5 weeks 6 days   
gestational age.There   
is a small echogenic   
focus adjacent to the   
yolk sac, likely atiny   
distal pole. This   
measures 1 mm, which is   
too small to   
calculategestational   
age. Cardiac activity   
is not detected, likely   
due tosmall size. A 9 x   
10 x 8 mm hypoechoic   
nonvascular focus   
adjacent tothe   
gestational sac is   
consistent with a small   
periimplantationalbleed  
. Ovaries: The right   
ovary measures 3 x 3.1   
x 3 cm. There is a   
stable1.9 x 1.6 x 2 cm   
echogenic mass in the   
right ovary with   
diffuseinternal echoes   
and posterior acoustic   
enhancement, suggesting   
anendometrioma.. The   
left ovary measures 2.5   
x 1.3 x 1.2 cm and   
appearsnormal. No   
adnexal mass is seen.   
Cul de sac: Small   
amount of simple free   
fluid is nonspecific   
and maybe physiologic.   
IMPRESSION:Intrauterine   
gestational sac with   
normal appearing yolk   
sac andpossible small   
fetal pole. Gestational   
age is 5 weeks 6 days   
by meansac diameter. A   
short-term follow-up   
ultrasound may be   
considered toconfirm   
viability. Small amount   
of simple free pelvic   
fluid is nonspecific   
and may bephysiologic.   
1.9 cm hypoechoic focus   
in the right ovary is   
stable from   
10/17/2019,with imaging   
features suggesting an   
endometrioma.   
Differentialconsiderati  
ons include a   
hemorrhagic   
cyst.Electronically   
signed by: ROSA ELENA NELSON 20 00:38 Normal                                  [facility]  
[address]  
Work Phone:   
[phone]  
   
                                        Comment on above:   Ordering Provider: SARAH GUSTAFSON [address]   
   
                                                    Complete Blood Count + Diffe  
cristian 2020   
   
                      Basophils (Bld) [#/Vol] 0.10 {x10E9/L}            See Belo  
w  U[facility]AAZOM[facility]Ssa  
[address]  
Work Phone:   
[phone]  
   
                                        Comment on above:   Reference Range: 0.0  
0 - 0.10   
   
                                                            Ordering Provider: BASILIA RUTH [address]   
   
                      Basophils/100 WBC (Bld) 0.8 %                 0.0 - 2.0  BASILIA  
[facility]  
[address]  
Work Phone:   
[phone]  
   
                                        Comment on above:   Ordering Provider: BASILIA RUTH [address]   
   
                                                    Eosinophils (Bld)   
[#/Vol]         0.00 {x10E9/L}                  See Below       MT-IFZLJ[facility]Szw  
[address]  
Work Phone:   
[phone]  
   
                                        Comment on above:   Reference Range: 0.0  
0 - 0.70   
   
                                                            Ordering Provider: BASILIA RUTH [address]   
   
                                                    Eosinophils/100 WBC   
(Bld)           0.1 %                           0.0 - 6.0       MG-PNOQX-Dhi  
[address]  
Work Phone:   
[phone]  
   
                                        Comment on above:   Ordering Provider: BASILIA RUTH [address]   
   
                                                    Erythrocyte   
distribution width   
(RBC) [Ratio]   13.4 %                          See Below       PX-JLHZH-Vqo  
[address]  
Work Phone:   
[phone]  
   
                                        Comment on above:   Reference Range: 11.  
5 - 14.5   
   
                                                            Ordering Provider: BASILIA RUTH [address]   
   
                                                    Hematocrit (Bld)   
[Volume fraction] 43.8 %                          See Below       GF-ENUQX-Cmg  
[address]  
Work Phone:   
[phone]  
   
                                        Comment on above:   Reference Range: 36.  
0 - 46.0   
   
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  
  
Attending Note  
Attestation: I saw and   
evaluated the patient.   
I personally obtained   
the key and critical   
portions of the history   
and physical exam or   
was physically present   
for key and critical   
portions performed by   
the attendee. I   
reviewed the attendee's   
documentation and   
discussed the patient   
with the attendee. I   
agree with the   
attendee's medical   
decision making as   
documented on the   
attendee's note with   
the exception/addition   
of the following:  
Comments/Additional   
Findings: AUB,   
depression,   
contraception  
  
Signatures  
Electronically signed   
by : Jerri Chong MD;   
2020 6:22PM EST   
(Co-author)  
Electronically signed   
by : Kanchan Nails MD;   
Aug 1 2020 10:39PM EST   
(Author)            Normal                                     
[employer] 2019   
   
                                        [facility]       HNO ID: [id number]  
Author: Cee Mckenna)   
ARI Parra  
Service: ?  
Author Type: Clinical   
Technician  
Type: [facility]  
Filed: 2019 10:30   
AM  
Note Text:  
Radiology Service   
Progress Note  
PATIENT NAME: Danna Soares  
MRN: [id number]  
DATE OF SERVICE:   
2019  
TIME: 10:29 AM  
PATIENT IDENTITY   
VERIFICATION COMPLETED   
USING TWO (2)   
IDENTIFIERS: Name  
and Date of Birth   
confirmed by patient   
verbally.  
PATIENT GENDER DATA:   
Female. Pregnancy   
status: Pregnant: No  
Breastfeeding status:   
NO.  
PATIENT RELEVANT   
IMPLANT DATA REVIEWED:   
Not Applicable  
RADIOLOGY DEPARTMENT:   
Ultrasound  
PERIPHERAL IV DATA: Not   
applicable  
SIGNED BY: ARI Castro  
2019 10:29   
AM                  [facility]  
   
                                                    Basic Metabolic Panlon    
   
                                        Anion gap [Moles/Vol] Unable to calculat  
e   
because one or more   
components used in   
calculation is outside   
assay range.        Normal              9-18                [facility]  
   
                                        Comment on above:   Performed By: #### C  
BCDIF, BMP ####  
[facility]  
[address] [facility] [address]  
[phone]   
   
                      Calcium [Mass/Vol] 8.7 mg/dL  Normal     8.4-10.2   [facility]  
   
                                        Comment on above:   Result Comment: Refe  
rence ranges were not locally established   
for this patient's age group. The normal values are based on   
the following source: Calcium (Gen. 2) (package insert v3.0   
English). [employer], [place], [place], 2013.   
   
                                                            Performed By: #### C  
BCDIF, ELLEN ####  
[facility]  
[address] [facility] [address]  
[phone]   
   
                      Chloride [Moles/Vol] 103 mmol/L Normal          [facility]  
   
                                        Comment on above:   Performed By: #### C  
BCDIF, BMP ####  
[facility]  
[address] [facility]  
[phone]   
   
                                        CO2 [Moles/Vol]     Unable to assay.   
Specimen hemolyzed. Normal              22-30               [facility]  
   
                                        Comment on above:   Performed By: #### C  
BCDIF, BMP ####  
[facility]  
[address] [facility]  
[phone]   
   
                      Creatinine [Mass/Vol] 0.48 mg/dL Low        0.58-0.96  [facility]  
   
                                        Comment on above:   Performed By: #### C  
BCDIF, BMP ####  
[facility]  
[address] [facility]  
[phone]   
   
                      Glucose [Mass/Vol] 111 mg/dL  High       74-99      [facility]  
   
                                        Comment on above:   Result Comment: Refe  
rence ranges for this patient's age group   
have not been established. These reference ranges reflect   
verified or established ranges for the adult population.   
Interpret these ranges with caution using the clinical context   
and additional reference resources.  
The American Diabetes Association (ADA) provides guidance for   
cutoff values for fasting glucose and random glucose. The ADA   
defines fasting as no caloric intake for at least 8 hours.   
Fasting plasma glucose results between 100 to 125 mg/dL   
indicate increased risk for diabetes (prediabetes).  
Fasting plasma glucose results greater than or equal to 126   
mg/dL meet the criteria for diagnosis of diabetes. In the   
absence of unequivocal hyperglycemia, results should be   
confirmed by repeat testing. In a patient with classic   
symptoms of hyperglycemia or hyperglycemic crisis, random   
plasma glucose results greater than or equal to 200 mg/dL meet   
the criteria for diagnosis of diabetes.  
Reference: Standards of Medical Care in Diabetes 2016,   
American Diabetes Association. Diabetes Care. 2016.39(Suppl   
1).   
   
                                                            Performed By: #### C  
BCDIF, BMP ####  
[facility]  
[address] [facility]  
[phone]   
   
                                        Potassium [Moles/Vol] Unable to assay.   
Specimen hemolyzed. Normal              3.7-5.1             [facility]  
   
                                        Comment on above:   Result Comment: Call  
ed to and read back by:  
STEPHANIA MIGUEL IN ER 19 1030   
   
                                                            Performed By: #### C  
RAFAEL, ELLEN ####  
[facility]  
[address] [facility]  
[phone]   
   
                      Sodium [Moles/Vol] 135 mmol/L Low        136-144    [facility]  
   
                                        Comment on above:   Performed By: #### C  
BCDIF, BMP ####  
[facility]  
[address] [facility]  
[phone]   
   
                                                    Urea nitrogen   
[Mass/Vol]      7 mg/dL         Normal          5-18            [facility]  
   
                                        Comment on above:   Result Comment: Refe  
rence ranges were not locally established   
for this patient's age group. The normal values are based on   
the following source: Urea/BUN (package insert v7.0 English).   
[employer], [place], [place], 2015.   
   
                                                            Performed By: #### C  
ELLEN HALL ####  
[facility]  
[address] [facility]  
[phone]   
   
                                                    Beta HCG Quant, EDon   
019   
   
                      Beta HCG Quant, .5 mU/mL High       <5.0       [facility]  
   
                                        Comment on above:   Result Comment: NAHEED  
TITATIVE HCG NORMAL RANGES  
Weeks of Gestation (Weeks Since LMP)  
3 Weeks (5.8-71.2 mIU/mL)  
4 Weeks (9.5-750 mIU/mL)  
5 Weeks (217-7138 mIU/mL)  
6 Weeks (158-48739 mIU/mL)  
7 Weeks (3697-620065 mIU/mL)  
8 Weeks (14290-906903 mIU/mL)  
9 Weeks (46612-550813 mIU/mL)  
10 Weeks (10693-609028 mIU/mL)  
12 Weeks (60207-331648 mIU/mL)  
Referenced to 4th IS of [facility]   
   
                                                            Performed By: #### K  
1, HCGED ####  
[facility]  
[address] [facility]  
[phone]   
   
                                                    CBC and Differentialon    
   
                      Abs Baso   0.03 k/uL  Normal     <0.11      [facility]  
   
                                        Comment on above:   Performed By: #### C  
BCDIF, BMP ####  
[facility]  
[address] [facility]  
[phone]   
   
                      Abs Mono   0.66 k/uL  Normal     <0.87      [facility]  
   
                                        Comment on above:   Performed By: #### C  
BCDIF, BMP ####  
[facility]  
 [place] [address]   
   
                      Abs Neut   5.14 k/uL  Normal     1.45-7.50  [facility]  
   
                                        Comment on above:   Performed By: #### C  
BCDIF, BMP ####  
[facility]  
 [address]   
   
                      Basophils/100 WBC (Bld) 0.4 %      Normal                [facility]  
   
                                        Comment on above:   Performed By: #### C  
BCDIF, BMP ####  
[facility]  
 [address]   
   
                                                    Eosinophils (Bld)   
[#/Vol]         0.06 10*3/uL    Normal          <0.46           [facility]  
   
                                        Comment on above:   Performed By: #### C  
BCDIF, BMP ####  
[facility]  
 [address]   
   
                                                    Eosinophils/100 WBC   
(Bld)           0.8 %           Normal                          [facility]  
   
                                        Comment on above:   Performed By: #### C  
BCDIF, BMP ####  
[facility]  
[address] [place] [address]   
   
                                                    Erythrocyte   
distribution width   
(RBC) [Ratio]   13.7 %          Normal          11.5-15.0       [facility]  
   
                                        Comment on above:   Performed By: #### C  
BCDIF, BMP ####  
[facility]  
[address] [place] [address]   
   
                                                    Hematocrit (Bld)   
[Volume fraction] 32.4 %          Low             36.0-46.0       [facility]  
   
                                        Comment on above:   Performed By: #### C  
BCDIF, BMP ####  
[facility]  
 [address]   
   
                                                    Hemoglobin (Bld)   
[Mass/Vol]      10.4 g/dL       Low             11.5-15.5       [facility]  
   
                                        Comment on above:   Performed By: #### C  
BCDIF, BMP ####  
[facility]  
 [address]   
   
                                                    Lymphocytes (Bld)   
[#/Vol]         1.77 10*3/uL    Normal          1.00-4.00       [facility]  
   
                                        Comment on above:   Performed By: #### C  
BCDIF, BMP ####  
[facility]  
 [address]  
[phone]   
   
                                                    Lymphocytes/100 WBC   
(Bld)           23.1 %          Normal                          [facility]  
   
                                        Comment on above:   Performed By: #### C  
BCDIF, BMP ####  
[facility]  
 [place] [address]   
   
                                                    MCH (RBC) [Entitic   
mass]           29.0 pG         Normal          26.0-34.0       [facility]  
   
                                        Comment on above:   Performed By: #### C  
BCDIF, BMP ####  
[facility]  
 [place] [address]   
   
                      MCHC (RBC) [Mass/Vol] 32.1 g/dL  Normal     30.5-36.0  [facility]  
   
                                        Comment on above:   Performed By: #### C  
BCDIF, BMP ####  
[facility]  
 [place] [address]   
   
                      MCV (RBC) [Entitic vol] 90.3 fL    Normal     80.0-100.0 [facility]  
   
                                        Comment on above:   Performed By: #### C  
BCDIF, BMP ####  
[facility]  
 [address]   
   
                      Monocytes/100 WBC (Bld) 8.6 %      Normal                [facility]  
   
                                        Comment on above:   Performed By: #### C  
BCDIF, BMP ####  
[facility]  
 [address]   
   
                                                    Neutrophils/100 WBC   
(Bld)           67.1 %          Normal                          [facility]  
   
                                        Comment on above:   Performed By: #### C  
BCDIF, BMP ####  
[facility]  
 [place] [address]   
   
                                                    Platelet mean volume   
(Bld) [Entitic vol] 11.8 fL         Normal          9.0-12.7        [facility]  
   
                                        Comment on above:   Performed By: #### C  
BCDIF, BMP ####  
[facility]  
 [place] [address]   
   
                      Platelets (Bld) [#/Vol] 224 10*3/uL Normal     150-400      
[facility]  
   
                                        Comment on above:   Performed By: #### C  
BCDIF, BMP ####  
[facility]  
 [place] [phone]   
   
                      RBC (Bld) [#/Vol] 3.59 10*6/uL Low        3.90-5.20  [facility]  
   
                                        Comment on above:   Performed By: #### C  
BCDIF, BMP ####  
[facility]  
 [address]   
   
                      WBC (Bld) [#/Vol] 7.66 10*3/uL Normal     3.70-11.00 [facility]  
   
                                        Comment on above:   Performed By: #### C  
BCDIF, BMP ####  
[facility]  
 [place] [address]   
   
                      Abs Baso   0.04 k/uL  Normal     <0.11      [facility]  
   
                                        Comment on above:   Performed By: #### C  
BCDIF, BMP ####  
[facility]  
 [place] [address]   
   
                      Abs Mono   0.86 k/uL  Normal     <0.87      [facility]  
   
                                        Comment on above:   Performed By: #### C  
BCDIF, BMP ####  
[facility]  
 [place] [address]   
   
                      Abs Neut   6.68 k/uL  Normal     1.45-7.50  [facility]  
   
                                        Comment on above:   Performed By: #### C  
BCDIF, BMP ####  
[facility]  
 [place] [address]   
   
                      Basophils/100 WBC (Bld) 0.5 %      Normal                [facility]  
   
                                        Comment on above:   Performed By: #### C  
BCDIF, BMP ####  
[facility]  
 [place] [address]   
   
                                                    Eosinophils (Bld)   
[#/Vol]         0.11 10*3/uL    Normal          <0.46           [facility]  
   
                                        Comment on above:   Performed By: #### C  
BCDIF, BMP ####  
[facility]  
[address] [place] [address]   
   
                                                    Eosinophils/100 WBC   
(Bld)           1.2 %           Normal                          [facility]  
   
                                        Comment on above:   Performed By: #### C  
BCDIF, BMP ####  
[facility]  
[address] [place] [address]   
   
                                                    Erythrocyte   
distribution width   
(RBC) [Ratio]   21.3 %          High            11.5-15.0       [facility]  
   
                                        Comment on above:   Performed By: #### C  
BCDIF, BMP ####  
[facility]  
 [address]   
   
                                                    Hematocrit (Bld)   
[Volume fraction] 39.4 %          Normal          36.0-46.0       [facility]  
   
                                        Comment on above:   Performed By: #### C  
BCDIF, BMP ####  
[facility]  
 [facility]  
[phone]   
   
                                                    Hemoglobin (Bld)   
[Mass/Vol]      11.9 g/dL       Normal          11.5-15.5       [facility]  
   
                                        Comment on above:   Performed By: #### C  
BCDIF, BMP ####  
[facility]  
 [facility]  
[phone]   
   
                                                    Lymphocytes (Bld)   
[#/Vol]         1.17 10*3/uL    Normal          1.00-4.00       [facility]  
   
                                        Comment on above:   Performed By: #### C  
BCDIF, BMP ####  
[facility]  
 [place] [address]   
   
                                                    Lymphocytes/100 WBC   
(Bld)           13.2 %          Normal                          [facility]  
   
                                        Comment on above:   Performed By: #### C  
BCDIF, BMP ####  
[facility]  
 [facility]  
[phone]   
   
                                                    MCH (RBC) [Entitic   
mass]           29.7 pG         Normal          26.0-34.0       [facility]  
   
                                        Comment on above:   Performed By: #### C  
BCDIF, BMP ####  
[facility]  
 [facility]  
[phone]   
   
                      MCHC (RBC) [Mass/Vol] 30.2 g/dL  Low        30.5-36.0  [facility]  
   
                                        Comment on above:   Performed By: #### C  
BCDIF, BMP ####  
[facility]  
 [facility]  
[phone]   
   
                      MCV (RBC) [Entitic vol] 98.3 fL    Normal     80.0-100.0 [facility]  
   
                                        Comment on above:   Performed By: #### C  
BCDIF, BMP ####  
[facility]  
 [facility]  
[phone]   
   
                      Monocytes/100 WBC (Bld) 9.7 %      Normal                [facility]  
   
                                        Comment on above:   Performed By: #### C  
BCDIF, BMP ####  
[facility]  
 [facility]  
[phone]   
   
                                                    Neutrophils/100 WBC   
(Bld)           75.4 %          Normal                          [facility]  
   
                                        Comment on above:   Performed By: #### C  
BCDIF, BMP ####  
[facility]  
[address] [place] [phone]   
   
                                                    Platelet mean volume   
(Bld) [Entitic vol] 12.1 fL         Normal          9.0-12.7        [facility]  
   
                                        Comment on above:   Performed By: #### C  
RAFAEL, BMP ####  
[facility]  
[address] [facility]  
[phone]   
   
                      Platelets (Bld) [#/Vol] 230 10*3/uL Normal     150-400      
[facility]  
   
                                        Comment on above:   Performed By: #### VADIM HALL, BMP ####  
[facility]  
[address] [facility]  
[phone]   
   
                      RBC (Bld) [#/Vol] 4.01 10*6/uL Normal     3.90-5.20  [facility]  
   
                                        Comment on above:   Performed By: #### C  
RAFAEL, BMP ####  
[facility]  
[address] [facility]  
[phone]   
   
                      WBC (Bld) [#/Vol] 8.86 10*3/uL Normal     3.70-11.00 [facility]  
   
                                        Comment on above:   Performed By: #### VADIM HALL, BMP ####  
[facility]  
[address] [facility]  
[phone]   
   
                                                    ED NOTEon 2019   
   
                                        ED NOTE             HNO ID: [id number]  
Author: Jerri HAM  
Service: ?  
Author Type: ?  
Type: ED Notes  
Filed: 2019 3:35   
PM  
Note Text:  
Report given to fco Alicia from [facility] medical transfer  
services. Patient is   
not in distress.   
Physician stated   
another bag of  
fluids was not needed   
for transfer since VSS. [facility]  
   
                                        ED NOTE             HNO ID: [id number]  
Author: Jerri HAM  
Service: ?  
Author Type: ?  
Type: ED Notes  
Filed: 2019 2:40   
PM  
Note Text:  
Report called and given   
to [facility] ED, charge   
nurse ERNA Elliott.  [facility]  
   
                                        ED NOTE             HNO ID: [id number]  
Author: Jerri HAM  
Service: ?  
Author Type: ?  
Type: ED Notes  
Filed: 2019 10:27   
AM  
Note Text:  
Labs redrawn for   
hemolysis. Patient is   
resting comfortably in   
bed with no  
distress. Will continue   
to monitor.         [facility]  
   
                                        ED NOTE             HNO ID: [id number]  
Author: Eugenia JeffersonRn)   
ERNA Thomason  
Service: ?  
Author Type: Registered   
Nurse  
Type: ED Notes  
Filed: 2019 9:33   
AM  
Note Text:  
Patient presents to the   
ED post D AND C last   
Wednesday. Patient   
states she  
passed out twice last   
week after the D AND C   
and today she started   
passing  
large clots and is in   
lots of pain.       [facility]  
   
                                                    ED PROV NOTEon 2019   
   
                                        ED PROV NOTE        HNO ID: [id number]  
Author: Krissy Forbes  
Service: Emergency   
Medicine  
Author Type: Physician  
Type: ED Provider Notes  
Filed: 2019 2:14   
PM  
Note Text:  
ED Provider Note  
Patient Name: Danna Soares  
MRN: [id number]  
SERVICE DATE: 19  
History  
Patient presents with:  
Vaginal Bleeding  
Syncope  
Patient presents for   
pelvic pain and vaginal   
bleeding. She had a   
DANDC done  
6 days ago for a missed   
. At that time   
after the procedure she  
went home and states   
she passed out. She has   
not had any further   
syncopal  
episodes and the   
bleeding actually   
subsided until this   
morning when she  
woke up passing large   
blood clots and   
increased pelvic pain.   
No fevers or  
chills, nausea or   
vomiting. She states   
the procedure was done   
at   
facility.  
History reviewed. No   
pertinent past medical   
history.  
History reviewed. No   
pertinent surgical   
history.  
No family history on   
file.  
Social History  
Tobacco Use  
- Smoking status: Never   
Smoker  
- Smokeless tobacco:   
Never Used  
Substance and Sexual   
Activity  
- Alcohol use: Not   
Currently  
- Drug use: Not on file  
- Sexual activity: Not   
on file  
ALLERGIES  
Allergen Reactions  
- Codeine Mental Status   
Change  
- Miralax [Polyethyle*   
Hives  
Review of Systems  
Constitutional:   
Negative for appetite   
change, chills,   
diaphoresis and  
fever.  
Respiratory: Negative   
for cough, shortness of   
breath and wheezing.  
Cardiovascular:   
Negative for chest   
pain, palpitations and   
leg swelling.  
Gastrointestinal:   
Negative for abdominal   
pain, diarrhea, nausea   
and  
vomiting.  
Genitourinary: Positive   
for pelvic pain and   
vaginal bleeding.   
Negative for  
dysuria, flank pain,   
frequency, hematuria   
and urgency.  
Musculoskeletal:   
Negative for   
arthralgias, back pain,   
neck pain and neck  
stiffness.  
Skin: Negative for   
pallor, rash and wound.  
Neurological: Positive   
for syncope and   
light-headedness.   
Negative for  
dizziness, weakness,   
numbness and headaches.  
Psychiatric/Behavioral:   
Negative for   
self-injury and   
suicidal ideas.  
All other systems   
reviewed and are   
negative.  
Physical Exam  
/71   Pulse 75     
Temp 98.4   Resp 16     
Wt 86 lb (39.0kg)     
SpO2 98%  
O2 Therapy: Room Air  
Physical Exam  
Vitals signs and   
nursing note reviewed.  
Constitutional:  
General: She is not in   
acute distress.  
Appearance: She is   
well-developed. She is   
not diaphoretic.  
HENT:  
Head: Normocephalic and   
atraumatic.  
Nose: Nose normal.  
Eyes:  
Conjunctiva/sclera:   
Conjunctivae normal.  
Pupils: Pupils are   
equal, round, and   
reactive to light.  
Neck:  
Musculoskeletal: Normal   
range of motion and   
neck supple.  
Cardiovascular:  
Rate and Rhythm: Normal   
rate and regular   
rhythm.  
Heart sounds: Normal   
heart sounds.  
Pulmonary:  
Effort: Pulmonary   
effort is normal. No   
respiratory distress.  
Breath sounds: Normal   
breath sounds.  
Abdominal:  
General: Bowel sounds   
are normal. There is no   
distension.  
Palpations: Abdomen is   
soft.  
Tenderness: There is no   
tenderness.  
Genitourinary:  
Comments: Large amount   
of vaginal blood in   
vaginal canal. No   
clots.  
No cervical motion   
tenderness or adnexal   
tenderness or masses  
Musculoskeletal: Normal   
range of motion.  
Skin:  
General: Skin is warm   
and dry.  
Capillary Refill:   
Capillary refill takes   
less than 2 seconds.  
Coloration: Skin is not   
pale.  
Findings: No erythema   
or rash.  
Neurological:  
Mental Status: She is   
alert and oriented to   
person, place, and   
time.  
Psychiatric:  
Behavior: Behavior   
normal.  
Diagnostic Testing  
ED Labs Ordered and   
Reviewed  
CBC + DIFF - Abnormal;   
Notable for the   
following components:  
Result Value Ref Range  
MCHC 30.2 (*) 30.5 -   
36.0 g/dL  
RDW-CV 21.3 (*) 11.5 -   
15.0 %  
All other components   
within normal limits  
BASIC METABOLIC PNL -   
Abnormal; Notable for   
the following   
components:  
Glucose 111 (*) 74 - 99   
mg/dL  
Creatinine 0.48 (*)   
0.58 - 0.96 mg/dL  
Sodium 135 (*) 136 -   
144 mmol/L  
All other components   
within normal limits  
BETA HCG, QUANTITATIVE   
FOR ED - Abnormal;   
Notable for the   
following  
components:  
Beta HCG, Quantitative   
For ED Use 180.5 (*)   
<5.0 mU/mL  
All other components   
within normal limits  
CBC + DIFF - Abnormal;   
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  
   
                                        Comment on above:   Performed By: #### 2  
[id number] ####DU [address] [place] [address]   
   
                      Hematocrit (HCT) 39.9 %     Normal     35.0-45.0  [facility]  
   
                                        Comment on above:   Performed By: #### 2  
[id number] ####DU [address] [place] [address]   
   
                                                    Hemoglobin mass conc   
(Bld)           13.2 g/dL       Normal          12.0-15.0       [facility]  
   
                                        Comment on above:   Performed By: #### 2  
[id number] ####DU [address] [place] [address]   
   
                      MCH        28.6 pg    Normal     26.0-32.0  [facility]  
   
                                        Comment on above:   Performed By: #### 2  
[id number] ####DU [address] [place] [address]   
   
                      MCHC mass conc (RBC) 33.0 g/dL  Normal     33.0-37.0  [facility]  
   
                                        Comment on above:   Performed By: #### 2  
[id number] ####DU [address] [place] [address]   
   
                      MCV        86.9 fL    Normal     78.0-95.0  [facility]  
   
                                        Comment on above:   Performed By: #### 2  
[id number] ####DU [address] [place] [address]   
   
                                                    Platelet mean volume   
(PMV)           10.2 fL         Normal          7.4-11.0        [facility]  
   
                                        Comment on above:   Performed By: #### 2  
[id number] ####DU [address] [place] [address]   
   
                      Platelets  246 E3/mcL Normal     130-400    [facility]  
   
                                        Comment on above:   Performed By: #### 2  
[id number] ####DU [address] [place] [address]   
   
                      WBC (Leukocytes) 12.0 E3/mcL High       3.6-11.0   [facility]  
   
                                        Comment on above:   Performed By: #### 2  
[id number] ####DU [address] [place] [address]   
   
                                                    Chlamydia GC by PCRon 2018   
   
                      Chlamydia by PCR. Not Detected Normal     Not Detected [facility]  
   
                                        Comment on above:   Result Comment: Xper  
t CT/NG Assay performance has not been   
evaluated in patients less than 14 years of age.   
   
                                                            Performed By: #### 2  
[id number] ####DU Urinalysis Manual   
[address] [place] [address]   
   
                      Gonorrhoeae by PCR Not Detected Normal     Not Detected [facility]  
   
                                        Comment on above:   Result Comment: Xper  
t CT/NG Assay performance has not been   
evaluated in patients less than 14 years of age.   
   
                                                            Performed By: #### 2  
[id number] ####DU Urinalysis Manual   
[address] [place] [address]   
   
                                                    U BhCG Qlton 2018   
   
                      HCG.beta subunit Qn Negative   Normal     Neg        [facility]  
   
                                        Comment on above:   Performed By: #### 2  
[id number] ####DU Urinalysis Manual   
[address] [place] [address]   
   
                                                    UA Completeon 2018   
   
                      UA Blood   Negative   Normal     Negative   [facility]  
   
                                        Comment on above:   Performed By: #### 8  
[id number] ####DU Urinalysis Automated   
[address] [place] [address]   
   
                      UA Clarity Clear      Normal     Clear      [facility]  
   
                                        Comment on above:   Performed By: #### 8  
[id number] ####DU Urinalysis Automated   
[address] [place] [address]   
   
                      UA Leuk Est Negative   Normal     Negative   [facility]  
   
                                        Comment on above:   Performed By: #### 8  
[id number] ####DU Urinalysis Automated   
[address] [place] [address]   
   
                      UA Mucous  Trace      Abnormal   Trace      [facility]  
   
                                        Comment on above:   Performed By: #### 8  
[id number] ####DU Urinalysis Automated   
[address] [place] [address]   
   
                      UA Nitrite Negative   Normal     Negative   [facility]  
   
                                        Comment on above:   Performed By: #### 8  
[id number] ####DU Urinalysis Automated   
[address] [place] [address]   
   
                      UA pH      6.0        Normal     4.6-8.0    [facility]  
   
                                        Comment on above:   Performed By: #### 8  
[id number] ####DU Urinalysis Automated   
[address] [place] [address]   
   
                      UA Protein Negative   Normal     Negative   [facility]  
   
                                        Comment on above:   Performed By: #### 8  
[id number] ####DU Urinalysis Automated   
[address] [place] [address]   
   
                      UA Spec Grav 1.018      Normal     1.003-1.030 [facility]  
   
                                        Comment on above:   Performed By: #### 8  
[id number] ####DU Urinalysis Automated   
[address] [place] [address]   
   
                      UA Squam Epithelial 0-5        Normal     0-5        [facility]  
   
                                        Comment on above:   Performed By: #### 8  
[id number] ####DU Urinalysis Automated   
[address] [place] [address]   
   
                      UA Urobilinogen 2.0 mg/dL  Abnormal              [facility]  
   
                                        Comment on above:   Performed By: #### 8  
[id number] ####DU Urinalysis Automated   
[address] [place] [address]   
   
                      UA WBC     0-5        Normal     0-5        [facility]  
   
                                        Comment on above:   Performed By: #### 8  
[id number] ####DU Urinalysis Automated   
[address] [place] [address]   
   
                      Urine, color Yellow     Normal     Yellow     [facility]  
   
                                        Comment on above:   Performed By: #### 8  
[id number] ####DU Urinalysis Automated   
[address] [place] [address]   
   
                      Urine, erythrocytes 0-3        Normal     0-3        [facility]  
   
                                        Comment on above:   Performed By: #### 8  
[id number] ####DU Urinalysis Automated   
[address] [place] [address]   
   
                      Urine, glucose Negative   Normal     Negative   [facility]  
   
                                        Comment on above:   Performed By: #### 8  
[id number] ####DU Urinalysis Automated   
[address] [place] [address]   
   
                      Urine, ketones presence Negative   Normal     Negative   S  
[facility]  
   
                                        Comment on above:   Performed By: #### 8  
[id number] ####DU Urinalysis Automated   
[address] [place] [address]   
   
                      Urine, urobilinogen Negative   Normal     Negative   [facility]  
   
                                        Comment on above:   Performed By: #### 8  
[id number] ####DU Urinalysis Automated   
[address] [place] [address]   
   
                                                    [place]et [place]/Trich & Yeaston    
   
                          Trich & Yeast None Detected Normal       None   
Detected                                [facility]  
   
                                        Comment on above:   Performed By: #### 8  
[id number] ####[facility] SubSection,   
   
                                                    XR Spine Lumbar w/ Obliqueso  
n 2018   
   
                                        INR Coag RelTime (Bld) Exam   
Date/Time:3/28/2018   
12:13 EDTReason for   
Exam:MID TO LOW BACK   
PAINReportXR SPINE   
LUMBAR W/   
OBLIQUESCLINICAL   
STATEMENT: Mid to low   
back pain for 3 weeks.   
No known   
trauma.TECHNIQUE: 5   
views of the lumbar   
spine were compared to   
x-rays   
2014.FINDINGS:   
There are 5   
nonrib-bearing   
lumbar-type vertebral   
body segments   
noted.Convex left   
curvature of the lumbar   
spine is seen. Pedicles   
appear symmetric.No   
pars defects are seen.   
No compression fracture   
or malalignment   
identified.No   
significant   
degenerative disc   
disease is   
identified.IMPRESSION:N  
o acute osseous   
variation is seen.*****   
FINAL REPORT   
*****Dictated:   
2018 9:06 pm Muna Walden DO   
(Electronic Signature):   
2018 9:06   
pmSigned by: Muna Walden DO Technologist:   
GLP                 Normal                                  [facility]  
   
                                                    XR Spine Thoracic 3 Viewson   
2018   
   
                                        INR Coag RelTime (Bld) Exam   
Date/Time:3/28/2018   
12:13 EDTReason for   
Exam:MID TO LOW BACK   
PAINReportXR SPINE   
THORACIC 3   
VIEWSCLINICAL   
STATEMENT: Mid to low   
back pain. Symptoms for   
3 weeks. No known   
trauma.TECHNIQUE: 3   
views of the thoracic   
spine were compared to   
chest x-ray   
9/15/2016.FINDINGS:   
S-shaped curvature of   
the thoracolumbar spine   
is seen. 12   
rib-bearingthoracic   
segments are noted.   
Pedicles appear   
symmetric. No   
compression fractureor   
malalignment   
identified. No   
significant   
degenerative disc   
disease is   
seen.Visualized lungs   
appear   
clear.IMPRESSION:S-shap  
ed curvature of the   
thoracolumbar spine. No   
acute osseous variation   
isseen.***** FINAL   
REPORT *****Dictated:   
2018 9:07 pm Muna Walden DO PSigned   
(Electronic Signature):   
2018 9:07   
pmSigned by: Muna Walden DO Technologist:   
GLP                 Normal                                  [facility]  
   
                                                    C Urineon 2017   
   
                                        C Urine             Final Report: Rare   
Normal skin maddison   
isolated            Normal                                  [facility]  
   
                                        Comment on above:   Performed By: #### 2  
[id number] ####[facility]   
[id number] [place] [address]   
   
                                                    U BhCG Qlton 2017   
   
                      HCG.beta subunit Qn Negative   Normal     Neg        [facility]  
   
                                        Comment on above:   Performed By: #### 2  
[id number] ####[facility] Urinalysis Manual   
[id number] [place] [address]   
   
                                                    UA Completeon 2017   
   
                      UA Blood   Negative   Normal     Negative   [facility]  
   
                                        Comment on above:   Performed By: #### 8  
[id number] ####[facility] Urinalysis Automated   
[address] [place] [address]   
   
                      UA Clarity Cloudy     Abnormal   Clear      [facility]  
   
                                        Comment on above:   Performed By: #### 8  
[id number] ####[facility] Urinalysis Automated   
[id number] [place] [address]   
   
                      UA Leuk Est 1+         Abnormal   Negative   [facility]  
   
                                        Comment on above:   Performed By: #### 8  
[id number] ####[facility] Urinalysis Automated   
[id number] [place] [address]   
   
                      UA Mucous  Many       Abnormal   Trace      [facility]  
   
                                        Comment on above:   Performed By: #### 8  
[id number] ####[facility] Urinalysis Automated   
[address] [place] [address]   
   
                      UA Nitrite Negative   Normal     Negative   [facility]  
   
                                        Comment on above:   Performed By: #### 8  
[id number] ####[facility] Urinalysis Automated   
[address] [place] [address]   
   
                      UA pH      6.0        Normal     4.6-8.0    [facility]  
   
                                        Comment on above:   Performed By: #### 8  
[id number] ####[facility] Urinalysis Automated   
[address] [place] [address]   
   
                      UA Protein Negative   Normal     Negative   [facility]  
   
                                        Comment on above:   Performed By: #### 8  
[id number] ####[facility] Urinalysis Automated   
[address] [place] [address]   
   
                      UA Spec Grav 1.021      Normal     1.003-1.030 [facility]  
   
                                        Comment on above:   Performed By: #### 8  
[id number] ####[facility] Urinalysis Automated   
[id number] [place] [address]   
   
                      UA Squam Epithelial 5-10       Abnormal   0-5        [facility]  
   
                                        Comment on above:   Performed By: #### 8  
[id number] ####[facility] Urinalysis Automated   
[id number] [place] [address]   
   
                      UA Urobilinogen 2.0 mg/dL  Abnormal              [facility]  
   
                                        Comment on above:   Performed By: #### 8  
[id number] ####[facility] Urinalysis Automated   
[id number] [place] [address]   
   
                      UA WBC     10-20      Abnormal   0-5        [facility]  
   
                                        Comment on above:   Performed By: #### 8  
[id number] ####[facility] Urinalysis Automated   
[id number] [place] [address]   
   
                      Urine, color Yellow     Normal     Yellow     [facility]  
   
                                        Comment on above:   Performed By: #### 8  
[id number] ####[facility] Urinalysis Automated   
[address] [place] [address]   
   
                      Urine, erythrocytes 0-3        Normal     0-3        [facility]  
   
                                        Comment on above:   Performed By: #### 8  
[id number] ####[facility] Urinalysis Automated   
[address] [place] [address]   
   
                      Urine, glucose Negative   Normal     Negative   [facility]  
   
                                        Comment on above:   Performed By: #### 8  
[id number] ####[facility] Urinalysis Automated   
[address] [place] [address]   
   
                      Urine, ketones presence Negative   Normal     Negative   [facility]  
   
                                        Comment on above:   Performed By: #### 8  
[id number] ####[facility] Urinalysis Automated   
[address] [place] [address]   
   
                      Urine, urobilinogen Negative   Normal     Negative   [facility]  
   
                                        Comment on above:   Performed By: #### 8  
[id number] ####[facility] Urinalysis Automated   
[address] [place] [address]   
  
  
  
Vital Signs  
  
  
                          Date Time    Vital Sign   Value        Performing   
Clinician                               Facility  
   
                                                    2024   
19:                              Diastolic blood   
pressure                  71 mm[Hg]                 Lorenzo Cid MD  
Work Phone:   
[phone]                          [facility]  
   
                                                    2024   
19:          Heart rate          94 /min             Lorenzo Cid MD  
Work Phone:   
[phone]                          [facility]  
   
                                                    2024   
19:          Respiratory rate    18 /min             Lorenzo Cid MD  
Work Phone:   
[phone]                          [facility]  
   
                                                    2024   
19:                              SaO2% (BldA) [Mass   
fraction]                 99 %                      Lorenzo Cid MD  
Work Phone:   
[phone]                          [facility]  
   
                                                    2024   
19:                              Systolic blood   
pressure                  119 mm[Hg]                Lorenzo Cid MD  
Work Phone:   
[phone]                          [facility]  
   
                                                    2024   
17:                              Body mass index   
(BMI) [Ratio]             20.19 kg/m2               Lorenzo Cid MD  
Work Phone:   
[phone]                          [facility]  
   
                                                    2024   
17:          Body weight         45.36 kg            Lorenzo Cid MD  
Work Phone:   
[phone]                          [facility]  
   
                                                    2024   
17:          Body temperature    97.9 [degF]         Lorenzo Cid MD  
Work Phone:   
[phone]                          [facility]  
   
                                                    2024   
22:                              Diastolic blood   
pressure                  81 mm[Hg]                 Winston Gordillo MD  
Work Phone:   
[phone]                          [facility]  
   
                                                    2024   
22:          Heart rate          91 /min             Winston Gordillo MD  
Work Phone:   
[phone]                          [facility]  
   
                                                    2024   
22:          Respiratory rate    18 /min             Winston Gordillo MD  
Work Phone:   
[phone]                          [facility]  
   
                                                    2024   
22:                              SaO2% (BldA) [Mass   
fraction]                 100 %                     Winston Gordillo MD  
Work Phone:   
[phone]                          [facility]  
   
                                                    2024   
22:                              Systolic blood   
pressure                  112 mm[Hg]                Winston Gordillo MD  
Work Phone:   
[phone]                          [facility]  
   
                                                    2024   
21:          Body height         149.9 cm            Winston Gordillo MD  
Work Phone:   
[phone]                          [facility]  
   
                                                    2024   
21:                              Body mass index   
(BMI) [Ratio]             18.99 kg/m2               Winston Gordillo MD  
Work Phone:   
[phone]                          [facility]  
   
                                                    2024   
21:          Body temperature    98.2 [degF]         Winston Gordillo MD  
Work Phone:   
[phone]                          [facility]  
   
                                                    2024   
21:          Body weight         42.64 kg            Winston Gordillo MD  
Work Phone:   
[phone]                          [facility]  
   
                                                    2024   
18:          Body temperature    98.4 [degF]         Axel Radford MD  
Work Phone:   
[phone]                          [facility]  
   
                                                    2024   
18:                              Diastolic blood   
pressure                  78 mm[Hg]                 Axel Radford MD  
Work Phone:   
[phone]                          [facility]  
   
                                                    2024   
18:          Heart rate          84 /min             Axel Radford MD  
Work Phone:   
[phone]                          [facility]  
   
                                                    2024   
18:          Respiratory rate    16 /min             Axel Radford MD  
Work Phone:   
[phone]                          [facility]  
   
                                                    2024   
18:                              SaO2% (BldA) [Mass   
fraction]                 99 %                      Axel Radford MD  
Work Phone:   
[phone]                          [facility]  
   
                                                    2024   
18:                              Systolic blood   
pressure                  114 mm[Hg]                Axel Radford MD  
Work Phone:   
[phone]                          [facility]  
   
                                                    2024   
16:          Body temperature    99.1 [degF]         Christiano Knight MD  
Work Phone:   
[phone]                          [facility]  
   
                                                    2024   
16:                              Diastolic blood   
pressure                  77 mm[Hg]                 Christiano Knight MD  
Work Phone:   
[phone]                          [facility]  
   
                                                    2024   
16:          Heart rate          74 /min             Christiano Knight MD  
Work Phone:   
[phone]                          [facility]  
   
                                                    2024   
16:          Respiratory rate    14 /min             Christiano Knight MD  
Work Phone:   
[phone]                          [facility]  
   
                                                    2024   
16:                              SaO2% (BldA) [Mass   
fraction]                 100 %                     Christiano Knight MD  
Work Phone:   
[phone]                          [facility] [employer]  
   
                                                    2024   
16:                              Systolic blood   
pressure                  137 mm[Hg]                Christiano Knight MD  
Work Phone:   
[phone]                          [facility] [employer]  
   
                                                    2024   
20:          Body height         149.9 cm            Christiano Knight MD  
Work Phone:   
[phone]                          [facility]  
   
                                                    2024   
20:                              Body mass index   
(BMI) [Ratio]             22.81 kg/m2               Christiano Knight MD  
Work Phone:   
[phone]                          [facility]  
   
                                                    2024   
20:          Body weight         51.26 kg            Christiano Knight MD  
Work Phone:   
[phone]                          [facility]  
   
                                                    2022   
09:      Body temperature 98.24 [degF]    No Pcp Required [facility]  
   
                                                    2022   
09:                              Diastolic blood   
pressure            81 mm[Hg]           No Pcp Required     [facility]  
   
                                                    2022   
09:      Heart rate      94 /min         No Pcp Required [facility]  
   
                                                    2022   
09:      Respiratory rate 17 /min         No Pcp Required [facility]  
   
                                                    2022   
09:                              SaO2% (BldA) [Mass   
fraction]           98 %                No Pcp Required     [facility]  
   
                                                    2022   
09:                              Systolic blood   
pressure            117 mm[Hg]          No Pcp Required     [facility]  
   
                                                    2022   
21:                              Diastolic blood   
pressure            106 mm[Hg]          No Pcp Required     [facility]  
   
                                                    2022   
21:      Heart rate      79 /min         No Pcp Required [facility]  
   
                                                    2022   
21:      Respiratory rate 18 /min         No Pcp Required [facility]  
   
                                                    2022   
21:                              SaO2% (BldA) [Mass   
fraction]           98 %                No Pcp Required     [facility]  
   
                                                    2022   
21:                              Systolic blood   
pressure            131 mm[Hg]          No Pcp Required     [facility]  
   
                                                    2022   
19:      Body height     149.8 cm        No Pcp Required [facility]  
   
                                                    2022   
19:      Body temperature 98.78 [degF]    No Pcp Required [facility]  
   
                                                    2022   
19:      Body weight     45.5 kg         No Pcp Required [facility]  
   
                                                    2022   
10:      Body temperature 98.06 [degF]    No Pcp Required [facility]  
   
                                                    2022   
10:                              Diastolic blood   
pressure            92 mm[Hg]           No Pcp Required     [facility]  
   
                                                    2022   
10:      Heart rate      97 /min         No Pcp Required [facility]  
   
                                                    2022   
10:      Respiratory rate 16 /min         No Pcp Required [facility]  
   
                                                    2022   
10:                              SaO2% (BldA) [Mass   
fraction]           99 %                No Pcp Required     [facility]  
   
                                                    2022   
10:                              Systolic blood   
pressure            124 mm[Hg]          No Pcp Required     [facility]  
   
                                                    01-   
18:                              Diastolic blood   
pressure            81 mm[Hg]           No Pcp Required     [facility]  
   
                                                    01-   
18:      Heart rate      104 /min        No Pcp Required [facility]  
   
                                                    01-   
18:      Respiratory rate 16 /min         No Pcp Required [facility]  
   
                                                    01-   
18:                              SaO2% (BldA) [Mass   
fraction]           97 %                No Pcp Required     [facility]  
   
                                                    01-   
18:                              Systolic blood   
pressure            114 mm[Hg]          No Pcp Required     [facility]  
   
                                                    01-   
14:      Body height     149.8 cm        No Pcp Required [facility]  
   
                                                    01-   
14:      Body temperature 98.06 [degF]    No Pcp Required [facility]  
   
                                                    01-   
14:      Body weight     54.5 kg         No Pcp Required [facility]  
   
                                                    2021   
19:      Body height     149.8 cm        No Pcp Required [facility]  
   
                                                    2021   
19:      Body temperature 99.32 [degF]    No Pcp Required [facility]  
   
                                                    2021   
19:      Body weight     54.5 kg         No Pcp Required [facility]  
   
                                                    2021   
19:                              Diastolic blood   
pressure            69 mm[Hg]           No Pcp Required     [facility]  
   
                                                    2021   
19:      Heart rate      75 /min         No Pcp Required [facility]  
   
                                                    2021   
19:      Respiratory rate 16 /min         No Pcp Required [facility]  
   
                                                    2021   
19:                              SaO2% (BldA) [Mass   
fraction]           95 %                No Pcp Required     [facility]  
   
                                                    2021   
19:                              Systolic blood   
pressure            105 mm[Hg]          No Pcp Required     [facility]  
   
                                                    2021   
08:                              BMI (Body Mass   
Index)              23.11 kg/m2         [facility]  
   
                                                    [facility]   
08:      Body weight     51.9 kg         [facility]  
   
                                                    [facility]   
08:      Body Temperature 97.7 [degF]     [facility]  
   
                                                    2021   
08:      BP Diastolic    89 mm[Hg]       [facility]  
   
                                                    20[facility]   
08:      BP Systolic     134 mm[Hg]      [facility]  
   
                                                    2021   
08:      Height          149.9 cm        [facility]  
   
                                                    2021   
08:      Pulse (Heart Rate) 72 /min         [facility]  
   
                                                    2021   
08:      Pulse Oximetry  99 %            [facility]  
   
                                                    2021   
08:      Respiratory Rate 19 /min         [facility]  
   
                                                    2020   
15:                              BMI (Body Mass   
Index)              20.2 kg/m2          Marguerite Marizol       [facility]   
[facility]  
Work Phone:   
[phone]  
   
                                                    2020   
15:      Body weight     45.36 kg        Marguerite Marizol   [facility]  
   
[facility]  
Work Phone:   
[phone]  
   
                                                    2020   
15:      BP Diastolic    61 mm[Hg]       Marguerite Marizol   [facility]  
   
[facility]  
Work Phone:   
[phone]  
   
                                                    2020   
15:      BP Systolic     96 mm[Hg]       Marguerite Marizol   [facility]  
   
[facility]  
Work Phone:   
[phone]  
   
                                                    2020   
15:                              BSA (Body Surface   
Area)               1.37 m2             Marguerite Marizol       [facility]   
[facility]  
Work Phone:   
[phone]  
   
                                                    2020   
15:      Height          149.86 cm       Marguerite Marizol   [facility]  
   
2nd Fl  
Work Phone:   
[phone]  
   
                                                    2020   
15:      Pulse (Heart Rate) 82 /min         Marguerite Marizol   DQ-EIXPM-Dzal  
own   
2nd Fl  
Work Phone:   
[phone]  
   
                                                    2020   
15:                      33 1            Marguerite Marizol   [facility]  
   
2nd Fl  
Work Phone:   
[phone]  
   
                                                    Comment on   
above:                                  BMI Percentile   
   
                                                    2020   
15:                      4 1             Marguerite Marizol   [facility]  
   
2nd Fl  
Work Phone:   
[phone]  
   
                                                    Comment on   
above:                                  2-20 Weight Percentile   
   
                                                    2020   
15:                      1 1             Marguerite Marizol   [facility]  
   
2nd Fl  
Work Phone:   
[phone]  
   
                                                    Comment on   
above:                                  2-20 Stature Percentile   
   
                                                    2020   
15:                      0 1             Marguerite Marizol   [facility]  
   
2nd Fl  
Work Phone:   
[phone]  
   
                                                    Comment on   
above:                                  Pain Scale   
   
                                                    2020   
15:                              BMI (Body Mass   
Index)              18.99 kg/m2         Marguerite Marizol       [facility]   
2nd Fl  
Work Phone:   
[phone]  
   
                                                    2020   
15:      Body weight     42.64 kg        Marguerite Marizol   [facility]  
   
2nd Fl  
Work Phone:   
[phone]  
   
                                                    2020   
15:      BP Diastolic    70 mm[Hg]       Marguerite Marizol   [facility]  
   
2nd Fl  
Work Phone:   
[phone]  
   
                                                    2020   
15:      BP Systolic     110 mm[Hg]      Marguerite Marizol   [facility]  
   
2nd Fl  
Work Phone:   
[phone]  
   
                                                    2020   
15:                              BSA (Body Surface   
Area)               1.34 m2             Marguerite Marizol       [facility]   
2nd Fl  
Work Phone:   
[phone]  
   
                                                    2020   
15:      Height          149.86 cm       Marguerite Stevenat   [facility]  
   
2nd Fl  
Work Phone:   
[phone]  
   
                                                    2020   
15:      Pulse (Heart Rate) 81 /min         Marguerite Stevenat   GT-UBZAO-Qrtr  
own   
2nd Fl  
Work Phone:   
[phone]  
   
                                                    2020   
15:                      0 1             Marguerite Stevenat   [facility]  
   
2nd Fl  
Work Phone:   
[phone]  
   
                                                    Comment on   
above:                                  Pain Scale   
   
                                                    2020   
15:                      17 1            Marguerite Stevenat   [facility]  
   
2nd Fl  
Work Phone:   
[phone]  
   
                                                    Comment on   
above:                                  BMI Percentile   
   
                                                    2020   
15:                      1 1             Marguerite Stevenat   [facility]  
   
2nd Fl  
Work Phone:   
[phone]  
   
                                                    Comment on   
above:                                  2-20 Stature Percentile   
   
                                                            2-20 Weight Percenti  
le   
   
                                                    2020   
17:                              BMI (Body Mass   
Index)              18.38 kg/m2         Corinne Bazella     IZ-BXFWJ-Tjswmxuksv  
[address]  
Work Phone:   
[phone]  
   
                                                    2020   
17:      Body weight     41.27 kg        Corinne Bazella IP-ECDVK-Wzexfjs  
arianne  
[address]  
Work Phone:   
[phone]  
   
                                                    2020   
17:      BP Diastolic    73 mm[Hg]       Corinne Bazella XL-CBPFL-Vvpaeqv  
arianne  
[address]  
Work Phone:   
[phone]  
   
                                                    2020   
17:      BP Systolic     105 mm[Hg]      Corinne Bazella WU-ICIFW-Mxbgwaj  
arianne  
[address]  
Work Phone:   
[phone]  
   
                                                    2020   
17:                              BSA (Body Surface   
Area)               1.32 m2             Corinne Bazella     CC-OYSMA-Htzunmtasa  
[address]  
Work Phone:   
[phone]  
   
                                                    2020   
17:      Height          149.86 cm       Corinne Bazella ZG-PMZGS-Ylahrvi  
arianne  
[address]  
Work Phone:   
[phone]  
   
                                                    2020   
17:      Pulse (Heart Rate) 101 /min        Corinne Bazella [facility]  
erbroo  
[address]  
Work Phone:   
[phone]  
   
                                                    2020   
17:                      1 1             Corinne Bazella LC-HQTYD-Tpjqpdq  
arianne  
[address]  
Work Phone:   
[phone]  
   
                                                    Comment on   
above:                                  2-20 Stature Percentile   
   
                                                            2-20 Weight Percenti  
le   
   
                                                    2020   
17:                      10 1            Corinne Bazella WM-KBMGG-Qwcwdte  
arianne  
[address]  
Work Phone:   
[phone]  
   
                                                    Comment on   
above:                                  BMI Percentile   
   
                                                    2020   
17:                      3 1             Corinne Bazella OK-WMFLD-Gvuadwh  
arianne  
[address]  
Work Phone:   
[phone]  
   
                                                    Comment on   
above:                                     
   
                                                    2020   
17:                      0 1             Corinne Bazella SE-ZAODK-Tlxhoyh  
arianne  
[address]  
Work Phone:   
[phone]  
   
                                                    Comment on   
above:                                  Para   
   
                                                    2020   
18:      BP Diastolic    81 mm[Hg]       [facility]  
   
                                                    2020   
18:      BP Systolic     137 mm[Hg]      [facility]  
   
                                                    2020   
18:      Pulse Oximetry  100 %           [facility]  
   
                                                    2020   
17:      Pulse (Heart Rate) 124 /min        [facility]  
   
                                                    2020   
17:      Body Temperature 97.9 [degF]     [facility]  
   
                                                    2020   
17:      Respiratory Rate 16 /min         [facility]    [facility]  
   
                                                    2020   
17:      Body weight     36.29 kg        [facility] Carla    [facility]  
   
                                                    2019   
14:                              BMI (Body Mass   
Index)              18.58 kg/m2         Chelsie Bowden          [facility] [address]   
[place]  
Work Phone:   
[phone]  
   
                                                    2019   
14:      Body weight     41.73 kg        Chelsie Bowden      [facility]  
0   
[place]  
Work Phone:   
[phone]  
   
                                                    2019   
14:      BP Diastolic    71 mm[Hg]       Chelsie Bowden      [facility]  
0   
[place]  
Work Phone:   
[phone]  
   
                                                    2019   
14:      BP Systolic     103 mm[Hg]      Chelsie Bowden      [facility]  
0   
[place]  
Work Phone:   
[phone]  
   
                                                    2019   
14:                              BSA (Body Surface   
Area)               1.33 m2             Chelsie Bowden          [facility] [address]   
[place]  
Work Phone:   
[phone]  
   
                                                    2019   
14:      Height          149.86 cm       Chelsie Bowden      [facility]  
0   
[place]  
Work Phone:   
[phone]  
   
                                                    2019   
14:      Pulse (Heart Rate) 79 /min         Chelsie Bowden      [facility]   
[address]   
[place]  
Work Phone:   
[phone]  
   
                                                    2019   
14:                      0 1             Chelsie Bowden      [facility]  
0   
[place]  
Work Phone:   
[phone]  
   
                                                    Comment on   
above:                                  Para   
   
                                                    2019   
14:                      1 1             Chelsie Bowden      [facility]  
0   
[place]  
Work Phone:   
[phone]  
   
                                                    Comment on   
above:                                     
   
                                                            2-20 Weight Percenti  
le   
   
                                                    2019   
14:                      2 1             Chelsie Bowden      [facility]  
0   
[place]  
Work Phone:   
[phone]  
   
                                                    Comment on   
above:                                  2-20 Stature Percentile   
   
                                                    2019   
14:                      14 1            Chelsie Bowden      [facility]  
0   
[place]  
Work Phone:   
[phone]  
   
                                                    Comment on   
above:                                  BMI Percentile   
   
                                                    2019   
12:                              BMI (Body Mass   
Index)              17.37 kg/m2         Chelsie Bowden          [facility]   
2nd Fl  
Work Phone:   
[phone]  
   
                                                    2019   
12:      Body weight     39.01 kg        Chelsie Bowden      [facility]  
   
2nd Fl  
Work Phone:   
[phone]  
   
                                                    2019   
12:      BP Diastolic    86 mm[Hg]       Chelsie Bowden      [facility]  
   
2nd Fl  
Work Phone:   
[phone]  
   
                                                    2019   
12:      BP Systolic     120 mm[Hg]      Chelsie Bowden      [facility]  
   
2nd Fl  
Work Phone:   
[phone]  
   
                                                    2019   
12:                              BSA (Body Surface   
Area)               1.29 m2             Chelsie Bowden          [facility]   
2nd Fl  
Work Phone:   
[phone]  
   
                                                    2019   
12:      Height          149.86 cm       Chelsie Bowden      [facility]  
   
2nd Fl  
Work Phone:   
[phone]  
   
                                                    2019   
12:      Pulse (Heart Rate) 81 /min         Chelsie Bowden      ZD-IHCZT-Pcsc  
own   
2nd Fl  
Work Phone:   
[phone]  
   
                                                    2019   
12:                      4 1             Chelsie Bowden      [facility]  
   
2nd Fl  
Work Phone:   
[phone]  
   
                                                    Comment on   
above:                                  BMI Percentile   
   
                                                    2019   
12:                      1 1             Chelsie Bowden      [facility]  
   
2nd Fl  
Work Phone:   
[phone]  
   
                                                    Comment on   
above:                                  2-20 Weight Percentile   
   
                                                               
   
                                                    2019   
12:                      0 1             Chelsie Bowden      [facility]  
   
2nd Fl  
Work Phone:   
[phone]  
   
                                                    Comment on   
above:                                  Pain Scale   
   
                                                            Para   
   
                                                    2019   
12:                      2 1             Chelsie Bowden      [facility]  
   
2nd Fl  
Work Phone:   
[phone]  
   
                                                    Comment on   
above:                                  2-20 Stature Percentile   
  
  
  
Encounters  
  
  
                          Encounter Date Encounter Type Care Provider Facility  
   
                                                    Start: 2024  
End: 2024     ambulatory          NATALI REILLY PEREZ [facility]  
   
                                                    Start: 2024  
End: 2024                         Emergency department   
patient visit                           Lorenzo Cid MD  
Work Phone:   
[phone]                          [facility]   
Emergency Department  
   
                                                    Start: 2024  
End: 2024                         Emergency department   
patient visit             PHYSICIAN KAYLEEN              [facility]  
   
                                                    Start: 2024  
End: 2024                         Emergency department   
patient visit             WINSTON GORDILLO              [facility]  
   
                                                    Start: 2024  
End: 2024                         Emergency department   
patient visit                           Winston Gordillo MD  
Work Phone:   
[phone]                          [facility] Emergency   
Medicine  
   
                                        Comment on above:   Nausea and vomiting,  
 unspecified vomiting type (Primary Dx)   
   
                                                    Start: 02-  
End: 02-                         Emergency department   
patient visit             [facility]  
   
                                                    Start: 2024  
End: 02-                         Emergency department   
patient visit             UNKNOWN PROVIDER          Facility:[facility]  
   
                                                    Start: 2024  
End: 2024                         Emergency department   
patient visit                           Axel Radford MD  
Work Phone:   
[phone]                          [facility] Emergency   
Medicine  
   
                                        Comment on above:   Seizures (Family rep  
orts  months  of seizures without   
medications.   
Drove 1 hour to get here today with two seizures in the car.   
Endorses nausea and vomiting. )   
   
                                                    Start: 2024  
End: 2024                         Emergency department   
patient visit             CHRISTIANO SANTOS          [facility]  
   
                                                    Start: 2024  
End: 2024     ambulatory          BOB COOPER      [facility]  
al  
   
                                                    Start: 2024  
End: 2024                         Emergency department   
patient visit                           Christiano Knight MD  
Work Phone:   
[phone]                          [facility] Med Surg   
2nd Floor  
   
                                        Comment on above:   Intractable nausea a  
nd vomiting   
   
                                                    Start: 2024  
End: 2024                         Emergency department   
patient visit             IVAN GODINEZ [facility]  
   
                                                    Start: 02-  
End: 02-                         Emergency department   
patient visit             PHYSICIAN KAYLEEN              [facility]  
   
                                                    Start: 02-  
End: 02-                         Emergency department   
patient visit             ELMIRA MANCINI            [facility]  
   
                                                    Start: 2024  
End: 2024                         Emergency department   
patient visit             PHYSICIAN NO              [facility]  
   
                                                    Start: 2024  
End: 2024                         Emergency department   
[facility]  
   
                                                    Start: 03-  
End: 2022                         Evaluation and   
management of inpatient   Mitra Camara                [facility]   
[address]  
   
                                                    Start: 2022  
End: 2022                         Emergency department   
patient visit             [facility]  
   
                                                    Start: 2022  
End: 2022                         Evaluation and   
management of inpatient   Nicolas Connolly        [facility] Med Surg 03  
   
                                                    Start: 01-  
End: 01-                         Emergency department   
patient visit             Priscilla Zepeda              [facility] Emergency 09  
   
                                                    Start: 2021  
End: 2021                         Emergency department   
patient visit             Pirscilla Gottiw              [facility] Emergency  
   
                                                    Start: 2021  
End: 2021                         Emergency department   
patient visit                           Leo Turner  
Work Phone:   
[phone]                          [facility]   
Emergency Department  
   
                                        Start: 2020   Patient encounter   
procedure                 Marguerite Marizol             [facility] 2nd   
Fl  
Work Phone:   
[phone]  
   
                                        Start: 2020   Patient encounter   
procedure                 Marguerite Marizol             [facility] 2nd   
Fl  
Work Phone:   
[phone]  
   
                                        Start: 2020   Patient encounter   
procedure                 Corinne Bazella           HE-MAOOG-Hqmizgnvygx   
[address]  
Work Phone:   
[phone]  
   
                                                    Start: 2020  
End: 2020                         Emergency department   
patient visit                           Eliot Puentesreynaldo Aguirre  
Work Phone:   
[phone]                          [facility]   
Emergency Department  
   
                                        Start: 2020   Patient encounter   
procedure                 Corinne Bazella           BI-RSAEH-Wwwawqooydg   
[address]  
Work Phone:   
[phone]  
   
                                        Start: 2020   Patient encounter   
procedure                 Corinne Bazella           IT-OIMRE-Moxol[facility]upcju   
[address]  
Work Phone:   
[phone]  
   
                                        Start: 2019   Patient encounter   
procedure                 Corinne Bazella           WI-YPKFQ-Xordskirtcw   
300  
Work Phone:   
[phone]  
   
                                        Start: 2019   Patient encounter   
procedure                 Chelsie Bowden                VS-KQVBW-Foxwzmu [address]  
Work Phone:   
[phone]  
   
                                        Start: 2019   Patient encounter   
procedure                 Jerri Tabor              [facility] 2nd   
Fl  
Work Phone:   
[phone]  
   
                                        Start: 2019   Patient encounter   
procedure                 Chelsie Bowden                [facility] 2nd   
Fl  
Work Phone:   
[phone]  
   
                                        Start: 2019   Patient encounter   
procedure                 Chelsei Bowden                [facility] 2nd   
Fl  
Work Phone:   
[phone]  
   
                                        Start: 2019   Patient encounter   
procedure                 Chelsie Bowden                [facility] 2nd   
Fl  
Work Phone:   
[phone]  
   
                                                    Start: 2018  
End: 2018                         Emergency department   
patient visit             [facility]         Facility:[facility]  
   
                                                    Start: 2018  
End: 2018     Ambulatory          [facility]   Facility:[facility]  
   
                                                    Start: 2017  
End: 2017                         Emergency department   
patient visit             [facility]         Facility:[facility]  
  
  
  
Procedures  
  
  
                          Date         Procedure    Procedure Detail Performing   
Clinician  
   
                                                    Start:   
2024          Urinalysis microscopic only                     Lorenzo vargas MD  
Work Phone:   
[phone]  
   
                                                    Start:   
2024                              Urinalysis, reagent strip   
without microscopy                                  Lorenzo Cid MD  
Work Phone:   
[phone]  
   
                                                    Start:   
2024                              Complete blood count with   
white cell differential,   
automated                                           Lorenzo Cid MD  
Work Phone:   
[phone]  
   
                                                    Start:   
2024          Comprehensive metabolic panel                     Lorenzo modi MD  
Work Phone:   
[phone]  
   
                                                    Start:   
2024                              Basic metabolic 2000 panel -   
Serum or Plasma                                     IVAN COLEY  
   
                                                    Start:   
2024                              CBC W Auto Differential panel   
- Blood                                             IVAN COLEY  
   
                                                    Start:   
2024                              Hepatic function 2000 panel -   
Serum or Plasma                                     IVAN LEMASTERS  
   
                                                    Start:   
2024          LIGHT BLUE TOP                          IVAN LEMASTERS  
   
                                                    Start:   
2024                              Lipase [Enzymatic   
activity/volume] in Serum or   
Plasma                                              IVAN LEMASTERS  
   
                                                    Start:   
2024                              Manual Differential panel -   
Blood                                               IVAN LEMASTERS  
   
                                                    Start:   
2024          RAINBOW DRAW                            IVAN LEMASTERS  
   
                                                    Start:   
2024          SST TOP                                 IVAN LEMASTERS  
   
                                                    Start:   
2024                              Basic metabolic panel calcium   
total                                               Winston Gordillo MD  
Work Phone:   
[phone]  
   
                                                    Start:   
2024                              Basic metabolic panel calcium   
total                                               June Dyson APRN-CNP  
Work Phone:   
[phone]  
   
                                                    Start:   
2024                              Bacteria identified in Urine   
by Culture                                          IVAN LEMASTERS  
   
                                                    Start:   
2024          DRUG SCREEN,URINE                       IVAN LEMASTERS  
   
                                                    Start:   
2024          HCG, URINE, QUALITATIVE                     IVAN RAGHAV  
S  
   
                                                    Start:   
2024          MICROSCOPIC ONLY, URINE                     IVAN RAGHAV  
S  
   
                                                    Start:   
2024                              URINALYSIS WITH REFLEX CULTURE   
AND MICROSCOPIC                                     IVAN LEMASTERS  
   
                                                    Start:   
2024          CT HEAD WO IV CONTRAST                     IVAN LEMASTERS  
   
                                                    Start:   
2024          EXTRA TUBES                             IVAN LEMASTERS  
   
                                                    Start:   
2024          EXTRA URINE GRAY TUBE                     IVAN LEMASTERS  
   
                                                    Start:   
2024                              CBC W Auto Differential panel   
- Blood                                             IVAN LEMASTERS  
   
                                                    Start:   
2024                              Comprehensive metabolic 2000   
panel - Serum or Plasma                             IVAN LEMASTERS  
   
                                                    Start:   
2024                              Magnesium [Mass/volume] in   
Serum or Plasma                                     IVAN LEMASTERS  
   
                                                    Start:   
2024          PROLACTIN                               IVAN LEMASTERS  
   
                                                    Start:   
2024          ECG 12-LEAD                             IVAN LEMASTERS  
   
                                                    Start:   
2024                              Toxin/antitoxin assay tissue   
culture                                             Bob Cooper MD  
Work Phone:   
[phone]  
   
                                                    Start:   
2024                              Complete blood count with   
white cell differential,   
automated                                           Juancho Apodaca MD  
Work Phone:   
[phone]  
   
                                                    Start:   
2024          Comprehensive metabolic panel                     Juancho rivera MD  
Work Phone:   
[phone]  
   
                                                    Start:   
2024                              Drug tst prsmv instrmnt chem   
analyzers pr date                                   Christiano Knight MD  
Work Phone:   
[phone]  
   
                                                    Start:   
2024          Comprehensive metabolic panel                     Christiano Knight MD  
Work Phone:   
[phone]  
   
                                                    Start:   
2024                              CBC W Auto Differential panel   
- Blood                                             IVAN COLEY  
   
                                                    Start:   
2024                              Comprehensive metabolic 2000   
panel - Serum or Plasma                             IVAN LEMASTERS  
   
                                                    Start:   
2024                              Lipase [Enzymatic   
activity/volume] in Serum or   
Plasma                                              IVAN LEMROBERT  
   
                                                    Start:   
2024          INSERT PERIPHERAL IV                     IVAN LEMASTERS  
   
                                                    Start:   
2024          ECG 12-LEAD                             IVAN LEMASTERS  
   
                                                    Start:   
2024          HCG, URINE, QUALITATIVE                     IVAN RAGHAV  
S  
   
                                                    Start:   
2024                              Bacteria identified in Urine   
by Culture                                          IVAN COLEY  
   
                                                    Start:   
2024          EXTRA URINE GRAY TUBE                     IVAN COLEY  
   
                                                    Start:   
2024          MICROSCOPIC ONLY, URINE                     IVAN AVILEZ  
S  
   
                                                    Start:   
2024                              URINALYSIS WITH REFLEX CULTURE   
AND MICROSCOPIC                                     IVAN LEMROBERT  
   
                                                    Start:   
2024          INFLUENZA A AND B PCR                     IVAN COLEY  
   
                                                    Start:   
2024          SARS-COV-2 PCR                          IVAN COLEY  
   
                                                    Start:   
2024                              INITIATE DROPLET PLUS   
ISOLATION                                           IVAN COLEY  
   
                                                    Start:   
2024                              CBC W Auto Differential panel   
- Blood                                             IVAN COLEY  
   
                                                    Start:   
2024                              Comprehensive metabolic 2000   
panel - Serum or Plasma                             IVAN COLEY  
   
                                                    Start:   
2024          INSERT PERIPHERAL IV                     IVAN BRIJESH  
   
                                                    Start:   
2024                              Glucose [Mass/volume] in Serum   
or Plasma                                           IVAN COLYE  
   
                                                    Start:   
03-  
End: 03-     Gas panel - Arterial blood                     Becky Dials  
   
                                                    Start:   
2022  
End: 2022     EKG impression                          Priscilla I Moomaw  
   
                                                    Start:   
2020          MAC Imaging Order                       Marguerite Marizol  
   
                                                    Start:   
2020          MAC Imaging Order                       Corinne Bazella  
   
                                                    Start:   
2020          COVID-19, MOLECULAR                     Octavia Messina  
Work Phone:   
[phone]  
   
                                                    Start:   
2020                              Choriogonadotropin   
[Units/volume] in Serum or   
Plasma                                              Octavia Messina  
Work Phone:   
[phone]  
   
                                                    Start:   
2020                              Complete blood count with   
white cell differential,   
automated                                           Octavia Messina  
Work Phone:   
[phone]  
   
                                                    Start:   
2020                              Complete blood count with   
white cell differential,   
manual                                              Octavia Messina  
Work Phone:   
[phone]  
   
                                                    Start:   
2020                              Comprehensive metabolic 2000   
panel - Serum or Plasma                             Octavia Messina  
Work Phone:   
[phone]  
   
                                                    Start:   
2020          GREY TOP                                Octavia Messina  
Work Phone:   
[phone]  
   
                                                    Start:   
2020          LAVENDER TOP                            Octavia Messina  
Work Phone:   
[phone]  
   
                                                    Start:   
2020          LIGHT BLUE TOP                          Octavia Messina  
Work Phone:   
[phone]  
   
                                                    Start:   
2020          LIGHT GREEN TOP                         Octavia Messina  
Work Phone:   
[phone]  
   
                                                    Start:   
2020          MINT GREEN TOP                          Octavia Messina  
Work Phone:   
[phone]  
   
                                                    Start:   
2020          PINK TOP                                Octavia Messina  
Work Phone:   
[phone]  
   
                                                    Start:   
2020          RAINBOW DRAW                            Octavia Messina  
Work Phone:   
[phone]  
   
                                                    Start:   
2019          Surgical Pathology                      Chelsie Bowden  
   
                                                    Start:   
2019          Antibody rubella                        Chelsie Bowden  
   
                                                    Start:   
2019                              Complete Blood Count Pregnancy   
Anemia Panel with reflex                            Chelsie Bowden  
   
                                                    Start:   
2019                              Culture bacterial quanttative   
colony count urine                                  Chelsie Bowden  
   
                                                    Start:   
2019          Hemoglobin Identification                     Chelsie Bowden  
   
                                                    Start:   
2019          Hepatitis c antibody                     Chelsie Bowden  
   
                                                    Start:   
2019                              Iaad ia hepatitis b surface   
antigen                                             Chelsie Bowden  
   
                                                    Start:   
2019                              Iadna chlamydia trachomatis   
amplified probe tq                                  Chelsie Bowden  
   
                                                    Start:   
2019          SYPHILIS SCREENING WITH REFLEX                     Chelsie Arango  
s  
   
                                                    Start:   
2019          Type and Screen                         Chelsie Bowden  
  
  
  
Plan of Treatment  
  
  
                          Date         Care Activity Detail       Author  
   
                                        Start: 2061   RSV VACCINE (1 -   
1-dose 60+ series)                      RSV VACCINE (1 - 1-dose   
60+ series)                             [facility]  
   
                                        Start: 2051   Shingles (RZV)   
Vaccine (1 of 2)                        Shingles (RZV) Vaccine   
(1 of 2)                                [facility]  
   
                                        Start: 2051   Zoster Vaccines (1 o  
f   
2)                                      Zoster Vaccines (1 of   
2)                                      [facility]  
   
                          Start: 2024 Influenza vaccination INFLUENZA VACC  
INE (#1) [facility]  
   
                                        Start: 2023   DTaP/Tdap/Td Vaccine  
s   
(7 - Td or Tdap)                        DTaP/Tdap/Td Vaccines   
(7 - Td or Tdap)                        [facility]  
   
                          Start: 2023 Tetanus vaccination              [facility]  
   
                                        Start: 2023   COVID-19 VACCINE ( season)                         COVID-19 VACCINE ( season)                         [facility]  
   
                          Start: 2023 Influenza vaccination INFLUENZA VACC  
INE (#1) [facility]  
   
                                        Start: 2022   Screening for   
malignant neoplasm of   
cervix                                              [facility]  
   
                                                    Start: 2022  
End: 2023                                     Sore Throat Lozenge 1   
Oral Lozenge Every 2   
Hours PRN ; LozengeDOSE   
= 1 lozenge(s) Oral   
Every 4 Hours, PRN Sore   
ThroatCa   
lozenge(s)/DOSE x 1 = 1   
lozenge(s)/Dose (Daily   
Total is 6 lozenge(s))   
Start: 11-Mar-2022 End:   
11-Mar-2023 Ordered:   
11-Mar-2022 Mitra Camara   
Intent                                  [facility]  
   
                                                    Start: 03-  
End: 2023                                     Acetaminophen 325 mg   
Oral Tablet Every 4   
Hours PRN ; Tablet   
(TYLENOL)DOSE = 650 mg   
Oral Every 4 Hours, PRN   
Pain - Mild (1-3)   
Start: 10-Mar-2022 End:   
10-Mar-2023 Ordered:   
10-Mar-2022 Becky Hyman                                  [facility]  
   
                                                    Start: 2022  
End: 2022                                     Technetium Tc 99m   
Sulfur Colloid -   
(Radiology Contrast) .   
; DOSE = 500 microCurie   
Oral Once Start:   
2022 End:   
2022 Ordered:   
2022 Becky Hyman                                  [facility]  
   
                                                    Start: 2022  
End: 2023                                             [facility]  
   
                                        Start: 2019   Screening for   
Chlamydia trachomatis                   STI Screening (Age   
18-24)                                  [facility]  
   
                                        Start: 10-   Hepatitis A (HAV)   
Vaccine (2 of 2 -   
2-dose series)                          Hepatitis A (HAV)   
Vaccine (2 of 2 -   
2-dose series)                          [facility]  
   
                                        Start: 10-   Hepatitis A Vaccines  
   
(2 of 2 - 2-dose   
series)                                 Hepatitis A Vaccines (2   
of 2 - 2-dose series)                   [facility]  
   
                                        Start: 2017   Screening for   
Chlamydia trachomatis     CHLAMYDIA SCREEN          [facility]  
   
                          Start: 2016 HIV screening              [facility]  
   
                          Start: 2002 COVID-19 Vaccine (#1) COVID-19 Vacci  
ne (#1) [facility]  
   
                                Start: 2001 Hepatitis C screening HEPATITI  
S C VIRUS   
SCREENING                               [facility]  
   
                          Start: 2001 Lipid panel  Lipid Panel  [facility]  
   
                                        Start: 2001   Screening for   
Chlamydia trachomatis     GONORRHEA SCREEN          [facility]  
   
                          Start: 2001 Yearly Adult Physical Yearly Adult P  
hysical [facility]  
   
                                                      
End: 2024     CALPROTECTIN, STOOL                     [facility]  
   
                                        Comment on above:   One Time for 1 Occur  
rences starting 2024 until   
2024   
   
                                                      
End: 2024     GI PANEL,PCR                            [facility]  
Work Phone:   
[phone]  
   
                                        Comment on above:   One Time for 1 Occur  
rences starting 2024 until   
2024   
   
                                                      
End: 2024     Gray Top                                [facility]  
Work Phone:   
[phone]  
   
                                        Comment on above:   Once for 1 Occurrenc  
es starting 2024 until 2024   
   
                                                      
End: 2024     Light Blue Top                          [facility]  
Work Phone:   
[phone]  
   
                                        Comment on above:   Once for 1 Occurrenc  
es starting 2024 until 2024   
   
                                                      
End: 2024     [place] draw                            [facility]  
Work Phone:   
[phone]  
   
                                        Comment on above:   Once (Lab) for 1 Occ  
urrences starting 2024 until   
2024   
   
                                                      
End: 2024     [facility]  
Work Phone:   
[phone]  
   
                                        Comment on above:   Once for 1 Occurrenc  
es starting 2024 until 2024   
   
                                                                 [id number]  
[address]   
[address]  
Work Phone:   
[phone]  
   
                                                    NEGATED: Highlighted   
row has been ruled   
out!                                                Planned Goals not   
documented                              [id number]   
[address]  
Work Phone:   
[phone]  
  
  
  
Immunizations  
  
  
                      Immunization Date Immunization Notes      Care Provider Fa  
cilikim  
   
                                        06-          meningococcal B vacc  
ine,   
recombinant, OMV,   
adjuvanted                                          Axel Radford MD  
Work Phone:   
[phone]                          [facility]  
   
                                        2018          hepatitis A vaccine,  
   
pediatric/adolescent   
dosage, 2 dose schedule                             Axel Radford MD  
Work Phone:   
[phone]                          [facility]  
   
                                        2018          meningococcal B vacc  
ine,   
recombinant, OMV,   
adjuvanted                                          Axel Radford MD  
Work Phone:   
[phone]                          [facility]  
   
                                        2018          meningococcal   
polysaccharide (groups   
A, C, Y and W-135)   
diphtheria toxoid   
conjugate vaccine   
(MCV4P)                                             Axel Radford MD  
Work Phone:   
[phone]                          [facility]  
   
                                        2018          hepatitis A and   
hepatitis B vaccine                                 Winston Gordillo MD  
Work Phone:   
[phone]                          [facility]  
Work Phone:   
[phone]  
   
                                        2014          human papilloma viru  
s   
vaccine, quadrivalent                               Axel Radford MD  
Work Phone:   
[phone]                          [facility]  
   
                                        2014          human papilloma viru  
s   
vaccine, quadrivalent                               Axel Radford MD  
Work Phone:   
[phone]                          [facility]  
   
                                        2013          human papilloma viru  
s   
vaccine, quadrivalent                               Axel Radford MD  
Work Phone:   
[phone]                          [facility]  
   
                                        2013          influenza, live,   
intranasal, quadrivalent                            Axel Radford MD  
Work Phone:   
[phone]                          [facility]  
   
                                        2013          meningococcal   
polysaccharide (groups   
A, C, Y and W-135)   
diphtheria toxoid   
conjugate vaccine   
(MCV4P)                                             Axel Radford MD  
Work Phone:   
[phone]                          [facility]  
   
                                        2013          tetanus toxoid, redu  
karina   
diphtheria toxoid, and   
acellular pertussis   
vaccine, adsorbed                                   Axel Radford MD  
Work Phone:   
[phone]                          [facility]  
   
                                        2013          influenza virus vacc  
ine,   
unspecified formulation                             Christiano Knight MD  
Work Phone:   
[phone]                          [facility]  
   
                                        2007          diphtheria, tetanus   
toxoids and acellular   
pertussis vaccine                                   Axel Radford MD  
Work Phone:   
[phone]                          [facility]  
   
                                        2007          measles, mumps and   
rubella virus vaccine                               Axel Radford MD  
Work Phone:   
[phone]                          [facility]  
   
                                        2007          poliovirus vaccine,   
inactivated                                         Axel Radford MD  
Work Phone:   
[phone]                          [facility]  
   
                          2007   varicella virus vaccine              Tej Radford MD  
Work Phone:   
[phone]                          [facility]  
   
                                        2004          diphtheria, tetanus   
toxoids and acellular   
pertussis vaccine                                   Axel Radford MD  
Work Phone:   
[phone]                          [facility]  
   
                                        2004          haemophilus influenz  
ae   
type b vaccine, PRP-T   
conjugate                                           Axel Radford MD  
Work Phone:   
[phone]                          [facility]  
   
                                        2004          measles, mumps and   
rubella virus vaccine                               Axel Radford MD  
Work Phone:   
[phone]                          [facility]  
   
                                        2004          poliovirus vaccine,   
inactivated                                         Axel Radford MD  
Work Phone:   
[phone]                          [facility]  
   
                          2004   varicella virus vaccine              Tej Radford MD  
Work Phone:   
[phone]                          [facility]  
   
                                        2002          diphtheria, tetanus   
toxoids and acellular   
pertussis vaccine                                   Axel Radford MD  
Work Phone:   
[phone]                          [facility]  
   
                                        2002          pneumococcal conjuga  
te   
vaccine, 7 valent                                   Axel Radford MD  
Work Phone:   
[phone]                          [facility]  
   
                                        2002          diphtheria, tetanus   
toxoids and acellular   
pertussis vaccine                                   Axel Radford MD  
Work Phone:   
[phone]                          [facility]  
   
                                        2002          haemophilus influenz  
ae   
type b vaccine, PRP-T   
conjugate                                           Axel Radford MD  
Work Phone:   
[phone] [facility]  
   
                                        2002          hepatitis B vaccine,  
   
pediatric or   
pediatric/adolescent   
dosage                                              Axel Radford MD  
Work Phone:   
[phone]                          [facility]  
   
                                        2002          pneumococcal conjuga  
te   
vaccine, 7 valent                                   Axel Radford MD  
Work Phone:   
[phone]                          [facility]  
   
                                        2002          poliovirus vaccine,   
inactivated                                         Axel Radford MD  
Work Phone:   
[phone]                          [facility]  
   
                                        2002          diphtheria, tetanus   
toxoids and acellular   
pertussis vaccine                                   Axel Radford MD  
Work Phone:   
[phone]                          [facility]  
   
                                        2002          haemophilus influenz  
ae   
type b vaccine, PRP-T   
conjugate                                           Axel Radford MD  
Work Phone:   
[phone]                          [facility]  
   
                                        2002          hepatitis B vaccine,  
   
pediatric or   
pediatric/adolescent   
dosage                                              Axel Radford MD  
Work Phone:   
[phone]                          [facility]  
   
                                        2002          pneumococcal conjuga  
te   
vaccine, 7 valent                                   Axel Radford MD  
Work Phone:   
[phone]                          [facility]  
   
                                        2002          poliovirus vaccine,   
inactivated                                         Axel Radford MD  
Work Phone:   
[phone]                          [facility]  
   
                                        2001          hepatitis B vaccine,  
   
pediatric or   
pediatric/adolescent   
dosage                                              Axel Radford MD  
Work Phone:   
[phone]                          [facility]  
  
  
  
Payers  
  
  
                          Date         Payer Category Payer        Policy ID  
   
                          2023   Medicaid                  [id number]  
[id number]  
[id number]  
   
                                2021      Medicaid        [employer]  
[place] MEDICA[place]   
[place] ADV[employer] MEDICAID   
[id number] 2021-Present            [id number]   
[id number]  
[id number]  
   
                          2020   Medicaid                  [id number]  
   
                          2017   Unknown                     
   
                          2001   Unknown                   [id number]   
[id number]  
   
                          2001   Unknown                   [id number]   
[id number]  
   
                          2001   Unknown                   [id number]   
[id number]  
   
                          2001   Unknown                   [id number]   
[id number]  
   
                          2001   Unknown                   [id number]   
[id number]  
   
                          2001   Unknown                   [id number]   
[id number]  
   
                          2001   Unknown                   [id number]   
[id number]  
   
                          2001   Unknown                   [id number]   
[id number]  
   
                          2001   Unknown                   [id number]   
[id number]  
   
                          2001   Unknown                   [id number]   
[id number]  
   
                          2001   Unknown                   [id number]   
[id number]  
  
  
  
Social History  
  
  
                          Date         Type         Detail       Facility  
   
                                       -            -            [address]  
Work Phone:   
[phone]  
   
                                                    Start:   
2020  
End: 2024                         Tobacco smoking status   
NHIS                      Never smoker              [facility]  
   
                                                    Start:   
2020          Alcohol intake      Ex-drinker (finding) [facility]  
   
                                                    Start:   
2001          Sex Assigned At Birth Not on file         [facility]  
   
                                                    Start:   
02-  
End: 2024                         Exposure to SARS-CoV-2   
(event)                   Not sure                  [facility]  
   
                                                    Start:   
2021  
End: 2024     Tobacco use and exposure Never used          [facility]  
   
                                                                Tobacco smoking   
consumption unknown                     [facility]  
   
                                                    Start:   
2024  
End: 2024           Alcoholic beverage intake Lifetime non-drinker   
(finding)                               [facility]  
   
                                                    Start:   
2024  
End: 2024                         History of Social   
function                                            [facility]  
   
                                                    Start:   
2024  
End: 2024     [facility] [employer]                           [employer] [facility]  
   
                                                            Has the electric, ga  
s,   
oil, or water company   
threatened to shut off   
services in your home in   
past 12Mo                 No                        [employer]  
   
                                                            (I/We) worried wheth  
er   
(my/our) food would run   
out before (I/we) got   
money to buy more.        Sometimes true            [employer]  
   
                                                            In the past 12 month  
s,   
has lack of   
transportation kept you   
from medical appointments   
or from getting   
medications?              No                        [employer]  
   
                                                    Start:   
10-                Alcohol intake            Current non-drinker of   
alcohol (finding)                       [facility]  
   
                                                    Start:   
2024                              Pregnancy           [employer]  
  
  
  
Functional Status  
  
  
                          Date         Assessment   Result       Facility  
   
                                                    Functional observable [facility]  
   
                                                    NEGATED: Highlighted   
row                       Functional performance    Functional status   
health issues are not   
documented Disease                      [facility]  
Work Phone:   
[phone]  
  
  
  
Mental Status  
  
  
                          Date         Assessment   Result       Facility  
   
                                2022                      Cognitive functi  
ons   
12-Mar-202210:39                        [facility]  
   
                                2022                      Cognitive functi  
ons   
[id number]                        [facility]  
   
                                                    NEGATED: Highlighted   
row                                     Cognitive function   
[Interpretation]                        Cognitive status   
health issues are not   
documented Disease                      [facility] [address]  
Work Phone:   
[phone]  
  
  
  
Clinical Notes 2022 to 2024  
 Mario Barkley RN - 2024 8:18 PM Janiya Barkley RN - 2024   
8:18 PM Dg Knight MD - 2024 7:55 PM Gerson Lima RN - 
2024 7:14 PM EDTDischarge Instructions  
  
                                Note Date & Type Note            Facility  
   
                                                    2024 Emergency   
department Note                         Formatting of this note might be   
different from the original.  
Pt given discharge instructions   
and states understanding of   
these instructions. Pt has no   
questions at this time. Pt   
encouraged to follow up with   
OBGYN and PCP. Pt given home   
care instructions to help with   
symptoms at home. Pt encouraged   
to  medication from the   
pharmacy and take as prescribed.   
Pt leaves ER in stable condition   
with steady gait at this time.  
Electronically signed by Mario Barkley RN at 2024 8:21   
PM EDT  
                                        [facility]  
   
                                                    2024 Emergency   
department Note                         Formatting of this note might be   
different from the original.  
Pt given discharge instructions   
and states understanding of   
these instructions. Pt has no   
questions at this time. Pt   
encouraged to follow up with   
OBGYN and PCP. Pt given home   
care instructions to help with   
symptoms at home. Pt encouraged   
to  medication from the   
pharmacy and take as prescribed.   
Pt leaves ER in stable condition   
with steady gait at this time.  
Electronically signed by Mario Barkley RN at 2024 8:21   
PM EDT  
Formatting of this note might be   
different from the original.  
The patient does have some   
bacteria in her urine. She did   
have low potassium. She is given   
potassium supplement here in the   
ER. She feels much improved and   
feels ready to go home. I will   
put her on some Macrobid and   
Zofran. I will have her follow   
up with primary care. She did   
have a small amount of protein   
in her urine, but no   
hypertension or any other   
findings consistent with   
preeclampsia.  
  
Christiano Knight MD  
24  
  
Electronically signed by Christiano Knight MD at 2024 7:56   
PM EDT  
Formatting of this note might be   
different from the original.  
REport given to Mario UMANZOR  
Electronically signed by Leia Lima RN at 2024 7:14 PM   
EDT  
Formatting of this note might be   
different from the original.  
Pt denies ability to provide   
urine specimen.  
Electronically signed by Leia Lima RN at 2024 6:02 PM   
EDT  
Formatting of this note is   
different from the original.  
Emergency Department Report  
  
[facility] EMERGENCY   
DEPARTMENT  
  
Service Date:.24  
  
PCP: No primary care provider on   
file.  
MRN: [id number]  
  
  
Chief Complaint:  
Chief Complaint  
Patient presents with  
Abdominal Pain  
Found patient in ED waiting room   
on her knees with her face in   
her boyfriends lap. He sates     
she did this because she did not   
know how long the wait was and   
she was trying to handle the   
pain . I had patient sit back in   
wheel chair an she was taken to   
room. Per family patient had a   
seizure before her abdominal   
cramping started. The seizure   
lasted 4 minutes and patient was   
  just stiff  per family.   
Patient present to us with   
abdominal cramping that radiates   
into back with clear discharge   
that went throug  
  
HPI  
Danna Soares is a 23 y.o.   
female presents to the ED with   
chief complaint of abdominal   
cramping and vomiting. Patient   
is a  AB2 presenting to the   
emergency department complaining   
of pelvic cramping. She states   
she feels she had a seizure   
today. She states she has a   
history of seizures. She states   
she was on no medications. She   
states she has had vomiting   
recently and feels that she was   
dehydrated. She states she was   
thirsty. She denies any headache   
or neck stiffness. She denies   
any vaginal bleeding. She denies   
any loss of bowel or bladder   
control. She denies any headache   
or neck stiffness. She denies   
any recent travel or trauma. She   
denies any yellowing to the skin  
  
Review of Systems:  
Review of Systems  
Review of Systems  
Constitutional: Negative for   
fevers or chills  
Skin: Negative for rash or   
bruising  
HENT: Negative  
Eyes: Negative  
Cardiovascular: Negative  
Gastrointestinal: Positive for   
vomiting. Negative for diarrhea.   
Positive for abdominal cramping  
Respiratory: Negative for cough   
or wheezing  
Genitourinary: Negative for   
frequency urgency or dysuria  
Musculoskeletal: Negative for   
fall, injury, trauma  
Neurological: Negative for   
headache  
  
Past Medical History:  
Past Medical History:  
Diagnosis Date  
Seizures  
  
Past Surgical History:  
No past surgical history on   
file.  
  
Allergies:  
Allergies  
Allergen Reactions  
Codeine  
Miralax [Polyethylene Glycol]  
  
  
Medications:  
Patient's Medications  
No medications on file  
  
Family History:  
History reviewed. No pertinent   
family history.  
  
Social History:  
Social History  
  
Socioeconomic History  
Marital status: Single  
Spouse name: Not on file  
Number of children: Not on file  
Years of education: Not on file  
Highest education level: Not on   
file  
Occupational History  
Not on file  
Tobacco Use  
Smoking status: Never  
Smokeless tobacco: Never  
Vaping Use  
Vaping status: Never Used  
Substance and Sexual Activity  
Alcohol use: Never  
Drug use: Yes  
Types: Marijuana  
Sexual activity: Not on file  
Other Topics Concern  
Not on file  
Social History Narrative  
Not on file  
  
Social Determinants of Health  
  
Financial Resource Strain: Not   
on file  
Food Insecurity: Food Insecurity   
Present (2024)  
Hunger Vital Sign  
Worried About Running Out of   
Food in the Last Year: Sometimes   
true  
Ran Out of Food in the Last   
Year: Sometimes true  
Transportation Needs: No   
Transportation Needs (2024)  
PRAPARE - Transportation  
Lack of Transportation   
(Medical): No  
Lack of Transportation   
(Non-Medical): No  
Physical Activity: Not on file  
Stress: Not on file  
Social Connections: Not on file  
Intimate Partner Violence: Not   
At Risk (2024)  
Humiliation, Afraid, Rape, and   
Kick questionnaire  
Fear of Current or Ex-Partner:   
No  
Emotionally Abused: No  
Physically Abused: No  
Sexually Abused: No  
Housing Stability: Unknown   
(2024)  
Housing Stability Vital Sign  
Unable to Pay for Housing in the   
Last Year: No  
Number of Places Lived in the   
Last Year: Not on file  
Unstable Housing in the Last   
Year: No  
  
Physical Exam:  
Physical Exam  
General: Well-developed   
well-nourished female  
HENT: Head is atraumatic. Mouth   
is dry  
Eyes: Pupils are equal  
Skin: Warm, dry. No rash  
Abdomen: Soft. No distention   
guarding or rebound  
Respiratory: Clear. No rhonchi  
Heart: Heart tones are regular.   
Capillary refill is brisk  
Neurologic: Awake, alert,   
oriented, answering questions   
appropriately. Moving all   
extremities well  
Lymphatic: No lymphedema or   
lymphadenopathy  
Musculoskeletal: No trauma   
fracture or deformity  
Psychiatric: Cooperative with   
examiner  
  
Vital Signs During ED Visit  
Patient Vitals for the past 24   
hrs:  
BP Temp Pulse Resp SpO2 Weight  
24 1809 120/69 -- 102 18   
97 % --  
24 1748 -- -- -- -- --   
45.4 kg (100 lb)  
24 1747 115/68 97.9 F   
(36.6 C) 84 22 97 % --  
  
Orders/Results:  
Results for orders placed or   
performed during the hospital   
encounter of 24  
CBC, EDIF, PLATELET  
Result Value Ref Range  
WBC (WHITE BLOOD COUNT) 10.8 3.6   
- 11.0 10*3/uL  
RBC 4.45 4.0 - 5.4 10*6/uL  
HEMOGLOBIN (HGB) 13.3 12.0 -   
16.0 G/DL  
HEMATOCRIT (HCT) 39.2 36.0 -   
48.0 %  
MEAN CELL VOLUME 88.1 80.0 -   
100.0 FL  
Mean Cell HGB 29.9 26.0 - 35.0   
PG  
MEAN CELL HGB CONCENTRATION 33.9   
27.0 - 37.0 G/DL  
RBC DISTRIBUTION 13.6 11.5 -   
14.5 %  
PLATELET COUNT 282 130 - 400   
10*3/uL  
MEAN PLATELET VOLUME 9.9 7.4 -   
11.0 FL  
DIFFERENTIAL TYPE AUTO DIFF %  
NEUTROPHILS 71.9 37.0 - 75.0 %  
LYMPHOCYTE 17.0 (L) 20.0 - 55.0   
%  
MONOCYTE % 9.8 0.0 - 10.0 %  
EOSINOPHIL % 0.6 0.0 - 11.0 %  
BASOPHIL % 0.7 0.0 - 2.0 %  
Absolute Neutrophil Count 7.8   
(H) 1.4 - 6.5 10*3/uL  
LYMPHOCYTES, ABSOLUTE 1.8 1.2 -   
3.4 10*3/uL  
MONOCYTES, ABSOLUTE 1.1 (H) 0.0   
- 0.7 10*3/uL  
ABSOLUTE EOSINOPHIL COUNT 0.1   
0.0 - 0.7 10*3/uL  
ABSOLUTE BASOPHIL COUNT 0.1 0.0   
- 0.2 10*3/uL  
COMPREHENSIVE METABOLIC PANEL  
Result Value Ref Range  
Glucose 90 70 - 100 MG/DL  
BUN 7 7 - 20 MG/DL  
CREATININE SERUM 0.50 (L) 0.70 -   
1.20 MG/DL  
SODIUM 133 (L) 137 - 145 MMOL/L  
POTASSIUM 2.9 (LL) 3.5 - 5.1   
MMOL/L  
CHLORIDE 104 98 - 107 MMOL/L  
CALCIUM 9.2 8.4 - 10.2 MG/DL  
PROTEIN, TOTAL 7.2 6.3 - 8.2   
GM/DL  
Albumin 4.1 3.5 - 5.0 G/dl  
BILIRUBIN, TOTAL 0.9 0.2 - 1.3   
MG/DL  
AST 36 14 - 36 IU/L  
ALKALINE PHOSPHATASE 53 38 - 126   
IU/L  
CARBON DIOXIDE (CO2) 17 (LL) 22   
- 30 MMOL/L  
A/G Ratio 1.3 RATIO  
ALT 32 <35 IU/L  
ESTIMATED GFR, NON AFRICAN AMER   
164 ml/min/1.73sq.m  
ESTIMATED GFR, AFRICAN AMERICAN   
198 ml/min/1.73sq.m  
GFR COMMENT Average GFR for   
20-29 years old = 116.  
LACTATE, BLOOD  
Result Value Ref Range  
LACTATE 1.7 0.7 - 2.0 mmol/L  
  
Radiographic Imaging  
No orders to display  
  
Procedures:  
Procedures  
  
Moderate Sedation Procedure:  
No  
  
ED Summary/MDM  
Patient was ordered fetal heart   
tones. Blood work was also   
obtained. Urinalysis was   
ordered. She was given 2 L of   
saline wide open. She was   
resting comfortably. Her blood   
work has returned. Lactic acid   
level of 1.7. Potassium is   
slightly low at 2.9. Her H&H of   
13 and 39. Urinalysis is   
currently pending.  
  
I have endorsed the case at   
shift change to the oncoming   
physician. Please refer to his   
note for any outstanding   
diagnostic studies and final   
disposition and results to   
treatment.  
  
Clinical Impression:  
1. Dehydration  
2. Hyperemesis gravidarum  
  
No follow-ups on file.  
New Prescriptions  
No medications on file  
  
Discontinued Medications  
No medications on file  
  
An After Visit Summary was   
printed and given to the patient   
with above information.  
.  
  
  
Lorenzo Cid MD  
24  
  
Electronically signed by Lorenzo Cid MD at 2024 6:55   
PM EDT  
documented in this encounter            [facility]  
   
                                                    2024 Physician   
Emergency department Note               Formatting of this note might be   
different from the original.  
The patient does have some   
bacteria in her urine. She did   
have low potassium. She is given   
potassium supplement here in the   
ER. She feels much improved and   
feels ready to go home. I will   
put her on some Macrobid and   
Zofran. I will have her follow   
up with primary care. She did   
have a small amount of protein   
in her urine, but no   
hypertension or any other   
findings consistent with   
preeclampsia.  
  
Christiano Knight MD  
24  
  
Electronically signed by Christiano Knight MD at 2024 7:56   
PM EDT  
                                        [facility]  
   
                                                    2024 Emergency   
department Note                         Formatting of this note might be   
different from the original.  
REport given to Mario UMANZOR  
Electronically signed by Leia Lima RN at 2024 7:14 PM   
EDT  
                                        [facility]  
   
                                                    2024 Emergency   
department Note                         Formatting of this note might be   
different from the original.  
Pt denies ability to provide   
urine specimen.  
Electronically signed by Leia Lima RN at 2024 6:02 PM   
EDT  
                                        [facility]  
   
                                                    2024 Physician   
Emergency department Note               Formatting of this note is   
different from the original.  
Emergency Department Report  
  
[facility] EMERGENCY   
DEPARTMENT  
  
Service Date:.24  
  
PCP: No primary care provider on   
file.  
MRN: [id number]  
  
  
Chief Complaint:  
Chief Complaint  
Patient presents with  
Abdominal Pain  
Found patient in ED waiting room   
on her knees with her face in   
her boyfriends lap. He sates     
she did this because she did not   
know how long the wait was and   
she was trying to handle the   
pain . I had patient sit back in   
wheel chair an she was taken to   
room. Per family patient had a   
seizure before her abdominal   
cramping started. The seizure   
lasted 4 minutes and patient was   
  just stiff  per family.   
Patient present to us with   
abdominal cramping that radiates   
into back with clear discharge   
that went throug  
  
HPI  
Danna Soares is a 23 y.o.   
female presents to the ED with   
chief complaint of abdominal   
cramping and vomiting. Patient   
is a  AB2 presenting to the   
emergency department complaining   
of pelvic cramping. She states   
she feels she had a seizure   
today. She states she has a   
history of seizures. She states   
she was on no medications. She   
states she has had vomiting   
recently and feels that she was   
dehydrated. She states she was   
thirsty. She denies any headache   
or neck stiffness. She denies   
any vaginal bleeding. She denies   
any loss of bowel or bladder   
control. She denies any headache   
or neck stiffness. She denies   
any recent travel or trauma. She   
denies any yellowing to the skin  
  
Review of Systems:  
Review of Systems  
Review of Systems  
Constitutional: Negative for   
fevers or chills  
Skin: Negative for rash or   
bruising  
HENT: Negative  
Eyes: Negative  
Cardiovascular: Negative  
Gastrointestinal: Positive for   
vomiting. Negative for diarrhea.   
Positive for abdominal cramping  
Respiratory: Negative for cough   
or wheezing  
Genitourinary: Negative for   
frequency urgency or dysuria  
Musculoskeletal: Negative for   
fall, injury, trauma  
Neurological: Negative for   
headache  
  
Past Medical History:  
Past Medical History:  
Diagnosis Date  
Seizures  
  
Past Surgical History:  
No past surgical history on   
file.  
  
Allergies:  
Allergies  
Allergen Reactions  
Codeine  
Miralax [Polyethylene Glycol]  
  
  
Medications:  
Patient's Medications  
No medications on file  
  
Family History:  
History reviewed. No pertinent   
family history.  
  
Social History:  
Social History  
  
Socioeconomic History  
Marital status: Single  
Spouse name: Not on file  
Number of children: Not on file  
Years of education: Not on file  
Highest education level: Not on   
file  
Occupational History  
Not on file  
Tobacco Use  
Smoking status: Never  
Smokeless tobacco: Never  
Vaping Use  
Vaping status: Never Used  
Substance and Sexual Activity  
Alcohol use: Never  
Drug use: Yes  
Types: Marijuana  
Sexual activity: Not on file  
Other Topics Concern  
Not on file  
Social History Narrative  
Not on file  
  
Social Determinants of Health  
  
Financial Resource Strain: Not   
on file  
Food Insecurity: Food Insecurity   
Present (2024)  
Hunger Vital Sign  
Worried About Running Out of   
Food in the Last Year: Sometimes   
true  
Ran Out of Food in the Last   
Year: Sometimes true  
Transportation Needs: No   
Transportation Needs (2024)  
PRAPARE - Transportation  
Lack of Transportation   
(Medical): No  
Lack of Transportation   
(Non-Medical): No  
Physical Activity: Not on file  
Stress: Not on file  
Social Connections: Not on file  
Intimate Partner Violence: Not   
At Risk (2024)  
Humiliation, Afraid, Rape, and   
Kick questionnaire  
Fear of Current or Ex-Partner:   
No  
Emotionally Abused: No  
Physically Abused: No  
Sexually Abused: No  
Housing Stability: Unknown   
(2024)  
Housing Stability Vital Sign  
Unable to Pay for Housing in the   
Last Year: No  
Number of Places Lived in the   
Last Year: Not on file  
Unstable Housing in the Last   
Year: No  
  
Physical Exam:  
Physical Exam  
General: Well-developed   
well-nourished female  
HENT: Head is atraumatic. Mouth   
is dry  
Eyes: Pupils are equal  
Skin: Warm, dry. No rash  
Abdomen: Soft. No distention   
guarding or rebound  
Respiratory: Clear. No rhonchi  
Heart: Heart tones are regular.   
Capillary refill is brisk  
Neurologic: Awake, alert,   
oriented, answering questions   
appropriately. Moving all   
extremities well  
Lymphatic: No lymphedema or   
lymphadenopathy  
Musculoskeletal: No trauma   
fracture or deformity  
Psychiatric: Cooperative with   
examiner  
  
Vital Signs During ED Visit  
Patient Vitals for the past 24   
hrs:  
BP Temp Pulse Resp SpO2 Weight  
24 1809 120/69 -- 102 18   
97 % --  
24 1748 -- -- -- -- --   
45.4 kg (100 lb)  
24 1747 115/68 97.9 F   
(36.6 C) 84 22 97 % --  
  
Orders/Results:  
Results for orders placed or   
performed during the hospital   
encounter of 24  
CBC, EDIF, PLATELET  
Result Value Ref Range  
WBC (WHITE BLOOD COUNT) 10.8 3.6   
- 11.0 10*3/uL  
RBC 4.45 4.0 - 5.4 10*6/uL  
HEMOGLOBIN (HGB) 13.3 12.0 -   
16.0 G/DL  
HEMATOCRIT (HCT) 39.2 36.0 -   
48.0 %  
MEAN CELL VOLUME 88.1 80.0 -   
100.0 FL  
Mean Cell HGB 29.9 26.0 - 35.0   
PG  
MEAN CELL HGB CONCENTRATION 33.9   
27.0 - 37.0 G/DL  
RBC DISTRIBUTION 13.6 11.5 -   
14.5 %  
PLATELET COUNT 282 130 - 400   
10*3/uL  
MEAN PLATELET VOLUME 9.9 7.4 -   
11.0 FL  
DIFFERENTIAL TYPE AUTO DIFF %  
NEUTROPHILS 71.9 37.0 - 75.0 %  
LYMPHOCYTE 17.0 (L) 20.0 - 55.0   
%  
MONOCYTE % 9.8 0.0 - 10.0 %  
EOSINOPHIL % 0.6 0.0 - 11.0 %  
BASOPHIL % 0.7 0.0 - 2.0 %  
Absolute Neutrophil Count 7.8   
(H) 1.4 - 6.5 10*3/uL  
LYMPHOCYTES, ABSOLUTE 1.8 1.2 -   
3.4 10*3/uL  
MONOCYTES, ABSOLUTE 1.1 (H) 0.0   
- 0.7 10*3/uL  
ABSOLUTE EOSINOPHIL COUNT 0.1   
0.0 - 0.7 10*3/uL  
ABSOLUTE BASOPHIL COUNT 0.1 0.0   
- 0.2 10*3/uL  
COMPREHENSIVE METABOLIC PANEL  
Result Value Ref Range  
Glucose 90 70 - 100 MG/DL  
BUN 7 7 - 20 MG/DL  
CREATININE SERUM 0.50 (L) 0.70 -   
1.20 MG/DL  
SODIUM 133 (L) 137 - 145 MMOL/L  
POTASSIUM 2.9 (LL) 3.5 - 5.1   
MMOL/L  
CHLORIDE 104 98 - 107 MMOL/L  
CALCIUM 9.2 8.4 - 10.2 MG/DL  
PROTEIN, TOTAL 7.2 6.3 - 8.2   
GM/DL  
Albumin 4.1 3.5 - 5.0 G/dl  
BILIRUBIN, TOTAL 0.9 0.2 - 1.3   
MG/DL  
AST 36 14 - 36 IU/L  
ALKALINE PHOSPHATASE 53 38 - 126   
IU/L  
CARBON DIOXIDE (CO2) 17 (LL) 22   
- 30 MMOL/L  
A/G Ratio 1.3 RATIO  
ALT 32 <35 IU/L  
ESTIMATED GFR, NON AFRICAN AMER   
164 ml/min/1.73sq.m  
ESTIMATED GFR, AFRICAN AMERICAN   
198 ml/min/1.73sq.m  
GFR COMMENT Average GFR for   
20-29 years old = 116.  
LACTATE, BLOOD  
Result Value Ref Range  
LACTATE 1.7 0.7 - 2.0 mmol/L  
  
Radiographic Imaging  
No orders to display  
  
Procedures:  
Procedures  
  
Moderate Sedation Procedure:  
No  
  
ED Summary/MDM  
Patient was ordered fetal heart   
tones. Blood work was also   
obtained. Urinalysis was   
ordered. She was given 2 L of   
saline wide open. She was   
resting comfortably. Her blood   
work has returned. Lactic acid   
level of 1.7. Potassium is   
slightly low at 2.9. Her H&H of   
13 and 39. Urinalysis is   
currently pending.  
  
I have endorsed the case at   
shift change to the oncoming   
physician. Please refer to his   
note for any outstanding   
diagnostic studies and final   
disposition and results to   
treatment.  
  
Clinical Impression:  
1. Dehydration  
2. Hyperemesis gravidarum  
  
No follow-ups on file.  
New Prescriptions  
No medications on file  
  
Discontinued Medications  
No medications on file  
  
An After Visit Summary was   
printed and given to the patient   
with above information.  
.  
  
  
Lorenzo Cid MD  
[id number]  
  
Electronically signed by Lorenzo Cid MD at 2024 6:55   
PM EDT  
                                        [facility]  
   
                                                    2024 Emergency   
department Note                         Formatting of this note is   
different from the original.  
Patient is a 23-year-old female   
who describes a history of   
gastroparesis and marijuana use   
presents with a chief complaint   
of abdominal pain nausea   
vomiting starting this morning.   
She insist that her nausea and   
vomiting is not due to her   
marijuana use and that she does   
not have cannabis hyperemesis   
syndrome. This is the patient's   
10th visit in February. It is   
currently .  
  
  
  
Review of Systems  
  
Physical Exam  
Vitals and nursing note   
reviewed.  
Constitutional:  
General: She is not in acute   
distress.  
Appearance: She is   
well-developed.  
HENT:  
Head: Normocephalic and   
atraumatic.  
Eyes:  
Conjunctiva/sclera: Conjunctivae   
normal.  
Cardiovascular:  
Rate and Rhythm: Normal rate and   
regular rhythm.  
Heart sounds: No murmur heard.  
Pulmonary:  
Effort: Pulmonary effort is   
normal. No respiratory distress.  
Breath sounds: Normal breath   
sounds.  
Abdominal:  
Palpations: Abdomen is soft.  
Tenderness: There is no   
abdominal tenderness.  
Musculoskeletal:  
General: No swelling.  
Cervical back: Neck supple.  
Skin:  
General: Skin is warm and dry.  
Capillary Refill: Capillary   
refill takes less than 2   
seconds.  
Neurological:  
Mental Status: She is alert.  
Psychiatric:  
Mood and Affect: Mood normal.  
  
  
  
Labs Reviewed  
BASIC METABOLIC PANEL - Abnormal  
Result Value  
Glucose 97  
Sodium 136  
Potassium 3.4 (*)  
Chloride 102  
Bicarbonate 20 (*)  
Anion Gap 17  
Urea Nitrogen 4 (*)  
Creatinine 0.54  
eGFR >90  
Calcium 9.5  
MANUAL DIFFERENTIAL - Abnormal  
Neutrophils %, Manual 84.0  
Lymphocytes %, Manual 9.0  
Monocytes %, Manual 6.0  
Eosinophils %, Manual 0.0  
Basophils %, Manual 1.0  
Seg Neutrophils Absolute, Manual   
8.90 (*)  
Lymphocytes Absolute, Manual   
0.95 (*)  
Monocytes Absolute, Manual 0.64  
Eosinophils Absolute, Manual   
0.00  
Basophils Absolute, Manual 0.11   
(*)  
Total Cells Counted 100  
RBC Morphology No significant   
RBC morphology present  
CBC WITH AUTO DIFFERENTIAL -   
Normal  
WBC 10.6  
nRBC 0.0  
RBC 5.01  
Hemoglobin 14.9  
Hematocrit 44.1  
MCV 88  
MCH 29.7  
MCHC 33.8  
RDW 13.6  
Platelets 306  
Immature Granulocytes %,   
Automated 0.4  
Immature Granulocytes Absolute,   
Automated 0.04  
Narrative:  
The previously reported   
component Neutrophils % is no   
longer being reported.  
The previously reported   
component Lymphocytes % is no   
longer being reported.  
The previously reported   
component Monocytes % is no   
longer being reported.  
The previously  
reported component Eosinophils %   
is no longer being reported.  
The previously reported   
component Basophils % is no   
longer being reported.  
The previously reported   
component Absolute Neutrophils   
is no longer being reported.  
The previously reported  
component Absolute Lymphocytes   
is no longer being reported.  
The previously reported   
component Absolute Monocytes is   
no longer being reported.  
The previously reported   
component Absolute Eosinophils   
is no longer being reported.  
The previously reported  
component Absolute Basophils is   
no longer being reported.  
HEPATIC FUNCTION PANEL - Normal  
Albumin 4.9  
Bilirubin, Total 0.6  
Bilirubin, Direct 0.1  
Alkaline Phosphatase 39  
ALT 16  
AST 15  
Total Protein 7.4  
LIPASE - Normal  
Lipase 27  
Narrative:  
Venipuncture immediately after   
or during the administration of   
Metamizole may lead to falsely   
low results. Testing should be   
performed immediately prior to   
Metamizole dosing.  
GRAY TOP  
  
  
No orders to display  
  
  
Procedures  
  
Medical Decision Making  
23-year-old female with multiple   
ER visits this month alone   
presented with nausea vomiting   
abdominal pain. She received a   
liter of fluids and 5 mg IVP   
Compazine and is feeling much   
better and request to be   
discharged home. Electrolytes   
are essentially unremarkable for   
acute findings. Lipase and   
biliary numbers are normal. She   
can be discharged.  
  
  
  
Diagnoses as of 24  
Nausea and vomiting, unspecified   
vomiting type  
  
  
Winston Gordillo MD  
24  
  
Electronically signed by Winston Gordillo MD at 2024 10:19 PM   
EST  
documented in this encounter            [facility]  
Work Phone:   
[phone]  
   
                                                    2024 Physician   
Emergency department Note               Formatting of this note is   
different from the original.  
Patient is a 23-year-old female   
who describes a history of   
gastroparesis and marijuana use   
presents with a chief complaint   
of abdominal pain nausea   
vomiting starting this morning.   
She insist that her nausea and   
vomiting is not due to her   
marijuana use and that she does   
not have cannabis hyperemesis   
syndrome. This is the patient's   
10th visit in February. It is   
currently .  
  
  
  
Review of Systems  
  
Physical Exam  
Vitals and nursing note   
reviewed.  
Constitutional:  
General: She is not in acute   
distress.  
Appearance: She is   
well-developed.  
HENT:  
Head: Normocephalic and   
atraumatic.  
Eyes:  
Conjunctiva/sclera: Conjunctivae   
normal.  
Cardiovascular:  
Rate and Rhythm: Normal rate and   
regular rhythm.  
Heart sounds: No murmur heard.  
Pulmonary:  
Effort: Pulmonary effort is   
normal. No respiratory distress.  
Breath sounds: Normal breath   
sounds.  
Abdominal:  
Palpations: Abdomen is soft.  
Tenderness: There is no   
abdominal tenderness.  
Musculoskeletal:  
General: No swelling.  
Cervical back: Neck supple.  
Skin:  
General: Skin is warm and dry.  
Capillary Refill: Capillary   
refill takes less than 2   
seconds.  
Neurological:  
Mental Status: She is alert.  
Psychiatric:  
Mood and Affect: Mood normal.  
  
  
  
Labs Reviewed  
BASIC METABOLIC PANEL - Abnormal  
Result Value  
Glucose 97  
Sodium 136  
Potassium 3.4 (*)  
Chloride 102  
Bicarbonate 20 (*)  
Anion Gap 17  
Urea Nitrogen 4 (*)  
Creatinine 0.54  
eGFR >90  
Calcium 9.5  
MANUAL DIFFERENTIAL - Abnormal  
Neutrophils %, Manual 84.0  
Lymphocytes %, Manual 9.0  
Monocytes %, Manual 6.0  
Eosinophils %, Manual 0.0  
Basophils %, Manual 1.0  
Seg Neutrophils Absolute, Manual   
8.90 (*)  
Lymphocytes Absolute, Manual   
0.95 (*)  
Monocytes Absolute, Manual 0.64  
Eosinophils Absolute, Manual   
0.00  
Basophils Absolute, Manual 0.11   
(*)  
Total Cells Counted 100  
RBC Morphology No significant   
RBC morphology present  
CBC WITH AUTO DIFFERENTIAL -   
Normal  
WBC 10.6  
nRBC 0.0  
RBC 5.01  
Hemoglobin 14.9  
Hematocrit 44.1  
MCV 88  
MCH 29.7  
MCHC 33.8  
RDW 13.6  
Platelets 306  
Immature Granulocytes %,   
Automated 0.4  
Immature Granulocytes Absolute,   
Automated 0.04  
Narrative:  
The previously reported   
component Neutrophils % is no   
longer being reported.  
The previously reported   
component Lymphocytes % is no   
longer being reported.  
The previously reported   
component Monocytes % is no   
longer being reported.  
The previously  
reported component Eosinophils %   
is no longer being reported.  
The previously reported   
component Basophils % is no   
longer being reported.  
The previously reported   
component Absolute Neutrophils   
is no longer being reported.  
The previously reported  
component Absolute Lymphocytes   
is no longer being reported.  
The previously reported   
component Absolute Monocytes is   
no longer being reported.  
The previously reported   
component Absolute Eosinophils   
is no longer being reported.  
The previously reported  
component Absolute Basophils is   
no longer being reported.  
HEPATIC FUNCTION PANEL - Normal  
Albumin 4.9  
Bilirubin, Total 0.6  
Bilirubin, Direct 0.1  
Alkaline Phosphatase 39  
ALT 16  
AST 15  
Total Protein 7.4  
LIPASE - Normal  
Lipase 27  
Narrative:  
Venipuncture immediately after   
or during the administration of   
Metamizole may lead to falsely   
low results. Testing should be   
performed immediately prior to   
Metamizole dosing.  
GRAY TOP  
  
  
No orders to display  
  
  
Procedures  
  
Medical Decision Making  
23-year-old female with multiple   
ER visits this month alone   
presented with nausea vomiting   
abdominal pain. She received a   
liter of fluids and 5 mg IVP   
Compazine and is feeling much   
better and request to be   
discharged home. Electrolytes   
are essentially unremarkable for   
acute findings. Lipase and   
biliary numbers are normal. She   
can be discharged.  
  
  
  
Diagnoses as of 24  
Nausea and vomiting, unspecified   
vomiting type  
  
  
Winston Gordillo MD  
24  
  
Electronically signed by Winston Gordillo MD at 2024 10:19 PM   
EST  
                                        [facility]  
Work Phone:   
[phone]  
   
                                                    2024 Hospital   
Discharge instructions                    
  
  
Axel Radford MD - 2024   
10:53 PM ESTFormatting of this   
note might be different from the   
original.  
  
  
Procedures done during this   
visit: None  
  
Electronically signed by Axel Radford MD at 2024 10:53   
PM EST  
  
  
  
  
The following attachments cannot   
be sent through Care   
Everywhere.Nausea and Vomiting   
Discharge Instructions, Adult   
(English)documented in this   
encounter                               [facility]  
   
                                        2024 Note     Physician Triage Not  
e  
The patient was seen by me in   
intake for a brief history and   
physical obtained  
for triage reasons only. My exam   
is intended to be an initial   
medical screening  
exam for disposition within our   
ED with limited initial orders   
placed, when  
appropriate, to expedite care by   
the treating team.  
HIPAA: Verbal permission granted   
from patient to discuss case,   
including  
protected health information, in   
front of family / friends in   
room at the time  
of the evaluation.  
Patient complains of had seizure   
on the way here - lives 1 hour   
away.+ nausea AND  
neck pains. No fevers. Had at   
least 20 seizures over the last   
week. Not on  
medications for the seizures.  
No pregnancy.  
Focused Exam: alert now,  
The patient is deemed   
appropriate for acute.  
Initial orders: iv, labs.  
The remainder of testing,   
treatment, and diagnostic plan   
will be assumed by the  
next clinician who will be   
seeing the patient as a primary   
patient, creating a  
plan and impression, and final   
disposition of the patient from   
the ED. I had a  
limited role in this case.  
PLEASE SEE OTHER   
ATTENDING/RESIDENT/PHYSICIAN/CNP  
/PA NOTATION  
FLYNN Araujo                 [facility]  
   
                                                    2024 Physician   
Emergency department Note               Formatting of this note might be   
different from the original.  
Physician Triage Note  
  
The patient was seen by me in   
intake for a brief history and   
physical obtained for triage   
reasons only. My exam is   
intended to be an initial   
medical screening exam for   
disposition within our ED with   
limited initial orders placed,   
when appropriate, to expedite   
care by the treating team.  
  
HIPAA: Verbal permission granted   
from patient to discuss case,   
including protected health   
information, in front of family   
/ friends in room at the time of   
the evaluation.  
  
Patient complains of had seizure   
on the way here - lives 1 hour   
away.+ nausea & neck pains. No   
fevers. Had at least 20 seizures   
over the last week. Not on   
medications for the seizures.  
No pregnancy.  
  
Focused Exam: alert now,  
  
The patient is deemed   
appropriate for acute.  
  
Initial orders: iv, labs.  
  
The remainder of testing,   
treatment, and diagnostic plan   
will be assumed by the next   
clinician who will be seeing the   
patient as a primary patient,   
creating a plan and impression,   
and final disposition of the   
patient from the ED. I had a   
limited role in this case.  
  
PLEASE SEE OTHER   
ATTENDING/RESIDENT/PHYSICIAN/CNP  
/PA NOTATION  
  
FLYNN Araujo  
  
Electronically signed by   
June Dyson APRN-CNP at   
2024 6:19 PM EST  
                                        [facility]  
Work Phone:   
[phone]  
   
                                                    2024 Emergency   
department Note                         Formatting of this note might be   
different from the original.  
Physician Triage Note  
  
The patient was seen by me in   
intake for a brief history and   
physical obtained for triage   
reasons only. My exam is   
intended to be an initial   
medical screening exam for   
disposition within our ED with   
limited initial orders placed,   
when appropriate, to expedite   
care by the treating team.  
  
HIPAA: Verbal permission granted   
from patient to discuss case,   
including protected health   
information, in front of family   
/ friends in room at the time of   
the evaluation.  
  
Patient complains of had seizure   
on the way here - lives 1 hour   
away.+ nausea & neck pains. No   
fevers. Had at least 20 seizures   
over the last week. Not on   
medications for the seizures.  
No pregnancy.  
  
Focused Exam: alert now,  
  
The patient is deemed   
appropriate for acute.  
  
Initial orders: iv, labs.  
  
The remainder of testing,   
treatment, and diagnostic plan   
will be assumed by the next   
clinician who will be seeing the   
patient as a primary patient,   
creating a plan and impression,   
and final disposition of the   
patient from the ED. I had a   
limited role in this case.  
  
PLEASE SEE OTHER   
ATTENDING/RESIDENT/PHYSICIAN/CNP  
/PA NOTATION  
  
FLYNN Araujo  
  
Electronically signed by   
June Dyson APRN-CNP at   
2024 6:19 PM EST  
documented in this encounter            [facility]  
   
                                        2024 Plan of care note Formatting   
of this note might be   
different from the original.  
  
Problem: Patient Care Overview  
Goal: Plan of Care Review  
Outcome: Adequate for Discharge  
Goal: Individualization &   
Mutuality  
Outcome: Adequate for Discharge  
Goal: Discharge Needs Assessment  
Outcome: Adequate for Discharge  
Goal: Interdisciplinary   
Rounds/Family Conf  
Outcome: Adequate for Discharge  
  
Problem: Skin Integrity   
Impairment, Risk/Actual (Adult)  
Goal: Identify Related Risk   
Factors and Signs and Symptoms  
Description: Related risk   
factors and signs and symptoms   
are identified upon initiation   
of Human Response Clinical   
Practice Guideline (CPG)  
Outcome: Adequate for Discharge  
Goal: Skin Integrity/Wound   
Healing  
Description: Patient will   
demonstrate the desired outcomes   
by discharge/transition of care.  
Outcome: Adequate for Discharge  
  
Problem: Nausea/Vomiting (Adult)  
Goal: Identify Related Risk   
Factors and Signs and Symptoms  
Description: Related risk   
factors and signs and symptoms   
are identified upon initiation   
of Human Response Clinical   
Practice Guideline (CPG)  
2024 1933 by Janelle Short RN  
Outcome: Adequate for Discharge  
2024 1625 by Janelle Short RN  
Outcome: Progressing  
Goal: Symptom Relief  
Description: Patient will   
demonstrate the desired outcomes   
by discharge/transition of care.  
2024 by Janelle Short RN  
Outcome: Adequate for Discharge  
2024 by Janelle Short RN  
Outcome: Progressing  
Goal: Adequate Hydration  
Description: Patient will   
demonstrate the desired outcomes   
by discharge/transition of care.  
2024 by Janelle Short RN  
Outcome: Adequate for Discharge  
2024 by Janelle Short RN  
Outcome: Progressing  
Intervention: Minimize Nausea   
Triggers/Manage Symptoms  
Flowsheets (Taken 2024   
1625)  
Nausea/Vomiting Interventions:  
cool cloth applied  
sips clear liquids given  
slow deep breathing encouraged  
stimuli minimized  
Environmental Support:  
calm environment promoted  
personal routine supported  
rest periods encouraged  
environmental consistency   
promoted  
Intervention: Position to   
Prevent Aspiration  
Flowsheets (Taken 2024)  
Body Position:   
positioned/repositioned   
independently  
Head of Bed (HOB): HOB 30-59   
degrees  
Intervention: Promote/Maintain   
Hydration  
Flowsheets (Taken 2024)  
Fluid/Electrolyte Management:  
fluids provided  
intravenous fluids adjusted  
electrolyte supplement initiated  
  
Electronically signed by Janelle Short RN at 2024 7:33   
PM [facility]  
   
                                                    2024 Miscellaneous   
Notes                                   Formatting of this note might be   
different from the original.  
  
Problem: Patient Care Overview  
Goal: Plan of Care Review  
Outcome: Adequate for Discharge  
Goal: Individualization &   
Mutuality  
Outcome: Adequate for Discharge  
Goal: Discharge Needs Assessment  
Outcome: Adequate for Discharge  
Goal: Interdisciplinary   
Rounds/Family Conf  
Outcome: Adequate for Discharge  
  
Problem: Skin Integrity   
Impairment, Risk/Actual (Adult)  
Goal: Identify Related Risk   
Factors and Signs and Symptoms  
Description: Related risk   
factors and signs and symptoms   
are identified upon initiation   
of Human Response Clinical   
Practice Guideline (CPG)  
Outcome: Adequate for Discharge  
Goal: Skin Integrity/Wound   
Healing  
Description: Patient will   
demonstrate the desired outcomes   
by discharge/transition of care.  
Outcome: Adequate for Discharge  
  
Problem: Nausea/Vomiting (Adult)  
Goal: Identify Related Risk   
Factors and Signs and Symptoms  
Description: Related risk   
factors and signs and symptoms   
are identified upon initiation   
of Human Response Clinical   
Practice Guideline (CPG)  
2024 by Janelle Short RN  
Outcome: Adequate for Discharge  
2024 by Janelle Short RN  
Outcome: Progressing  
Goal: Symptom Relief  
Description: Patient will   
demonstrate the desired outcomes   
by discharge/transition of care.  
2024 by Janelle Short RN  
Outcome: Adequate for Discharge  
2024 by Janelle Short RN  
Outcome: Progressing  
Goal: Adequate Hydration  
Description: Patient will   
demonstrate the desired outcomes   
by discharge/transition of care.  
2024 by Janelle Short RN  
Outcome: Adequate for Discharge  
2024 by Janelle Short RN  
Outcome: Progressing  
Intervention: Minimize Nausea   
Triggers/Manage Symptoms  
Flowsheets (Taken 2024   
162)  
Nausea/Vomiting Interventions:  
cool cloth applied  
sips clear liquids given  
slow deep breathing encouraged  
stimuli minimized  
Environmental Support:  
calm environment promoted  
personal routine supported  
rest periods encouraged  
environmental consistency   
promoted  
Intervention: Position to   
Prevent Aspiration  
Flowsheets (Taken 2024)  
Body Position:   
positioned/repositioned   
independently  
Head of Bed (HOB): HOB 30-59   
degrees  
Intervention: Promote/Maintain   
Hydration  
Flowsheets (Taken 2024)  
Fluid/Electrolyte Management:  
fluids provided  
intravenous fluids adjusted  
electrolyte supplement initiated  
  
Electronically signed by Janelle Short RN at 2024 7:33   
PM EST  
Formatting of this note might be   
different from the original.  
  
Problem: Nausea/Vomiting (Adult)  
Goal: Identify Related Risk   
Factors and Signs and Symptoms  
Description: Related risk   
factors and signs and symptoms   
are identified upon initiation   
of Human Response Clinical   
Practice Guideline (CPG)  
Outcome: Progressing  
Goal: Symptom Relief  
Description: Patient will   
demonstrate the desired outcomes   
by discharge/transition of care.  
Outcome: Progressing  
Goal: Adequate Hydration  
Description: Patient will   
demonstrate the desired outcomes   
by discharge/transition of care.  
Outcome: Progressing  
Intervention: Minimize Nausea   
Triggers/Manage Symptoms  
Flowsheets (Taken 2024)  
Nausea/Vomiting Interventions:  
cool cloth applied  
sips clear liquids given  
slow deep breathing encouraged  
stimuli minimized  
Environmental Support:  
calm environment promoted  
personal routine supported  
rest periods encouraged  
environmental consistency   
promoted  
Intervention: Position to   
Prevent Aspiration  
Flowsheets (Taken 2024   
162)  
Body Position:   
positioned/repositioned   
independently  
Head of Bed (HOB): HOB 30-59   
degrees  
Intervention: Promote/Maintain   
Hydration  
Flowsheets (Taken 2024   
162)  
Fluid/Electrolyte Management:  
fluids provided  
intravenous fluids adjusted  
electrolyte supplement initiated  
  
Electronically signed by Janelle Short RN at 2024 4:26   
PM EST  
Formatting of this note might be   
different from the original.  
Afternoon assessment completed.   
No changes noted. Stool specimen   
obtained. Patient resting with   
no complaints. Call light and   
phone within reach. Rn continue   
to monitor.  
  
Electronically signed by Janelle Short RN at 2024 4:24   
PM EST  
Formatting of this note might be   
different from the original.  
No change noted from previous   
assessment by this RN unless   
otherwise detailed in   
coordinating flow sheets.   
Patient denies any needs at this   
time. Will continue to monitor.  
  
Electronically signed by Janelle Short RN at 2024   
12:52 PM EST  
Formatting of this note might be   
different from the original.  
Responded to patient's call   
light. She verbalizes she wants   
to leave [facility] Dr. Cooper notified.   
Pulled IV out. Patient's best   
friend at bedside to drive   
patient home.  
Electronically signed by   
Jennifer Tolliver RN at   
2024 7:41 PM EST  
Formatting of this note might be   
different from the original.  
Resting in bed. Continues to be   
medicated for complaints of   
nausea and emesis which is   
yellow in color. Call light in   
reach.  
Electronically signed by   
Fadia Unger RN at   
2024 4:32 AM EST  
Formatting of this note might be   
different from the original.  
Patient awake in bed. Has many   
requests for this nurse   
including a second shower, extra   
pillows, warm blankets, drinks,   
and pain medication, and   
medication for nausea as soon as   
she can have something. No   
emesis observed this shift.   
Trying to meet all of patient   
requests and basic needs.   
Assessment unchanged from   
previous one. No seizure   
activity observed. Call light in   
reach.  
Electronically signed by   
Fadia Unger RN at   
2024 1:15 AM EST  
Formatting of this note might be   
different from the original.  
Patient to room 2755 from ED and   
assisted into bed x 1 assist.   
Patient oriented to room and   
call light system. Admission   
assessment initiated. Family   
with patient and will assist her   
with shower per her request. Is   
asking for nausea medication and   
will call  to get order   
for something else as she has   
had all she can have at this   
time. SR up x 4 due to seizure   
precautions being maintained as   
patient has active seizure   
history, having one in ED as   
reported by staff.  
Electronically signed by   
Fadia Unger RN at   
2024 10:28 PM EST  
documented in this encounter            [facility]  
   
                                        2024 Plan of care note Formatting   
of this note might be   
different from the original.  
  
Problem: Nausea/Vomiting (Adult)  
Goal: Identify Related Risk   
Factors and Signs and Symptoms  
Description: Related risk   
factors and signs and symptoms   
are identified upon initiation   
of Human Response Clinical   
Practice Guideline (CPG)  
Outcome: Progressing  
Goal: Symptom Relief  
Description: Patient will   
demonstrate the desired outcomes   
by discharge/transition of care.  
Outcome: Progressing  
Goal: Adequate Hydration  
Description: Patient will   
demonstrate the desired outcomes   
by discharge/transition of care.  
Outcome: Progressing  
Intervention: Minimize Nausea   
Triggers/Manage Symptoms  
Flowsheets (Taken 2024   
1625)  
Nausea/Vomiting Interventions:  
cool cloth applied  
sips clear liquids given  
slow deep breathing encouraged  
stimuli minimized  
Environmental Support:  
calm environment promoted  
personal routine supported  
rest periods encouraged  
environmental consistency   
promoted  
Intervention: Position to   
Prevent Aspiration  
Flowsheets (Taken 2024   
1625)  
Body Position:   
positioned/repositioned   
independently  
Head of Bed (HOB): HOB 30-59   
degrees  
Intervention: Promote/Maintain   
Hydration  
Flowsheets (Taken 2024   
1625)  
Fluid/Electrolyte Management:  
fluids provided  
intravenous fluids adjusted  
electrolyte supplement initiated  
  
Electronically signed by Janelle Short RN at 2024 4:26   
PM [facility]  
   
                                        2024 Nurse Note Formatting of this  
 note might be   
different from the original.  
Afternoon assessment completed.   
No changes noted. Stool specimen   
obtained. Patient resting with   
no complaints. Call light and   
phone within reach. Rn continue   
to monitor.  
  
Electronically signed by Janelle Short RN at 2024 4:24   
PM [facility]  
   
                                        2024 Nurse Note Formatting of this  
 note might be   
different from the original.  
No change noted from previous   
assessment by this RN unless   
otherwise detailed in   
coordinating flow sheets.   
Patient denies any needs at this   
time. Will continue to monitor.  
  
Electronically signed by Janelle Short RN at 2024   
12:52 PM [facility]  
   
                                                    2024 History of   
Present illness Narrative               Formatting of this note is   
different from the original.  
LSW met with patient to discuss   
discharge plans. Patient states   
that she currently lives in a   
mobile home with her fiance. She   
does not have advance   
directives. She denies mental   
health history. She states that   
she is independent with all ADLs   
and does not use any DME. She   
has no PCP and states that she   
does not want one. She denies   
all questions and concerns.  
  
Discharge Plan:  
Patient will return home.  
  
24 1120  
Referral Information  
Arrived From home or self-care  
Readmission Information  
Was patient readmitted within 30   
Days? No  
Information Source  
Information Source patient  
Outpatient Providers  
Outpatient Providers Updated In   
[facility] Yes  
Contact Information  
/SW Added to Care   
Team Yes  
This Writer is Primary Case   
Manager/SW Yes  
Social Work Contact Name   
Krissy Davison  
's Phone Number   
[phone]  
Living Environment  
Lives With significant other  
Living Arrangements mobile home  
Provides Primary Care For no one  
Primary Care Provided By self  
Support System Immediate family  
Able to Return to Prior   
Arrangements yes  
Functional Status  
Patient's Functional Status   
Prior To This Admission?   
Independent  
Are There Status Changes This   
Admission? No  
Changes Observed Since   
Admission? No Changes Observed  
Concerns With Patient Being Able   
To Care For Themselves At   
Discharge? No  
Employment/Financial  
Employed? No  
Source Of Income public   
assistance  
Financial Concerns none  
Insurance  
Medical Insurance Verified Yes  
Prescription Coverage Yes  
Pharmacy updated in [facility] Yes  
Initial Discharge Planning  
Home Care Services (PTA) No  
Home Therapies (PTA) Home SLP  
DME (PTA) None  
Medical Supplies (PTA) None  
Patient Goal for Discharge   
Return home with assistance from   
family and friends  
Anticipated discharge   
disposition Home  
Anticipated Changes Related to   
Illness none  
Transportation Available car  
Assessment/Concerns to be   
Addressed  
Concerns To Be Addressed no   
discharge needs   
identified;denies needs/concerns   
at this time  
  
  
Electronically signed by   
KEARA Davis at   
2024 11:23 AM EST  
documented in this encounter            [facility]  
   
                                        2024 Nurse Note Formatting of this  
 note might be   
different from the original.  
Responded to patient's call   
light. She verbalizes she wants   
to leave [place] Dr. Cooper notified.   
Pulled IV out. Patient's best   
friend at bedside to drive   
patient home.  
Electronically signed by   
Jennifer Tolliver RN at   
2024 7:41 PM EST  
                                        [employer]  
   
                                        2024 Nurse Note Formatting of this  
 note might be   
different from the original.  
Resting in bed. Continues to be   
medicated for complaints of   
nausea and emesis which is   
yellow in color. Call light in   
reach.  
Electronically signed by   
Fadia Unger RN at   
2024 4:32 AM [facility]  
                                        [employer]  
   
                                        2024 Nurse Note Formatting of this  
 note might be   
different from the original.  
Patient awake in bed. Has many   
requests for this nurse   
including a second shower, extra   
pillows, warm blankets, drinks,   
and pain medication, and   
medication for nausea as soon as   
she can have something. No   
emesis observed this shift.   
Trying to meet all of patient   
requests and basic needs.   
Assessment unchanged from   
previous one. No seizure   
activity observed. Call light in   
reach.  
Electronically signed by   
Fadia Unger RN at   
2024 1:15 AM [facility]  
   
                                                    2024 Emergency   
department Note                         Formatting of this note might be   
different from the original.  
Pt transported to MS/ICU to room   
2755. Patient belongings with   
pt. Pt left in stable condition   
with call light within reach and   
family at bedside. Report sheet   
tubed to MS and bedside report   
given to nurse.  
Electronically signed by Marla Biggs RN at 2024 8:29 PM   
[facility]  
   
                                                    2024 Emergency   
department Note                         Formatting of this note might be   
different from the original.  
Pt transported to MS/ICU to room   
2755. Patient belongings with   
pt. Pt left in stable condition   
with call light within reach and   
family at bedside. Report sheet   
tubed to MS and bedside report   
given to nurse.  
Electronically signed by Marla Biggs RN at 2024 8:29 PM   
EST  
Formatting of this note might be   
different from the original.  
Pt wishes to keep bottoms on at   
this time.  
Electronically signed by Pilar Clifford RN at 2024   
7:59 PM EST  
Formatting of this note might be   
different from the original.  
Pt requesting pain medication at   
this time. Provider aware. Pt   
ambulates to  with steady   
gait.  
Electronically signed by Pilar Clifford RN at 2024   
6:36 PM EST  
Formatting of this note is   
different from the original.  
DEPARTMENT OF EMERGENCY MEDICINE  
  
CHIEF COMPLAINT  
Vomiting, Abdominal Pain (Pt dx   
with colitis , unable to keep   
medications down with zofran and   
phenergan suppository/Neg flu   
covid and pregnancy test), and   
Seizure (X 4 days/Waiting to see   
neuro for seizures/[facility]   
in [place] last night)  
  
  
GLENDA Soares is a 23 y.o.   
female who presents with a   
complaint of nausea, vomiting,   
diarrhea, and abdominal pain.   
She has had symptoms for 4 days.   
She was seen twice yesterday at   
[facility]. She had laboratory   
testing and a CT scan of the   
abdomen and pelvis. The CT scan   
of the abdomen and pelvis showed   
possible early colitis. She was   
started on Augmentin. She was   
provided with Zofran and   
Phenergan. She states her throat   
is raw from vomiting. No fevers   
or chills. No cough. She also   
does complain of some   
intermittent seizures. This was   
worked up yesterday at   
[facility] as well. She has been   
referred to Neurology for   
follow-up.  
  
REVIEW OF SYSTEMS  
Review of Systems  
  
Constitutional: No fevers, no   
chills  
Eyes: No vision change, no   
drainage  
ENT: No ear pain, throat is raw   
from vomiting  
Cardiovascular: No chest pain,   
no edema  
Respiratory: No shortness of   
breath, no cough  
Gastrointestinal: Positive   
vomiting, Positive diarrhea  
Genitourinary: No dysuria, no   
frequency  
Musculoskeletal: No joint pain,   
no joint swelling  
Integumentary: No rash, no   
itching  
Neurologic: No headache, no   
confusion  
Psychiatric: No anxiety, no   
depression  
  
PAST MEDICAL HISTORY  
Past Medical History:  
Diagnosis Date  
Seizures  
  
  
SURGICAL HISTORY  
No past surgical history on   
file.  
  
CURRENT MEDICATIONS  
No current facility-administered   
medications for this encounter.  
  
No current outpatient   
medications on file.  
  
  
ALLERGIES  
Allergies  
Allergen Reactions  
Codeine  
Miralax [Polyethylene Glycol]  
  
  
FAMILY HISTORY  
History reviewed. No pertinent   
family history.  
  
SOCIAL HISTORY  
Social History  
  
Socioeconomic History  
Marital status: Not on file  
Spouse name: Not on file  
Number of children: Not on file  
Years of education: Not on file  
Highest education level: Not on   
file  
Occupational History  
Not on file  
Tobacco Use  
Smoking status: Never  
Smokeless tobacco: Never  
Vaping Use  
Vaping status: Never Used  
Substance and Sexual Activity  
Alcohol use: Never  
Drug use: Yes  
Types: Marijuana  
Sexual activity: Not on file  
Other Topics Concern  
Not on file  
Social History Narrative  
Not on file  
  
Social Determinants of Health  
  
Financial Resource Strain: Not   
on file  
Food Insecurity: Not on file  
Transportation Needs: Not on   
file  
Physical Activity: Not on file  
Stress: Not on file  
Social Connections: Not on file  
Intimate Partner Violence: Not   
on file  
Housing Stability: Not on file  
  
  
PHYSICAL EXAM  
/77   Pulse 80   Temp 99.4   
F (37.4 C) (Oral)   Resp 17   Ht   
1.499 m (4' 11 )   SpO2 100%     
Smoking Status Never  
Physical Exam  
  
The patient is well-developed,   
well-nourished, and in no acute   
distress.  
Head is atraumatic and   
normocephalic.  
Pupils are equal and reactive.  
Face is symmetric.  
Mucous membranes are moist.  
Neck is supple.  
Heart is regular rate and   
rhythm.  
Lungs are clear to auscultation.  
Abdomen is soft, nontender,   
nondistended.  
Skin is warm and dry.  
Extremities are warm and well   
perfused and without acute   
deformity.  
Neurologically, the patient is   
awake, alert, and without acute   
deficit.  
Psychiatrically, the patient is   
calm and cooperative.  
  
ED COURSE & MEDICAL DECISION   
MAKING  
  
Laboratory evaluation shows   
bicarb low at 14. She continues   
to complain of nausea despite   
receiving Reglan, Benadryl, and   
Zofran. Her drug screen is   
positive for cannabinoids. I did   
address possible cannabis   
hyperemesis syndrome with the   
patient but she denies any   
regular use of cannabis and   
states it is only very   
occasional use. Given her   
continued complaint of   
intractable nausea, along with   
her acidosis, I did contact Dr. Apodaca who has agreed to admit the   
patient  
  
Impression: 1. Intractable   
nausea and vomiting 2. Metabolic   
acidosis  
  
Disposition: Admission  
  
Christiano Knight MD  
240  
  
Electronically signed by Christiano Knight MD at 2024 6:26   
PM EST  
documented in this encounter            [facility]  
   
                                        2024 Nurse Note Formatting of this  
 note might be   
different from the original.  
Patient to room 2755 from ED and   
assisted into bed x 1 assist.   
Patient oriented to room and   
call light system. Admission   
assessment initiated. Family   
with patient and will assist her   
with shower per her request. Is   
asking for nausea medication and   
will call  to get order   
for something else as she has   
had all she can have at this   
time. SR up x 4 due to seizure   
precautions being maintained as   
patient has active seizure   
history, having one in ED as   
reported by staff.  
Electronically signed by   
Fadia Unger RN at   
2024 10:28 PM [facility]  
   
                                                    2024 Emergency   
department Note                         Formatting of this note might be   
different from the original.  
Pt wishes to keep bottoms on at   
this time.  
Electronically signed by Pilar Clifford RN at 2024   
7:59 PM [facility]  
   
                                                    2024 Emergency   
department Note                         Formatting of this note might be   
different from the original.  
Pt requesting pain medication at   
this time. Provider aware. Pt   
ambulates to  with steady   
gait.  
Electronically signed by Pilar Clifford RN at 2024   
6:36 PM [facility]  
   
                                                    2024 Physician   
Emergency department Note               Formatting of this note is   
different from the original.  
DEPARTMENT OF EMERGENCY MEDICINE  
  
CHIEF COMPLAINT  
Vomiting, Abdominal Pain (Pt dx   
with colitis , unable to keep   
medications down with zofran and   
phenergan suppository/Neg flu   
covid and pregnancy test), and   
Seizure (X 4 days/Waiting to see   
neuro for seizures/[facility]   
in [place] last night)  
  
  
GLENDA Soares is a 23 y.o.   
female who presents with a   
complaint of nausea, vomiting,   
diarrhea, and abdominal pain.   
She has had symptoms for 4 days.   
She was seen twice yesterday at   
[facility]. She had laboratory   
testing and a CT scan of the   
abdomen and pelvis. The CT scan   
of the abdomen and pelvis showed   
possible early colitis. She was   
started on Augmentin. She was   
provided with Zofran and   
Phenergan. She states her throat   
is raw from vomiting. No fevers   
or chills. No cough. She also   
does complain of some   
intermittent seizures. This was   
worked up yesterday at   
[facility] as well. She has been   
referred to Neurology for   
follow-up.  
  
REVIEW OF SYSTEMS  
Review of Systems  
  
Constitutional: No fevers, no   
chills  
Eyes: No vision change, no   
drainage  
ENT: No ear pain, throat is raw   
from vomiting  
Cardiovascular: No chest pain,   
no edema  
Respiratory: No shortness of   
breath, no cough  
Gastrointestinal: Positive   
vomiting, Positive diarrhea  
Genitourinary: No dysuria, no   
frequency  
Musculoskeletal: No joint pain,   
no joint swelling  
Integumentary: No rash, no   
itching  
Neurologic: No headache, no   
confusion  
Psychiatric: No anxiety, no   
depression  
  
PAST MEDICAL HISTORY  
Past Medical History:  
Diagnosis Date  
Seizures  
  
  
SURGICAL HISTORY  
No past surgical history on   
file.  
  
CURRENT MEDICATIONS  
No current facility-administered   
medications for this encounter.  
  
No current outpatient   
medications on file.  
  
  
ALLERGIES  
Allergies  
Allergen Reactions  
Codeine  
Miralax [Polyethylene Glycol]  
  
  
FAMILY HISTORY  
History reviewed. No pertinent   
family history.  
  
SOCIAL HISTORY  
Social History  
  
Socioeconomic History  
Marital status: Not on file  
Spouse name: Not on file  
Number of children: Not on file  
Years of education: Not on file  
Highest education level: Not on   
file  
Occupational History  
Not on file  
Tobacco Use  
Smoking status: Never  
Smokeless tobacco: Never  
Vaping Use  
Vaping status: Never Used  
Substance and Sexual Activity  
Alcohol use: Never  
Drug use: Yes  
Types: Marijuana  
Sexual activity: Not on file  
Other Topics Concern  
Not on file  
Social History Narrative  
Not on file  
  
Social Determinants of Health  
  
Financial Resource Strain: Not   
on file  
Food Insecurity: Not on file  
Transportation Needs: Not on   
file  
Physical Activity: Not on file  
Stress: Not on file  
Social Connections: Not on file  
Intimate Partner Violence: Not   
on file  
Housing Stability: Not on file  
  
  
PHYSICAL EXAM  
/77   Pulse 80   Temp 99.4   
F (37.4 C) (Oral)   Resp 17   Ht   
1.499 m (4' 11 )   SpO2 100%     
Smoking Status Never  
Physical Exam  
  
The patient is well-developed,   
well-nourished, and in no acute   
distress.  
Head is atraumatic and   
normocephalic.  
Pupils are equal and reactive.  
Face is symmetric.  
Mucous membranes are moist.  
Neck is supple.  
Heart is regular rate and   
rhythm.  
Lungs are clear to auscultation.  
Abdomen is soft, nontender,   
nondistended.  
Skin is warm and dry.  
Extremities are warm and well   
perfused and without acute   
deformity.  
Neurologically, the patient is   
awake, alert, and without acute   
deficit.  
Psychiatrically, the patient is   
calm and cooperative.  
  
ED COURSE & MEDICAL DECISION   
MAKING  
  
Laboratory evaluation shows   
bicarb low at 14. She continues   
to complain of nausea despite   
receiving Reglan, Benadryl, and   
Zofran. Her drug screen is   
positive for cannabinoids. I did   
address possible cannabis   
hyperemesis syndrome with the   
patient but she denies any   
regular use of cannabis and   
states it is only very   
occasional use. Given her   
continued complaint of   
intractable nausea, along with   
her acidosis, I did contact Dr. Apodaca who has agreed to admit the   
patient  
  
Impression: 1. Intractable   
nausea and vomiting 2. Metabolic   
acidosis  
  
Disposition: Admission  
  
Christiano Knight MD  
24  
  
Electronically signed by Christiano Knight MD at 2024 6:26   
PM [facility]  
Work Phone:   
[phone]  
   
                                        2022 Note     Send Summary:  
Discharge Summary Providers:  
Provider RoleProvider Name  
Mitra Santos Hafiz  
Nurse PractitionerAlyssa Beckwith  
PrimaryRequired, No Pcp  
  
  
Note Recipients: Required, No   
Pcp, MD  
  
  
Discharge:  
  
Summary:  
Admission Date: .10-Mar-2022   
10:33:00  
Discharge Date: 12-Mar-2022  
Attending Physician at   
Discharge: Mitra Camara  
Admission Reason: influenza A   
severe dehydration(1)  
Final Discharge Diagnoses:   
Cannabinoid hyperemesis   
syndrome, Intractable nausea  
and vomiting  
Procedures: none  
Condition at Discharge:   
Satisfactory  
Disposition at Discharge: .Home  
Vital Signs:  
  
T PRBPSpO2  
[id number]/8198%  
Date/Time3/12 7: 7:   
7: 7: 7:46  
Range(36.6C - 36.8C ) (80 - 94 )   
(16 - 17 ) (112 - 117 )/ (72 -   
81 ) (96%  
- 99% )  
  
Date: Weight/Scale Type:Height:  
10-Mar-2022 16:4347 kg /   
plg490.8 cm  
Physical Exam:  
Constitutional: Thin  
Eyes: PERRL, EOMI, clear sclera  
ENMT: mucous membranes moist, no   
apparent injury, no lesions seen  
Head/Neck: Neck supple, no   
apparent injury, No JVD, trachea   
midline,  
Respiratory/Thorax: Patent   
airways, CTAB, normal breath   
sounds with good chest  
expansion, thorax symmetric  
Cardiovascular: Regular, rate   
and rhythm, no murmurs, 2+ equal   
pulses of the  
extremities, normal S 1and S 2  
Gastrointestinal: Nondistended,   
soft, non-tender, no rebound   
tenderness or  
guarding, no masses palpable, no   
organomegaly, +BS, no bruits  
Musculoskeletal: ROM intact, no   
joint swelling, normal strength  
Extremities: normal extremities,   
no cyanosis edema, contusions or   
wounds, no  
clubbing  
Neurological: alert and oriented   
x3, intact senses, motor,   
response and  
reflexes, normal strength  
Psychological: Appropriate mood   
and behavior  
Skin: Warm and dry, no lesions,   
no rashes  
  
Hospital Course:  
HPI:  
GÉNESISSDDANNA CASTRO is a 21   
year old Female with pmh of   
hyperemesis  
syndrome secondary to cannanboid   
use, major depression disorder   
who had  
hospitalization in 2022   
for similar presentation as   
today. She has 3  
emergency room visits in the   
past few days with today being   
the 3rd visit. She  
was seen in another ED on the   
 and our ED on the  she   
had n/v/diarrhea  
and abdominal pain. She has been   
having diarrhea nausea and   
vomiting for 3  
days. On the  she developed   
abdominal pain that hurts with   
movement and on  
inspiration. CT abdomen pelvis   
negative and did not reveal   
acute pathology. She  
presented today not feeling any   
better. she was treated   
yesterday with  
phenergan and bentyl. She was   
diagnosed with influenza A and   
dc to home with  
tamiflu, bentyl and phenergan.   
On presentation today she   
continued to vomit was  
unable to keep down any liquids   
or her nausea medicine so she   
presented to  
[facility]   
emergency department for further   
evaluation. She has  
had decrease in urination. She   
denies any recent fevers. She is   
currently on  
her menses with a negative   
pregnancy test 2 days ago. She   
had low-grade  
temperature 99.3, blood pressure   
134/99 heart rate 82   
respirations 18 pulse  
oxygen saturation 98% on room   
air. Her laboratory values   
showed a bicarbonate  
of 14 and yesterday was 12,   
normal creatinine, potassium 3.5   
sodium 135 and  
glucose 93. Urinalysis with   
ketones 80 and a large amount of   
blood white cells  
2, 1+ mucus. Urine toxicology   
screen showed cannabinoid   
positive. She states  
that she last smoked marijuana a   
week ago. In the emergency   
department she was  
treated with 2 L of normal   
saline and is being admitted for   
further evaluation.  
  
Past medical history: Major   
depressive disorder, hyperemesis   
syndrome secondary  
to cannabinoid  
Past surgical history:   
Appendectomy, right index finger   
surgery, ORIF of right  
wrist, tonsillectomy and   
adenoidectomy  
Family history: Father-diabetes  
Social history: Patient is   
single, she is cannabinoid   
dependent, denies  
nicotine or alcohol use.  
  
Allergies: Codeine MiraLAX  
Medications: Reviewed and   
reconciled  
  
Hospital course: Patient was   
started on Tamiflu on 3/9 which   
is 4/5 day  
treatment. She will continue 1   
day on discharge. She was given   
IV fluids and  
electrolytes replaced   
accordingly. Nausea/vomiting and   
abdominal pain likely  
related to hyperemesis syndrome   
secondary to cannabinoid use.   
She was seen by  
critical care Dr. Messina for   
normal anion gap metabolic   
acidosis which resolved.  
She has very low osmolar state   
and recommends a high protein   
diet on discharge.  
She indicates today she feels   
better than she has and requests   
to go home. She  
was counseled on marijuana   
cessation at time of discharge.   
She has not had any  
nausea or vomiting and has been   
able to tolerate her diet.   
Denies any abdominal  
pain.  
  
  
Discharge Information:  
  
and Continuing Care:  
Lab Results - Pending:  
None  
Radiology Results - Pending:   
None  
Discharge Instructions:  
Nutrition/Diet:  
regular, high protein  
  
Labs:  
Lab Test(s): Basic Metabolic   
Panel  
Date To Be Drawn: 3/14  
Call (more content not   
included)...                            [facility]  
   
                                        03- Note     History of Present I  
llness:  
Pregnant/Lactating:  
Are You Pregnantno (1)  
Are You Currently   
Breastfeedingno (1)  
  
Admission Reason: influenza A   
severe dehydration  
HPI:  
DANNA SOARES is a 21   
year old Female with pmh of   
hyperemesis  
syndrome secondary to cannanboid   
use, major depression disorder   
who had  
hospitalization in 2022   
for similar presentation as   
today. She has 3  
emergency room visits in the   
past few days with today being   
the 3rd visit. She  
was seen in another ED on the   
 and our ED on the  she   
had n/v/diarrhea  
and abdominal pain. She has been   
having diarrhea nausea and   
vomiting for 3  
days. On the  she developed   
abdominal pain that hurts with   
movement and on  
inspiration. CT abdomen pelvis   
negative and did not reveal   
acute pathology. She  
presented today not feeling any   
better. she was treated   
yesterday with  
phenergan and bentyl. She was   
diagnosed with influenza A and   
dc to home with  
tamiflu, bentyl and phenergan.   
On presentation today she   
continued to vomit was  
unable to keep down any liquids   
or her nausea medicine so she   
presented to  
[facility]   
emergency department for further   
evaluation. She has  
had decrease in urination. She   
denies any recent fevers. She is   
currently on  
her menses with a negative   
pregnancy test 2 days ago. She   
had low-grade  
temperature 99.3, blood pressure   
134/99 heart rate 82   
respirations 18 pulse  
oxygen saturation 98% on room   
air. Her laboratory values   
showed a bicarbonate  
of 14 and yesterday was 12,   
normal creatinine, potassium 3.5   
sodium 135 and  
glucose 93. Urinalysis with   
ketones 80 and a large amount of   
blood white cells  
2, 1+ mucus. Urine toxicology   
screen showed cannabinoid   
positive. She states  
that she last smoked marijuana a   
week ago. In the emergency   
department she was  
treated with 2 L of normal   
saline and is being admitted for   
further evaluation.  
  
Past medical history: Major   
depressive disorder, hyperemesis   
syndrome secondary  
to cannabinoid  
Past surgical history:   
Appendectomy, right index finger   
surgery, ORIF of right  
wrist, tonsillectomy and   
adenoidectomy  
Family history: Father-diabetes  
Social history: Patient is   
single, she is cannabinoid   
dependent, denies  
nicotine or alcohol use.  
  
Allergies: Codeine MiraLAX  
Medications: Reviewed and   
reconciled  
  
Review of systems: 12 point   
review systems reviewed with   
patient with pertinent  
positives listed in HPI   
otherwise review systems   
negative  
  
Social History:  
Social History:  
Smoking Statusnever smoker (2)  
Alcohol Usedenies(2)  
Drug Useoccasionally (3)  
  
  
  
Allergies:  
MiraLax: Hives/Urticaria  
codeine: Unknown  
  
Medications Prior to Admission:  
Admission Medication   
Reconciliation has not been   
completed for this patient.  
  
Objective:  
  
Objective Information:  
T PRBPSpO2  
[id number]/8698%  
Date/Time3/10 10:413/10   
16:003/10 16:003/10 16:003/10   
16:00  
Range(37.3C - 37.3C ) (72 - 100   
) (16 - 18 ) (112 - 136 )/ (68 -   
103 )  
(97% - 98% )  
Highest temp of 37.3 C was   
recorded at 3/10 10:41  
  
  
Pain reported at 3/10 14:00:   
unable to assess; sleeping  
  
  
  
Weights  
3/10 10:41: Weight in lbs   
((lbs)) 100.3  
3/10 10:41: Weight in kg (Weight   
(kg)) 45.5  
3/10 10:41: BMI (kg/m2) (BMI   
(kg/m2)) 20.276  
  
Physical Exam by System:  
  
Constitutional: Ill-appearing,   
pale  
Eyes: PERRL, EOMI, clear sclera  
ENMT: mucous membranes moist, no   
apparent injury, no lesions seen  
Head/Neck: Neck supple, no   
apparent injury,No JVD, trachea   
midline,  
Respiratory/Thorax: Patent   
airways, CTAB, normal breath   
sounds with good chest  
expansion, thorax symmetric  
Cardiovascular: Regular, rate   
and rhythm, no murmurs, 2+ equal   
pulses of the  
extremities, normal S 1and S 2  
Gastrointestinal: Nondistended,   
soft, non-tender, no rebound   
tenderness or  
guarding, no masses palpable, no   
organomegaly, +BS, no bruits  
Musculoskeletal: ROM intact, no   
joint swelling, normal strength  
Extremities: normal extremities,   
no cyanosis edema, contusions or   
wounds, no  
clubbing  
Neurological: alert and oriented   
x3, intact senses, motor,   
response and  
reflexes, normal strength  
Psychological: Appropriate mood   
and behavior  
Skin: Warm and dry, no lesions,   
no rashes  
  
Medications:  
  
Medications:  
  
Continuous Medications  
--------------------------------  
  
1. Lactated Ringers Infusion:   
1000 mL IntraVenous  
  
Scheduled Medications  
--------------------------------  
  
1. LORazepam Injectable: 1 mg   
IntraVenous Push Every 6 Hours  
2. Oseltamivir: 75 mg Oral Every   
12 Hours  
3. Pantoprazole Injectable: 40   
mg IntraVenous Push Every 12   
Hours  
  
PRN Medications  
--------------------------------  
  
1. Acetaminophen: 650 mg Oral   
Every 4 Hours  
2. Promethazine IV Piggy Back:   
12.5 mg IntraVenous Piggyback   
Every 6 Hours  
  
  
  
Recent Lab Results:  
  
Results:  
  
  
  
I have reviewed these laboratory   
results:  
  
Coronavirus 2019, Screen   
Asymptomatic 10-Mar-2022   
15:28:40  
  
ResultValue  
Fluid Source Nasal,   
Nasopharyngeal (more content not   
included)...                            [facility]  
   
                                        2022 Note     Send Summary:  
Discharge Summary Providers:  
Provider RoleProvider Name  
ReferringLeIvan Almonte Alexander M ConsultingThomae, Dale R  
PrimaryRequired, No Pcp  
  
  
Note Recipients: Ivan Coley MD  
  
  
Discharge:  
  
Summary:  
Admission Date: .2022   
22:59:00  
Discharge Date: 2022  
Attending Physician at   
Discharge: Nicolas Connolly  
Admission Reason: nausea and   
vomiting(1)  
Final Discharge Diagnoses:   
Intractable nausea and vomiting  
Procedures: none  
Condition at Discharge:   
Satisfactory  
Disposition at Discharge: .Home  
Vital Signs:  
  
T PRBPSpO2  
[id number]/9299%  
Date/Time 8: 8:   
8: 8: 8:20  
Range(36.2C - 37.1C ) (89 - 100   
) (16 - 18 ) (118 - 124 )/ (79 -   
98 )  
(97% - 100% )  
Highest temp of 37.1 C was   
recorded at  4:08  
  
Date: Weight/Scale Type:Height:  
2022 05:1651.3 kg /   
qsz744.8 cm  
Physical Exam:  
Constitutional: No acute   
distress  
HEENT: Moist oral mucosa  
Respiratory/thorax: Lung sounds   
clear throughout the lung fields   
even chest  
expansion  
CV: S1-S2 regular rate and   
rhythm  
GI: Soft nontender bowel sounds   
present x4  
Extremity: No peripheral edema  
Neuro: Alert and oriented x3 no   
focal deficit  
Hospital Course:  
21-year-old female who was   
diagnosed at [facility] with   
infectious colitis was  
prescribed ciprofloxacin and   
Flagyl had a 7-day course of   
intractable nausea  
vomiting, who Has cannabinoid   
use was admitted to the hospital   
secondary to  
severe dehydration, hypokalemia   
and hypomagnesemia. Patient was   
trialed on  
multiple different antiemetics   
with no relief in her nausea   
vomiting. After  
she was appropriately fluid   
resuscitated nausea vomiting did   
improve, we tried  
a diet and she immediately threw   
up. She was started on Reglan   
and still threw  
up after the Reglan. She was   
complaining of left upper   
quadrant abdominal pain  
and secondary to the nausea   
vomiting I started her on   
Carafate for concern for  
gastritis. She had 2 CT abdomen   
pelvis is this admission 1 in   
the emergency  
room and one recommended by Dr. Gallagher which did not reveal acute   
pathology.  
She had a right upper quadrant   
ultrasound which was   
unremarkable. She was  
started on IV Protonix twice a   
day as she had epigastric pain,   
acid reflux and  
pain with swallowing and concern   
for persistent nausea vomiting   
that she  
possibly also had esophagitis.   
Patient did not undergo   
endoscopic as we  
clinically were treating her   
symptoms. She improved with   
adding Carafate and  
was able to eat with no   
vomiting. She was instructed to   
stop cannabinoid and  
she is in agreement with the   
plan. She has been instructed to   
follow with her  
primary care physician. She had   
2 CT abdomen pelvis's which did   
not show  
infectious colitis so I   
discontinued antibiotics at time   
of discharge. She  
remained afebrile and   
hemodynamically stable. She has   
been prescribed a  
gastric emptying study that will   
be performed on Monday as   
outpatient. Her  
electrolytes have been   
corrected, her potassium is 3.4   
magnesium 2.00. She  
will have an additional dose of   
potassium prior to discharge of   
the hospital.  
Should her potassium will need   
to be followed as outpatient and   
she has been  
instructed to eat potassium rich   
foods and she loves spinach   
which will also  
help her magnesium. She is being   
discharged to home in stable   
condition.  
Discharge time greater than 30   
minutes.  
  
  
Discharge Information:  
  
and Continuing Care:  
Lab Results - Pending:  
None  
Radiology Results - Pending:   
None  
Discharge Instructions:  
Activity:  
activity as tolerated.  
Balance activity with rest,   
gradually increase your activity   
as  
tolerated  
Exercise as prescribed by your   
physician  
  
Nutrition/Diet:  
low cholesterol, low fat  
  
Follow Up Appointments:  
Follow-Up Appointment 01:  
Physician/Dept/Service: PCP  
Call to Schedule in: 1 week  
  
Follow-Up Appointment 02:  
Physician/Dept/Service: Dr Mathis  
Call to Schedule in: 2 weeks  
  
Discharge Medications: Home   
Medication  
Protonix 40 mg oral delayed   
release tablet - 1 tab(s) orally   
2 times a day  
sucralfate 1 g oral tablet - 1   
tab(s) orally 4 times a day   
-.Meds to Beds - 4  
Times a Day Before Meals  
  
PRN Medication  
Zofran 8 mg oral tablet - 1   
tab(s) orally 3 times, As Needed   
-for nausea and  
vomiting  
  
DNR Status:  
Code StatusCode Status order at   
time of discharge: Full Code  
  
  
Electronic Signatures:  
Becky Hyman (APRN-CNS) (Signed   
2022 11:06)  
Authored: Send Summary, Summary   
Content, Ongoing Care, DNR   
Status, Note  
Completion  
  
  
Last Updated: 2022 11:06   
by Becky Hyman (APRN-CNS)  
  
References:  
1. Data Referenced From  Daily   
Progress Note-General Internal   
Medicine   
2022 13:35                       [facility]  
   
                                        2022 Note     History of Present I  
llness:  
Pregnant/Lactating:  
Are You Pregnantno (1)  
Are You Currently   
Breastfeedingno (1)  
  
Admission Reason: Nausea,   
vomiting and abdominal pain  
HPI:  
DANNA SOARES is a 21   
year old Female  
  
This is a 21-year-old white   
female who was recently   
diagnosed 8 days ago with  
colitis. She was at [facility] on [address] and a CT scan was done   
showing  
colitis. She was transferred to   
[facility]. She states she was   
admitted  
there for 2 days on antibiotics   
and discharged home on   
antibiotics. Since  
being discharged she has been   
having abdominal pain especially   
left side along  
with intractable nausea and   
vomiting. She says she cannot   
keep her antibiotics  
down and she was sent home with   
and she reports feeling hot and   
sweaty along  
with chills. She had diarrhea   
once a day for the last 2 days.   
Because of the  
pain and nausea and vomiting,   
she came into the ER to be   
evaluated. No sick  
contacts at home or in general   
and no change in diets.  
  
In the ER her blood pressure is   
125/95 with a pulse of 88 and a   
respiratory  
rate of 18. She is satting 98%   
on room air she has a   
temperature of 37.7. Her  
white count is normal at 8.1   
with no shift and her H&H are   
normal at 13.4 and  
40.2. Platelets of 272,000. She   
is COVID-19 negative. Her CMP is   
remarkable  
only for a potassium of 3.1.   
Urine pregnancy test is   
negative. And urinalysis  
shows 2+ protein with 3+ blood   
and 2+ ketones and greater than   
182 RBCs and 23  
WBCs. CT of the head without   
contrast is nonacute.  
  
In the ER, she was given oral   
Bentyl along with 4 mg of IV   
Zofran and started  
on IV fluids. She was given 40   
mg of p.o. potassium. Then she   
was given 50 mg  
of IV Toradol and then 12.5 mg   
IV piggyback Phenergan. She was   
then given 20  
mg of IM dicyclomine and then   
started on 10 mg of IV Reglan   
followed by 2 doses  
of 2 mg IV push Haldol. She is   
admitted to the medical service   
with working  
diagnosis of intractable nausea   
and vomiting.  
  
  
PAST MEDICAL HISTORY  
see above  
  
PAST SURGICAL HISTORY  
1. Appendectomy 8 6  
2. Surgery for her right index   
finger  
3. ORIF right wrist  
4. Tonsillectomy and   
adenoidectomy  
  
FAMILY HISTORY  
Mother is alive and well  
Father is alive with diabetes  
8 siblings alive and well  
1 daughter alive and well  
  
SOCIAL HISTORY  
Patient is single  
No tobacco or alcohol  
She denies any drug abuse   
history although she said she   
did do marijuana on [place]  
's  
  
Social History:  
Social History:  
Smoking Statusnever smoker (1)  
Alcohol Usedenies(1)  
Drug Usedenies (1)  
  
  
  
Allergies:  
MiraLax: Hives/Urticaria  
codeine: Unknown  
  
Medications Prior to Admission:  
  
promethazine 25 mg oral tablet:   
1 tab(s) orally 3 times a day,   
As Needed -for  
nausea and vomiting  
Phenadoz 25 mg rectal   
suppository: 1 suppository(ies)   
rectally 3 times a day,  
As Needed -for nausea and   
vomiting.  
  
Review of Systems:  
Constitutional: POSITIVE: Fever,   
Chills  
  
Eyes: NEGATIVE: Blurry Vision,   
Drainage, Diploplia, Redness,   
Vision Loss/  
Change  
  
ENMT: NEGATIVE: Nasal Discharge,   
Nasal Congestion, Ear Pain,   
Mouth Pain, Throat  
Pain  
  
Respiratory: NEGATIVE: Dry   
Cough, Productive Cough,   
Hemoptysis, Wheezing,  
Shortness of Breath  
  
Cardiac: NEGATIVE: Chest Pain,   
Dyspnea on Exertion, Orthopnea,   
Palpitations,  
Syncope  
  
Gastrointestinal: POSITIVE:   
Nausea, Vomiting, Diarrhea,   
Abdominal Pain;  
NEGATIVE: Constipation  
  
Genitourinary: NEGATIVE:   
Discharge, Dysuria, Flank Pain,   
Frequency, Hematuria  
  
Musculoskeletal: NEGATIVE:   
Decreased ROM, Pain, Swelling,   
Stiffness, Weakness  
  
Neurological: NEGATIVE:   
Dizziness, Confusion, Headache,   
Syncope  
  
Psychiatric: NEGATIVE: Mood   
Changes, Anxiety  
  
  
Objective:  
  
Objective Information:  
T PRBPSpO2  
[id number]/7697%  
Date/Time 7: 7:   
7: 7: 7:30  
Range(37.7C - 37.8C ) (84 - 94 )   
(16 - 23 ) (111 - 134 )/ (76 -   
95 ) (96%  
- 100% )  
Highest temp of 37.8 C was   
recorded at  7:30  
  
  
Pain reported at  4:00: 4 =   
Moderate  
  
Physical Exam by System:  
  
Constitutional: Awake and alert;   
oriented x3 with no apparent   
distress or  
respiratory distress  
Eyes: PERRL, EOMI, clear sclera  
ENMT: mucous membranes moist,   
oropharynx clear  
Head/Neck: Normocephalic; neck   
supple, no apparent injury,   
thyroid without mass  
or tenderness, No JVD, trachea   
midline, no bruits  
Respiratory/Thorax: Clear to   
auscultation bilaterally no   
wheezes or rhonchi  
noted  
Cardiovascular: Regular rate and   
rhythm; normal S1-S2 with no   
murmur; no  
pitting edema and 2+ pulses   
bilaterally  
Gastrointestinal: Soft,   
nondistended, positive bowel   
sounds; there is some mild  
tenderness with minimal   
palpation left lower quadrant   
with some guarding but no  
rebound  
Neurological: Nonfocal, intact   
senses, motor, response and   
reflexes, normal  
strength; cranial nerves II   
through XII appear intact  
Psychological: Pleasant affect  
  
Recent Lab Results:  
  
Results:  
  
  
I have reviewed these laboratory   
results (more content not   
included)...                            [facility]  
   
                                        Evaluation note     Psychological: Appro  
priate mood   
and behaviorExtremities: normal   
extremities, no cyanosis edema,   
contusions or wounds, no   
clubbingNeurological: alert and   
oriented x3, intact senses,   
motor, response and reflexes,   
normal strengthSkin: Warm and   
dry, no lesions, no   
rashesRespiratory/Thorax: Patent   
airways, CTAB, normal breath   
sounds with good chest   
expansion, thorax symmetricENMT:   
mucous membranes moist, no   
apparent injury, no lesions   
seenCardiovascular: Regular,   
rate and rhythm, no murmurs, 2+   
equal pulses of the extremities,   
normal S 1and S   
2Gastrointestinal: Nondistended,   
soft, non-tender, no rebound   
tenderness or guarding, no   
masses palpable, no   
organomegaly, +BS, no   
bruitsMusculoskeletal: ROM   
intact, no joint swelling,   
normal strengthEyes: PERRL,   
EOMI, clear   
scleraConstitutional: Well   
developed, awake/alert/oriented   
x3, no distress, alert and   
cooperative                             [facility]  
   
                                        Evaluation note     Skin: Warm and dryEy  
es:   
Extraocular muscles intactENMT:   
Moist mucous   
membranesPsychological:   
CooperativeNeurological: Awake,   
alert, orientedCardiovascular:   
Regular rate and   
rhythmExtremities: No peripheral   
edemaHead/Neck: Normocephalic,   
atraumaticRespiratory/Thorax:   
Bilateral equal breath   
soundsGastrointestinal: Soft,   
nontender, nondistended,   
positive bowel   
soundsMusculoskeletal: Moving   
all extremitiesConstitutional:   
Awake, alert, oriented, no acute   
distress                                [facility]  
  
  
  
                                                    Evaluation note   
  
  
  
                                                    Diagnosis  
   
                                                      
  
  
Intractable nausea and vomiting- Primary  
  
  
Persistent vomiting  
   
                                                      
  
  
Colitis  
  
  
Other and unspecified noninfectious gastroenteritis and colitis  
   
                                                      
  
  
Diarrhea of presumed infectious origin  
   
                                                      
  
  
Smoker  
  
  
Tobacco use disorder  
   
                                                      
  
  
Metabolic acidosis  
  
  
Acidosis  
  
documented in this encounter  
[facility]   
  
                                                    Diagnosis  
   
                                                      
  
  
Nausea and vomiting, unspecified vomiting type- Primary  
  
documented in this encounter  
[facility]   
  
                                                    Diagnosis  
   
                                                      
  
  
Nausea and vomiting, unspecified vomiting type- Primary  
  
documented in this encounter  
[facility]  
Work Phone: [phone] note*   
  
                                                    Diagnosis  
   
                                                      
  
  
Dehydration- Primary  
   
                                                      
  
  
Hyperemesis gravidarum  
  
  
Mild hyperemesis gravidarum, unspecified as to episode of care  
   
                                                      
  
  
Acute cystitis without hematuria  
  
  
Acute cystitis  
  
documented in this encounter  
[facility] Activity:activity as 
  tolerated.  
* Call Provider If:Any new concerning symptoms.  
* Patient Instructions:- CALL 911 IF YOU HAVE ANY OF THE SIGNS AND SYMPTOMS OF 
  HEART FAILURE: 1. Chest pain 2. Significant Shortness of breath 3. Fainting. -
  Notify your physician immediately if you have shortness of breath; weight gain
  of 3 lbs. or more; fatigue and loss of energy; swelling of lowerextremities or
  abdomen; dizziness or fainting; change of appetite; and frequent coughing. - 
  Patientreceived Living With Heart Failure book. - Daily weight on the same 
  scale, same time after voiding and before eating. - Maintain daily weight log.  
* Activity (Heart Failure):- Balance activity with rest, gradually increase your
  activity as tolerated. - Exercise as prescribed by your physician.  
* Patient Instructions:- CALL 911 OR GO DIRECTLY TO THE EMERGENCY ROOM IF YOU 
  HAVE ANY OF THE SIGNS AND SYMPTOMS OF STROKE: 1. Sudden weakness or numbness 
  of the face, arm or leg, especially on one side of the body. 2. Sudden 
  difficulty speaking or understanding. 3. Sudden trouble seeing in one or both 
  eyes. 4. Sudden trouble walking, dizziness, loss of balance or coordination. 
  5. Sudden severe headache with no known cause. 6. Loss of consciousness or 
  decreased consciousness, fainting, or seizures. - Know the Risk Factors for 
  Stroke: high blood pressure, high cholesterol, diabetes, smoking, physical 
  inactivity, overweight, previous stroke or TIA, heart disease, atrial 
  fibrillation. - Always carry a medication list with you and take it to ALL 
  Healthcare Provider visits. - You may be contacted by a Hospital 
  Representative after discharge to evaluate your progress at home and to 
  [facility]  
* Hospital Specific Instructions - [facility]:- For questions/problems/concerns call 
  the Discharge Physician at [phone] and have them paged.  
* Follow Up Appointment 1:Physician/Dept/Service: PCPCall to Schedule in: 1 week  
* Follow Up Appointment 2:Physician/Dept/Service: Dr Valverde for Referral: 
  Hospital Follow-upLocation: [address] [address]Phone Number: 
  [phone]  
* Gold Form - Other Clinicians:Other Clinician Instructions: Please provide list
  of potassium rich inmagnesium rich foods. Spinach and broccoli is an excellent
  source. Please eat regularly to maintainnatural source of potassium and 
  magnesium.  
[facility] Labs 1 (Modify 
  Template):Lab Test(s): Basic Metabolic PanelDate To Be Drawn: 3/14Call Results
  To: PCPFax Results To: PCPLab Instructions: Walk-in lab, no appointment 
  required.Comments: monitor potassium  
* Additional Orders:Additional Instructions: Follow up with PCP within 1 week of
  dischargeContinue Tamiflu for one more day (tomorrow) on dischargeContinue 
  home medicationsNo new medications at time ofdischargeHigh protein regular 
  diet  
* Call Provider If:Breathing faster than normal. Breathing harder than normal or
  having retractions. Fever of 100.4 F (38 C) or higher. Temperature is greater 
  than 102 degrees. Chills. Drinking less than normal. Not being able to go 4-6 
  hours between albuterol treatments. Urinating less than normal, over 1 day. 
  Urinating less than 4 times per day. Acting very sleepy and difficult to 
  awaken. Vomiting (throwing up) and not able to eat or drink for 12 hours. 3 or
  more loose, watery bowel movements in 24 hours (diarrhea). Any new concerning 
  symptoms.  
* Follow Up Appointment 1:Physician/Dept/Service: PCPRepalmira for Referral: follow
  up post dischargeCall to Schedule in: 1 weekComments: You were given a list of
  primary care physicians please call one and get established  
[facility]* Attachments  
  
The following attachments cannot be sent through Care Everywhere.  
  
* Dehydration (English)  
* Hyperemesis Gravidarum (HG) ([facility]) (English)  
* Urinary Tract Infections (UTIs) in Women ([facility]) (English)  
  
[facility] in [facility] encounterH[facility] for referral (narrative)* 
  Unlisted Procedure Code (Routine) - New Request  
  
                          Specialty    Diagnoses / Procedures Referred By Contac  
t Referred To Contact  
   
                                                              
  
  
Procedures  
  
  
INPATIENT ADMISSION   
NOTIFICATION                              
  
  
Juancho Apodaca MD  
  
  
[address] [place] [address]  
  
  
Phone: [phone]  
  
  
Fax: [phone]                         
  
  
  
  
  
                    Referral ID Status    Reason    Start Date Expiration Date V  
isits   
Requested                               Visits   
Authorized  
   
                [id number] New Request         2024 3/7/2025 1       1  
  
  
  
  
Electronically signed by Juancho Apodaca MD at 2024 6:29 PM [facility]  
  
Summary Purpose  
  
  
                                                      
  
  
  
Family History  
No Family History Records FoundNo Family History Records FoundNo Family History 
Records FoundNo Family History Records FoundNo Family History Records FoundNo 
Family History Records FoundNo Family History Records FoundNo Family History 
Records FoundNo Family History Records FoundNo Family History Records FoundNo 
Family History Records Found  
  
Advance Directives  
No Advanced Directives Records FoundDocuments on File  
  
                          Type         Date Recorded Patient Representative Expl  
anation  
   
                                                    Advance Directives and Livin  
g   
Will                2020 6:29 PM                         
  
                                Documents on File  
  
                          Type         Date Recorded Patient Representative Expl  
anation  
   
                                                    Advance Directives and Livin  
g   
Will                2020 6:29 PM                         
   
                                                    Advance Directives and Livin  
g   
Will                2021 8:35 AM                          
  
                           Latest Code Status on File  
  
                          Code Status  Date Activated Date Inactivated Comments  
   
                          Full Code    2024 7:39 PM                
  
  
  
                                Date Activated  Date Inactivated Comments  
   
                                2024 7:39 PM                   
  
  
  
Discharge Instructions  
* Instructions*   
  
Leo Turner MD - 2021  
  
  
  
If unable to follow-up with the physician/clinic recommended above, please see 
an Urgent Care Clinic for re-evaluation within the same number of days.  
Return to the nearest emergency department at any time if there is:  
any new, returning or worsening symptoms  
new or changing rash  
fever > 100.4 (or feeling like there is a high fever if you don't have a 
thermometer)  
uncontrollable shaking chills  
difficulty following up as recommended  
or any other concerns about your condition or treatment.  
  
best regards,  
Leo Turner MD  
  
  
  
  
  
* Attachments  
  
The following attachments cannot be sent through Care Everywhere.  
  
* Postpartum Breast Care: Non-Breastfeeding (English)  
* Rash (English)  
  
documented in this encounter  
  
Assessments  
  
  
                                                    Diagnosis  
   
                                                      
  
  
Postpartum breast pain- Primary  
  
  
Other and unspecified disorder of breast associated with childbirth, postpartum   
condition or complication  
   
                                                      
  
  
Postpartum bloody nipple discharge  
   
                                                      
  
  
Rash  
  
  
Rash and other nonspecific skin eruption  
  
  
  
Additional Source Comments  
  
  
  
                                                    INFORMATION SOURCE (unrecogn  
ized section and content)  
   
                                          
  
  
  
                                        DATE CREATED        AUTHOR  
   
                                2018                      [facility]  
  
  
  
                                DATE CREATED    AUTHOR          AUTHOR'S ORGANIZ  
ATION  
   
                                2019                      [facility]  
  
  
  
                                DATE CREATED    AUTHOR          AUTHOR'S ORGANIZ  
ATION  
   
                                2020                       [employer]  
  
  
  
                                DATE CREATED    AUTHOR          AUTHOR'S ORGANIZ  
ATION  
   
                                2022                      [facility]  
  
  
  
                                DATE CREATED    AUTHOR          AUTHOR'S ORGANIZ  
ATION  
   
                                02/15/2024                      [facility]  
  
  
  
                                DATE CREATED    AUTHOR          AUTHOR'S ORGANIZ  
ATION  
   
                                2024                      [facility]  
  
  
  
                                DATE CREATED    AUTHOR          AUTHOR'S ORGANIZ  
ATION  
   
                                2024                      [facility]  
  
  
  
                                DATE CREATED    AUTHOR          AUTHOR'S ORGANIZ  
ATION  
   
                                2024                      [facility]  
  
  
  
                                DATE CREATED    AUTHOR          AUTHOR'S ORGANIZ  
ATION  
   
                                2024                      [facility]  
al  
  
  
  
                                DATE CREATED    AUTHOR          AUTHOR'S ORGANIZ  
ATION  
   
                                2024                      [facility]  
[facility]  
  
  
  
                                DATE CREATED    AUTHOR          AUTHOR'S ORGANIZ  
ATION  
   
                                2024                      [facility]  
  
  
  
  
  
                                                    Reason for Visit (unrecogniz  
ed section and content)  
   
                                          
  
  
  
                                        Reason              Comments  
   
                                        Emesis                
   
                                        Nausea                
  
  
  
                                        Reason              Comments  
   
                                        Breast Pain           
  
  
  
                                        Reason              Comments  
   
                                        Vomiting              
   
                                        Abdominal Pain      Pt dx with colitis ,  
 unable to keep medications down with zofran   
and   
phenergan suppositoryNeg flu covid and pregnancy test  
   
                                        Seizure             [facility] [email or web address] [place]   
night  
  
  
  
                                        Reason              Comments  
   
                                        Seizures            Family reports  julio  
hs  of seizures without medications. Drove 1 hour   
to get   
here today with two seizures in the car. Endorses nausea and vomiting.  
  
  
  
                                        Reason              Comments  
   
                                        Abdominal Pain      Pt comes in for gene  
ralized abdominal pain x today. Pt was seen   
recently for a similar event. Pt states she was doing well for about   
1wk and then ate [employer] last pm, then woke up this am with vomiting.   
Pt took some reglan, but it increased the pain.  
  
  
  
                                        Reason              Comments  
   
                                        Abdominal Pain      Found patient in ED   
waiting room on her knees with her face in   
her   
boyfriends lap. He sates   she did this because she did not know how   
long the wait was and she was trying to handle the pain . I had   
patient sit back in wheel chair an she was taken to room. Per family   
patient had a seizure before her abdominal cramping started. The   
seizure lasted 4 minutes and patient was   just stiff  per family.   
Patient present to us with abdominal cramping that radiates into back   
with clear discharge that went throug  
  
  
  
    Jordan, Ashlee, RN - 2020 6:44 PM Ashlee Diaz RN -   
2020 6:34 PM Ashlee Diaz RN - 2020 5:16 PM Tish aKye RN - 2021 8:47 AM EDT  
  
                                                    ED Notes (unrecognized secti  
on and content)  
   
                                                      
  
  
THIS RN SEES PT WALKING QUICKLY DOWN HALLWAY TO EMS EXIT. PT STOPPED BY THIS RN 
D/T   
PT STILL HAVING IV IN. WHEN ASKED WHERE SHE WAS GOING PT STATES  MY MOM CALLED 
AND WE   
HAVE TO TAKE MY SISTER TO THE CRISIS UNIT.  THIS RN STATES THAT SHE CANNOT LEAVE
WITH   
AN IV IN. PT AGREES TO COME BACK AND REMOVE IV. WHEN THIS RN WAS GETTING 
SUPPLIES TO   
TAKE IV OUT, PT RIPS IV OUT OF LEFT AC AND STARTS TO BLEED DOWN ARM AND ON 
FLOOR.   
THIS RN SECURES 2X2 WITH TAPE ON IV SITE. BLEEDING CONTROLLED. PT CONTINUES TO 
WALK   
OUT AFTER IV IS REMOVED. DR MESSINA AWARE. PT GCS 15 AND GAIT STEADY WITH AMBULATE
OUT   
OF ER.  
  
  
Electronically signed by Ashlee Ortega RN at 2020 6:48 PM EDT  
  
  
PT AMBULATES TO RR AND BACK WITHOUT DIFFICULTY. UPDATED ON POC. NO CONCERNS 
VOICED.   
RESPS EVEN AND UNLABORED  
  
  
Electronically signed by Ashlee Ortega RN at 2020 6:34 PM EDT  
  
  
PT PRESENTS TO THE ED FOR N/V X4 DAYS. STATES SHE IS 6 WEEKS PREGNANT  
  
Special isolation precautions are in place with signage outside this patient's 
room.   
This RN performs hand hygiene and enters the patient room wearing:  
? gloves  
? an appropriately fitting (N-95, PAPR, Aura) mask  
? face shield  
? protective gown  
to provide nursing care. See nursing documentation for the care provided.  
  
  
Electronically signed by Ashlee Ortega RN at 2020 6:20 PM 
EDTdocumented in   
this encounter  
  
  
Patient DC home with follow up to OB, as well as given referral for PCP here. 
NAD   
noted. Verbalized understanding of prescription. Ambulated independently out of 
ED.  
  
  
Electronically signed by Tish Angel RN at 2021 8:48 AM EDT  
  
  
Patient comes in for complaints of bilateral breast and nipple pain, with bloody
  
discharge that started today. Patient is appx 1 month post partum, vaginal 
delivery,   
not breastfeeding. States pain now is 7/10, when waking up it was 10/10 but did 
not   
take any medications at home. Patient also states having an increase in a  rash 
skin   
condition that she has had for 5 years is now all of a sudden rash has moved to 
her   
stomach. Patient takes no prescription medications at this time. Denies and   
n/v/f/d/cp/sob.  
  
  
Electronically signed by Tish Angel RN at 2021 8:30 AM EDT  
  
  
Formatting of this note might be different from the original.  
ED PROVIDER NOTE  
[facility] EMERGENCY DEPARTMENT  
  
NAME: Danna Soares AGE: 20 y.o. : 2000  
VISIT DATE: 2021  
MRN: [id number]  
CSN: [id number]  
PCP: Physician No  
  
Chief Complaint  
Patient presents with  
Breast Pain  
  
  
HPI  
  
HPI:  
20-year-old female previously healthy, now with bilateral breast pain 1 month 6 
days   
postpartum. Patient reports uncomplicated delivery and pregnancy. Not breast-
feeding.   
She began to develop the bilateral generalized breast discomfort about a week 
ago.   
Today she noted small amounts of blood leaking from both nipples. She also 
reports   
that she has a history of hyperpigmentation of her skin on her upper arms and   
bilateral thighs for about 5 years, but now has a dark discolored rash on her 
abdomen   
for the past several days as well. Not pruritic. She has never had the rash 
evaluated   
by a physician.  
No recent fever. She did have a  sinus infection  1 week ago, no recent 
antibiotic.   
Otherwise negative review of systems.  
  
Severity: Moderate  
Location: **as above*  
Radiating to: **only as above; otherwise none*  
Exacerbated by: **only as above; otherwise none*  
Relieved by: **only as above; otherwise none* patient has not tried any pain   
medicines yet  
Associated with: **only as above; otherwise none*  
  
Historian(s) deny any other concerns.  
ROS negative except as above.  
  
I have reviewed and agree with the available nursing notes except as otherwise   
reported.  
I have reviewed available medical records.  
  
  
REVIEW OF SYSTEMS:  
Const:  
No fever  
Eyes:  
No vision change  
ENT:  
No remaining congestion  
No sore throat  
CV:  
No CP  
No syncope  
Resp:  
No cough  
No SOB  
GI:  
No Abdo pain  
No nausea  
No vomiting  
No diarrhea  
No constipation  
:  
No dysuria  
No hematuria  
MSK:  
Negative except as noted in HPI  
Skin:  
rash. Not pruritic.  
Neuro:  
No HA  
Hem:  
No bleeding/clotting problems except as per HPI. No nosebleeds or GI bleeding or
  
hemoptysis or easy bruising/bleeding.  
Psych:  
Nl behavior  
  
except as otherwise noted  
  
  
PHYSICAL EXAM:  
Patient Vitals for the past 24 hrs:  
BP Temp Temp src Pulse Resp SpO2 Height Weight  
21 0835 51.9 kg (114 lb 6.7 oz)  
21 0820 134/89 97.7 F (36.5 C) Tympanic 72 (!) 19 99 % 4' 11  49.9 kg (110
lb)  
  
VS Reviewed.  
  
  
Constitutional:  
Non-toxic  
Head:  
Normocephalic  
Atraumatic  
Eyes:  
PERRL  
EOMi  
ENT:  
Mucus membranes moist  
No pharyngeal injection  
Neck:  
Nl ROM  
trachea midline  
Cardiovascular:  
RRR  
Respiratory:  
No resp distress  
Breasts  
Examined with female RN at the bedside assisting.  
Both breasts have a normal symmetric appearance, including the nipples. There is
no   
visible skin abnormality or apparent swelling. The skin is not hot to the touch.
  
Nipples appear normal. There is no active or expressible discharge or bleeding. 
There   
are no skin lesions. There is no palpable peau d'orange feel to the skin, nor 
any   
palpable mass  
Gastrointestinal:  
Non-distended  
Genitourinary:  
  
Back  
Nl inspection  
Upper Extremities:  
Nl inspection  
Lower Extremities:  
  
Neurologic:  
Alert  
answers questions appropriately  
no focal deficits  
Psych:  
Appropriate  
Skin:  
Warm  
Dry  
Nl color  
Coalesced hyperpigmented flat to slightly raised round plaques ranging from 0.5 
to 1   
cm diameter, located on the bilateral upper and lower arms, and the bilateral   
abdomen, extending across the left flank. The abdominal lesions have more of a 
dusky   
gray appearance to them, about the lesions on the arms and forearms have a 
slightly   
erythematous hyperpigmented skin color to them. Lesions are nontender.   
No crepitus or fluctuance or induration or excessive heat.  
  
No acute/emergency findings unless otherwise specified  
  
  
History reviewed. No pertinent past medical history.  
  
History reviewed. No pertinent surgical history.  
  
Family History  
Problem Relation Age of Onset  
Cancer Sister  
Cancer Maternal Grandmother  
Cancer Paternal Grandmother  
  
Social History  
  
Socioeconomic History  
Marital status: Single  
Spouse name: Not on file  
Number of children: Not on file  
Years of education: Not on file  
Highest education level: Not on file  
Occupational History  
Not on file  
Social Needs  
Financial resource strain: Not on file  
Food insecurity  
Worry: Not on file  
Inability: Not on file  
Transportation needs  
Medical: Not on file  
Non-medical: Not on file  
Tobacco Use  
Smoking status: Never Smoker  
Smokeless tobacco: Never Used  
Substance and Sexual Activity  
Alcohol use: Not on file  
Drug use: Never  
Sexual activity: Not on file  
Lifestyle  
Physical activity  
Days per week: Not on file  
Minutes per session: Not on file  
Stress: Not on file  
Relationships  
Social connections  
Talks on phone: Not on file  
Gets together: Not on file  
Attends [employer] service: Not on file  
Active member of club or organization: Not on file  
Attends meetings of clubs or organizations: Not on file  
Relationship status: Not on file  
Other Topics Concern  
Not on file  
Social History Narrative  
Not on file  
  
No current outpatient medications on file prior to encounter.  
  
  
Allergies  
Allergen Reactions  
Miralax [Polyethylene Glycol 3350] Hives  
Codeine Anxiety  
  
Review of Systems  
  
Patient Vitals for the past 24 hrs:  
BP Temp Temp src Pulse Resp SpO2 Height Weight  
21 0835 51.9 kg (114 lb 6.7 oz)  
21 0820 134/89 97.7 F (36.5 C) Tympanic 72 (!) 19 99 % 4' 11  49.9 kg (110
lb)  
  
Physical Exam  
  
Laboratory & Radiographic Imaging (if done):  
No results found for this visit on 21.  
No orders to display  
  
Procedures  
  
  
  
MDM  
  
Medical Decision Making  
  
DDx (including but not limited to):  
? Breast pain and discharge likely secondary to hormonal changes postpartum. She
will   
need follow-up with her OB/GYN/women's health clinic.  
? Nonspecific rash, acute on chronic. I encouraged patient to follow-up with a   
primary care clinic; she does not currently have one so I am providing contact 
info.   
Primary care will be able to refer her out to a dermatologist to address this   
evolving chronic rash.  
  
No indication of:  
? Mastitis/abscess  
? Breast lump/mass, though an ER examination cannot rule out breast masses and I
  
encouraged patient to follow-up closely with her OB/Gyn clinic for ongoing 
women's   
health care.  
? Systemic illness  
? Bleeding diathesis  
  
  
  
  
Considered appropriately wide DDx.  
  
At this time, acutely dangerous emergency conditions found to be unlikely based 
on   
history, exam, vitals and any testing, except as otherwise specified, and 
patient is   
appropriate for outpatient management. Pt will return to the ED if condition is   
worsening in any way, or if new sx arise.  
  
They understand, are appreciative and comfortable with outpatient plan including
  
follow-up and return ED recommendations as discussed.  
Pt appears nontoxic, well-hydrated and comfortable.  
  
The patient has been informed that they may have pre-hypertension or  
hypertension based on a blood pressure reading in the Emergency  
Department. I recommend that the patient call the primary care provider  
listed on their discharge instructions or a physician of their choice as  
soon as possible to arrange follow-up in the next 4 weeks for further  
evaluation of possible pre-hypertension or hypertension.  
  
.  
  
  
  
Clinical Impression:  
1. Postpartum breast pain  
2. Postpartum bloody nipple discharge  
3. Rash  
  
ED Disposition  
ED Disposition Condition Comment  
Discharge Stable Danna Eichelsderfer discharged to home/self care in stable   
condition.  
  
  
  
  
Follow-up Information  
1. Natali Perez MD.  
Specialty: Obstetrics/Gynecology  
Why: For further evaluation & treatment within 2 days  
[address]  
[facility] [address]  
[phone]  
  
  
  
2. Mireille Rey MD.  
Specialty: Internal Medicine  
Why: To establish primary care within the next week. She will then be  
able to refer you to a dermatologist to evaluate the rash.  
[address] [address]  
[phone]  
  
  
  
  
Contact information for after-discharge care  
Follow-up information has not been specified.  
  
  
New Prescriptions  
  
  
ibuprofen (ADVIL,MOTRIN) 200 MG tablet Take 2 (two) tablets (400 mg total) by 
mouth   
every 8 (eight) hours as needed for pain .  
  
  
  
  
  
Leo Turner MD  
[phone]  
  
  
  
Electronically signed by Leo Turner MD at 2021 8:52 AM   
EDTdocumented in this encounter  
  
  
                         <item><item><item><item><item>  
  
                                                    Privacy Markings (unrecogniz  
ed section and content)  
   
                                                    Section Author: Teresa Ely   
PROHIBITION ON REDISCLOSURE OF CONFIDENTIAL   
INFORMATION   
This notice accompanies a disclosure of information concerning a client made to 
you   
with the consent of such client. This information has been disclosed to you from
  
records protected by federal confidentiality rules (42 C.F.R. Part 2). The 
federal   
rules prohibit you from making any further disclosure of this information unless
  
further disclosure is expressly permitted by the written consent of the person 
to   
whom it pertains or as otherwise permitted by 42 C.F.R. Part 2. A general   
authorization for the release of medical or other information is NOT sufficient 
for   
this purpose.Section Author: Teresa Ely PROHIBITION ON REDISCLOSURE OF 
CONFIDENTIAL   
INFORMATION This notice accompanies a disclosure of information concerning a 
client   
made to you with the consent of such client. This information has been disclosed
to   
you from records protected by federal confidentiality rules (42 C.F.R. Part 2). 
The   
federal rules prohibit you from making any further disclosure of this 
information   
unless further disclosure is expressly permitted by the written consent of the 
person   
to whom it pertains or as otherwise permitted by 42 C.F.R. Part 2. A general   
authorization for the release of medical or other information is NOT sufficient 
for   
this purpose.Section Author: Teresa Ely PROHIBITION ON REDISCLOSURE OF 
CONFIDENTIAL   
INFORMATION This notice accompanies a disclosure of information concerning a 
client   
made to you with the consent of such client. This information has been disclosed
to   
you from records protected by federal confidentiality rules (42 C.F.R. Part 2). 
The   
federal rules prohibit you from making any further disclosure of this 
information   
unless further disclosure is expressly permitted by the written consent of the 
person   
to whom it pertains or as otherwise permitted by 42 C.F.R. Part 2. A general   
authorization for the release of medical or other information is NOT sufficient 
for   
this purpose.Section Author: Teresa Ely PROHIBITION ON REDISCLOSURE OF 
CONFIDENTIAL   
INFORMATION This notice accompanies a disclosure of information concerning a 
client   
made to you with the consent of such client. This information has been disclosed
to   
you from records protected by federal confidentiality rules (42 C.F.R. Part 2). 
The   
federal rules prohibit you from making any further disclosure of this 
information   
unless further disclosure is expressly permitted by the written consent of the 
person   
to whom it pertains or as otherwise permitted by 42 C.F.R. Part 2. A general   
authorization for the release of medical or other information is NOT sufficient 
for   
this purpose.Section Author: Teresa Ely PROHIBITION ON REDISCLOSURE OF 
CONFIDENTIAL   
INFORMATION This notice accompanies a disclosure of information concerning a 
client   
made to you with the consent of such client. This information has been disclosed
to   
you from records protected by federal confidentiality rules (42 C.F.R. Part 2). 
The   
federal rules prohibit you from making any further disclosure of this 
information   
unless further disclosure is expressly permitted by the written consent of the 
person   
to whom it pertains or as otherwise permitted by 42 C.F.R. Part 2. A general   
authorization for the release of medical or other information is NOT sufficient 
for   
this purpose.  
  
  
                                    Scheduled  
  
                                                    Active and Recently Administ  
ered Medications (unrecognized section and content)  
   
                                          
  
  
  
                          Medication Order 02/10/2024   2024   2024  
   
                                                      
  
  
Ampicillin-Sulbactam   
Sodium (UNASYN) 3 g in   
sodium chloride 0.9% (MB   
PLUS) 100 mL (total   
volume) IVPB (COMPLETED)  
3 g, Intravenous,   
Administer over 30   
Minutes, ONCE, 1 dose, On   
Sun 24 at 1930,   
Contains a penicillin.  
                                                    193 ($$New Bag$$ -   
Provider: Marla Biggs RN)  
                                        1049 (Stopped - Provider:   
Janelle Sohrt RN)  
  
   
                                                      
  
  
dicyclomine (BENTYL)   
injection 20 mg   
(COMPLETED)  
20 mg, Intramuscular,   
ONCE, 1 dose, On Sun   
2/11/24 at 1815  
                                                    1841 (Given - Provider:   
Pilar Clifford RN)  
                                          
   
                                                      
  
  
diphenhydrAMINE   
(BENADRYL) injection 12.5   
mg (COMPLETED)  
12.5 mg, Intravenous,   
ONCE, 1 dose, On Sun   
2/11/24 at 1715  
                                                    1710 (Given - Provider:   
Marla Biggs RN)  
                                          
   
                                                      
  
  
Enoxaparin Sodium   
(LOVENOX) injection 40 mg  
40 mg, Subcutaneous,   
DAILY AT BEDTIME, First   
dose on 24 at   
2100, Until Discontinued,   
Indications: DVT/PE   
prophylaxis  
                                                            2100 (Canceled Entry  
 -   
Provider: System Discharge   
- Comment: Automatically   
canceled at discontinue of   
medication order)  
  
   
                                                      
  
  
Ketorolac (TORADOL)   
injection 30 mg   
(COMPLETED)  
30 mg, Intravenous, ONCE,   
1 dose, On Sun 2/11/24 at   
1900  
                                                    1841 (Given - Provider:   
Pilar Clifford RN)  
                                          
   
                                                      
  
  
Magnesium sulfate 2 g/50   
ml in sterile water   
premix IVPB 2 g 50 mL   
(total volume)  
2 g, Intravenous,   
Administer over 4 Hours,   
DAILY, First dose on 24 at 0600, Until   
Discontinued, Give if   
magnesium is less than 2   
on morning labs. Infuse   
at a rate of 0.5 gm/hour.  
                                                              
   
                                                      
  
  
Metoclopramide (REGLAN)   
injection 10 mg   
(COMPLETED)  
10 mg, Intravenous, ONCE,   
1 dose, On Sun 2/11/24 at   
1715  
                                                    1710 (Given - Provider:   
Marla Biggs RN)  
                                          
   
                                                      
  
  
metroNIDAZOLE (FLAGYL)   
500 mg in NaCl premix   
IVPB  
500 mg, Intravenous, at   
200 mL/hr, Administer   
over 30 Minutes, EVERY 8   
HOURS NON-STANDARD, First   
dose on 24 at   
1500, Until Discontinued  
                                                              
   
                                                      
  
  
Ondansetron 4mg/2ml   
(ZOFRAN) injection 4 mg   
(COMPLETED)  
4 mg, Intravenous, ONCE,   
1 dose, On Sun 2/11/24 at   
1715  
                                                    1710 (Given - Provider:   
Marla Biggs RN)  
                                          
   
                                                      
  
  
Pantoprazole (PROTONIX)   
injection 40 mg  
40 mg, Intravenous, EVERY   
12 HOURS, First dose on   
24 at 1230,   
Until Discontinued,   
Dilute each 40 mg vial   
with 10 mL of NS. All   
bolus doses, whether 40   
mg or 80 mg, should be   
administered over at   
least two minutes.,   
Indications: Inpt Stress   
Ulcer Prophylaxis  
                                                            1542 (Given - Provid  
er:   
Janelle Short RN)2100   
(Canceled Entry -   
Provider: System Discharge   
- Comment: Automatically   
canceled at discontinue of   
medication order)  
  
   
                                                      
  
  
Potassium chloride   
(K-DUR) tablet ER 40   
mEq(Linked Group 1)  
40 mEq, Oral, DAILY,   
First dose on 24   
at 0600, Until   
Discontinued, Give if   
potassium is 3.1 to 3.4   
on morning labs and   
patient is able to   
tolerate oral medications   
Swallow tablets whole; do   
not crush, chew, or suck   
on tablet. Tablet may   
also be broken in half   
and each half swallowed   
separately.  
                                                              
   
                                                      
  
  
potassium chloride 40 mEq   
in 0.9% sodium chloride   
500 ml IVPB(Linked Group   
1)  
40 mEq, Intravenous, at   
125 mL/hr, Administer   
over 4 Hours, DAILY,   
First dose on 24   
at 0600, Until   
Discontinued, Give if   
potassium is less than   
3.1 on morning labs OR if   
potassium is 3.1 to 3.4   
on morning labs and   
patient is UNABLE to   
tolerate oral medications  
                                                              
   
                                                      
  
  
potassium chloride 40 mEq   
in 0.9% sodium chloride   
500 ml IVPB (COMPLETED)  
40 mEq, Intravenous, at   
125 mL/hr, Administer   
over 4 Hours, ONCE, 1   
dose, On 24 at   
1300  
                                                            1318 ($$New Bag$$ -   
Provider: Janelle Short RN)1730 (Stopped -   
Provider: Janelle Short RN)  
  
   
                                                      
  
  
Sodium chloride 0.9% IV   
solution 1,000 mL   
(COMPLETED)  
1,000 mL, Intravenous,   
ONCE, 1 dose, On Sun   
2/11/24 at 1715  
                                                    1709 ($$New Bag$$ -   
Provider: Marla Biggs RN)1909 (Stopped -   
Provider: Mary Abrams RN)  
                                          
  
                                   Continuous  
  
                          Medication Order 02/10/2024   2024   2024  
   
                                                      
  
  
Dextrose 5% 1,000 mL with   
Sodium bicarbonate 100 mEq   
IV solution  
Intravenous, CONTINUOUS,   
Starting on 24 at   
1200, Until 24 at   
2143  
                                                            1318 ($$New Bag$$ -   
Provider: Janelle Short RN)1743   
(Rate/Dose Verify -   
Provider: Janelle Short RN)  
  
   
                                                      
  
  
Sodium chloride 0.9% IV   
solution (CANCELED)  
Intravenous, at 125 mL/hr,   
CONTINUOUS, Starting on   
Sun 24 at 1945, Until   
Mon 24 at 1037  
                                                    2103 ($$New Bag$$ -   
Provider: Fadia Unger RN)  
                                        0515 ($$New Bag$$ -   
Provider: Fadia Unger RN)0925   
(Rate/Dose Verify -   
Provider: Janelle Short RN)1049   
(Stopped - Provider:   
Janelle Short RN)  
  
  
                                       PRN  
  
                          Medication Order 02/10/2024   2024   2024  
   
                                                      
  
  
Acetaminophen (TYLENOL)   
tablet 650 mg  
650 mg, Oral, EVERY 4 HOURS   
AS NEEDED, Starting on Sun   
24 at 1939, Until 24 at 2143, Mild Pain,   
Oral temp > 100.4 F  
                                                              
   
                                                      
  
  
Metoclopramide (REGLAN)   
injection 10 mg (CANCELED)  
10 mg, Intravenous, EVERY 6   
HOURS AS NEEDED, Starting   
on Sun 24 at 1939,   
Until 24 at 0919,   
Nausea / Vomiting  
                                                            0432 (Given - Provid  
er:   
Fadia Unger RN)  
  
   
                                                      
  
  
Metoclopramide (REGLAN)   
injection 10 mg  
10 mg, Intravenous, EVERY 6   
HOURS AS NEEDED, Starting   
on Mon 24 at 0918,   
Until 24 at 2143,   
Nausea / Vomiting, 2nd line   
N/V  
                                                            1543 (Given - Provid  
er:   
Janelle Short RN)  
  
   
                                                      
  
  
Ondansetron 4mg/2ml   
(ZOFRAN) injection 4 mg  
4 mg, Intravenous, EVERY 4   
HOURS AS NEEDED, Starting   
on Sun 24 at 1939,   
Until 24 at 2143,   
Nausea / Vomiting  
                                                    1950 (Given - Provider:   
Marla Biggs RN)  
                                        0046 (Given - Provider:   
Fadia Unger RN)0945 (Given -   
Provider: Janelle Short RN)1415   
(Given - Provider: Teresa Brady RN)1827 (Given   
- Provider: Janelle   
Short, RN)  
  
   
                                                      
  
  
Promethazine (PHENERGAN)   
12.5 mg in Sodium chloride   
0.9%, with overfill 60.5 mL   
(total volume) IVPB   
(CANCELED)  
12.5 mg, Intravenous, at   
121-242 mL/hr, Administer   
over 15-30 Minutes, EVERY 4   
HOURS AS NEEDED, Starting   
on Sun 2/11/24 at 2134,   
Until 24 at 0919,   
Nausea / Vomiting, Total   
dose is 25 mg, which will   
be two bags of 12.5mg each   
Extravasation Risk  
                                                    2210 ($$New Bag$$ -   
Provider: Fadia Unger RN)2211 ($$New   
Bag$$ - Provider:   
Fadia Unger, ERNA)  
                                        0742 (Stopped -   
Provider: Janelle Short RN)  
  
   
                                                      
  
  
Promethazine (PHENERGAN)   
12.5 mg in Sodium chloride   
0.9%, with overfill 60.5 mL   
(total volume) IVPB(Linked   
Group 2)  
12.5 mg, Intravenous, at   
121-242 mL/hr, Administer   
over 15-30 Minutes, EVERY 4   
HOURS AS NEEDED, Starting   
on 24 at 0918,   
Until 24 at 2143,   
Refractory Nausea Vomiting,   
Total dose is 25 mg, which   
will be two bags of 12.5mg   
each Extravasation Risk  
                                                              
   
                                                      
  
  
Promethazine (PHENERGAN)   
12.5 mg in Sodium chloride   
0.9%, with overfill 60.5 mL   
(total volume) IVPB(Linked   
Group 2)  
12.5 mg, Intravenous, at   
121-242 mL/hr, Administer   
over 15-30 Minutes, EVERY 4   
HOURS AS NEEDED, Starting   
on 24 at 0918,   
Until 24 at 2143,   
Nausea / Vomiting, Total   
dose is 25mg which is two   
bags of 12.5mg each   
Extravasation Risk  
                                                              
  
                                  Linked Groups  
  
                                                    Order  
   
                                                      
  
  
Group 1:  
  
  
Potassium chloride (K-DUR) tablet ER 40 mEqJump to med  
40 mEq, Oral, DAILY, First dose on 24 at 0600, Until Discontinued, Give
if   
potassium is 3.1 to 3.4 on morning labs and patient is able to tolerate oral   
medications Swallow tablets whole; do not crush, chew, or suck on tablet. Tablet
may   
also be broken in half and each half swallowed separately.  
  
   
                                                      
  
  
Or  
  
  
potassium chloride 40 mEq in 0.9% sodium chloride 500 ml IVPBJump to med  
40 mEq, Intravenous, at 125 mL/hr, Administer over 4 Hours, DAILY, First dose on
24 at 0600, Until Discontinued, Give if potassium is less than 3.1 on 
morning   
labs OR if potassium is 3.1 to 3.4 on morning labs and patient is UNABLE to 
tolerate   
oral medications  
  
   
                                                      
  
  
Group 2:  
  
  
Promethazine (PHENERGAN) 12.5 mg in Sodium chloride 0.9%, with overfill 60.5 mL   
(total volume) IVPBJump to med  
12.5 mg, Intravenous, at 121-242 mL/hr, Administer over 15-30 Minutes, EVERY 4 
HOURS   
AS NEEDED, Starting on 24 at 0918, Until 24 at 2143, 
Refractory   
Nausea Vomiting, Total dose is 25 mg, which will be two bags of 12.5mg each   
Extravasation Risk  
  
   
                                                      
  
  
And  
  
  
Promethazine (PHENERGAN) 12.5 mg in Sodium chloride 0.9%, with overfill 60.5 mL   
(total volume) IVPBJump to med  
12.5 mg, Intravenous, at 121-242 mL/hr, Administer over 15-30 Minutes, EVERY 4 
HOURS   
AS NEEDED, Starting on 24 at 0918, Until 24 at 2143, Nausea /   
Vomiting, Total dose is 25mg which is two bags of 12.5mg each Extravasation Risk  
  
  
                                    Scheduled  
  
                          Medication Order 2024  
   
                                                      
  
  
ketorolac (TORADOL) 15 MG/ML   
injection (COMPLETED)  
15 mg, Intravenous Push, ONCE, 1   
dose, On 24 at 2323  
                                                            2300 (Given - Provid  
er:   
Arsenio Blanco RN)  
  
   
                                                      
  
  
ondansetron (ZOFRAN) 4 MG/2ML   
injection (COMPLETED)  
4 mg, Intravenous Push, STAT, 1   
dose, On 24 at 1925  
                                                            1859 (Given - Provid  
er: Zaira Allen RN)  
  
   
                                                      
  
  
ondansetron (ZOFRAN) 4 MG/2ML   
injection (COMPLETED)  
4 mg, Intravenous Push, STAT, 1   
dose, On 24 at 2323  
                                                            2259 (Given - Provid  
er:   
Arsenio Blanco RN)  
  
  
                                    Scheduled  
  
                          Medication Order 2024  
   
                                                      
  
  
prochlorperazine (Compazine)   
injection 5 mg (COMPLETED)  
5 mg, intravenous, Once, On 24 at , For 1 dose  
                                                             (Given - Provid  
er:   
Nicolas Ro RN)  
  
   
                                                      
  
  
sodium chloride 0.9 % bolus   
1,000 mL (COMPLETED)  
1,000 mL, intravenous, at 1,000   
mL/hr, Administer over 1 Hours,   
Once, On 24 at ,   
For 1 dose  
                                                             (New Bag - Prov  
ider:   
Nicolas Ro RN) (Stopped   
- Provider: Tegan Lee RN)  
  
  
                                    Scheduled  
  
                          Medication Order 2024  
   
                                                      
  
  
diphenhydrAMINE (BENADRYL)   
injection 25 mg (COMPLETED)  
25 mg, Intravenous, ONCE, 1 dose,   
On 24 at 1830  
                                                            1802 (Given - Provid  
er: Leia Lima RN)  
  
   
                                                      
  
  
Metoclopramide (REGLAN) injection   
10 mg (COMPLETED)  
10 mg, Intravenous, ONCE, 1 dose,   
On 24 at 1800  
                                                            1802 (Given - Provid  
er: Leia Lima RN)  
  
   
                                                      
  
  
Nitrofurantoin   
(macrocrystal-monohydrate)   
(MACROBID) capsule 100 mg   
(COMPLETED)  
100 mg, Oral, ONCE, 1 dose, On   
24 at 2030, Administer   
with food  
                                                             (Given - Provid  
er: Mario Barkley RN)  
  
   
                                                      
  
  
potassium bicarbonate (EFFER-K)   
effervescent tablet 50 mEq   
(COMPLETED)  
50 mEq, Oral, ONCE, 1 dose, On   
24 at 2030  
                                                             (Given - Provid  
er: Mario Barkley RN)  
  
   
                                                      
  
  
Sodium chloride 0.9% IV solution   
1,000 mL (COMPLETED)  
1,000 mL, Intravenous, ONCE, 1   
dose, On 24 at 1830  
                                                            1948 ($$New Bag$$ -   
Provider:   
Mario Barkley RN)   
(Stopped - Provider: Mario Barkley RN)  
  
   
                                                      
  
  
Sodium chloride 0.9% IV solution   
1,000 mL (COMPLETED)  
1,000 mL, Intravenous, Administer   
over 60 Minutes, ONCE, 1 dose, On   
24 at 1830  
                                                            1802 ($$New Bag$$ -   
Provider:   
Leia Lima, ERNA)1952 (Stopped   
- Provider: Mario Barkley RN)  
  
  
  
  
  
  
                                                    Care Teams (unrecognized sec  
tion and content)  
   
                                          
  
  
  
                      Team Member Relationship Specialty  Start Date End Date  
   
                                                      
  
  
Generic Provider, No Assigned Pcp, MD  
  
  
[address]  
  
  
[place] [address] PCP - General                   24            
  
  
FOR RECORDS PERTAINING TO PATIENTS WHO ARE OR HAVE BEEN ENROLLED IN A CHEMICAL 
DEPENDENCY/SUBSTANCEABUSE PROGRAM, SOME INFORMATION MAY BE OMITTED. This 
clinical summary was aggregated from multiple sources. Caution should be 
exercised in using it in the provision of clinical care. This summary normalizes
information from multiple sources, and as a consequence, information in this 
document may materially change the coding, format and clinical context of 
patient data. In addition, data may be omitted in some cases. CLINICAL DECISIONS
SHOULD BE BASED ON THE PRIMARY CLINICAL RECORDS. [facility] [employer] [facility]. provides 
no warranty or guarantee of the accuracy or completeness of information in this 
document.

## 2025-02-02 NOTE — OB.TRI.HP_ITS
HPI - General    
General    
Date of Service: 25    
HPI Narrative    
DANNA SOARES, is a 24 F who presents  at 38.2 with questionable gush   
of fluid. none further. good active fetus. no vb. irregular non painful ctx.     
    
    
Maternal Data    
Information    
ASHELY Calculator    
    
    
                                Estimated Delivery Date Method          Current     
WG    
     
                Current Estimate 25        Ultrasound #1   38w 2d    
     
                Other Estimates 25        LMP (Uncertain) 34w 3d    
    
    
    
PFSH    
PFSH    
Medical History (Reviewed 25 @ 15:25 by Porsha Schneider)    
    
OCD (obsessive compulsive disorder)    
ADD (attention deficit disorder)    
Depression with anxiety    
bipolar    
    
    
                                Home Medications    
    
    
    
?Medication ?Instructions ?Recorded ?Last Taken ?Type    
     
                          docosahexaenoic acid 200 mg 200 mg PO DAILY 24 0    
25 08:00 History    
    
                                        capsule (Prenatal DHA)        
     
                          blood sugar diagnostic (Blood #120 ea 24 Unknown    
 Rx    
    
                                        Glucose Test strips)        
     
                          blood-glucose meter       #1 ea 24 Unknown Rx    
     
                          lancets                   #200 ea 24 Unknown Rx    
    
    
    
                                            
    
    
    
Allergy/AdvReac Type Severity Reaction Status Date / Time    
     
promethazine (From Phenergan) Allergy Severe Other Verified 25 03:43    
     
codeine Allergy  HALLUCINATI Verified 25 03:42    
    
   ONS      
     
polyethylene glycol 3350 AdvReac  Hives Verified 25 03:42    
    
(From Miralax)         
    
    
    
Family History (Reviewed 25 @ 15:25 by Porsha Schneider)    
Other   Colon cancer    
   Hypertension    
   Lung cancer    
   Ovarian cancer    
   Schizophrenia    
   Uterine cancer    
   bipolar    
    
    
    
Surgical History (Reviewed 25 @ 15:25 by Porsha Schneider)    
    
S/P tonsillectomy    
s/p finger surgery    
S/P appendectomy    
    
    
Social History (Reviewed 25 @ 15:25 by Porsha Schneider)    
adopted:  No     
household members:  family     
housing:  house     
number of children:  1     
current occupational status:  employed     
current occupation:  dancer     
current occupational exposures/hazards:  No     
pets and animals:  Yes pets and animals: cat(s) and dog(s)     
history of recent travel:  No     
sexually active:  Yes     
Smoking Status:  Never smoker     
alcohol intake:  never     
substance use type:  marijuana     
caffeine:  No     
what type of physical activity do you participate in:  aerobics     
frequency:  5-6 times per week     
seatbelt use:  never     
do you feel safe at home:  Yes     
additional social history:  Boyfriend- Alexander     
    
    
    
Pregnancy History    
    
    
    
        4            Elective abortions            
     
Hx Para        1            Spontaneous abortions        2    
     
Hx # Term Pregnancies                    Ectopic pregnancies            
     
Hx #  Pregnancies                    Multiple births            
     
                    # of living children        1    
    
    
    
Past Pregnancies    
    
    
Del. Date Name    GA/Weeks Outcome Route   Bth Weight Infant Gen Labor Lgth     
Anesthesia          Del Locatn          Provider            FOB    
     
      21 Ani 39    live birth - full term   7lbs 7oz Female           
epidural [facility]       
ANDRES                                         
    
    
Delivery Date: 21  Last Updated by: Ese Garrido    
      IoL chronic decreased FM    
    
    
Visit Details    
    
Expected Delivery Route/Plan    
    
Labor Preferences-    
CB/BF classes: no    
labor support person: Marcela Munoz    
labor intervention preferences: []    
pain management options preferred: epidural    
cut cord/dad catch: Marcela cut cord    
breastfeeding: bottle    
PP birth control planned: discussed    
discussed possible routes of delivery and associated risks: []    
special requests: []    
    
Plans    
Covid status: []    
Flu vaccine: declines    
Tdap vaccine: []    
Rhogam: NA    
LARC form signed: yes    
Problem list reviewed and updated with the most current plan of care details and  
 appropriate orders placed.      
Relevant counseling for the gestational age provided.    
Continue routine prenatal care and follow up unless otherwise noted in visit   
notes/problem list details    
    
    
OB Flowsheet    
Initial Weight: Not Recorded    
    
    
    
Date    
-?-?-?-?-?-?-?-?-?-?-?-?-    
EGA Weight BP Urine Prot    
-?-?-?-?-?-?-?-?-?-?-?-?-    
Glucose FHR FuHt Pres Dilation    
-?-?-?-?-?-?-?-?-?-?-?-?-    
Effaced Fetal St Visit Note    
     
                                                    24    
-?-?-?-?-?-?-?-?-?-?-?-?-    
                13w 0d          107 lb 4 oz                 105/69              
-?-?-?-?-?-?-?-?-?-?-?-?-    
                              153                                     
-?-?-?-?-?-?-?-?-?-?-?-?-    
                                                                KW- CRL 61mm. GA    
 13.0 weeks. Accepts NIPT and carrier    
                                                    KW- CRL 61mm. GA 13.0 weeks.    
 Accepts NIPT and carrier. encouraged to stop     
smoking     
     
                                                    24    
-?-?-?-?-?-?-?-?-?-?-?-?-    
                17w 3d          107 lb                  109/67              
-?-?-?-?-?-?-?-?-?-?-?-?-    
                              145                                     
-?-?-?-?-?-?-?-?-?-?-?-?-    
                                                                JV- no cramping,    
 spotting, or complaints. has anatomy ultrasound ordered for     
. did not do NIPT.     
     
                                                    10/16/24    
-?-?-?-?-?-?-?-?--?-?-?-?-    
                22w 5d          117 lb 8 oz                 111/67              
-?-?-?-?-?-?-?-?-?-?-?-?-    
                              153                                     
-?-?-?-?-?-?-?-?-?-?-?-?-    
                                                                JV- no lof, vagi    
nal bleeding, or dec fm. declines flu vaccine. needs new ob     
labs still and will do that today.     
     
                                                    24    
-?-?-?-?-?-?-?-?-?-?-?-?-    
                26w 5d          127 lb 2 oz                 110/70          Nega    
tive     
-?-?-?-?-?-?-?-?-?-?-?-?-    
            Negative              134               26                          
-?-?-?-?-?-?-?-?-?-?-?-?-    
                                                                MH-No VB, LOF. G    
ood Fm. 3rd trim labs pending    
     
                                                    24    
-?-?-?-?-?-?-?-?-?-?-?-?-    
                28w 4d          127 lb 4 oz                 128/75              
-?-?-?-?-?-?-?-?-?-?-?-?-    
                              140               28                          
-?-?-?-?-?-?-?-?-?-?-?-?-    
                                                                SM- checking sug    
ars most WNL= will continue to check     
     
                                                    24    
-?-?-?-?-?-?-?-?-?-?-?-?-    
                30w 5d          132 lb 8 oz                 110/60          Nega    
tive     
-?-?-?-?-?-?-?-?-?-?-?-?-    
            Negative              147               30                          
-?-?-?-?-?-?-?-?-?-?-?-?-    
                                                                MH-No VB, LOF. G    
ood FM. Reviewed glucose/stable    
     
                                                    25    
-?-?-?-?-?-?-?-?-?-?-?-?-    
                34w 3d          140 lb 4 oz                 110/68          Nega    
tive     
-?-?-?-?-?-?-?-?-?-?-?-?-    
            Negative              134               33                          
-?-?-?-?-?-?-?-?-?-?-?-?-    
                                                                MH-No VB, lof. G    
ood FM.  Just checked glucose 7 days. abnormals noted.      
Reviewed testing times, food options renata lunch when always high.  Consult sam roberts. Tox screen    
     
                                                    25    
-?-?-?-?-?-?-?-?-?-?-?-?-    
                35w 6d          141 lb 8 oz                 114/75          Nega    
tive     
-?-?-?-?-?-?-?-?-?-?-?-?-    
            Negative              140               35                          
-?-?--?-?-?-?-?-?-?-?-?-?-    
                                                                SM- met with nut    
ritionist, some elevated BS, overall WNL, no vb lof good fm     
nor egular ctx    
     
                                                    25    
-?-?-?-?-?-?-?-?-?-?-?-?-    
                36w 4d          145 lb 4 oz                 120/76              
-?-?-?-?-?-?-?-?-?-?-?-?-    
                         144            36             Cephalic           1    
-?-?-?-?-?-?-?-?-?-?-?-?-    
                     30                   -3                          JV- gbs co    
llected. no complaints. glucose log reviewed and normal. only a     
couple of 2 hr pp over 120 (122 or 121). growth scan to be done soon.     
     
                                                    25    
-?-?-?-?-?-?-?-?-?-?-?-?-    
                38w 0d          144 lb                  110/69          Negative    
     
-?-?-?-?-?-?-?-?-?-?-?-?-    
          Negative            140            38                       3    
-?-?-?-?-?-?-?-?-?-?-?-?-    
                     60                   -2                          LC- 3625g     
EFW on todays growth scan. mostly normal pp(121-122 if over),     
all normal fasting. 39 week IOL set up.    
    
    
    
NST    
FHR Rate Baby A    
Baseline: 130    
Variability:: Moderate    
Accelerations:: 15 x 15    
Decelerations:: None    
NST Reactive:: Yes    
FHR Category:: Category I    
Uterine Activity:: irreg    
    
Assessment & Plan    
(1) No leakage of amniotic fluid into vagina:     
COMMENT:       rom neg. no change in cervical exam. d/c home    
PLAN:     
Plan    
    
Patient presents for triage evaluation secondary to rule out ROM    
FHT:  Moderate variability reactive no decelerations category I tracing    
Duson:  irregular Contractions    
Assessment and plan:  Reactive NST, reassuring maternal and fetal status patient  
 discharged to home to follow-up in office prn.  See problem list details for   
additional plan information.    
    
Charges/Coding    
Procedures Urinary/Genital    
[id number]: 54212-11 Fetal non-stress test Interp

## 2025-02-05 ENCOUNTER — HOSPITAL ENCOUNTER (OUTPATIENT)
Dept: HOSPITAL 100 - WPOUT | Age: 25
Discharge: HOME | End: 2025-02-05
Payer: MEDICAID

## 2025-02-05 VITALS — SYSTOLIC BLOOD PRESSURE: 121 MMHG | DIASTOLIC BLOOD PRESSURE: 70 MMHG | HEART RATE: 89 BPM

## 2025-02-05 VITALS — BODY MASS INDEX: 28.6 KG/M2

## 2025-02-05 VITALS — RESPIRATION RATE: 16 BRPM

## 2025-02-05 DIAGNOSIS — O09.93: ICD-10-CM

## 2025-02-05 DIAGNOSIS — O47.1: Primary | ICD-10-CM

## 2025-02-05 DIAGNOSIS — Z3A.38: ICD-10-CM

## 2025-02-05 LAB — ROM INTERNAL CONTROL TEST: (no result)

## 2025-02-05 PROCEDURE — 59025 FETAL NON-STRESS TEST: CPT

## 2025-02-05 PROCEDURE — G0378 HOSPITAL OBSERVATION PER HR: HCPCS

## 2025-02-05 PROCEDURE — 59050 FETAL MONITOR W/REPORT: CPT

## 2025-02-05 PROCEDURE — 99221 1ST HOSP IP/OBS SF/LOW 40: CPT

## 2025-02-05 PROCEDURE — 84112 EVAL AMNIOTIC FLUID PROTEIN: CPT

## 2025-02-07 ENCOUNTER — HOSPITAL ENCOUNTER (INPATIENT)
Age: 25
LOS: 1 days | Discharge: HOME | DRG: 560 | End: 2025-02-08
Payer: MEDICAID

## 2025-02-07 VITALS — HEART RATE: 90 BPM | OXYGEN SATURATION: 83 %

## 2025-02-07 VITALS — OXYGEN SATURATION: 99 % | HEART RATE: 100 BPM

## 2025-02-07 VITALS — DIASTOLIC BLOOD PRESSURE: 60 MMHG | SYSTOLIC BLOOD PRESSURE: 128 MMHG | HEART RATE: 122 BPM

## 2025-02-07 VITALS
RESPIRATION RATE: 16 BRPM | DIASTOLIC BLOOD PRESSURE: 67 MMHG | TEMPERATURE: 97.52 F | HEART RATE: 99 BPM | OXYGEN SATURATION: 98 % | SYSTOLIC BLOOD PRESSURE: 105 MMHG

## 2025-02-07 VITALS — SYSTOLIC BLOOD PRESSURE: 111 MMHG | HEART RATE: 108 BPM | OXYGEN SATURATION: 100 % | DIASTOLIC BLOOD PRESSURE: 69 MMHG

## 2025-02-07 VITALS — HEART RATE: 117 BPM | SYSTOLIC BLOOD PRESSURE: 106 MMHG | DIASTOLIC BLOOD PRESSURE: 65 MMHG | TEMPERATURE: 98.06 F

## 2025-02-07 VITALS — OXYGEN SATURATION: 100 % | HEART RATE: 114 BPM

## 2025-02-07 VITALS — HEART RATE: 97 BPM | SYSTOLIC BLOOD PRESSURE: 117 MMHG | OXYGEN SATURATION: 100 % | DIASTOLIC BLOOD PRESSURE: 62 MMHG

## 2025-02-07 VITALS — OXYGEN SATURATION: 99 % | HEART RATE: 91 BPM

## 2025-02-07 VITALS — HEART RATE: 111 BPM | OXYGEN SATURATION: 97 %

## 2025-02-07 VITALS — HEART RATE: 110 BPM | OXYGEN SATURATION: 98 %

## 2025-02-07 VITALS — RESPIRATION RATE: 16 BRPM

## 2025-02-07 VITALS — OXYGEN SATURATION: 97 % | HEART RATE: 103 BPM

## 2025-02-07 VITALS — HEART RATE: 106 BPM | SYSTOLIC BLOOD PRESSURE: 120 MMHG | DIASTOLIC BLOOD PRESSURE: 80 MMHG

## 2025-02-07 VITALS — HEART RATE: 92 BPM | OXYGEN SATURATION: 99 %

## 2025-02-07 VITALS — OXYGEN SATURATION: 98 % | HEART RATE: 145 BPM

## 2025-02-07 VITALS
DIASTOLIC BLOOD PRESSURE: 57 MMHG | SYSTOLIC BLOOD PRESSURE: 135 MMHG | RESPIRATION RATE: 18 BRPM | HEART RATE: 115 BPM | TEMPERATURE: 97.8 F

## 2025-02-07 VITALS — OXYGEN SATURATION: 97 % | HEART RATE: 113 BPM

## 2025-02-07 VITALS — OXYGEN SATURATION: 97 % | HEART RATE: 125 BPM

## 2025-02-07 VITALS
SYSTOLIC BLOOD PRESSURE: 109 MMHG | TEMPERATURE: 98.5 F | DIASTOLIC BLOOD PRESSURE: 65 MMHG | HEART RATE: 97 BPM | RESPIRATION RATE: 16 BRPM

## 2025-02-07 VITALS — SYSTOLIC BLOOD PRESSURE: 117 MMHG | HEART RATE: 115 BPM | DIASTOLIC BLOOD PRESSURE: 69 MMHG

## 2025-02-07 VITALS — OXYGEN SATURATION: 98 % | HEART RATE: 105 BPM

## 2025-02-07 VITALS — HEART RATE: 99 BPM | OXYGEN SATURATION: 100 %

## 2025-02-07 VITALS — OXYGEN SATURATION: 100 % | SYSTOLIC BLOOD PRESSURE: 121 MMHG | HEART RATE: 104 BPM | DIASTOLIC BLOOD PRESSURE: 69 MMHG

## 2025-02-07 VITALS — DIASTOLIC BLOOD PRESSURE: 76 MMHG | HEART RATE: 103 BPM | SYSTOLIC BLOOD PRESSURE: 120 MMHG

## 2025-02-07 VITALS — DIASTOLIC BLOOD PRESSURE: 58 MMHG | SYSTOLIC BLOOD PRESSURE: 91 MMHG | HEART RATE: 131 BPM

## 2025-02-07 VITALS — OXYGEN SATURATION: 97 % | HEART RATE: 99 BPM

## 2025-02-07 VITALS — DIASTOLIC BLOOD PRESSURE: 75 MMHG | SYSTOLIC BLOOD PRESSURE: 122 MMHG | HEART RATE: 99 BPM

## 2025-02-07 VITALS — DIASTOLIC BLOOD PRESSURE: 75 MMHG | OXYGEN SATURATION: 98 % | SYSTOLIC BLOOD PRESSURE: 123 MMHG | HEART RATE: 100 BPM

## 2025-02-07 VITALS — DIASTOLIC BLOOD PRESSURE: 61 MMHG | OXYGEN SATURATION: 100 % | HEART RATE: 96 BPM | SYSTOLIC BLOOD PRESSURE: 102 MMHG

## 2025-02-07 VITALS — SYSTOLIC BLOOD PRESSURE: 109 MMHG | HEART RATE: 112 BPM | DIASTOLIC BLOOD PRESSURE: 65 MMHG

## 2025-02-07 VITALS — HEART RATE: 101 BPM | OXYGEN SATURATION: 100 %

## 2025-02-07 VITALS — HEART RATE: 103 BPM | OXYGEN SATURATION: 93 %

## 2025-02-07 VITALS — HEART RATE: 124 BPM | OXYGEN SATURATION: 98 %

## 2025-02-07 VITALS — SYSTOLIC BLOOD PRESSURE: 119 MMHG | HEART RATE: 112 BPM | DIASTOLIC BLOOD PRESSURE: 68 MMHG

## 2025-02-07 VITALS — HEART RATE: 99 BPM | OXYGEN SATURATION: 99 %

## 2025-02-07 VITALS — OXYGEN SATURATION: 98 % | HEART RATE: 110 BPM

## 2025-02-07 VITALS — HEART RATE: 97 BPM | OXYGEN SATURATION: 98 %

## 2025-02-07 VITALS — HEART RATE: 106 BPM | OXYGEN SATURATION: 98 %

## 2025-02-07 VITALS — HEART RATE: 100 BPM | OXYGEN SATURATION: 100 % | SYSTOLIC BLOOD PRESSURE: 110 MMHG | DIASTOLIC BLOOD PRESSURE: 66 MMHG

## 2025-02-07 VITALS — HEART RATE: 109 BPM | DIASTOLIC BLOOD PRESSURE: 73 MMHG | SYSTOLIC BLOOD PRESSURE: 117 MMHG

## 2025-02-07 VITALS — TEMPERATURE: 98.24 F | RESPIRATION RATE: 18 BRPM

## 2025-02-07 VITALS — HEART RATE: 100 BPM | OXYGEN SATURATION: 98 %

## 2025-02-07 VITALS — SYSTOLIC BLOOD PRESSURE: 104 MMHG | DIASTOLIC BLOOD PRESSURE: 58 MMHG | HEART RATE: 100 BPM

## 2025-02-07 VITALS — OXYGEN SATURATION: 98 % | HEART RATE: 100 BPM

## 2025-02-07 VITALS — HEART RATE: 90 BPM | OXYGEN SATURATION: 93 %

## 2025-02-07 VITALS — HEART RATE: 109 BPM | DIASTOLIC BLOOD PRESSURE: 80 MMHG | SYSTOLIC BLOOD PRESSURE: 119 MMHG

## 2025-02-07 VITALS — SYSTOLIC BLOOD PRESSURE: 104 MMHG | HEART RATE: 110 BPM | DIASTOLIC BLOOD PRESSURE: 67 MMHG

## 2025-02-07 VITALS — HEART RATE: 97 BPM | SYSTOLIC BLOOD PRESSURE: 109 MMHG | DIASTOLIC BLOOD PRESSURE: 65 MMHG

## 2025-02-07 VITALS — DIASTOLIC BLOOD PRESSURE: 58 MMHG | SYSTOLIC BLOOD PRESSURE: 108 MMHG | HEART RATE: 112 BPM

## 2025-02-07 VITALS — TEMPERATURE: 98.6 F | RESPIRATION RATE: 18 BRPM

## 2025-02-07 VITALS — HEART RATE: 122 BPM | OXYGEN SATURATION: 99 %

## 2025-02-07 VITALS — HEART RATE: 130 BPM | OXYGEN SATURATION: 98 %

## 2025-02-07 VITALS — HEART RATE: 109 BPM | OXYGEN SATURATION: 97 %

## 2025-02-07 VITALS — HEART RATE: 115 BPM | SYSTOLIC BLOOD PRESSURE: 135 MMHG | DIASTOLIC BLOOD PRESSURE: 57 MMHG

## 2025-02-07 VITALS — SYSTOLIC BLOOD PRESSURE: 120 MMHG | DIASTOLIC BLOOD PRESSURE: 78 MMHG | HEART RATE: 104 BPM

## 2025-02-07 VITALS — OXYGEN SATURATION: 97 % | HEART RATE: 104 BPM

## 2025-02-07 VITALS — SYSTOLIC BLOOD PRESSURE: 112 MMHG | HEART RATE: 105 BPM | DIASTOLIC BLOOD PRESSURE: 67 MMHG

## 2025-02-07 VITALS — SYSTOLIC BLOOD PRESSURE: 100 MMHG | HEART RATE: 102 BPM | DIASTOLIC BLOOD PRESSURE: 66 MMHG

## 2025-02-07 VITALS — BODY MASS INDEX: 29.3 KG/M2

## 2025-02-07 VITALS — OXYGEN SATURATION: 100 % | HEART RATE: 129 BPM

## 2025-02-07 VITALS — HEART RATE: 102 BPM | OXYGEN SATURATION: 98 %

## 2025-02-07 VITALS — OXYGEN SATURATION: 92 % | HEART RATE: 87 BPM

## 2025-02-07 VITALS — OXYGEN SATURATION: 98 % | HEART RATE: 96 BPM

## 2025-02-07 VITALS — DIASTOLIC BLOOD PRESSURE: 65 MMHG | SYSTOLIC BLOOD PRESSURE: 107 MMHG | HEART RATE: 105 BPM

## 2025-02-07 VITALS — HEART RATE: 121 BPM | OXYGEN SATURATION: 99 %

## 2025-02-07 VITALS — HEART RATE: 91 BPM | OXYGEN SATURATION: 100 %

## 2025-02-07 DIAGNOSIS — Z3A.39: ICD-10-CM

## 2025-02-07 DIAGNOSIS — O24.410: Primary | ICD-10-CM

## 2025-02-07 DIAGNOSIS — F12.10: ICD-10-CM

## 2025-02-07 LAB
DEPRECATED RDW RBC: 45.5 FL (ref 35.1–43.9)
DIFFERENTIAL COMMENT: (no result)
DIFFERENTIAL INDICATED: (no result)
ERYTHROCYTE [DISTWIDTH] IN BLOOD: 15.6 % (ref 11.6–14.6)
HCT VFR BLD AUTO: 34.1 % (ref 37–47)
HEMOGLOBIN: 11.1 G/DL (ref 12–15)
HGB BLD-MCNC: 11.1 G/DL (ref 12–15)
IMMATURE GRANULOCYTES COUNT: 0.24 X10^3/UL (ref 0–0)
MANUAL DIF COMMENT BLD-IMP: (no result)
MCV RBC: 81.2 FL (ref 81–99)
MEAN CORP HGB CONC: 32.6 G/DL (ref 32–36)
MEAN PLATELET VOL.: 12.7 FL (ref 6.2–12)
NRBC FLAGGED BY ANALYZER: 0 % (ref 0–5)
PLATELET # BLD: 238 K/MM3 (ref 150–450)
PLATELET COUNT: 238 K/MM3 (ref 150–450)
POSITIVE COUNT: YES
POSITIVE DIFFERENTIAL: YES
RBC # BLD AUTO: 4.2 M/MM3 (ref 4.2–5.4)
RBC DISTRIBUTION WIDTH CV: 15.6 % (ref 11.6–14.6)
RBC DISTRIBUTION WIDTH SD: 45.5 FL (ref 35.1–43.9)
SCAN SMEAR PER REVIEW CRITERIA: (no result)
WBC # BLD AUTO: 14.2 K/MM3 (ref 4.4–11)
WHITE BLOOD COUNT: 14.2 K/MM3 (ref 4.4–11)

## 2025-02-07 PROCEDURE — 85025 COMPLETE CBC W/AUTO DIFF WBC: CPT

## 2025-02-07 PROCEDURE — 82962 GLUCOSE BLOOD TEST: CPT

## 2025-02-07 PROCEDURE — 86901 BLOOD TYPING SEROLOGIC RH(D): CPT

## 2025-02-07 PROCEDURE — 59050 FETAL MONITOR W/REPORT: CPT

## 2025-02-07 PROCEDURE — 86850 RBC ANTIBODY SCREEN: CPT

## 2025-02-07 PROCEDURE — 59025 FETAL NON-STRESS TEST: CPT

## 2025-02-07 PROCEDURE — 86900 BLOOD TYPING SEROLOGIC ABO: CPT

## 2025-02-07 PROCEDURE — 86780 TREPONEMA PALLIDUM: CPT

## 2025-02-07 NOTE — XMS RPT_ITS
Alcohol use: Never  
Drug use: Yes  
Types: Marijuana  
Sexual activity: Not on file  
Other Topics Concern  
Not on file  
Social History Narrative  
Not on file  
  
Social Determinants of Health  
  
Financial Resource Strain: Not   
on file  
Food Insecurity: Food Insecurity   
Present (2024)  
Hunger Vital Sign  
Worried About Running Out of   
Food in the Last Year: Sometimes   
true  
Ran Out of Food in the Last   
Year: Sometimes true  
Transportation Needs: No   
Transportation Needs (2024)  
PRAPARE - Transportation  
Lack of Transportation   
(Medical): No  
Lack of Transportation   
(Non-Medical): No  
Physical Activity: Not on file  
Stress: Not on file  
Social Connections: Not on file  
Intimate Partner Violence: Not   
At Risk (2024)  
Humiliation, Afraid, Rape, and   
Kick questionnaire  
Fear of Current or Ex-Partner:   
No  
Emotionally Abused: No  
Physically Abused: No  
Sexually Abused: No  
Housing Stability: Unknown   
(2024)  
Housing Stability Vital Sign  
Unable to Pay for Housing in the   
Last Year: No  
Number of Places Lived in the   
Last Year: Not on file  
Unstable Housing in the Last   
Year: No  
  
Physical Exam:  
Physical Exam  
General: Well-developed   
well-nourished female  
HENT: Head is atraumatic. Mouth   
is dry  
Eyes: Pupils are equal  
Skin: Warm, dry. No rash  
Abdomen: Soft. No distention   
guarding or rebound  
Respiratory: Clear. No rhonchi  
Heart: Heart tones are regular.   
Capillary refill is brisk  
Neurologic: Awake, alert,   
oriented, answering questions   
appropriately. Moving all   
extremities well  
Lymphatic: No lymphedema or   
lymphadenopathy  
Musculoskeletal: No trauma   
fracture or deformity  
Psychiatric: Cooperative with   
examiner  
  
Vital Signs During ED Visit  
Patient Vitals for the past 24   
hrs:  
BP Temp Pulse Resp SpO2 Weight  
24 1809 120/69 -- 102 18   
97 % --  
24 1748 -- -- -- -- --   
45.4 kg (100 lb)  
24 1747 115/68 97.9 F   
(36.6 C) 84 22 97 % --  
  
Orders/Results:  
Results for orders placed or   
performed during the hospital   
encounter of 24  
CBC, EDIF, PLATELET  
Result Value Ref Range  
WBC (WHITE BLOOD COUNT) 10.8 3.6   
- 11.0 10*3/uL  
RBC 4.45 4.0 - 5.4 10*6/uL  
HEMOGLOBIN (HGB) 13.3 12.0 -   
16.0 G/DL  
HEMATOCRIT (HCT) 39.2 36.0 -   
48.0 %  
MEAN CELL VOLUME 88.1 80.0 -   
100.0 FL  
Mean Cell HGB 29.9 26.0 - 35.0   
PG  
MEAN CELL HGB CONCENTRATION 33.9   
27.0 - 37.0 G/DL  
RBC DISTRIBUTION 13.6 11.5 -   
14.5 %  
PLATELET COUNT 282 130 - 400   
10*3/uL  
MEAN PLATELET VOLUME 9.9 7.4 -   
11.0 FL  
DIFFERENTIAL TYPE AUTO DIFF %  
NEUTROPHILS 71.9 37.0 - 75.0 %  
LYMPHOCYTE 17.0 (L) 20.0 - 55.0   
%  
MONOCYTE % 9.8 0.0 - 10.0 %  
EOSINOPHIL % 0.6 0.0 - 11.0 %  
BASOPHIL % 0.7 0.0 - 2.0 %  
Absolute Neutrophil Count 7.8   
(H) 1.4 - 6.5 10*3/uL  
LYMPHOCYTES, ABSOLUTE 1.8 1.2 -   
3.4 10*3/uL  
MONOCYTES, ABSOLUTE 1.1 (H) 0.0   
- 0.7 10*3/uL  
ABSOLUTE EOSINOPHIL COUNT 0.1   
0.0 - 0.7 10*3/uL  
ABSOLUTE BASOPHIL COUNT 0.1 0.0   
- 0.2 10*3/uL  
COMPREHENSIVE METABOLIC PANEL  
Result Value Ref Range  
Glucose 90 70 - 100 MG/DL  
BUN 7 7 - 20 MG/DL  
CREATININE SERUM 0.50 (L) 0.70 -   
1.20 MG/DL  
SODIUM 133 (L) 137 - 145 MMOL/L  
POTASSIUM 2.9 (LL) 3.5 - 5.1   
MMOL/L  
CHLORIDE 104 98 - 107 MMOL/L  
CALCIUM 9.2 8.4 - 10.2 MG/DL  
PROTEIN, TOTAL 7.2 6.3 - 8.2   
GM/DL  
Albumin 4.1 3.5 - 5.0 G/dl  
BILIRUBIN, TOTAL 0.9 0.2 - 1.3   
MG/DL  
AST 36 14 - 36 IU/L  
ALKALINE PHOSPHATASE 53 38 - 126   
IU/L  
CARBON DIOXIDE (CO2) 17 (LL) 22   
- 30 MMOL/L  
A/G Ratio 1.3 RATIO  
ALT 32 <35 IU/L  
ESTIMATED GFR, NON AFRICAN AMER   
164 ml/min/1.73sq.m  
ESTIMATED GFR, AFRICAN AMERICAN   
198 ml/min/1.73sq.m  
GFR COMMENT Average GFR for   
20-29 years old = 116.  
LACTATE, BLOOD  
Result Value Ref Range  
LACTATE 1.7 0.7 - 2.0 mmol/L  
  
Radiographic Imaging  
No orders to display  
  
Procedures:  
Procedures  
  
Moderate Sedation Procedure:  
No  
  
ED Summary/MDM  
Patient was ordered fetal heart   
tones. Blood work was also   
obtained. Urinalysis was   
ordered. She was given 2 L of   
saline wide open. She was   
resting comfortably. Her blood   
work has returned. Lactic acid   
level of 1.7. Potassium is   
slightly low at 2.9. Her H&H of   
13 and 39. Urinalysis is   
currently pending.  
  
I have endorsed the case at   
shift change to the oncoming   
physician. Please refer to his   
note for any outstanding   
diagnostic studies and final   
disposition and results to   
treatment.  
  
Clinical Impression:  
1. Dehydration  
2. Hyperemesis gravidarum  
  
No follow-ups on file.  
New Prescriptions  
No medications on file  
  
Discontinued Medications  
No medications on file  
  
An After Visit Summary was   
printed and given to the patient   
with above information.  
.  
  
  
Lorenzo Cid MD  
24  
  
Electronically signed by Lorenzo Cid MD at 2024 6:55   
PM EDT  
documented in this encounter            [facility]  
   
                                                    2024 Physician   
Emergency department Note               Formatting of this note might be   
different from the original.  
The patient does have some   
bacteria in her urine. She did   
have low potassium. She is given   
potassium supplement here in the   
ER. She feels much improved and   
feels ready to go home. I will   
put her on some Macrobid and   
Zofran. I will have her follow   
up with primary care. She did   
have a small amount of protein   
in her urine, but no   
hypertension or any other   
findings consistent with   
preeclampsia.  
  
Christiano Knight MD  
24  
  
Electronically signed by Christiano Knight MD at 2024 7:56   
PM EDT  
                                        [facility]  
   
                                                    2024 Emergency   
department Note                         Formatting of this note might be   
different from the original.  
REport given to Mario UMANZOR  
Electronically signed by Leia Lima RN at 2024 7:14 PM   
EDT  
                                        [facility]  
   
                                                    2024 Emergency   
department Note                         Formatting of this note might be   
different from the original.  
Pt denies ability to provide   
urine specimen.  
Electronically signed by Leia Lima RN at 2024 6:02 PM   
EDT  
                                        [facility]  
   
                                                    2024 Physician   
Emergency department Note               Formatting of this note is   
different from the original.  
Emergency Department Report  
  
[facility] EMERGENCY   
DEPARTMENT  
  
Service Date:.24  
  
PCP: No primary care provider on   
file.  
MRN: [id number]  
  
  
Chief Complaint:  
Chief Complaint  
Patient presents with  
Abdominal Pain  
Found patient in ED waiting room   
on her knees with her face in   
her boyfriends lap. He sates     
she did this because she did not   
know how long the wait was and   
she was trying to handle the   
pain . I had patient sit back in   
wheel chair an she was taken to   
room. Per family patient had a   
seizure before her abdominal   
cramping started. The seizure   
lasted 4 minutes and patient was   
  just stiff  per family.   
Patient present to us with   
abdominal cramping that radiates   
into back with clear discharge   
that went throug  
  
HPI  
Bacilio Moreno is a 23 y.o.   
female presents to the ED with   
chief complaint of abdominal   
cramping and vomiting. Patient   
is a  AB2 presenting to the   
emergency department complaining   
of pelvic cramping. She states   
she feels she had a seizure   
today. She states she has a   
history of seizures. She states   
she was on no medications. She   
states she has had vomiting   
recently and feels that she was   
dehydrated. She states she was   
thirsty. She denies any headache   
or neck stiffness. She denies   
any vaginal bleeding. She denies   
any loss of bowel or bladder   
control. She denies any headache   
or neck stiffness. She denies   
any recent travel or trauma. She   
denies any yellowing to the skin  
  
Review of Systems:  
Review of Systems  
Review of Systems  
Constitutional: Negative for   
fevers or chills  
Skin: Negative for rash or   
bruising  
HENT: Negative  
Eyes: Negative  
Cardiovascular: Negative  
Gastrointestinal: Positive for   
vomiting. Negative for diarrhea.   
Positive for abdominal cramping  
Respiratory: Negative for cough   
or wheezing  
Genitourinary: Negative for   
frequency urgency or dysuria  
Musculoskeletal: Negative for   
fall, injury, trauma  
Neurological: Negative for   
headache  
  
Past Medical History:  
Past Medical History:  
Diagnosis Date  
Seizures  
  
Past Surgical History:  
No past surgical history on   
file.  
  
Allergies:  
Allergies  
Allergen Reactions  
Codeine  
Miralax [Polyethylene Glycol]  
  
  
Medications:  
Patient's Medications  
No medications on file  
  
Family History:  
History reviewed. No pertinent   
family history.  
  
Social History:  
Social History  
  
Socioeconomic History  
Marital status: Single  
Spouse name: Not on file  
Number of children: Not on file  
Years of education: Not on file  
Highest education level: Not on   
file  
Occupational History  
Not on file  
Tobacco Use  
Smoking status: Never  
Smokeless tobacco: Never  
Vaping Use  
Vaping status: Never Used  
Substance and Sexual Activity  
Alcohol use: Never  
Drug use: Yes  
Types: Marijuana  
Sexual activity: Not on file  
Other Topics Concern  
Not on file  
Social History Narrative  
Not on file  
  
Social Determinants of Health  
  
Financial Resource Strain: Not   
on file  
Food Insecurity: Food Insecurity   
Present (2024)  
Hunger Vital Sign  
Worried About Running Out of   
Food in the Last Year: Sometimes   
true  
Ran Out of Food in the Last   
Year: Sometimes true  
Transportation Needs: No   
Transportation Needs (2024)  
PRAPARE - Transportation  
Lack of Transportation   
(Medical): No  
Lack of Transportation   
(Non-Medical): No  
Physical Activity: Not on file  
Stress: Not on file  
Social Connections: Not on file  
Intimate Partner Violence: Not   
At Risk (2024)  
Humiliation, Afraid, Rape, and   
Kick questionnaire  
Fear of Current or Ex-Partner:   
No  
Emotionally Abused: No  
Physically Abused: No  
Sexually Abused: No  
Housing Stability: Unknown   
(2024)  
Housing Stability Vital Sign  
Unable to Pay for Housing in the   
Last Year: No  
Number of Places Lived in the   
Last Year: Not on file  
Unstable Housing in the Last   
Year: No  
  
Physical Exam:  
Physical Exam  
General: Well-developed   
well-nourished female  
HENT: Head is atraumatic. Mouth   
is dry  
Eyes: Pupils are equal  
Skin: Warm, dry. No rash  
Abdomen: Soft. No distention   
guarding or rebound  
Respiratory: Clear. No rhonchi  
Heart: Heart tones are regular.   
Capillary refill is brisk  
Neurologic: Awake, alert,   
oriented, answering questions   
appropriately. Moving all   
extremities well  
Lymphatic: No lymphedema or   
lymphadenopathy  
Musculoskeletal: No trauma   
fracture or deformity  
Psychiatric: Cooperative with   
examiner  
  
Vital Signs During ED Visit  
Patient Vitals for the past 24   
hrs:  
BP Temp Pulse Resp SpO2 Weight  
24 1809 120/69 -- 102 18   
97 % --  
24 1748 -- -- -- -- --   
45.4 kg (100 lb)  
24 1747 115/68 97.9 F   
(36.6 C) 84 22 97 % --  
  
Orders/Results:  
Results for orders placed or   
performed during the hospital   
encounter of 24  
CBC, EDIF, PLATELET  
Result Value Ref Range  
WBC (WHITE BLOOD COUNT) 10.8 3.6   
- 11.0 10*3/uL  
RBC 4.45 4.0 - 5.4 10*6/uL  
HEMOGLOBIN (HGB) 13.3 12.0 -   
16.0 G/DL  
HEMATOCRIT (HCT) 39.2 36.0 -   
48.0 %  
MEAN CELL VOLUME 88.1 80.0 -   
100.0 FL  
Mean Cell HGB 29.9 26.0 - 35.0   
PG  
MEAN CELL HGB CONCENTRATION 33.9   
27.0 - 37.0 G/DL  
RBC DISTRIBUTION 13.6 11.5 -   
14.5 %  
PLATELET COUNT 282 130 - 400   
10*3/uL  
MEAN PLATELET VOLUME 9.9 7.4 -   
11.0 FL  
DIFFERENTIAL TYPE AUTO DIFF %  
NEUTROPHILS 71.9 37.0 - 75.0 %  
LYMPHOCYTE 17.0 (L) 20.0 - 55.0   
%  
MONOCYTE % 9.8 0.0 - 10.0 %  
EOSINOPHIL % 0.6 0.0 - 11.0 %  
BASOPHIL % 0.7 0.0 - 2.0 %  
Absolute Neutrophil Count 7.8   
(H) 1.4 - 6.5 10*3/uL  
LYMPHOCYTES, ABSOLUTE 1.8 1.2 -   
3.4 10*3/uL  
MONOCYTES, ABSOLUTE 1.1 (H) 0.0   
- 0.7 10*3/uL  
ABSOLUTE EOSINOPHIL COUNT 0.1   
0.0 - 0.7 10*3/uL  
ABSOLUTE BASOPHIL COUNT 0.1 0.0   
- 0.2 10*3/uL  
COMPREHENSIVE METABOLIC PANEL  
Result Value Ref Range  
Glucose 90 70 - 100 MG/DL  
BUN 7 7 - 20 MG/DL  
CREATININE SERUM 0.50 (L) 0.70 -   
1.20 MG/DL  
SODIUM 133 (L) 137 - 145 MMOL/L  
POTASSIUM 2.9 (LL) 3.5 - 5.1   
MMOL/L  
CHLORIDE 104 98 - 107 MMOL/L  
CALCIUM 9.2 8.4 - 10.2 MG/DL  
PROTEIN, TOTAL 7.2 6.3 - 8.2   
GM/DL  
Albumin 4.1 3.5 - 5.0 G/dl  
BILIRUBIN, TOTAL 0.9 0.2 - 1.3   
MG/DL  
AST 36 14 - 36 IU/L  
ALKALINE PHOSPHATASE 53 38 - 126   
IU/L  
CARBON DIOXIDE (CO2) 17 (LL) 22   
- 30 MMOL/L  
A/G Ratio 1.3 RATIO  
ALT 32 <35 IU/L  
ESTIMATED GFR, NON AFRICAN AMER   
164 ml/min/1.73sq.m  
ESTIMATED GFR, AFRICAN AMERICAN   
198 ml/min/1.73sq.m  
GFR COMMENT Average GFR for   
20-29 years old = 116.  
LACTATE, BLOOD  
Result Value Ref Range  
LACTATE 1.7 0.7 - 2.0 mmol/L  
  
Radiographic Imaging  
No orders to display  
  
Procedures:  
Procedures  
  
Moderate Sedation Procedure:  
No  
  
ED Summary/MDM  
Patient was ordered fetal heart   
tones. Blood work was also   
obtained. Urinalysis was   
ordered. She was given 2 L of   
saline wide open. She was   
resting comfortably. Her blood   
work has returned. Lactic acid   
level of 1.7. Potassium is   
slightly low at 2.9. Her H&H of   
13 and 39. Urinalysis is   
currently pending.  
  
I have endorsed the case at   
shift change to the oncoming   
physician. Please refer to his   
note for any outstanding   
diagnostic studies and final   
disposition and results to   
treatment.  
  
Clinical Impression:  
1. Dehydration  
2. Hyperemesis gravidarum  
  
No follow-ups on file.  
New Prescriptions  
No medications on file  
  
Discontinued Medications  
No medications on file  
  
An After Visit Summary was   
printed and given to the patient   
with above information.  
.  
  
  
Lorenzo Cid MD  
[id number]  
  
Electronically signed by Lorenzo Cid MD at 2024 6:55   
PM EDT  
                                        [facility]  
   
                                                    2024 Emergency   
department Note                         Formatting of this note is   
different from the original.  
Patient is a 23-year-old female   
who describes a history of   
gastroparesis and marijuana use   
presents with a chief complaint   
of abdominal pain nausea   
vomiting starting this morning.   
She insist that her nausea and   
vomiting is not due to her   
marijuana use and that she does   
not have cannabis hyperemesis   
syndrome. This is the patient's   
10th visit in February. It is   
currently .  
  
  
  
Review of Systems  
  
Physical Exam  
Vitals and nursing note   
reviewed.  
Constitutional:  
General: She is not in acute   
distress.  
Appearance: She is   
well-developed.  
HENT:  
Head: Normocephalic and   
atraumatic.  
Eyes:  
Conjunctiva/sclera: Conjunctivae   
normal.  
Cardiovascular:  
Rate and Rhythm: Normal rate and   
regular rhythm.  
Heart sounds: No murmur heard.  
Pulmonary:  
Effort: Pulmonary effort is   
normal. No respiratory distress.  
Breath sounds: Normal breath   
sounds.  
Abdominal:  
Palpations: Abdomen is soft.  
Tenderness: There is no   
abdominal tenderness.  
Musculoskeletal:  
General: No swelling.  
Cervical back: Neck supple.  
Skin:  
General: Skin is warm and dry.  
Capillary Refill: Capillary   
refill takes less than 2   
seconds.  
Neurological:  
Mental Status: She is alert.  
Psychiatric:  
Mood and Affect: Mood normal.  
  
  
  
Labs Reviewed  
BASIC METABOLIC PANEL - Abnormal  
Result Value  
Glucose 97  
Sodium 136  
Potassium 3.4 (*)  
Chloride 102  
Bicarbonate 20 (*)  
Anion Gap 17  
Urea Nitrogen 4 (*)  
Creatinine 0.54  
eGFR >90  
Calcium 9.5  
MANUAL DIFFERENTIAL - Abnormal  
Neutrophils %, Manual 84.0  
Lymphocytes %, Manual 9.0  
Monocytes %, Manual 6.0  
Eosinophils %, Manual 0.0  
Basophils %, Manual 1.0  
Seg Neutrophils Absolute, Manual   
8.90 (*)  
Lymphocytes Absolute, Manual   
0.95 (*)  
Monocytes Absolute, Manual 0.64  
Eosinophils Absolute, Manual   
0.00  
Basophils Absolute, Manual 0.11   
(*)  
Total Cells Counted 100  
RBC Morphology No significant   
RBC morphology present  
CBC WITH AUTO DIFFERENTIAL -   
Normal  
WBC 10.6  
nRBC 0.0  
RBC 5.01  
Hemoglobin 14.9  
Hematocrit 44.1  
MCV 88  
MCH 29.7  
MCHC 33.8  
RDW 13.6  
Platelets 306  
Immature Granulocytes %,   
Automated 0.4  
Immature Granulocytes Absolute,   
Automated 0.04  
Narrative:  
The previously reported   
component Neutrophils % is no   
longer being reported.  
The previously reported   
component Lymphocytes % is no   
longer being reported.  
The previously reported   
component Monocytes % is no   
longer being reported.  
The previously  
reported component Eosinophils %   
is no longer being reported.  
The previously reported   
component Basophils % is no   
longer being reported.  
The previously reported   
component Absolute Neutrophils   
is no longer being reported.  
The previously reported  
component Absolute Lymphocytes   
is no longer being reported.  
The previously reported   
component Absolute Monocytes is   
no longer being reported.  
The previously reported   
component Absolute Eosinophils   
is no longer being reported.  
The previously reported  
component Absolute Basophils is   
no longer being reported.  
HEPATIC FUNCTION PANEL - Normal  
Albumin 4.9  
Bilirubin, Total 0.6  
Bilirubin, Direct 0.1  
Alkaline Phosphatase 39  
ALT 16  
AST 15  
Total Protein 7.4  
LIPASE - Normal  
Lipase 27  
Narrative:  
Venipuncture immediately after   
or during the administration of   
Metamizole may lead to falsely   
low results. Testing should be   
performed immediately prior to   
Metamizole dosing.  
GRAY TOP  
  
  
No orders to display  
  
  
Procedures  
  
Medical Decision Making  
23-year-old female with multiple   
ER visits this month alone   
presented with nausea vomiting   
abdominal pain. She received a   
liter of fluids and 5 mg IVP   
Compazine and is feeling much   
better and request to be   
discharged home. Electrolytes   
are essentially unremarkable for   
acute findings. Lipase and   
biliary numbers are normal. She   
can be discharged.  
  
  
  
Diagnoses as of 24  
Nausea and vomiting, unspecified   
vomiting type  
  
  
Nilton Gordillo MD  
24  
  
Electronically signed by Nilton Gordillo MD at 2024 10:19 PM   
EST  
documented in this encounter            [facility]  
Work Phone:   
[phone]  
   
                                                    2024 Physician   
Emergency department Note               Formatting of this note is   
different from the original.  
Patient is a 23-year-old female   
who describes a history of   
gastroparesis and marijuana use   
presents with a chief complaint   
of abdominal pain nausea   
vomiting starting this morning.   
She insist that her nausea and   
vomiting is not due to her   
marijuana use and that she does   
not have cannabis hyperemesis   
syndrome. This is the patient's   
10th visit in February. It is   
currently .  
  
  
  
Review of Systems  
  
Physical Exam  
Vitals and nursing note   
reviewed.  
Constitutional:  
General: She is not in acute   
distress.  
Appearance: She is   
well-developed.  
HENT:  
Head: Normocephalic and   
atraumatic.  
Eyes:  
Conjunctiva/sclera: Conjunctivae   
normal.  
Cardiovascular:  
Rate and Rhythm: Normal rate and   
regular rhythm.  
Heart sounds: No murmur heard.  
Pulmonary:  
Effort: Pulmonary effort is   
normal. No respiratory distress.  
Breath sounds: Normal breath   
sounds.  
Abdominal:  
Palpations: Abdomen is soft.  
Tenderness: There is no   
abdominal tenderness.  
Musculoskeletal:  
General: No swelling.  
Cervical back: Neck supple.  
Skin:  
General: Skin is warm and dry.  
Capillary Refill: Capillary   
refill takes less than 2   
seconds.  
Neurological:  
Mental Status: She is alert.  
Psychiatric:  
Mood and Affect: Mood normal.  
  
  
  
Labs Reviewed  
BASIC METABOLIC PANEL - Abnormal  
Result Value  
Glucose 97  
Sodium 136  
Potassium 3.4 (*)  
Chloride 102  
Bicarbonate 20 (*)  
Anion Gap 17  
Urea Nitrogen 4 (*)  
Creatinine 0.54  
eGFR >90  
Calcium 9.5  
MANUAL DIFFERENTIAL - Abnormal  
Neutrophils %, Manual 84.0  
Lymphocytes %, Manual 9.0  
Monocytes %, Manual 6.0  
Eosinophils %, Manual 0.0  
Basophils %, Manual 1.0  
Seg Neutrophils Absolute, Manual   
8.90 (*)  
Lymphocytes Absolute, Manual   
0.95 (*)  
Monocytes Absolute, Manual 0.64  
Eosinophils Absolute, Manual   
0.00  
Basophils Absolute, Manual 0.11   
(*)  
Total Cells Counted 100  
RBC Morphology No significant   
RBC morphology present  
CBC WITH AUTO DIFFERENTIAL -   
Normal  
WBC 10.6  
nRBC 0.0  
RBC 5.01  
Hemoglobin 14.9  
Hematocrit 44.1  
MCV 88  
MCH 29.7  
MCHC 33.8  
RDW 13.6  
Platelets 306  
Immature Granulocytes %,   
Automated 0.4  
Immature Granulocytes Absolute,   
Automated 0.04  
Narrative:  
The previously reported   
component Neutrophils % is no   
longer being reported.  
The previously reported   
component Lymphocytes % is no   
longer being reported.  
The previously reported   
component Monocytes % is no   
longer being reported.  
The previously  
reported component Eosinophils %   
is no longer being reported.  
The previously reported   
component Basophils % is no   
longer being reported.  
The previously reported   
component Absolute Neutrophils   
is no longer being reported.  
The previously reported  
component Absolute Lymphocytes   
is no longer being reported.  
The previously reported   
component Absolute Monocytes is   
no longer being reported.  
The previously reported   
component Absolute Eosinophils   
is no longer being reported.  
The previously reported  
component Absolute Basophils is   
no longer being reported.  
HEPATIC FUNCTION PANEL - Normal  
Albumin 4.9  
Bilirubin, Total 0.6  
Bilirubin, Direct 0.1  
Alkaline Phosphatase 39  
ALT 16  
AST 15  
Total Protein 7.4  
LIPASE - Normal  
Lipase 27  
Narrative:  
Venipuncture immediately after   
or during the administration of   
Metamizole may lead to falsely   
low results. Testing should be   
performed immediately prior to   
Metamizole dosing.  
GRAY TOP  
  
  
No orders to display  
  
  
Procedures  
  
Medical Decision Making  
23-year-old female with multiple   
ER visits this month alone   
presented with nausea vomiting   
abdominal pain. She received a   
liter of fluids and 5 mg IVP   
Compazine and is feeling much   
better and request to be   
discharged home. Electrolytes   
are essentially unremarkable for   
acute findings. Lipase and   
biliary numbers are normal. She   
can be discharged.  
  
  
  
Diagnoses as of 24  
Nausea and vomiting, unspecified   
vomiting type  
  
  
Nilton Gordillo MD  
24  
  
Electronically signed by Nilton Gordillo MD at 2024 10:19 PM   
EST  
                                        [facility]  
Work Phone:   
[phone]  
   
                                                    2024 Hospital   
Discharge instructions                    
  
  
Axel Montoya MD - 2024   
10:53 PM ESTFormatting of this   
note might be different from the   
original.  
  
  
Procedures done during this   
visit: None  
  
Electronically signed by Axel Montoya MD at 2024 10:53   
PM EST  
  
  
  
  
The following attachments cannot   
be sent through Care   
Everywhere.Nausea and Vomiting   
Discharge Instructions, Adult   
(English)documented in this   
encounter                               [facility]  
   
                                        2024 Note     Physician Triage Not  
e  
The patient was seen by me in   
intake for a brief history and   
physical obtained  
for triage reasons only. My exam   
is intended to be an initial   
medical screening  
exam for disposition within our   
ED with limited initial orders   
placed, when  
appropriate, to expedite care by   
the treating team.  
HIPAA: Verbal permission granted   
from patient to discuss case,   
including  
protected health information, in   
front of family / friends in   
room at the time  
of the evaluation.  
Patient complains of had seizure   
on the way here - lives 1 hour   
away.+ nausea AND  
neck pains. No fevers. Had at   
least 20 seizures over the last   
week. Not on  
medications for the seizures.  
No pregnancy.  
Focused Exam: alert now,  
The patient is deemed   
appropriate for acute.  
Initial orders: iv, labs.  
The remainder of testing,   
treatment, and diagnostic plan   
will be assumed by the  
next clinician who will be   
seeing the patient as a primary   
patient, creating a  
plan and impression, and final   
disposition of the patient from   
the ED. I had a  
limited role in this case.  
PLEASE SEE OTHER   
ATTENDING/RESIDENT/PHYSICIAN/CNP  
/PA NOTATION  
FLYNN Araujo                 [facility]  
   
                                                    2024 Physician   
Emergency department Note               Formatting of this note might be   
different from the original.  
Physician Triage Note  
  
The patient was seen by me in   
intake for a brief history and   
physical obtained for triage   
reasons only. My exam is   
intended to be an initial   
medical screening exam for   
disposition within our ED with   
limited initial orders placed,   
when appropriate, to expedite   
care by the treating team.  
  
HIPAA: Verbal permission granted   
from patient to discuss case,   
including protected health   
information, in front of family   
/ friends in room at the time of   
the evaluation.  
  
Patient complains of had seizure   
on the way here - lives 1 hour   
away.+ nausea & neck pains. No   
fevers. Had at least 20 seizures   
over the last week. Not on   
medications for the seizures.  
No pregnancy.  
  
Focused Exam: alert now,  
  
The patient is deemed   
appropriate for acute.  
  
Initial orders: iv, labs.  
  
The remainder of testing,   
treatment, and diagnostic plan   
will be assumed by the next   
clinician who will be seeing the   
patient as a primary patient,   
creating a plan and impression,   
and final disposition of the   
patient from the ED. I had a   
limited role in this case.  
  
PLEASE SEE OTHER   
ATTENDING/RESIDENT/PHYSICIAN/CNP  
/PA NOTATION  
  
FLYNN Araujo  
  
Electronically signed by   
June Dyson APRN-CNP at   
2024 6:19 PM EST  
                                        [facility]  
Work Phone:   
[phone]  
   
                                                    2024 Emergency   
department Note                         Formatting of this note might be   
different from the original.  
Physician Triage Note  
  
The patient was seen by me in   
intake for a brief history and   
physical obtained for triage   
reasons only. My exam is   
intended to be an initial   
medical screening exam for   
disposition within our ED with   
limited initial orders placed,   
when appropriate, to expedite   
care by the treating team.  
  
HIPAA: Verbal permission granted   
from patient to discuss case,   
including protected health   
information, in front of family   
/ friends in room at the time of   
the evaluation.  
  
Patient complains of had seizure   
on the way here - lives 1 hour   
away.+ nausea & neck pains. No   
fevers. Had at least 20 seizures   
over the last week. Not on   
medications for the seizures.  
No pregnancy.  
  
Focused Exam: alert now,  
  
The patient is deemed   
appropriate for acute.  
  
Initial orders: iv, labs.  
  
The remainder of testing,   
treatment, and diagnostic plan   
will be assumed by the next   
clinician who will be seeing the   
patient as a primary patient,   
creating a plan and impression,   
and final disposition of the   
patient from the ED. I had a   
limited role in this case.  
  
PLEASE SEE OTHER   
ATTENDING/RESIDENT/PHYSICIAN/CNP  
/PA NOTATION  
  
FLYNN Araujo  
  
Electronically signed by   
June Dyson APRN-CNP at   
2024 6:19 PM EST  
documented in this encounter            [facility]  
   
                                        2024 Plan of care note Formatting   
of this note might be   
different from the original.  
  
Problem: Patient Care Overview  
Goal: Plan of Care Review  
Outcome: Adequate for Discharge  
Goal: Individualization &   
Mutuality  
Outcome: Adequate for Discharge  
Goal: Discharge Needs Assessment  
Outcome: Adequate for Discharge  
Goal: Interdisciplinary   
Rounds/Family Conf  
Outcome: Adequate for Discharge  
  
Problem: Skin Integrity   
Impairment, Risk/Actual (Adult)  
Goal: Identify Related Risk   
Factors and Signs and Symptoms  
Description: Related risk   
factors and signs and symptoms   
are identified upon initiation   
of Human Response Clinical   
Practice Guideline (CPG)  
Outcome: Adequate for Discharge  
Goal: Skin Integrity/Wound   
Healing  
Description: Patient will   
demonstrate the desired outcomes   
by discharge/transition of care.  
Outcome: Adequate for Discharge  
  
Problem: Nausea/Vomiting (Adult)  
Goal: Identify Related Risk   
Factors and Signs and Symptoms  
Description: Related risk   
factors and signs and symptoms   
are identified upon initiation   
of Human Response Clinical   
Practice Guideline (CPG)  
2024 by Janelle Short RN  
Outcome: Adequate for Discharge  
2024 by Janelle Short RN  
Outcome: Progressing  
Goal: Symptom Relief  
Description: Patient will   
demonstrate the desired outcomes   
by discharge/transition of care.  
2024 by Janelle Short RN  
Outcome: Adequate for Discharge  
2024 by Janelle Short RN  
Outcome: Progressing  
Goal: Adequate Hydration  
Description: Patient will   
demonstrate the desired outcomes   
by discharge/transition of care.  
2024 by Janelle Short RN  
Outcome: Adequate for Discharge  
2024 by Janelle Short RN  
Outcome: Progressing  
Intervention: Minimize Nausea   
Triggers/Manage Symptoms  
Flowsheets (Taken 2024)  
Nausea/Vomiting Interventions:  
cool cloth applied  
sips clear liquids given  
slow deep breathing encouraged  
stimuli minimized  
Environmental Support:  
calm environment promoted  
personal routine supported  
rest periods encouraged  
environmental consistency   
promoted  
Intervention: Position to   
Prevent Aspiration  
Flowsheets (Taken 2024   
162)  
Body Position:   
positioned/repositioned   
independently  
Head of Bed (HOB): HOB 30-59   
degrees  
Intervention: Promote/Maintain   
Hydration  
Flowsheets (Taken 2024   
162)  
Fluid/Electrolyte Management:  
fluids provided  
intravenous fluids adjusted  
electrolyte supplement initiated  
  
Electronically signed by Janelle Short RN at 2024 7:33   
PM [facility]  
   
                                                    2024 Miscellaneous   
Notes                                   Formatting of this note might be   
different from the original.  
  
Problem: Patient Care Overview  
Goal: Plan of Care Review  
Outcome: Adequate for Discharge  
Goal: Individualization &   
Mutuality  
Outcome: Adequate for Discharge  
Goal: Discharge Needs Assessment  
Outcome: Adequate for Discharge  
Goal: Interdisciplinary   
Rounds/Family Conf  
Outcome: Adequate for Discharge  
  
Problem: Skin Integrity   
Impairment, Risk/Actual (Adult)  
Goal: Identify Related Risk   
Factors and Signs and Symptoms  
Description: Related risk   
factors and signs and symptoms   
are identified upon initiation   
of Human Response Clinical   
Practice Guideline (CPG)  
Outcome: Adequate for Discharge  
Goal: Skin Integrity/Wound   
Healing  
Description: Patient will   
demonstrate the desired outcomes   
by discharge/transition of care.  
Outcome: Adequate for Discharge  
  
Problem: Nausea/Vomiting (Adult)  
Goal: Identify Related Risk   
Factors and Signs and Symptoms  
Description: Related risk   
factors and signs and symptoms   
are identified upon initiation   
of Human Response Clinical   
Practice Guideline (CPG)  
2024 by Janelle Short RN  
Outcome: Adequate for Discharge  
2024 by Janelle Short RN  
Outcome: Progressing  
Goal: Symptom Relief  
Description: Patient will   
demonstrate the desired outcomes   
by discharge/transition of care.  
2024 by Janelle Short RN  
Outcome: Adequate for Discharge  
2024 by Janelle Short RN  
Outcome: Progressing  
Goal: Adequate Hydration  
Description: Patient will   
demonstrate the desired outcomes   
by discharge/transition of care.  
2024 by Janelle Short RN  
Outcome: Adequate for Discharge  
2024 by Janelle Short RN  
Outcome: Progressing  
Intervention: Minimize Nausea   
Triggers/Manage Symptoms  
Flowsheets (Taken 2024   
162)  
Nausea/Vomiting Interventions:  
cool cloth applied  
sips clear liquids given  
slow deep breathing encouraged  
stimuli minimized  
Environmental Support:  
calm environment promoted  
personal routine supported  
rest periods encouraged  
environmental consistency   
promoted  
Intervention: Position to   
Prevent Aspiration  
Flowsheets (Taken 2024   
162)  
Body Position:   
positioned/repositioned   
independently  
Head of Bed (HOB): HOB 30-59   
degrees  
Intervention: Promote/Maintain   
Hydration  
Flowsheets (Taken 2024   
162)  
Fluid/Electrolyte Management:  
fluids provided  
intravenous fluids adjusted  
electrolyte supplement initiated  
  
Electronically signed by Janelle Short RN at 2024 7:33   
PM EST  
Formatting of this note might be   
different from the original.  
  
Problem: Nausea/Vomiting (Adult)  
Goal: Identify Related Risk   
Factors and Signs and Symptoms  
Description: Related risk   
factors and signs and symptoms   
are identified upon initiation   
of Human Response Clinical   
Practice Guideline (CPG)  
Outcome: Progressing  
Goal: Symptom Relief  
Description: Patient will   
demonstrate the desired outcomes   
by discharge/transition of care.  
Outcome: Progressing  
Goal: Adequate Hydration  
Description: Patient will   
demonstrate the desired outcomes   
by discharge/transition of care.  
Outcome: Progressing  
Intervention: Minimize Nausea   
Triggers/Manage Symptoms  
Flowsheets (Taken 2024   
162)  
Nausea/Vomiting Interventions:  
cool cloth applied  
sips clear liquids given  
slow deep breathing encouraged  
stimuli minimized  
Environmental Support:  
calm environment promoted  
personal routine supported  
rest periods encouraged  
environmental consistency   
promoted  
Intervention: Position to   
Prevent Aspiration  
Flowsheets (Taken 2024   
1625)  
Body Position:   
positioned/repositioned   
independently  
Head of Bed (HOB): HOB 30-59   
degrees  
Intervention: Promote/Maintain   
Hydration  
Flowsheets (Taken 2024   
1625)  
Fluid/Electrolyte Management:  
fluids provided  
intravenous fluids adjusted  
electrolyte supplement initiated  
  
Electronically signed by Janelle Short RN at 2024 4:26   
PM EST  
Formatting of this note might be   
different from the original.  
Afternoon assessment completed.   
No changes noted. Stool specimen   
obtained. Patient resting with   
no complaints. Call light and   
phone within reach. Rn continue   
to monitor.  
  
Electronically signed by Janelle Short RN at 2024 4:24   
PM EST  
Formatting of this note might be   
different from the original.  
No change noted from previous   
assessment by this RN unless   
otherwise detailed in   
coordinating flow sheets.   
Patient denies any needs at this   
time. Will continue to monitor.  
  
Electronically signed by Janelle Short RN at 2024   
12:52 PM EST  
Formatting of this note might be   
different from the original.  
Responded to patient's call   
light. She verbalizes she wants   
to leave AMA Dr. Cooper notified.   
Pulled IV out. Patient's best   
friend at bedside to drive   
patient home.  
Electronically signed by   
Jennifer Tolliver RN at   
2024 7:41 PM EST  
Formatting of this note might be   
different from the original.  
Resting in bed. Continues to be   
medicated for complaints of   
nausea and emesis which is   
yellow in color. Call light in   
reach.  
Electronically signed by   
Fadia Unger RN at   
2024 4:32 AM EST  
Formatting of this note might be   
different from the original.  
Patient awake in bed. Has many   
requests for this nurse   
including a second shower, extra   
pillows, warm blankets, drinks,   
and pain medication, and   
medication for nausea as soon as   
she can have something. No   
emesis observed this shift.   
Trying to meet all of patient   
requests and basic needs.   
Assessment unchanged from   
previous one. No seizure   
activity observed. Call light in   
reach.  
Electronically signed by   
Fadia Unger RN at   
2024 1:15 AM EST  
Formatting of this note might be   
different from the original.  
Patient to room 2755 from ED and   
assisted into bed x 1 assist.   
Patient oriented to room and   
call light system. Admission   
assessment initiated. Family   
with patient and will assist her   
with shower per her request. Is   
asking for nausea medication and   
will call  to get order   
for something else as she has   
had all she can have at this   
time. SR up x 4 due to seizure   
precautions being maintained as   
patient has active seizure   
history, having one in ED as   
reported by staff.  
Electronically signed by   
Fadia Unger RN at   
2024 10:28 PM EST  
documented in this encounter            [facility]  
   
                                        2024 Plan of care note Formatting   
of this note might be   
different from the original.  
  
Problem: Nausea/Vomiting (Adult)  
Goal: Identify Related Risk   
Factors and Signs and Symptoms  
Description: Related risk   
factors and signs and symptoms   
are identified upon initiation   
of Human Response Clinical   
Practice Guideline (CPG)  
Outcome: Progressing  
Goal: Symptom Relief  
Description: Patient will   
demonstrate the desired outcomes   
by discharge/transition of care.  
Outcome: Progressing  
Goal: Adequate Hydration  
Description: Patient will   
demonstrate the desired outcomes   
by discharge/transition of care.  
Outcome: Progressing  
Intervention: Minimize Nausea   
Triggers/Manage Symptoms  
Flowsheets (Taken 2024   
1625)  
Nausea/Vomiting Interventions:  
cool cloth applied  
sips clear liquids given  
slow deep breathing encouraged  
stimuli minimized  
Environmental Support:  
calm environment promoted  
personal routine supported  
rest periods encouraged  
environmental consistency   
promoted  
Intervention: Position to   
Prevent Aspiration  
Flowsheets (Taken 2024   
1625)  
Body Position:   
positioned/repositioned   
independently  
Head of Bed (HOB): HOB 30-59   
degrees  
Intervention: Promote/Maintain   
Hydration  
Flowsheets (Taken 2024   
1625)  
Fluid/Electrolyte Management:  
fluids provided  
intravenous fluids adjusted  
electrolyte supplement initiated  
  
Electronically signed by Janelle Short RN at 2024 4:26   
PM [facility]  
   
                                        2024 Nurse Note Formatting of this  
 note might be   
different from the original.  
Afternoon assessment completed.   
No changes noted. Stool specimen   
obtained. Patient resting with   
no complaints. Call light and   
phone within reach. Rn continue   
to monitor.  
  
Electronically signed by Janelle Short RN at 2024 4:24   
PM [facility]  
   
                                        2024 Nurse Note Formatting of this  
 note might be   
different from the original.  
No change noted from previous   
assessment by this RN unless   
otherwise detailed in   
coordinating flow sheets.   
Patient denies any needs at this   
time. Will continue to monitor.  
  
Electronically signed by Janelle Short RN at 2024   
12:52 PM EST  
                                        [facility]  
   
                                                    2024 History of   
Present illness Narrative               Formatting of this note is   
different from the original.  
LSW met with patient to discuss   
discharge plans. Patient states   
that she currently lives in a   
mobile home with her fiance. She   
does not have advance   
directives. She denies mental   
health history. She states that   
she is independent with all ADLs   
and does not use any DME. She   
has no PCP and states that she   
does not want one. She denies   
all questions and concerns.  
  
Discharge Plan:  
Patient will return home.  
  
24 1120  
Referral Information  
Arrived From home or self-care  
Readmission Information  
Was patient readmitted within 30   
Days? No  
Information Source  
Information Source patient  
Outpatient Providers  
Outpatient Providers Updated In   
[facility] Yes  
Contact Information  
/SW Added to Care   
Team Yes  
This Writer is Primary Case   
Manager/SW Yes  
Social Work Contact Name   
Krissy Davison  
's Phone Number   
[phone]  
Living Environment  
Lives With significant other  
Living Arrangements mobile home  
Provides Primary Care For no one  
Primary Care Provided By self  
Support System Immediate family  
Able to Return to Prior   
Arrangements yes  
Functional Status  
Patient's Functional Status   
Prior To This Admission?   
Independent  
Are There Status Changes This   
Admission? No  
Changes Observed Since   
Admission? No Changes Observed  
Concerns With Patient Being Able   
To Care For Themselves At   
Discharge? No  
Employment/Financial  
Employed? No  
Source Of Income public   
assistance  
Financial Concerns none  
Insurance  
Medical Insurance Verified Yes  
Prescription Coverage Yes  
Pharmacy updated in [facility] Yes  
Initial Discharge Planning  
Home Care Services (PTA) No  
Home Therapies (PTA) Home SLP  
DME (PTA) None  
Medical Supplies (PTA) None  
Patient Goal for Discharge   
Return home with assistance from   
family and friends  
Anticipated discharge   
disposition Home  
Anticipated Changes Related to   
Illness none  
Transportation Available car  
Assessment/Concerns to be   
Addressed  
Concerns To Be Addressed no   
discharge needs   
identified;denies needs/concerns   
at this time  
  
  
Electronically signed by   
KEARA Davis at   
2024 11:23 AM EST  
documented in this encounter            [facility]  
   
                                        2024 Nurse Note Formatting of this  
 note might be   
different from the original.  
Responded to patient's call   
light. She verbalizes she wants   
to leave AMA Dr. Cooper notified.   
Pulled IV out. Patient's best   
friend at bedside to drive   
patient home.  
Electronically signed by   
Jennifer Tolliver RN at   
2024 7:41 PM EST  
                                        [facility]  
   
                                        2024 Nurse Note Formatting of this  
 note might be   
different from the original.  
Resting in bed. Continues to be   
medicated for complaints of   
nausea and emesis which is   
yellow in color. Call light in   
reach.  
Electronically signed by   
Fadia Unger RN at   
2024 4:32 AM [facility]  
                                        [facility]  
   
                                        2024 Nurse Note Formatting of this  
 note might be   
different from the original.  
Patient awake in bed. Has many   
requests for this nurse   
including a second shower, extra   
pillows, warm blankets, drinks,   
and pain medication, and   
medication for nausea as soon as   
she can have something. No   
emesis observed this shift.   
Trying to meet all of patient   
requests and basic needs.   
Assessment unchanged from   
previous one. No seizure   
activity observed. Call light in   
reach.  
Electronically signed by   
Fadia Unger RN at   
2024 1:15 AM EST  
                                        [facility]  
   
                                                    2024 Emergency   
department Note                         Formatting of this note might be   
different from the original.  
Pt transported to MS/ICU to room   
2755. Patient belongings with   
pt. Pt left in stable condition   
with call light within reach and   
family at bedside. Report sheet   
tubed to MS and bedside report   
given to nurse.  
Electronically signed by Marla Biggs RN at 2024 8:29 PM   
EST  
                                        [facility]  
   
                                                    2024 Emergency   
department Note                         Formatting of this note might be   
different from the original.  
Pt transported to MS/ICU to room   
2755. Patient belongings with   
pt. Pt left in stable condition   
with call light within reach and   
family at bedside. Report sheet   
tubed to MS and bedside report   
given to nurse.  
Electronically signed by Marla Biggs RN at 2024 8:29 PM   
EST  
Formatting of this note might be   
different from the original.  
Pt wishes to keep bottoms on at   
this time.  
Electronically signed by Pilar Clifford RN at 2024   
7:59 PM EST  
Formatting of this note might be   
different from the original.  
Pt requesting pain medication at   
this time. Provider aware. Pt   
ambulates to  with steady   
gait.  
Electronically signed by Pilar Clifford RN at 2024   
6:36 PM EST  
Formatting of this note is   
different from the original.  
DEPARTMENT OF EMERGENCY MEDICINE  
  
CHIEF COMPLAINT  
Vomiting, Abdominal Pain (Pt dx   
with colitis , unable to keep   
medications down with zofran and   
phenergan suppository/Neg flu   
covid and pregnancy test), and   
Seizure (X 4 days/Waiting to see   
neuro for seizures/[facility]   
in [place] last night)  
  
  
GLENDA Moreno is a 23 y.o.   
female who presents with a   
complaint of nausea, vomiting,   
diarrhea, and abdominal pain.   
She has had symptoms for 4 days.   
She was seen twice yesterday at   
[facility]. She had laboratory   
testing and a CT scan of the   
abdomen and pelvis. The CT scan   
of the abdomen and pelvis showed   
possible early colitis. She was   
started on Augmentin. She was   
provided with Zofran and   
Phenergan. She states her throat   
is raw from vomiting. No fevers   
or chills. No cough. She also   
does complain of some   
intermittent seizures. This was   
worked up yesterday at   
[facility] as well. She has been   
referred to Neurology for   
follow-up.  
  
REVIEW OF SYSTEMS  
Review of Systems  
  
Constitutional: No fevers, no   
chills  
Eyes: No vision change, no   
drainage  
ENT: No ear pain, throat is raw   
from vomiting  
Cardiovascular: No chest pain,   
no edema  
Respiratory: No shortness of   
breath, no cough  
Gastrointestinal: Positive   
vomiting, Positive diarrhea  
Genitourinary: No dysuria, no   
frequency  
Musculoskeletal: No joint pain,   
no joint swelling  
Integumentary: No rash, no   
itching  
Neurologic: No headache, no   
confusion  
Psychiatric: No anxiety, no   
depression  
  
PAST MEDICAL HISTORY  
Past Medical History:  
Diagnosis Date  
Seizures  
  
  
SURGICAL HISTORY  
No past surgical history on   
file.  
  
CURRENT MEDICATIONS  
No current facility-administered   
medications for this encounter.  
  
No current outpatient   
medications on file.  
  
  
ALLERGIES  
Allergies  
Allergen Reactions  
Codeine  
Miralax [Polyethylene Glycol]  
  
  
FAMILY HISTORY  
History reviewed. No pertinent   
family history.  
  
SOCIAL HISTORY  
Social History  
  
Socioeconomic History  
Marital status: Not on file  
Spouse name: Not on file  
Number of children: Not on file  
Years of education: Not on file  
Highest education level: Not on   
file  
Occupational History  
Not on file  
Tobacco Use  
Smoking status: Never  
Smokeless tobacco: Never  
Vaping Use  
Vaping status: Never Used  
Substance and Sexual Activity  
Alcohol use: Never  
Drug use: Yes  
Types: Marijuana  
Sexual activity: Not on file  
Other Topics Concern  
Not on file  
Social History Narrative  
Not on file  
  
Social Determinants of Health  
  
Financial Resource Strain: Not   
on file  
Food Insecurity: Not on file  
Transportation Needs: Not on   
file  
Physical Activity: Not on file  
Stress: Not on file  
Social Connections: Not on file  
Intimate Partner Violence: Not   
on file  
Housing Stability: Not on file  
  
  
PHYSICAL EXAM  
/77   Pulse 80   Temp 99.4   
F (37.4 C) (Oral)   Resp 17   Ht   
1.499 m (4' 11 )   SpO2 100%     
Smoking Status Never  
Physical Exam  
  
The patient is well-developed,   
well-nourished, and in no acute   
distress.  
Head is atraumatic and   
normocephalic.  
Pupils are equal and reactive.  
Face is symmetric.  
Mucous membranes are moist.  
Neck is supple.  
Heart is regular rate and   
rhythm.  
Lungs are clear to auscultation.  
Abdomen is soft, nontender,   
nondistended.  
Skin is warm and dry.  
Extremities are warm and well   
perfused and without acute   
deformity.  
Neurologically, the patient is   
awake, alert, and without acute   
deficit.  
Psychiatrically, the patient is   
calm and cooperative.  
  
ED COURSE & MEDICAL DECISION   
MAKING  
  
Laboratory evaluation shows   
bicarb low at 14. She continues   
to complain of nausea despite   
receiving Reglan, Benadryl, and   
Zofran. Her drug screen is   
positive for cannabinoids. I did   
address possible cannabis   
hyperemesis syndrome with the   
patient but she denies any   
regular use of cannabis and   
states it is only very   
occasional use. Given her   
continued complaint of   
intractable nausea, along with   
her acidosis, I did contact Dr. Apodaca who has agreed to admit the   
patient  
  
Impression: 1. Intractable   
nausea and vomiting 2. Metabolic   
acidosis  
  
Disposition: Admission  
  
Christiano Knight MD  
[id number]  
  
Electronically signed by Christiano Knight MD at 2024 6:26   
PM EST  
documented in this encounter            [facility]  
   
                                        2024 Nurse Note Formatting of this  
 note might be   
different from the original.  
Patient to room 2755 from ED and   
assisted into bed x 1 assist.   
Patient oriented to room and   
call light system. Admission   
assessment initiated. Family   
with patient and will assist her   
with shower per her request. Is   
asking for nausea medication and   
will call  to get order   
for something else as she has   
had all she can have at this   
time. SR up x 4 due to seizure   
precautions being maintained as   
patient has active seizure   
history, having one in ED as   
reported by staff.  
Electronically signed by   
Fadia Unger RN at   
2024 10:28 PM [facility]  
   
                                                    2024 Emergency   
department Note                         Formatting of this note might be   
different from the original.  
Pt wishes to keep bottoms on at   
this time.  
Electronically signed by Pilar Clifford RN at 2024   
7:59 PM [facility]  
   
                                                    2024 Emergency   
department Note                         Formatting of this note might be   
different from the original.  
Pt requesting pain medication at   
this time. Provider aware. Pt   
ambulates to  with steady   
gait.  
Electronically signed by Pilar Clifford RN at 2024   
6:36 PM [facility]  
   
                                                    2024 Physician   
Emergency department Note               Formatting of this note is   
different from the original.  
DEPARTMENT OF EMERGENCY MEDICINE  
  
CHIEF COMPLAINT  
Vomiting, Abdominal Pain (Pt dx   
with colitis , unable to keep   
medications down with zofran and   
phenergan suppository/Neg flu   
covid and pregnancy test), and   
Seizure (X 4 days/Waiting to see   
neuro for seizures/[facility]   
in [place] last night)  
  
  
GLENDA Moreno is a 23 y.o.   
female who presents with a   
complaint of nausea, vomiting,   
diarrhea, and abdominal pain.   
She has had symptoms for 4 days.   
She was seen twice yesterday at   
[facility]. She had laboratory   
testing and a CT scan of the   
abdomen and pelvis. The CT scan   
of the abdomen and pelvis showed   
possible early colitis. She was   
started on Augmentin. She was   
provided with Zofran and   
Phenergan. She states her throat   
is raw from vomiting. No fevers   
or chills. No cough. She also   
does complain of some   
intermittent seizures. This was   
worked up yesterday at   
[facility] as well. She has been   
referred to Neurology for   
follow-up.  
  
REVIEW OF SYSTEMS  
Review of Systems  
  
Constitutional: No fevers, no   
chills  
Eyes: No vision change, no   
drainage  
ENT: No ear pain, throat is raw   
from vomiting  
Cardiovascular: No chest pain,   
no edema  
Respiratory: No shortness of   
breath, no cough  
Gastrointestinal: Positive   
vomiting, Positive diarrhea  
Genitourinary: No dysuria, no   
frequency  
Musculoskeletal: No joint pain,   
no joint swelling  
Integumentary: No rash, no   
itching  
Neurologic: No headache, no   
confusion  
Psychiatric: No anxiety, no   
depression  
  
PAST MEDICAL HISTORY  
Past Medical History:  
Diagnosis Date  
Seizures  
  
  
SURGICAL HISTORY  
No past surgical history on   
file.  
  
CURRENT MEDICATIONS  
No current facility-administered   
medications for this encounter.  
  
No current outpatient   
medications on file.  
  
  
ALLERGIES  
Allergies  
Allergen Reactions  
Codeine  
Miralax [Polyethylene Glycol]  
  
  
FAMILY HISTORY  
History reviewed. No pertinent   
family history.  
  
SOCIAL HISTORY  
Social History  
  
Socioeconomic History  
Marital status: Not on file  
Spouse name: Not on file  
Number of children: Not on file  
Years of education: Not on file  
Highest education level: Not on   
file  
Occupational History  
Not on file  
Tobacco Use  
Smoking status: Never  
Smokeless tobacco: Never  
Vaping Use  
Vaping status: Never Used  
Substance and Sexual Activity  
Alcohol use: Never  
Drug use: Yes  
Types: Marijuana  
Sexual activity: Not on file  
Other Topics Concern  
Not on file  
Social History Narrative  
Not on file  
  
Social Determinants of Health  
  
Financial Resource Strain: Not   
on file  
Food Insecurity: Not on file  
Transportation Needs: Not on   
file  
Physical Activity: Not on file  
Stress: Not on file  
Social Connections: Not on file  
Intimate Partner Violence: Not   
on file  
Housing Stability: Not on file  
  
  
PHYSICAL EXAM  
/77   Pulse 80   Temp 99.4   
F (37.4 C) (Oral)   Resp 17   Ht   
1.499 m (4' 11 )   SpO2 100%     
Smoking Status Never  
Physical Exam  
  
The patient is well-developed,   
well-nourished, and in no acute   
distress.  
Head is atraumatic and   
normocephalic.  
Pupils are equal and reactive.  
Face is symmetric.  
Mucous membranes are moist.  
Neck is supple.  
Heart is regular rate and   
rhythm.  
Lungs are clear to auscultation.  
Abdomen is soft, nontender,   
nondistended.  
Skin is warm and dry.  
Extremities are warm and well   
perfused and without acute   
deformity.  
Neurologically, the patient is   
awake, alert, and without acute   
deficit.  
Psychiatrically, the patient is   
calm and cooperative.  
  
ED COURSE & MEDICAL DECISION   
MAKING  
  
Laboratory evaluation shows   
bicarb low at 14. She continues   
to complain of nausea despite   
receiving Reglan, Benadryl, and   
Zofran. Her drug screen is   
positive for cannabinoids. I did   
address possible cannabis   
hyperemesis syndrome with the   
patient but she denies any   
regular use of cannabis and   
states it is only very   
occasional use. Given her   
continued complaint of   
intractable nausea, along with   
her acidosis, I did contact Dr. Apodaca who has agreed to admit the   
patient  
  
Impression: 1. Intractable   
nausea and vomiting 2. Metabolic   
acidosis  
  
Disposition: Admission  
  
Christiano Knight MD  
[id number]  
  
Electronically signed by Christiano Knight MD at 2024 6:26   
PM [facility]  
Work Phone:   
[phone]  
   
                                        2022 Note     Send Summary:  
Discharge Summary Providers:  
Provider RoleProvider Name  
Mitra Santos  
AttendingMitra Camara  
Nurse PractitionerHarAlyssa duggan  
PrimaryRequired, No Pcp  
  
  
Note Recipients: Required, No   
Pcp, MD  
  
  
Discharge:  
  
Summary:  
Admission Date: .10-Mar-2022   
10:33:00  
Discharge Date: 12-Mar-2022  
Attending Physician at   
Discharge: Mitra Camara  
Admission Reason: influenza A   
severe dehydration(1)  
Final Discharge Diagnoses:   
Cannabinoid hyperemesis   
syndrome, Intractable nausea  
and vomiting  
Procedures: none  
Condition at Discharge:   
Satisfactory  
Disposition at Discharge: .Home  
Vital Signs:  
  
T PRBPSpO2  
[id number]/8198%  
Date/Time3/12 7: 7:   
7: 7: 7:46  
Range(36.6C - 36.8C ) (80 - 94 )   
(16 - 17 ) (112 - 117 )/ (72 -   
81 ) (96%  
- 99% )  
  
Date: Weight/Scale Type:Height:  
10-Mar-2022 16:4347 kg /   
kvo441.8 cm  
Physical Exam:  
Constitutional: Thin  
Eyes: PERRL, EOMI, clear sclera  
ENMT: mucous membranes moist, no   
apparent injury, no lesions seen  
Head/Neck: Neck supple, no   
apparent injury, No JVD, trachea   
midline,  
Respiratory/Thorax: Patent   
airways, CTAB, normal breath   
sounds with good chest  
expansion, thorax symmetric  
Cardiovascular: Regular, rate   
and rhythm, no murmurs, 2+ equal   
pulses of the  
extremities, normal S 1and S 2  
Gastrointestinal: Nondistended,   
soft, non-tender, no rebound   
tenderness or  
guarding, no masses palpable, no   
organomegaly, +BS, no bruits  
Musculoskeletal: ROM intact, no   
joint swelling, normal strength  
Extremities: normal extremities,   
no cyanosis edema, contusions or   
wounds, no  
clubbing  
Neurological: alert and oriented   
x3, intact senses, motor,   
response and  
reflexes, normal strength  
Psychological: Appropriate mood   
and behavior  
Skin: Warm and dry, no lesions,   
no rashes  
  
Hospital Course:  
HPI:  
BACILIO MORENO is a 21   
year old Female with pmh of   
hyperemesis  
syndrome secondary to cannanboid   
use, major depression disorder   
who had  
hospitalization in 2022   
for similar presentation as   
today. She has 3  
emergency room visits in the   
past few days with today being   
the 3rd visit. She  
was seen in another ED on the   
 and our ED on the  she   
had n/v/diarrhea  
and abdominal pain. She has been   
having diarrhea nausea and   
vomiting for 3  
days. On the  she developed   
abdominal pain that hurts with   
movement and on  
inspiration. CT abdomen pelvis   
negative and did not reveal   
acute pathology. She  
presented today not feeling any   
better. she was treated   
yesterday with  
phenergan and bentyl. She was   
diagnosed with influenza A and   
dc to home with  
tamiflu, bentyl and phenergan.   
On presentation today she   
continued to vomit was  
unable to keep down any liquids   
or her nausea medicine so she   
presented to  
[facility]   
emergency department for further   
evaluation. She has  
had decrease in urination. She   
denies any recent fevers. She is   
currently on  
her menses with a negative   
pregnancy test 2 days ago. She   
had low-grade  
temperature 99.3, blood pressure   
134/99 heart rate 82   
respirations 18 pulse  
oxygen saturation 98% on room   
air. Her laboratory values   
showed a bicarbonate  
of 14 and yesterday was 12,   
normal creatinine, potassium 3.5   
sodium 135 and  
glucose 93. Urinalysis with   
ketones 80 and a large amount of   
blood white cells  
2, 1+ mucus. Urine toxicology   
screen showed cannabinoid   
positive. She states  
that she last smoked marijuana a   
week ago. In the emergency   
department she was  
treated with 2 L of normal   
saline and is being admitted for   
further evaluation.  
  
Past medical history: Major   
depressive disorder, hyperemesis   
syndrome secondary  
to cannabinoid  
Past surgical history:   
Appendectomy, right index finger   
surgery, ORIF of right  
wrist, tonsillectomy and   
adenoidectomy  
Family history: Father-diabetes  
Social history: Patient is   
single, she is cannabinoid   
dependent, denies  
nicotine or alcohol use.  
  
Allergies: Codeine MiraLAX  
Medications: Reviewed and   
reconciled  
  
Hospital course: Patient was   
started on Tamiflu on 3/9 which   
is 4/5 day  
treatment. She will continue 1   
day on discharge. She was given   
IV fluids and  
electrolytes replaced   
accordingly. Nausea/vomiting and   
abdominal pain likely  
related to hyperemesis syndrome   
secondary to cannabinoid use.   
She was seen by  
critical care Dr. Martinez for   
normal anion gap metabolic   
acidosis which resolved.  
She has very low osmolar state   
and recommends a high protein   
diet on discharge.  
She indicates today she feels   
better than she has and requests   
to go home. She  
was counseled on marijuana   
cessation at time of discharge.   
She has not had any  
nausea or vomiting and has been   
able to tolerate her diet.   
Denies any abdominal  
pain.  
  
  
Discharge Information:  
  
and Continuing Care:  
Lab Results - Pending:  
None  
Radiology Results - Pending:   
None  
Discharge Instructions:  
Nutrition/Diet:  
regular, high protein  
  
Labs:  
Lab Test(s): Basic Metabolic   
Panel  
Date To Be Drawn: 3/14  
Call (more content not   
included)...                            [facility]  
   
                                        03- Note     History of Present I  
llness:  
Pregnant/Lactating:  
Are You Pregnantno (1)  
Are You Currently   
Breastfeedingno (1)  
  
Admission Reason: influenza A   
severe dehydration  
HPI:  
BACILIO MORENO is a 21   
year old Female with pmh of   
hyperemesis  
syndrome secondary to cannanboid   
use, major depression disorder   
who had  
hospitalization in 2022   
for similar presentation as   
today. She has 3  
emergency room visits in the   
past few days with today being   
the 3rd visit. She  
was seen in another ED on the   
 and our ED on the  she   
had n/v/diarrhea  
and abdominal pain. She has been   
having diarrhea nausea and   
vomiting for 3  
days. On the  she developed   
abdominal pain that hurts with   
movement and on  
inspiration. CT abdomen pelvis   
negative and did not reveal   
acute pathology. She  
presented today not feeling any   
better. she was treated   
yesterday with  
phenergan and bentyl. She was   
diagnosed with influenza A and   
dc to home with  
tamiflu, bentyl and phenergan.   
On presentation today she   
continued to vomit was  
unable to keep down any liquids   
or her nausea medicine so she   
presented to  
[facility]   
emergency department for further   
evaluation. She has  
had decrease in urination. She   
denies any recent fevers. She is   
currently on  
her menses with a negative   
pregnancy test 2 days ago. She   
had low-grade  
temperature 99.3, blood pressure   
134/99 heart rate 82   
respirations 18 pulse  
oxygen saturation 98% on room   
air. Her laboratory values   
showed a bicarbonate  
of 14 and yesterday was 12,   
normal creatinine, potassium 3.5   
sodium 135 and  
glucose 93. Urinalysis with   
ketones 80 and a large amount of   
blood white cells  
2, 1+ mucus. Urine toxicology   
screen showed cannabinoid   
positive. She states  
that she last smoked marijuana a   
week ago. In the emergency   
department she was  
treated with 2 L of normal   
saline and is being admitted for   
further evaluation.  
  
Past medical history: Major   
depressive disorder, hyperemesis   
syndrome secondary  
to cannabinoid  
Past surgical history:   
Appendectomy, right index finger   
surgery, ORIF of right  
wrist, tonsillectomy and   
adenoidectomy  
Family history: Father-diabetes  
Social history: Patient is   
single, she is cannabinoid   
dependent, denies  
nicotine or alcohol use.  
  
Allergies: Codeine MiraLAX  
Medications: Reviewed and   
reconciled  
  
Review of systems: 12 point   
review systems reviewed with   
patient with pertinent  
positives listed in HPI   
otherwise review systems   
negative  
  
Social History:  
Social History:  
Smoking Statusnever smoker (2)  
Alcohol Usedenies(2)  
Drug Useoccasionally (3)  
  
  
  
Allergies:  
MiraLax: Hives/Urticaria  
codeine: Unknown  
  
Medications Prior to Admission:  
Admission Medication   
Reconciliation has not been   
completed for this patient.  
  
Objective:  
  
Objective Information:  
T PRBPSpO2  
[id number]/8698%  
Date/Time3/10 10:413/10   
16:003/10 16:003/10 16:003/10   
16:00  
Range(37.3C - 37.3C ) (72 - 100   
) (16 - 18 ) (112 - 136 )/ (68 -   
103 )  
(97% - 98% )  
Highest temp of 37.3 C was   
recorded at 3/10 10:41  
  
  
Pain reported at 3/10 14:00:   
unable to assess; sleeping  
  
  
  
Weights  
3/10 10:41: Weight in lbs   
((lbs)) 100.3  
3/10 10:41: Weight in kg (Weight   
(kg)) 45.5  
3/10 10:41: BMI (kg/m2) (BMI   
(kg/m2)) 20.276  
  
Physical Exam by System:  
  
Constitutional: Ill-appearing,   
pale  
Eyes: PERRL, EOMI, clear sclera  
ENMT: mucous membranes moist, no   
apparent injury, no lesions seen  
Head/Neck: Neck supple, no   
apparent injury,No JVD, trachea   
midline,  
Respiratory/Thorax: Patent   
airways, CTAB, normal breath   
sounds with good chest  
expansion, thorax symmetric  
Cardiovascular: Regular, rate   
and rhythm, no murmurs, 2+ equal   
pulses of the  
extremities, normal S 1and S 2  
Gastrointestinal: Nondistended,   
soft, non-tender, no rebound   
tenderness or  
guarding, no masses palpable, no   
organomegaly, +BS, no bruits  
Musculoskeletal: ROM intact, no   
joint swelling, normal strength  
Extremities: normal extremities,   
no cyanosis edema, contusions or   
wounds, no  
clubbing  
Neurological: alert and oriented   
x3, intact senses, motor,   
response and  
reflexes, normal strength  
Psychological: Appropriate mood   
and behavior  
Skin: Warm and dry, no lesions,   
no rashes  
  
Medications:  
  
Medications:  
  
Continuous Medications  
--------------------------------  
  
1. Lactated Ringers Infusion:   
1000 mL IntraVenous  
  
Scheduled Medications  
--------------------------------  
  
1. LORazepam Injectable: 1 mg   
IntraVenous Push Every 6 Hours  
2. Oseltamivir: 75 mg Oral Every   
12 Hours  
3. Pantoprazole Injectable: 40   
mg IntraVenous Push Every 12   
Hours  
  
PRN Medications  
--------------------------------  
  
1. Acetaminophen: 650 mg Oral   
Every 4 Hours  
2. Promethazine IV Piggy Back:   
12.5 mg IntraVenous Piggyback   
Every 6 Hours  
  
  
  
Recent Lab Results:  
  
Results:  
  
  
  
I have reviewed these laboratory   
results:  
  
Coronavirus 2019, Screen   
Asymptomatic 10-Mar-2022   
15:28:40  
  
ResultValue  
Fluid Source Nasal,   
Nasopharyngeal (more content not   
included)...                            [facility]  
   
                                        2022 Note     Send Summary:  
Discharge Summary Providers:  
Provider RoleProvider Name  
ReferringIvan Mccormick Alexander M ConsultingThomae, Dale R  
PrimaryRequired, No Pcp  
  
  
Note Recipients: Ivan Mccormick MD  
  
  
Discharge:  
  
Summary:  
Admission Date: .2022   
22:59:00  
Discharge Date: 2022  
Attending Physician at   
Discharge: Nicolas Connolly  
Admission Reason: nausea and   
vomiting(1)  
Final Discharge Diagnoses:   
Intractable nausea and vomiting  
Procedures: none  
Condition at Discharge:   
Satisfactory  
Disposition at Discharge: .Home  
Vital Signs:  
  
T PRBPSpO2  
[id number]/9299%  
Date/Time 8: 8:   
8: 8: 8:20  
Range(36.2C - 37.1C ) (89 - 100   
) (16 - 18 ) (118 - 124 )/ (79 -   
98 )  
(97% - 100% )  
Highest temp of 37.1 C was   
recorded at  4:08  
  
Date: Weight/Scale Type:Height:  
2022 05:1651.3 kg /   
iiz406.8 cm  
Physical Exam:  
Constitutional: No acute   
distress  
HEENT: Moist oral mucosa  
Respiratory/thorax: Lung sounds   
clear throughout the lung fields   
even chest  
expansion  
CV: S1-S2 regular rate and   
rhythm  
GI: Soft nontender bowel sounds   
present x4  
Extremity: No peripheral edema  
Neuro: Alert and oriented x3 no   
focal deficit  
Hospital Course:  
21-year-old female who was   
diagnosed at [facility] with   
infectious colitis was  
prescribed ciprofloxacin and   
Flagyl had a 7-day course of   
intractable nausea  
vomiting, who Has cannabinoid   
use was admitted to the hospital   
secondary to  
severe dehydration, hypokalemia   
and hypomagnesemia. Patient was   
trialed on  
multiple different antiemetics   
with no relief in her nausea   
vomiting. After  
she was appropriately fluid   
resuscitated nausea vomiting did   
improve, we tried  
a diet and she immediately threw   
up. She was started on Reglan   
and still threw  
up after the Reglan. She was   
complaining of left upper   
quadrant abdominal pain  
and secondary to the nausea   
vomiting I started her on   
Carafate for concern for  
gastritis. She had 2 CT abdomen   
pelvis is this admission 1 in   
the emergency  
room and one recommended by Dr. Gallagher which did not reveal acute   
pathology.  
She had a right upper quadrant   
ultrasound which was   
unremarkable. She was  
started on IV Protonix twice a   
day as she had epigastric pain,   
acid reflux and  
pain with swallowing and concern   
for persistent nausea vomiting   
that she  
possibly also had esophagitis.   
Patient did not undergo   
endoscopic as we  
clinically were treating her   
symptoms. She improved with   
adding Carafate and  
was able to eat with no   
vomiting. She was instructed to   
stop cannabinoid and  
she is in agreement with the   
plan. She has been instructed to   
follow with her  
primary care physician. She had   
2 CT abdomen pelvis's which did   
not show  
infectious colitis so I   
discontinued antibiotics at time   
of discharge. She  
remained afebrile and   
hemodynamically stable. She has   
been prescribed a  
gastric emptying study that will   
be performed on Monday as   
outpatient. Her  
electrolytes have been   
corrected, her potassium is 3.4   
magnesium 2.00. She  
will have an additional dose of   
potassium prior to discharge of   
the hospital.  
Should her potassium will need   
to be followed as outpatient and   
she has been  
instructed to eat potassium rich   
foods and she loves spinach   
which will also  
help her magnesium. She is being   
discharged to home in stable   
condition.  
Discharge time greater than 30   
minutes.  
  
  
Discharge Information:  
  
and Continuing Care:  
Lab Results - Pending:  
None  
Radiology Results - Pending:   
None  
Discharge Instructions:  
Activity:  
activity as tolerated.  
Balance activity with rest,   
gradually increase your activity   
as  
tolerated  
Exercise as prescribed by your   
physician  
  
Nutrition/Diet:  
low cholesterol, low fat  
  
Follow Up Appointments:  
Follow-Up Appointment 01:  
Physician/Dept/Service: PCP  
Call to Schedule in: 1 week  
  
Follow-Up Appointment 02:  
Physician/Dept/Service: Dr Mathis  
Call to Schedule in: 2 weeks  
  
Discharge Medications: Home   
Medication  
Protonix 40 mg oral delayed   
release tablet - 1 tab(s) orally   
2 times a day  
sucralfate 1 g oral tablet - 1   
tab(s) orally 4 times a day   
-.Meds to Beds - 4  
Times a Day Before Meals  
  
PRN Medication  
Zofran 8 mg oral tablet - 1   
tab(s) orally 3 times, As Needed   
-for nausea and  
vomiting  
  
DNR Status:  
Code StatusCode Status order at   
time of discharge: Full Code  
  
  
Electronic Signatures:  
Becky Oseguera (APRN-CNS) (Signed   
2022 11:06)  
Authored: Send Summary, Summary   
Content, Ongoing Care, DNR   
Status, Note  
Completion  
  
  
Last Updated: 2022 11:06   
by Becky Oseguera (APRN-CNS)  
  
References:  
1. Data Referenced From  Daily   
Progress Note-General Internal   
Medicine   
2022 13:35                       [facility]  
   
                                        2022 Note     History of Present I  
llness:  
Pregnant/Lactating:  
Are You Pregnantno (1)  
Are You Currently   
Breastfeedingno (1)  
  
Admission Reason: Nausea,   
vomiting and abdominal pain  
HPI:  
BACILIO MORENO is a 21   
year old Female  
  
This is a 21-year-old white   
female who was recently   
diagnosed 8 days ago with  
colitis. She was at [facility] on [address] and a CT scan was done   
showing  
colitis. She was transferred to   
[facility]. She states she was   
admitted  
there for 2 days on antibiotics   
and discharged home on   
antibiotics. Since  
being discharged she has been   
having abdominal pain especially   
left side along  
with intractable nausea and   
vomiting. She says she cannot   
keep her antibiotics  
down and she was sent home with   
and she reports feeling hot and   
sweaty along  
with chills. She had diarrhea   
once a day for the last 2 days.   
Because of the  
pain and nausea and vomiting,   
she came into the ER to be   
evaluated. No sick  
contacts at home or in general   
and no change in diets.  
  
In the ER her blood pressure is   
125/95 with a pulse of 88 and a   
respiratory  
rate of 18. She is satting 98%   
on room air she has a   
temperature of 37.7. Her  
white count is normal at 8.1   
with no shift and her H&H are   
normal at 13.4 and  
40.2. Platelets of 272,000. She   
is COVID-19 negative. Her CMP is   
remarkable  
only for a potassium of 3.1.   
Urine pregnancy test is   
negative. And urinalysis  
shows 2+ protein with 3+ blood   
and 2+ ketones and greater than   
182 RBCs and 23  
WBCs. CT of the head without   
contrast is nonacute.  
  
In the ER, she was given oral   
Bentyl along with 4 mg of IV   
Zofran and started  
on IV fluids. She was given 40   
mg of p.o. potassium. Then she   
was given 50 mg  
of IV Toradol and then 12.5 mg   
IV piggyback Phenergan. She was   
then given 20  
mg of IM dicyclomine and then   
started on 10 mg of IV Reglan   
followed by 2 doses  
of 2 mg IV push Haldol. She is   
admitted to the medical service   
with working  
diagnosis of intractable nausea   
and vomiting.  
  
  
PAST MEDICAL HISTORY  
see above  
  
PAST SURGICAL HISTORY  
1. Appendectomy 8 6  
2. Surgery for her right index   
finger  
3. ORIF right wrist  
4. Tonsillectomy and   
adenoidectomy  
  
FAMILY HISTORY  
Mother is alive and well  
Father is alive with diabetes  
8 siblings alive and well  
1 daughter alive and well  
  
SOCIAL HISTORY  
Patient is single  
No tobacco or alcohol  
She denies any drug abuse   
history although she said she   
did do marijuana on New  
Year's  
  
Social History:  
Social History:  
Smoking Statusnever smoker (1)  
Alcohol Usedenies(1)  
Drug Usedenies (1)  
  
  
  
Allergies:  
MiraLax: Hives/Urticaria  
codeine: Unknown  
  
Medications Prior to Admission:  
  
promethazine 25 mg oral tablet:   
1 tab(s) orally 3 times a day,   
As Needed -for  
nausea and vomiting  
Phenadoz 25 mg rectal   
suppository: 1 suppository(ies)   
rectally 3 times a day,  
As Needed -for nausea and   
vomiting.  
  
Review of Systems:  
Constitutional: POSITIVE: Fever,   
Chills  
  
Eyes: NEGATIVE: Blurry Vision,   
Drainage, Diploplia, Redness,   
Vision Loss/  
Change  
  
ENMT: NEGATIVE: Nasal Discharge,   
Nasal Congestion, Ear Pain,   
Mouth Pain, Throat  
Pain  
  
Respiratory: NEGATIVE: Dry   
Cough, Productive Cough,   
Hemoptysis, Wheezing,  
Shortness of Breath  
  
Cardiac: NEGATIVE: Chest Pain,   
Dyspnea on Exertion, Orthopnea,   
Palpitations,  
Syncope  
  
Gastrointestinal: POSITIVE:   
Nausea, Vomiting, Diarrhea,   
Abdominal Pain;  
NEGATIVE: Constipation  
  
Genitourinary: NEGATIVE:   
Discharge, Dysuria, Flank Pain,   
Frequency, Hematuria  
  
Musculoskeletal: NEGATIVE:   
Decreased ROM, Pain, Swelling,   
Stiffness, Weakness  
  
Neurological: NEGATIVE:   
Dizziness, Confusion, Headache,   
Syncope  
  
Psychiatric: NEGATIVE: Mood   
Changes, Anxiety  
  
  
Objective:  
  
Objective Information:  
T PRBPSpO2  
[id number]/7697%  
Date/Time 7: 7:   
7: 7: 7:30  
Range(37.7C - 37.8C ) (84 - 94 )   
(16 - 23 ) (111 - 134 )/ (76 -   
95 ) (96%  
- 100% )  
Highest temp of 37.8 C was   
recorded at  7:30  
  
  
Pain reported at  4:00: 4 =   
Moderate  
  
Physical Exam by System:  
  
Constitutional: Awake and alert;   
oriented x3 with no apparent   
distress or  
respiratory distress  
Eyes: PERRL, EOMI, clear sclera  
ENMT: mucous membranes moist,   
oropharynx clear  
Head/Neck: Normocephalic; neck   
supple, no apparent injury,   
thyroid without mass  
or tenderness, No JVD, trachea   
midline, no bruits  
Respiratory/Thorax: Clear to   
auscultation bilaterally no   
wheezes or rhonchi  
noted  
Cardiovascular: Regular rate and   
rhythm; normal S1-S2 with no   
murmur; no  
pitting edema and 2+ pulses   
bilaterally  
Gastrointestinal: Soft,   
nondistended, positive bowel   
sounds; there is some mild  
tenderness with minimal   
palpation left lower quadrant   
with some guarding but no  
rebound  
Neurological: Nonfocal, intact   
senses, motor, response and   
reflexes, normal  
strength; cranial nerves II   
through XII appear intact  
Psychological: Pleasant affect  
  
Recent Lab Results:  
  
Results:  
  
  
I have reviewed these laboratory   
results (more content not   
included)...                            [facility]  
   
                                        Evaluation note     Psychological: Appro  
priate mood   
and behaviorExtremities: normal   
extremities, no cyanosis edema,   
contusions or wounds, no   
clubbingNeurological: alert and   
oriented x3, intact senses,   
motor, response and reflexes,   
normal strengthSkin: Warm and   
dry, no lesions, no   
rashesRespiratory/Thorax: Patent   
airways, CTAB, normal breath   
sounds with good chest   
expansion, thorax symmetricENMT:   
mucous membranes moist, no   
apparent injury, no lesions   
seenCardiovascular: Regular,   
rate and rhythm, no murmurs, 2+   
equal pulses of the extremities,   
normal S 1and S   
2Gastrointestinal: Nondistended,   
soft, non-tender, no rebound   
tenderness or guarding, no   
masses palpable, no   
organomegaly, +BS, no   
bruitsMusculoskeletal: ROM   
intact, no joint swelling,   
normal strengthEyes: PERRL,   
EOMI, clear   
scleraConstitutional: Well   
developed, awake/alert/oriented   
x3, no distress, alert and   
cooperative                             [facility]  
   
                                        Evaluation note     Skin: Warm and dryEy  
es:   
Extraocular muscles intactENMT:   
Moist mucous   
membranesPsychological:   
CooperativeNeurological: Awake,   
alert, orientedCardiovascular:   
Regular rate and   
rhythmExtremities: No peripheral   
edemaHead/Neck: Normocephalic,   
atraumaticRespiratory/Thorax:   
Bilateral equal breath   
soundsGastrointestinal: Soft,   
nontender, nondistended,   
positive bowel   
soundsMusculoskeletal: Moving   
all extremitiesConstitutional:   
Awake, alert, oriented, no acute   
distress                                [facility]  
  
  
  
                                                    Evaluation note   
  
  
  
                                                    Diagnosis  
   
                                                      
  
  
Intractable nausea and vomiting- Primary  
  
  
Persistent vomiting  
   
                                                      
  
  
Colitis  
  
  
Other and unspecified noninfectious gastroenteritis and colitis  
   
                                                      
  
  
Diarrhea of presumed infectious origin  
   
                                                      
  
  
Smoker  
  
  
Tobacco use disorder  
   
                                                      
  
  
Metabolic acidosis  
  
  
Acidosis  
  
documented in this encounter  
[facility]   
  
                                                    Diagnosis  
   
                                                      
  
  
Nausea and vomiting, unspecified vomiting type- Primary  
  
documented in this encounter  
[facility]   
  
                                                    Diagnosis  
   
                                                      
  
  
Nausea and vomiting, unspecified vomiting type- Primary  
  
documented in this encounter  
[facility]  
Work Phone: [phone] note*   
  
                                                    Diagnosis  
   
                                                      
  
  
Dehydration- Primary  
   
                                                      
  
  
Hyperemesis gravidarum  
  
  
Mild hyperemesis gravidarum, unspecified as to episode of care  
   
                                                      
  
  
Acute cystitis without hematuria  
  
  
Acute cystitis  
  
documented in this encounter  
[facility] Activity:activity as 
  tolerated.  
* Call Provider If:Any new concerning symptoms.  
* Patient Instructions:- CALL 911 IF YOU HAVE ANY OF THE SIGNS AND SYMPTOMS OF 
  HEART FAILURE: 1. Chest pain 2. Significant Shortness of breath 3. Fainting. -
  Notify your physician immediately if you have shortness of breath; weight gain
  of 3 lbs. or more; fatigue and loss of energy; swelling of lowerextremities or
  abdomen; dizziness or fainting; change of appetite; and frequent coughing. - 
  Patientreceived Living With Heart Failure book. - Daily weight on the same 
  scale, same time after voiding and before eating. - Maintain daily weight log.  
* Activity (Heart Failure):- Balance activity with rest, gradually increase your
  activity as tolerated. - Exercise as prescribed by your physician.  
* Patient Instructions:- CALL 911 OR GO DIRECTLY TO THE EMERGENCY ROOM IF YOU 
  HAVE ANY OF THE SIGNS AND SYMPTOMS OF STROKE: 1. Sudden weakness or numbness 
  of the face, arm or leg, especially on one side of the body. 2. Sudden 
  difficulty speaking or understanding. 3. Sudden trouble seeing in one or both 
  eyes. 4. Sudden trouble walking, dizziness, loss of balance or coordination. 
  5. Sudden severe headache with no known cause. 6. Loss of consciousness or 
  decreased consciousness, fainting, or seizures. - Know the Risk Factors for 
  Stroke: high blood pressure, high cholesterol, diabetes, smoking, physical 
  inactivity, overweight, previous stroke or TIA, heart disease, atrial 
  fibrillation. - Always carry a medication list with you and take it to ALL 
  Healthcare Provider visits. - You may be contacted by a Hospital 
  Representative after discharge to evaluate your progress at home and to 
  discuss yourexperience at [facility].  
* Hospital Specific Instructions - [facility]:- For questions/problems/concerns call 
  the Discharge Physician at [phone] and have them paged.  
* Follow Up Appointment 1:Physician/Dept/Service: Camila to Schedule in: 1 week  
* Follow Up Appointment 2:Physician/Dept/Service: Dr Valverde for Referral: 
  Hospital Follow-upLocation: [address] [address]Phone Number: 
  [phone]  
* Gold Form - Other Clinicians:Other Clinician Instructions: Please provide list
  of potassium rich inmagnesium rich foods. Spinach and broccoli is an excellent
  source. Please eat regularly to maintainnatural source of potassium and 
  magnesium.  
[facility] Discharge instructions* Labs 1 (Modify 
  Template):Lab Test(s): Basic Metabolic PanelDate To Be Drawn: 3/14Call Results
  To: PCPFax Results To: PCPLab Instructions: Walk-in lab, no appointment 
  required.Comments: monitor potassium  
* Additional Orders:Additional Instructions: Follow up with PCP within 1 week of
  dischargeContinue Tamiflu for one more day (tomorrow) on dischargeContinue 
  home medicationsNo new medications at time ofdischargeHigh protein regular 
  diet  
* Call Provider If:Breathing faster than normal. Breathing harder than normal or
  having retractions. Fever of 100.4 F (38 C) or higher. Temperature is greater 
  than 102 degrees. Chills. Drinking less than normal. Not being able to go 4-6 
  hours between albuterol treatments. Urinating less than normal, over 1 day. 
  Urinating less than 4 times per day. Acting very sleepy and difficult to 
  awaken. Vomiting (throwing up) and not able to eat or drink for 12 hours. 3 or
  more loose, watery bowel movements in 24 hours (diarrhea). Any new concerning 
  symptoms.  
* Follow Up Appointment 1:Physician/Dept/Service: PCPRepalmira for Referral: follow
  up post dischargeCall to Schedule in: 1 weekComments: You were given a list of
  primary care physicians please call one and get established  
[facility] Discharge instructions* Attachments  
  
The following attachments cannot be sent through Care Everywhere.  
  
* Dehydration (English)  
* Hyperemesis Gravidarum (HG) ([facility]) (English)  
* Urinary Tract Infections (UTIs) in Women ([facility]) (English)  
  
documented in this encounterW[facility] CenterReason for referral (narrative)* 
  Unlisted Procedure Code (Routine) - New Request  
  
                          Specialty    Diagnoses / Procedures Referred By Contac  
t Referred To Contact  
   
                                                              
  
  
Procedures  
  
  
INPATIENT ADMISSION   
NOTIFICATION                              
  
  
Juancho Apodaca MD  
  
  
[address] [place] [address]  
  
  
Phone: [phone]  
  
  
Fax: [phone]                         
  
  
  
  
  
                    Referral ID Status    Reason    Start Date Expiration Date V  
isits   
Requested                               Visits   
Authorized  
   
                [id number] New Request         2024 3/7/2025 1       1  
  
  
  
  
Electronically signed by Juancho Apodaca MD at 2024 6:29 PM [facility]  
  
Summary Purpose  
  
  
                                                      
  
  
  
Family History  
No Family History Records FoundNo Family History Records FoundNo Family History 
Records FoundNo Family History Records FoundNo Family History Records FoundNo 
Family History Records FoundNo Family History Records FoundNo Family History 
Records FoundNo Family History Records FoundNo Family History Records FoundNo 
Family History Records Found  
  
Advance Directives  
No Advanced Directives Records FoundDocuments on File  
  
                          Type         Date Recorded Patient Representative Expl  
anation  
   
                                                    Advance Directives and Livin  
g   
Will                2020 6:29 PM                         
  
                                Documents on File  
  
                          Type         Date Recorded Patient Representative Expl  
anation  
   
                                                    Advance Directives and Livin  
g   
Will                2020 6:29 PM                         
   
                                                    Advance Directives and Livin  
g   
Will                2021 8:35 AM                          
  
                           Latest Code Status on File  
  
                          Code Status  Date Activated Date Inactivated Comments  
   
                          Full Code    2024 7:39 PM                
  
  
  
                                Date Activated  Date Inactivated Comments  
   
                                2024 7:39 PM                   
  
  
  
Discharge Instructions  
* Instructions*   
  
Leo Turner MD - 2021  
  
  
  
If unable to follow-up with the physician/clinic recommended above, please see 
an Urgent Care Clinic for re-evaluation within the same number of days.  
Return to the nearest emergency department at any time if there is:  
any new, returning or worsening symptoms  
new or changing rash  
fever > 100.4 (or feeling like there is a high fever if you don't have a 
thermometer)  
uncontrollable shaking chills  
difficulty following up as recommended  
or any other concerns about your condition or treatment.  
  
best regards,  
Leo Turner MD  
  
  
  
  
  
* Attachments  
  
The following attachments cannot be sent through Care Everywhere.  
  
* Postpartum Breast Care: Non-Breastfeeding (English)  
* Rash (English)  
  
documented in this encounter  
  
Assessments  
  
  
                                                    Diagnosis  
   
                                                      
  
  
Postpartum breast pain- Primary  
  
  
Other and unspecified disorder of breast associated with childbirth, postpartum   
condition or complication  
   
                                                      
  
  
Postpartum bloody nipple discharge  
   
                                                      
  
  
Rash  
  
  
Rash and other nonspecific skin eruption  
  
  
  
Additional Source Comments  
  
  
  
                                                    INFORMATION SOURCE (unrecogn  
ized section and content)  
   
                                          
  
  
  
                                        DATE CREATED        AUTHOR  
   
                                2018                      [facility]  
  
  
  
                                DATE CREATED    AUTHOR          AUTHOR'S ORGANIZ  
ATION  
   
                                2019                      [facility]  
  
  
  
                                DATE CREATED    AUTHOR          AUTHOR'S ORGANIZ  
ATION  
   
                                2020                       [employer]  
  
  
  
                                DATE CREATED    AUTHOR          AUTHOR'S ORGANIZ  
ATION  
   
                                2022                      [facility]  
[facility]  
  
  
  
                                DATE CREATED    AUTHOR          AUTHOR'S ORGANIZ  
ATION  
   
                                02/15/2024                      [facility]  
  
  
  
                                DATE CREATED    AUTHOR          AUTHOR'S ORGANIZ  
ATION  
   
                                2024                      [facility]   
[facility]  
  
  
  
                                DATE CREATED    AUTHOR          AUTHOR'S ORGANIZ  
ATION  
   
                                2024                      [facility]  
  
  
  
                                DATE CREATED    AUTHOR          AUTHOR'S ORGANIZ  
ATION  
   
                                2024                      [facility]  
spital  
  
  
  
                                DATE CREATED    AUTHOR          AUTHOR'S ORGANIZ  
ATION  
   
                                2024                      [facility]  
al  
  
  
  
                                DATE CREATED    AUTHOR          AUTHOR'S ORGANIZ  
ATION  
   
                                2024                      [facility]  
nter  
  
  
  
                                DATE CREATED    AUTHOR          AUTHOR'S ORGANIZ  
ATION  
   
                                2024                      [facility]  
  
  
  
  
  
                                                    Reason for Visit (unrecogniz  
ed section and content)  
   
                                          
  
  
  
                                        Reason              Comments  
   
                                        Emesis                
   
                                        Nausea                
  
  
  
                                        Reason              Comments  
   
                                        Breast Pain           
  
  
  
                                        Reason              Comments  
   
                                        Vomiting              
   
                                        Abdominal Pain      Pt dx with colitis ,  
 unable to keep medications down with zofran   
and   
phenergan suppositoryNeg flu covid and pregnancy test  
   
                                        Seizure             X [facility] [place] last   
night  
  
  
  
                                        Reason              Comments  
   
                                        Seizures            Family reports  julio  
hs  of seizures without medications. Drove 1 hour   
to get   
here today with two seizures in the car. Endorses nausea and vomiting.  
  
  
  
                                        Reason              Comments  
   
                                        Abdominal Pain      Pt comes in for gene  
ralized abdominal pain x today. Pt was seen   
recently for a similar event. Pt states she was doing well for about   
1wk and then ate [employer] last pm, then woke up this am with vomiting.   
Pt took some reglan, but it increased the pain.  
  
  
  
                                        Reason              Comments  
   
                                        Abdominal Pain      Found patient in ED   
waiting room on her knees with her face in   
her   
boyfriends lap. He sates   she did this because she did not know how   
long the wait was and she was trying to handle the pain . I had   
patient sit back in wheel chair an she was taken to room. Per family   
patient had a seizure before her abdominal cramping started. The   
seizure lasted 4 minutes and patient was   just stiff  per family.   
Patient present to us with abdominal cramping that radiates into back   
with clear discharge that went throug  
  
  
  
    Jordan, Ashlee, RN - 2020 6:44 PM Ashlee Diaz RN -   
2020 6:34 PM Ashlee Daiz RN - 2020 5:16 PM Tish Kaye RN - 2021 8:47 AM EDT  
  
                                                    ED Notes (unrecognized secti  
on and content)  
   
                                                      
  
  
THIS RN SEES PT WALKING QUICKLY DOWN HALLWAY TO EMS EXIT. PT STOPPED BY THIS RN 
D/T   
PT STILL HAVING IV IN. WHEN ASKED WHERE SHE WAS GOING PT STATES  MY MOM CALLED 
AND WE   
HAVE TO TAKE MY SISTER TO THE CRISIS UNIT.  THIS RN STATES THAT SHE CANNOT LEAVE
WITH   
AN IV IN. PT AGREES TO COME BACK AND REMOVE IV. WHEN THIS RN WAS GETTING 
SUPPLIES TO   
TAKE IV OUT, PT RIPS IV OUT OF LEFT AC AND STARTS TO BLEED DOWN ARM AND ON 
FLOOR.   
THIS RN SECURES 2X2 WITH TAPE ON IV SITE. BLEEDING CONTROLLED. PT CONTINUES TO 
WALK   
OUT AFTER IV IS REMOVED. DR MARTINEZ AWARE. PT GCS 15 AND GAIT STEADY WITH AMBULATE
OUT   
OF ER.  
  
  
Electronically signed by Ashlee Ortega RN at 2020 6:48 PM EDT  
  
  
PT AMBULATES TO RR AND BACK WITHOUT DIFFICULTY. UPDATED ON POC. NO CONCERNS 
VOICED.   
RESPS EVEN AND UNLABORED  
  
  
Electronically signed by Ashlee Ortega RN at 2020 6:34 PM EDT  
  
  
PT PRESENTS TO THE ED FOR N/V X4 DAYS. STATES SHE IS 6 WEEKS PREGNANT  
  
Special isolation precautions are in place with signage outside this patient's 
room.   
This RN performs hand hygiene and enters the patient room wearing:  
? gloves  
? an appropriately fitting (N-95, PAPR, Aura) mask  
? face shield  
? protective gown  
to provide nursing care. See nursing documentation for the care provided.  
  
  
Electronically signed by Ashlee Ortega RN at 2020 6:20 PM 
EDTdocumented in   
this encounter  
  
  
Patient DC home with follow up to OB, as well as given referral for PCP here. 
NAD   
noted. Verbalized understanding of prescription. Ambulated independently out of 
ED.  
  
  
Electronically signed by Tish Angel RN at 2021 8:48 AM EDT  
  
  
Patient comes in for complaints of bilateral breast and nipple pain, with bloody
  
discharge that started today. Patient is appx 1 month post partum, vaginal 
delivery,   
not breastfeeding. States pain now is 7/10, when waking up it was 10/10 but did 
not   
take any medications at home. Patient also states having an increase in a  rash 
skin   
condition that she has had for 5 years is now all of a sudden rash has moved to 
her   
stomach. Patient takes no prescription medications at this time. Denies and   
n/v/f/d/cp/sob.  
  
  
Electronically signed by Tish Angel RN at 2021 8:30 AM EDT  
  
  
Formatting of this note might be different from the original.  
ED PROVIDER NOTE  
[facility] EMERGENCY DEPARTMENT  
  
NAME: Bacilio Moreno AGE: 20 y.o. : 2000  
VISIT DATE: 2021  
MRN: [id number]  
CSN: [id number]  
PCP: Physician No  
  
Chief Complaint  
Patient presents with  
Breast Pain  
  
  
HPI  
  
HPI:  
20-year-old female previously healthy, now with bilateral breast pain 1 month 6 
days   
postpartum. Patient reports uncomplicated delivery and pregnancy. Not breast-
feeding.   
She began to develop the bilateral generalized breast discomfort about a week 
ago.   
Today she noted small amounts of blood leaking from both nipples. She also 
reports   
that she has a history of hyperpigmentation of her skin on her upper arms and   
bilateral thighs for about 5 years, but now has a dark discolored rash on her 
abdomen   
for the past several days as well. Not pruritic. She has never had the rash 
evaluated   
by a physician.  
No recent fever. She did have a  sinus infection  1 week ago, no recent 
antibiotic.   
Otherwise negative review of systems.  
  
Severity: Moderate  
Location: **as above*  
Radiating to: **only as above; otherwise none*  
Exacerbated by: **only as above; otherwise none*  
Relieved by: **only as above; otherwise none* patient has not tried any pain   
medicines yet  
Associated with: **only as above; otherwise none*  
  
Historian(s) deny any other concerns.  
ROS negative except as above.  
  
I have reviewed and agree with the available nursing notes except as otherwise   
reported.  
I have reviewed available medical records.  
  
  
REVIEW OF SYSTEMS:  
Const:  
No fever  
Eyes:  
No vision change  
ENT:  
No remaining congestion  
No sore throat  
CV:  
No CP  
No syncope  
Resp:  
No cough  
No SOB  
GI:  
No Abdo pain  
No nausea  
No vomiting  
No diarrhea  
No constipation  
:  
No dysuria  
No hematuria  
MSK:  
Negative except as noted in HPI  
Skin:  
rash. Not pruritic.  
Neuro:  
No HA  
Hem:  
No bleeding/clotting problems except as per HPI. No nosebleeds or GI bleeding or
  
hemoptysis or easy bruising/bleeding.  
Psych:  
Nl behavior  
  
except as otherwise noted  
  
  
PHYSICAL EXAM:  
Patient Vitals for the past 24 hrs:  
BP Temp Temp src Pulse Resp SpO2 Height Weight  
21 0835 51.9 kg (114 lb 6.7 oz)  
21 0820 134/89 97.7 F (36.5 C) Tympanic 72 (!) 19 99 % 4' 11  49.9 kg (110
lb)  
  
VS Reviewed.  
  
  
Constitutional:  
Non-toxic  
Head:  
Normocephalic  
Atraumatic  
Eyes:  
PERRL  
EOMi  
ENT:  
Mucus membranes moist  
No pharyngeal injection  
Neck:  
Nl ROM  
trachea midline  
Cardiovascular:  
RRR  
Respiratory:  
No resp distress  
Breasts  
Examined with female RN at the bedside assisting.  
Both breasts have a normal symmetric appearance, including the nipples. There is
no   
visible skin abnormality or apparent swelling. The skin is not hot to the touch.
  
Nipples appear normal. There is no active or expressible discharge or bleeding. 
There   
are no skin lesions. There is no palpable peau d'orange feel to the skin, nor 
any   
palpable mass  
Gastrointestinal:  
Non-distended  
Genitourinary:  
  
Back  
Nl inspection  
Upper Extremities:  
Nl inspection  
Lower Extremities:  
  
Neurologic:  
Alert  
answers questions appropriately  
no focal deficits  
Psych:  
Appropriate  
Skin:  
Warm  
Dry  
Nl color  
Coalesced hyperpigmented flat to slightly raised round plaques ranging from 0.5 
to 1   
cm diameter, located on the bilateral upper and lower arms, and the bilateral   
abdomen, extending across the left flank. The abdominal lesions have more of a 
dusky   
gray appearance to them, about the lesions on the arms and forearms have a 
slightly   
erythematous hyperpigmented skin color to them. Lesions are nontender.   
No crepitus or fluctuance or induration or excessive heat.  
  
No acute/emergency findings unless otherwise specified  
  
  
History reviewed. No pertinent past medical history.  
  
History reviewed. No pertinent surgical history.  
  
Family History  
Problem Relation Age of Onset  
Cancer Sister  
Cancer Maternal Grandmother  
Cancer Paternal Grandmother  
  
Social History  
  
Socioeconomic History  
Marital status: Single  
Spouse name: Not on file  
Number of children: Not on file  
Years of education: Not on file  
Highest education level: Not on file  
Occupational History  
Not on file  
Social Needs  
Financial resource strain: Not on file  
Food insecurity  
Worry: Not on file  
Inability: Not on file  
Transportation needs  
Medical: Not on file  
Non-medical: Not on file  
Tobacco Use  
Smoking status: Never Smoker  
Smokeless tobacco: Never Used  
Substance and Sexual Activity  
Alcohol use: Not on file  
Drug use: Never  
Sexual activity: Not on file  
Lifestyle  
Physical activity  
Days per week: Not on file  
Minutes per session: Not on file  
Stress: Not on file  
Relationships  
Social connections  
Talks on phone: Not on file  
Gets together: Not on file  
Attends Scientologist service: Not on file  
Active member of club or organization: Not on file  
Attends meetings of clubs or organizations: Not on file  
Relationship status: Not on file  
Other Topics Concern  
Not on file  
Social History Narrative  
Not on file  
  
No current outpatient medications on file prior to encounter.  
  
  
Allergies  
Allergen Reactions  
Miralax [Polyethylene Glycol 3350] Hives  
Codeine Anxiety  
  
Review of Systems  
  
Patient Vitals for the past 24 hrs:  
BP Temp Temp src Pulse Resp SpO2 Height Weight  
21 0835 51.9 kg (114 lb 6.7 oz)  
21 0820 134/89 97.7 F (36.5 C) Tympanic 72 (!) 19 99 % 4' 11  49.9 kg (110
lb)  
  
Physical Exam  
  
Laboratory & Radiographic Imaging (if done):  
No results found for this visit on 21.  
No orders to display  
  
Procedures  
  
  
  
MDM  
  
Medical Decision Making  
  
DDx (including but not limited to):  
? Breast pain and discharge likely secondary to hormonal changes postpartum. She
will   
need follow-up with her OB/GYN/women's health clinic.  
? Nonspecific rash, acute on chronic. I encouraged patient to follow-up with a   
primary care clinic; she does not currently have one so I am providing contact 
info.   
Primary care will be able to refer her out to a dermatologist to address this   
evolving chronic rash.  
  
No indication of:  
? Mastitis/abscess  
? Breast lump/mass, though an ER examination cannot rule out breast masses and I
  
encouraged patient to follow-up closely with her OB/Gyn clinic for ongoing 
women's   
health care.  
? Systemic illness  
? Bleeding diathesis  
  
  
  
  
Considered appropriately wide DDx.  
  
At this time, acutely dangerous emergency conditions found to be unlikely based 
on   
history, exam, vitals and any testing, except as otherwise specified, and 
patient is   
appropriate for outpatient management. Pt will return to the ED if condition is   
worsening in any way, or if new sx arise.  
  
They understand, are appreciative and comfortable with outpatient plan including
  
follow-up and return ED recommendations as discussed.  
Pt appears nontoxic, well-hydrated and comfortable.  
  
The patient has been informed that they may have pre-hypertension or  
hypertension based on a blood pressure reading in the Emergency  
Department. I recommend that the patient call the primary care provider  
listed on their discharge instructions or a physician of their choice as  
soon as possible to arrange follow-up in the next 4 weeks for further  
evaluation of possible pre-hypertension or hypertension.  
  
.  
  
  
  
Clinical Impression:  
1. Postpartum breast pain  
2. Postpartum bloody nipple discharge  
3. Rash  
  
ED Disposition  
ED Disposition Condition Comment  
Discharge Stable Bacilio Moreno discharged to home/self care in stable   
condition.  
  
  
  
  
Follow-up Information  
1. Natali Salcido MD.  
Specialty: Obstetrics/Gynecology  
Why: For further evaluation & treatment within 2 days  
[address]  
[facility] [address]  
[facility] [id number] [phone]  
  
  
  
2. Mireille Rey MD.  
Specialty: Internal Medicine  
Why: To establish primary care within the next week. She will then be  
able to refer you to a dermatologist to evaluate the rash.  
[address] [address]  
[phone]  
  
  
  
  
Contact information for after-discharge care  
Follow-up information has not been specified.  
  
  
New Prescriptions  
  
  
ibuprofen (ADVIL,MOTRIN) 200 MG tablet Take 2 (two) tablets (400 mg total) by 
mouth   
every 8 (eight) hours as needed for pain .  
  
  
  
  
  
Leo Turner MD  
21 0852  
  
  
  
Electronically signed by Leo Turner MD at 2021 8:52 AM   
EDTdocumented in this encounter  
  
  
                         <item><item><item><item><item>  
  
                                                    Privacy Markings (unrecogniz  
ed section and content)  
   
                                                    Section Author: Teresa Ely   
PROHIBITION ON REDISCLOSURE OF CONFIDENTIAL   
INFORMATION   
This notice accompanies a disclosure of information concerning a client made to 
you   
with the consent of such client. This information has been disclosed to you from
  
records protected by federal confidentiality rules (42 C.F.R. Part 2). The 
federal   
rules prohibit you from making any further disclosure of this information unless
  
further disclosure is expressly permitted by the written consent of the person 
to   
whom it pertains or as otherwise permitted by 42 C.F.R. Part 2. A general   
authorization for the release of medical or other information is NOT sufficient 
for   
this purpose.Section Author: Teresa Ely PROHIBITION ON REDISCLOSURE OF 
CONFIDENTIAL   
INFORMATION This notice accompanies a disclosure of information concerning a 
client   
made to you with the consent of such client. This information has been disclosed
to   
you from records protected by federal confidentiality rules (42 C.F.R. Part 2). 
The   
federal rules prohibit you from making any further disclosure of this 
information   
unless further disclosure is expressly permitted by the written consent of the 
person   
to whom it pertains or as otherwise permitted by 42 C.F.R. Part 2. A general   
authorization for the release of medical or other information is NOT sufficient 
for   
this purpose.Section Author: Teresa Ely PROHIBITION ON REDISCLOSURE OF 
CONFIDENTIAL   
INFORMATION This notice accompanies a disclosure of information concerning a 
client   
made to you with the consent of such client. This information has been disclosed
to   
you from records protected by federal confidentiality rules (42 C.F.R. Part 2). 
The   
federal rules prohibit you from making any further disclosure of this 
information   
unless further disclosure is expressly permitted by the written consent of the 
person   
to whom it pertains or as otherwise permitted by 42 C.F.R. Part 2. A general   
authorization for the release of medical or other information is NOT sufficient 
for   
this purpose.Section Author: Teresa Ely PROHIBITION ON REDISCLOSURE OF 
CONFIDENTIAL   
INFORMATION This notice accompanies a disclosure of information concerning a 
client   
made to you with the consent of such client. This information has been disclosed
to   
you from records protected by federal confidentiality rules (42 C.F.R. Part 2). 
The   
federal rules prohibit you from making any further disclosure of this 
information   
unless further disclosure is expressly permitted by the written consent of the 
person   
to whom it pertains or as otherwise permitted by 42 C.F.R. Part 2. A general   
authorization for the release of medical or other information is NOT sufficient 
for   
this purpose.Section Author: Teresa Ely PROHIBITION ON REDISCLOSURE OF 
CONFIDENTIAL   
INFORMATION This notice accompanies a disclosure of information concerning a 
client   
made to you with the consent of such client. This information has been disclosed
to   
you from records protected by federal confidentiality rules (42 C.F.R. Part 2). 
The   
federal rules prohibit you from making any further disclosure of this 
information   
unless further disclosure is expressly permitted by the written consent of the 
person   
to whom it pertains or as otherwise permitted by 42 C.F.R. Part 2. A general   
authorization for the release of medical or other information is NOT sufficient 
for   
this purpose.  
  
  
                                    Scheduled  
  
                                                    Active and Recently Administ  
ered Medications (unrecognized section and content)  
   
                                          
  
  
  
                          Medication Order 02/10/2024   2024   2024  
   
                                                      
  
  
Ampicillin-Sulbactam   
Sodium (UNASYN) 3 g in   
sodium chloride 0.9% (MB   
PLUS) 100 mL (total   
volume) IVPB (COMPLETED)  
3 g, Intravenous,   
Administer over 30   
Minutes, ONCE, 1 dose, On   
Sun 24 at 1930,   
Contains a penicillin.  
                                                    1936 ($$New Bag$$ -   
Provider: Marla Biggs,   
ERNA)  
                                        1049 (Stopped - Provider:   
Janelle Short RN)  
  
   
                                                      
  
  
dicyclomine (BENTYL)   
injection 20 mg   
(COMPLETED)  
20 mg, Intramuscular,   
ONCE, 1 dose, On Sun   
24 at 1815  
                                                    1841 (Given - Provider:   
Pilar Clifford RN)  
                                          
   
                                                      
  
  
diphenhydrAMINE   
(BENADRYL) injection 12.5   
mg (COMPLETED)  
12.5 mg, Intravenous,   
ONCE, 1 dose, On Sun   
24 at 1715  
                                                    1710 (Given - Provider:   
Marla Biggs RN)  
                                          
   
                                                      
  
  
Enoxaparin Sodium   
(LOVENOX) injection 40 mg  
40 mg, Subcutaneous,   
DAILY AT BEDTIME, First   
dose on 24 at   
2100, Until Discontinued,   
Indications: DVT/PE   
prophylaxis  
                                                            2100 (Canceled Entry  
 -   
Provider: System Discharge   
- Comment: Automatically   
canceled at discontinue of   
medication order)  
  
   
                                                      
  
  
Ketorolac (TORADOL)   
injection 30 mg   
(COMPLETED)  
30 mg, Intravenous, ONCE,   
1 dose, On Sun 24 at   
1900  
                                                    1841 (Given - Provider:   
Pilar Clifford RN)  
                                          
   
                                                      
  
  
Magnesium sulfate 2 g/50   
ml in sterile water   
premix IVPB 2 g 50 mL   
(total volume)  
2 g, Intravenous,   
Administer over 4 Hours,   
DAILY, First dose on 24 at 0600, Until   
Discontinued, Give if   
magnesium is less than 2   
on morning labs. Infuse   
at a rate of 0.5 gm/hour.  
                                                              
   
                                                      
  
  
Metoclopramide (REGLAN)   
injection 10 mg   
(COMPLETED)  
10 mg, Intravenous, ONCE,   
1 dose, On Sun 24 at   
1715  
                                                    1710 (Given - Provider:   
Marla Biggs RN)  
                                          
   
                                                      
  
  
metroNIDAZOLE (FLAGYL)   
500 mg in NaCl premix   
IVPB  
500 mg, Intravenous, at   
200 mL/hr, Administer   
over 30 Minutes, EVERY 8   
HOURS NON-STANDARD, First   
dose on 24 at   
1500, Until Discontinued  
                                                              
   
                                                      
  
  
Ondansetron 4mg/2ml   
(ZOFRAN) injection 4 mg   
(COMPLETED)  
4 mg, Intravenous, ONCE,   
1 dose, On Sun 2/11/24 at   
1715  
                                                    1710 (Given - Provider:   
Marla Biggs RN)  
                                          
   
                                                      
  
  
Pantoprazole (PROTONIX)   
injection 40 mg  
40 mg, Intravenous, EVERY   
12 HOURS, First dose on   
24 at 1230,   
Until Discontinued,   
Dilute each 40 mg vial   
with 10 mL of NS. All   
bolus doses, whether 40   
mg or 80 mg, should be   
administered over at   
least two minutes.,   
Indications: Inpt Stress   
Ulcer Prophylaxis  
                                                            1542 (Given - Provid  
er:   
Janelle Short RN)2100   
(Canceled Entry -   
Provider: System Discharge   
- Comment: Automatically   
canceled at discontinue of   
medication order)  
  
   
                                                      
  
  
Potassium chloride   
(K-DUR) tablet ER 40   
mEq(Linked Group 1)  
40 mEq, Oral, DAILY,   
First dose on 24   
at 0600, Until   
Discontinued, Give if   
potassium is 3.1 to 3.4   
on morning labs and   
patient is able to   
tolerate oral medications   
Swallow tablets whole; do   
not crush, chew, or suck   
on tablet. Tablet may   
also be broken in half   
and each half swallowed   
separately.  
                                                              
   
                                                      
  
  
potassium chloride 40 mEq   
in 0.9% sodium chloride   
500 ml IVPB(Linked Group   
1)  
40 mEq, Intravenous, at   
125 mL/hr, Administer   
over 4 Hours, DAILY,   
First dose on 24   
at 0600, Until   
Discontinued, Give if   
potassium is less than   
3.1 on morning labs OR if   
potassium is 3.1 to 3.4   
on morning labs and   
patient is UNABLE to   
tolerate oral medications  
                                                              
   
                                                      
  
  
potassium chloride 40 mEq   
in 0.9% sodium chloride   
500 ml IVPB (COMPLETED)  
40 mEq, Intravenous, at   
125 mL/hr, Administer   
over 4 Hours, ONCE, 1   
dose, On 24 at   
1300  
                                                            1318 ($$New Bag$$ -   
Provider: Janelle Short RN)1730 (Stopped -   
Provider: Janelle Short RN)  
  
   
                                                      
  
  
Sodium chloride 0.9% IV   
solution 1,000 mL   
(COMPLETED)  
1,000 mL, Intravenous,   
ONCE, 1 dose, On Sun   
24 at 1715  
                                                    1709 ($$New Bag$$ -   
Provider: Marla Biggs RN)1909 (Stopped -   
Provider: Mary Abrams RN)  
                                          
  
                                   Continuous  
  
                          Medication Order 02/10/2024   2024   2024  
   
                                                      
  
  
Dextrose 5% 1,000 mL with   
Sodium bicarbonate 100 mEq   
IV solution  
Intravenous, CONTINUOUS,   
Starting on 24 at   
1200, Until 24 at   
2143  
                                                            1318 ($$New Bag$$ -   
Provider: Janelle Short RN)1743   
(Rate/Dose Verify -   
Provider: Janelle Short RN)  
  
   
                                                      
  
  
Sodium chloride 0.9% IV   
solution (CANCELED)  
Intravenous, at 125 mL/hr,   
CONTINUOUS, Starting on   
Sun 24 at 1945, Until   
24 at 1037  
                                                    2103 ($$New Bag$$ -   
Provider: Fadia Unger RN)  
                                        0515 ($$New Bag$$ -   
Provider: Fadia Unger RN)0925   
(Rate/Dose Verify -   
Provider: Janelle Short RN)1049   
(Stopped - Provider:   
Janelle Short RN)  
  
  
                                       PRN  
  
                          Medication Order 02/10/2024   2024   2024  
   
                                                      
  
  
Acetaminophen (TYLENOL)   
tablet 650 mg  
650 mg, Oral, EVERY 4 HOURS   
AS NEEDED, Starting on Sun   
24 at 1939, Until 24 at 2143, Mild Pain,   
Oral temp > 100.4 F  
                                                              
   
                                                      
  
  
Metoclopramide (REGLAN)   
injection 10 mg (CANCELED)  
10 mg, Intravenous, EVERY 6   
HOURS AS NEEDED, Starting   
on Sun 24 at 1939,   
Until 24 at 0919,   
Nausea / Vomiting  
                                                            0432 (Given - Provid  
er:   
Fadia Unger RN)  
  
   
                                                      
  
  
Metoclopramide (REGLAN)   
injection 10 mg  
10 mg, Intravenous, EVERY 6   
HOURS AS NEEDED, Starting   
on Mon 24 at 0918,   
Until 24 at 2143,   
Nausea / Vomiting, 2nd line   
N/V  
                                                            1543 (Given - Provid  
er:   
Janelle Short RN)  
  
   
                                                      
  
  
Ondansetron 4mg/2ml   
(ZOFRAN) injection 4 mg  
4 mg, Intravenous, EVERY 4   
HOURS AS NEEDED, Starting   
on Sun 24 at 1939,   
Until 24 at 2143,   
Nausea / Vomiting  
                                                    1950 (Given - Provider:   
Marla Biggs RN)  
                                        0046 (Given - Provider:   
Fadia Unger RN)0945 (Given -   
Provider: Janelle Short, RN)1415   
(Given - Provider: Teresa Brady RN)1827 (Given   
- Provider: Janelle Short, ERNA)  
  
   
                                                      
  
  
Promethazine (PHENERGAN)   
12.5 mg in Sodium chloride   
0.9%, with overfill 60.5 mL   
(total volume) IVPB   
(CANCELED)  
12.5 mg, Intravenous, at   
121-242 mL/hr, Administer   
over 15-30 Minutes, EVERY 4   
HOURS AS NEEDED, Starting   
on Sun 24 at 2134,   
Until 24 at 0919,   
Nausea / Vomiting, Total   
dose is 25 mg, which will   
be two bags of 12.5mg each   
Extravasation Risk  
                                                    2210 ($$New Bag$$ -   
Provider: Fadia Unger RN)2211 ($$New   
Bag$$ - Provider:   
Fadia Unger RN)  
                                        0742 (Stopped -   
Provider: Janelle Short RN)  
  
   
                                                      
  
  
Promethazine (PHENERGAN)   
12.5 mg in Sodium chloride   
0.9%, with overfill 60.5 mL   
(total volume) IVPB(Linked   
Group 2)  
12.5 mg, Intravenous, at   
121-242 mL/hr, Administer   
over 15-30 Minutes, EVERY 4   
HOURS AS NEEDED, Starting   
on 24 at 0918,   
Until 24 at 2143,   
Refractory Nausea Vomiting,   
Total dose is 25 mg, which   
will be two bags of 12.5mg   
each Extravasation Risk  
                                                              
   
                                                      
  
  
Promethazine (PHENERGAN)   
12.5 mg in Sodium chloride   
0.9%, with overfill 60.5 mL   
(total volume) IVPB(Linked   
Group 2)  
12.5 mg, Intravenous, at   
121-242 mL/hr, Administer   
over 15-30 Minutes, EVERY 4   
HOURS AS NEEDED, Starting   
on Mon 24 at 0918,   
Until 24 at 2143,   
Nausea / Vomiting, Total   
dose is 25mg which is two   
bags of 12.5mg each   
Extravasation Risk  
                                                              
  
                                  Linked Groups  
  
                                                    Order  
   
                                                      
  
  
Group 1:  
  
  
Potassium chloride (K-DUR) tablet ER 40 mEqJump to med  
40 mEq, Oral, DAILY, First dose on 24 at 0600, Until Discontinued, Give
if   
potassium is 3.1 to 3.4 on morning labs and patient is able to tolerate oral   
medications Swallow tablets whole; do not crush, chew, or suck on tablet. Tablet
may   
also be broken in half and each half swallowed separately.  
  
   
                                                      
  
  
Or  
  
  
potassium chloride 40 mEq in 0.9% sodium chloride 500 ml IVPBJump to med  
40 mEq, Intravenous, at 125 mL/hr, Administer over 4 Hours, DAILY, First dose on
24 at 0600, Until Discontinued, Give if potassium is less than 3.1 on 
morning   
labs OR if potassium is 3.1 to 3.4 on morning labs and patient is UNABLE to 
tolerate   
oral medications  
  
   
                                                      
  
  
Group 2:  
  
  
Promethazine (PHENERGAN) 12.5 mg in Sodium chloride 0.9%, with overfill 60.5 mL   
(total volume) IVPBJump to med  
12.5 mg, Intravenous, at 121-242 mL/hr, Administer over 15-30 Minutes, EVERY 4 
HOURS   
AS NEEDED, Starting on 24 at 0918, Until 24 at 2143, 
Refractory   
Nausea Vomiting, Total dose is 25 mg, which will be two bags of 12.5mg each   
Extravasation Risk  
  
   
                                                      
  
  
And  
  
  
Promethazine (PHENERGAN) 12.5 mg in Sodium chloride 0.9%, with overfill 60.5 mL   
(total volume) IVPBJump to med  
12.5 mg, Intravenous, at 121-242 mL/hr, Administer over 15-30 Minutes, EVERY 4 
HOURS   
AS NEEDED, Starting on 24 at 0918, Until 24 at 2143, Nausea /   
Vomiting, Total dose is 25mg which is two bags of 12.5mg each Extravasation Risk  
  
  
                                    Scheduled  
  
                          Medication Order 2024  
   
                                                      
  
  
ketorolac (TORADOL) 15 MG/ML   
injection (COMPLETED)  
15 mg, Intravenous Push, ONCE, 1   
dose, On 24 at 2323  
                                                            2300 (Given - Provid  
er:   
Arsenio Blanco RN)  
  
   
                                                      
  
  
ondansetron (ZOFRAN) 4 MG/2ML   
injection (COMPLETED)  
4 mg, Intravenous Push, STAT, 1   
dose, On 24 at 1925  
                                                            1859 (Given - Provid  
er: Zaira Allen RN)  
  
   
                                                      
  
  
ondansetron (ZOFRAN) 4 MG/2ML   
injection (COMPLETED)  
4 mg, Intravenous Push, STAT, 1   
dose, On 24 at 2323  
                                                            2259 (Given - Provid  
er:   
Arsenio Blanco RN)  
  
  
                                    Scheduled  
  
                          Medication Order 2024  
   
                                                      
  
  
prochlorperazine (Compazine)   
injection 5 mg (COMPLETED)  
5 mg, intravenous, Once, On 24 at 0, For 1 dose  
                                                             (Given - Provid  
er:   
Nicolas Ro RN)  
  
   
                                                      
  
  
sodium chloride 0.9 % bolus   
1,000 mL (COMPLETED)  
1,000 mL, intravenous, at 1,000   
mL/hr, Administer over 1 Hours,   
Once, On 24 at 0,   
For 1 dose  
                                                             (New Bag - Prov  
ider:   
Nicolas Ro RN) (Stopped   
- Provider: Tegan Lee RN)  
  
  
                                    Scheduled  
  
                          Medication Order 2024  
   
                                                      
  
  
diphenhydrAMINE (BENADRYL)   
injection 25 mg (COMPLETED)  
25 mg, Intravenous, ONCE, 1 dose,   
On 24 at 1830  
                                                            1802 (Given - Provid  
er: Leia Lima RN)  
  
   
                                                      
  
  
Metoclopramide (REGLAN) injection   
10 mg (COMPLETED)  
10 mg, Intravenous, ONCE, 1 dose,   
On 24 at 1800  
                                                            1802 (Given - Provid  
er: Leia Lima RN)  
  
   
                                                      
  
  
Nitrofurantoin   
(macrocrystal-monohydrate)   
(MACROBID) capsule 100 mg   
(COMPLETED)  
100 mg, Oral, ONCE, 1 dose, On   
24 at 2030, Administer   
with food  
                                                             (Given - Provid  
er: Mario Barkley RN)  
  
   
                                                      
  
  
potassium bicarbonate (EFFER-K)   
effervescent tablet 50 mEq   
(COMPLETED)  
50 mEq, Oral, ONCE, 1 dose, On   
24 at 2030  
                                                             (Given - Provid  
er: Mario Barkley RN)  
  
   
                                                      
  
  
Sodium chloride 0.9% IV solution   
1,000 mL (COMPLETED)  
1,000 mL, Intravenous, ONCE, 1   
dose, On 24 at 1830  
                                                            1948 ($$New Bag$$ -   
Provider:   
Mario Barkley RN)   
(Stopped - Provider: Mario Barkley RN)  
  
   
                                                      
  
  
Sodium chloride 0.9% IV solution   
1,000 mL (COMPLETED)  
1,000 mL, Intravenous, Administer   
over 60 Minutes, ONCE, 1 dose, On   
24 at 1830  
                                                            1802 ($$New Bag$$ -   
Provider:   
Leia Lima RN) (Stopped   
- Provider: Mario Barkley RN)  
  
  
  
  
  
  
                                                    Care Teams (unrecognized sec  
tion and content)  
   
                                          
  
  
  
                      Team Member Relationship Specialty  Start Date End Date  
   
                                                      
  
  
Generic Provider, No Assigned Pcp, MD  
  
  
[address]  
  
  
[place] [address] PCP - General                   24            
  
  
FOR RECORDS PERTAINING TO PATIENTS WHO ARE OR HAVE BEEN ENROLLED IN A CHEMICAL 
DEPENDENCY/SUBSTANCEABUSE PROGRAM, SOME INFORMATION MAY BE OMITTED. This 
clinical summary was aggregated from multiple sources. Caution should be 
exercised in using it in the provision of clinical care. This summary normalizes
information from multiple sources, and as a consequence, information in this 
document may materially change the coding, format and clinical context of 
patient data. In addition, data may be omitted in some cases. CLINICAL DECISIONS
SHOULD BE BASED ON THE PRIMARY CLINICAL RECORDS. [facility] [employer] [facility]. provides 
no warranty or guarantee of the accuracy or completeness of information in this 
document.

## 2025-02-07 NOTE — HP.PCM.OB_ITS
HPI - General    
General    
Date of Admission: 25    
HPI Narrative    
DANNA SOARES, is a 23 y/o  @ 39 weeks 0 days who presents to L&D with  
rupture of membranes since 11pm and frequent contractions. She was found to be 5  
cm at 3:30 this am upon arrival and requesting an epidural.     
    
    
Maternal Data    
Information    
ASHELY Calculator    
    
    
                                Estimated Delivery Date Method          Current     
WG    
     
                Current Estimate 25        Ultrasound #1   39w 0d    
     
                Other Estimates 25        LMP (Uncertain) 35w 1d    
    
    
    
PFSH    
[facility]    
Medical History (Reviewed 25 @ 11:23 by Miley Graves)    
    
OCD (obsessive compulsive disorder)    
ADD (attention deficit disorder)    
Depression with anxiety    
bipolar    
    
    
                                Home Medications    
    
    
    
?Medication ?Instructions ?Recorded ?Last Taken ?Type    
     
                          docosahexaenoic acid 200 mg 200 mg PO DAILY pregnancy     
24 08:00     
History    
    
                                        capsule (Prenatal DHA)   200 mg     
     
                          blood sugar diagnostic (Blood #120 ea 24 Unknown    
 Rx    
    
                                        Glucose Test strips)        
     
                          blood-glucose meter       #1 ea 24 Unknown Rx    
     
                          lancets                   #200 ea 24 Unknown Rx    
    
    
    
                                            
    
    
    
Allergy/AdvReac Type Severity Reaction Status Date / Time    
     
promethazine (From Phenergan) Allergy Severe Other Verified 25 04:14    
     
codeine Allergy  HALLUCINATI Verified 25 04:14    
    
   ONS      
     
polyethylene glycol 3350 AdvReac  Hives Verified 25 04:14    
    
(From Miralax)         
    
    
    
Family History (Reviewed 25 @ 11:23 by Miley Graves)    
Other   Colon cancer    
   Hypertension    
   Lung cancer    
   Ovarian cancer    
   Schizophrenia    
   Uterine cancer    
   bipolar    
    
    
    
Surgical History (Reviewed 25 @ 11:23 by Miley Graves)    
    
S/P tonsillectomy    
s/p finger surgery    
S/P appendectomy    
    
    
Social History (Reviewed 25 @ 11:23 by Miley Graves)    
adopted:  No     
household members:  family     
housing:  house     
number of children:  1     
current occupational status:  employed     
current occupation:  dancer     
current occupational exposures/hazards:  No     
pets and animals:  Yes pets and animals: cat(s) and dog(s)     
history of recent travel:  No     
sexually active:  Yes     
Smoking Status:  Never smoker     
alcohol intake:  never     
substance use type:  marijuana     
caffeine:  No     
what type of physical activity do you participate in:  aerobics     
frequency:  5-6 times per week     
seatbelt use:  never     
do you feel safe at home:  Yes     
additional social history:  Boyfriend- Alexander     
    
    
    
Pregnancy History    
    
    
    
        4            Elective abortions            
     
Hx Para        1            Spontaneous abortions        2    
     
Hx # Term Pregnancies                    Ectopic pregnancies            
     
Hx #  Pregnancies                    Multiple births            
     
                    # of living children        1    
    
    
    
Past Pregnancies    
    
    
Del. Date Name    GA/Weeks Outcome Route   Bth Weight Infant Gen Labor Lgth     
Anesthesia          Del Locatn          Provider            FOB    
     
      21 Ani 39    live birth - full term   7lbs 7oz Female           
epidural [facility]       
ANDRES                                         
    
    
Delivery Date: 21  Last Updated by: Ese Garrido    
      IoL chronic decreased FM    
    
    
Visit Details    
    
Expected Delivery Route/Plan    
    
Labor Preferences-    
CB/BF classes: no    
labor support person: Marcela Munoz    
labor intervention preferences: []    
pain management options preferred: epidural    
cut cord/dad catch: Marcela cut cord    
breastfeeding: bottle    
PP birth control planned: discussed    
discussed possible routes of delivery and associated risks: []    
special requests: []    
    
Plans    
Covid status: []    
Flu vaccine: declines    
Tdap vaccine: []    
Rhogam: NA    
LARC form signed: yes    
Problem list reviewed and updated with the most current plan of care details and  
 appropriate orders placed.      
Relevant counseling for the gestational age provided.    
Continue routine prenatal care and follow up unless otherwise noted in visit   
notes/problem list details    
    
    
OB Flowsheet    
Initial Weight: Not Recorded    
    
    
    
Date    
-?-?-?-?-?-?-?-?-?-?-?-?-    
EGA Weight BP Urine Prot    
-?-?-?-?-?-?-?-?-?-?-?-?-    
Glucose FHR FuHt Pres Dilation    
-?-?-?-?-?-?-?-?-?-?-?-?-    
Effaced Fetal St Visit Note    
     
                                                    24    
-?-?-?-?-?-?-?-?-?-?-?-?-    
                13w 0d          107 lb 4 oz                 105/69              
-?-?-?-?-?-?-?-?-?-?-?-?-    
                              153                                     
-?-?-?-?-?--?-?-?-?-?-?-?-    
                                                                KW- CRL 61mm. GA    
 13.0 weeks. Accepts NIPT and carrier    
                                                    KW- CRL 61mm. GA 13.0 weeks.    
 Accepts NIPT and carrier. encouraged to stop     
smoking     
     
                                                    24    
-?-?-?-?-?--?-?-?-?-?-?-?-    
                17w 3d          107 lb                  109/67              
-?-?-?-?-?-?-?-?-?-?-?-?-    
                              145                                     
-?-?-?-?-?-?-?-?-?-?-?-?-    
                                                                JV- no cramping,    
 spotting, or complaints. has anatomy ultrasound ordered for     
. did not do NIPT.     
     
                                                    10/16/24    
-?-?-?-?-?-?-?-?-?-?-?-?-    
                22w 5d          117 lb 8 oz                 111/67              
-?-?-?-?-?-?-?-?-?-?-?-?-    
                              153                                     
-?-?-?-?-?-?-?-?-?-?-?-?-    
                                                                JV- no lof, vagi    
nal bleeding, or dec fm. declines flu vaccine. needs new ob     
labs still and will do that today.     
     
                                                    24    
-?-?-?-?-?-?-?-?-?-?-?-?-    
                26w 5d          127 lb 2 oz                 110/70          Nega    
tive     
-?-?-?-?-?-?-?-?-?-?-?-?-    
            Negative              134               26                          
-?-?-?-?-?-?-?-?-?-?-?-?-    
                                                                MH-No VB, LOF. G    
ood Fm. 3rd trim labs pending    
     
                                                    24    
-?-?-?-?-?-?-?-?-?-?-?-?-    
                28w 4d          127 lb 4 oz                 128/75              
-?-?-?-?-?-?-?-?-?-?-?-?-    
                              140               28                          
-?-?-?-?-?-?-?-?-?-?-?-?-    
                                                                SM- checking sug    
ars most WNL= will continue to check     
     
                                                    24    
-?-?-?-?-?-?-?-?-?-?-?-?-    
                30w 5d          132 lb 8 oz                 110/60          Nega    
tive     
-?-?-?-?-?-?-?-?-?-?-?-?-    
            Negative              147               30                          
-?-?-?-?-?-?-?-?-?-?-?-?-    
                                                                MH-No VB, LOF. G    
ood FM. Reviewed glucose/stable    
     
                                                    25    
-?-?-?-?-?-?-?-?-?-?-?-?-    
                34w 3d          140 lb 4 oz                 110/68          Nega    
tive     
-?-?-?-?-?-?-?-?-?-?-?-?-    
            Negative              134               33                          
-?-?-?-?-?-?-?-?-?-?-?-?-    
                                                                MH-No VB, lof. G    
ood FM.  Just checked glucose 7 days. abnormals noted.      
Reviewed testing times, food options renata lunch when always high.  Consult   
dietitian. Tox screen    
     
                                                    25    
-?-?-?-?-?-?-?-?-?-?-?-?-    
                35w 6d          141 lb 8 oz                 114/75          Nega    
tive     
-?-?-?-?-?-?-?-?-?-?-?-?-    
            Negative              140               35                          
-?-?-?-?-?-?-?-?-?-?-?-?-    
                                                                SM- met with nut    
ritionist, some elevated BS, overall WNL, no vb lof good fm     
nor egular ctx    
     
                                                    25    
-?-?-?-?-?-?-?-?-?-?-?-?-    
                36w 4d          145 lb 4 oz                 120/76              
-?-?-?-?-?-?-?-?-?-?-?-?-    
                         144            36             Cephalic           1    
-?-?-?-?-?-?-?-?-?-?-?-?-    
                     30                   -3                          JV- gbs co    
llected. no complaints. glucose log reviewed and normal. only a     
couple of 2 hr pp over 120 (122 or 121). growth scan to be done soon.     
     
                                                    25    
-?-?--?-?-?-?-?-?-?-?-?-?-    
                38w 0d          144 lb                  110/69          Negative    
     
-?-?-?-?-?-?-?-?-?-?-?-?-    
          Negative            140            38                       3    
-?-?-?-?-?-?-?-?-?-?-?-?-    
                     60                   -2                          LC- 3625g     
EFW on todays growth scan. mostly normal pp(121-122 if over),     
all normal fasting. 39 week IOL set up.    
     
                                                    25    
-?-?-?-?-?-?-?-?-?-?-?-?-    
                38w 5d          144 lb                  114/69          Negative    
     
-?-?-?-?-?-?-?-?-?-?-?-?-    
          Negative            140            38             Cephalic           3    
-?-?-?-?-?-?-?-?-?-?-?-?-    
                     70                   -2                          SM- no vb     
lof good fm no regular ctx membranes swept IOL friday    
    
    
    
ROS    
Constitutional    
Constitutional: Denies change in weight, fatigue, fever(s), headache(s), poor   
appetite or weakness    
Eyes    
Eyes: Denies blurry vision, change in vision, seeing flashes or spots in vision    
ENT    
HEENT: Denies dizziness, headache(s), loss taste/smell or sore throat    
Cardiovascular    
Cardiovascular: Denies chest pain, dizziness, dyspnea, irregular heart rhythm,   
leg edema, palpitations, rapid heart rate or vomiting    
Respiratory/Chest    
Respiratory/Chest: Denies chest tightness, cough, dyspnea or breast pain    
Gastrointestinal    
Gastrointestinal: Denies abdominal pain, anorexia, constipation, cramping,   
diarrhea, hemorrhoids, vomiting or weight changes    
Genitourinary    
Genitourinary: Denies dysuria, flank pain, genital lesions, genital pain,   
urinary frequency or urinary urgency    
Musculoskeletal    
Musculoskeletal: Denies back pain, difficulty walking, joint pain, limited range  
 of motion, muscle cramps or numbness    
Integumentary    
Integumentary: Denies lesions or unusual bruising    
Neurologic    
Neurologic: Denies abnormal movements, abnormal speech, dizziness, numbness,   
seizure-like activity or syncope    
Psychiatric    
Psychiatric: Denies anxiety, behavioral changes, change in appetite, change in   
libido, cognitive impairment, confusion, depression, difficulty concentrating,   
hallucinations or suicidal thoughts    
Endocrine    
Endocrinology: Denies excessive sweating, polydipsia or polyuria    
Hematologic/Lymphatic    
Hematologic/Lymphatic: Denies easy bleeding, easy bruising or lymphadenopathy    
Allergic/Immunologic    
Allergic/Immunologic: Denies itchy eyes, lip swelling, seasonal rhinorrhea,   
rhinitis, throat swelling, tongue swelling, eczemia, wheezing or asthma    
    
Vital Signs    
Vital Signs    
Vital Signs:     
                                            
    
    
    
 25    
04:06 25    
04:06 25    
04:06    
     
Temperature   98.6 F    
     
Temperature Source Temporal      
     
Pulse Rate       
     
Respiratory Rate  18     
     
Blood Pressure       
     
BP Systolic       
     
BP Diastolic       
     
Pulse Ox       
    
    
    
    
 25    
04:07 25    
04:07 25    
04:07    
     
Temperature       
     
Temperature Source       
     
Pulse Rate  108 H     
     
Respiratory Rate       
     
Blood Pressure 111/69      
     
BP Systolic 111      
     
BP Diastolic 69      
     
Pulse Ox   100    
    
    
    
    
 25    
04:20 25    
04:20 25    
05:03    
     
Temperature       
     
Temperature Source       
     
Pulse Rate 91      
     
Respiratory Rate       
     
Blood Pressure   117/69    
     
BP Systolic   117    
     
BP Diastolic   69    
     
Pulse Ox  99     
    
    
    
    
 25    
05:03 25    
05:04 25    
05:04    
     
Temperature       
     
Temperature Source       
     
Pulse Rate 115 H 105 H     
     
Respiratory Rate       
     
Blood Pressure       
     
BP Systolic       
     
BP Diastolic       
     
Pulse Ox   100    
    
    
    
    
 25    
05:04 25    
05:04 25    
05:08    
     
Temperature       
     
Temperature Source       
     
Pulse Rate 87      
     
Respiratory Rate       
     
Blood Pressure   117/73    
     
BP Systolic   117    
     
BP Diastolic   73    
     
Pulse Ox  92     
    
    
    
    
 25    
05:08 25    
05:09 25    
05:09    
     
Temperature       
     
Temperature Source       
     
Pulse Rate 109 H 100     
     
Respiratory Rate       
     
Blood Pressure       
     
BP Systolic       
     
BP Diastolic       
     
Pulse Ox   99    
    
    
    
    
 25    
05:13 25    
05:13 25    
05:14    
     
Temperature       
     
Temperature Source       
     
Pulse Rate 90      
     
Respiratory Rate       
     
Blood Pressure   121/69 H    
     
BP Systolic   121    
     
BP Diastolic   69    
     
Pulse Ox  83     
    
    
    
    
 25    
05:14 25    
05:14 25    
05:18    
     
Temperature       
     
Temperature Source       
     
Pulse Rate 104 H  90    
     
Respiratory Rate       
     
Blood Pressure       
     
BP Systolic       
     
BP Diastolic       
     
Pulse Ox  100     
    
    
    
    
 25    
05:18 25    
05:19 25    
05:19    
     
Temperature       
     
Temperature Source       
     
Pulse Rate  97     
     
Respiratory Rate       
     
Blood Pressure       
     
BP Systolic       
     
BP Diastolic       
     
Pulse Ox 93  100    
    
    
    
    
 25    
05:19 25    
05:19 25    
05:23    
     
Temperature       
     
Temperature Source       
     
Pulse Rate  106 H     
     
Respiratory Rate       
     
Blood Pressure 117/62  108/58 L    
     
BP Systolic 117  108    
     
BP Diastolic 62  58    
     
Pulse Ox       
    
    
    
    
 25    
05:23 25    
05:24 25    
05:24    
     
Temperature       
     
Temperature Source       
     
Pulse Rate 112 H 103 H     
     
Respiratory Rate       
     
Blood Pressure       
     
BP Systolic       
     
BP Diastolic       
     
Pulse Ox   97    
    
    
    
    
 25    
05:29 25    
05:29 25    
05:29    
     
Temperature       
     
Temperature Source       
     
Pulse Rate  100     
     
Respiratory Rate       
     
Blood Pressure 110/66      
     
BP Systolic 110      
     
BP Diastolic 66      
     
Pulse Ox   100    
    
    
    
    
 25    
05:30 25    
05:33 25    
05:33    
     
Temperature       
     
Temperature Source       
     
Pulse Rate   105 H    
     
Respiratory Rate 16      
     
Blood Pressure  107/65     
     
BP Systolic  107     
     
BP Diastolic  65     
     
Pulse Ox       
    
    
    
    
 25    
05:34 25    
05:34 25    
05:35    
     
Temperature       
     
Temperature Source       
     
Pulse Rate 99      
     
Respiratory Rate   16    
     
Blood Pressure       
     
BP Systolic       
     
BP Diastolic       
     
Pulse Ox  97     
    
    
    
    
 25    
05:38 25    
05:38 25    
05:39    
     
Temperature       
     
Temperature Source       
     
Pulse Rate  99 99    
     
Respiratory Rate       
     
Blood Pressure 122/75 H      
     
BP Systolic 122      
     
BP Diastolic 75      
     
Pulse Ox       
    
    
    
    
 25    
05:39 25    
05:40 25    
05:40    
     
Temperature       
     
Temperature Source       
     
Pulse Rate       
     
Respiratory Rate  16 16    
     
Blood Pressure       
     
BP Systolic       
     
BP Diastolic       
     
Pulse Ox 100      
    
    
    
    
 25    
05:43 25    
05:43 25    
05:43    
     
Temperature       
     
Temperature Source       
     
Pulse Rate  100     
     
Respiratory Rate       
     
Blood Pressure 123/75 H      
     
BP Systolic 123      
     
BP Diastolic 75      
     
Pulse Ox   98    
    
    
    
    
 25    
05:45 25    
05:48 25    
05:48    
     
Temperature       
     
Temperature Source       
     
Pulse Rate   112 H    
     
Respiratory Rate 16      
     
Blood Pressure  119/68     
     
BP Systolic  119     
     
BP Diastolic  68     
     
Pulse Ox       
    
    
    
    
 25    
05:49 25    
05:49 25    
05:50    
     
Temperature       
     
Temperature Source       
     
Pulse Rate 91      
     
Respiratory Rate   16    
     
Blood Pressure       
     
BP Systolic       
     
BP Diastolic       
     
Pulse Ox  100     
    
    
    
    
 25    
05:53 25    
05:53 25    
05:54    
     
Temperature       
     
Temperature Source       
     
Pulse Rate  103 H 105 H    
     
Respiratory Rate       
     
Blood Pressure 120/76      
     
BP Systolic 120      
     
BP Diastolic 76      
     
Pulse Ox       
    
    
    
    
 25    
05:54 25    
05:55 25    
05:58    
     
Temperature       
     
Temperature Source       
     
Pulse Rate       
     
Respiratory Rate  16     
     
Blood Pressure   120/78    
     
BP Systolic   120    
     
BP Diastolic   78    
     
Pulse Ox 98      
    
    
    
    
 25    
05:58 25    
05:59 25    
05:59    
     
Temperature       
     
Temperature Source       
     
Pulse Rate 104 H 101 H     
     
Respiratory Rate       
     
Blood Pressure       
     
BP Systolic       
     
BP Diastolic       
     
Pulse Ox   100    
    
    
    
    
 25    
06:00    
     
Temperature     
     
Temperature Source     
     
Pulse Rate     
     
Respiratory Rate 16    
     
Blood Pressure     
     
BP Systolic     
     
BP Diastolic     
     
Pulse Ox     
    
    
                                     Weight    
    
    
    
Weight:                        145 lb 3.2 oz                                      
     
     
Body Mass Index (BMI)          29.3                                               
     
    
    
    
    
    
Physical Exam    
Const    
alert, oriented x3, no apparent distress and healthy appearing    
General Appearance: cooperative; Negative for anxious    
HEENT    
normocephalic    
Face and Sinus: normal facial exam    
Eyes    
EOMs intact bilaterally and no scleral icterus    
General Eye: normal appearance of both eyes    
Neck    
full ROM and supple    
Lymph    
Lymphatic: no lymphadenopathy noted    
Chest    
Chest: abnormal inspection of the chest    
Resp    
normal respiratory effort    
Effort and Inspection: able to speak in complete sentences    
Cardio    
regular rate    
GI    
soft to palpation and non-tender    
Inspection: gravid    
Palpation: soft; Negative for tender    
    
external exam normal    
Amniotic Fluid: ROM+plus    
Back/Spine    
no CVA tenderness    
Extremity    
normal to inspection, full ROM and no clubbing, cyanosis or edema    
General Extremity: Negative for calf tenderness or edema    
Skin    
Lesions: no lesions    
Rashes: no rashes    
Psych    
mental status grossly normal    
    
Prenatal Labs    
Prenatal Labs    
Prenatal Labs:     
    
    
                          Blood Type                O POSITIVE     
     
                          Antibody Screen           NEGATIVE     
     
                          Hct                       34.1 % (37-47)  L    
     
                          Hgb                       11.1 g/dL (12.0-15.0)  L    
     
                          Obstetrics Ultrasound         
     
                          Syphilis Total Ab         Non-reactive     
     
                          Rubella IgG Antibody      Reactive  (Nonreactive)     
     
                          Hep Bs Antigen            Negative  (Negative)     
     
                          Hepatitis C Antibody      Non-Reactive  (Nonreactive)     
     
                          Hepatitis C Ab (EIA)           
     
                          Chlamydia DNA (KIRSTEN)       Negative  (Negative)     
     
                          N.gonorrhoeae DNA (KIRSTEN)   Negative  (Negative)     
     
                          HIV 1&2 Antibody          Non Reactive  (Non Reactive)    
     
     
                          Glucose 1 Hr 50 gm        162 mg/dL ()  H    
     
                          Rhogam given:             No    
    
    
    
Assessment & Plan    
(1) Gestational diabetes:     
QUALIFIERS:               Gestational diabetes mellitus control: diet-controlled  
  Trimester: third trimester  Qualified Code(s): O24.410 - Gestational diabetes   
mellitus in pregnancy, diet controlled    
COMMENT:       Does not check often or correct times. Elevated readings noted   
over a 7 day course of readings.  Continue to check QID. Consult dietitian. 36   
wk growth US    
(2) Abnormal glucose affecting pregnancy:     
COMMENT:       failed 1 hour and threw up 3 hour, tracking sugars at home. <95   
fasting. 2hr around 110. Enc to bring readings in    
(3) History of recurrent miscarriages:     
(4) Marijuana abuse:     
COMMENT:       random tox.  +25- patient quit!    
(5) Scoliosis:     
COMMENT:       Does report causes occasional pain.    
(6) Bipolar disorder:     
QUALIFIERS:               Active/Remission status: remission status unspecified   
 Qualified Code(s): F31.9 - Bipolar disorder, unspecified    
COMMENT:       Not on meds.    
(7) Supervision of high risk pregnancy, antepartum:     
COMMENT:       PRR  ASHELY 26 girl Irma Finn (sister-8  
 years old-has custody) , BF Alexander    
(8) Pregnancy:     
QUALIFIERS:               Weeks of gestation: 38 weeks  Qualified Code(s):   
Z3A.38 - 38 weeks gestation of pregnancy    
COMMENT:       GBS neg, nl anatomy, nl growth    
PLAN:     
Plan    
Patient presents IAL, plan expectant management for , pitocin  PRN - however   
cx is already 9 cm     
Pain management: has epidural.        
GBS negative .    
Management of any pregnancy complications: see above     
I have reviewed the PFSH and made any clinically relevant updates.

## 2025-02-07 NOTE — DCINST_ITS
Discharge Instructions    
Diet    
Discharge Diet: No restrictions    
DC O2, CPAP, BIPAP needs    
Home O2 Discharge instructions: No    
Dressing / Incision    
Discharge Activity: Return to Normal Activity, May Not Drive (while taking   
narcotic pain medications.) and May Shower    
May resume sexual activity in: 4-6 weeks    
Dressing / Incision    
Call your doctor if your incision/area has: Continuous Slow Oozing, Sudden   
Increased Bleeding, Increased Pain/ Swelling, Increased Redness and Foul   
Smelling Discharge    
Follow Up Care    
Please Follow Up With: Amanda Graham DO    
When: Call [phone] to make an appointment with your doctor in 6 weeks. If   
you had elevated blood pressure or 4th degree laceration, you will need to be   
seen in 2 weeks.    
Test Results:     
Test results from this visit will be discussed in further detail at your follow-  
up appointment, if applicable.    
    
    
Discharge Plan    
Admission    
Admit Date/Time: 02/07/25 03:53    
    
Attending Provider: Amanda Graham    
    
Primary Care Provider: Care Physician,Kayleen Primary    
    
Discharge Orders/Prescriptions    
Prescriptions:    
No Action    
  Prenatal  mg capsule     
   200 mg PO DAILY     
  (DME) Blood Glucose Test  Strip     
   See Rx Instructions   .MEDSUPPLY Qty: 120 5RF    
   Rx Instructions:    
   As directed-fasting & 2 hr post meals    
  (DME) blood-glucose meter  Misc     
   See Rx Instructions   .MEDSUPPLY Qty: 1 0RF    
   Rx Instructions:    
   As directed- Test fasting and 2 hours after meals    
  (DME) lancets  Misc     
   See Rx Instructions   .MEDSUPPLY Qty: 200 5RF    
   Rx Instructions:    
   As directed-fasting & 2 hr post meals    
    
Referrals / Follow Up:    
Care Physician,No Primary [Primary Care Provider] -

## 2025-02-07 NOTE — EX.PCM.OBVAG
Assessment & Plan
(1) Gestational diabetes: 
QUALIFIERS:               Gestational diabetes mellitus control: diet-controlled  Trimester: third trimester  Qualified Code(s): O24.410 - Gestational diabetes mellitus in pregnancy, diet controlled
COMMENT:       Does not check often or correct times. Elevated readings noted over a 7 day course of readings.  Continue to check QID. Consult dietitian. 36 wk growth US
(2) Abnormal glucose affecting pregnancy: 
COMMENT:       failed 1 hour and threw up 3 hour, tracking sugars at home. <95 fasting. 2hr around 110. Enc to bring readings in
(3) History of recurrent miscarriages: 
(4) Marijuana abuse: 
COMMENT:       random tox.  +25- patient quit!
(5) Scoliosis: 
COMMENT:       Does report causes occasional pain.
(6) Bipolar disorder: 
QUALIFIERS:               Active/Remission status: remission status unspecified  Qualified Code(s): F31.9 - Bipolar disorder, unspecified
COMMENT:       Not on meds.
(7) Supervision of high risk pregnancy, antepartum: 
COMMENT:       PRR  ASHELY 25 girl Irma Finn (sister-8 years old-has custody) , BF Alexander
(8) Pregnancy: 
QUALIFIERS:               Weeks of gestation: 38 weeks  Qualified Code(s): Z3A.38 - 38 weeks gestation of pregnancy
COMMENT:       GBS neg, nl anatomy, nl growth

Maternal Data
Information
ASHELY Calculator
 Estimated Delivery Date Method Current WG
Current Estimate 25 Ultrasound #1 39w 0d
Other Estimates 25 LMP (Uncertain) 35w 1d

Final ASHELY Source: US <20 weeks

Vaginal Delivery
Maternal Presentation
Maternal Presentation: Active Labor and Spontaneous Rupture of Membranes
Vaginal Delivery Information
Procedure Performed: Spontaneous Vaginal Delivery and Shoulder Dystocia Maneuvers Delivery maneuver performed for shoulder dystocia: Placido maneuver, Suprapubic pressure and Posterior arm extraction Head to body interval: 00:50
Surgeon/Practitioner: Amanda Graham
Date of Procedure: 25
Pre-Procedure Diagnosis: active labor, srom  at 39 weeks, gestational diabetes, 
Post-Procedure Diagnosis: active labor, srom  at 39 weeks, gestational diabetes, , shoulder dystocia
Type of anesthesia: Epidural
Estimated Blood Loss: 200cc
Time of Delivery: 07:05
Findings
Description of procedure: 
Patient began pushing and delivered the head in the PASCUAL presentation.  The head was delivered atraumatically..  The anterior  shoulder was not delivering quickly. The patient was placed in supine position from her side position and mcrobert's was 
performed. Suprapubic was next followed by the posterior arm. The arm delivered shortly after the nurse yelled out  30 seconds and  the rest of the infant and the infant was placed on the maternal abdomen. The total time of the shoulder dystocia was 
50 seconds.  Delayed cord clamping was employed for approximately 60 seconds.  Cord was clamped and cut and gentle traction was applied to the cord and the placenta delivered spontaneously immediately following it was noted to be intact with 
three-vessel cord.  The perineum and vagina were inspected and noted to have no laceration.  EBL was 200cc.  Patient and infant tolerated delivery well.
Fetal Presentation: Vertex
Amniotic Membrane Rupture Type: Spontaneous
Amniotic Fluid Description: Clear
Placental Delivery Description: Spontaneous
Placenta Disposition: [facility]
Specimen collected: No
Fetal Cord Vessel Description: 3 Vessels
Cord Entanglement: None
Infant A Gender: Female
Apgar (1 minute): 8
Apgar (5 minute): 9
Delayed Cord Clamping: Yes
First Assist
FIRST Assistant: No
Post Vaginal Deli
Medications given after delivery: IV Pitocin and IM Pitocin
Episiotomy Description: None
Laceration: None
Complication
Complications: No

Multi Select Codes
Urinary/Genital
Urinary/Genital CPT Codes: 94471 Vaginal Delivery+  Care([facility])

## 2025-02-07 NOTE — OB.VAGDELI_ITS
Assessment & Plan    
(1) Gestational diabetes:     
QUALIFIERS:               Gestational diabetes mellitus control: diet-controlled  
 Trimester: third trimester  Qualified Code(s): O24.410 - Gestational diabetes   
mellitus in pregnancy, diet controlled    
COMMENT:       Does not check often or correct times. Elevated readings noted   
over a 7 day course of readings.  Continue to check QID. Consult dietitian. 36   
wk growth US    
(2) Abnormal glucose affecting pregnancy:     
COMMENT:       failed 1 hour and threw up 3 hour, tracking sugars at home. <95   
fasting. 2hr around 110. Enc to bring readings in    
(3) History of recurrent miscarriages:     
(4) Marijuana abuse:     
COMMENT:       random tox.  +25- patient quit!    
(5) Scoliosis:     
COMMENT:       Does report causes occasional pain.    
(6) Bipolar disorder:     
QUALIFIERS:               Active/Remission status: remission status unspecified   
Qualified Code(s): F31.9 - Bipolar disorder, unspecified    
COMMENT:       Not on meds.    
(7) Supervision of high risk pregnancy, antepartum:     
COMMENT:       PRR  ASHELY 26 girl Irma Finn (sister-8  
years old-has custody) , BF Alexander    
(8) Pregnancy:     
QUALIFIERS:               Weeks of gestation: 38 weeks  Qualified Code(s):   
Z3A.38 - 38 weeks gestation of pregnancy    
COMMENT:       GBS neg, nl anatomy, nl growth    
    
Maternal Data    
Information    
ASHELY Calculator    
    
    
                                Estimated Delivery Date Method          Current     
WG    
     
                Current Estimate 25        Ultrasound #1   39w 0d    
     
                Other Estimates 25        LMP (Uncertain) 35w 1d    
    
    
Final ASHELY Source: US <20 weeks    
    
Vaginal Delivery    
Maternal Presentation    
Maternal Presentation: Active Labor and Spontaneous Rupture of Membranes    
Vaginal Delivery Information    
Procedure Performed: Spontaneous Vaginal Delivery and Shoulder Dystocia   
Maneuvers Delivery maneuver performed for shoulder dystocia: Placido maneuver,  
Suprapubic pressure and Posterior arm extraction Head to body interval: 00:50    
Surgeon/Practitioner: Amanda Graham    
Date of Procedure: 25    
Pre-Procedure Diagnosis: active labor, srom  at 39 weeks, gestational diabetes,   
    
Post-Procedure Diagnosis: active labor, srom  at 39 weeks, gestational diabetes,  
, shoulder dystocia    
Type of anesthesia: Epidural    
Estimated Blood Loss: 200cc    
Time of Delivery: 07:05    
Findings    
Description of procedure:     
Patient began pushing and delivered the head in the PASCUAL presentation.  The head   
was delivered atraumatically..  The anterior  shoulder was not delivering   
quickly. The patient was placed in supine position from her side position and   
mcrobert's was performed. Suprapubic was next followed by the posterior arm. The  
arm delivered shortly after the nurse yelled out  30 seconds and  the rest of   
the infant and the infant was placed on the maternal abdomen. The total time of   
the shoulder dystocia was 50 seconds.  Delayed cord clamping was employed for   
approximately 60 seconds.  Cord was clamped and cut and gentle traction was   
applied to the cord and the placenta delivered spontaneously immediately   
following it was noted to be intact with three-vessel cord.  The perineum and   
vagina were inspected and noted to have no laceration.  EBL was 200cc.  Patient   
and infant tolerated delivery well.    
Fetal Presentation: Vertex    
Amniotic Membrane Rupture Type: Spontaneous    
Amniotic Fluid Description: Clear    
Placental Delivery Description: Spontaneous    
Placenta Disposition: [facility]    
Specimen collected: No    
Fetal Cord Vessel Description: 3 Vessels    
Cord Entanglement: None    
Infant A Gender: Female    
Apgar (1 minute): 8    
Apgar (5 minute): 9    
Delayed Cord Clamping: Yes    
First Assist    
FIRST Assistant: No    
Post Vaginal Deli    
Medications given after delivery: IV Pitocin and IM Pitocin    
Episiotomy Description: None    
Laceration: None    
Complication    
Complications: No    
    
Multi Select Codes    
Urinary/Genital    
Urinary/Genital CPT Codes: 41219 Vaginal Delivery+  Care([facility])

## 2025-02-07 NOTE — PCM.DC
Discharge Instructions
Diet
Discharge Diet: No restrictions
DC O2, CPAP, BIPAP needs
Home O2 Discharge instructions: No
Dressing / Incision
Discharge Activity: Return to Normal Activity, May Not Drive (while taking narcotic pain medications.) and May Shower
May resume sexual activity in: 4-6 weeks
Dressing / Incision
Call your doctor if your incision/area has: Continuous Slow Oozing, Sudden Increased Bleeding, Increased Pain/ Swelling, Increased Redness and Foul Smelling Discharge
Follow Up Care
Please Follow Up With: Amanda Graham DO
When: Call [phone] to make an appointment with your doctor in 6 weeks. If you had elevated blood pressure or 4th degree laceration, you will need to be seen in 2 weeks.
Test Results: 
Test results from this visit will be discussed in further detail at your follow-up appointment, if applicable.


Discharge Plan
Admission
Admit Date/Time: 02/07/25 03:53

Attending Provider: Amanda Graham

Primary Care Provider: Care Physician,Kayleen Primary

Discharge Orders/Prescriptions
Prescriptions:
No Action
  Prenatal  mg capsule 
   200 mg PO DAILY 
  (DME) Blood Glucose Test  Strip 
   See Rx Instructions   .MEDSUPPLY Qty: 120 5RF
   Rx Instructions:
   As directed-fasting & 2 hr post meals
  (DME) blood-glucose meter  Misc 
   See Rx Instructions   .MEDSUPPLY Qty: 1 0RF
   Rx Instructions:
   As directed- Test fasting and 2 hours after meals
  (DME) lancets  Misc 
   See Rx Instructions   .MEDSUPPLY Qty: 200 5RF
   Rx Instructions:
   As directed-fasting & 2 hr post meals

Referrals / Follow Up:
Care Physician,No Primary [Primary Care Provider] -

## 2025-02-07 NOTE — CASEMGMT
Social Work

CHAYO received call from nurse Carolyn in , stating a worker from [facility] was present and requesting to speak to CHAYO.  CHAYO spoke to Ashwini over the phone, Ashwini requested information regarding tox screen results from mom and 
baby.  CHAYO gave information that mom did not have a tox screen completed and baby had a tox screen that was negative for all substances. No further information requested. Ashwini stated she felt the visit would be short and if there were any 
additional concerns or questions she would reach back out. 

Sahara Kennedy, MSW, LSW

## 2025-02-07 NOTE — XMS RPT_ITS
Comprehensive CCD (C-CDA v2.1)  
  
                          Created on: 2025  
  
  
DANNA SOARES  
External Reference #: CDR,PersonID:[id number]  
: 2000  
Sex: Female  
  
Demographics  
  
  
                                        Address             [address]  
[address]  
[place]  [address]  
   
                                        Home Phone          [phone]  
   
                                        Home Phone          [phone]  
   
                                        Home Phone          [phone]  
   
                                        Home Phone          [phone]  
   
                                        Home Phone          [phone]  
   
                                        Home Phone          [phone]  
   
                                        Home Phone          [phone]  
   
                                        Home Phone          [phone]  
   
                                        Preferred Language  en  
   
                                        Marital Status      Single  
   
                                        Congregation Affiliation Unknown  
   
                                        Race                White  
   
                                        Ethnic Group        Not  or Lati  
no  
  
  
Author  
  
  
                                        Organization        [facility]  
  
  
Care Team Providers  
  
  
                                Care Team Member Name Role            Phone  
   
                                Jono Mitchell Unavailable     Unavailable  
   
                                Rings, Dylan Unavailable     Unavailable  
   
                                Rings Dylan Unavailable     Unavailable  
   
                                Jono Mitchell M Unavailable     Unavailable  
   
                                Jono Mitchell Unavailable     Unavailable  
   
                                Jono Mitchell Unavailable     Unavailable  
   
                                Jono Mitchell Unavailable     Unavailable  
   
                                Derrick Ravin W Unavailable     Unavailable  
   
                                Louise Mezashua W Unavailable     Unavailable  
   
                                Shaker, Jerri   Unavailable     Unavailable  
   
                                Dennise, Chelsie     Unavailable     Unavailable  
   
                                Haines Pooja Unavailable     Unavailable  
   
                                Memberg, Emma P Unavailable     Unavailable  
   
                                Jono Mitchell Unavailable     Unavailable  
   
                                No, Physician   Primary Care Provider Unavailabl  
e  
   
                                Unknown, Referring Provider Unavailable     Unav  
ailable  
   
                                Jono Mitchell Unavailable     Unavailable  
   
                                Memberg, Emma P Unavailable     Unavailable  
   
                                Marizol, Marguerite  Unavailable     Unavailable  
   
                                Kovalenko, Simran Unavailable     Unavailable  
   
                                No, Physician   Primary Care Provider Unavailabl  
e  
   
                                No, Physician   Unavailable     Unavailable  
   
                                Required, No Pcp Unavailable     Unavailable  
   
                                Moomaw, Priscilla I   Unavailable     Unavailable  
   
                                Jonathan Mathis  Unavailable     [phone]  
   
                                Nicolas Connolly Unavailable     [phone]  
[phone]  
   
                                Ivan Coley Unavailable     UnavailMitra Espinosa     Unavailable     Unavailable  
   
                                Unavailable     Primary Care Provider Unavailabl  
e  
   
                                Unavailable     Primary Care Provider Unavailabl  
e  
   
                                Generic Provider MD, No Assigned Pcp Primary Car  
e Provider Unavailable  
   
                                PROVIDER, UNKNOWN Admitting       Unavailable  
   
                                AXEL RADFORD  Attending       Unavailable  
   
                                IVAN COLEY Attending       Unavailable  
   
                                IVAN COLEY Attending       Unavailable  
   
                                CHRISTIANO SANTOS Attending       Unavailable  
   
                                WINSTON GORDILLO   Attending       Unavailable  
   
                                BOB COOPER Attending       Unavailable  
   
                                JUANCHO APODACA    Admitting       Unavailable  
   
                                CONSULT, NEUROLOGY Consulting      Unavailable  
   
                                ELMIRA MANCINI Attending       Unavailable  
   
                                NO, PHYSICIAN   Primary Care    Unavailable  
   
                                SHANAE HERRON Admitting       Unavailable  
   
                                NO, PHYSICIAN   Primary Care    Unavailable  
   
                                [facility], GENERIC Consulting      Unavai  
LARRY Cook     Attending       Unavailable  
   
                                NO, PHYSICIAN   Primary Care    Unavailable  
   
                                NO, PHYSICIAN   Primary Care    Unavailable  
   
                                ADAMS ASHRAF Attending       Unava  
ilable  
   
                                NO, PHYSICIAN   Primary Care    Unavailable  
   
                                JABARI MARIEE Attending       Unavailable  
   
                                NATALI PEREZ Referring       Unavailabl  
e  
   
                                [facility]       Primary Care    Unavailable  
   
                                SARATH HUTCHISON Attending       Unavailable  
  
  
  
Allergies  
  
  
                                                    Allergy   
Classification                          Reported   
Allergen(s)               Allergy Type              Date of   
Onset                     Reaction(s)               Facility  
   
                                                      
(18 sources)                            codeine;   
Translations:   
[codeine]                 Drug Allergy              10-  
3                                       Delirium,   
Anxiety,   
Psychosis,   
Unknown                                 [facility]   
Repository  
   
                                        Comment on above:    freaks out    
   
                                                      
(6 sources)                             polyethylene   
glycol 3350;   
Translations:   
[MiraLax]           Drug Allergy                            Hives/Urticari  
a                                       [facility]   
Repository  
   
                                                      
(8 sources)                             POLYETHYLENE   
GLYCOL 3350;   
Translations:   
[POLYETHYLENE   
GLYCOL 3350]              Drug Allergy                
9                         Hives                     [facility]  
   
                                                      
(3 sources)                             strawberry   
allergenic   
extract;   
Translations:   
[STRAWBERRY]              Drug Allergy                
6                         [facility]  
   
                                                      
(3 sources)                             polyethylene   
glycol 300;   
Translations:   
[POLYETHYLENE   
GLYCOL]                   Drug Allergy              10-  
3                                                   [facility]  
   
                                                      
(1 source)                Strawberry                Propensity to   
adverse   
reactions to   
drug                                      
6                         Rash                      [facility]  
Work Phone:   
[phone]  
   
                                                      
(2 sources)                             Other (Review   
Comments!);   
Translations:   
[OTHER (REVIEW   
COMMENTS!)]                             Propensity to   
adverse   
reactions to   
drug                                      
1                                                   [facility]  
Work Phone:   
[phone]  
  
  
  
Medications  
Current Medications  
  
  
  
                      Medication Drug Class(es) Dates      Sig (Normalized) Sig   
(Original)  
   
                                                    cyclobenzaprine   
hydrochloride 5 mg   
oral tablet  
(5 sources)               Muscle Relaxant           Start:   
  
1                                       take 1 tablet by   
mouth three times   
daily for pain                          cyclobenzaprine 5   
mg oral tablet ; 1   
tab(s) orally 3   
times a day, As   
Needed -for pain   
Quantity: 12   
Refills: 0 Ordered:   
7-Dec-2021 Priscilla Zepeda I Start:   
7-Dec-2021 Status:   
Other Generic   
Substitution   
Allowed Comments:   
Some   
non-prescription   
drugs may aggravate   
your condition.   
Read all labels   
carefully. If a   
warning appears,   
check with your   
doctor before   
taking.This drug   
may impair the   
ability to drive or   
operate machinery.   
Use care until you   
become familiar   
with its effects.  
   
                                        Comment on above:   Some non-prescriptio  
n drugs may aggravate your condition. Read  
  
all labels carefully. If a warning appears, check with your   
doctor before taking.This drug may impair the ability to drive or   
operate machinery. Use care until you become familiar with its   
effects.   
   
                                                    dicyclomine   
hydrochloride 20 mg   
oral tablet  
(8 sources)               Anticholinergic           Start:   
  
4  
End:   
  
4                                       inject 1 dose by   
intramuscular   
injection once                          20 mg,   
Intramuscular,   
ONCE, 1 dose, On   
Sun 24 at 1815  
  
  
  
                                                    Start: 2024  
End: 02-                         take 1 tablet by mouth four   
times daily before mealtime             dicyclomine (Bentyl) 20 mg tablet   
Indications: Abdominal pain,   
unspecified abdominal location Take   
1 tablet (20 mg) by mouth 4 times a   
day before meals. 120 tablet 0   
2024 02/10/2025 Active  
   
                                        Start: 2022   take 1 tablet by bjorn  
th four   
times daily                             dicyclomine 20 mg oral tablet ; 1   
tab(s) orally 4 times a day (1 hour   
before meals and at bedtime)   
Quantity: 20 Refills: 0 Ordered:   
9-Mar-2022 Lei Anderson   
Start: 9-Mar-2022 Status:   
Discontinued Generic Substitution   
Allowed Comments: May cause   
drowsiness. Alcohol may intensify   
this effect. Use care when   
operating dangerous machinery.  
   
                                                                dicyclomine 20 m  
g oral tablet ;   
orally 3 times a day Quantity: 0   
Refills: 0 Ordered: 30-Oct-2016   
Cooper Sierra Status: Discontinued   
Generic Substitution Allowed  
  
  
  
                                        Comment on above:   May cause drowsiness  
. Alcohol may intensify this effect. Use   
care when operating dangerous machinery.   
   
                                                    famotidine 20 mg oral   
tablet  
(5 sources)                             Histamine-2 Receptor   
Antagonist                                          take 1 tablet   
by mouth twice   
daily                                   famotidine 20 mg oral   
tablet ; 1 tab(s)   
orally 2 times a day   
Quantity: 0 Refills:   
0 Ordered:   
30-Oct-2016 Cooper Sierra Status:   
Discontinued Generic   
Substitution Allowed  
   
                                                    ferrous sulfate 325 mg   
oral tablet  
(5 sources)                                                     ferrous sulfate   
325   
mg (65 mg elemental   
iron) oral tablet ;   
orally once a day   
Quantity: 0 Refills:   
0 Ordered:   
30-Oct-2016 Cooper Sierra Status:   
Discontinued Generic   
Substitution Allowed  
   
                                                    hydrocortisone 10   
mg/ml vaginal cream  
(1 source)                Corticosteroid            Start:   
                                                 hydrocortisone 1 %   
cream Apply topically   
2 times daily. 2   
times daily 30 g 1   
2013 Active  
   
                                                    hyoscyamine sulfate   
0.125 mg oral tablet  
(1 source)                                          Start:   
                                     take 1 tablet   
by mouth every   
four hours                              hyoscyamine (Anaspaz,   
Levsin) 0.125 mg   
tablet Indications:   
Nausea and vomiting,   
unspecified vomiting   
type Take 1 tablet   
(0.125 mg) by mouth   
every 4 hours if   
needed for cramping   
for up to 7 days. 28   
tablet 0 2024   
Active  
   
                                                    ibuprofen 200 mg oral   
tablet  
(1 source)                              Nonsteroidal   
Anti-inflammatory   
Drug                                    Start:   
  
End:   
                                     take 2 tablets   
by mouth every   
eight hours as   
needed                                  ibuprofen   
(ADVIL,MOTRIN) 200 MG   
tablet Take 2 (two)   
tablets (400 mg   
total) by mouth every   
8 (eight) hours as   
needed for pain . 30   
tablet 0 2021 Active  
   
                                                    1 ml ketorolac   
tromethamine 15 mg/ml   
cartridge  
(2 sources)                             Nonsteroidal   
Anti-inflammatory   
Drug, Cyclooxygenase   
Inhibitor                               Start:   
  
End:   
                                                 ketorolac (TORADOL)   
15 MG/ML injection  
  
  
  
                                                    Start: 2024  
End: 2024                                     30 mg, Intravenous, ONCE, 1   
dose, On Sun 2/11/24 at 1900  
  
  
  
                                                    50 ml magnesium sulfate 40   
mg/ml injection  
(1 source)                                          Start: 2024  
End: 2024                                     2 g, Intravenous, Administer  
 over   
4 Hours, DAILY, First dose on 24 at 0600, Until   
Discontinued, Give if magnesium   
is less than 2 on morning labs.   
Infuse at a rate of 0.5 gm/hour.  
  
  
  
                                                    Start: 2024  
End: 2024                                     2 g, Intravenous, Administer  
 over 4 Hours, DAILY, First dose on  
  
24 at 0600, Until Discontinued, Give if magnesium is   
less than 2 on morning labs. Infuse at a rate of 0.5 gm/hour.  
  
  
  
                                                    melatonin 5 mg oral   
tablet  
(5 sources)                                                 take 1 tablet by   
mouth once daily   
at bedtime                              Melatonin 5 mg oral   
tablet ; 1 tab(s)   
orally once a day   
(at bedtime)   
Quantity: 0   
Refills: 0 Ordered:   
30-Oct-2016 Cooper Sierra Status:   
Discontinued   
Generic   
Substitution   
Allowed  
   
                                                    nitrofurantoin,   
macrocrystals 25 mg /   
nitrofurantoin,   
monohydrate 75 mg   
oral capsule  
(2 sources)                             Nitrofuran   
Antibacterial                           Start:   
20  
End:   
20                                      take 1 dose by   
mouth once at   
mealtime                                100 mg, Oral, ONCE,   
1 dose, On 24 at 2030,   
Administer with   
food  
  
  
  
                                                    Start: 2024  
End: 2024                         take 1 capsule by mouth   
twice daily                             Nitrofurantoin, macrocrystal-monohydrate  
,   
100 MG capsule Take 1 capsule by mouth 2   
times daily for 5 days. Take w/ food/milk   
10 capsule 2024 Active  
  
  
  
                                                    omeprazole 20 mg   
delayed release oral   
capsule  
(5 sources)                             Proton Pump   
Inhibitor                                           take 1 capsule   
by mouth once   
daily                                   omeprazole 20 mg   
oral delayed release   
capsule ; 1 cap(s)   
orally once a day   
Quantity: 0 Refills:   
0 Ordered:   
30-Oct-2016 Cooper Sierra Status:   
Discontinued Generic   
Substitution Allowed  
   
                                                    ondansetron 4 mg   
disintegrating oral   
tablet  
(20 sources)                            Serotonin-3   
Receptor Antagonist                     Start:   
20                                      take 1 tablet   
by mouth every   
eight hours as   
needed                                  Ondansetron 4 MG Tab   
Dispersible tablet   
Take 1 tablet by   
mouth every 8 hours   
as needed for   
Nausea. Place on   
tongue 10 tablet   
2024 Active  
  
  
  
                                                    Start: 2024  
End: 2024                                     ondansetron (ZOFRAN) 4 MG/2M  
L   
injection  
   
                                                    Start: 2024  
End: 2024                                     ondansetron (ZOFRAN) 4 MG/2M  
L   
injection  
   
                                                    Start: 2024  
End: 2024                         take 4 mg intravenously every   
four hours as needed                    4 mg, Intravenous, EVERY 4 HOURS   
AS NEEDED, Starting on Sun 24   
at 1939, Until Mon 24 at   
2143, Nausea / Vomiting  
   
                                                    Start: 2024  
End: 2024                                     4 mg, Intravenous, ONCE, 1 d  
ose,   
On Sun 24 at 1715  
   
                                        Start: 2024   take 1 tablet by bjorn  
th every   
eight hours for nausea                  ondansetron ODT (Zofran-ODT) 4 mg   
disintegrating tablet Indications:   
Nausea and vomiting, unspecified   
vomiting type Take 1 tablet (4 mg)   
by mouth every 8 hours if needed   
for nausea or vomiting. 30 tablet   
0 2024 Active  
   
                                                    Start: 2022  
End: 2022                         take 1 tablet by mouth three   
times daily                             ondansetron 4 mg oral tablet,   
disintegrating ; 1 tab(s) orally 3   
times a day Quantity: 0 Refills: 0   
Ordered: 10-Mar-2022 Meaghan Morales   
Start: 6-Mar-2022 End: 16-Mar-2022   
Generic Substitution Allowed  
   
                                Start: 2022                 Zofran 8 mg or  
al tablet ; 1 tab(s)   
orally 3 times, As Needed -for   
nausea and vomiting Quantity: 21   
Refills: 0 Ordered: 2022   
Becky Hyman Start: 2022   
Status: Other Generic Substitution   
Allowed  
   
                                        Start: 10-   take 1 tablet by bjorn  
th every   
eight hours as needed for nausea        Ondansetron 4 MG Oral Tablet   
Disintegrating TAKE 1 TABLET Every   
8 hours PRN Nausea Quantity: 20   
Refills: 0 Chelsie Cuevas   
Start : 18-Oct-2019 Active  
   
                                        Start: 10-   take 1 tablet by bjorn  
th four times   
daily as needed for nausea and   
vomiting                                ondansetron 4 mg oral tablet,   
disintegrating ; 1 tab(s) orally 4   
times a day, As Needed -for nausea   
and vomiting Quantity: 10 Refills:   
0 Ordered: 23-May-2020 Priscilla Zepeda Start: 23-May-2020 Status:   
Completed Generic Substitution   
Allowed  
  
  
  
                                                    oseltamivir 75 mg   
oral capsule  
(1 source)                              Neuraminidase   
Inhibitor                               Start:   
2022  
End: 2022                         take 1   
capsule by   
mouth twice   
daily                                   oseltamivir 75 mg   
oral capsule ; 1   
cap(s) orally 2   
times a day   
Quantity: 2 Refills:   
0 Ordered:   
12-Mar-2022 Alyssa Beckwith Start:   
13-Mar-2022 End:   
13-Mar-2022 Generic   
Substitution Allowed   
Comments: Check with   
your doctor before   
becoming   
pregnant.Finish all   
this medication   
unless otherwise   
directed by   
prescriber.  
   
                                        Comment on above:   Check with your doct  
or before becoming pregnant.Finish all   
this   
medication unless otherwise directed by prescriber.   
   
                                                    Potassium Chloride  
(1 source)                                          Start:   
2024  
End: 2024                                     Potassium chloride   
(K-DUR) tablet ER 40   
mEq  
   
                                                    promethazine   
hydrochloride 25 mg   
rectal suppository  
(19 sources)              Phenothiazine             Start:   
2022                                          Promethegan 25 mg   
rectal suppository ;   
1 suppository(ies)   
rectally every 6   
hours Quantity: 10   
Refills: 0 Ordered:   
9-Mar-2022 Lei Anderson Start:   
9-Mar-2022 Status:   
Discontinued Generic   
Substitution Allowed   
Comments: For rectal   
use only.Keep in   
refrigerator. Do not   
freeze.May cause   
drowsiness. Alcohol   
may intensify this   
effect. Use care   
when operating   
dangerous   
machinery.Obtain   
medical advice   
before taking any   
non-prescription   
drugs as some may   
affect the action of   
this medication.  
  
  
  
                                Start: 01-                 Phenadoz 25 mg  
 rectal suppository ; 1   
suppository(ies) rectally 3 times a   
day, As Needed -for nausea and   
vomiting Quantity: 12 Refills: 0   
Ordered: 10-Franco-2022 Keya, Priscilla I   
Start: 10-Franco-2022 Status:   
Discontinued Generic Substitution   
Allowed Comments: For rectal use   
only.Keep in refrigerator. Do not   
freeze.May cause drowsiness. Alcohol   
may intensify this effect. Use care   
when operating dangerous   
machinery.Obtain medical advice   
before taking any non-prescription   
drugs as some may affect the action   
of this medication.  
   
                                        Start: 01-   take 1 tablet by bjorn  
th three   
times daily for nausea and   
vomiting                                promethazine 25 mg oral tablet ; 1   
tab(s) orally 3 times a day, As   
Needed -for nausea and vomiting   
Quantity: 12 Refills: 0 Ordered:   
10-Franco-2022 Morocaelw, Priscilla I Start:   
10-Franco-2022 Status: Discontinued   
Generic Substitution Allowed   
Comments: May cause drowsiness.   
Alcohol may intensify this effect.   
Use care when operating dangerous   
machinery.Obtain medical advice   
before taking any non-prescription   
drugs as some may affect the action   
of this medication.  
   
                                                    Start: 2020  
End: 07-                         take 1 tablet by mouth every   
six hours                               promethazine 25 mg oral tablet ; 1   
tab(s) orally every 6 hours Quantity:   
12 Refills: 0 Ordered: 12-Jul-2020   
nAíbal Gustafson Start:   
2020 End: 15-Jul-2020 Status:   
Discontinued Generic Substitution   
Allowed Comments: May cause   
drowsiness. Alcohol may intensify   
this effect. Use care when operating   
dangerous machinery.Obtain medical   
advice before taking any   
non-prescription drugs as some may   
affect the action of this medication.  
   
                                                    Start: 2020  
End: 2020                         take 1 tablet by mouth three   
times daily for nausea                  promethazine 25 mg oral tablet ; 1   
tab(s) orally 3 times a day, As   
Needed for nausea/vomiting Quantity:   
21 Refills: 0 Ordered: 30-May-2020   
Hernesto Vazquez Start: 30-May-2020   
End: 2020 Status: Completed   
Generic Substitution Allowed   
Comments: May cause drowsiness.   
Alcohol may intensify this effect.   
Use care when operating dangerous   
machinery.Obtain medical advice   
before taking any non-prescription   
drugs as some may affect the action   
of this medication.  
  
  
  
                                        Comment on above:   May cause drowsiness  
. Alcohol may intensify this effect. Use   
care when operating dangerous machinery.Obtain medical advice before taking   
any non-prescription drugs as some may affect the action of this medication.   
   
                                                            For rectal use only.  
Keep in refrigerator. Do not freeze.May cause drowsiness.   
Alcohol may intensify this effect. Use care when operating   
dangerous machinery.Obtain medical advice before taking any non-prescription 
drugs as some may affect the action of this medication.   
  
  
  
Completed/Discontinued Medications  
  
  
  
                      Medication Drug Class(es) Dates      Sig (Normalized) Sig   
(Original)  
   
                                                    acetaminophen 325 mg   
oral tablet  
(1 source)                                          Start:   
2024  
End:   
2024                              take 1 tablet by   
mouth every four   
hours as needed                           
   
                                                    Ampicillin-Sulbactam   
Sodium (UNASYN) 3 g   
in sodium chloride   
0.9% (MB PLUS) 100 mL   
(total volume) IVPB  
(1 source)                                          Start:   
2024  
End:   
2024                                          3 g, Intravenous,   
Administer over   
30 Minutes, ONCE,   
1 dose, On Sun   
24 at 1930,   
Contains a   
penicillin.  
   
                                                    azithromycin 500 mg   
oral tablet  
(3 sources)                             Macrolide   
Antimicrobial                           Start:   
2019                                          Azithromycin 500   
MG Oral Tablet 2   
tablets p.o. x 1   
Quantity: 2   
Refills: 0 Chelsie Cuevas   
Start :   
2019   
Active  
   
                                                    cephalexin 500 mg   
oral capsule  
(10 sources)                            Cephalosporin   
Antibacterial                           Start:   
10-  
End:   
10-                              take 1 capsule by   
mouth twice daily                       Keflex 500 mg   
oral capsule ; 1   
cap(s) orally 2   
times a day x 5   
days Quantity: 10   
Refills: 0   
Ordered:   
17-Oct-2019   
Priscilla Zepeda   
Start:   
17-Oct-2019 End:   
21-Oct-2019   
Status:   
Discontinued   
Generic   
Substitution   
Allowed Comments:   
Finish all this   
medication unless   
otherwise   
directed by   
prescriber.  
   
                                        Comment on above:   Finish all this medi  
cation unless otherwise directed by   
prescriber.   
   
                                                    Dextrose 5% 1,000 mL   
with Sodium   
bicarbonate 100 mEq   
IV solution  
(1 source)                                          Start:   
2024  
End:   
2024                                          Intravenous,   
CONTINUOUS,   
Starting on 24 at 1200,   
Until 24   
at 2143  
   
                                                    diphenhydrAMINE  
(3 sources)                             Histamine-1   
Receptor Antagonist                     Start:   
2024  
End:   
2024                                          25 mg,   
Intravenous,   
ONCE, 1 dose, On   
24 at   
1830  
  
  
  
                                                    Start: 2024  
End: 2024                                     12.5 mg, Intravenous, ONCE,   
1 dose, On Sun 2/11/24 at 1715  
   
                                                    Start: 2020  
End: 2020                                     diphenhydrAMINE (BENADRYL) i  
njection 25 mg  
  
  
  
                                                    docusate sodium 100   
mg oral capsule  
(5 sources)                                         Start:   
2016  
End: 2017                         take 1 capsule by   
mouth once daily                        docusate sodium   
100 mg oral   
capsule ; 1   
cap(s) orally   
once a day   
Quantity: 60   
Refills: 0   
Ordered:   
3-Nov-2016 Tevin Pink Start:   
3-Nov-2016 End:   
2017   
Status:   
Discontinued   
Generic   
Substitution   
Allowed  
   
                                                    doxycycline hyclate   
100 mg oral tablet  
(3 sources)                             Tetracycline-cla  
ss Drug                                 Start:   
2019                              take 4 tablets by   
mouth once                              Doxycycline   
Hyclate 100 MG   
Oral Tablet TAKE   
4 TABLET Once   
take all pills at   
once today   
Quantity: 4   
Refills: 0   
Pooja Haines MD Start :   
2019   
Active  
   
                                                    doxylamine succinate   
25 mg oral tablet  
(1 source)                                          Start:   
2020                              take 0.5 tablet   
by mouth once   
daily as needed   
for nausea                              Unisom SleepTabs   
25 MG Oral Tablet   
Take 1/2 tablet   
for nausea once a   
day, as needed   
Quantity: 30   
Refills: 3   
Simran Hart MD Start :   
25-Aug-2020   
Active  
   
                                                    0.4 ml enoxaparin   
sodium 100 mg/ml   
prefilled syringe  
(1 source)                              Low Molecular   
Weight Heparin                          Start:   
2024  
End: 2024                         inject 40 mg by   
subcutaneous   
injection once   
daily at bedtime                        40 mg,   
Subcutaneous,   
DAILY AT BEDTIME,   
First dose on 24 at 2100,   
Until   
Discontinued,   
Indications:   
DVT/PE   
prophylaxis  
   
                                                    Flintstones Plus   
Iron CHEW  
(1 source)                                          Start:   
2020                                          Flintstones Plus   
Iron CHEW CHEW   
AND SWALLOW 1   
TABLET DAILY.   
Quantity: 30   
Refills: 3   
Simran Hart MD Start :   
25-Aug-2020   
Active  
   
                                                    2 ml metoclopramide   
5 mg/ml prefilled   
syringe  
(6 sources)                             Dopamine-2   
Receptor   
Antagonist                              Start:   
2024  
End: 2024                                     10 mg,   
Intravenous,   
ONCE, 1 dose, On   
24 at   
1800  
  
  
  
                                                    Start: 2024  
End: 2024                         take 10 mg intravenously every   
six hours as needed                     10 mg, Intravenous, EVERY 6 HOURS   
AS NEEDED, Starting on 24   
at 0918, Until 24 at   
2143, Nausea / Vomiting, 2nd line   
N/V  
   
                                                    Start: 2024  
End: 2024                                     10 mg, Intravenous, ONCE, 1   
dose,   
On Sun 2/11/24 at 1715  
   
                                        Start: 2020   take 1 tablet by bjorn  
 every six   
hours as needed                         Metoclopramide HCl - 10 MG Oral   
Tablet TAKE 1 TABLET EVERY 6 HOURS   
AS NEEDED. Quantity: 30 Refills: 3   
Simran Hart MD Start :   
25-Aug-2020 Active  
   
                                                    Start: 2020  
End: 2020                                     metoclopramide (REGLAN) inje  
ction   
10 mg  
  
  
  
                                                    100 ml   
metroNIDAZOLE 5   
mg/ml injection  
(1 source)                              Nitroimidazole   
Antimicrobial                           Start:   
2024  
End:   
2024                              take 500 mg   
intravenously   
every eight hours                       500 mg,   
Intravenous, at   
200 mL/hr,   
Administer over   
30 Minutes, EVERY   
8 HOURS   
NON-STANDARD,   
First dose on 24 at 1500,   
Until   
Discontinued  
   
                                                    Multivitamin   
preparation  
(5 sources)                                                 take 1 tablet by   
mouth once daily                        Vitamins for Hair   
oral tablet ; 1   
tab(s) orally   
once a day   
Quantity: 0   
Refills: 0   
Ordered:   
30-May-2020 Melva Nieto Status:   
Completed Generic   
Substitution   
Allowed  
  
  
  
                                                take 1 tablet by mouth once noah  
y Vitamins for Hair oral tablet ; 1 tab(s)   
orally   
once a day Quantity: 0 Refills: 0 Ordered:   
30-May-2020 Melva Nieto Generic Substitution   
Allowed  
  
  
  
                                                    pantoprazole 40 mg   
injection  
(4 sources)                             Proton Pump   
Inhibitor                               Start: 2024  
End: 2024                                     40 mg, Intravenous,   
EVERY 12 HOURS, First   
dose on 24 at   
1230, Until   
Discontinued, Dilute   
each 40 mg vial with 10   
mL of NS. All bolus   
doses, whether 40 mg or   
80 mg, should be   
administered over at   
least two minutes.,   
Indications: Inpt   
Stress Ulcer   
Prophylaxis  
  
  
  
                                        Start: 2022   take 1 tablet by bjorn  
th twice   
daily                                   Protonix 40 mg oral delayed release   
tablet ; 1 tab(s) orally 2 times a day   
Quantity: 28 Refills: 0 Ordered:   
2022 Becky Hyman Start:   
2022 Status: Other Generic   
Substitution Allowed Comments: It is   
very important that you take or use   
this exactly as directed. Do not skip   
doses or discontinue unless directed by   
your doctor.Obtain medical advice   
before taking any non-prescription   
drugs as some may affect the action of   
this medication.Swallow whole. Do not   
crush.  
  
  
  
                                        Comment on above:   It is very important  
 that you take or use this exactly as   
directed. Do not skip doses or discontinue unless directed by   
your doctor.Obtain medical advice before taking any   
non-prescription drugs as some may affect the action of this   
medication.Swallow whole. Do not crush.   
   
                                                    potassium   
bicarbonate 25 meq   
effervescent oral   
tablet  
(1 source)                                          Start:   
  
End:   
                                     take 1 dose by   
mouth once                              50 mEq, Oral, ONCE,   
1 dose, On 24 at 2030  
   
                                                    Potassium Chloride /   
Sodium Chloride  
(1 source)                                          Start:   
  
End:   
                                                 40 mEq,   
Intravenous, at 125   
mL/hr, Administer   
over 4 Hours, ONCE,   
1 dose, On 24 at 1300  
   
                                                    predniSONE 50 mg   
oral tablet  
(5 sources)                                         Start:   
  
End:   
                                     take 1 tablet   
by mouth once   
daily at   
mealtime                                predniSONE 50 mg   
oral tablet ; 1   
tab(s) orally once   
a day x 5 days   
Quantity: 5   
Refills: 0 Ordered:   
7-Dec-2021 Priscilla Zepeda Start:   
7-Dec-2021 End:   
11-Dec-2021 Status:   
Completed Generic   
Substitution   
Allowed Comments:   
It is very   
important that you   
take or use this   
exactly as   
directed. Do not   
skip doses or   
discontinue unless   
directed by your   
doctor.Obtain   
medical advice   
before taking any   
non-prescription   
drugs as some may   
affect the action   
of this   
medication.Take   
with food or milk.  
   
                                        Comment on above:   It is very important  
 that you take or use this exactly as   
directed. Do not skip doses or discontinue unless directed by   
your doctor.Obtain medical advice before taking any   
non-prescription drugs as some may affect the action of this   
medication.Take with food or milk.   
   
                                                    Prenatal   
Multivitamins with   
Folic Acid 1.25 mg   
oral capsule  
(5 sources)                                         Start:   
  
End:   
08-10-2  
020                                                 Prenatal   
Multivitamins with   
Folic Acid 1.25 mg   
oral capsule ; 1   
cap(s) orally once   
a day Quantity: 30   
Refills: 0 Ordered:   
2020   
Aníbal Gustafson   
Start: 2020   
End: 10-Aug-2020   
Status: Completed   
Generic   
Substitution   
Allowed Comments:   
May discolor urine   
or feces.  
  
  
  
                                                    Start: 2020  
End: 08-                                     Prenatal Multivitamins with   
Folic Acid 1.25 mg oral capsule ; 1  
  
cap(s) orally once a day Quantity: 30 Refills: 0 Ordered:   
2020 Aníbal Gustafson Start: 2020 End:   
10-Aug-2020 Generic Substitution Allowed Comments: May discolor   
urine or feces.  
  
  
  
                                        Comment on above:   May discolor urine o  
r feces.   
   
                                                    Prenatal Vitamin TABS  
(2 sources)                                                     Prenatal Vitamin  
 TABS Refills:   
0 Active  
  
  
  
                                                                Prenatal Vitamin  
 TABS Refills: 0 DO Active  
  
  
  
                                                    prochlorperazine 5 mg/ml   
injectable solution  
(6 sources)               Phenothiazine             Start: 2024  
End: 2024                                     prochlorperazine   
(Compazine) injection 5   
mg  
  
  
  
                                Start: 2020                 prochlorperazi  
ne 25 mg rectal suppository ; 1   
suppository(ies)   
rectally 2 times a day Quantity: 20 Refills: 4 Ordered:   
2020 Be Olson Start: 2020 Status: Completed   
Generic Substitution Allowed Comments: Avoid prolonged or   
excessive exposure to direct and/or artificial sunlight while   
taking this medication.For rectal use only.It is very important   
that you take or use this exactly as directed. Do not skip doses   
or discontinue unless directed by your doctor.May cause   
drowsiness or dizziness.Obtain medical advice before taking any   
non-prescription drugs as some may affect the action of this   
medication.  
  
  
  
                                        Comment on above:   Avoid prolonged or e  
xcessive exposure to direct and/or   
artificial   
sunlight while taking this medication.For rectal use only.It is   
very important that you take or use this exactly as directed. Do   
not skip doses or discontinue unless directed by your doctor.May   
cause drowsiness or dizziness.Obtain medical advice before taking   
any non-prescription drugs as some may affect the action of this   
medication.   
   
                                                    Promethazine   
(PHENERGAN) 12.5 mg   
in Sodium chloride   
0.9%, with overfill   
60.5 mL (total   
volume) IVPB  
(2 sources)                                         Start:   
2024  
End:   
2024                                    take 12.5 mg   
intravenously every   
four hours as   
needed                                  Promethazine   
(PHENERGAN) 12.5   
mg in Sodium   
chloride 0.9%,   
with overfill 60.5   
mL (total volume)   
IVPB  
  
  
  
                                                    Start: 2024  
End: 2024                         take 12.5 mg intravenously every   
four hours as needed                    12.5 mg, Intravenous, at 121-242   
mL/hr, Administer over 15-30   
Minutes, EVERY 4 HOURS AS NEEDED,   
Starting on Sun 24 at 2134,   
Until Mon 24 at 0919, Nausea   
/ Vomiting, Total dose is 25 mg,   
which will be two bags of 12.5mg   
each Extravasation Risk  
  
  
  
                                                    Senokot TABS  
(2 sources)                                                     Senokot TABS Ref  
ills: 0 Active  
  
  
  
                                                                Senokot TABS Ref  
ills: 0 DO Active  
  
  
  
                                                    sertraline 25 mg   
oral tablet  
(10 sources)                            Serotonin   
Reuptake   
Inhibitor                               Start: 2016  
End: 2017                         take 1 tablet   
by mouth once   
daily in the   
morning                                 sertraline 25 mg   
oral tablet ; 1   
tab(s) orally once   
a day (in the   
morning) Quantity:   
60 Refills: 0   
Ordered: 3-Nov-2016   
Tevin Pink   
Start: 3-Nov-2016   
End: 2017   
Status:   
Discontinued   
Generic   
Substitution   
Allowed  
  
  
  
                                                take 1 tablet by mouth once noah  
y Zoloft 50 mg oral tablet ; 1 tab(s) orally   
once a   
day Quantity: 0 Refills: 0 Ordered: 30-Oct-2016   
Rigo Cooper Status: Discontinued Generic   
Substitution Allowed  
  
  
  
                                                    1000 ml sodium chloride 9   
mg/ml injection  
(5 sources)                                         Start: 2024  
End: 2024                                     1,000 mL, Intravenous, Admin  
ister   
over 60 Minutes, ONCE, 1 dose, On   
24 at 1830  
  
  
  
                                                    Start: 2024  
End: 2024                                     sodium chloride 0.9 % bolus   
1,000 mL  
   
                                                    Start: 2024  
End: 2024                                     Intravenous, at 125 mL/hr, C  
ONTINUOUS, Starting on Sun 24   
at   
1945, Until 24 at 1037  
   
                                                    Start: 2024  
End: 2024                                     1,000 mL, Intravenous, ONCE,  
 1 dose, On Sun 24 at 1715  
  
  
  
                                                    sucralfate 1000 mg   
oral tablet  
(3 sources)               Aluminum Complex          Start: 2022  
End: 2022                                     sucralfate 1 g oral   
tablet ; 1 tab(s)   
orally 4 times a   
day -.Meds to Beds   
- 4 Times a Day   
Before Meals   
Quantity: 120   
Refills: 0 Ordered:   
2022 Becky Hyman Start:   
2022 End:   
2022 Status:   
Other Generic   
Substitution   
Allowed  
   
                                                    vitamin b6 100 mg   
oral tablet  
(1 source)                              Start: 2020   take 1 tablet   
by mouth once   
daily                                   B6 Natural 100 MG   
Oral Tablet TAKE 1   
TABLET DAILY AS   
DIRECTED. Quantity:   
30 Refills: 3   
Simran Hart MD   
Start : 25-Aug-2020   
Active  
  
  
  
Problems  
Active Problems  
  
  
                                                    Problem   
Classification  Problem         Date            Documented Date Episodic/Chronic  
   
                                                    Abdominal pain  
(2 sources)                             Unspecified   
abdominal pain;   
Translations:   
[Unspecified   
abdominal pain]                         Onset:   
2024                                          Episodic  
   
                                                    Bacterial infection;   
unspecified site  
(5 sources)                             Chlamydial   
infection;   
Translations:   
[Chlamydia   
infection]                                                  Episodic  
   
                                                    Developmental   
disorders  
(1 source)                              Dyslexia;   
Translations:   
[Dyslexia and   
alexia]                                 Onset:   
2013                Chronic  
   
                                                    Fluid and electrolyte   
disorders  
(10 sources)                            Acute hypokalemia;   
Translations:   
[Hypopotassemia]                        Onset:   
2024                Episodic  
   
                                                    Genitourinary   
symptoms and   
ill-defined   
conditions  
(2 sources)                             At risk of urinary   
tract infection;   
Translations: [At   
risk of UTI (urinary   
tract infection)]                                           Episodic  
   
                                                    Influenza  
(1 source)                              Influenza due to   
Influenza A virus;   
Translations:   
[Influenza with   
other respiratory   
manifestations]                         03-          Episodic  
   
                                                    Influenza  
(1 source)      Influenza                       03-        
   
                                                    Mood disorders  
(2 sources)                             Depressive disorder;   
Translations:   
[Depression]                                                Chronic  
   
                                                    Nonmalignant breast   
conditions  
(1 source)                              Pain of breast;   
Translations:   
[Postpartum breast   
pain]                                                       Episodic  
   
                                                    Other complications   
of birth; puerperium   
affecting management   
of mother  
(1 source)                              Disorder of breast   
associated with   
childbirth;   
Translations:   
[Postpartum bloody   
nipple discharge]                                           Episodic  
   
                                                    Other complications   
of pregnancy  
(6 sources)                             Missed miscarriage;   
Translations:   
[, missed]                                          Episodic  
   
                                                    Other complications   
of pregnancy  
(3 sources)                             Retained products of   
conception;   
Translations:   
[Retained products   
of conception]                                              Episodic  
   
                                                    Other complications   
of pregnancy  
(1 source)                              Psychological   
disorder during   
pregnancy;   
Translations:   
[Bipolar disease   
during pregnancy,   
antepartum]                                                 Episodic  
   
                                                    Other complications   
of pregnancy  
(1 source)                              Hyperemesis   
gravidarum;   
Translations: [Mild   
hyperemesis   
gravidarum]                             2024          Episodic  
   
                                                    Other complications   
of pregnancy  
(2 sources)                             Vomiting of   
pregnancy,   
unspecified;   
Translations:   
[Vomiting of   
pregnancy,   
unspecified]                            Onset:   
2024                                          Episodic  
   
                                                    Other nervous system   
disorders  
(1 source)                              Cognitive deficit in   
communication   
skills;   
Translations:   
[Cognitive   
communication   
deficit]                                Onset:   
2013                Chronic  
   
                                                    Other nutritional;   
endocrine; and   
metabolic disorders  
(2 sources)                             Hypomagnesemia;   
Translations:   
[Disorders of   
magnesium   
metabolism]                             2022          Chronic  
   
                                                    Other pregnancy and   
delivery including   
normal  
(12 sources)                            Teenage pregnancy;   
Translations: [Urine   
pregnancy test   
positive]                                                   Episodic  
   
                                                    Other skin disorders  
(1 source)                              Eruption;   
Translations: [Rash]                                         Episodic  
   
                                                    Spondylosis;   
intervertebral disc   
disorders; other back   
problems  
(1 source)                              Lumbago with   
sciatica;   
Translations:   
[Sciatica]                              12-          Episodic  
   
                                                    Substance-related   
disorders  
(3 sources)                             Smoker;   
Translations:   
[Nicotine   
dependence,   
unspecified,   
uncomplicated]                          Onset:   
2024                Chronic  
   
                                                    Syncope  
(6 sources)                             Syncope;   
Translations:   
[Syncope and   
collapse]                               Onset:   
2022                Episodic  
   
                                        Comment on above:   SYNCOPE   
   
                                                    Unclassified  
(2 sources)                             BACK PAIN NO KNOWN   
INJURY                                  2021            
   
                                        Comment on above:   BACK PAIN NO KNOWN I  
NJURY   
   
                                                    Unclassified  
(1 source)                              Low back pain with   
sciatica                                2021            
   
                                                    Unclassified  
(2 sources)                             Cannabinoid   
hyperemesis syndrome                     2022            
   
                                                    Unclassified  
(1 source)      Left sided colitis                 2022        
   
                                                    Unclassified  
(1 source)      Unilateral weakness                 2022        
   
                                                    Unclassified  
(2 sources)     FLU-LIKE SYMPTOMS                 03-        
   
                                        Comment on above:   FLU-LIKE SYMPTOMS   
   
                                                    Unclassified  
(2 sources)               Vomitting                 Onset:   
2024                                            
   
                                                    Urinary tract   
infections  
(1 source)                              Acute cystitis;   
Translations: [Acute   
cystitis without   
hematuria]                              2024          Episodic  
  
  
Past or Other Problems  
  
  
                      Problem Classification Problem    Date       Documented Da  
te Episodic/Chronic  
   
                                                    Epilepsy; convulsions  
(2 sources)                             Unspecified   
convulsions;   
Translations:   
[Unspecified   
convulsions]                            Onset:   
02-                                          Episodic  
   
                                                    Intracranial injury  
(1 source)                              Concussion injury of   
body structure;   
Translations:   
[Concussion]                            Onset:   
2013                Episodic  
   
                                                    Malaise and fatigue  
(2 sources)                             Asthenia;   
Translations: [Other   
malaise and fatigue]                    Onset:   
2013                Episodic  
   
                                                    Nausea and vomiting  
(20 sources)                            Nausea and vomiting;   
Translations:   
[Vomiting]                              Onset:   
02-                01-                Episodic  
   
                                        Comment on above:   VOMITING   
   
                                                            NAUSEA WITH VOMITING  
, UNSPECIFIED   
   
                                                    Noninfectious   
gastroenteritis  
(6 sources)                             Colitis;   
Translations: [Other   
and unspecified   
noninfectious   
gastroenteritis and   
colitis]                                Onset:   
2022                Episodic  
   
                                                    Other gastrointestinal   
disorders  
(3 sources)                             Diarrhea of presumed   
infectious origin;   
Translations:   
[Diarrhea,   
unspecified]                            Onset:   
2024                Episodic  
   
                                                    Other non-traumatic   
joint disorders  
(1 source)                              Patellofemoral stress   
syndrome;   
Translations: [Pain   
in joint, lower leg]                    Onset:   
2013                Episodic  
   
                                                    Rehabilitation care;   
fitting of prostheses;   
and adjustment of   
devices  
(2 sources)                             Patient encounter   
status; Translations:   
[Other physical   
therapy]                                Onset:   
2013                Episodic  
   
                                                    Substance-related   
disorders  
(2 sources)                             Cannabis use,   
unspecified,   
uncomplicated;   
Translations:   
[Cannabis use,   
unspecified,   
uncomplicated]                          Onset:   
02-                                          Episodic  
   
                                                    Syncope  
(1 source)      Syncope                         2022        
   
                                        Comment on above:   BLURRED VISION, ARM   
TINGLING, SYNCOPE, HIT HEAD AND LOC   
   
                                                    Unclassified  
(2 sources)                             Patient encounter   
status; Translations:   
[Screen for STD   
(sexually transmitted   
disease)]                                                     
   
                                                    NEGATED: Highlighted   
row has not   
occurred!Residual   
codes; unclassified  
(20 sources)    Disease                                         Episodic  
  
  
  
Results  
  
  
                          Test Name    Value        Interpretation Reference   
Range                                   Facility  
   
                                                    CBC, EDIF, PLATELETon 2024   
   
                          ABSOLUTE BASOPHIL COUNT 0.1 10*3/uL               0.0   
- 0.2   
10*3/uL                                 [facility]  
   
                      Basophils/100 WBC (Bld) 0.7 %                 0.0 - 2.0 %   
[facility]  
   
                                                    Differential cell count   
method Nom (Bld) AUTO DIFF                       %               [facility]  
   
                                                    Eosinophils (Bld)   
[#/Vol]             0.1 10*3/uL                             0.0 - 0.7   
10*3/uL                                 [facility]  
   
                                                    Eosinophils/100 WBC   
(Bld)           0.6 %                           0.0 - 11.0 %    [facility]  
   
                                                    Erythrocyte   
distribution width   
(RBC) [Ratio]       13.6 %                                  11.5 - 14.5   
%                                       [facility]  
   
                                                    Hematocrit (Bld)   
[Volume fraction]   39.2 %                                  36.0 - 48.0   
%                                       [facility]  
   
                                                    Hemoglobin (Bld)   
[Mass/Vol]      13.3 g/dL                                       [facility]  
   
                                                    Interpretation and   
review of laboratory   
results         Abnormal                                        [facility]  
   
                                                    Lymphocytes (Bld)   
[#/Vol]             1.8 10*3/uL                             1.2 - 3.4   
10*3/uL                                 [facility]  
   
                                                    Lymphocytes/100 WBC   
(Bld)               17.0 %              Low                 20.0 - 55.0   
%                                       [facility]  
   
                                                    MCH (RBC) [Entitic   
mass]               29.9 pg                                 26.0 - 35.0   
PG                                      [facility]  
   
                      MCHC (RBC) [Mass/Vol] 33.9 g/dL                        [facility]  
   
                      MCV (RBC) [Entitic vol] 88.1 fL                          [facility]  
   
                          Monocytes (Bld) [#/Vol] 1.1 10*3/uL  High         0.0   
- 0.7   
10*3/uL                                 [facility]  
   
                      Monocytes/100 WBC (Bld) 9.8 %                 0.0 - 10.0 %  
 [facility]  
   
                                                    Neutrophils (Bld)   
[#/Vol]             7.8 10*3/uL         High                1.4 - 6.5   
10*3/uL                                 [facility]  
   
                                                    Neutrophils/100 WBC   
(Bld)               71.9 %                                  37.0 - 75.0   
%                                       [facility]  
   
                                                    Platelet mean volume   
(Bld) [Entitic vol] 9.9 fL                                          [facility]  
   
                          Platelets (Bld) [#/Vol] 282 10*3/uL               130   
- 400   
10*3/uL                                 [facility]  
   
                          RBC (Bld) [#/Vol] 4.45 10*6/uL              4.0 - 5.4   
10*6/uL                                 [facility]  
   
                          WBC (Bld) [#/Vol] 10.8 10*3/uL              3.6 - 11.0  
   
10*3/uL                                 [facility]  
   
                                                    COMPREHENSIVE METABOLIC PANE  
[place] 2024   
   
                          Albumin [Mass/Vol] 4.1 G/dl                  3.5 - 5.0  
   
G/dl                                    [facility]  
   
                                                    Albumin/Globulin [Mass   
ratio]          1.3 {ratio}                     RATIO           [facility]  
   
                                                    ALP [Catalytic   
activity/Vol]   53 U/L                                          [facility]  
   
                                                    ALT [Catalytic   
activity/Vol]   32 U/L                          [facility]  
   
                                                    AST [Catalytic   
activity/Vol]   36 U/L                                          [facility]  
   
                      Bilirubin [Mass/Vol] 0.9 mg/dL                        [facility]  
   
                      Calcium [Mass/Vol] 9.2 mg/dL                        [facility]  
   
                      Chloride [Moles/Vol] 104 mmol/L                       [facility]  
   
                                        Comment on above:   Please note: Triglyc  
eride levels of 600mg/dL or higher may   
positively bias chloride results by approximately 2.1 mmol   
   
                      CO2 [Moles/Vol] 17 mmol/L  Critically low            [facility]  
   
                                        Comment on above:   Result called to and  
 read back by: CHEPE MCKEE 2024 @ 18:16   
by L   
   
                      Creatinine [Mass/Vol] 0.50 mg/dL Low                   [facility]  
   
                                        GFR COMMENT         Average GFR for 20-2  
9   
years old = 116.                                            [facility]  
   
                                        Comment on above:   Chronic Kidney disea  
se, GFR = <60.  
Kidney failure, GFR = <15.  
The GFR estimate is not adjusted for extreme body surface area   
or acute process, nor has it been validated for pregnant women   
or ethnic groups other than  and .  
  
   
   
                                                    GFR/1.73 sq M.predicted   
among blacks MDRD   
(S/P/Bld) [Vol   
rate/Area]          198 mL/min/{1.73_m2}                     ml/min/1.73s  
q.m                                     [facility]   
System  
   
                                                    GFR/1.73 sq M.predicted   
among non-blacks MDRD   
(S/P/Bld) [Vol   
rate/Area]          164 mL/min/{1.73_m2}                     ml/min/1.73s  
q.m                                     [facility]  
   
                                                    Glucose post fast   
[Mass/Vol]      90 mg/dL                                        [facility]  
   
                                        Comment on above:     
NORMAL <100 mg/dL  
PREDIABETES 101-126 mg/dL  
DIABETES 126 mg/dL or higher  
  
   
   
                                                    Interpretation and   
review of laboratory   
results         Abnormal                                        [facility]  
   
                      Potassium [Moles/Vol] 2.9 mmol/L Critically low             
 [facility]  
   
                                        Comment on above:   Result called to and  
 read back by: CHEPE MCKEE 2024 @ 18:15   
by [facility]   
   
                      Protein [Mass/Vol] 7.2 g/dL                         [facility]  
   
                      Sodium [Moles/Vol] 133 mmol/L Low                   [facility]  
   
                                                    Urea nitrogen   
[Mass/Vol]      7 mg/dL                                         [facility]  
   
                                                    LACTATE, BLOODon 2024   
   
                          Lactate [Moles/Vol] 1.7 mmol/L                0.7 - 2.  
0   
mmol/L                                  [facility]  
   
                                                    No Panel Informationon    
   
                                                    Interpretation and   
review of laboratory   
results         Abnormal                                        [facility]  
   
                                                    URINALYSIS, MACROon 20  
24   
   
                      Bilirubin Ql (U) MODERATE   Abnormal   NEGATIVE   [facility]   
System  
   
                      Clarity (U) CLEAR                 CLEAR      [facility]   
System  
   
                      Color (U)  YELLOW                YELLOW     [facility]   
System  
   
                                                    Glucose Test strip (U)   
[Mass/Vol]          Negative                                NEGATIVE   
mg/dl                                   [facility]  
   
                      Hemoglobin Ql (U) Negative              NEGATIVE   [facility]  
[facility]   
[facility]  
   
                          Ketones (U) [Mass/Vol] mg/dL        Abnormal     NEGAT  
BHAVANI   
mg/dl                                   [facility]  
   
                                                    Leukocyte esterase Test   
strip Ql (U)    TRACE           Abnormal        NEGATIVE        [facility]  
   
                      Nitrite Ql (U) Negative              NEGATIVE   [facility]   
System  
   
                      pH (U)     6.5 [pH]              5.0 - 7.0  [facility]  
   
                      Protein Ql (U) 30 mg/dl   Abnormal   NEGATIVE   [facility]   
System  
   
                                                    Specific gravity (U)   
[Rel density]       >1.030              High                1.010 -   
1.025                                   [facility]  
   
                                                    Urobilinogen (U)   
[Mass/Vol]      2.0 mg/dL       High                            [facility]  
   
                                                    URINE MICROSCOPICon 20  
24   
   
                                                    Bacteria LM.HPF (Urine   
sed) [#/Area]   2+              Abnormal        NEGATIVE        [facility]  
   
                                                    Casts LM.LPF (Urine   
sed) [#/Area]   NONE                            NONE /LPF       [facility]   
System  
   
                                                    Crystals LM Nom (Urine   
sed)            NONE                            NONE            [facility]   
System  
   
                                                    Epithelial cells LM Ql   
(Urine sed)     TOO NUMEROUS TO COUNT                 /HPF            [facility]  
th   
System  
   
                      Mucus Ql (Urine sed) 1+         Abnormal   NEGATIVE   [facility]   
System  
   
                                                    RBC LM.HPF (Urine sed)   
[#/Area]            Negative                                NEGATIVE   
/HPF                                    [facility]  
   
                                                    Urine sediment comments   
LM Kalpesh (Urine sed)                      POSSIBLY CONTAMINATED   
SPECIMEN, CULTURE MUST   
BE ORDERED SEPARATELY   
IF DEEMED NECESSARY.                                         [facility]  
   
                                                    WBC LM.HPF (Urine sed)   
[#/Area]            '5 TO 10                                NEGATIVE   
/HPF                                    [facility]  
   
                                                    ED Prov Noteon 2024   
   
                                        ED Prov Note        [place] [facility]  
   
Note:  
NAME: Danna Soares 23 y.o.   
CSN: [id number] MRN:   
[id number]  
PCP:  
Kayleen, Physician  
ED Course / Medical   
Decision Making:  
Patient is actively   
vomiting in the ER. She   
is approximately 14   
weeks  
pregnant. She cannot   
tell me when her last   
menstrual period was.   
She follows  
with an OB at   
[place]. She has had   
a history of nausea   
vomiting related to  
marijuana use. I   
discussed with patient   
antiemetics. She was   
concerned about  
Zofran because of the   
association between   
Zofran and cleft   
palate. I told her  
there is a association   
but this never been   
proven. She would have   
to give  
informed consent if she   
wants an antiemetic.   
Patient decided she   
wanted the  
Zofran. Patient is   
alert and oriented   
capable of making   
informed decision about  
her health care for her   
and her fetus. Patient   
has normal CBC.   
Chemistries are  
normal except for a low   
BUN of 5 and a CO2   
level of 18. Liver   
enzymes and  
amylase are normal. I   
felt this was most   
likely hyperemesis   
gravidarum  
secondary to pregnancy   
there could be a   
component of cyclic   
vomiting syndrome  
from marijuana use.   
Patient tells me she   
had a recent ultrasound   
done by her  
OB/GYN which showed   
intrauterine pregnancy.   
So there is no concern   
for ectopic  
pregnancy she has no   
pelvic pain. She has   
slight upper abdominal   
cramping  
related to the fact she   
has been vomiting. On   
recheck patient wanted   
to go  
home. She was not able   
to give us a urine.   
Apparently her ride   
needs to take  
her home. Patient   
discharged to follow-up   
with her OB/GYN. If   
worsening  
symptoms come back to   
the ER. Patient was   
able to hold down ice   
chips prior to  
discharge. Patient   
declined Zofran   
prescription  
Medical Decision Making  
Amount and/or   
Complexity of Data   
Reviewed  
Independent Historian:  
Details: Patient gave   
history  
Labs:  
Details: Reviewed  
Clinical Impression:  
1. Nausea/vomiting in   
pregnancy  
Disposition: Patient is   
being discharged to   
home  
History:  
Chief Complaint: Nausea   
(Pt to ER c/o nausea   
and vomiting for the   
past 24 hrs,  
pt states  Im 14 weeks   
pregnant, wanted to   
make sure I wasn't   
dehydrated,  
negative home covid   
test )  
HPI: The history was   
obtained from the   
patient. She is a 23   
y.o. female  
who presents with a   
chief complaint of   
Nausea (Pt to ER c/o   
nausea and vomiting  
for the past 24 hrs, pt   
states  Im 14 weeks   
pregnant, wanted to   
make sure I  
wasn't dehydrated,   
negative home covid   
test ).  
HPI is 14 weeks   
pregnant. She is a   
 4 para 1 with 2   
prior  
miscarriages. She is   
followed with an OB at   
[place]. She has had   
nausea  
vomiting for 2 days.   
She has not been able   
to stop with the   
vomiting. Patient  
has been seen in the   
past for cyclic   
vomiting syndrome   
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   
use.  
Patient states they   
always say that but she   
does not believe that   
is what causes  
her nausea vomiting.   
Patient cannot tell me   
when the last time she   
smoked  
marijuana. Patient   
denies any urinary   
symptoms. She has no   
vaginal bleeding or  
discharge. She has   
upper abdominal   
cramping after the   
vomiting. She had a  
recent ultrasound at   
her OBs office and she   
states she had   
intrauterine  
pregnancy.. She denies   
any vaginal bleeding or   
discharge. She has no   
urinary  
symptoms.  
PMHx: No past medical   
history on file.  
PMSx:  
Past Surgical History:  
Procedure Laterality   
Date  
ADENOIDECTOMY  
APPENDECTOMY  
ORTHOPEDIC SURGERY  
TONSILLECTOMY  
FAM. Hx:  
Family History  
Problem Relation Age of   
Onset  
Cancer Sister  
Cancer Maternal   
Grandmother  
Cancer Paternal   
Grandmother  
SOC. Hx:  
Social History  
Socioeconomic History  
Marital status: Single  
Tobacco Use  
Smoking status: Never  
Smokeless tobacco:   
Never  
Vaping Use  
Vaping status: Never   
Used  
Substance and Sexual   
Activity  
Alcohol use: Not   
Currently  
Drug use: Yes  
Types: Marijuana  
Social History   
Narrative  
** Merged History   
Encounter **  
Social Determinants of   
Health  
Food Insecurity: Food   
Insecurity Present   
(2024)  
Received from [facility],   
[facility]  
Hunger Vital Sign  
Worried About Running   
Out of Food in the Last   
Year: Sometimes true  
Ran Out of Food in the   
Last Year: Sometimes   
true  
Transportation Needs:   
No Transportation Needs   
(2024)  
Received from [facility],   
[facility]  
PRAPARE -   
Transportation  
Lack of Transportation   
(Medical): No  
Lack of Transportation   
(Non-Medical): No  
Housing Stability:   
Unknown (2024)  
Received from [facility],   
[facility]  
Housing Stability Vital   
Sign  
Unable to Pay for   
Housing in the Last   
Year: No  
Unstable Housing in the   
Last Year: No  
MEDs:  
Previous Medications  
Medication Sig  
dicyclomine (BENTYL) 20   
mg tablet Take 1 (one)   
tablet (20 mg total) by   
mouth 4  
(four) times a day as   
needed .  
famotidine (PEPCID) 20   
MG tablet Take 1 (one)   
tablet (20 mg total) by   
mouth 2 (more content   
not included)...    Normal                                  [facility]  
   
                                                    POC BASIC METABOLIC PANEL -   
[facility] 2024   
   
                      Chloride [Moles/Vol] 103 mmol/L Normal          [facility]  
   
                                        Comment on above:   Order Comment: [facility] has implemented   
the eGFR calculation approach that does not have a coefficient   
for race that conforms to the NKF-ASN Task Force   
Recommendations.   
   
                      CO2 [Moles/Vol] 18 mmol/L  Low        21-32      [facility]  
   
                                        Comment on above:   Order Comment: [facility] has implemented   
the eGFR calculation approach that does not have a coefficient   
for race that conforms to the NKF-ASN Task Force   
Recommendations.   
   
                      Creatinine [Mass/Vol] 0.54 mg/dL Normal     0.40-1.10  [facility]  
   
                                        Comment on above:   Order Comment: [facility] has implemented   
the eGFR calculation approach that does not have a coefficient   
for race that conforms to the NKF-ASN Task Force   
Recommendations.   
   
                      Glucose [Mass/Vol] 93 mg/dL   Normal     65-99      [facility]  
   
                                        Comment on above:   Order Comment: [facility] has implemented   
the eGFR calculation approach that does not have a coefficient   
for race that conforms to the NKF-ASN Task Force   
Recommendations.   
   
                      POC GFR    134 mL/min/1.73 m2 Normal     >=60       [facility]  
   
                                        Comment on above:   Order Comment: [facility] has implemented   
the eGFR calculation approach that does not have a coefficient   
for race that conforms to the NKF-ASN Task Force   
Recommendations.   
   
                                                            Result Comment: Xiao  
mated GFR was calculated using the    
CKD-EPI creatinine equation.   
   
                      POC IONIZED CALCIUM 4.5 mg/dL  Normal     4.5-5.3    [facility]  
   
                                        Comment on above:   Order Comment: [facility] has implemented   
the eGFR calculation approach that does not have a coefficient   
for race that conforms to the NKF-ASN Task Force   
Recommendations.   
   
                      Potassium [Moles/Vol] 3.6 mmol/L Normal     3.5-5.1    [facility]  
   
                                        Comment on above:   Order Comment: [facility] has implemented   
the eGFR calculation approach that does not have a coefficient   
for race that conforms to the NKF-ASN Task Force   
Recommendations.   
   
                      Sodium [Moles/Vol] 137 mmol/L Normal     135-145    [facility]  
   
                                        Comment on above:   Order Comment: [facility] has implemented   
the eGFR calculation approach that does not have a coefficient   
for race that conforms to the NKF-ASN Task Force   
Recommendations.   
   
                                                    Urea nitrogen   
[Mass/Vol]      5 mg/dL         Low                         [facility]  
   
                                        Comment on above:   Order Comment: [facility] has implemented   
the eGFR calculation approach that does not have a coefficient   
for race that conforms to the NKF-ASN Task Force   
Recommendations.   
   
                                                    POC CBC AND DIFFERENTIALon 0  
2024   
   
                                                    BASOPHILS ABSOLUTE   
COUNT           0.07 K/mcL      Normal          0.00-0.30       [facility]  
   
                      Basophils/100 WBC (Bld) 0.7 %      Normal                G  
[facility]  
   
                                                    Eosinophils (Bld)   
[#/Vol]         0.13 10*3/uL    Normal          0.00-0.50       [facility]  
   
                                                    Eosinophils/100 WBC   
(Bld)           1.3 %           Normal                          [facility]  
   
                                                    Erythrocyte   
distribution width   
(RBC) [Ratio]   13.2 %          Normal          11.6-14.8       [facility]  
   
                                                    Hematocrit (Bld)   
[Volume fraction] 38.3 %          Normal          36.0-46.0       [facility]  
   
                                                    Hemoglobin (Bld)   
[Mass/Vol]      12.9 g/dL       Normal          12.0-16.0       [facility]  
   
                      IG ABSOLUTE 0.04 K/mcL Normal     0.00-0.30  [facility]  
   
                      IG PERCENT 0.40 %     Normal                [facility]  
   
                                        Comment on above:   Result Comment: The   
IG parameter is the percentage of   
metamyelocytes, myelocytes and promyelocytes. An immature   
granulocyte count (IG) of 1% or more suggests the possibility   
of infection, an IG count of 3% is very likely related to an   
infection.   
   
                                                    Lymphocytes (Bld)   
[#/Vol]         1.90 10*3/uL    Normal          0.90-4.00       [facility]  
   
                                                    Lymphocytes/100 WBC   
(Bld)           18.6 %          Normal                          [facility]  
   
                                                    MCH (RBC) [Entitic   
mass]           30.1 pg         Normal          26.0-34.0       [facility]  
   
                      MCV (RBC) [Entitic vol] 89.3 fL    Normal     80.0-100.0 [facility]  
   
                                                    MEAN CORPUSCULAR   
HEMOGLOBIN CONC 33.7 g/dL       Normal          31.0-37.0       [facility]  
   
                      Monocytes (Bld) [#/Vol] 1.19 10*3/uL High       0.30-0.90   
 [facility]  
   
                      Monocytes/100 WBC (Bld) 11.6 %     Normal                [facility]  
   
                                                    NEUTROPHILS ABSOLUTE   
COUNT           6.89 K/mcL      Normal          1.70-7.00       [facility]  
   
                                                    Neutrophils/100 WBC   
(Bld)           67.4 %          Normal                          [facility]  
   
                                                    Platelet mean volume   
(Bld) [Entitic vol] 11.3 fL         Normal          9.4-12.4        [facility]  
   
                      Platelets (Bld) [#/Vol] 252 10*3/uL Normal     150-400      
[facility]  
   
                      RBC (Bld) [#/Vol] 4.29 10*6/uL Normal     4.00-5.20  [facility]  
   
                      WBC (Bld) [#/Vol] 10.22 10*3/uL Normal     4.50-11.00 [facility]  
   
                                                    POC LIVER PANEL PLUS [place]   
2024   
   
                      Albumin [Mass/Vol] 4.0 g/dL   Normal     3.2-5.2    [facility]  
   
                                                    ALP [Catalytic   
activity/Vol]   51 U/L          Normal                    [facility]  
   
                                                    Amylase [Catalytic   
activity/Vol]   84 U/L          Normal                    [facility]  
   
                                                    Amylase [Catalytic   
activity/Vol]   5 U/L           Low             7-33            [facility]  
   
                                                    AST [Catalytic   
activity/Vol]   18 U/L          Normal          0-45            [facility]  
   
                      Bilirubin [Mass/Vol] 0.6 mg/dL  Normal     0.0-1.3    [facility]  
   
                      POC ALT (SGPT) <          Normal     0-40       [facility]  
   
                      Protein [Mass/Vol] 7.3 g/dL   Normal     6.0-8.0    [facility]  
   
                                                    Basic metabolic 2000 panelon  
 2024   
   
                      Anion gap [Moles/Vol] 17 mmol/L  Normal     10-20      [facility]  
   
                                        Comment on above:   Performed By: #### 2  
4323-8 ####  
AIDEE RODRIGUES ([id number])  
[facility] ([facility])  
[address] [place] [id number]   
   
                      Calcium [Mass/Vol] 9.5 mg/dL  Normal     8.6-10.3   [facility]  
   
                                        Comment on above:   Performed By: #### 2  
4323-8 ####  
AIDEE RODRIGUES ([id number])  
[facility] ([facility])  
[facility] [place] [address]   
   
                      Chloride [Moles/Vol] 102 mmol/L Normal          [facility]  
   
                                        Comment on above:   Performed By: #### 2  
4323-8 ####  
AIDEE RODRIGUES ([id number])  
[facility] ([facility])  
[facility] [place] [address]   
   
                      CO2 [Moles/Vol] 20 mmol/L  Low        21-32      [facility]  
   
                                        Comment on above:   Performed By: #### 2  
4323-8 ####  
AIDEE RODRIGUES ([id number])  
[facility] ([facility])  
[address] [place] [address]   
   
                      Creatinine [Mass/Vol] 0.54 mg/dL Normal     0.50-1.05  [facility]  
   
                                        Comment on above:   Performed By: #### 2  
4323-8 ####  
AIDEE RODRIGUES ([id number])  
[facility] ([facility])  
[address] [place] [address]   
   
                                                    GFR/1.73 sq M.predicted   
MDRD (S/P/Bld) [Vol   
rate/Area]      mL/min/{1.73_m2} Normal          >60             [facility]  
   
                                        Comment on above:   Result Comment: Calc  
ulations of estimated GFR are performed   
using the  CKD-EPI Study Refit equation without the race   
variable for the IDMS-Traceable creatinine methods.  
https://jasn.asnjournals.org/content/early//ASN.  
785024   
   
                                                            Performed By: #### 2  
4323-8 ####  
AIDEE RODRIGUES ([id number])  
[facility] ([facility])  
[address] [place] [address]   
   
                      Glucose [Mass/Vol] 97 mg/dL   Normal     74-99      [facility]  
   
                                        Comment on above:   Performed By: #### 2  
4323-8 ####  
AIDEE RODRIGUES ([id number])  
[facility] ([facility])  
[facility] [place] [address]   
   
                      Potassium [Moles/Vol] 3.4 mmol/L Low        3.5-5.3    [facility]  
   
                                        Comment on above:   Performed By: #### 2  
4323-8 ####  
AIDEE RODRIGUES ([id number])  
[facility] ([facility])  
[facility] [place] [address]   
   
                      Sodium [Moles/Vol] 136 mmol/L Normal     136-145    [facility]  
   
                                        Comment on above:   Performed By: #### 2  
4323-8 ####  
AIDEE RODRIGUES ([id number])  
[facility] ([facility])  
[address] [place] [address]   
   
                                                    Urea nitrogen   
[Mass/Vol]      4 mg/dL         Low             6-23            [facility]  
   
                                        Comment on above:   Performed By: #### 2  
4323-8 ####  
AIDEE RODRIGUES ([id number])  
[facility] ([facility])  
[address] [place] [address]   
   
                          Anion gap [Moles/Vol] 17 mmol/L                 10 - 2  
0   
mmol/L                                  [facility]  
   
                          Calcium [Mass/Vol] 9.5 mg/dL                 8.6 - 10.  
3   
mg/dL                                   [facility]  
   
                          Chloride [Moles/Vol] 102 mmol/L                98 - 10  
7   
mmol/L                                  [facility]  
   
                          CO2 [Moles/Vol] 20 mmol/L    Low          21 - 32   
mmol/L                                  [facility]  
   
                          Creatinine [Mass/Vol] 0.54 mg/dL                0.50 -  
 1.05   
mg/dL                                   [facility]  
   
                      eGFR                             - PINF     [facility]  
   
                                        Comment on above:   Calculations of xiao  
mated GFR are performed using the    
CKD-EPI Study Refit equation without the race variable for the   
IDMS-Traceable creatinine methods.  
https://jasn.asnjournals.org/content/early//ASN.  
202480  
   
   
                          Glucose [Mass/Vol] 97 mg/dL                  74 - 99   
mg/dL                                   [facility]  
   
                                                    Interpretation and   
review of laboratory   
results         Abnormal                                        [facility]  
   
                          Potassium [Moles/Vol] 3.4 mmol/L   Low          3.5 -   
5.3   
mmol/L                                  [facility]  
   
                          Sodium [Moles/Vol] 136 mmol/L                136 - 145  
   
mmol/L                                  [facility]  
   
                                                    Urea nitrogen   
[Mass/Vol]      4 mg/dL         Low             6 - 23 mg/dL    [facility]  
   
                                                    CBC W Auto Differential pane  
l (Bld)on 2024   
   
                                                    Erythrocyte   
distribution width   
(RBC) [Ratio]   13.6 %          Normal          11.5-14.5       [facility]  
   
                                        Comment on above:   Order Comment: The p  
reviously reported component Neutrophils %  
   
is no longer being reported.The previously reported component   
Lymphocytes % is no longer being reported.The previously   
reported component Monocytes % is no longer being reported.The   
previously reported component Eosinophils % is no longer being   
reported.The previously reported component Basophils % is no   
longer being reported.The previously reported component   
Absolute Neutrophils is no longer being reported.The   
previously reported component Absolute Lymphocytes is no   
longer being reported.The previously reported component   
Absolute Monocytes is no longer being reported.The previously   
reported component Absolute Eosinophils is no longer being   
reported.The previously reported component Absolute Basophils   
is no longer being reported.   
   
                                                            Performed By: #### 2  
4323-8 ####  
HOSKINS RAVI ([id number])  
[facility] ([facility])  
[address] [place] [address]   
   
                                                    Hematocrit (Bld)   
[Volume fraction] 44.1 %          Normal          36.0-46.0       [facility]  
   
                                        Comment on above:   Order Comment: The p  
reviously reported component Neutrophils %  
  
is no longer being reported.The previously reported component   
Lymphocytes % is no longer being reported.The previously   
reported component Monocytes % is no longer being reported.The   
previously reported component Eosinophils % is no longer being   
reported.The previously reported component Basophils % is no   
longer being reported.The previously reported component   
Absolute Neutrophils is no longer being reported.The   
previously reported component Absolute Lymphocytes is no   
longer being reported.The previously reported component   
Absolute Monocytes is no longer being reported.The previously   
reported component Absolute Eosinophils is no longer being   
reported.The previously reported component Absolute Basophils   
is no longer being reported.   
   
                                                            Performed By: #### 2  
4323-8 ####  
AIDEE RODRIGUES ([id number])  
[facility] ([facility])  
[address] [place] [address]   
   
                                                    Hemoglobin (Bld)   
[Mass/Vol]      14.9 g/dL       Normal          12.0-16.0       [facility]  
   
                                        Comment on above:   Order Comment: The p  
reviously reported component Neutrophils %  
   
is no longer being reported.The previously reported component   
Lymphocytes % is no longer being reported.The previously   
reported component Monocytes % is no longer being reported.The   
previously reported component Eosinophils % is no longer being   
reported.The previously reported component Basophils % is no   
longer being reported.The previously reported component   
Absolute Neutrophils is no longer being reported.The   
previously reported component Absolute Lymphocytes is no   
longer being reported.The previously reported component   
Absolute Monocytes is no longer being reported.The previously   
reported component Absolute Eosinophils is no longer being   
reported.The previously reported component Absolute Basophils   
is no longer being reported.   
   
                                                            Performed By: #### 2  
4323-8 ####  
AIDEE RODRIGUES ([id number])  
[facility] ([facility])  
[address] [place] [address]   
   
                                                    Immature granulocytes   
(Bld) [#/Vol]   0.04 x10*3/uL   Normal          0.00-0.70       [facility]  
   
                                        Comment on above:   Order Comment: The p  
reviously reported component Neutrophils %  
   
is no longer being reported.The previously reported component   
Lymphocytes % is no longer being reported.The previously   
reported component Monocytes % is no longer being reported.The   
previously reported component Eosinophils % is no longer being   
reported.The previously reported component Basophils % is no   
longer being reported.The previously reported component   
Absolute Neutrophils is no longer being reported.The   
previously reported component Absolute Lymphocytes is no   
longer being reported.The previously reported component   
Absolute Monocytes is no longer being reported.The previously   
reported component Absolute Eosinophils is no longer being   
reported.The previously reported component Absolute Basophils   
is no longer being reported.   
   
                                                            Performed By: #### 2  
4323-8 ####  
AIDEE RODRIGUES ([id number])  
[facility] ([facility])  
[facility] [place] [id number]   
   
                                                    Immature   
granulocytes/100 WBC   
(Bld)           0.4 %           Normal          0.0-0.9         [facility]  
   
                                        Comment on above:   Order Comment: The p  
reviously reported component Neutrophils %  
   
is no longer being reported.The previously reported component   
Lymphocytes % is no longer being reported.The previously   
reported component Monocytes % is no longer being reported.The   
previously reported component Eosinophils % is no longer being   
reported.The previously reported component Basophils % is no   
longer being reported.The previously reported component   
Absolute Neutrophils is no longer being reported.The   
previously reported component Absolute Lymphocytes is no   
longer being reported.The previously reported component   
Absolute Monocytes is no longer being reported.The previously   
reported component Absolute Eosinophils is no longer being   
reported.The previously reported component Absolute Basophils   
is no longer being reported.   
   
                                                            Result Comment: Jossie  
ture Granulocyte Count (IG) includes   
promyelocytes, myelocytes and metamyelocytes but does not   
include bands. Percent differential counts (%) should be   
interpreted in the context of the absolute cell counts   
(cells/UL).   
   
                                                            Performed By: #### 2  
4323-8 ####  
AIDEE RODRIGUES ([id number])  
[facility] ([facility])  
[facility] [place] [address]   
   
                                                    MCH (RBC) [Entitic   
mass]           29.7 pg         Normal          26.0-34.0       [facility]  
   
                                        Comment on above:   Order Comment: The p  
reviously reported component Neutrophils %  
  
is no longer being reported.The previously reported component   
Lymphocytes % is no longer being reported.The previously   
reported component Monocytes % is no longer being reported.The   
previously reported component Eosinophils % is no longer being   
reported.The previously reported component Basophils % is no   
longer being reported.The previously reported component   
Absolute Neutrophils is no longer being reported.The   
previously reported component Absolute Lymphocytes is no   
longer being reported.The previously reported component   
Absolute Monocytes is no longer being reported.The previously   
reported component Absolute Eosinophils is no longer being   
reported.The previously reported component Absolute Basophils   
is no longer being reported.   
   
                                                            Performed By: #### 2  
4323-8 ####  
AIDEE RODRIGUES ([id number])  
[facility] ([facility])  
[facility] [place] [address]   
   
                      MCHC (RBC) [Mass/Vol] 33.8 g/dL  Normal     32.0-36.0  [facility]  
[facility]  
   
                                        Comment on above:   Order Comment: The p  
reviously reported component Neutrophils %  
   
is no longer being reported.The previously reported component   
Lymphocytes % is no longer being reported.The previously   
reported component Monocytes % is no longer being reported.The   
previously reported component Eosinophils % is no longer being   
reported.The previously reported component Basophils % is no   
longer being reported.The previously reported component   
Absolute Neutrophils is no longer being reported.The   
previously reported component Absolute Lymphocytes is no   
longer being reported.The previously reported component   
Absolute Monocytes is no longer being reported.The previously   
reported component Absolute Eosinophils is no longer being   
reported.The previously reported component Absolute Basophils   
is no longer being reported.   
   
                                                            Performed By: #### 2  
4323-8 ####  
AIDEE RODRIGUES ([id number])  
[facility] ([facility])  
[address] [place] [address]   
   
                      MCV (RBC) [Entitic vol] 88 fL      Normal          U  
[facility]  
   
                                        Comment on above:   Order Comment: The p  
reviously reported component Neutrophils %  
  
is no longer being reported.The previously reported component   
Lymphocytes % is no longer being reported.The previously   
reported component Monocytes % is no longer being reported.The   
previously reported component Eosinophils % is no longer being   
reported.The previously reported component Basophils % is no   
longer being reported.The previously reported component   
Absolute Neutrophils is no longer being reported.The   
previously reported component Absolute Lymphocytes is no   
longer being reported.The previously reported component   
Absolute Monocytes is no longer being reported.The previously   
reported component Absolute Eosinophils is no longer being   
reported.The previously reported component Absolute Basophils   
is no longer being reported.   
   
                                                            Performed By: #### 2  
4323-8 ####  
AIDEE RODRIGUES (03031)  
[facility] ([facility])  
[facility] [place] [address]   
   
                                                    Nucleated RBC/100 WBC   
(Bld) [Ratio]   0.0 /100 WBCs   Normal          0.0-0.0         [facility]  
   
                                        Comment on above:   Order Comment: The p  
reviously reported component Neutrophils %  
   
is no longer being reported.The previously reported component   
Lymphocytes % is no longer being reported.The previously   
reported component Monocytes % is no longer being reported.The   
previously reported component Eosinophils % is no longer being   
reported.The previously reported component Basophils % is no   
longer being reported.The previously reported component   
Absolute Neutrophils is no longer being reported.The   
previously reported component Absolute Lymphocytes is no   
longer being reported.The previously reported component   
Absolute Monocytes is no longer being reported.The previously   
reported component Absolute Eosinophils is no longer being   
reported.The previously reported component Absolute Basophils   
is no longer being reported.   
   
                                                            Performed By: #### 2  
4323-8 ####  
AIDEE RODRIGUES ([id number])  
[facility] ([facility])  
[facility] [place] [address]   
   
                      Platelets (Bld) [#/Vol] 306 x10*3/uL Normal     150-450     
 [facility]  
   
                                        Comment on above:   Order Comment: The p  
reviously reported component Neutrophils %  
   
is no longer being reported.The previously reported component   
Lymphocytes % is no longer being reported.The previously   
reported component Monocytes % is no longer being reported.The   
previously reported component Eosinophils % is no longer being   
reported.The previously reported component Basophils % is no   
longer being reported.The previously reported component   
Absolute Neutrophils is no longer being reported.The   
previously reported component Absolute Lymphocytes is no   
longer being reported.The previously reported component   
Absolute Monocytes is no longer being reported.The previously   
reported component Absolute Eosinophils is no longer being   
reported.The previously reported component Absolute Basophils   
is no longer being reported.   
   
                                                            Performed By: #### 2  
4323-8 ####  
AIDEE RODRIGUES (09158)  
[facility] ([facility])  
[address] [place] [address]   
   
                      RBC (Bld) [#/Vol] 5.01 x10*6/uL Normal     4.00-5.20  [facility]  
   
                                        Comment on above:   Order Comment: The p  
reviously reported component Neutrophils %  
   
is no longer being reported.The previously reported component   
Lymphocytes % is no longer being reported.The previously   
reported component Monocytes % is no longer being reported.The   
previously reported component Eosinophils % is no longer being   
reported.The previously reported component Basophils % is no   
longer being reported.The previously reported component   
Absolute Neutrophils is no longer being reported.The   
previously reported component Absolute Lymphocytes is no   
longer being reported.The previously reported component   
Absolute Monocytes is no longer being reported.The previously   
reported component Absolute Eosinophils is no longer being   
reported.The previously reported component Absolute Basophils   
is no longer being reported.   
   
                                                            Performed By: #### 2  
4323-8 ####  
AIDEE RODRIGUES (26070)  
[facility] ([facility])  
[address] [place] [address]   
   
                      WBC (Bld) [#/Vol] 10.6 x10*3/uL Normal     4.4-11.3   [facility]  
   
                                        Comment on above:   Order Comment: The p  
reviously reported component Neutrophils %  
   
is no longer being reported.The previously reported component   
Lymphocytes % is no longer being reported.The previously   
reported component Monocytes % is no longer being reported.The   
previously reported component Eosinophils % is no longer being   
reported.The previously reported component Basophils % is no   
longer being reported.The previously reported component   
Absolute Neutrophils is no longer being reported.The   
previously reported component Absolute Lymphocytes is no   
longer being reported.The previously reported component   
Absolute Monocytes is no longer being reported.The previously   
reported component Absolute Eosinophils is no longer being   
reported.The previously reported component Absolute Basophils   
is no longer being reported.   
   
                                                            Performed By: #### 2  
4323-8 ####  
AIDEE RODRIGUES (22054)  
[facility] ([facility])  
[address] [place] [address]   
   
                                                    Erythrocyte   
distribution width   
(RBC) [Ratio]       13.6 %                                  11.5 - 14.5   
%                                       [facility]  
   
                                                    Hematocrit (Bld)   
[Volume fraction]   44.1 %                                  36.0 - 46.0   
%                                       [facility]  
   
                                                    Hemoglobin (Bld)   
[Mass/Vol]          14.9 g/dL                               12.0 - 16.0   
g/dL                                    [facility]  
   
                                                    Immature granulocytes   
(Bld) [#/Vol]   0.04 10*3/uL                                    [facility]  
   
                                                    Immature   
granulocytes/100 WBC   
(Bld)           0.4 %                           0.0 - 0.9 %     [facility]  
   
                                        Comment on above:   Immature Granulocyte  
 Count (IG) includes promyelocytes,   
myelocytes and metamyelocytes but does not include bands.   
Percent differential counts (%) should be interpreted in the   
context of the absolute cell counts (cells/UL).   
   
                                                    Interpretation and   
review of laboratory   
results         Normal                                          [facility]  
   
                                                    MCH (RBC) [Entitic   
mass]               29.7 pg                                 26.0 - 34.0   
pg                                      [facility]  
   
                          MCHC (RBC) [Mass/Vol] 33.8 g/dL                 32.0 -  
 36.0   
g/dL                                    [facility]  
   
                      MCV (RBC) [Entitic vol] 88 fL                 80 - 100 fL   
[facility]  
   
                                                    Nucleated RBC/100 WBC   
(Bld) [Ratio]   0.0 %                                           [facility]  
   
                      Platelets (Bld) [#/Vol] 306 10*3/uL                         
[facility]  
   
                      RBC (Bld) [#/Vol] 5.01 10*6/uL                       [facility]  
[facility]  
   
                      WBC (Bld) [#/Vol] 10.6 10*3/uL                       [facility]  
   
                                                            The previously repor  
patricia   
component Neutrophils %   
is no longer being   
reported.The previously   
reported component   
Lymphocytes % is no   
longer being   
reported.The previously   
reported component   
Monocytes % is no   
longer being   
reported.The previously  
reported component   
Eosinophils % is no   
longer being   
reported.The previously   
reported component   
Basophils % is no   
longer being   
reported.The previously   
reported component   
Absolute Neutrophils is   
no longer being   
reported.The previously   
reported  
component Absolute   
Lymphocytes is no   
longer being   
reported.The previously   
reported component   
Absolute Monocytes is   
no longer being   
reported.The previously   
reported component   
Absolute Eosinophils is   
no longer being   
reported.The previously   
reported  
component Absolute   
Basophils is no longer   
being reported.                                             [facility]  
   
                                                    Hepatic function 2000 panelo  
n 2024   
   
                                                    Albumin BCP dye   
[Mass/Vol]      4.9 g/dL        Normal          3.4-5.0         [facility]  
   
                                        Comment on above:   Performed By: #### 2  
4323-8 ####  
AIDEE RODRIGUES ([id number])  
[facility] ([facility])  
[facility] [place] [address]   
   
                                                    ALP [Catalytic   
activity/Vol]   39 U/L          Normal                    [facility]  
   
                                        Comment on above:   Performed By: #### 2  
4323-8 ####  
AIDEE RODRIGUES (01438)  
[facility] ([facility])  
[facility] [place] [address]   
   
                                                    ALT With P-5'-P   
[Catalytic   
activity/Vol]   16 U/L          Normal          7-45            [facility]  
   
                                        Comment on above:   Result Comment: Kaleigh  
ents treated with Sulfasalazine may   
generate falsely decreased results for ALT.   
   
                                                            Performed By: #### 2  
4323-8 ####  
AIDEE RODRIGUES (00223)  
[facility] ([facility])  
[address] [place] [address]   
   
                                                    AST With P-5'-P   
[Catalytic   
activity/Vol]   15 U/L          Normal          9-39            [facility]  
   
                                        Comment on above:   Performed By: #### 2  
4323-8 ####  
AIDEE RODRIGUES (74306)  
[facility] ([facility])  
[address] [place] [address]   
   
                      Bilirubin [Mass/Vol] 0.6 mg/dL  Normal     0.0-1.2    [facility]  
   
                                        Comment on above:   Performed By: #### 2  
4323-8 ####  
AIDEE RODRIGUES (47860)  
[facility] ([facility])  
[address] [place] [address]   
   
                                                    Bilirubin.direct   
[Mass/Vol]      0.1 mg/dL       Normal          0.0-0.3         [facility]  
   
                                        Comment on above:   Performed By: #### 2  
4323-8 ####  
AIDEE RODRIGUES (71404)  
[facility] ([facility])  
[address] [place], [place] [address]   
   
                      Protein [Mass/Vol] 7.4 g/dL   Normal     6.4-8.2    [facility]  
   
                                        Comment on above:   Performed By: #### 2  
4323-8 ####  
AIDEE RODRIGUES (76046)  
[facility] ([facility])  
[address] [place] [address]   
   
                                                    Albumin BCP dye   
[Mass/Vol]          4.9 g/dL                                3.4 - 5.0   
g/dL                                    [facility]  
   
                                                    ALP [Catalytic   
activity/Vol]   39 U/L                          33 - 110 U/L    [facility]  
   
                                                    ALT With P-5'-P   
[Catalytic   
activity/Vol]   16 U/L                          7 - 45 U/L      [facility]  
   
                                        Comment on above:   Patients treated wit  
h Sulfasalazine may generate falsely   
decreased results for ALT.   
   
                                                    AST With P-5'-P   
[Catalytic   
activity/Vol]   15 U/L                          9 - 39 U/L      [facility]  
   
                          Bilirubin [Mass/Vol] 0.6 mg/dL                 0.0 - 1  
.2   
mg/dL                                   [facility]  
   
                                                    Bilirubin.direct   
[Mass/Vol]          0.1 mg/dL                               0.0 - 0.3   
mg/dL                                   [facility]  
   
                          Protein [Mass/Vol] 7.4 g/dL                  6.4 - 8.2  
   
g/dL                                    [facility]  
   
                                                    Lipaseon 2024   
   
                                                    Lipase [Catalytic   
activity/Vol]   27 U/L                          9 - 82 U/L      [facility]  
   
                                                    Lipase [Catalytic activity/V  
ol]on 2024   
   
                                                            Venipuncture   
immediately after or   
during the   
administration of   
Metamizole may lead to   
falsely low results.   
Testing should be   
performed immediately   
prior to Metamizole   
dosing.                                                     [facility]  
   
                                                    Manual differential performe  
d Ql (Bld)on 2024   
   
                      Basophils (Bld) [#/Vol] 0.11 x10*3/uL High       0.00-0.10  
  [facility]  
   
                                        Comment on above:   Performed By: #### 2  
4323-8 ####  
AIDEE RODRIGUES (84632)  
[facility] ([facility])  
[address] [place] [address]   
   
                      Basophils/100 WBC (Bld) 1.0 %      Normal     0.0-2.0    [facility]  
[facility]  
   
                                        Comment on above:   Performed By: #### 2  
4323-8 ####  
AIDEE RODRIGUES (43911)  
[facility] ([facility])  
[address] [place] [address]   
   
                                                    Cells Counted Total   
(Bld) [#]       100             Normal                          [facility]  
   
                                        Comment on above:   Performed By: #### 2  
4323-8 ####  
AIDEE RODRIGUES (90782)  
[facility] ([facility])  
[address] [place] [address]   
   
                                                    Eosinophils (Bld)   
[#/Vol]         0.00 x10*3/uL   Normal          0.00-0.70       [facility]  
   
                                        Comment on above:   Performed By: #### 2  
4323-8 ####  
AIDEE RODRIGUES (14353)  
[facility] ([facility])  
[address] [place] [address]   
   
                                                    Eosinophils/100 WBC   
(Bld)           0.0 %           Normal          0.0-6.0         [facility]  
   
                                        Comment on above:   Performed By: #### 2  
4323-8 ####  
IADEE RODRIGUES (59600)  
[facility] ([facility])  
[address] [place] [address]   
   
                                                    Lymphocytes (Bld)   
[#/Vol]         0.95 x10*3/uL   Low             1.20-4.80       [facility]  
   
                                        Comment on above:   Performed By: #### 2  
4323-8 ####  
AIDEE RODRIGUES (97757)  
[facility] ([facility])  
[address] [place] [address]   
   
                                                    Lymphocytes/100 WBC   
(Bld)           9.0 %           Normal          13.0-44.0       [facility]  
   
                                        Comment on above:   Performed By: #### 2  
4323-8 ####  
AIDEE RODRIGUES (86502)  
[facility] ([facility])  
[address] [place] [address]   
   
                      Monocytes (Bld) [#/Vol] 0.64 x10*3/uL Normal     0.10-1.00  
  [facility]  
   
                                        Comment on above:   Performed By: #### 2  
4323-8 ####  
AIDEE RODRIGUES (04453)  
[facility] ([facility])  
[address] [place] [address]   
   
                      Monocytes/100 WBC (Bld) 6.0 %      Normal     2.0-10.0   [facility]  
   
                                        Comment on above:   Performed By: #### 2  
4323-8 ####  
AIDEE RODRIGUES (63517)  
[facility] ([facility])  
[address] [place] [address]   
   
                                                    RBC morphology finding   
Nom (Bld)                               No significant RBC   
morphology present  Normal                                  [facility]  
   
                                        Comment on above:   Performed By: #### 2  
4323-8 ####  
AIDEE RODRIGUES (28966)  
[facility] ([facility])  
[address] [place] [address]   
   
                                                    Segmented neutrophils   
(Bld) [#/Vol]   8.90 x10*3/uL   High            1.20-7.00       [facility]  
   
                                        Comment on above:   Performed By: #### 2  
4323-8 ####  
AIDEE RODRIGUES (14848)  
[facility] ([facility])  
[address] [place] [address]   
   
                                                    Segmented   
neutrophils/100 WBC   
(Bld)           84.0 %          Normal          40.0-80.0       [facility]  
   
                                        Comment on above:   Result Comment: Perc  
ent differential counts (%) should be   
interpreted in the context of the absolute cell counts   
(cells/uL).   
   
                                                            Performed By: #### 2  
4323-8 ####  
AIDEE RODRIGUES (32600)  
[facility] ([facility])  
[address] [place] [address]   
   
                      Basophils (Bld) [#/Vol] 0.11 10*3/uL High                   
 [facility]  
   
                      Basophils/100 WBC (Bld) 1.0 %                 0.0 - 2.0 %   
[facility]  
   
                                                    Cells Counted Total   
(Bld) [#]       100 {cells}                                     [facility]  
   
                                                    Eosinophils (Bld)   
[#/Vol]         0.00 10*3/uL                                    [facility]  
   
                                                    Eosinophils/100 WBC   
(Bld)           0.0 %                           0.0 - 6.0 %     [facility]  
   
                                                    Interpretation and   
review of laboratory   
results         Abnormal                                        [facility]  
   
                                                    Lymphocytes (Bld)   
[#/Vol]         0.95 10*3/uL    Low                             [facility]  
   
                                                    Lymphocytes/100 WBC   
(Bld)               9.0 %                                   13.0 - 44.0   
%                                       [facility]  
   
                      Monocytes (Bld) [#/Vol] 0.64 10*3/uL                        
 [facility]  
   
                      Monocytes/100 WBC (Bld) 6.0 %                 2.0 - 10.0 %  
 [facility]  
   
                                                    RBC morphology finding   
Nom (Bld)                               No significant RBC   
morphology present                                          [facility]  
   
                                                    Segmented neutrophils   
(Bld) [#/Vol]   8.90 10*3/uL    High                            [facility]  
   
                                                    Segmented   
neutrophils/100 WBC   
(Bld)               84.0 %                                  40.0 - 80.0   
%                                       [facility]  
   
                                        Comment on above:   Percent differential  
 counts (%) should be interpreted in the   
context of the absolute cell counts (cells/uL).   
   
                                                                  [facility]  
   
                                                    No Panel Informationon    
   
                                                    Interpretation and   
review of laboratory   
results         Normal                                          [facility]  
   
                                                    Triacylglycerol lipaseon    
   
                                                    Lipase [Catalytic   
activity/Vol]   27 U/L          Normal          9-82            [facility]  
   
                                        Comment on above:   Order Comment: Venip  
uncture immediately after or during the   
administration of Metamizole may lead to falsely low results.   
Testing should be performed immediately prior to Metamizole   
dosing.   
   
                                                            Performed By: #### 2  
4323-8 ####  
HOSKINS RAVI ([id number])  
[facility] ([facility])  
[address] [place] [address]   
   
                                                    CALPROTECTIN, FECALon 2024   
   
                      CALPROTECTIN, FECAL 51         Normal                [facility]  
   
                                        Comment on above:   Result Comment: Refe  
rence range: 0 to 120  
Unit: ug/g  
(NOTE)  
Concentration Interpretation Follow-Up  
< 5 - 50 ug/g Normal None  
>50 -120 ug/g Borderline Re-evaluate in 4-6 weeks  
>120 ug/g Abnormal Repeat as clinically  
indicated  
PERFORMED AT [facility]   
   
                                                            Performed By: #### L  
CALP ####  
Testing performed at [facility]   
   
                                                    CT ABDOMEN PELVIS WITH IV CO  
NTRAST ONLYon 02-   
   
                                                    CT ABDOMEN PELVIS WITH   
IV CONTRAST ONLY                        EXAMINATION:  
CT ABDOMEN PELVIS WITH   
IV CONTRAST ONLY  
HISTORY:  
ORDERING SYSTEM   
PROVIDED HISTORY: n/v/d   
unable to keep anything   
down,  
recent diagnosis of   
colitis,  
TECHNOLOGIST PROVIDED   
HISTORY:  
Illness/Other  
Reason for exam:   
nausea/vomiting x 1   
week, pt states no   
injury/chance of  
pregnancy  
Encounter Type: Initial  
Additional signs and   
symptoms: .  
ORDERING SYSTEM   
PROVIDED DIAGNOSIS   
CODES:  
COMPARISON:  
CT abdomen and pelvis   
with contrast,   
02/10/2024.  
TECHNIQUE:  
Dose reduction   
techniques were   
achieved by using   
automated exposure  
control and/or   
adjustment of mA and/or   
kV according to patient   
size and/or  
use of iterative   
reconstruction   
technique.  
Postcontrast axial CT   
images obtained through   
the abdomen and pelvis.  
Reconstructions   
obtained in the   
sagittal and coronal   
planes.  
CONTRAST:  
IOPAMIDOL 370 MG   
IODINE/ML (76 %)   
INTRAVENOUS SOLUTION -   
75 mL,  
FINDINGS:  
Lung bases are clear.   
No pleural effusion.   
Heart size normal.   
Liver  
normal. Gallbladder   
normal. Common bile   
duct normal in size.   
Pancreas  
normal. Spleen normal.   
Adrenal glands normal.   
Kidneys normal. Stomach  
normal. Duodenum   
normal. No bowel   
obstruction.   
Appendectomy. No  
abnormal thickening or   
inflammation of the   
colon. Abdominal aorta   
is  
normal in size.   
Inferior vena cava   
normal in size. No   
ascites. No  
lymphadenopathy. No   
free air.  
In the pelvis, the   
bladder is normal.   
Uterus normal. Ovaries   
appear  
normal. Rectum normal.   
There is a small amount   
of free fluid in the  
pelvis that has   
increased from the   
prior.  
Osseous structures are   
unremarkable.  
IMPRESSION:  
1. Previously described   
diffuse colitis is no   
longer identified. The  
colon appears normal.  
2. Appendectomy.  
3. Small amount of free   
fluid in the pelvis is   
nonspecific. There is a  
greater amount of free   
fluid in the pelvis   
than on the prior.  
4. No small bowel   
obstruction. There is   
no bowel wall   
thickening.  
[facility] [place] [place]/[facility]  
Workstation ID: [id number]  
Dictated by: ROSE CHANDRA on Thu Feb 15,   
2024 8:59:26 AM EST  
Transcribed by: JAS CESPEDES on Thu Feb 15,   
2024 9:09:40 AM EST  
Finalized by: ROSE CHANDRA on Thu Feb 15,   
2024 4:08:13 PM EST Normal                                  [facility]  
   
                                        Comment on above:   Order Comment: Injur  
y/Trauma or Illness?:Illness/Other  
How long have you had these symptoms (acute/chronic)?:Acute  
Reason for exam?:nausea/vomiting x 1 week, pt states no   
injury/chance of pregnancy  
Type of Exam?:Initial  
Additional signs and symptoms?:.   
   
                                                    ED Provider Noteson 02-15-20  
24   
   
                                                    Transcription   
Authentication   
Interface Message Text                  -----------------------  
------- HISTORY OF   
PRESENT ILLNESS  
-----------------------  
---  
2024, 10:37 PM.  
The history is provided   
by the Patient. Danna Soares is a 23   
year  
old female presenting   
to the ED for seizures.   
Pt states she has had   
seizures  
for the past 4 years   
but has never been   
evaluated for them.   
Reports they have  
been more frequent   
these past few days,   
with associated nausea,   
left eye pain,  
and neck pain. Family   
mentions pt's symptoms   
often resolve after she   
eats,  
noting they suspect she   
may be hypoglycemic. Pt   
requesting more nausea   
and pain  
medications. Pt   
declining further   
treatment at this time   
due to wanting to go  
back home to [place].   
Pt mentions she has an   
appointment with   
neurology  
tomorrow.  
-----------------------  
------------ REVIEW OF   
SYSTEMS  
-----------------------  
--------------  
Review of Systems  
Eyes: Positive for   
pain.  
Gastrointestinal:   
Positive for nausea.  
Musculoskeletal:   
Positive for neck pain.  
Neurological: Positive   
for seizures.  
-----------------------  
---------------- PAST   
HISTORY  
-----------------------  
-------------------  
Past Medical History:  
Past Medical History:  
Diagnosis Date  
Other  infants,   
unspecified (weight)  
Past Surgical History:  
Past Surgical History:  
Procedure Laterality   
Date  
NO PAST SURGICAL   
HISTORY  
Social History: Tobacco   
Use: denies  
Alcohol Use: denies  
Drug Use: denies  
Family History:  
Family History  
Problem Relation Age of   
Onset  
Good health Unknown  
The patient's home   
medications have been   
reviewed.  
Allergies: Strawberry   
and Other (review   
comments!)  
-----------------------  
-------------- PHYSICAL   
EXAM  
-----------------------  
------------------  
Vitals Recorded in This   
Encounter  
2024  
1827  
BP: 114/78  
Pulse: 84  
Resp: 16  
Temp: 98.4 ???F (36.9   
???C)  
Temp src: Oral  
SpO2: 99 %  
Pain Score: 0  
Constitutional: Well   
developed, well   
nourished. Awake AND   
alert. No distress.  
Eyes: PERRL. EOMI.   
Conjunctivae are not   
pale. No scleral   
icterus.  
ENT: Mucous membranes   
are moist.  
Neck: Supple. B/l neck   
tenderness.  
Cardiovascular: Regular   
rate. Regular rhythm.   
No murmurs, rubs, or   
gallops.  
Distal pulses are equal   
and 2+.  
Pulmonary/Chest: No   
evidence of respiratory   
distress. Clear to   
auscultation  
bilaterally. No   
wheezing, rales or   
rhonchi.  
Abdominal: Soft and   
non-distended. There is   
no tenderness. No   
rebound,  
guarding, or rigidity.  
Musculoskeletal: Full   
range of motion in all   
extremities. No edema.   
No calf  
tenderness.  
Skin: Skin is warm and   
dry. No rashes.  
Neurological: Alert,   
awake, and appropriate.   
Normal speech. Normal   
sensation.  
Normal gait.  
Psychiatric: Good eye   
contact. Appropriate in   
content/context. Normal   
affect.  
-----------------------  
----------- LABORATORY   
RESULTS  
-----------------------  
------------  
Results for orders   
placed or performed   
during the hospital   
encounter of 24  
COMPLETE BLOOD COUNT   
W/DIFF  
Narrative  
The following orders   
were created for panel   
order COMPLETE BLOOD   
COUNT W/DIFF.  
Procedure Abnormality   
Status  
--------- -----------   
------  
CBC WITH   
DIFFERENTIAL[[id number]]   
Abnormal Final result  
Please view results for   
these tests on the   
individual orders.  
BASIC METABOLIC PANEL  
Result Value Ref Range  
Glucose 62 (L) 74 - 109   
mg/dL  
Sodium 135 (L) 136 -   
145 mmol/L  
Potassium 3.8 3.5 - 5.0   
mmol/L  
Carbon Dioxide 19 (L)   
21 - 31 mmol/L  
Chloride 100 98 - 107   
mmol/L  
Blood Urea Nitrogen 6   
(L) 7 - 25 mg/dL  
Creatinine 0.57 (L)   
0.60 - 1.20 mg/dL  
Calcium 9.3 8.6 - 10.3   
mg/dL  
Anion Gap 20 10 - 20  
Estimated GFR (CKD-EPI)   
132 >=60 mL/min/1.73sqm  
CBC WITH DIFFERENTIAL  
Result Value Ref Range  
WBC 7.7 4.5 - 11.5 K/uL  
RBC 4.39 4.00 - 5.20   
M/uL  
Hemoglobin 13.2 12.0 -   
15.0 g/dL  
Hematocrit 39.5 36.0 -   
46.0 %  
MCV 90 80 - 100 fL  
MCH 30.0 26.0 - 34.0 pg  
MCHC 33.3 32.0 - 35.9   
g/dL  
Platelet 251 150 - 400   
K/uL  
RDW-CV 13.4 11.5 - 14.5   
%  
MPV 12.2 (H) 7.5 - 11.2   
fL  
Neutrophils 68.0 31.0 -   
76.0 %  
Neutrophil # 5.21 1.50   
- 8.00 K/uL  
Lymphocytes 20.5 (L)   
24.0 - 44.0 %  
Lymphocytes # 1.57 1.00   
- 4.80 K/uL  
Monocytes 9.8 2.0 -   
11.0 %  
Monocyte # 0.75 0.20 -   
1.00 K/uL  
Eosinophil 0.6 0.1 -   
4.0 %  
Eosinophil # 0.05 0.00   
- 0.70 K/uL  
Basophils 1.1 <=1.9 %  
Basophil # 0.09 0.00 -   
0.20 K/uL  
MDW 19 <=20  
-----------------------  
------------------- ED   
COURSE  
-----------------------  
----------------------  
HIPAA: Verbal   
permission granted from   
patient to discuss case   
, including  
protected health   
information, in front   
of family / friends in   
room at the time  
of the evaluation.  
ED Medications:  
Medications  
ondansetron (ZOFRAN) 4   
MG/2ML injection (has   
no administration in   
time range)  
ketorolac (TORADOL) 15   
MG/ML injection (has no   
administration in time   
range)  
ondansetron (ZOFRAN) 4   
MG/2ML injection (4 mg   
Intravenous Push Given   
24)  
10:54 PM. Shared   
decision making:   
Patient is appropriate   
for discharge. They  
were given follow up   
instructions and return   
precautions, (more   
content not   
included)...        Normal                                  The   
[facility]  
   
                                                    BASIC METABOLIC PANELon    
   
                      Anion gap [Moles/Vol] 20 mmol/L  Normal     10-20      The  
   
[facility]  
   
                                        Comment on above:   Performed By: #### C  
H8 ####  
[facility]  
[address] [place] [address], [id number]   
   
                      Calcium [Mass/Vol] 9.3 mg/dL  Normal     8.6-10.3   The   
[facility]   
System  
   
                                        Comment on above:   Result Comment: Note  
 updated reference ranges.   
   
                                                            Performed By: #### C  
H8 ####  
S [facility]  
 [place], [place],    
   
                      Chloride [Moles/Vol] 100 mmol/L Normal          The   
[facility]   
System  
   
                                        Comment on above:   Result Comment: Note  
 updated reference ranges.   
   
                                                            Performed By: #### C  
H8 ####  
[facility]  
 [place], [place],    
   
                      CO2 [Moles/Vol] 19 mmol/L  Low        21-31      The   
[facility]   
System  
   
                                        Comment on above:   Result Comment: Note  
 updated reference ranges.   
   
                                                            Performed By: #### C  
H8 ####  
S [facility]  
 [place], [place],    
   
                      Creatinine [Mass/Vol] 0.57 mg/dL Low        0.60-1.20  The  
   
[facility]   
System  
   
                                        Comment on above:   Result Comment: Note  
 updated reference ranges.   
   
                                                            Performed By: #### C  
H8 ####  
[facility]  
 [place], [place],    
   
                      ESTIMATED GFR (CKD-EPI) 132 mL/min/1.73sqm Normal     >=60  
       The   
[facility]   
System  
   
                                        Comment on above:   Result Comment:   
 CKD EPI Equation using Creatinine without  
   
Race  
Comment: Estimated glomerular filtration rate (eGFR) is   
calculated without a race coefficient. Values should be   
interpreted in the context of the patient's full clinical   
presentation.  
Reference:  
1. Nicolás C, Amado M, Geoffrey HODGES, et al.. A Unifying Approach   
for GFR Estimation: Recommendations of the NKF-ASN Task Force   
on Reassessing the Inclusion of Race in Diagnosing Kidney   
Disease. American Journal of Kidney Diseases   
202;79(2):268-88.e1.  
2. N Engl J Med 1 Vol. 385 Issue 19 Pages 2743-4431   
   
                                                            Performed By: #### C  
H8 ####  
S [facility]  
 [place], [place],    
   
                      Glucose [Mass/Vol] 62 mg/dL   Low             The   
[facility]   
System  
   
                                        Comment on above:   Performed By: #### C  
H8 ####  
S [facility]  
 [place], [place],    
   
                      Potassium [Moles/Vol] 3.8 mmol/L Normal     3.5-5.0    The  
   
[facility]   
System  
   
                                        Comment on above:   Result Comment: Note  
 updated reference ranges.  
Note updated reference ranges.   
   
                                                            Performed By: #### C  
H8 ####  
[facility]  
[address] [place] [address],    
   
                      Sodium [Moles/Vol] 135 mmol/L Low        136-145    The   
[facility]   
System  
   
                                        Comment on above:   Result Comment: Note  
 updated reference ranges.   
   
                                                            Performed By: #### C  
H8 ####  
[facility]  
[address] [place] [address],    
   
                                                    Urea nitrogen   
[Mass/Vol]      6 mg/dL         Low             7-25            The   
[facility]   
System  
   
                                        Comment on above:   Result Comment: Note  
 updated reference ranges.   
   
                                                            Performed By: #### C  
H8 ####  
S [facility]  
[address] [place] [address],    
   
                                                    Bacteria identifiedon 2024   
   
                                                    Bacteria identified Cx   
Nom (U)                                 Test: Urine Culture  
Specimen Source: Clean   
Catch/Voided  
Specimen Type: Urine  
Specimen Date:   
2024 3:23 AM  
Result Date: 2/15/2024   
8:47 AM  
Result Status: Final   
result  
Abnormal: No  
Resulting Lab: [facility]   
LAB  
[address] [address]  
Tel: [phone]  
  
CULTURE  
------------------  
No significant growth Normal                                  [facility]  
   
                                        Comment on above:   Performed By: #### 2  
4323-8 ####  
HOSKINS RAVI ([id number])  
[facility] ([facility])  
[address] [place] [address]   
   
                                                    Basic metabolic 2000 panelon  
 2024   
   
                      Anion gap [Moles/Vol] 20 mmol/L             10 - 20    [facility]  
   
                          Calcium [Mass/Vol] 9.3 mg/dL                 8.6 - 10.  
3   
mg/dL                                   [facility]  
   
                                        Comment on above:   Note updated referen  
ce ranges.   
   
                          Chloride [Moles/Vol] 100 mmol/L                98 - 10  
7   
mmol/L                                  [facility]  
   
                                        Comment on above:   Note updated referen  
ce ranges.   
   
                          CO2 [Moles/Vol] 19 mmol/L    Low          21 - 31   
mmol/L                                  [facility]  
   
                                        Comment on above:   Note updated referen  
ce ranges.   
   
                          Creatinine [Mass/Vol] 0.57 mg/dL   Low          0.60 -  
 1.20   
mg/dL                                   [facility]  
   
                                        Comment on above:   Note updated referen  
ce ranges.   
   
                                                    GFR/1.73 sq M.predicted   
CKD-EPI (S/P/Bld) [Vol   
rate/Area]      132                             - PINF          [facility]  
   
                                        Comment on above:    CKD EPI Equatio  
n using Creatinine without Race  
Comment: Estimated glomerular filtration rate (eGFR) is   
calculated without a race coefficient. Values should be   
interpreted in the context of the patient's full clinical   
presentation.  
Reference:  
1. Nicolás C, Amado M, Geoffrey DC, et al.. A Unifying Approach   
for GFR Estimation: Recommendations of the NKF-ASN Task Force   
on Reassessing the Inclusion of Race in Diagnosing Kidney   
Disease. American Journal of Kidney Diseases   
;79(2):268-88.e1.  
2. N Engl J Med  Vol. 385 Issue 19 Pages 5288-0621  
  
   
   
                          Glucose [Mass/Vol] 62 mg/dL     Low          74 - 109   
mg/dL                                   [facility]  
   
                                                    Interpretation and   
review of laboratory   
results         Abnormal                                        [facility]  
   
                          Potassium [Moles/Vol] 3.8 mmol/L                3.5 -   
5.0   
mmol/L                                  [facility]  
   
                                        Comment on above:   Note updated referen  
ce ranges.  
Note updated reference ranges.  
   
   
                          Sodium [Moles/Vol] 135 mmol/L   Low          136 - 145  
   
mmol/L                                  [facility]  
   
                                        Comment on above:   Note updated referen  
ce ranges.   
   
                                                    Urea nitrogen   
[Mass/Vol]      6 mg/dL         Low             7 - 25 mg/dL    [facility]  
   
                                        Comment on above:   Note updated referen  
ce ranges.   
   
                                                                  [facility]  
   
                                                    CBC W Auto Differential pane  
l (Bld)on 2024   
   
                      Basophils (Bld) [#/Vol] 0.09 x10*3/uL Normal     0.00-0.10  
  [facility]  
   
                                        Comment on above:   Performed By: #### 5  
7021-8 ####  
AIDEE RODRIGUES ([id number])  
[facility] ([facility])  
[address] [place] [address]   
   
                      Basophils/100 WBC (Bld) 1.2 %      Normal     0.0-2.0    U  
[facility]  
   
                                        Comment on above:   Performed By: #### 5  
[id number] ####  
AIDEE RODRIGUES ([id number])  
[facility] ([facility])  
[address] [place] [address]   
   
                                                    Eosinophils (Bld)   
[#/Vol]         0.20 x10*3/uL   Normal          0.00-0.70       [facility]  
   
                                        Comment on above:   Performed By: #### 5  
7021-8 ####  
AIDEE RODRIGUES ([id number])  
[facility] ([facility])  
[address] [place] [address]   
   
                                                    Eosinophils/100 WBC   
(Bld)           2.7 %           Normal          0.0-6.0         [facility]  
   
                                        Comment on above:   Performed By: ####   
7021-8 ####  
AIDEE RODRIGUES ([id number])  
[facility] ([facility])  
[address] [place] [address]   
   
                                                    Erythrocyte   
distribution width   
(RBC) [Ratio]   12.4 %          Normal          11.5-14.5       [facility]  
   
                                        Comment on above:   Performed By: #### 5  
7021-8 ####  
AIDEE RODRIGUES ([id number])  
[facility] ([facility])  
[address] [place] [address]   
   
                                                    Hematocrit (Bld)   
[Volume fraction] 39.4 %          Normal          36.0-46.0       [facility]  
   
                                        Comment on above:   Performed By: #### 5  
7021-8 ####  
AIDEE RODRIGUES ([id number])  
[facility] ([facility])  
[address] [place] [address]   
   
                                                    Hemoglobin (Bld)   
[Mass/Vol]      13.5 g/dL       Normal          12.0-16.0       [facility]  
   
                                        Comment on above:   Performed By: #### 5  
7021-8 ####  
AIDEE RODRIGUES ([id number])  
[facility] ([facility])  
[address] [place] [address]   
   
                                                    Immature granulocytes   
(Bld) [#/Vol]   0.04 x10*3/uL   Normal          0.00-0.70       [facility]  
   
                                        Comment on above:   Performed By: #### 5  
7021-8 ####  
AIDEE RODRIGUES ([id number])  
[facility] ([facility])  
[address] [place] [address]   
   
                                                    Immature   
granulocytes/100 WBC   
(Bld)           0.5 %           Normal          0.0-0.9         [facility]  
   
                                        Comment on above:   Result Comment: Jossie  
ture Granulocyte Count (IG) includes   
promyelocytes, myelocytes and metamyelocytes but does not   
include bands. Percent differential counts (%) should be   
interpreted in the context of the absolute cell counts   
(cells/UL).   
   
                                                            Performed By: #### 5  
7021-8 ####  
AIDEE RODRIGUES ([id number])  
[facility] ([facility])  
[address] [place] [address]   
   
                                                    Lymphocytes (Bld)   
[#/Vol]         1.85 x10*3/uL   Normal          1.20-4.80       [facility]  
   
                                        Comment on above:   Performed By: #### 5  
7021-8 ####  
AIDEE RODRIGUES ([id number])  
[facility] ([facility])  
[address] [place] [address]   
   
                                                    Lymphocytes/100 WBC   
(Bld)           24.9 %          Normal          13.0-44.0       [facility]  
   
                                        Comment on above:   Performed By: #### 5  
7021-8 ####  
AIDEE RODRIGUES ([id number])  
[facility] ([facility])  
[address] [place] [address]   
   
                                                    MCH (RBC) [Entitic   
mass]           29.8 pg         Normal          26.0-34.0       [facility]  
   
                                        Comment on above:   Performed By: #### 5  
7021-8 ####  
AIDEE RODRIGUES ([id number])  
[facility] ([facility])  
[address] [place] [address]   
   
                      MCHC (RBC) [Mass/Vol] 34.3 g/dL  Normal     32.0-36.0  [facility]  
   
                                        Comment on above:   Performed By: #### 5  
7021-8 ####  
AIDEE RODRIGUES (98297)  
[facility] ([facility])  
[address] [place] [address]   
   
                      MCV (RBC) [Entitic vol] 87 fL      Normal          U  
[facility]  
   
                                        Comment on above:   Performed By: #### 5  
7021-8 ####  
AIDEE RODRIGUES (82176)  
[facility] ([facility])  
[address] [place] [address]   
   
                      Monocytes (Bld) [#/Vol] 0.73 x10*3/uL Normal     0.10-1.00  
  [facility]  
   
                                        Comment on above:   Performed By: #### 5  
7021-8 ####  
AIDEE RODRIGUES (05070)  
[facility] ([facility])  
[address] [place] [address]   
   
                      Monocytes/100 WBC (Bld) 9.8 %      Normal     2.0-10.0   [facility]  
   
                                        Comment on above:   Performed By: #### 5  
7021-8 ####  
AIDEE RODRIGUES (46117)  
[facility] ([facility])  
[address] [place] [address]   
   
                                                    Neutrophils (Bld)   
[#/Vol]         4.52 x10*3/uL   Normal          1.20-7.70       [facility]  
   
                                        Comment on above:   Result Comment: Perc  
ent differential counts (%) should be   
interpreted in the context of the absolute cell counts   
(cells/uL).   
   
                                                            Performed By: #### 5  
7021-8 ####  
AIDEE RODRIGUES (09111)  
[facility] ([facility])  
[address] [place] [address]   
   
                                                    Neutrophils/100 WBC   
(Bld)           60.9 %          Normal          40.0-80.0       [facility]  
   
                                        Comment on above:   Performed By: #### 5  
7021-8 ####  
AIDEE RODRIGUES (25860)  
[facility] ([facility])  
[address] [place] [address]   
   
                                                    Nucleated RBC/100 WBC   
(Bld) [Ratio]   0.0 /100 WBCs   Normal          0.0-0.0         [facility]  
   
                                        Comment on above:   Performed By: #### 5  
7021-8 ####  
AIDEE RODRIGUES (85703)  
[facility] ([facility])  
[address] [place] [address]   
   
                      Platelets (Bld) [#/Vol] 276 x10*3/uL Normal     150-450     
 [facility]  
   
                                        Comment on above:   Performed By: #### 5  
7021-8 ####  
AIDEE RODRIGUES (69852)  
[facility] ([facility])  
[facility] [place] [address]   
   
                      RBC (Bld) [#/Vol] 4.53 x10*6/uL Normal     4.00-5.20  [facility]  
   
                                        Comment on above:   Performed By: #### 5  
7021-8 ####  
AIDEE RODRIGUES (79731)  
[facility] ([facility])  
[address] [place] [address]   
   
                      WBC (Bld) [#/Vol] 7.4 x10*3/uL Normal     4.4-11.3   [facility]  
   
                                        Comment on above:   Performed By: #### 5  
7021-8 ####  
AIDEE RODRIGUES (76894)  
[facility] ([facility])  
[address] [place] [address]   
   
                                                    CBC WITH DIFFERENTIALon    
   
                      Basophils (Bld) [#/Vol] 0.09 10*3/uL Normal     0.00-0.20   
 The   
[facility]   
[facility]  
   
                                        Comment on above:   Performed By: #### C  
BCDSAT ####  
S PATHOLOGY LABORATORY  
[address] [place] [address],    
   
                      Basophils/100 WBC (Bld) 1.1 %      Normal     <=1.9      T  
   
[facility]   
System  
   
                                        Comment on above:   Performed By: #### VADIM  
BCDSAT ####  
[facility] PATH[facility] LAB[facility]  
[address] [place] [address],    
   
                                                    Eosinophils (Bld)   
[#/Vol]         0.05 10*3/uL    Normal          0.00-0.70       The   
[facility]   
System  
   
                                        Comment on above:   Performed By: #### C  
BCDSAT ####  
[facility]  
[address] [place], [place],    
   
                                                    Eosinophils/100 WBC   
(Bld)           0.6 %           Normal          0.1-4.0         The   
[facility]   
System  
   
                                        Comment on above:   Performed By: #### C  
BCDSAT ####  
S PATHOLOGY LABORATORY  
[address] [place] [address],    
   
                                                    Erythrocyte   
distribution width   
(RBC) [Ratio]   13.4 %          Normal          11.5-14.5       The   
[facility]   
System  
   
                                        Comment on above:   Performed By: #### C  
BCDSAT ####  
[facility]  
[address] [place] [address],    
   
                                                    Hematocrit (Bld)   
[Volume fraction] 39.5 %          Normal          36.0-46.0       The   
[facility]   
System  
   
                                        Comment on above:   Performed By: #### C  
BCDSAT ####  
[facility] PATHOLOGY LABORATORY  
[address] [place] [address],    
   
                                                    Hemoglobin (Bld)   
[Mass/Vol]      13.2 g/dL       Normal          12.0-15.0       The   
[facility]   
System  
   
                                        Comment on above:   Performed By: #### C  
BCDSAT ####  
[facility] PATHOLOGY LABORATORY  
[address] [place] [address],    
   
                                                    Lymphocytes (Bld)   
[#/Vol]         1.57 10*3/uL    Normal          1.00-4.80       The   
[facility]   
System  
   
                                        Comment on above:   Performed By: #### C  
BCDSAT ####  
[facility] PATHOLOGY LABORATORY  
[address] [place] [address],    
   
                                                    Lymphocytes/100 WBC   
(Bld)           20.5 %          Low             24.0-44.0       The   
[facility]   
System  
   
                                        Comment on above:   Performed By: #### C  
BCDSAT ####  
[facility] PATHOLOGY LABORATORY  
[address] [place] [address],    
   
                                                    MCH (RBC) [Entitic   
mass]           30.0 pg         Normal          26.0-34.0       The   
[facility]   
System  
   
                                        Comment on above:   Performed By: #### C  
BCDSAT ####  
[facility] PATHOLOGY LABORATORY  
[address] [place] [address],    
   
                      MCHC (RBC) [Mass/Vol] 33.3 g/dL  Normal     32.0-35.9  The  
   
[facility]   
System  
   
                                        Comment on above:   Performed By: #### C  
BCDSAT ####  
[facility] PATHOLOGY LABORATORY  
[address] [place] [address],    
   
                      MCV (RBC) [Entitic vol] 90 fL      Normal          T  
he   
[facility]   
System  
   
                                        Comment on above:   Performed By: #### C  
BCDSAT ####  
[facility] PATHOLOGY LABORATORY  
[address] [place] [address],    
   
                                                    MONOCYTE DISTRIBUTION   
WIDTH           19              Normal          <=20            The   
[facility]   
System  
   
                                        Comment on above:   Performed By: #### C  
BCDSAT ####  
[facility]  
[address] [place], [place],    
   
                      Monocytes (Bld) [#/Vol] 0.75 10*3/uL Normal     0.20-1.00   
 The   
[facility]   
System  
   
                                        Comment on above:   Performed By: #### C  
BCDSAT ####  
[facility] PATHOLOGY LABORATORY  
[address] [place], [place],    
   
                      Monocytes/100 WBC (Bld) 9.8 %      Normal     2.0-11.0   T  
[facility]   
System  
   
                                        Comment on above:   Performed By: #### C  
BCDSAT ####  
[facility] PATHOLOGY LABORATORY  
[address] [place], [place],    
   
                                                    Neutrophils (Bld)   
[#/Vol]         5.21 10*3/uL    Normal          1.50-8.00       The   
[facility]   
System  
   
                                        Comment on above:   Performed By: #### C  
BCDSAT ####  
[facility] PATH[facility]Y LAB[facility]ORY  
[address] [place], [place],    
   
                                                    Neutrophils/100 WBC   
(Bld)           68.0 %          Normal          31.0-76.0       The   
[facility]   
System  
   
                                        Comment on above:   Performed By: #### C  
BCDSAT ####  
[facility] PATHOLOGY LAB[facility]ATORY  
[address] [place], [place],    
   
                                                    Platelet mean volume   
(Bld) [Entitic vol] 12.2 fL         High            7.5-11.2        The   
[facility]   
System  
   
                                        Comment on above:   Performed By: #### C  
BCDSAT ####  
[facility] PATHOLOGY LABORATORY  
 [place], [place],    
   
                      Platelets (Bld) [#/Vol] 251 10*3/uL Normal     150-400      
The   
[facility]   
System  
   
                                        Comment on above:   Performed By: #### C  
BCDSAT ####  
[facility] PATHOLOGY LAB[facility]ATORY  
 [place], [place],    
   
                      RBC (Bld) [#/Vol] 4.39 10*6/uL Normal     4.00-5.20  The   
[facility]   
System  
   
                                        Comment on above:   Performed By: #### C  
BCDSAT ####  
[facility] PATHOLOGY LAB[facility]ORY  
 [place], [place],    
   
                      WBC (Bld) [#/Vol] 7.7 10*3/uL Normal     4.5-11.5   The   
[facility]   
System  
   
                                        Comment on above:   Performed By: #### C  
BCDSAT ####  
[facility]  
[address] [place], [address]   
   
                          Basophils (Bld) [#/Vol] 0.09 10*3/uL              0.00  
 - 0.20   
K/uL                                    [facility]  
   
                      Basophils/100 WBC (Bld) 1.1 %                 NINF - 1.9 %  
 [facility]  
   
                                                    Eosinophils (Bld)   
[#/Vol]             0.05 10*3/uL                            0.00 - 0.70   
K/uL                                    [facility]  
   
                                                    Eosinophils/100 WBC   
(Bld)           0.6 %                           0.1 - 4.0 %     [facility]  
   
                                                    Erythrocyte   
distribution width   
(RBC) [Ratio]       13.4 %                                  11.5 - 14.5   
%                                       [facility]  
   
                                                    Hematocrit (Bld)   
[Volume fraction]   39.5 %                                  36.0 - 46.0   
%                                       [facility]  
   
                                                    Hemoglobin (Bld)   
[Mass/Vol]          13.2 g/dL                               12.0 - 15.0   
g/dL                                    [facility]  
   
                                                    Interpretation and   
review of laboratory   
results         Abnormal                                        [facility]  
   
                                                    Lymphocytes (Bld)   
[#/Vol]             1.57 10*3/uL                            1.00 - 4.80   
K/uL                                    [facility]  
   
                                                    Lymphocytes/100 WBC   
(Bld)               20.5 %              Low                 24.0 - 44.0   
%                                       [facility]  
   
                                                    MCH (RBC) [Entitic   
mass]               30.0 pg                                 26.0 - 34.0   
pg                                      [facility]  
   
                          MCHC (RBC) [Mass/Vol] 33.3 g/dL                 32.0 -  
 35.9   
g/dL                                    [facility]  
   
                      MCV (RBC) [Entitic vol] 90 fL                 80 - 100 fL   
[facility]  
   
                                                    Monocyte distribution   
width Auto (Bld)   
[Entitic vol]   19                              NINF - 20       [facility]  
   
                          Monocytes (Bld) [#/Vol] 0.75 10*3/uL              0.20  
 - 1.00   
K/uL                                    [facility]  
   
                      Monocytes/100 WBC (Bld) 9.8 %                 2.0 - 11.0 %  
 [facility]  
   
                                                    Neutrophils (Bld)   
[#/Vol]             5.21 10*3/uL                            1.50 - 8.00   
K/uL                                    [facility]  
   
                                                    Neutrophils/100 WBC   
(Bld)               68.0 %                                  31.0 - 76.0   
%                                       [facility]  
   
                                                    Platelet mean volume   
(Bld) [Entitic vol] 12.2 fL             High                7.5 - 11.2   
fL                                      [facility]  
   
                          Platelets (Bld) [#/Vol] 251 10*3/uL               150   
- 400   
K/uL                                    [facility]  
   
                      RBC (Bld) [#/Vol] 4.39 10*6/uL                       [facility]  
   
                          WBC (Bld) [#/Vol] 7.7 10*3/uL               4.5 - 11.5  
   
K/uL                                    [facility]  
   
                                                                  [facility]  
   
                                                    CT HEAD WO IV CONTRASTon    
   
                                        CT HEAD WO IV CONTRAST Interpreted By:   
Zhou Simmons,  
STUDY:  
CT HEAD WO IV CONTRAST;   
2024 2:58 am  
  
INDICATION:  
Signs/Symptoms:altered   
loc.  
  
COMPARISON:  
None  
  
ACCESSION NUMBER(S):  
[id number]  
  
ORDERING CLINICIAN:  
CHRISTIANO SANTOS  
  
TECHNIQUE:  
Contiguous axial images   
of the head were   
obtained without   
intravenous  
contrast.  
  
FINDINGS:  
BRAIN PARENCHYMA: The   
gray white matter   
differentiation is  
preserved. No mass   
effect or midline   
shift.  
  
HEMORRHAGE: No evidence   
of acute intracranial   
hemorrhage.  
VENTRICLES AND   
EXTRA-AXIAL SPACES: The   
ventricles are within   
normal  
limits in size for   
brain volume. No   
evidence of abnormal   
extraaxial  
fluid collection.   
EXTRACRANIAL SOFT   
TISSUES: Within normal   
limits.  
PARANASAL   
SINUSES/MASTOIDS: The   
visualized paranasal   
sinuses and  
mastoid air cells are   
clear and well   
pneumatized. CALVARIUM:   
No  
evidence of depressed   
calvarial fracture.  
  
OTHER FINDINGS: None  
  
IMPRESSION:  
No evidence of acute   
intracranial hemorrhage   
or mass effect.  
  
  
  
MACRO:  
None  
  
Signed by: Zhou Simmons   
2024 3:03 AM  
Dictation workstation:   
[id number]        Normal                                  [facility]  
   
                                                    Comprehensive metabolic 2000  
 panelon 2024   
   
                                                    Albumin BCP dye   
[Mass/Vol]      4.5 g/dL        Normal          3.4-5.0         [facility]  
   
                                        Comment on above:   Performed By: #### 5  
7021-8 ####  
AIDEE RODRIGUES ([id number])  
[facility] ([facility])  
[address] [place] [address]   
   
                                                    ALP [Catalytic   
activity/Vol]   39 U/L          Normal                    [facility]  
   
                                        Comment on above:   Performed By: #### 5  
7021-8 ####  
AIDEE RODRIGUES (22679)  
[facility] ([facility])  
[address] [place] [address]   
   
                                                    ALT With P-5'-P   
[Catalytic   
activity/Vol]   9 U/L           Normal          7-45            [facility]  
   
                                        Comment on above:   Result Comment: Kaleigh  
ents treated with Sulfasalazine may   
generate falsely decreased results for ALT.   
   
                                                            Performed By: #### 5  
7021-8 ####  
AIDEE RODRIGUES (72949)  
[facility] ([facility])  
[address] [place] [address]   
   
                      Anion gap [Moles/Vol] 17 mmol/L  Normal     10-20      [facility]  
   
                                        Comment on above:   Performed By: #### 5  
7021-8 ####  
AIDEE RODRIGUES (26914)  
[facility] ([facility])  
[facility] [place] [address]   
   
                                                    AST With P-5'-P   
[Catalytic   
activity/Vol]   11 U/L          Normal          9-39            [facility]  
   
                                        Comment on above:   Performed By: #### 5  
7021-8 ####  
AIDEE RODRIGUES (92064)  
[facility] ([facility])  
[address] [place] [address]   
   
                      Bilirubin [Mass/Vol] 0.7 mg/dL  Normal     0.0-1.2    [facility]  
   
                                        Comment on above:   Performed By: #### 5  
7021-8 ####  
AIDEE RODRIGUES (14094)  
[facility] ([facility])  
[address] [place] [address]   
   
                      Calcium [Mass/Vol] 8.9 mg/dL  Normal     8.6-10.3   [facility]  
   
                                        Comment on above:   Performed By: #### 5  
7021-8 ####  
AIDEE RODRIGUES (84369)  
[facility] ([facility])  
[address] [place] [address]   
   
                      Chloride [Moles/Vol] 99 mmol/L  Normal          [facility]  
   
                                        Comment on above:   Performed By: #### 5  
7021-8 ####  
AIDEE RODRIGUES (48142)  
[facility] ([facility])  
[address] [place] [address]   
   
                      CO2 [Moles/Vol] 21 mmol/L  Normal     21-32      [facility]  
   
                                        Comment on above:   Performed By: #### 5  
7021-8 ####  
AIDEE RODRIGUES (06015)  
[facility] ([facility])  
[address] [place] [address]   
   
                      Creatinine [Mass/Vol] 0.59 mg/dL Normal     0.50-1.05  [facility]  
   
                                        Comment on above:   Performed By: #### 5  
7021-8 ####  
AIDEE RODRIGUES (82016)  
[facility] ([facility])  
[address] [place] [address]   
   
                                                    GFR/1.73 sq M.predicted   
MDRD (S/P/Bld) [Vol   
rate/Area]      mL/min/{1.73_m2} Normal          >60             [facility]  
   
                                        Comment on above:   Result Comment: Calc  
ulations of estimated GFR are performed   
using the  CKD-EPI Study Refit equation without the race   
variable for the IDMS-Traceable creatinine methods.  
https://jasn.asnjournals.org/content/early//ASN.  
216285   
   
                                                            Performed By: #### 5  
7021-8 ####  
AIDEE RODRIGUES (46544)  
[facility] ([facility])  
[address] [place] [address]   
   
                      Glucose [Mass/Vol] 84 mg/dL   Normal     74-99      [facility]  
   
                                        Comment on above:   Performed By: #### 5  
7021-8 ####  
AIDEE RODRIGUES (88513)  
[facility] ([facility])  
[address] [place] [address]   
   
                      Potassium [Moles/Vol] 3.2 mmol/L Low        3.5-5.3    [facility]  
   
                                        Comment on above:   Performed By: #### 5  
7021-8 ####  
AIDEE RODRIGUES (81583)  
[facility] ([facility])  
[address] [place] [address]   
   
                      Protein [Mass/Vol] 6.6 g/dL   Normal     6.4-8.2    [facility]  
   
                                        Comment on above:   Performed By: #### 5  
7021-8 ####  
AIDEE RODRIGUES (86373)  
[facility] ([facility])  
[address] [place] [address]   
   
                      Sodium [Moles/Vol] 134 mmol/L Low        136-145    [facility]  
   
                                        Comment on above:   Performed By: #### 5  
7021-8 ####  
AIDEE RODRIGUES (72149)  
[facility] ([facility])  
[facility] [place] [address]   
   
                                                    Urea nitrogen   
[Mass/Vol]      7 mg/dL         Normal          6-23            [facility]  
   
                                        Comment on above:   Performed By: #### 5  
7021-8 ####  
AIDEE RODRIGUES (59877)  
[facility] ([facility])  
[address] [place], [place] [address]   
   
                                                    DRUG SCREEN,URINEon 20  
24   
   
                                                    Amphetamines Screen Ql   
(U)                 Negative            Normal              Presumptive   
Negative                                [facility]  
   
                                        Comment on above:   Order Comment: Drug   
screen results are presumptive and should   
not be used to assesscompliance with prescribed medication.   
Contact the [facility] laboratoryto add-on definitive   
confirmatory testing if clinically indicated.Toxicology   
screening results are reported qualitatively. The   
concentration must???be greater than or equal to the cutoff to   
be reported as positive. The concentrationat which the   
screening test can detect an individual drug or metabolite   
varies.The absence of expected drug(s) and/or drug   
metabolite(s) may indicate non-compliance,inappropriate timing   
of specimen collection relative to drug administration, poor   
drugabsorption, diluted/adulterated urine, or limitations of   
testing. For medical purposesonly; not valid for forensic   
use.Interpretive questions should be directed to the   
laboratory medical directors.   
   
                                                            Result Comment: CUTO  
FF LEVEL: 500 NG/ML  
Cross-reactivity has been reported with high concentrations  
of the following drugs: buproprion, chloroquine,   
chlorpromazine,  
ephedrine, mephentermine, fenfluramine, phentermine,  
phenylpropanolamine, pseudoephedrine, and propranolol.   
   
                                                            Performed By: #### 5  
7021-8 ####  
AIDEE RODRIGUES ([id number])  
[facility] ([facility])  
[address] [place], [place] [address]   
   
                                                    Barbiturates Screen Ql   
(U)                 Negative            Normal              Presumptive   
Negative                                [facility]  
   
                                        Comment on above:   Order Comment: Drug   
screen results are presumptive and should   
not be used to assesscompliance with prescribed medication.   
Contact the performing [facility] laboratoryto add-on definitive   
confirmatory testing if clinically indicated.Toxicology   
screening results are reported qualitatively. The   
concentration must???be greater than or equal to the cutoff to   
be reported as positive. The concentrationat which the   
screening test can detect an individual drug or metabolite   
varies.The absence of expected drug(s) and/or drug   
metabolite(s) may indicate non-compliance,inappropriate timing   
of specimen collection relative to drug administration, poor   
drugabsorption, diluted/adulterated urine, or limitations of   
testing. For medical purposesonly; not valid for forensic   
use.Interpretive questions should be directed to the   
laboratory medical directors.   
   
                                                            Result Comment: CUTO  
FF LEVEL: 200 NG/ML   
   
                                                            Performed By: #### 5  
7021-8 ####  
AIDEE RODRIGUES ([id number])  
[facility] ([facility])  
[address] [place] [address]   
   
                          Benzodiazepines Ql (U) Negative     Normal       Presu  
mptive   
Negative                                [facility]  
   
                                        Comment on above:   Order Comment: Drug   
screen results are presumptive and should   
not be used to assesscompliance with prescribed medication.   
Contact the performing [facility] laboratoryto add-on definitive   
confirmatory testing if clinically indicated.Toxicology   
screening results are reported qualitatively. The   
concentration must???be greater than or equal to the cutoff to   
be reported as positive. The concentrationat which the   
screening test can detect an individual drug or metabolite   
varies.The absence of expected drug(s) and/or drug   
metabolite(s) may indicate non-compliance,inappropriate timing   
of specimen collection relative to drug administration, poor   
drugabsorption, diluted/adulterated urine, or limitations of   
testing. For medical purposesonly; not valid for forensic   
use.Interpretive questions should be directed to the   
laboratory medical directors.   
   
                                                            Result Comment: CUTO  
FF LEVEL: 200 NG/ML   
   
                                                            Performed By: #### 5  
7021-8 ####  
AIDEE RODRIGUES ([id number])  
[facility] ([facility])  
[address] [place] [address]   
   
                                                    Benzoylecgonine Screen   
Ql (U)              Negative            Normal              Presumptive   
Negative                                [facility]  
   
                                        Comment on above:   Order Comment: Drug   
screen results are presumptive and should   
not be used to assesscompliance with prescribed medication.   
Contact the performing [facility] laboratoryto add-on definitive   
confirmatory testing if clinically indicated.Toxicology   
screening results are reported qualitatively. The   
concentration must???be greater than or equal to the cutoff to   
be reported as positive. The concentrationat which the   
screening test can detect an individual drug or metabolite   
varies.The absence of expected drug(s) and/or drug   
metabolite(s) may indicate non-compliance,inappropriate timing   
of specimen collection relative to drug administration, poor   
drugabsorption, diluted/adulterated urine, or limitations of   
testing. For medical purposesonly; not valid for forensic   
use.Interpretive questions should be directed to the   
laboratory medical directors.   
   
                                                            Result Comment: CUTO  
FF LEVEL: 150 NG/ML   
   
                                                            Performed By: #### 5  
7021-8 ####  
AIDEE RODRIGUES ([id number])  
[facility] ([facility])  
[facility] [place] [address]   
   
                                                    Cannabinoids Screen Ql   
(U)                 Positive            Abnormal            Presumptive   
Negative                                [facility]  
   
                                        Comment on above:   Order Comment: Drug   
screen results are presumptive and should   
not be used to assesscompliance with prescribed medication.   
Contact the performing [facility] laboratoryto add-on definitive   
confirmatory testing if clinically indicated.Toxicology   
screening results are reported qualitatively. The   
concentration must???be greater than or equal to the cutoff to   
be reported as positive. The concentrationat which the   
screening test can detect an individual drug or metabolite   
varies.The absence of expected drug(s) and/or drug   
metabolite(s) may indicate non-compliance,inappropriate timing   
of specimen collection relative to drug administration, poor   
drugabsorption, diluted/adulterated urine, or limitations of   
testing. For medical purposesonly; not valid for forensic   
use.Interpretive questions should be directed to the   
laboratory medical directors.   
   
                                                            Result Comment: CUTO  
FF LEVEL: 50 NG/ML   
   
                                                            Performed By: #### 5  
7021-8 ####  
AIDEE RODRIGUES (21739)  
[facility] ([facility])  
[facility] [place] [address]   
   
                                                    fentaNYL+Norfentanyl   
Screen Ql (U)       Negative            Normal              Presumptive   
Negative                                [facility]  
   
                                        Comment on above:   Order Comment: Drug   
screen results are presumptive and should   
not be used to assesscompliance with prescribed medication.   
Contact the performing [facility] laboratoryto add-on definitive   
confirmatory testing if clinically indicated.Toxicology   
screening results are reported qualitatively. The   
concentration must???be greater than or equal to the cutoff to   
be reported as positive. The concentrationat which the   
screening test can detect an individual drug or metabolite   
varies.The absence of expected drug(s) and/or drug   
metabolite(s) may indicate non-compliance,inappropriate timing   
of specimen collection relative to drug administration, poor   
drugabsorption, diluted/adulterated urine, or limitations of   
testing. For medical purposesonly; not valid for forensic   
use.Interpretive questions should be directed to the   
laboratory medical directors.   
   
                                                            Result Comment: CUTO  
FF LEVEL: 5 NG/ML   
   
                                                            Performed By: #### 5  
7021-8 ####  
AIDEE RODRIGUES ([id number])  
[facility] ([facility])  
[facility] [place] [address]   
   
                          Opiates Screen Ql (U) Negative     Normal       Presum  
ptive   
Negative                                [facility]  
   
                                        Comment on above:   Order Comment: Drug   
screen results are presumptive and should   
not be used to assesscompliance with prescribed medication.   
Contact the performing [facility] laboratoryto add-on definitive   
confirmatory testing if clinically indicated.Toxicology   
screening results are reported qualitatively. The   
concentration must???be greater than or equal to the cutoff to   
be reported as positive. The concentrationat which the   
screening test can detect an individual drug or metabolite   
varies.The absence of expected drug(s) and/or drug   
metabolite(s) may indicate non-compliance,inappropriate timing   
of specimen collection relative to drug administration, poor   
drugabsorption, diluted/adulterated urine, or limitations of   
testing. For medical purposesonly; not valid for forensic   
use.Interpretive questions should be directed to the   
laboratory medical directors.   
   
                                                            Result Comment: CUTO  
FF LEVEL: 300 NG/ML  
The opiate screen does not detect fentanyl, meperidine, or  
tramadol. Oxycodone is not consistently detected (refer to  
Oxycodone Screen, Urine result).   
   
                                                            Performed By: #### 5  
7021-8 ####  
AIDEE RODRIGUES ([id number])  
[facility] ([facility])  
[facility] [place] [address]   
   
                                                    oxyCODONE+oxyMORphone   
Screen Ql (U)       Negative            Normal              Presumptive   
Negative                                [facility]  
   
                                        Comment on above:   Order Comment: Drug   
screen results are presumptive and should   
not be used to assesscompliance with prescribed medication.   
Contact the performing [facility] laboratoryto add-on definitive   
confirmatory testing if clinically indicated.Toxicology   
screening results are reported qualitatively. The   
concentration must???be greater than or equal to the cutoff to   
be reported as positive. The concentrationat which the   
screening test can detect an individual drug or metabolite   
varies.The absence of expected drug(s) and/or drug   
metabolite(s) may indicate non-compliance,inappropriate timing   
of specimen collection relative to drug administration, poor   
drugabsorption, diluted/adulterated urine, or limitations of   
testing. For medical purposesonly; not valid for forensic   
use.Interpretive questions should be directed to the   
laboratory medical directors.   
   
                                                            Result Comment: CUTO  
FF LEVEL: 100 NG/ML  
This test will accurately detect both oxycodone and   
oxymorphone.   
   
                                                            Performed By: #### 5  
7021-8 ####  
AIDEE RODRIGUES ([id number])  
[facility] ([facility])  
[address] [place] [address]   
   
                          Phencyclidine Ql (U) Negative     Normal       Presump  
tive   
Negative                                [facility]  
   
                                        Comment on above:   Order Comment: Drug   
screen results are presumptive and should   
not be used to assesscompliance with prescribed medication.   
Contact the performing [facility] laboratoryto add-on definitive   
confirmatory testing if clinically indicated.Toxicology   
screening results are reported qualitatively. The   
concentration must???be greater than or equal to the cutoff to   
be reported as positive. The concentrationat which the   
screening test can detect an individual drug or metabolite   
varies.The absence of expected drug(s) and/or drug   
metabolite(s) may indicate non-compliance,inappropriate timing   
of specimen collection relative to drug administration, poor   
drugabsorption, diluted/adulterated urine, or limitations of   
testing. For medical purposesonly; not valid for forensic   
use.Interpretive questions should be directed to the   
laboratory medical directors.   
   
                                                            Result Comment: CUTO  
FF LEVEL: 25 NG/ML  
Cross-reactivity has been reported with dextromethorphan.   
   
                                                            Performed By: #### 5  
7021-8 ####  
AIDEE RODRIGUES ([id number])  
[facility] ([facility])  
[address] [id number]   
   
                                                    ECG 12-LEADon 2024   
   
                                        ECG 12-LEAD         Ventricular Rate  
75  
Atrial Rate  
75  
P-R Interval  
116  
QRS Duration  
78  
Q-T Interval  
380  
QTC Calculation(Bazett)  
424  
P Axis  
77  
R Axis  
80  
T Axis  
67  
QRS Count  
13  
Q Onset  
219  
P Onset  
161  
P Offset  
203  
T Offset  
409  
QTC Fredericia  
409  
Diagnosis  
Normal sinus rhythm  
Normal ECG  
When compared with ECG   
of 2024 14:49,  
Vent. rate has   
decreased BY 61 BPM  
See ED provider note   
for full interpretation   
and clinical   
correlation  
Confirmed by Ranjana Eason ([id number]) on   
2024 4:47:21 PM Normal                                  [facility]  
   
                                                    HCG (pregnancy test) IAchagoi  
d Ql (U)on 2024   
   
                                                    HCG (pregnancy test) Ql   
(U)             Negative        Normal          NEGATIVE        [facility]  
   
                                        Comment on above:   Performed By: #### 5  
7021-8 ####  
AIDEE RODRIGUES (31737)  
[facility] ([facility])  
[address] [place] [address]   
   
                                                    Magnesiumon 2024   
   
                      Magnesium [Mass/Vol] 1.79 mg/dL Normal     1.60-2.40  [facility]  
   
                                        Comment on above:   Performed By: #### 5  
7021-8 ####  
AIDEE RODRIGUES ([id number])  
[facility] ([facility])  
[address] [place] [address]   
   
                                                    Prolactinon 2024   
   
                      Prolactin [Mass/Vol] 9.5 ug/L   Normal     3.0-20.0   [facility]  
   
                                        Comment on above:   Performed By: #### 2  
4323-8 ####  
AIDEE RODRIGUES (03375)  
[facility] ([facility])  
[address] [place] [address]   
   
                                                    Urinalysis complete W Reflex  
 Culture panel (U)on 2024   
   
                      Appearance (U) Hazy       Normal     Clear      [facility]  
   
                                        Comment on above:   Performed By: #### 5  
7021-8 ####  
AIDEE RODRIGUES ([id number])  
[facility] ([facility])  
[address] [place] [address]   
   
                                                    Bilirubin (U)   
[Mass/Vol]      Negative        Normal          NEGATIVE        [facility]  
   
                                        Comment on above:   Performed By: #### 5  
7021-8 ####  
AIDEE RODRIGUES (33042)  
[facility] ([facility])  
[address] [place] [address]   
   
                          Color (U)    Yellow       Normal       Straw,   
Yellow                                  [facility]  
   
                                        Comment on above:   Performed By: #### 5  
7021-8 ####  
AIDEE RODRIGUES (03248)  
[facility] ([facility])  
[address] [place] [address]   
   
                                                    Glucose Auto test strip   
(U) [Mass/Vol]  Negative        Normal          NEGATIVE        [facility]  
   
                                        Comment on above:   Performed By: #### 5  
7021-8 ####  
AIDEE RODRIGUES (42558)  
[facility] ([facility])  
[address] [place] [address]   
   
                      Ketones (U) [Mass/Vol] 80 (2+)    Abnormal   NEGATIVE   [facility]  
[facility]  
   
                                        Comment on above:   Performed By: #### 5  
7021-8 ####  
AIDEE RODRIGUES (94999)  
[facility] ([facility])  
[address] [place] [address]   
   
                                                    Leukocyte esterase Auto   
test strip Ql (U) TRACE           Abnormal        NEGATIVE        [facility]  
   
                                        Comment on above:   Performed By: #### 5  
7021-8 ####  
AIDEE RODRIGUES (79253)  
[facility] ([facility])  
[address] [place] [address]   
   
                                                    Nitrite Auto test strip   
Ql (U)          Negative        Normal          NEGATIVE        [facility]  
   
                                        Comment on above:   Performed By: #### 5  
7021-8 ####  
AIDEE RODRIGUES (30927)  
[facility] ([facility])  
[address] [place] [address]   
   
                          pH (U)       6.0 [pH]     Normal       5.0, 5.5,   
6.0, 6.5,   
7.0, 7.5,   
8.0                                     [facility]  
   
                                        Comment on above:   Performed By: ####   
7021-8 ####  
AIDEE RODRIGUES (50073)  
[facility] ([facility])  
[address] [place] [address]   
   
                      Protein (U) [Mass/Vol] 30 (1+)    Normal     NEGATIVE   [facility]  
   
                                        Comment on above:   Performed By: #### 5  
7021-8 ####  
AIDEE RODRIGUES (41923)  
[facility] ([facility])  
[address] [place] [address]   
   
                      RBC (U) [#/Vol] Negative   Normal     NEGATIVE   [facility]  
   
                                        Comment on above:   Performed By: ####   
7021-8 ####  
AIDEE RODRIGUES (33229)  
[facility] ([facility])  
[address] [place] [address]   
   
                                                    Specific gravity (U)   
[Rel density]   1.023           Normal          1.005-1.035     [facility]  
   
                                        Comment on above:   Performed By: #### 5  
7021-8 ####  
AIDEE RODRIGUES (56335)  
[facility] ([facility])  
[address] [place] [address]   
   
                                                    Urobilinogen (U)   
[Mass/Vol]      4.0 mg/dL       Normal          <2.0            [facility]  
   
                                        Comment on above:   Result Comment: Some  
 pigments and medications may cause a   
false positive urobilinogen.   
   
                                                            Performed By: #### 5  
7021-8 ####  
AIDEE RODRIGUES (11312)  
[facility] ([facility])  
[address] [place] [address]   
   
                                                    Urinalysis microscopic panel  
 Auto Ql (U)on 2024   
   
                                                    Bacteria Auto (Urine   
sed) [#/Area]   1+ /HPF         Abnormal        NONE SEEN       [facility]  
   
                                        Comment on above:   Performed By: #### 5  
7021-8 ####  
AIDEE RODRIGUES (51464)  
[facility] ([facility])  
[address] [place] [address]   
   
                                                    Crystals.amorphous   
Computer assisted (U)   
[#/Area]        1+ /HPF         Normal          NONE, 1+, 2+    [facility]  
   
                                        Comment on above:   Performed By: #### 5  
7021-8 ####  
AIDEE RODRIGUES (76141)  
[facility] ([facility])  
[address] [place] [address]   
   
                                                    Epithelial   
cells.squamous Auto   
(Urine sed) [#/Area] 1-9 (SPARSE)        Normal              Reference   
range not   
established.                            [facility]  
   
                                        Comment on above:   Performed By: #### 5  
7021-8 ####  
AIDEE RODRIGUES (44369)  
[facility] ([facility])  
[address] [place] [address]   
   
                                                    Mucus Auto (Urine sed)   
[#/Area]            3+ /LPF             Normal              Reference   
range not   
established.                            [facility]  
   
                                        Comment on above:   Performed By: #### 5  
7021-8 ####  
AIDEE RODRIGUES (34079)  
[facility] ([facility])  
[address] [place] [address]   
   
                                                    RBC Auto (Urine sed)   
[#/Area]            1-2                 Normal              NONE, 1-2,   
3-5                                     [facility]  
   
                                        Comment on above:   Performed By: #### 5  
7021-8 ####  
AIDEE RODRIGUES (36247)  
[facility] ([facility])  
[facility] [place] [address]   
   
                                                    WBC Auto (Urine sed)   
[#/Area]        1-5             Normal          1-5, NONE       [facility]  
   
                                        Comment on above:   Performed By: #### 5  
7021-8 ####  
AIDEE RODRIGUES (32220)  
[facility] ([facility])  
[address] [place] [address]   
   
                                                    GI PANELon 2024   
   
                      ADENOVIRUS F 40/41 Not detected Normal     NOT DETECTED [facility]  
   
                                        Comment on above:   Result Comment: Limi  
tations:  
Nucleic acid may persist in vivo independently of organism   
viability.  
Additionally, some organisms may be carried asymptomatically.  
Detection of target organisms does not imply that the  
corresponding organisms are infectious or are the causative   
agent of clinical symptoms.  
Results must be correlated with clinical history,   
epidemiological data and other clinical information available.  
Testing performed at [place] [address]   
   
                                                            Performed By: #### G  
IPAN ####  
[place] [address]  
#### CDDNAT ####  
[place] [address]   
   
                      ASTROVIRUS Not detected Normal     NOT DETECTED [facility]  
   
                                        Comment on above:   Performed By: #### G  
IPAN ####  
[place] [address]  
#### CDDNAT ####  
[place] [address]   
   
                      CAMPYLOBACTER Not detected Normal     NOT DETECTED [facility]  
   
                                        Comment on above:   Performed By: #### G  
IPAN ####  
[place] [address]  
#### CDDNAT ####  
Testing performed at [place] [id number]   
   
                      CRYPTOSPORIDIUM Not detected Normal     NOT DETECTED [facility]  
   
                                        Comment on above:   Performed By: #### G  
IPAN ####  
Testing performed at [address] [place] [address]  
#### CDDNAT ####  
Testing performed at [address] [place] [address]   
   
                      CYCLOSPORA CAYETANENSIS Not detected Normal     NOT DETECT  
ED [facility]  
   
                                        Comment on above:   Performed By: #### G  
IPAN ####  
Testing performed at [address] [place] [address]  
#### CDDNAT ####  
Testing performed at [address] [place] [address]   
   
                      ENTAMOEBA HISTOLYTICA Not detected Normal     NOT DETECTED  
 [facility]  
   
                                        Comment on above:   Performed By: #### G  
IPAN ####  
Testing performed at [address] [place] [address]  
#### CDDNAT ####  
Testing performed at [address] [place] [address]   
   
                                                    ENTEROAGGREGATIVE E   
COLI            Not detected    Normal          NOT DETECTED    [facility]  
   
                                        Comment on above:   Performed By: #### G  
IPAN ####  
Testing performed at [address] [place] [address]  
#### CDDNAT ####  
Testing performed at [address] [place] [address]   
   
                      ENTEROTOXIGENIC E COLI Not detected Normal     NOT DETECTE  
D [facility]  
   
                                        Comment on above:   Performed By: #### G  
IPAN ####  
Testing performed at [address] [place] [address]  
#### CDDNAT ####  
Testing performed at [address] [place] [address]   
   
                      ENTROPATHOGENIC E COLI Not detected Normal     NOT DETECTE  
D [facility]  
   
                                        Comment on above:   Performed By: #### G  
IPAN ####  
Testing performed at [address] [place] [address]  
#### CDDNAT ####  
Testing performed at [address] [place] [address]   
   
                      GIARDIA LAMBLIA Not detected Normal     NOT DETECTED [facility]  
   
                                        Comment on above:   Performed By: #### G  
IPAN ####  
Testing performed at [address] [place] [address]  
#### CDDNAT ####  
Testing performed at [address] [place] [address]   
   
                      NOROVIRUS GI/GII Not detected Normal     NOT DETECTED [facility]  
   
                                        Comment on above:   Performed By: #### G  
IPAN ####  
Testing performed at [address] [place] [address]  
#### CDDNAT ####  
Testing performed at [address] [place] [address]   
   
                                                    PLESIOMONAS   
SHIGELLOIDES    Not detected    Normal          NOT DETECTED    [facility]  
   
                                        Comment on above:   Performed By: #### G  
IPAN ####  
Testing performed at [address] [place] [address]  
#### CDDNAT ####  
Testing performed at [address] [place] [address]   
   
                      ROTAVIRUS A Not detected Normal     NOT DETECTED [facility]  
   
                                        Comment on above:   Performed By: #### G  
IPAN ####  
Testing performed at [address] [place] [address]  
#### CDDNAT ####  
Testing performed at [address] [place] [address]   
   
                      SALMONELLA Not detected Normal     NOT DETECTED [facility]  
   
                                        Comment on above:   Performed By: #### G  
IPAN ####  
Testing performed at [address] [place] [address]  
#### CDDNAT ####  
Testing performed at [address] [place] [address]   
   
                      SAPOVIRUS  Not detected Normal     NOT DETECTED [facility]  
   
                                        Comment on above:   Performed By: #### G  
IPAN ####  
Testing performed at [address] [place] [address]  
#### CDDNAT ####  
Testing performed at [address] [place] [address]   
   
                                                    SHIGA-LIKE   
TOXIN-PRODUCING E COLI Not detected    Normal          NOT DETECTED    [facility]  
   
                                        Comment on above:   Performed By: #### G  
IPAN ####  
Testing performed at [address] [place] [address]  
#### CDDNAT ####  
Testing performed at [address] [place] [address]   
   
                                                    SHIGELLA/ENTEROINVASIVE   
E COLI          Not detected    Normal          NOT DETECTED    [facility]  
   
                                        Comment on above:   Performed By: #### G  
IPAN ####  
Testing performed at [address] [place] [address]  
#### CDDNAT ####  
Testing performed at [address] [address]   
   
                      VIBRIO     Not detected Normal     NOT DETECTED [facility]  
   
                                        Comment on above:   Performed By: #### G  
IPAN ####  
Testing performed at [address] [address]  
#### CDDNAT ####  
[place] [address]   
   
                      VIBRIO CHOLERAE Not detected Normal     NOT DETECTED [facility]  
   
                                        Comment on above:   Performed By: #### G  
IPAN ####  
Testing performed at [address] [address]  
#### CDDNAT ####  
Testing performed at [address] [place] [address]   
   
                      YESINIA ENTEROCOLITICA Not detected Normal     NOT DETECTE  
D [facility]  
   
                                        Comment on above:   Performed By: #### G  
IPAN ####  
Testing performed at [address] [place] [address]  
#### CDDNAT ####  
Testing performed at [address] [place] [address]   
   
                                                    C DIFFICILE BY PCR (CLOSTRID  
IUM DIFFICILE TOXIN)on 2024   
   
                      C. Diff 027 Negative              NEGATIVE   [facility]  
   
                                        Comment on above:   Hypervirulent C. dif  
ficile strain 027/NAP1/BI  
TESTING PERFORMED BY PCR  
  
   
   
                      C.DIFFICILE TOXIN,PCR Negative              NEGATIVE   [facility]  
   
                                                    C DIFFICILE DNAon 2024  
   
   
                      C. DIFF 027 Negative   Normal     NEGATIVE   [facility]  
   
                                        Comment on above:   Result Comment: Hype  
rvirulent C. difficile strain 027/NAP1/BI  
TESTING PERFORMED BY PCR   
   
                                                            Performed By: #### G  
IPAN ####  
Testing performed at [address] [place] [address]  
#### CDDNAT ####  
Testing performed at [address] [address]   
   
                      TOXIGENIC C. DIFF Negative   Normal     NEGATIVE   [facility]  
   
                                        Comment on above:   Performed By: #### G  
IPAN ####  
Testing performed at [address] [address]  
#### CDDNAT ####  
Testing performed at [address] [address]   
   
                                                    CBCon 2024   
   
                      ABSOLUTE BAS 0.1 10*3/uL Normal     0.0-0.2    [facility]  
   
                                        Comment on above:   Performed By: #### C  
MPF, ACBC ####  
Testing performed at [address] [address]   
   
                      ABSOLUTE EOS 0.1 10*3/uL Normal     0.0-0.7    [facility]  
   
                                        Comment on above:   Performed By: #### C  
MPF, ACBC ####  
Testing performed at [address] [address]   
   
                                                    ABSOLUTE NEUTROPHIL   
COUNT           6.5 10*3/uL     Normal          1.4-6.5         [facility]  
   
                                        Comment on above:   Performed By: #### C  
MPF, ACBC ####  
Testing performed at [address] [address]   
   
                      Basophils/100 WBC (Bld) 0.7 %      Normal     0.0-2.0    [facility]  
   
                                        Comment on above:   Performed By: #### C  
MPF, ACBC ####  
Testing performed at [address] [address]   
   
                      DTYPE      AUTO DIFF  Normal                [facility]  
   
                                        Comment on above:   Performed By: #### C  
MPF, ACBC ####  
Testing performed at [address] [address]   
   
                                                    Eosinophils/100 WBC   
(Bld)           0.7 %           Normal          0.0-11.0        [facility]  
   
                                        Comment on above:   Performed By: #### C  
MPF, ACBC ####  
Testing performed at [address] [address]   
   
                                                    Lymphocytes (Bld)   
[#/Vol]         1.9 10*3/uL     Normal          1.2-3.4         [facility]  
   
                                        Comment on above:   Performed By: #### C  
MPF, ACBC ####  
Testing performed at [address] [place] [address]   
   
                                                    Lymphocytes/100 WBC   
(Bld)           20.1 %          Normal          20.0-55.0       [facility]  
   
                                        Comment on above:   Performed By: #### C  
MPF, ACBC ####  
Testing performed at [address] [address]   
   
                      Monocytes (Bld) [#/Vol] 0.8 10*3/uL High       0.0-0.7      
[facility]  
   
                                        Comment on above:   Performed By: #### C  
MPF, ACBC ####  
Testing performed at [address] [address]   
   
                      Monocytes/100 WBC (Bld) 9.0 %      Normal     0.0-10.0   [facility]  
   
                                        Comment on above:   Performed By: #### C  
MPF, ACBC ####  
Testing performed at [address] [address]   
   
                                                    Neutrophils/100 WBC   
(Bld)           69.5 %          Normal          37.0-75.0       [facility]  
   
                                        Comment on above:   Performed By: #### C  
MPF, ACBC ####  
Testing performed at [address] [place] [address]   
   
                                                    Erythrocyte   
distribution width   
(RBC) [Ratio]   13.2 %          Normal          11.5-14.5       [facility]  
   
                                        Comment on above:   Performed By: #### C  
MPF, ACBC ####  
Testing performed at [address] [place] [address]   
   
                                                    Hematocrit (Bld)   
[Volume fraction] 37.8 %          Normal          36.0-48.0       [facility]  
   
                                        Comment on above:   Performed By: #### C  
MPF, ACBC ####  
Testing performed at [address] [place] [address]   
   
                                                    Hemoglobin (Bld)   
[Mass/Vol]      12.8 g/dL       Normal          12.0-16.0       [facility]  
   
                                        Comment on above:   Performed By: #### C  
MPF, ACBC ####  
Testing performed at [address] [address]   
   
                                                    MCH (RBC) [Entitic   
mass]           30.2 pg         Normal          26.0-35.0       [facility]  
   
                                        Comment on above:   Performed By: #### C  
MPF, ACBC ####  
Testing performed at [address] [address]   
   
                      MCHC (RBC) [Mass/Vol] 33.9 g/dL  Normal     27.0-37.0  [facility]  
   
                                        Comment on above:   Performed By: #### C  
MPF, ACBC ####  
Testing performed at [address] [address]   
   
                      MCV (RBC) [Entitic vol] 88.9 fL    Normal     80.0-100.0 [facility]  
   
                                        Comment on above:   Performed By: #### C  
MPF, ACBC ####  
Testing performed at [address] [address]   
   
                                                    Platelet mean volume   
(Bld) [Entitic vol] 11.3 fL         High            7.4-11.0        [facility]  
   
                                        Comment on above:   Performed By: #### C  
MPF, ACBC ####  
Testing performed at [address] [address]   
   
                      Platelets (Bld) [#/Vol] 228 10*3/uL Normal     130-400      
[facility]  
   
                                        Comment on above:   Performed By: #### C  
MPF, ACBC ####  
Testing performed at [address] [address]   
   
                      RBC (Bld) [#/Vol] 4.25 10*6/uL Normal     4.0-5.4    [facility]  
   
                                        Comment on above:   Performed By: #### C  
MPF, ACBC ####  
Testing performed at [address] [address]   
   
                      WBC (Bld) [#/Vol] 9.4 10*3/uL Normal     3.6-11.0   [facility]  
   
                                        Comment on above:   Performed By: #### C  
MPF, ACBC ####  
Testing performed at [address] [address]   
   
                                                    CBC, EDIF, PLATELETon 2024   
   
                          ABSOLUTE BASOPHIL COUNT 0.1 10*3/uL               0.0   
- 0.2   
10*3/uL                                 [facility]  
   
                      Basophils/100 WBC (Bld) 0.7 %                 0.0 - 2.0 %   
[facility]  
   
                                                    Differential cell count   
method Nom (Bld) AUTO DIFF                       %               [facility]  
   
                                                    Eosinophils (Bld)   
[#/Vol]             0.1 10*3/uL                             0.0 - 0.7   
10*3/uL                                 [facility]   
System  
   
                                                    Eosinophils/100 WBC   
(Bld)           0.7 %                           0.0 - 11.0 %    [facility]  
   
                                                    Erythrocyte   
distribution width   
(RBC) [Ratio]       13.2 %                                  11.5 - 14.5   
%                                       [facility]  
   
                                                    Hematocrit (Bld)   
[Volume fraction]   37.8 %                                  36.0 - 48.0   
%                                       [facility]  
   
                                                    Hemoglobin (Bld)   
[Mass/Vol]      12.8 g/dL                                       [facility]  
   
                                                    Interpretation and   
review of laboratory   
results         Abnormal                                        [facility]  
   
                                                    Lymphocytes (Bld)   
[#/Vol]             1.9 10*3/uL                             1.2 - 3.4   
10*3/uL                                 [facility]  
   
                                                    Lymphocytes/100 WBC   
(Bld)               20.1 %                                  20.0 - 55.0   
%                                       [facility]  
   
                                                    MCH (RBC) [Entitic   
mass]               30.2 pg                                 26.0 - 35.0   
PG                                      [facility]  
   
                      MCHC (RBC) [Mass/Vol] 33.9 g/dL                        [facility]  
   
                      MCV (RBC) [Entitic vol] 88.9 fL                          A  
[facility]  
   
                          Monocytes (Bld) [#/Vol] 0.8 10*3/uL  High         0.0   
- 0.7   
10*3/uL                                 [facility]  
   
                      Monocytes/100 WBC (Bld) 9.0 %                 0.0 - 10.0 %  
 [facility]  
   
                                                    Neutrophils (Bld)   
[#/Vol]             6.5 10*3/uL                             1.4 - 6.5   
10*3/uL                                 [facility]  
   
                                                    Neutrophils/100 WBC   
(Bld)               69.5 %                                  37.0 - 75.0   
%                                       [facility]  
   
                                                    Platelet mean volume   
(Bld) [Entitic vol] 11.3 fL         High                            [facility]  
   
                          Platelets (Bld) [#/Vol] 228 10*3/uL               130   
- 400   
10*3/uL                                 [facility]  
   
                          RBC (Bld) [#/Vol] 4.25 10*6/uL              4.0 - 5.4   
10*6/uL                                 [facility]  
   
                          WBC (Bld) [#/Vol] 9.4 10*3/uL               3.6 - 11.0  
   
10*3/uL                                 [facility]  
   
                                                    CMP FASTINGon 02-   
   
                                        GFR Information     Average GFR for 20-2  
9   
years old = 116.    Normal                                  [facility]  
   
                                        Comment on above:   Result Comment:   
enrico Kidney disease, GFR = <60.  
Kidney failure, GFR = <15.  
The GFR estimate is not adjusted for extreme body surface area   
or acute process, nor has it been validated for pregnant women   
or ethnic groups other than  and .   
   
                                                            Performed By: #### C  
MPF, ACBC ####  
Testing performed at [address] [address]   
   
                      Glucose [Mass/Vol] 78 mg/dL   Normal          [facility]  
   
                                        Comment on above:   Result Comment:  
NORMAL <100 mg/dL  
PREDIABETES 101-126 mg/dL  
DIABETES 126 mg/dL or higher   
   
                                                            Performed By: #### C  
MPF, ACBC ####  
Testing performed at [address] [address]   
   
                      A:G RATIO  1.6 RATIO  Normal                [facility]  
   
                                        Comment on above:   Performed By: #### C  
MPF, ACBC ####  
Testing performed at [address] [address]   
   
                      ALBUMIN    3.9 G/dl   Normal     3.5-5.0    [facility]  
   
                                        Comment on above:   Performed By: #### C  
MPF, ACBC ####  
Testing performed at [address] [address]   
   
                                                    ALP [Catalytic   
activity/Vol]   51 U/L          Normal                    [facility]  
   
                                        Comment on above:   Performed By: #### C  
MPF, ACBC ####  
Testing performed at [address] [address]   
   
                                                    ALT [Catalytic   
activity/Vol]   14 U/L          Normal          <35             [facility]  
   
                                        Comment on above:   Performed By: #### C  
MPF, ACBC ####  
Testing performed at [address] [address]   
   
                                                    AST [Catalytic   
activity/Vol]   23 U/L          Normal          14-36           [facility]  
   
                                        Comment on above:   Performed By: #### C  
MPF, ACBC ####  
Testing performed at [address] [address]   
   
                      Bilirubin [Mass/Vol] 1.0 mg/dL  Normal     0.2-1.3    [facility]  
   
                                        Comment on above:   Performed By: #### C  
MPF, ACBC ####  
Testing performed at [address] [address]   
   
                      Calcium [Mass/Vol] 8.7 mg/dL  Normal     8.4-10.2   [facility]  
   
                                        Comment on above:   Performed By: #### C  
MPF, ACBC ####  
Testing performed at [address] [address]   
   
                      Chloride [Moles/Vol] 108 mmol/L High            [facility]  
   
                                        Comment on above:   Result Comment: Farzana mccabe note: Triglyceride levels of 600mg/dL   
or higher may positively bias chloride results by   
approximately 2.1 mmol   
   
                                                            Performed By: #### C  
MPF, ACBC ####  
Testing performed at [address] [address]   
   
                      CO2 [Moles/Vol] 12 mmol/L  Critically low 22-30      [facility]  
   
                                        Comment on above:   Result Comment: Resu  
lt called to read back by: ABEBE MARTÍNEZ   
2024 @ 05:52 by TAS   
   
                                                            Performed By: #### C  
MPF, ACBC ####  
Testing performed at [address] [address]   
   
                      Creatinine [Mass/Vol] 0.56 mg/dL Low        0.70-1.20  [facility]  
   
                                        Comment on above:   Performed By: #### C  
MPF, ACBC ####  
Testing performed at [address] [place] [address] [place] [address]   
   
                                                    EST. GFR,   
[employer]        174 ml/min/1.73sq.m [facility]  
   
                                        Comment on above:   Performed By: #### C  
MPF, ACBC ####  
Testing performed at [address] [place] [address] [place] [address]   
   
                                                    EST. GFR,Non    
[employer]        144 ml/min/1.73sq.m [facility]  
   
                                        Comment on above:   Performed By: #### C  
MPF, ACBC ####  
Testing performed at [address] [address]   
   
                      Potassium [Moles/Vol] 3.5 mmol/L Normal     3.5-5.1    [facility]  
   
                                        Comment on above:   Performed By: #### C  
MPF, ACBC ####  
Testing performed at [address] [address]   
   
                      Protein [Mass/Vol] 6.3 g/dL   Normal     6.3-8.2    [facility]  
   
                                        Comment on above:   Performed By: #### C  
MPF, ACBC ####  
Testing performed at [address] [address]   
   
                      Sodium [Moles/Vol] 136 mmol/L Low        137-145    [facility]  
   
                                        Comment on above:   Performed By: #### C  
MPF, ACBC ####  
Testing performed at [address] [address]   
   
                                                    Urea nitrogen   
[Mass/Vol]      4 mg/dL         Low             7-20            [facility]  
   
                                        Comment on above:   Performed By: #### C  
MPF, ACBC ####  
Testing performed at [address] [address]   
   
                                                    COMPREHENSIVE METABOLIC PANE  
Benjamin 2024   
   
                          Albumin [Mass/Vol] 3.9 G/dl                  3.5 - 5.0  
   
G/dl                                    [facility]  
   
                                                    Albumin/Globulin [Mass   
ratio]          1.6 {ratio}                     RATIO           [facility]  
   
                                                    ALP [Catalytic   
activity/Vol]   51 U/L                                          [facility]  
   
                                                    ALT [Catalytic   
activity/Vol]   14 U/L                          NINF            [facility]  
   
                                                    AST [Catalytic   
activity/Vol]   23 U/L                                          [facility]  
   
                      Bilirubin [Mass/Vol] 1.0 mg/dL                        [facility]  
   
                      Calcium [Mass/Vol] 8.7 mg/dL                        [facility]  
   
                      Chloride [Moles/Vol] 108 mmol/L High                  [facility]  
   
                                        Comment on above:   Please note: Triglyc  
eride levels of 600mg/dL or higher may   
positively bias chloride results by approximately 2.1 mmol   
   
                      CO2 [Moles/Vol] 12 mmol/L  Critically low            [facility]  
   
                                        Comment on above:   Result called to jeimy godinez back by: ABEBE MARTÍNEZ 2024 @ 05:52   
by TAS   
   
                      Creatinine [Mass/Vol] 0.56 mg/dL Low                   [facility]  
   
                                        GFR COMMENT         Average GFR for 20-2  
9   
years old = 116.                                            [facility]  
   
                                        Comment on above:   Chronic Kidney disea  
se, GFR = <60.  
Kidney failure, GFR = <15.  
The GFR estimate is not adjusted for extreme body surface area   
or acute process, nor has it been validated for pregnant women   
or ethnic groups other than  and .  
  
   
   
                                                    GFR/1.73 sq M.predicted   
among blacks MDRD   
(S/P/Bld) [Vol   
rate/Area]          174 mL/min/{1.73_m2}                     ml/min/1.73s  
q.m                                     [facility]  
   
                                                    GFR/1.73 sq M.predicted   
among non-blacks MDRD   
(S/P/Bld) [Vol   
rate/Area]          144 mL/min/{1.73_m2}                     ml/min/1.73s  
q.m                                     [facility]  
   
                      Glucose [Mass/Vol] 78 mg/dL                         [facility]  
   
                                        Comment on above:     
NORMAL <100 mg/dL  
PREDIABETES 101-126 mg/dL  
DIABETES 126 mg/dL or higher  
  
   
   
                                                    Interpretation and   
review of laboratory   
results         Abnormal                                        [facility]  
   
                      Potassium [Moles/Vol] 3.5 mmol/L                       [facility]  
   
                      Protein [Mass/Vol] 6.3 g/dL                         [facility]  
   
                      Sodium [Moles/Vol] 136 mmol/L Low                   [facility]  
   
                                                    Urea nitrogen   
[Mass/Vol]      4 mg/dL         Low                             [facility]  
   
                                                    CBCon 2024   
   
                      ABSOLUTE BAS 0.1 10*3/uL Normal     0.0-0.2    [facility]  
   
                                        Comment on above:   Performed By: #### C  
MPF, ACBC, LIPA2 ####  
Testing performed at [address] [address]   
   
                      ABSOLUTE EOS 0.0 10*3/uL Normal     0.0-0.7    [facility]  
   
                                        Comment on above:   Performed By: #### C  
MPF, ACBC, LIPA2 ####  
Testing performed at [address] [address]   
   
                                                    ABSOLUTE NEUTROPHIL   
COUNT           6.2 10*3/uL     Normal          1.4-6.5         [facility]  
   
                                        Comment on above:   Performed By: #### C  
MPF, ACBC, LIPA2 ####  
Testing performed at [address] [address]   
   
                      Basophils/100 WBC (Bld) 1.0 %      Normal     0.0-2.0    [facility]  
   
                                        Comment on above:   Performed By: #### C  
MPF, ACBC, LIPA2 ####  
Testing performed at [address] [place] [address]   
   
                      DTYPE      AUTO DIFF  Normal                [facility]  
   
                                        Comment on above:   Performed By: #### C  
MPF, ACBC, LIPA2 ####  
Testing performed at [address] [place] [address]   
   
                                                    Eosinophils/100 WBC   
(Bld)           0.4 %           Normal          0.0-11.0        [facility]  
   
                                        Comment on above:   Performed By: #### C  
MPF, ACBC, LIPA2 ####  
Testing performed at [address] [place] [address]   
   
                                                    Lymphocytes (Bld)   
[#/Vol]         1.4 10*3/uL     Normal          1.2-3.4         [facility]  
   
                                        Comment on above:   Performed By: #### C  
MPF, ACBC, LIPA2 ####  
Testing performed at [address] [address]   
   
                                                    Lymphocytes/100 WBC   
(Bld)           16.1 %          Low             20.0-55.0       [facility]  
   
                                        Comment on above:   Performed By: #### C  
MPF, ACBC, LIPA2 ####  
Testing performed at [address] [address]   
   
                      Monocytes (Bld) [#/Vol] 0.7 10*3/uL Normal     0.0-0.7      
[facility]  
   
                                        Comment on above:   Performed By: #### C  
MPF, ACBC, LIPA2 ####  
Testing performed at [address] [address]   
   
                      Monocytes/100 WBC (Bld) 8.8 %      Normal     0.0-10.0   [facility]  
   
                                        Comment on above:   Performed By: #### C  
MPF, ACBC, LIPA2 ####  
Testing performed at [address] [place] [address]   
   
                                                    Neutrophils/100 WBC   
(Bld)           73.7 %          Normal          37.0-75.0       [facility]  
   
                                        Comment on above:   Performed By: #### C  
MPF, ACBC, LIPA2 ####  
Testing performed at [address] [place] [address]   
   
                                                    Erythrocyte   
distribution width   
(RBC) [Ratio]   13.6 %          Normal          11.5-14.5       [facility]  
   
                                        Comment on above:   Performed By: #### C  
MPF, ACBC, LIPA2 ####  
Testing performed at [address] [address]   
   
                                                    Hematocrit (Bld)   
[Volume fraction] 40.1 %          Normal          36.0-48.0       [facility]  
   
                                        Comment on above:   Performed By: #### C  
MPF, ACBC, LIPA2 ####  
Testing performed at [address] [address]   
   
                                                    Hemoglobin (Bld)   
[Mass/Vol]      13.3 g/dL       Normal          12.0-16.0       [facility]  
   
                                        Comment on above:   Performed By: #### C  
MPF, ACBC, LIPA2 ####  
Testing performed at [address] [address]   
   
                                                    MCH (RBC) [Entitic   
mass]           29.8 pg         Normal          26.0-35.0       [facility]  
   
                                        Comment on above:   Performed By: #### C  
MPF, ACBC, LIPA2 ####  
Testing performed at [address] [address]   
   
                      MCHC (RBC) [Mass/Vol] 33.1 g/dL  Normal     27.0-37.0  [facility]  
   
                                        Comment on above:   Performed By: #### C  
MPF, ACBC, LIPA2 ####  
Testing performed at [address] [address]   
   
                      MCV (RBC) [Entitic vol] 90.2 fL    Normal     80.0-100.0 [facility]  
   
                                        Comment on above:   Performed By: #### C  
MPF, ACBC, LIPA2 ####  
Testing performed at [address] [address]   
   
                                                    Platelet mean volume   
(Bld) [Entitic vol] 10.2 fL         Normal          7.4-11.0        [facility]  
   
                                        Comment on above:   Performed By: #### C  
MPF, ACBC, LIPA2 ####  
Testing performed at [address] [address]   
   
                      Platelets (Bld) [#/Vol] 237 10*3/uL Normal     130-400      
[facility]  
   
                                        Comment on above:   Performed By: #### C  
MPF, ACBC, LIPA2 ####  
Testing performed at [address] [address]   
   
                      RBC (Bld) [#/Vol] 4.45 10*6/uL Normal     4.0-5.4    [facility]  
   
                                        Comment on above:   Performed By: #### C  
MPF, ACBC, LIPA2 ####  
Testing performed at [address] [address]   
   
                      WBC (Bld) [#/Vol] 8.4 10*3/uL Normal     3.6-11.0   [facility]  
   
                                        Comment on above:   Performed By: #### C  
MPF, ACBC, LIPA2 ####  
Testing performed at [address] [address]   
   
                                                    CBC W Auto Differential pane  
l (Bld)on 2024   
   
                      Basophils (Bld) [#/Vol] 0.10 x10*3/uL Normal     0.00-0.10  
  [facility]  
   
                                        Comment on above:   Performed By: #### 5  
7021-8 ####  
AIDEE RODRIGUES (71702)  
[facility] ([facility])  
[address] [place] [address]   
   
                      Basophils/100 WBC (Bld) 1.2 %      Normal     0.0-2.0    [facility]  
   
                                        Comment on above:   Performed By: #### 5  
7021-8 ####  
AIDEE RODRIGUES (13996)  
[facility] ([facility])  
[address] [place] [address]   
   
                                                    Eosinophils (Bld)   
[#/Vol]         0.03 x10*3/uL   Normal          0.00-0.70       [facility]  
   
                                        Comment on above:   Performed By: #### 5  
7021-8 ####  
AIDEE RODRIGUES (71810)  
[facility] ([facility])  
[address] [place] [address]   
   
                                                    Eosinophils/100 WBC   
(Bld)           0.3 %           Normal          0.0-6.0         [facility]  
   
                                        Comment on above:   Performed By: #### 5  
7021-8 ####  
AIDEE RODRIGUES (46667)  
[facility] ([facility])  
[address] [place] [address]   
   
                                                    Erythrocyte   
distribution width   
(RBC) [Ratio]   12.4 %          Normal          11.5-14.5       [facility]  
   
                                        Comment on above:   Performed By: #### 5  
7021-8 ####  
AIDEE RODRIGUES (61153)  
[facility] ([facility])  
[address] [place] [address]   
   
                                                    Hematocrit (Bld)   
[Volume fraction] 39.0 %          Normal          36.0-46.0       [facility]  
   
                                        Comment on above:   Performed By: #### 5  
7021-8 ####  
AIDEE RODRIGUES (64418)  
[facility] ([facility])  
[address] [place] [address]   
   
                                                    Hemoglobin (Bld)   
[Mass/Vol]      13.1 g/dL       Normal          12.0-16.0       [facility]  
   
                                        Comment on above:   Performed By: #### 5  
7021-8 ####  
AIDEE RODRIGUES (34297)  
[facility] ([facility])  
[address] [place] [address]   
   
                                                    Immature granulocytes   
(Bld) [#/Vol]   0.02 x10*3/uL   Normal          0.00-0.70       [facility]  
   
                                        Comment on above:   Performed By: #### 5  
7021-8 ####  
AIDEE RODRIGUES (74116)  
[facility] ([facility])  
[address] [place] [address]   
   
                                                    Immature   
granulocytes/100 WBC   
(Bld)           0.2 %           Normal          0.0-0.9         [facility]  
   
                                        Comment on above:   Result Comment: Jossie  
ture Granulocyte Count (IG) includes   
promyelocytes, myelocytes and metamyelocytes but does not   
include bands. Percent differential counts (%) should be   
interpreted in the context of the absolute cell counts   
(cells/UL).   
   
                                                            Performed By: #### 5  
7021-8 ####  
AIDEE RODRIGUES (91806)  
[facility] ([facility])  
[address] [place], [place] [address]   
   
                                                    Lymphocytes (Bld)   
[#/Vol]         1.25 x10*3/uL   Normal          1.20-4.80       [facility]  
   
                                        Comment on above:   Performed By: #### 5  
7021-8 ####  
AIDEE RODRIGUES (46376)  
[facility] ([facility])  
[address] [place] [address]   
   
                                                    Lymphocytes/100 WBC   
(Bld)           14.5 %          Normal          13.0-44.0       [facility]  
   
                                        Comment on above:   Performed By: #### 5  
7021-8 ####  
AIDEE RODRIGUES (37927)  
[facility] ([facility])  
[address] [place] [address]   
   
                                                    MCH (RBC) [Entitic   
mass]           29.8 pg         Normal          26.0-34.0       [facility]  
   
                                        Comment on above:   Performed By: #### 5  
7021-8 ####  
AIDEE RODRIGUES (35045)  
[facility] ([facility])  
[address] [place] [address]   
   
                      MCHC (RBC) [Mass/Vol] 33.6 g/dL  Normal     32.0-36.0  [facility]  
   
                                        Comment on above:   Performed By: #### 5  
7021-8 ####  
AIDEE RODRIGUES (42485)  
[facility] ([facility])  
[address] [place] [address]   
   
                      MCV (RBC) [Entitic vol] 89 fL      Normal          U  
[facility]  
   
                                        Comment on above:   Performed By: #### 5  
7021-8 ####  
AIDEE RODRIGUES (13878)  
[facility] ([facility])  
[address] [place] [address]   
   
                      Monocytes (Bld) [#/Vol] 0.64 x10*3/uL Normal     0.10-1.00  
  [facility]  
   
                                        Comment on above:   Performed By: #### 5  
7021-8 ####  
AIDEE RODRIGUES (54058)  
[facility] ([facility])  
[address] [place] [address]   
   
                      Monocytes/100 WBC (Bld) 7.4 %      Normal     2.0-10.0   U  
[facility]  
   
                                        Comment on above:   Performed By: #### 5  
7021-8 ####  
AIDEE RODRIGUES (88770)  
[facility] ([facility])  
[address] [place] [address]   
   
                                                    Neutrophils (Bld)   
[#/Vol]         6.56 x10*3/uL   Normal          1.20-7.70       [facility]  
   
                                        Comment on above:   Result Comment: Perc  
ent differential counts (%) should be   
interpreted in the context of the absolute cell counts   
(cells/uL).   
   
                                                            Performed By: #### 5  
7021-8 ####  
AIDEE RODRIGUES (64508)  
[facility] ([facility])  
[address] [place] [address]   
   
                                                    Neutrophils/100 WBC   
(Bld)           76.4 %          Normal          40.0-80.0       [facility]  
   
                                        Comment on above:   Performed By: #### 5  
7021-8 ####  
AIDEE RODRIGUES (31794)  
[facility] ([facility])  
[address] [place] [address]   
   
                                                    Nucleated RBC/100 WBC   
(Bld) [Ratio]   0.0 /100 WBCs   Normal          0.0-0.0         [facility]  
   
                                        Comment on above:   Performed By: #### 5  
7021-8 ####  
AIDEE RODRIGUES (72222)  
[facility] ([facility])  
[address] [place] [address]   
   
                      Platelets (Bld) [#/Vol] 278 x10*3/uL Normal     150-450     
 [facility]  
   
                                        Comment on above:   Performed By: #### 5  
7021-8 ####  
AIDEE RODRIGUES (36504)  
[facility] ([facility])  
[address] [place] [address]   
   
                      RBC (Bld) [#/Vol] 4.39 x10*6/uL Normal     4.00-5.20  [facility]  
   
                                        Comment on above:   Performed By: #### 5  
7021-8 ####  
AIDEE RODRIGUES (49600)  
[facility] ([facility])  
[address] [place] [address]   
   
                      WBC (Bld) [#/Vol] 8.6 x10*3/uL Normal     4.4-11.3   [facility]  
   
                                        Comment on above:   Performed By: #### 5  
7021-8 ####  
AIDEE RODRIGUES (79187)  
[facility] ([facility])  
[address] [place] [address]   
   
                                                    CBC, EDIF, PLATELETon 2024   
   
                          ABSOLUTE BASOPHIL COUNT 0.1 10*3/uL               0.0   
- 0.2   
10*3/uL                                 [facility]  
   
                      Basophils/100 WBC (Bld) 1.0 %                 0.0 - 2.0 %   
[facility]  
   
                                                    Differential cell count   
method Nom (Bld) AUTO DIFF                       %               [facility]  
   
                                                    Eosinophils (Bld)   
[#/Vol]             0.0 10*3/uL                             0.0 - 0.7   
10*3/uL                                 [facility]  
   
                                                    Eosinophils/100 WBC   
(Bld)           0.4 %                           0.0 - 11.0 %    [facility]  
   
                                                    Erythrocyte   
distribution width   
(RBC) [Ratio]       13.6 %                                  11.5 - 14.5   
%                                       [facility]  
   
                                                    Hematocrit (Bld)   
[Volume fraction]   40.1 %                                  36.0 - 48.0   
%                                       [facility]  
   
                                                    Hemoglobin (Bld)   
[Mass/Vol]      13.3 g/dL                                       [facility]  
   
                                                    Interpretation and   
review of laboratory   
results         Abnormal                                        [facility]  
   
                                                    Lymphocytes (Bld)   
[#/Vol]             1.4 10*3/uL                             1.2 - 3.4   
10*3/uL                                 [facility]  
   
                                                    Lymphocytes/100 WBC   
(Bld)               16.1 %              Low                 20.0 - 55.0   
%                                       [facility]  
   
                                                    MCH (RBC) [Entitic   
mass]               29.8 pg                                 26.0 - 35.0   
PG                                      [facility]  
   
                      MCHC (RBC) [Mass/Vol] 33.1 g/dL                        [facility]  
   
                      MCV (RBC) [Entitic vol] 90.2 fL                          A  
[facility]  
   
                          Monocytes (Bld) [#/Vol] 0.7 10*3/uL               0.0   
- 0.7   
10*3/uL                                 [facility]  
   
                      Monocytes/100 WBC (Bld) 8.8 %                 0.0 - 10.0 %  
 [facility]  
   
                                                    Neutrophils (Bld)   
[#/Vol]             6.2 10*3/uL                             1.4 - 6.5   
10*3/uL                                 [facility]  
   
                                                    Neutrophils/100 WBC   
(Bld)               73.7 %                                  37.0 - 75.0   
%                                       [facility]  
   
                                                    Platelet mean volume   
(Bld) [Entitic vol] 10.2 fL                                         [facility]  
   
                          Platelets (Bld) [#/Vol] 237 10*3/uL               130   
- 400   
10*3/uL                                 [facility]  
   
                          RBC (Bld) [#/Vol] 4.45 10*6/uL              4.0 - 5.4   
10*6/uL                                 [facility]  
   
                          WBC (Bld) [#/Vol] 8.4 10*3/uL               3.6 - 11.0  
   
10*3/uL                                 [facility]  
   
                                                    CMP FASTINGon 2024   
   
                      A:G RATIO  1.7 RATIO  Normal                [facility]  
   
                                        Comment on above:   Performed By: #### C  
MPF, ACBC, LIPA2 ####  
Testing performed at [address] [address]   
   
                      ALBUMIN    4.5 G/dl   Normal     3.5-5.0    [facility]  
   
                                        Comment on above:   Performed By: #### C  
MPF, ACBC, LIPA2 ####  
Testing performed at [address] [address]   
   
                                                    ALP [Catalytic   
activity/Vol]   53 U/L          Normal                    [facility]  
   
                                        Comment on above:   Performed By: #### C  
MPF, ACBC, LIPA2 ####  
Testing performed at [address] [address]   
   
                                                    ALT [Catalytic   
activity/Vol]   15 U/L          Normal          <35             [facility]  
   
                                        Comment on above:   Performed By: #### C  
MPF, ACBC, LIPA2 ####  
Testing performed at [address] [address]   
   
                                                    AST [Catalytic   
activity/Vol]   26 U/L          Normal          14-36           [facility]  
   
                                        Comment on above:   Performed By: #### C  
MPF, ACBC, LIPA2 ####  
Testing performed at [address] [address]   
   
                      Bilirubin [Mass/Vol] 1.2 mg/dL  Normal     0.2-1.3    [facility]  
   
                                        Comment on above:   Performed By: #### C  
MPF, ACBC, LIPA2 ####  
Testing performed at [address] [address]   
   
                      Calcium [Mass/Vol] 8.8 mg/dL  Normal     8.4-10.2   [facility]  
   
                                        Comment on above:   Performed By: #### C  
MPF, ACBC, LIPA2 ####  
Testing performed at [address] [address]   
   
                      Chloride [Moles/Vol] 106 mmol/L Normal          [facility]  
   
                                        Comment on above:   Result Comment: Farzana mccabe note: Triglyceride levels of 600mg/dL   
or higher may positively bias chloride results by   
approximately 2.1 mmol   
   
                                                            Performed By: #### C  
MPF, ACBC, LIPA2 ####  
[place] [address]   
   
                      CO2 [Moles/Vol] 14 mmol/L  Critically low 22-30      [facility]  
   
                                        Comment on above:   Result Comment: Resu  
lt called to read back by: ESPINOZA SUTTON RN   
2024 @ 17:10 by [facility]   
   
                                                            Performed By: #### C  
MPF, ACBC, LIPA2 ####  
[place] [address]   
   
                      Creatinine [Mass/Vol] 0.60 mg/dL Low        0.70-1.20  [facility]  
   
                                        Comment on above:   Performed By: #### C  
MPF, ACBC, LIPA2 ####  
Testing performed at [place] [id number]   
   
                                                    EST. GFR,   
American        161 ml/min/1.73sq.m [facility]  
   
                                        Comment on above:   Performed By: #### C  
MPF, ACBC, LIPA2 ####  
Testing performed at [place] [id number]   
   
                                                    EST. GFR,Non    
American        133 ml/min/1.73sq.m [facility]  
   
                                        Comment on above:   Performed By: #### C  
MPF, ACBC, LIPA2 ####  
[place] [address]   
   
                                        GFR Information     Average GFR for 20-2  
9   
years old = 116.    Normal                                  [facility]  
   
                                        Comment on above:   Result Comment:   
enrico Kidney disease, GFR = <60.  
Kidney failure, GFR = <15.  
The GFR estimate is not adjusted for extreme body surface area   
or acute process, nor has it been validated for pregnant women   
or ethnic groups other than  and .   
   
                                                            Performed By: #### C  
MPF, ACBC, LIPA2 ####  
[place] [address]   
   
                      Glucose [Mass/Vol] 83 mg/dL   Normal          [facility]  
   
                                        Comment on above:   Result Comment:  
NORMAL <100 mg/dL  
PREDIABETES 101-126 mg/dL  
DIABETES 126 mg/dL or higher   
   
                                                            Performed By: #### C  
MPF, ACBC, LIPA2 ####  
Testing performed at [address] [address]   
   
                      Potassium [Moles/Vol] 3.5 mmol/L Normal     3.5-5.1    [facility]  
   
                                        Comment on above:   Performed By: #### C  
MPF, ACBC, LIPA2 ####  
Testing performed at [address] [address]   
   
                      Protein [Mass/Vol] 7.1 g/dL   Normal     6.3-8.2    [facility]  
   
                                        Comment on above:   Performed By: #### C  
MPF, ACBC, LIPA2 ####  
Testing performed at [address] [address]   
   
                      Sodium [Moles/Vol] 136 mmol/L Low        137-145    [facility]  
   
                                        Comment on above:   Performed By: #### C  
MPF, ACBC, LIPA2 ####  
Testing performed at [address] [address]   
   
                                                    Urea nitrogen   
[Mass/Vol]      3 mg/dL         Low             7-20            [facility]  
   
                                        Comment on above:   Performed By: #### C  
MPF, ACBC, LIPA2 ####  
Testing performed at [address] [address]   
   
                                                    COMPREHENSIVE METABOLIC PANE  
[place] 2024   
   
                          Albumin [Mass/Vol] 4.5 G/dl                  3.5 - 5.0  
   
G/dl                                    [facility]  
   
                                                    Albumin/Globulin [Mass   
ratio]          1.7 {ratio}                     RATIO           [facility]  
   
                                                    ALP [Catalytic   
activity/Vol]   53 U/L                                          [facility]  
   
                                                    ALT [Catalytic   
activity/Vol]   15 U/L                          NINF            [facility]  
   
                                                    AST [Catalytic   
activity/Vol]   26 U/L                                          [facility]  
   
                      Bilirubin [Mass/Vol] 1.2 mg/dL                        [facility]  
   
                      Calcium [Mass/Vol] 8.8 mg/dL                        [facility]  
   
                      Chloride [Moles/Vol] 106 mmol/L                       [facility]  
   
                                        Comment on above:   Please note: Triglyc  
eride levels of 600mg/dL or higher may   
positively bias chloride results by approximately 2.1 mmol   
   
                      CO2 [Moles/Vol] 14 mmol/L  Critically low            [facility]  
   
                                        Comment on above:   Result called to jeimy godinez back by: ESPINOZA SUTTON RN 2024 @ 17:10 by   
[facility]   
   
                      Creatinine [Mass/Vol] 0.60 mg/dL Low                   [facility]   
System  
   
                                        GFR COMMENT         Average GFR for 20-2  
9   
years old = 116.                                            [facility]  
   
                                        Comment on above:   Chronic Kidney disea  
se, GFR = <60.  
Kidney failure, GFR = <15.  
The GFR estimate is not adjusted for extreme body surface area   
or acute process, nor has it been validated for pregnant women   
or ethnic groups other than  and .  
  
   
   
                                                    GFR/1.73 sq M.predicted   
among blacks MDRD   
(S/P/Bld) [Vol   
rate/Area]          161 mL/min/{1.73_m2}                     ml/min/1.73s  
q.m                                     [facility]   
System  
   
                                                    GFR/1.73 sq M.predicted   
among non-blacks MDRD   
(S/P/Bld) [Vol   
rate/Area]          133 mL/min/{1.73_m2}                     ml/min/1.73s  
q.m                                     [facility]   
System  
   
                                                    Glucose post fast   
[Mass/Vol]      83 mg/dL                                        [facility]  
   
                                        Comment on above:     
NORMAL <100 mg/dL  
PREDIABETES 101-126 mg/dL  
DIABETES 126 mg/dL or higher  
  
   
   
                                                    Interpretation and   
review of laboratory   
results         Abnormal                                        [facility]   
System  
   
                      Potassium [Moles/Vol] 3.5 mmol/L                       [facility]   
System  
   
                      Protein [Mass/Vol] 7.1 g/dL                         [facility]   
System  
   
                      Sodium [Moles/Vol] 136 mmol/L Low                   [facility]   
System  
   
                                                    Urea nitrogen   
[Mass/Vol]      3 mg/dL         Low                             [facility]  
   
                                                    Comprehensive metabolic 2000  
 panelon 2024   
   
                                                    Albumin BCP dye   
[Mass/Vol]      4.6 g/dL        Normal          3.4-5.0         [facility]  
   
                                        Comment on above:   Performed By: #### 5  
7021-8 ####  
AIDEE RODRIGUES (95042)  
[facility] ([facility])  
[address] [place] [address]   
   
                                                    ALP [Catalytic   
activity/Vol]   38 U/L          Normal                    [facility]  
   
                                        Comment on above:   Performed By: #### 5  
7021-8 ####  
AIDEE RODRIGUES (70349)  
[facility] ([facility])  
[address] [place] [address]   
   
                                                    ALT With P-5'-P   
[Catalytic   
activity/Vol]   10 U/L          Normal          7-45            [facility]  
   
                                        Comment on above:   Result Comment: Kaleigh  
ents treated with Sulfasalazine may   
generate falsely decreased results for ALT.   
   
                                                            Performed By: #### 5  
7021-8 ####  
AIDEE RODRIGUES (19854)  
[facility] ([facility])  
[address] [place] [address]   
   
                      Anion gap [Moles/Vol] 18 mmol/L  Normal     10-20      [facility]  
   
                                        Comment on above:   Performed By: #### 5  
7021-8 ####  
AIDEE RODRIGUES (66730)  
[facility] ([facility])  
[address] [place] [address]   
   
                                                    AST With P-5'-P   
[Catalytic   
activity/Vol]   13 U/L          Normal          9-39            [facility]  
   
                                        Comment on above:   Performed By: #### 5  
7021-8 ####  
AIDEE RODRIGUES (75022)  
[facility] ([facility])  
[address] [place] [address]   
   
                      Bilirubin [Mass/Vol] 0.8 mg/dL  Normal     0.0-1.2    [facility]  
   
                                        Comment on above:   Performed By: #### 5  
7021-8 ####  
AIDEE RODRIGUES (51917)  
[facility] ([facility])  
[address] [place] [address]   
   
                      Calcium [Mass/Vol] 9.0 mg/dL  Normal     8.6-10.3   [facility]  
   
                                        Comment on above:   Performed By: #### 5  
7021-8 ####  
AIDEE RODRIGUES (51844)  
[facility] ([facility])  
[address] [place] [address]   
   
                      Chloride [Moles/Vol] 102 mmol/L Normal          [facility]  
   
                                        Comment on above:   Performed By: #### 5  
7021-8 ####  
AIDEE RODRIGUES (09557)  
[facility] ([facility])  
[address] [place] [address]   
   
                      CO2 [Moles/Vol] 19 mmol/L  Low        21-32      [facility]  
   
                                        Comment on above:   Performed By: #### 5  
7021-8 ####  
AIDEE RODRIGUES (55978)  
[facility] ([facility])  
[address] [place] [address]   
   
                      Creatinine [Mass/Vol] 0.63 mg/dL Normal     0.50-1.05  [facility]  
   
                                        Comment on above:   Performed By: #### 5  
7021-8 ####  
AIDEE RODRIGUES (48793)  
[facility] ([facility])  
[address] [place] [address]   
   
                                                    GFR/1.73 sq M.predicted   
MDRD (S/P/Bld) [Vol   
rate/Area]      mL/min/{1.73_m2} Normal          >60             [facility]  
   
                                        Comment on above:   Result Comment: Calc  
ulations of estimated GFR are performed   
using the  CKD-EPI Study Refit equation without the race   
variable for the IDMS-Traceable creatinine methods.  
https://jasn.asnjournals.org/content/early//ASN.  
107975   
   
                                                            Performed By: #### 5  
7021-8 ####  
AIDEE RODRIGUES (16298)  
[facility] ([facility])  
[address] [place] [address]   
   
                      Glucose [Mass/Vol] 85 mg/dL   Normal     74-99      [facility]  
   
                                        Comment on above:   Performed By: #### 5  
7021-8 ####  
AIDEE RODRIGUES (97817)  
[facility] ([facility])  
[address] [place] [address]   
   
                      Potassium [Moles/Vol] 3.5 mmol/L Normal     3.5-5.3    [facility]  
   
                                        Comment on above:   Performed By: #### 5  
7021-8 ####  
AIDEE RODRIGUES (67653)  
[facility] ([facility])  
[address] [place] [address]   
   
                      Protein [Mass/Vol] 6.9 g/dL   Normal     6.4-8.2    [facility]  
   
                                        Comment on above:   Performed By: #### 5  
7021-8 ####  
AIDEE RODRIGUES (90316)  
[facility] ([facility])  
[address] [place] [address]   
   
                      Sodium [Moles/Vol] 135 mmol/L Low        136-145    [facility]  
   
                                        Comment on above:   Performed By: #### 5  
7021-8 ####  
AIDEE RODRIGUES (21166)  
[facility] ([facility])  
[address] [place] [address]   
   
                                                    Urea nitrogen   
[Mass/Vol]      7 mg/dL         Normal          6-23            [facility]  
   
                                        Comment on above:   Performed By: #### 5  
7021-8 ####  
AIDEE RODRIGUES (52977)  
[facility] ([facility])  
[address] [place] [address]   
   
                                                    LIPASEon 2024   
   
                                                    Lipase [Catalytic   
activity/Vol]   80 U/L                          23 - 300 U/L    [facility]  
   
                                                    LIPASE,SERUMon 2024   
   
                      LIPASE,SERUM 80 U/L     Normal          [facility]  
   
                                        Comment on above:   Performed By: #### C  
MPF, ACBC, LIPA2 ####  
Testing performed at [address] [address]   
   
                                                    No Panel Informationon    
   
                                                                  [facility]  
   
                                                    RAPID TOX SCREEN,URINEon    
   
                      BUPRENORPHINE Negative   Normal     NEGATIVE   [facility]  
   
                                        Comment on above:   Result Comment: <12.  
5 ng/ml CUTOFF   
   
                                                            Performed By: #### R  
TOX ####  
Testing performed at [address] [address]   
   
                      MDMA       Negative   Normal     NEGATIVE   [facility]  
   
                                        Comment on above:   Result Comment: <100  
0 ng/ml CUTOFF   
   
                                                            Performed By: #### R  
TOX ####  
Testing performed at [address] [address]   
   
                      OXYCODONE  Negative   Normal     NEGATIVE   [facility]  
   
                                        Comment on above:   Result Comment: <100  
 ng/ml CUTOFF   
   
                                                            Performed By: #### R  
TOX ####  
Testing performed at [address] [address]   
   
                      AMPHETAMINE Negative   Normal     NEGATIVE   [facility]  
   
                                        Comment on above:   Result Comment: <500  
 ng/ml CUTOFF   
   
                                                            Performed By: #### R  
TOX ####  
Testing performed at [facility]  
[address] [place] [address]   
   
                      BARBITURATES Negative   Normal     NEGATIVE   [facility]  
   
                                        Comment on above:   Result Comment: <200  
 ng/ml CUTOFF   
   
                                                            Performed By: #### R  
TOX ####  
Testing performed at [address] [place] [address]   
   
                      BENZODIAZEPINES Negative   Normal     NEGATIVE   [facility]  
   
                                        Comment on above:   Result Comment: <200  
 ng/ml CUTOFF   
   
                                                            Performed By: #### R  
TOX ####  
Testing performed at [address] [place] [address]   
   
                      CANNABINOIDS Positive   Abnormal   NEGATIVE   [facility]  
   
                                        Comment on above:   Result Comment: <50   
ng/ml CUTOFF  
*Unconfirmed Screening Result* Unconfirmed screening results   
are to be used only for medical treatment purposes.   
   
                                                            Performed By: #### R  
TOX ####  
Testing performed at [address] [place] [address]   
   
                      COCAINE    Negative   Normal     NEGATIVE   [facility]  
   
                                        Comment on above:   Result Comment: <150  
 ng/ml CUTOFF   
   
                                                            Performed By: #### R  
TOX ####  
Testing performed at [address] [place] [address]   
   
                      METHADONE  Negative   Normal     NEGATIVE   [facility]  
   
                                        Comment on above:   Result Comment: Meth  
adone Metabolite  
<100 ng/ml CUTOFF   
   
                                                            Performed By: #### R  
TOX ####  
Testing performed at [address] [place] [address]   
   
                      METHAMPHETAMINE Negative   Normal     NEGATIVE   [facility]  
   
                                        Comment on above:   Result Comment: <500  
 ng/ml CUTOFF   
   
                                                            Performed By: #### R  
TOX ####  
Testing performed at [address] [place] [address]   
   
                      OPIATES    Negative   Normal     NEGATIVE   [facility]  
   
                                        Comment on above:   Result Comment: <300  
 ng/ml CUTOFF   
   
                                                            Performed By: #### R  
TOX ####  
Testing performed at [address] [place] [address]   
   
                                                    TRICYCLIC   
ANTIDEPRESSANTS Negative        Normal          NEGATIVE        [facility]  
   
                                        Comment on above:   Result Comment: <100  
0 ng/ml CUTOFF   
   
                                                            Performed By: #### R  
TOX ####  
Testing performed at [address] [place] [address]   
   
                      FENTANYL   Negative   Normal     NEGATIVE   [facility]  
   
                                        Comment on above:   Performed By: #### R  
TOX ####  
Testing performed at [address] [place] [address]   
   
                                                    TOXICOLOGY DRUG SCREEN, URIN  
[place] 2024   
   
                                                    Amphetamine (U)   
[Mass/Vol]          Negative                                NEGATIVE   
NG/ML                                   [facility]   
[facility]  
   
                                        Comment on above:   <500 ng/ml CUTOFF   
   
                                                    Barbiturates Screen Ql   
(U)                 Negative                                NEGATIVE   
NG/ML                                   [facility]   
System  
   
                                        Comment on above:   <200 ng/ml CUTOFF   
   
                          Benzodiazepines Ql (U) Negative                  NEGAT  
BHAVANI   
NG/ML                                   [facility]   
[facility]  
   
                                        Comment on above:   <200 ng/ml CUTOFF   
   
                          Benzoylecgonine Ql (U) Negative                  NEGAT  
BHAVANI   
NG/ML                                   [facility]   
[facility]  
   
                                        Comment on above:   <150 ng/ml CUTOFF   
   
                          Buprenorphine Ql (U) Negative                  NEGATIV  
E   
NG/ML                                   [facility]   
[facility]  
   
                                        Comment on above:   <12.5 ng/ml CUTOFF   
   
                                                    Cannabinoids Screen Ql   
(U)                 Positive            Abnormal            NEGATIVE   
NG/ML                                   [facility]   
System  
   
                                        Comment on above:   <50 ng/ml CUTOFF  
*Unconfirmed Screening Result* Unconfirmed screening results   
are to be used only for medical treatment purposes.  
  
   
   
                          Fentanyl     Negative                  NEGATIVE   
NG/ML                                   [facility]  
   
                                                    Interpretation and   
review of laboratory   
results         Abnormal                                        [facility]   
System  
   
                          Methadone Screen Ql (U) Negative                  NEGA  
TIVE   
NG/ML                                   [facility]   
[facility]  
   
                                        Comment on above:   Methadone Metabolite  
<100 ng/ml CUTOFF  
  
   
   
                                                    Methamphetamine (U)   
[Mass/Vol]          Negative                                NEGATIVE   
NG/ML                                   [facility]   
System  
   
                                        Comment on above:   <500 ng/ml CUTOFF   
   
                                                    Methylenedioxymethamphe  
tamine Ql (Unsp spec) Negative                                NEGATIVE   
NG/ML                                   [facility]   
[facility]  
   
                                        Comment on above:   <1000 ng/ml CUTOFF   
   
                          Opiates Screen Ql (U) Negative                  NEGATI  
VE   
NG/ML                                   [facility]  
   
                                        Comment on above:   <300 ng/ml CUTOFF   
   
                          oxyCODONE Ql (U) Negative                  NEGATIVE   
NG/ML                                   [facility]   
System  
   
                                        Comment on above:   <100 ng/ml CUTOFF   
   
                                                    Tricyclic   
antidepressants Screen   
Ql (U)              Negative                                NEGATIVE   
NG/ML                                   [facility]   
System  
   
                                        Comment on above:   <1000 ng/ml CUTOFF   
   
                                                                  [facility]   
System  
   
                                                    Triacylglycerol lipaseon    
   
                                                    Lipase [Catalytic   
activity/Vol]   15 U/L          Normal          9-82            [facility]  
   
                                        Comment on above:   Order Comment: Venip  
uncture immediately after or during the   
administration of Metamizole may lead to falsely low results.   
Testing should be performed immediately prior to Metamizole   
dosing.   
   
                                                            Performed By: #### 5  
7021-8 ####  
HOSKINS RAVI ([id number])  
[facility] ([facility])  
[address] [place] [address]   
   
                                                    CT ABDOMEN PELVIS WITH IV CO  
NTRAST ONLYon 02-   
   
                                                    CT ABDOMEN PELVIS WITH   
IV CONTRAST ONLY                        EXAMINATION:  
CT ABDOMEN PELVIS WITH   
IV CONTRAST ONLY  
HISTORY:  
ORDERING SYSTEM   
PROVIDED HISTORY: Upper   
and periumbilical   
abdominal pain,  
TECHNOLOGIST PROVIDED   
HISTORY:  
Illness/Other  
Reason for exam:   
seizure yesterday,   
vomiting since  
Encounter Type: Initial  
Additional signs and   
symptoms: .  
ORDERING SYSTEM   
PROVIDED DIAGNOSIS   
CODES:  
COMPARISON:  
2022  
TECHNIQUE:  
CT examination of the   
abdomen and pelvis   
following the   
administration of  
intravenous contrast.   
Coronal and sagittal   
reformations were   
performed.  
Dose reduction   
techniques were   
achieved by using   
automated exposure  
control and/or   
adjustment of mA and/or   
kV according to patient   
size and/or  
use of iterative   
reconstruction   
technique.  
CONTRAST:  
IOPAMIDOL 370 MG   
IODINE/ML (76 %)   
INTRAVENOUS SOLUTION -   
75 mL,  
FINDINGS:  
Lung bases:The   
visualized lung bases   
are clear. Small hiatal   
hernia.  
Liver: Unremarkable.  
Gallbladder:   
Unremarkable.  
Pancreas: Unremarkable.  
Spleen: Unremarkable.  
Adrenal glands:   
Unremarkable.  
Kidneys: The kidneys   
enhance   
symmetrically.There is   
no hydronephrosis.No  
focal renal lesions.   
Evaluation for   
nephrolithiasis is   
limited due to  
contrast in the renal   
collecting systems   
bilaterally.  
Bladder:   
Underdistended.  
Bowel: Mild diffuse   
colonic wall thickening   
which may be secondary   
to mild  
colitis. No evidence of   
bowel obstruction.   
Status post   
appendectomy.  
Retroperitoneum: No   
lymphadenopathy or   
mass.  
Vasculature: The   
abdominal aorta is   
normal in caliber.  
Mesentery: No abdominal   
ascites or   
lymphadenopathy. No   
free air.  
Pelvis: Small amount of   
free fluid in the   
pelvis. No   
lymphadenopathy.  
Body Wall: Small fat   
containing umbilical   
hernia.  
Bones: No acute   
abnormality.  
IMPRESSION:  
1. Mild diffuse   
colitis. No evidence of   
bowel obstruction.  
2. Small hiatal hernia.  
3. Small amount of free   
fluid in the   
cul-de-sac.  
Workstation ID: [id number]  
Dictated by: CEDRICK COOK on Sat Feb 10,   
2024 2:41:42 PM EST  
Transcribed by:   
CEDRICK COOK on Sat   
Feb 10, 2024 2:41:42 PM   
EST  
Finalized by: CEDRICK COOK on Sat Feb 10,   
2024 2:41:42 PM EST Normal                                  [facility]  
   
                                        Comment on above:   Order Comment: Injur  
y/Trauma or Illness?:Illness/Other  
How long have you had these symptoms (acute/chronic)?:Acute  
Reason for exam?:seizure yesterday, vomiting since  
Type of Exam?:Initial  
Additional signs and symptoms?:.   
   
                                                    CT HEAD OR BRAIN WITHOUT CON  
TRASTon 02-   
   
                                                    CT HEAD OR BRAIN   
WITHOUT CONTRAST                        EXAMINATION:  
CT HEAD OR BRAIN   
WITHOUT CONTRAST,   
02/10/2024  
COMPARISON:  
None.  
HISTORY:  
Dx: R56.9 (Seizure   
(HCC))  
Injury/Trauma or   
Illness?: Illness/Other  
How long have you had   
these symptoms   
(acute/chronic)?: Acute  
Seizure  
TECHNIQUE:  
3 mm axial images   
performed through the   
head. 3 mm axial,   
sagittal and  
coronal MPR   
reconstructions   
performed.  
Dose reduction   
techniques were   
achieved by using   
automated exposure  
control and/or   
adjustment of mA and/or   
kV according to patient   
size and/or  
use of iterative   
reconstruction   
technique.  
FINDINGS:  
The third, fourth, and   
lateral ventricles are   
normal in size, shape   
and  
position. There is no   
evidence of acute   
hemorrhage, mass effect   
or  
midline shift.   
Visualized paranasal   
sinuses, mastoid air   
cells and bony  
structures are   
unremarkable.  
IMPRESSION:  
No acute intracranial   
abnormality identified.   
If this is patient's   
1st  
seizure workup, an MRI   
of the head, without   
and with contrast, is a   
much  
more sensitive modality   
in assessing for   
seizure foci.  
[employer]  
Workstation ID: [id number]  
Dictated by: COREEN SAMPSON on Sat Feb 10,   
2024 7:38:50 PM EST  
Transcribed by:   
KIRA GREEN on   
Sat Feb 10, 2024   
7:56:11 PM EST  
Finalized by: COREEN SAMPSON on Sat Feb 10,   
2024 8:15:26 PM EST Normal                                  [facility]  
   
                                        Comment on above:   Order Comment: Injur  
y/Trauma or Illness?:Illness/Other  
How long have you had these symptoms (acute/chronic)?:Acute  
Reason for exam?:seizures  
Type of Exam?:Initial  
Additional signs and symptoms?:vomiting, chills   
   
                                                    Bacteria identifiedon 2024   
   
                                                    Bacteria identified Cx   
Nom (U)                                 Test: Urine Culture  
Specimen Source: Clean   
Catch/Voided  
Specimen Type: Urine  
Specimen Date: 2024   
5:02 PM  
Result Date: 2024   
8:24 AM  
Result Status: Final   
result  
Abnormal: No  
Resulting Lab: [facility]   
LAB  
[address] [address]  
Tel: [phone]  
  
CULTURE  
------------------  
Normal genitourinary   
maddison               Normal                                  [facility]  
   
                                        Comment on above:   Performed By: #### 6  
30-4 ####  
KATIA RIVERA ([id number])  
[facility] ([facility])  
[address] [place] [address]   
   
                                                    CBC W Auto Differential pane  
l (Bld)on 2024   
   
                      Basophils (Bld) [#/Vol] 0.13 x10*3/uL High       0.00-0.10  
  [facility]  
   
                                        Comment on above:   Performed By: #### 5  
7021-8 ####  
AIDEE RODRIGUES ([id number])  
[facility] ([facility])  
[address] [place] [address]   
   
                      Basophils/100 WBC (Bld) 1.1 %      Normal     0.0-2.0    [facility]  
   
                                        Comment on above:   Performed By: #### 5  
7021-8 ####  
AIDEE RODRIGUES ([id number])  
[facility] ([facility])  
[address] [place] [address]   
   
                                                    Eosinophils (Bld)   
[#/Vol]         0.05 x10*3/uL   Normal          0.00-0.70       [facility]  
   
                                        Comment on above:   Performed By: #### 5  
7021-8 ####  
AIDEE RODRIGUES ([id number])  
[facility] ([facility])  
[address] [place] [address]   
   
                                                    Eosinophils/100 WBC   
(Bld)           0.4 %           Normal          0.0-6.0         [facility]  
   
                                        Comment on above:   Performed By: #### 5  
7021-8 ####  
AIDEE RODRIGUES ([id number])  
[facility] ([facility])  
[address] [place] [address]   
   
                                                    Erythrocyte   
distribution width   
(RBC) [Ratio]   12.3 %          Normal          11.5-14.5       [facility]  
   
                                        Comment on above:   Performed By: #### 5  
7021-8 ####  
AIDEE RODRIGUES ([id number])  
[facility] ([facility])  
[address] [place] [address]   
   
                                                    Hematocrit (Bld)   
[Volume fraction] 41.1 %          Normal          36.0-46.0       [facility]  
   
                                        Comment on above:   Performed By: #### 5  
7021-8 ####  
AIDEE RODRIGUES (12026)  
[facility] ([facility])  
[address] [place] [address]   
   
                                                    Hemoglobin (Bld)   
[Mass/Vol]      14.0 g/dL       Normal          12.0-16.0       [facility]  
   
                                        Comment on above:   Performed By: #### 5  
7021-8 ####  
AIDEE RODRIGUES ([id number])  
[facility] ([facility])  
[address] [place] [address]   
   
                                                    Immature granulocytes   
(Bld) [#/Vol]   0.03 x10*3/uL   Normal          0.00-0.70       [facility]  
   
                                        Comment on above:   Performed By: #### 5  
7021-8 ####  
AIDEE RODRIGUES (80172)  
[facility] ([facility])  
[address] [place] [address]   
   
                                                    Immature   
granulocytes/100 WBC   
(Bld)           0.3 %           Normal          0.0-0.9         [facility]  
   
                                        Comment on above:   Result Comment: Jossie  
ture Granulocyte Count (IG) includes   
promyelocytes, myelocytes and metamyelocytes but does not   
include bands. Percent differential counts (%) should be   
interpreted in the context of the absolute cell counts   
(cells/UL).   
   
                                                            Performed By: #### 5  
7021-8 ####  
AIDEE RODRIGUES (84565)  
[facility] ([facility])  
[address] [place] [address]   
   
                                                    Lymphocytes (Bld)   
[#/Vol]         1.82 x10*3/uL   Normal          1.20-4.80       [facility]  
   
                                        Comment on above:   Performed By: #### 5  
7021-8 ####  
AIDEE RODRIGUES ([id number])  
[facility] ([facility])  
[address] [place] [address]   
   
                                                    Lymphocytes/100 WBC   
(Bld)           15.9 %          Normal          13.0-44.0       [facility]  
   
                                        Comment on above:   Performed By: #### 5  
7021-8 ####  
AIDEE RODRIGUES (93966)  
[facility] ([facility])  
[address] [place] [address]   
   
                                                    MCH (RBC) [Entitic   
mass]           29.6 pg         Normal          26.0-34.0       [facility]  
   
                                        Comment on above:   Performed By: #### 5  
7021-8 ####  
AIDEE RODRIGUES (90285)  
[facility] ([facility])  
[address] [place] [address]   
   
                      MCHC (RBC) [Mass/Vol] 34.1 g/dL  Normal     32.0-36.0  [facility]  
   
                                        Comment on above:   Performed By: #### 5  
7021-8 ####  
AIDEE RODRIGUES (90328)  
[facility] ([facility])  
[address] [place] [address]   
   
                      MCV (RBC) [Entitic vol] 87 fL      Normal          U  
[facility]  
   
                                        Comment on above:   Performed By: #### 5  
7021-8 ####  
AIDEE RODRIGUES (23265)  
[facility] ([facility])  
[address] [place] [address]   
   
                      Monocytes (Bld) [#/Vol] 0.80 x10*3/uL Normal     0.10-1.00  
  [facility]  
   
                                        Comment on above:   Performed By: #### 5  
7021-8 ####  
AIDEE RODRIGUES (44993)  
[facility] ([facility])  
[address] [place] [address]   
   
                      Monocytes/100 WBC (Bld) 7.0 %      Normal     2.0-10.0   [facility]  
   
                                        Comment on above:   Performed By: #### 5  
7021-8 ####  
AIDEE RODRIGUES (75405)  
[facility] ([facility])  
[address] [place] [address]   
   
                                                    Neutrophils (Bld)   
[#/Vol]         8.59 x10*3/uL   High            1.20-7.70       [facility]  
   
                                        Comment on above:   Result Comment: Perc  
ent differential counts (%) should be   
interpreted in the context of the absolute cell counts   
(cells/uL).   
   
                                                            Performed By: #### 5  
7021-8 ####  
AIDEE RODRIGUES (17798)  
[facility] ([facility])  
[address] [place] [address]   
   
                                                    Neutrophils/100 WBC   
(Bld)           75.3 %          Normal          40.0-80.0       [facility]  
   
                                        Comment on above:   Performed By: #### 5  
7021-8 ####  
AIDEE RODRIGUES (52338)  
[facility] ([facility])  
[address] [place] [address]   
   
                                                    Nucleated RBC/100 WBC   
(Bld) [Ratio]   0.0 /100 WBCs   Normal          0.0-0.0         [facility]  
   
                                        Comment on above:   Performed By: #### 5  
7021-8 ####  
AIDEE RODRIGUES (94566)  
[facility] ([facility])  
[address] [place] [address]   
   
                      Platelets (Bld) [#/Vol] 347 x10*3/uL Normal     150-450     
 [facility]  
   
                                        Comment on above:   Performed By: #### 5  
7021-8 ####  
AIDEE RODRIGUES (00763)  
[facility] ([facility])  
[address] [place], [place] [address]   
   
                      RBC (Bld) [#/Vol] 4.73 x10*6/uL Normal     4.00-5.20  [facility]  
   
                                        Comment on above:   Performed By: #### 5  
7021-8 ####  
AIDEE RODRIGUES (49120)  
[facility] ([facility])  
[address] [place] [address]   
   
                      WBC (Bld) [#/Vol] 11.4 x10*3/uL High       4.4-11.3   [facility]  
   
                                        Comment on above:   Performed By: #### 5  
7021-8 ####  
AIDEE RODRIGUES (10359)  
[facility] ([facility])  
[address] [place] [address]   
   
                                                    Comprehensive metabolic 2000  
 panelon 2024   
   
                                                    Albumin BCP dye   
[Mass/Vol]      5.0 g/dL        Normal          3.4-5.0         [facility]  
   
                                        Comment on above:   Performed By: #### 2  
4323-8 ####  
AIDEE RODRIGUES (21729)  
[facility] ([facility])  
[address] [place] [address]   
   
                                                    ALP [Catalytic   
activity/Vol]   49 U/L          Normal                    [facility]  
   
                                        Comment on above:   Performed By: #### 2  
4323-8 ####  
AIDEE RODRIGUES (23559)  
[facility] ([facility])  
[address] [place] [address]   
   
                                                    ALT With P-5'-P   
[Catalytic   
activity/Vol]   11 U/L          Normal          7-45            [facility]  
   
                                        Comment on above:   Result Comment: Kaleigh  
ents treated with Sulfasalazine may   
generate falsely decreased results for ALT.   
   
                                                            Performed By: #### 2  
4323-8 ####  
AIDEE RODRIGUES (90634)  
[facility] ([facility])  
[address] [place] [address]   
   
                      Anion gap [Moles/Vol] 23 mmol/L  High       10-20      [facility]  
   
                                        Comment on above:   Performed By: #### 2  
4323-8 ####  
AIDEE RODRIGUES (97276)  
[facility] ([facility])  
[address] [place] [address]   
   
                                                    AST With P-5'-P   
[Catalytic   
activity/Vol]   15 U/L          Normal          9-39            [facility]  
   
                                        Comment on above:   Performed By: #### 2  
4323-8 ####  
AIDEE RODRIGUES (81518)  
[facility] ([facility])  
[address] [place] [address]   
   
                      Bilirubin [Mass/Vol] 1.3 mg/dL  High       0.0-1.2    [facility]  
   
                                        Comment on above:   Performed By: #### 2  
4323-8 ####  
AIDEE RODRIGUES (54069)  
[facility] ([facility])  
[address] [place] [address]   
   
                      Calcium [Mass/Vol] 10.1 mg/dL Normal     8.6-10.3   [facility]  
   
                                        Comment on above:   Performed By: #### 2  
4323-8 ####  
AIDEE RODRIGUES (93685)  
[facility] ([facility])  
[address] [place] [address]   
   
                      Chloride [Moles/Vol] 102 mmol/L Normal          [facility]  
   
                                        Comment on above:   Performed By: #### 2  
4323-8 ####  
AIDEE RODRIGUES (65592)  
[facility] ([facility])  
[facility] [place] [address]   
   
                      CO2 [Moles/Vol] 18 mmol/L  Low        21-32      [facility]  
   
                                        Comment on above:   Performed By: #### 2  
4323-8 ####  
AIDEE RODRIGUES (18540)  
[facility] ([facility])  
[address] [place] [address]   
   
                      Creatinine [Mass/Vol] 0.79 mg/dL Normal     0.50-1.05  [facility]  
   
                                        Comment on above:   Performed By: #### 2  
4323-8 ####  
AIDEE RODRIGUES (88199)  
[facility] ([facility])  
[address] [place] [address]   
   
                                                    GFR/1.73 sq M.predicted   
MDRD (S/P/Bld) [Vol   
rate/Area]      mL/min/{1.73_m2} Normal          >60             [facility]  
   
                                        Comment on above:   Result Comment: Calc  
ulations of estimated GFR are performed   
using the  CKD-EPI Study Refit equation without the race   
variable for the IDMS-Traceable creatinine methods.  
https://jasn.asnjournals.org/content/early//ASN.  
095869   
   
                                                            Performed By: #### 2  
4323-8 ####  
AIDEE RODRIGUES (59775)  
[facility] ([facility])  
[address] [place] [address]   
   
                      Glucose [Mass/Vol] 123 mg/dL  High       74-99      [facility]  
   
                                        Comment on above:   Performed By: #### 2  
4323-8 ####  
AIDEE RODRIGUES (93973)  
[facility] ([facility])  
[address] [place] [address]   
   
                      Potassium [Moles/Vol] 3.7 mmol/L Normal     3.5-5.3    [facility]  
   
                                        Comment on above:   Performed By: #### 2  
4323-8 ####  
AIDEE RODRIGUES (56503)  
[facility] ([facility])  
[address] [place] [address]   
   
                      Protein [Mass/Vol] 8.1 g/dL   Normal     6.4-8.2    [facility]  
   
                                        Comment on above:   Performed By: #### 2  
4323-8 ####  
AIDEE RODRIGUES (16424)  
[facility] ([facility])  
[address] [place] [address]   
   
                      Sodium [Moles/Vol] 139 mmol/L Normal     136-145    [facility]  
   
                                        Comment on above:   Performed By: #### 2  
4323-8 ####  
AIDEE RODRIGUES (19346)  
[facility] ([facility])  
[address] [place] [address]   
   
                                                    Urea nitrogen   
[Mass/Vol]      13 mg/dL        Normal          6-23            [facility]  
   
                                        Comment on above:   Performed By: #### 2  
4323-8 ####  
AIDEE RODRIGUES ([id number])  
[facility] ([facility])  
[facility] [place] [address]   
   
                                                    ECG 12-LEADon 2024   
   
                                        ECG 12-LEAD         Ventricular Rate  
136  
Atrial Rate  
136  
P-R Interval  
118  
QRS Duration  
76  
Q-T Interval  
312  
QTC Calculation(Bazett)  
469  
P Axis  
83  
R Axis  
91  
T Axis  
72  
QRS Count  
22  
Q Onset  
220  
P Onset  
161  
P Offset  
205  
T Offset  
376  
QTC Fredericia  
409  
Diagnosis  
Sinus tachycardia  
Rightward axis  
Borderline ECG  
When compared with ECG   
of 2022 17:28,  
Previous ECG has   
undetermined rhythm,   
needs review  
See ED provider note   
for full interpretation   
and clinical   
correlation  
Confirmed by Ranjana Eason ([id number]) on   
2024 6:58:48 PM Normal                                  [facility]  
   
                                                    FLUAV and FLUBV RNA KIRSTEN+prob  
e Nom (Unsp spec)on 2024   
   
                                                    FLUAV RNA KIRSTEN+probe Ql   
(Resp)          Not detected    Normal          Not Detected    [facility]  
   
                                        Comment on above:   Order Comment: This   
assay is an in vitro diagnostic multiplex   
nucleic acid amplification test for the detection and   
discrimination of Influenza A & B from nasopharyngeal   
specimens, and has been validated for use at [facility]. Negative results do not preclude   
Influenza A/B infections, and should not be used as the sole   
basis for diagnosis, treatment, or other management decisions.   
If Influenza A/B and RSV PCR results are negative, testing for   
Parainfluenza virus, Adenovirus and Metapneumovirus is   
routinely performed [place] [facility] [place]   
[place] in[place], and is available on other patients by placing   
an add-on request.   
   
                                                            Performed By: #### 4  
8509-4 ####  
AIDEE RODRIGUES ([id number])  
[facility] ([facility])  
[address] [place] [address]   
   
                                                    FLUBV RNA KIRSTEN+probe Ql   
(Resp)          Not detected    Normal          Not Detected    [facility]  
   
                                        Comment on above:   Order Comment: This   
assay is an in vitro diagnostic multiplex   
nucleic acid amplification test for the detection and   
discrimination of Influenza A & B from nasopharyngeal   
specimens, and has been validated for use at [facility]. Negative results do not preclude   
Influenza A/B infections, and should not be used as the sole   
basis for diagnosis, treatment, or other management decisions.   
If Influenza A/B and RSV PCR results are negative, testing for   
Parainfluenza virus, Adenovirus and Metapneumovirus is   
routinely performed for [facility] [place]   
care in[place], and is available on other patients by placing   
an add-on request.   
   
                                                            Performed By: #### 4  
8509-4 ####  
AIDEE RODRIGUES ([id number])  
[facility] ([facility])  
[address] [place] [address]   
   
                                                    Glucose Test strip manual (B  
ld) [Mass/Vol]on 2024   
   
                      Glucose [Mass/Vol] 129 mg/dL  High       74-99      [facility]  
   
                                        Comment on above:   Performed By: #### 2  
341-6 ####  
AIDEE RODRIGUES ([id number])  
[facility] ([facility])  
[address] [place] [address]   
   
                                                    HCG (pregnancy test) IA.rapi  
d Ql (U)on 2024   
   
                                                    HCG (pregnancy test) Ql   
(U)             Negative        Normal          NEGATIVE        [facility]  
   
                                        Comment on above:   Performed By: #### 8  
0384-1 ####  
AIDEE RODRIGUES ([id number])  
[facility] ([facility])  
[address] [place], [place] [address]   
   
                                                    SARS coronavirus 2 RNAon    
   
                                                    SARS-CoV-2 (COVID-19)   
RNA KIRSTEN+probe Ql (Resp) Not detected    Normal          Not Detected    [facility]  
   
                                        Comment on above:   Order Comment: This   
assay has received FDA Emergency Use   
Authorization (EUA) and is only authorized for the duration of   
time that circumstances exist to justify the authorization of   
the emergency use of in vitro diagnostic tests for the   
detection of SARS-CoV-2 virus and/or diagnosis of COVID-19   
infection under section 564(b)(1) of the Act, 21 U.S.C.   
360bbb-3(b)(1). This assay is an in vitro diagnostic nucleic   
acid amplification test for the qualitative detection of   
SARS-CoV-2 from nasopharyngeal specimens and has been   
validated for use at [facility].   
Negative results do not preclude COVID-19 infections and   
should not be used as the sole basis for diagnosis, treatment,   
or other management decisions.   
   
                                                            Performed By: #### 9  
4500-6 ####  
AIDEE RODRIGUES ([id number])  
[facility] ([facility])  
[address] [place] [address]   
   
                                                    Urinalysis complete W Reflex  
 Culture panel (U)on 2024   
   
                      Appearance (U) Hazy       Normal     Clear      [facility]  
   
                                        Comment on above:   Performed By: #### 5  
8077-9 ####  
AIDEE RODRIGUES ([id number])  
[facility] ([facility])  
[address] [place] [address]   
   
                                                    Bilirubin (U)   
[Mass/Vol]      Negative        Normal          NEGATIVE        [facility]  
   
                                        Comment on above:   Performed By: #### 5  
8077-9 ####  
AIDEE RODRIGUES ([id number])  
[facility] ([facility])  
[address] [place] [address]   
   
                          Color (U)    Yellow       Normal       Straw,   
Yellow                                  [facility]  
   
                                        Comment on above:   Performed By: #### 5  
8077-9 ####  
AIDEE RODRIGUES ([id number])  
[facility] ([facility])  
[address] [place] [address]   
   
                                                    Glucose Auto test strip   
(U) [Mass/Vol]  Negative        Normal          NEGATIVE        [facility]  
   
                                        Comment on above:   Performed By: #### 5  
8077-9 ####  
AIDEE RODRIGUES ([id number])  
[facility] ([facility])  
[address] [place] [address]   
   
                      Ketones (U) [Mass/Vol] 80 (2+)    Abnormal   NEGATIVE   [facility]  
   
                                        Comment on above:   Performed By: #### 5  
8077-9 ####  
AIDEE RODRIGUES (72088)  
[facility] ([facility])  
[address] [place] [address]   
   
                                                    Leukocyte esterase Auto   
test strip Ql (U) TRACE           Abnormal        NEGATIVE        [facility]  
   
                                        Comment on above:   Performed By: #### 5  
8077-9 ####  
AIDEE RODRIGUES (38226)  
[facility] ([facility])  
[address] [place] [address]   
   
                                                    Nitrite Auto test strip   
Ql (U)          Negative        Normal          NEGATIVE        [facility]  
   
                                        Comment on above:   Performed By: #### 5  
8077-9 ####  
AIDEE RODRIGUES (68519)  
[facility] ([facility])  
[address] [place] [address]   
   
                          pH (U)       5.0 [pH]     Normal       5.0, 5.5,   
6.0, 6.5,   
7.0, 7.5,   
8.0                                     [facility]  
   
                                        Comment on above:   Performed By: #### 5  
8077-9 ####  
AIDEE RODRIGUES (81628)  
[facility] ([facility])  
[address] [place] [address]   
   
                      Protein (U) [Mass/Vol] 100 (2+)   Normal     NEGATIVE   [facility]  
   
                                        Comment on above:   Performed By: #### 5  
8077-9 ####  
AIDEE RODRIGUES (65828)  
[facility] ([facility])  
[address] [place] [address]   
   
                      RBC (U) [#/Vol] Negative   Normal     NEGATIVE   [facility]  
   
                                        Comment on above:   Performed By: #### 5  
8077-9 ####  
AIDEE RODRIGUES ([id number])  
[facility] ([facility])  
[address] [place] [address]   
   
                                                    Specific gravity (U)   
[Rel density]   1.030           Normal          1.005-1.035     [facility]  
   
                                        Comment on above:   Performed By: #### 5  
8077-9 ####  
AIDEE RODRIGUES (20137)  
[facility] ([facility])  
[address] [place] [address]   
   
                                                    Urobilinogen (U)   
[Mass/Vol]      mg/dL           Normal          <2.0            [facility]  
   
                                        Comment on above:   Performed By: #### 5  
8077-9 ####  
AIDEE RODRIGUES (71185)  
[facility] ([facility])  
[address] [place] [address]   
   
                                                    Urinalysis microscopic panel  
 Auto Ql (U)on 2024   
   
                                                    Bacteria Auto (Urine   
sed) [#/Area]   1+ /HPF         Abnormal        NONE SEEN       [facility]  
   
                                        Comment on above:   Performed By: #### 5  
3315-8 ####  
AIDEE RODRIGUES (69567)  
[facility] ([facility])  
[address] [place] [address]   
   
                                                    Epithelial   
cells.squamous Auto   
(Urine sed) [#/Area] 1-9 (SPARSE)        Normal              Reference   
range not   
established.                            [facility]  
   
                                        Comment on above:   Performed By: #### 5  
3315-8 ####  
AIDEE RODRIGUES (66315)  
[facility] ([facility])  
[address] [place] [address]   
   
                                                    Mucus Auto (Urine sed)   
[#/Area]            2+ /LPF             Normal              Reference   
range not   
established.                            [facility]  
   
                                        Comment on above:   Performed By: #### 5  
3315-8 ####  
AIDEE RODRIGUES (38119)  
[facility] ([facility])  
[address] [place] [address]   
   
                                                    RBC Auto (Urine sed)   
[#/Area]            1-2                 Normal              NONE, 1-2,   
3-5                                     [facility]  
   
                                        Comment on above:   Performed By: #### 5  
3315-8 ####  
AIDEE RODRIGUES ([id number])  
[facility] ([facility])  
[address] [place], [place] [address]   
   
                                                    WBC Auto (Urine sed)   
[#/Area]        1-5             Normal          1-5, NONE       [facility]  
   
                                        Comment on above:   Performed By: #### 5  
3315-8 ####  
AIDEE RODRIGUES (50327)  
[facility] ([facility])  
[address] [place] [address]   
   
                                                    BASIC METABOLIC PANELon    
   
                      Anion gap [Moles/Vol] 13 mmol/L  Normal     10 - 20    [facility]  
   
                                        Comment on above:   Performed By: #### B  
MP ####[address] [address]   
   
                      Calcium [Mass/Vol] 8.6 mg/dL  Normal     8.6 - 10.3 [facility]  
   
                                        Comment on above:   Performed By: #### B  
MP ####[address] [address]   
   
                      Chloride [Moles/Vol] 104 mmol/L Normal     98 - 107   [facility]  
   
                                        Comment on above:   Performed By: #### B  
MP ####[address] [address]   
   
                      Creatinine [Mass/Vol] 0.45 mg/dL Low        0.50 - 1.05 [facility]  
   
                                        Comment on above:   Performed By: #### B  
MP ####[address] [address]   
   
                      eGFR FEMALE >90        Normal     >90        [facility]  
   
                                        Comment on above:   Result Comment: CALC  
ULATIONS OF ESTIMATED GFR ARE PERFORMED  
USING THE  CKD-EPI STUDY REFIT EQUATION  
WITHOUT THE RACE VARIABLE FOR THE IDMS-TRACEABLE  
CREATININE METHODS.  
https://jasn.asnjournals.org/content/early//ASN.  
845680   
   
                                                            Performed By: #### B  
MP ####[address] [address]   
   
                      Glucose [Mass/Vol] 79 mg/dL   Normal     74 - 99    [facility]  
   
                                        Comment on above:   Performed By: #### B  
MP ####[address] [address]   
   
                      HCO3 (Bld) [Moles/Vol] 22 mmol/L  Normal     21 - 32    [facility]  
   
                                        Comment on above:   Performed By: #### B  
MP ####[address] [address]   
   
                      Potassium [Moles/Vol] 3.4 mmol/L Low        3.5 - 5.3  [facility]  
   
                                        Comment on above:   Performed By: #### B  
MP ####[place] [id number]   
   
                      Sodium [Moles/Vol] 136 mmol/L Normal     136 - 145  [facility]  
   
                                        Comment on above:   Performed By: #### B  
MP ####[place] [id number]   
   
                                                    Urea nitrogen   
[Mass/Vol]      5 mg/dL         Low             6 - 23          [facility]  
   
                                        Comment on above:   Performed By: #### B  
MP ####[place] [id number]   
   
                                                    CBCon 2022   
   
                                                    Erythrocyte   
distribution width   
(RBC) [Ratio]   14.5 %          Normal          11.5 - 14.5     [facility]  
   
                                        Comment on above:   Performed By: #### P  
TINR ####  
[place] [id number]   
   
                                                    Hematocrit (Bld)   
[Volume fraction] 35.5 %          Low             36.0 - 46.0     [facility]  
   
                                        Comment on above:   Performed By: #### P  
TINR ####  
[place] [id number]   
   
                                                    Hemoglobin (Bld)   
[Mass/Vol]      11.6 g/dL       Low             12.0 - 16.0     [facility]  
   
                                        Comment on above:   Performed By: #### P  
TINR ####  
[place] [id number]   
   
                      MCHC (RBC) [Mass/Vol] 32.6 g/dL  Normal     32.0 - 36.0 [facility]  
   
                                        Comment on above:   Performed By: #### P  
TINR ####  
[place] [id number]   
   
                      MCV (RBC) [Entitic vol] 85 fL      Normal     80 - 100   S  
[facility]  
   
                                        Comment on above:   Performed By: #### P  
TINR ####  
[place] [id number]   
   
                      Platelets (Bld) [#/Vol] 156 10*3/uL Normal     150 - 450    
[facility]  
   
                                        Comment on above:   Performed By: #### P  
TINR ####  
[place] [id number]   
   
                      RBC        4.18 x10E12/L Normal     4.00 - 5.20 [facility]  
   
                                        Comment on above:   Performed By: #### P  
TINR ####  
[place] [address] [place] [address]   
   
                      WBC (Bld) [#/Vol] 3.3 10*3/uL Low        4.4 - 11.3 [facility]  
   
                                        Comment on above:   Performed By: #### P  
TINR ####  
[address] [address]   
   
                                                    Daily Progress Note-Nephrolo  
gyon 2022   
   
                                                    Daily Progress   
Note-Nephrology                         Service: Nephrology  
  
Subjective Data:  
DANNA SOARES   
is a 21 year old Female   
who is Hospital Day #   
3.  
  
Patient seen and   
examined at the bedside   
this morning  
She is resting   
comfortably in bed  
She states that she is   
feeling better  
She is not having any   
more nausea vomiting or   
diarrhea however she   
also has not  
eaten  
No other major   
complaints at this   
time.  
  
Objective Data:  
  
Objective Information:  
T PRBPSpO2  
[id number]/7296%  
Date/Time3/11 21:   
21: 21:   
21: 21:29  
Range(36.6C - 36.8C )   
(80 - 87 ) (16 - 17 )   
(112 - 117 )/ (72 - 78   
) (96%  
- 100% )  
  
  
Pain reported at 3/11   
21:29: 6 = Moderate  
  
Physical Exam by   
System:  
  
Constitutional: Awake,   
alert, oriented, no   
acute distress  
Eyes: Extraocular   
muscles intact  
ENMT: Moist mucous   
membranes  
Head/Neck:   
Normocephalic,   
atraumatic  
Respiratory/Thorax:   
Bilateral equal breath   
sounds  
Cardiovascular: Regular   
rate and rhythm  
Gastrointestinal: Soft,   
nontender,   
nondistended, positive   
bowel sounds  
Musculoskeletal: Moving   
all extremities  
Extremities: No   
peripheral edema  
Neurological: Awake,   
alert, oriented  
Psychological:   
Cooperative  
Skin: Warm and dry  
  
Medication:  
  
Medications:  
  
Continuous Medications  
-----------------------  
---------  
  
1. Lactated Ringers   
Infusion: 1000 mL   
IntraVenous  
  
Scheduled Medications  
-----------------------  
---------  
  
1. LORazepam   
Injectable: 1 mg   
IntraVenous Push Every   
6 Hours  
2. Oseltamivir: 75 mg   
Oral Every 12 Hours  
3. Pantoprazole   
Injectable: 40 mg   
IntraVenous Push Every   
12 Hours  
4. Potassium Chloride   
Extended Release: 40   
mEq Oral 2 Times a Day  
  
PRN Medications  
-----------------------  
---------  
  
1. Acetaminophen: 650   
mg Oral Every 4 Hours  
2. Ondansetron   
Injectable: 4 mg   
IntraVenous Push Every   
6 Hours  
3. oxyCODONE Immediate   
Release: 5 mg Oral   
Every 6 Hours  
4. Promethazine IV   
Piggy Back: 12.5 mg   
IntraVenous Piggyback   
Every 6 Hours  
  
5. Sore Throat Lozenge:   
1 lozenge(s) Oral Every   
4 Hours  
  
  
Recent Lab Results:  
  
Results:  
CBC: 3/12/2022 05:40  
  
\ Hgb / \ 11.6 L /  
WBC ----------------   
Plt 3.3 L   
---------------- 156  
/ Hct \ / 35.5 L \  
  
RBC: 4.18 MCV: 85  
  
  
BMP: 3/12/2022 05:40  
NA+   Cl-   BUN / 136     
104   5 L /  
-----------------------  
--------- Glucose  
-----------------------  
---- 79  
K+   HCO3-   Creat \   
3.4 L  22   0.45 L \  
Calcium : 8.6 Anion Gap   
: 13  
  
  
Assessment and Plan:  
Code Status:  
Code StatusFull Code  
  
Assessment:  
Normal anion gap   
metabolic acidosis:   
Resolved  
Hypokalemia  
Very low osmolar state  
Nausea/vomiting/diarrhe  
a: Currently resolved  
Depression  
History of marijuana   
abuse  
Influenza A  
Leukopenia  
Malnourishment  
  
Plan:  
Her metabolic acidosis   
has resolved as   
suspected likely   
secondary to her  
diarrhea that she was   
having  
I will stop her IV   
fluids  
We will replace her   
potassium  
She needs a   
high-protein diet and   
better osmole intake   
with her malnourishment  
and very low osmolar   
state  
I will sign off at this   
time  
Please call with any   
further issues or needs  
  
  
Electronic Signatures:  
Srini Messina ()   
(Signed 12-Mar-2022   
07:42)  
Authored: Service,   
Subjective Data,   
Objective Data,   
Assessment and Plan,   
Note  
Completion  
  
  
Last Updated:   
12-Mar-2022 07:42 by   
Srini Messina (DO) Normal                                  [facility]  
   
                                                    Discharge [id number]   
022   
   
                                        Discharge Profile2  Discharge Orders:  
Anticipated Discharge   
Date:  
Anticipated Discharge   
[id number]  
  
Anticipated Discharge   
Time12:00  
  
DNAR:  
Code Status at   
Discharge: Full Code  
  
Diet:  
Dietregular, high   
protein  
  
Labs 1:  
Lab Test(s)Basic   
Metabolic Panel  
Date To Be Drawn3/14  
Call Results ToPCP  
Fax Results ToPCP  
Lab InstructionsWalk-in   
lab, no appointment   
required.  
Commentsmonitor   
potassium  
  
Additional Orders:  
Additional Instructions  
Follow up with PCP   
within 1 week of   
discharge  
Continue Tamiflu for   
one more day (tomorrow)   
on discharge  
Continue home   
medications  
No new medications at   
time of discharge  
High protein regular   
diet  
  
Call Provider If   
(Homegoing Patients):  
Breathing faster than   
normal.  
  
Breathing harder than   
normal or having   
retractions.  
  
Fever of 100.4 F (38 C)   
or higher.  
  
Temperature is greater   
than 102 degrees.  
  
Chills.  
  
Drinking less than   
normal.  
  
Not being able to go   
4-6 hours between   
albuterol treatments.  
  
Urinating less than   
normal, over 1 day.  
  
Urinating less than 4   
times per day.  
  
Acting very sleepy and   
difficult to awaken.  
  
Vomiting (throwing up)   
and not able to eat or   
drink for 12 hours.  
  
3 or more loose, watery   
bowel movements in 24   
hours (diarrhea).  
  
Any new concerning   
symptoms.  
  
Hospital Course (Home   
Care/Gold Form):  
Hospital Course:  
Hospital Course:   
include significant   
abnormal lab values  
HPI:  
GÉNESISSDDANNA CASTRO   
is a 21 year old Female   
with pmh of hyperemesis  
syndrome secondary to   
cannanboid use, major   
depression disorder who   
had  
hospitalization in   
2022 for   
similar presentation as   
today. She has 3  
emergency room visits   
in the past few days   
with today being the   
3rd visit. She  
was seen in another ED   
on the  and our ED   
on the  she had   
n/v/diarrhea  
and abdominal pain. She   
has been having   
diarrhea nausea and   
vomiting for 3  
days. On the  she   
developed abdominal   
pain that hurts with   
movement and on  
inspiration. CT abdomen   
pelvis negative and did   
not reveal acute   
pathology. She  
presented today not   
feeling any better. she   
was treated yesterday   
with  
phenergan and bentyl.   
She was diagnosed with   
influenza A and dc to   
home with  
tamiflu, bentyl and   
phenergan. On   
presentation today she   
continued to vomit was  
unable to keep down any   
liquids or her nausea   
medicine so she   
presented to  
[facility] emergency   
department for further   
evaluation. She has  
had decrease in   
urination. She denies   
any recent fevers. She   
is currently on  
her menses with a   
negative pregnancy test   
2 days ago. She had   
low-grade  
temperature 99.3, blood   
pressure 134/99 heart   
rate 82 respirations 18   
pulse  
oxygen saturation 98%   
on room air. Her   
laboratory values   
showed a bicarbonate  
of 14 and yesterday was   
12, normal creatinine,   
potassium 3.5 sodium   
135 and  
glucose 93. Urinalysis   
with ketones 80 and a   
large amount of blood   
white cells  
2, 1+ mucus. Urine   
toxicology screen   
showed cannabinoid   
positive. She states  
that she last smoked   
marijuana a week ago.   
In the emergency   
department she was  
treated with 2 L of   
normal saline and is   
being admitted for   
further evaluation.  
  
Past medical history:   
Major depressive   
disorder, hyperemesis   
syndrome secondary  
to cannabinoid  
Past surgical history:   
Appendectomy, right   
index finger surgery,   
ORIF of right  
wrist, tonsillectomy   
and adenoidectomy  
Family history:   
Father-diabetes  
Social history: Patient   
is single, she is   
cannabinoid dependent,   
denies  
nicotine or alcohol   
use.  
  
Allergies: Codeine   
MiraLAX  
Medications: Reviewed   
and reconciled  
  
Hospital course:   
Patient was started on   
Tamiflu on 3/9 which is   
4/5 day  
treatment. She will   
continue 1 day on   
discharge. She was   
given IV fluids and  
electrolytes replaced   
accordingly.   
Nausea/vomiting and   
abdominal pain likely  
related to hyperemesis   
syndrome secondary to   
cannabinoid use. She   
was seen by  
critical care Dr. Messina   
for normal anion gap   
metabolic acidosis   
which resolved.  
She has very low   
osmolar state and   
recommends a high   
protein diet on   
discharge.  
She indicates today she   
feels better than she   
has and requests to go   
home. She  
was counseled on   
marijuana cessation at   
time of discharge.  
  
Provider FINAL REVIEW   
of Orders:  
Final Review:  
Final Review of   
Medication   
Reconciliation and   
Orders Completedby APRN  
Reviewing   
ProviderFLYNN Graff at 12-Mar-2022   
10:53:01  
  
Appointments:  
Follow-Up Appointment   
01:  
Physician/Dept/ServiceP  
JOSLYN  
Reason for   
Referralfollow up post   
discharge  
Call to Schedule in1   
week  
CommentsYou were given   
a list of primary care   
physicians please call   
one and  
get established  
  
  
Electronic Signatures:  
Brittany Krishnamurthy ([facility]   
SECT) (Signed   
12-Mar-2022 11:23)  
Authored: Discharge   
Orders, Appointments  
Alyssa Beckwith   
(APRN-CNP) (Signed   
12-Mar-2022 10:53)  
Authored: Discharge   
Orders, Hospital Course   
(Home Care/Gold Form),   
Provider  
FINAL REVIEW of Orders,   
Appointments, Gold Form   
-    
Summary  
  
  
Last Updated: 12-Mar-20   
(more content not   
included)...        Normal                                  [facility]  
   
                                                    MAGNESIUMon 2022   
   
                      Magnesium [Mass/Vol] 1.87 mg/dL Normal     1.60 - 2.40 [facility]  
   
                                        Comment on above:   Performed By: #### M  
G ####  
[facility]  
[address] [place] [address]   
   
                                                    Order Reconciliationon    
   
                                        Order Reconciliation Page 1  
  
Discharge   
Reconciliation Document  
  
  
  
Reconciliation Type:   
Discharge requested on   
behalf of Alyssa Beckwith (Advanced  
Practice Nurse) done by   
Alyssa Beckwith   
(APRN-CNP)  
  
Discharge - Partial   
Reconciliation:   
12-Mar-2022 10:42 by:   
Alyssa Beckwith  
(APRN-CNP)  
Discharge -   
Reconciliation:   
12-Mar-2022 10:51 by:   
Alyssa Beckwith   
(APRN-CNP)  
  
Home Medications   
EnteredHOME MEDICATIONS   
AT DISCHARGE   
DateReconciliation  
Comment/ Additional   
Information  
dicyclomine 20 mg oral   
tablet 1 tab(s) orally   
4 times a day (1 hour   
before  
meals and at bedtime)   
09-Mar-2022 12:08   
Discontinued;   
Discontinue  
from ORM  
  
dicyclomine 20 mg oral   
tablet is not required  
ondansetron 4 mg oral   
tablet, disintegrating   
1 tab(s) orally 3 times   
a day  
06-Mar-2022 16:21   
ondansetron 4 mg oral   
tablet, disintegrating   
1 tab(s)  
orally 3 times a day   
06-Mar-2022 16:21   
ondansetron 4 mg oral   
tablet,  
disintegrating is   
continued as   
ondansetron 4 mg oral   
tablet, disintegrating  
oseltamivir 75 mg oral   
capsule 1 cap(s) orally   
2 times a day   
09-Mar-2022  
12:12 oseltamivir 75 mg   
oral capsule 1 cap(s)   
orally 2 times a day  
13-Mar-2022 10:50   
Discontinued;   
Copy/Discontinue  
  
oseltamivir 75 mg oral   
capsule is continued   
and modified  
Promethegan 25 mg   
rectal suppository 1   
suppository(ies)   
rectally every 6 hours  
09-Mar-2022 12:09   
Discontinued;   
Discontinue from ORM  
  
Promethegan 25 mg   
rectal suppository is   
not required  
  
  
Current OrdersDateHOME   
MEDICATIONS AT   
DISCHARGE   
DateReconciliation   
Comment/  
Additional Information  
Acetaminophen Tablet   
(TYLENOL)DOSE = 650 mg   
Oral Every 4 Hours, PRN   
Pain -  
Mild (1-3) 10-Mar-2022   
16:15 Acetaminophen is   
not required  
LORazepam Injectable   
(ATIVAN)DOSE = 1 mg   
IntraVenous Push Every   
6 Hours  
10-Mar-2022 16:15   
LORazepam Injectable is   
not required  
Ondansetron Injectable   
(ZOFRAN)DOSE = 4 mg   
IntraVenous Push Every   
6 Hours,  
PRN Nausea and/or   
Vomiting 10-Mar-2022   
19:57 Ondansetron   
Injectable  
is not required  
Oseltamivir Capsule   
(TAMIFLU)DOSE = 75 mg   
Oral Every 12 HoursStop   
After 5  
Days 10-Mar-2022 16:22   
Oseltamivir is not   
required  
oxyCODONE Immediate   
Release Tablet (OXYIR,   
ROXICODONE)DOSE = 5 mg   
Oral Every  
6 Hours, PRN Pain - Mod   
(4-6) 11-Mar-2022 17:51   
oxyCODONE Immediate  
Release is not required  
Pantoprazole Injectable   
(PROTONIX)DOSE = 40 mg   
IntraVenous Push Every   
12  
Hours 10-Mar-2022 16:15   
Pantoprazole Injectable   
is not required  
Potassium Chloride   
Extended Release   
Tablet, Extended   
ReleaseDOSE = 40 mEq   
Oral  
2 Times a DayStop After   
2 Doses 12-Mar-2022   
06:47 Potassium   
Chloride  
Extended Release is not   
required  
Promethazine IV Piggy   
Back in Sodium Chloride   
0.9% 50 mL   
(PHENERGAN)DOSE =  
12.5 mg Every 6 Hours,   
PRN NauseaRecommended   
Infusion Time: 15   
minute(s)  
10-Mar-2022 16:20   
Promethazine IV Piggy   
Back is not required  
Sore Throat Lozenge   
LozengeDOSE = 1   
lozenge(s) Oral Every 4   
Hours, PRN Sore  
ThroatCa   
lozenge(s)/DOSE x 1 = 1   
lozenge(s)/Dose (Daily   
Total is 6  
lozenge(s)) 11-Mar-2022   
17:52 Sore Throat   
Lozenge is not required  
  
  
All Active Home   
Medications at time of   
Discharge   
Reconciliation:   
12-Mar-2022  
10:51  
ondansetron 4 mg oral   
tablet, disintegrating   
1 tab(s) orally 3 times   
a day  
oseltamivir 75 mg oral   
capsule 1 cap(s) orally   
2 times a day       Normal                                  [facility]  
   
                                                    PHOSPHORUSon 2022   
   
                      Phosphate [Mass/Vol] 2.9 mg/dL  Normal     2.5 - 4.9  [facility]  
   
                                        Comment on above:   Result Comment: The   
performance characteristics of phosphorus   
testing in  
heparinized plasma have been validated by the individual  
 laboratory site where testing is performed. Testing  
on heparinized plasma is not approved by the FDA;  
however, such approval is not necessary.   
   
                                                            Performed By: #### P  
HOS ####  
[address] [address]   
   
                                                    BASIC METABOLIC PANELon    
   
                      Anion gap [Moles/Vol] 13 mmol/L  Normal     10 - 20    [facility]  
   
                                        Comment on above:   Performed By: #### P  
TINR ####  
[address] [address]   
   
                      Calcium [Mass/Vol] 8.3 mg/dL  Low        8.6 - 10.3 [facility]  
   
                                        Comment on above:   Performed By: #### P  
TINR ####  
[address] [address]   
   
                      Chloride [Moles/Vol] 106 mmol/L Normal     98 - 107   [facility]  
   
                                        Comment on above:   Performed By: #### P  
TINR ####  
[address] [address]   
   
                      Creatinine [Mass/Vol] 0.40 mg/dL Low        0.50 - 1.05 [facility]  
   
                                        Comment on above:   Performed By: #### P  
TINR ####  
[address] [address]   
   
                      eGFR FEMALE >90        Normal     >90        [facility]  
   
                                        Comment on above:   Result Comment: CALC  
ULATIONS OF ESTIMATED GFR ARE PERFORMED  
USING THE  CKD-EPI STUDY REFIT EQUATION  
WITHOUT THE RACE VARIABLE FOR THE IDMS-TRACEABLE  
CREATININE METHODS.  
https://jasn.asnjournals.org/content/early/ASN.  
088198   
   
                                                            Performed By: #### P  
TINR ####  
[facility] [address]   
   
                      Glucose [Mass/Vol] 89 mg/dL   Normal     74 - 99    [facility]  
   
                                        Comment on above:   Performed By: #### P  
TINR ####  
[address] [address]   
   
                      HCO3 (Bld) [Moles/Vol] 20 mmol/L  Low        21 - 32    [facility]  
   
                                        Comment on above:   Performed By: #### P  
TINR ####  
[address] [address]   
   
                      Potassium [Moles/Vol] 3.0 mmol/L Low        3.5 - 5.3  [facility]  
   
                                        Comment on above:   Performed By: #### P  
TINR ####  
[address] [address]   
   
                      Sodium [Moles/Vol] 136 mmol/L Normal     136 - 145  [facility]  
   
                                        Comment on above:   Performed By: #### P  
TINR ####  
[place] [id number]   
   
                                                    Urea nitrogen   
[Mass/Vol]      2 mg/dL         Low             6 - 23          [facility]  
   
                                        Comment on above:   Performed By: #### P  
TINR ####  
[address] [address]   
   
                                                    CBCon 2022   
   
                                                    Erythrocyte   
distribution width   
(RBC) [Ratio]   14.4 %          Normal          11.5 - 14.5     [facility]  
   
                                        Comment on above:   Performed By: #### H  
EPFP ####  
[address] [address]   
   
                                                    Hematocrit (Bld)   
[Volume fraction] 35.3 %          Low             36.0 - 46.0     [facility]  
   
                                        Comment on above:   Performed By: #### H  
EPFP ####  
[address] [address]   
   
                                                    Hemoglobin (Bld)   
[Mass/Vol]      11.8 g/dL       Low             12.0 - 16.0     [facility]  
   
                                        Comment on above:   Performed By: #### H  
EPFP ####  
[address] [address]   
   
                      MCHC (RBC) [Mass/Vol] 33.3 g/dL  Normal     32.0 - 36.0 [facility]  
   
                                        Comment on above:   Performed By: #### H  
EPFP ####  
[address] [address]   
   
                      MCV (RBC) [Entitic vol] 85 fL      Normal     80 - 100   S  
[facility]  
   
                                        Comment on above:   Performed By: #### H  
EPFP ####  
[address] [address]   
   
                      Platelets (Bld) [#/Vol] 151 10*3/uL Normal     150 - 450    
[facility]  
   
                                        Comment on above:   Performed By: #### H  
EPFP ####  
[address] [address]   
   
                      RBC        4.14 x10E12/L Normal     4.00 - 5.20 [facility]  
   
                                        Comment on above:   Performed By: #### H  
EPFP ####  
[address] [address]   
   
                      WBC (Bld) [#/Vol] 3.4 10*3/uL Low        4.4 - 11.3 [facility]  
   
                                        Comment on above:   Performed By: #### H  
EPFP ####  
[address] [address]   
   
                                                    Consult-Nephrologyon   
022   
   
                                        Consult-Nephrology  Service:  
Service: Nephrology  
  
Consult:  
Consult requested by   
(Attending Name): Mitra Camara  
Reason: ACUTE NON ANION   
GAP METABOLIC ACIDOSIS-   
BICARB 14- RECEIVED 2   
LITERS  
NORMAL SALINE IN ED.   
REPEAT 12 NOW ON LR AT   
200 ML/HR  
  
History of Present   
Illness:  
HPI:  
DANNA SOARES ANTONIO   
is a 21 year old Female  
  
She presented to the   
emergency room   
secondary to complaints   
of nausea vomiting  
and diarrhea. She also   
was having abdominal   
pain. She presented   
multiple  
times to the emergency   
room and was also seen   
back in January for   
very similar  
complaints  
  
I was consulted to see   
her secondary to   
metabolic acidosis  
  
She was found to have a   
severe metabolic   
acidosis likely   
secondary to her acute  
illness of nausea   
vomiting and diarrhea  
  
She has a past medical   
history of depression,   
hyperemesis, marijuana   
use  
  
Her past surgical   
history includes an   
appendectomy, right   
index finger surgery,  
wrist surgery,   
tonsillectomy,   
adenoidectomy  
  
Her family history   
includes diabetes in   
her father  
  
Her social history   
includes that she is   
single. She does use   
marijuana  
  
This a.m. when I met   
her she is resting   
comfortably in bed  
She states that she is   
tired  
She denies nausea   
vomiting and diarrhea   
at this time she states   
that since she  
has been admitted here   
this is all much better   
for her and much   
improved  
  
She denies abdominal   
pain currently  
She states that she is   
having some bowel   
movements and also   
urinating well  
  
A full 10 point review   
of systems was obtained   
is negative except HPI   
as above  
  
Review Family/Social   
History and ROS:  
Social History:  
  
Smoking Status: never   
smoker (1)  
Alcohol Use: denies(1)  
Drug Use: occasionally   
(2)  
  
  
Allergies:  
MiraLax:   
Hives/Urticaria  
codeine: Unknown  
  
Objective:  
  
Objective Information:  
  
T PRBPSpO2  
Value36.72517006/54137%  
Date/Time3/10 23:113/10   
23:113/10 23:113/10   
23:113/10 23:11  
Range(36.7C - 37.3C )   
(71 - 100 ) (16 - 18 )   
(112 - 136 )/ (68 - 103   
)  
(97% - 100% )  
Highest temp of 37.3 C   
was recorded at 3/10   
10:41  
  
Physical Exam by   
System:  
  
Constitutional: Awake,   
alert, oriented, no   
acute distress  
Eyes: Extraocular   
muscles intact  
ENMT: Moist mucous   
membranes  
Head/Neck:   
Normocephalic,   
atraumatic  
Respiratory/Thorax:   
Bilateral equal breath   
sounds  
Cardiovascular: Regular   
rate and rhythm  
Gastrointestinal: Soft,   
nontender,   
nondistended, positive   
bowel sounds  
Musculoskeletal: Moving   
all extremities  
Extremities: No   
peripheral edema  
Neurological: Awake,   
alert, oriented  
Psychological:   
Cooperative  
Skin: Warm and dry  
  
Medications:  
  
Medications:  
  
Continuous Medications  
-----------------------  
---------  
  
1. Lactated Ringers   
Infusion: 1000 mL   
IntraVenous  
  
Scheduled Medications  
-----------------------  
---------  
  
1. LORazepam   
Injectable: 1 mg   
IntraVenous Push Every   
6 Hours  
2. Oseltamivir: 75 mg   
Oral Every 12 Hours  
3. Pantoprazole   
Injectable: 40 mg   
IntraVenous Push Every   
12 Hours  
  
PRN Medications  
-----------------------  
---------  
  
1. Acetaminophen: 650   
mg Oral Every 4 Hours  
2. Ondansetron   
Injectable: 4 mg   
IntraVenous Push Every   
6 Hours  
3. Promethazine IV   
Piggy Back: 12.5 mg   
IntraVenous Piggyback   
Every 6 Hours  
  
  
  
Recent Lab Results:  
  
Results:  
  
  
I have reviewed these   
laboratory results:   
Basic Metabolic Panel   
[Drawn  
11-Mar-2022 05:38:00],   
Basic Metabolic Panel   
[Drawn 10-Mar-2022   
19:37:00],  
Basic Metabolic Panel   
[Drawn 10-Mar-2022   
17:32:00], Complete   
Blood Count [Drawn  
11-Mar-2022 05:38:00],   
Lactate, Level [Drawn   
10-Mar-2022 19:37:00],   
Blood Gas,  
Arterial [Drawn   
10-Mar-2022 19:09:00],   
Drug Screen, Urine   
[Drawn 10-Mar-2022  
13:27:00], Urinalysis   
[Drawn 10-Mar-2022   
13:27:00], Urinalysis,   
Microscopic  
[Drawn 10-Mar-2022   
13:27:00], Urinalysis   
with Culture if   
Indicated [Drawn  
09-Mar-2022 10:18:00],   
Influenza A/B,Covid   
2019 PCR,Symptomatic   
[Drawn  
09-Mar-2022 10:18:00].  
  
  
Assessment:  
Normal anion gap   
metabolic acidosis  
Hypokalemia  
Very low osmolar state  
Nausea/vomiting/diarrhe  
a  
Depression  
History of marijuana   
abuse  
Influenza A  
  
Plan:  
At this time her   
metabolic acidosis is   
greatly improved  
Her bicarb this morning   
is up to 20 with   
supportive measures  
Her metabolic acidosis   
from history is likely   
secondary to her acute   
illness  
with diarrhea  
We could obtain a urine   
anion gap but currently   
her acidosis is much   
improved  
with supportive   
measures and so I do   
not think necessary at   
this time  
I will back down on her   
lactated Ringer's  
She needs a better diet  
She has a very low   
osmolar state and so   
she needs a better   
well-balanced diet  
with good protein   
intake  
Her potassium needs   
replaced  
Thanks for the consult  
  
Consult Status:  
Consult Order ID:   
[id number]  
  
  
Electronic Signatures:  
Srini Messina ()   
(Signed 11-Mar-2022   
08:18)  
Authored: Service,   
History of Present   
Illness, Review   
Family/Social History  
and ROS, Allergies,   
Objective,   
Assessment/Recommendati  
ons, Note Completion  
  
  
Last Updated: 11-Mar-2   
(more content not   
included)...        Normal                                  [facility]  
   
                                                    Daily Progress Note-General   
Internal Medicineon 2022   
   
                                                    Daily Progress   
Note-General Internal   
Medicine                                Consult Type:   
subsequent visit/care  
  
Service: General   
Internal Medicine  
  
Subjective Data:  
DANNA SOARES   
is a 21 year old Female   
who is Hospital Day #   
2.  
  
Patient seen and   
examined  
Lying in bed  
Has had some nausea,   
tolerating clears  
No other chief   
complaints.  
  
Overnight Events:   
Patient had an   
uneventful night.  
  
Objective Data:  
  
Objective Information:  
T PRBPSpO2  
[id number]/01639%  
Date/Time3/10 23:113/10   
23:113/10 23:113/10   
23:113/10 23:11  
Range(36.7C - 37.3C )   
(71 - 100 ) (16 - 18 )   
(112 - 136 )/ (68 - 103   
)  
(97% - 100% )  
Highest temp of 37.3 C   
was recorded at 3/10   
10:41  
  
  
Pain reported at 3/10   
20:19: 5 = Moderate  
  
Physical Exam by   
System:  
  
Constitutional:   
Ill-appearing, pale  
Eyes: PERRL, EOMI,   
clear sclera  
ENMT: mucous membranes   
moist, no apparent   
injury, no lesions seen  
Head/Neck: Neck supple,   
no apparent injury,No   
JVD, trachea midline,  
Respiratory/Thorax:   
Patent airways, CTAB,   
normal breath sounds   
with good chest  
expansion, thorax   
symmetric  
Cardiovascular:   
Regular, rate and   
rhythm, no murmurs, 2+   
equal pulses of the  
extremities, normal S   
1and S 2  
Gastrointestinal:   
Nondistended, soft,   
non-tender, no rebound   
tenderness or  
guarding, no masses   
palpable, no   
organomegaly, +BS, no   
bruits  
Musculoskeletal: ROM   
intact, no joint   
swelling, normal   
strength  
Extremities: normal   
extremities, no   
cyanosis edema,   
contusions or wounds,   
no  
clubbing  
Neurological: alert and   
oriented x3, intact   
senses, motor, response   
and  
reflexes, normal   
strength  
Psychological:   
Appropriate mood and   
behavior  
Skin: Warm and dry, no   
lesions, no rashes  
  
Medication:  
  
Medications:  
  
Continuous Medications  
-----------------------  
---------  
  
1. Lactated Ringers   
Infusion: 1000 mL   
IntraVenous  
  
Scheduled Medications  
-----------------------  
---------  
  
1. LORazepam   
Injectable: 1 mg   
IntraVenous Push Every   
6 Hours  
2. Oseltamivir: 75 mg   
Oral Every 12 Hours  
3. Pantoprazole   
Injectable: 40 mg   
IntraVenous Push Every   
12 Hours  
  
PRN Medications  
-----------------------  
---------  
  
1. Acetaminophen: 650   
mg Oral Every 4 Hours  
2. Ondansetron   
Injectable: 4 mg   
IntraVenous Push Every   
6 Hours  
3. Promethazine IV   
Piggy Back: 12.5 mg   
IntraVenous Piggyback   
Every 6 Hours  
  
  
  
Recent Lab Results:  
  
Results:  
  
  
I have reviewed these   
laboratory results:  
  
Basic Metabolic Panel   
11-Mar-2022 05:38:00  
  
ResultValue  
Glucose, Serum 89  
  
K 3.0 L  
  
Bicarbonate, Serum 20 L  
Anion Gap, Serum 13  
BUN 2 L  
CREAT 0.40 L  
GFR Female >90  
Calcium, Serum 8.3 L  
  
Complete Blood Count   
11-Mar-2022 05:38:00  
  
ResultValue  
White Blood Cell Count   
3.4 L  
Red Blood Cell Count   
4.14  
HGB 11.8 L  
HCT 35.3 L  
MCV 85  
MCHC 33.3  
  
RDW-CV 14.4  
  
  
Radiology Results:  
  
Results:  
  
  
Conclusion:  
 Please see physician   
note for formal   
interpretation   
confirmed by Scribe   
Confirmed by CARLOS MOODY   
() on 2022   
12:18:50 PM  
  
Electrocardiogram 12   
Lead [2022   
12:19PM]  
  
  
Assessment and Plan:  
Code Status:  
Code StatusFull Code  
  
Assessment:  
21-year-old female with   
significant past   
medical history of   
chronic depression,  
hyperemesis syndrome   
secondary to  
  
Cannabinoid, bipolar   
disorder who presented   
to the emergency   
department after 3  
ED visits with nausea   
vomiting diarrhea   
weakness. She was   
diagnosed with  
influenza A on 2022 and is being   
admitted for severe   
dehydration  
  
Impression  
Severe dehydration with   
acute non-anion gap   
metabolic acidosis   
secondary to GI  
loss  
Hyperemesis syndrome   
secondary to   
cannabinoid use  
Influenza A  
Chronic   
depression/bipolar   
disorder  
GI prophylaxis  
DVT prophylaxis  
  
Assessment and plan  
Patient seen and   
examined in the   
emergency department  
She is pale dehydrated  
clear liquids advance   
as tolerated- last   
admission took about 48   
hours to  
settle her vomiting   
down. she did well with   
carafate can add if no   
improvement  
on current regimen  
Oral mucosa is   
moist-her bicarb   
yesterday was 12 and   
today is 14 on  
presentation she is   
status post 2 L of   
normal saline IV fluid   
boluses  
I will add lactated   
Ringer's at 200 cc an   
hour  
Repeat BMP now  
Repeat BMP at 2000  
BMP in a.m.  
Tamiflu 75 mg p.o.   
twice a day for total   
of 5 days  
iv protonix 40 mg twice   
a day  
phenergan 12.5 mg iv   
every 6 hours as needed  
potassium 3.6-   
potassium 20 iv  
she has been counseled   
prior on marijuana and   
risk with dehydration   
causing  
hyperemesis. she is   
severely dehydrated so   
this may just be   
dehydration from  
the flu. will see how   
she does on the ativan  
plan of care discussed   
with patient  
time spent 45 minutes  
  
3/11  
Patient seen and   
examined  
Afebrile, VSS, 100 Sp02   
room air  
Has had some nausea,   
tolerating clears  
Consult to Dr. Messina:   
Metabolic acidosis   
improving, Maintain LR   
at 75 ml hour  
Hyperemesis syndrome   
secondary to   
cannabinoid use:   
Metabolic acidosis   
improving  
this am, will continue   
LR and await Dr. Messina   
recommendations  
Clear liquids (more   
content not   
included)...        Normal                                  [facility]  
   
                                                    ARTERIAL BLOOD GASon 03-10-  
022   
   
                      BASE EXCESS-BLOOD -9.9 mmol/L Low        -2.0 - 3.0 [facility]  
   
                                        Comment on above:   Performed By: #### P  
TINR ####  
[place] [address]   
   
                      BICARB, CALCULATED 13.6 mmol/L Low        22.0 - 26.0 [facility]  
   
                                        Comment on above:   Performed By: #### P  
TINR ####  
[place] [address]   
   
                                                    Oxygen (Bld) [Partial   
pressure]       111 mm[Hg]      High            85 - 95         [facility]  
   
                                        Comment on above:   Performed By: #### P  
TINR ####  
[place] [address]   
   
                      PCO2       24 mmHg    Low        38 - 42    [facility]  
   
                                        Comment on above:   Performed By: #### P  
TINR ####  
[place] [address]   
   
                      pH (Bld)   7.36 [pH]  Low        7.38 - 7.42 [facility]  
   
                                        Comment on above:   Performed By: #### P  
TINR ####  
[place] [address]   
   
                      SO2        100 %      Normal     94 - 100   [facility]  
   
                                        Comment on above:   Performed By: #### P  
TINR ####  
[facility]  
[address] [id number]   
   
                                                    Admission Risk Screen - Adul  
ton 03-   
   
                                                    Admission Risk Screen -   
Adult                                   Allergies:  
Allergies:  
MiraLax:   
Hives/Urticaria  
codeine: Unknown  
  
Patient Verification:  
New W ID Band Applied   
in my Departmentno  
Type of ID Patient is   
WearingW wristband, but   
not applied here  
Patient Transferred   
from Other  Facility   
([facility], [facility]   
House,etc)no  
Patient Identity   
Verified Bypatient  
ID Band FULL Name,   
include Middle,   
spelling matches   
patient's ID used for  
verificationyes  
ID Band  Matches   
Patient ID used for   
Verficationyes  
ID Band MRN Matches EMR   
MRNyes  
  
Visitor Restriction:  
Coronavirus Visitor   
Restriction: Reasonable   
restrictions to   
in-person  
visitors will be   
observed due to current   
coronavirus pandemic.  
  
Travel History:  
COVID-19 Screening   
Completedno exposure or   
symptoms(1)  
Travel or Exposure Past   
30 DaysNO travel to   
International locations   
in the  
past 30 days  
Ebola AlertFor   
Ebola-like Symptoms:   
Isolate Patient and   
Notify  
Provider/Infection   
Preventionist  
  
For Contact: Notify   
Provider/Infection   
Preventionist  
  
Advance Directive:  
Advance Directive/DNRno   
(2)  
Advance Directive   
Information   
Givenpatient/family   
declined  
  
[facility] Fall Screen:  
History of falling   
(immediate or   
previous)no (0)  
Secondary Diagnosisyes   
(15)  
Intravenous Therapy/   
Heparin/Saline Lockyes   
(20)  
Gait/Transferringnormal  
/bedrest/wheelchair (0)  
Ambulatory   
Aidsnone/bedrest/nurse   
assist (0)  
Mental Statusoriented   
to own ability (0)  
Score: Low risk (<25).   
Moderate risk (25-44).   
High risk (>44).35  
[facility]   
InterventionsMODERATE   
INTERVENTIONS:  
*Low Interventions   
Plus:  
* falls risk   
band/sticker applied to   
patient,  
*yellow non-skid   
footwear,  
*instruct to call for   
assistance before  
getting out of bed,  
*bed/chair/bedside   
commode/toilet alarms,  
*sensory   
devices/ambulatory   
aides  
available and in reach,  
*medications reviewed   
for potential  
side effects and care   
planning.  
  
  
Family Violence Screen:  
Are you or have you   
been threatened or   
abused physically,   
emotionally, or  
sexually by anyoneno  
Do you feel UNSAFE   
going back to the place   
where you are livingno  
Clinical assessment:   
Are there any apparent   
signs of   
injuries/behaviors that  
could be related to   
abuse/neglectno  
Social Service Consult   
for abuse/neglect   
needed this visitno  
  
Functional Screen:  
Functional Screen: In   
the recent/past 2-4   
weeks, patient or   
family have  
noticedno issues that   
require a   
speech/language consult   
at this time  
  
AM-PAC- Basic   
Mobility/Daily   
Activity:  
Patient baseline   
bedboundno  
  
Learning Assessment   
(Patient):  
Patient is Able to be   
Assessed for   
Learningyes  
Factors Influencing   
Readiness to   
Learnacuteness of   
illness; anxiety  
Factors that Impact   
Ability to Learnnone  
Devices/Methods Used to   
Communicatenone  
Learning   
Preferencesskill   
demonstration; verbal   
instruction; written  
material; video  
Cultural   
Considerationsnone  
Developmental   
Considerationsnone  
Congregation   
Considerationsnone  
  
Learning Assessment   
(Other Learner):  
Other learner   
availableno  
  
Depression Screen:  
During the past month,   
have you often been   
bothered by feeling   
down,  
depressed or hopelessno  
During the past month,   
have you often had   
little interest or   
pleasure in  
doing thingsno  
Have you had any   
thoughts of harming   
anyone elseno (1)  
  
[facility] Suicide:  
Risk Screen Not   
Applicable/Able to   
Answerable to be   
screened  
In the Past Month: Have   
you wished you were   
dead or could go to   
sleep and not  
wake upno(1)  
In the Past Month: Have   
you had any actual   
thoughts of killing   
yourself  
no(1)  
Lifetime: Have you ever   
done, started to do, or   
prepared to do anything   
to  
end your lifeno  
[facility] Suicide   
Risknegative  
  
Adult Nutrition Screen:  
Have you recently lost   
weight without tryingno  
Have you been eating   
poorly because of a   
decreased appetiteyes  
Malnutrition Screening   
Tool Score1  
Malnutrition Screening   
Tool RiskMST = 0 or 1   
Not at risk. Eating   
well with  
little or no weight   
loss  
Nutrition Consult   
needed this visitno  
Can Patient Participate   
in Room Serviceyes  
Patient requires Paper   
Dishes/Plastic   
Utensilsno  
  
Pain Screen:  
Pain Scalenumerical   
0-10  
Pain Scale   
Educationteaching   
provided  
Current Pain Level7 =   
Severe  
Acceptable Pain Level5   
= Moderate  
Expression of Pain   
(nonverbal)verbalizatio  
n  
Chronic Painno  
  
Spiritual Screen:  
Are there any cultural,   
spiritual, Church   
practices/values/needs   
that are  
important for us to   
knowno  
  
CAGE:  
Is this an injured   
patient at a [place]   
Center   
[place] [place]  
[facility]/[place]): no (2)  
  
Vaccinations:  
Vaccination - Influenza   
Vaccination Screen:  
Is it flu season   
(between  and   
)Yes  
Screening for   
identified   
contraindications to   
influenza vaccination  
patient/caregiver   
refusal  
  
Vaccination - Pneumonia   
Vaccination Screen:  
Patient has received a   
previous pneumonia   
vaccine:no/unknown...  
Immunocompetent persons   
with underlying chronic   
conditions or reside in   
long  
term care   
facilitiesnone of these   
conditions  
Persons with Functional   
or Anatomic   
Asplenianone of t (more   
content not   
included)...        Normal                                  [facility]  
   
                                                    BASIC METABOLIC PANELon    
   
                      Anion gap [Moles/Vol] 17 mmol/L  Normal     10 - 20    [facility]  
   
                                        Comment on above:   Performed By: #### P  
TINR ####  
[address] [address]   
   
                      Calcium [Mass/Vol] 8.4 mg/dL  Low        8.6 - 10.3 [facility]  
   
                                        Comment on above:   Performed By: #### P  
TINR ####  
[address] [address]   
   
                      Chloride [Moles/Vol] 108 mmol/L High       98 - 107   [facility]  
   
                                        Comment on above:   Performed By: #### P  
TINR ####  
[address] [address]   
   
                      Creatinine [Mass/Vol] 0.49 mg/dL Low        0.50 - 1.05 [facility]  
   
                                        Comment on above:   Performed By: #### P  
TINR ####  
[address] [address]   
   
                      eGFR FEMALE >90        Normal     >90        [facility]  
   
                                        Comment on above:   Result Comment: CALC  
ULATIONS OF ESTIMATED GFR ARE PERFORMED  
USING THE  CKD-EPI STUDY REFIT EQUATION  
WITHOUT THE RACE VARIABLE FOR THE IDMS-TRACEABLE  
CREATININE METHODS.  
https://jasn.asnjournals.org/content/early//ASN.  
213526   
   
                                                            Performed By: #### P  
TINR ####  
[address] [address]   
   
                      Glucose [Mass/Vol] 80 mg/dL   Normal     74 - 99    [facility]  
   
                                        Comment on above:   Performed By: #### P  
TINR ####  
[address] [address]   
   
                      HCO3 (Bld) [Moles/Vol] 12 mmol/L  Low        21 - 32    [facility]  
   
                                        Comment on above:   Performed By: #### P  
TINR ####  
[address] [address]   
   
                      Potassium [Moles/Vol] 3.2 mmol/L Low        3.5 - 5.3  [facility]  
   
                                        Comment on above:   Performed By: #### P  
TINR ####  
[address] [address]   
   
                      Sodium [Moles/Vol] 134 mmol/L Low        136 - 145  [facility]  
   
                                        Comment on above:   Performed By: #### P  
TINR ####  
[address] [address]   
   
                                                    Urea nitrogen   
[Mass/Vol]      4 mg/dL         Low             6 - 23          [facility]  
   
                                        Comment on above:   Performed By: #### P  
TINR ####  
[address] [address]   
   
                      Anion gap [Moles/Vol] 17 mmol/L  Normal     10 - 20    [facility]  
   
                                        Comment on above:   Performed By: #### M  
G ####  
[address] [address]   
   
                      Calcium [Mass/Vol] 8.3 mg/dL  Low        8.6 - 10.3 [facility]  
   
                                        Comment on above:   Performed By: #### M  
G ####  
[address] [address]   
   
                      Chloride [Moles/Vol] 109 mmol/L High       98 - 107   [facility]  
   
                                        Comment on above:   Performed By: #### M  
G ####  
[address] [address]   
   
                      Creatinine [Mass/Vol] 0.45 mg/dL Low        0.50 - 1.05 [facility]  
   
                                        Comment on above:   Performed By: #### M  
G ####  
[address] [address]   
   
                      eGFR FEMALE >90        Normal     >90        [facility]  
   
                                        Comment on above:   Result Comment: CALC  
ULATIONS OF ESTIMATED GFR ARE PERFORMED  
USING THE  CKD-EPI STUDY REFIT EQUATION  
WITHOUT THE RACE VARIABLE FOR THE IDMS-TRACEABLE  
CREATININE METHODS.  
https://jasn.asnjournals.org/content/early//ASN709   
   
                                                            Performed By: #### M  
G ####  
[address] [address]   
   
                      Glucose [Mass/Vol] 87 mg/dL   Normal     74 - 99    [facility]  
   
                                        Comment on above:   Performed By: #### M  
G ####  
[address] [address]   
   
                      HCO3 (Bld) [Moles/Vol] 12 mmol/L  Low        21 - 32    [facility]  
   
                                        Comment on above:   Performed By: #### M  
G ####  
[address] [address]   
   
                      Potassium [Moles/Vol] 3.2 mmol/L Low        3.5 - 5.3  [facility]  
   
                                        Comment on above:   Performed By: #### M  
G ####  
[address] [address]   
   
                      Sodium [Moles/Vol] 135 mmol/L Low        136 - 145  [facility]  
   
                                        Comment on above:   Performed By: #### M  
G ####  
[address] [address]   
   
                                                    Urea nitrogen   
[Mass/Vol]      4 mg/dL         Low             6 - 23          [facility]  
   
                                        Comment on above:   Performed By: #### M  
G ####  
[address] [address]   
   
                                                    CBC AND DIFFERENTIALon 03-10  
-2022   
   
                      DIFFERENTIAL SEE MANUAL DIFF Normal                [facility]  
   
                                        Comment on above:   Performed By: #### H  
EPFP ####  
[address] [address]   
   
                                                    Erythrocyte   
distribution width   
(RBC) [Ratio]   14.7 %          High            11.5 - 14.5     [facility]  
   
                                        Comment on above:   Performed By: #### H  
EPFP ####  
[address] [address]   
   
                                                    Hematocrit (Bld)   
[Volume fraction] 41.6 %          Normal          36.0 - 46.0     [facility]  
   
                                        Comment on above:   Performed By: #### H  
EPFP ####  
[address] [address]   
   
                                                    Hemoglobin (Bld)   
[Mass/Vol]      13.5 g/dL       Normal          12.0 - 16.0     [facility]  
   
                                        Comment on above:   Performed By: #### H  
EPFP ####  
[address] [address]   
   
                      MCHC (RBC) [Mass/Vol] 32.4 g/dL  Normal     32.0 - 36.0 [facility]  
   
                                        Comment on above:   Performed By: #### H  
EPFP ####  
[address] [address]   
   
                      MCV (RBC) [Entitic vol] 86 fL      Normal     80 - 100   S  
[facility]  
   
                                        Comment on above:   Performed By: #### H  
EPFP ####  
[address] [address]   
   
                      NUCLEATED RBC 0.2 /100 WBC Normal                [facility]  
   
                                        Comment on above:   Performed By: #### H  
EPFP ####  
[address] [address]   
   
                      Platelets (Bld) [#/Vol] 194 10*3/uL Normal     150 - 450    
[facility]  
   
                                        Comment on above:   Performed By: #### H  
EPFP ####  
[address] [address]   
   
                      RBC        4.84 x10E12/L Normal     4.00 - 5.20 [facility]  
   
                                        Comment on above:   Performed By: #### H  
EPFP ####  
[address] [address]   
   
                      WBC (Bld) [#/Vol] 3.1 10*3/uL Low        4.4 - 11.3 [facility]  
   
                                        Comment on above:   Performed By: #### H  
EPFP ####  
[address] [address]   
   
                                                    COMPREHENSIVE PANELon 03-10-  
2022   
   
                      Albumin [Mass/Vol] 4.5 g/dL   Normal     3.4 - 5.0  [facility]  
   
                                        Comment on above:   Performed By: #### C  
BCDF ####  
[address] [address]   
   
                                                    ALP [Catalytic   
activity/Vol]   42 U/L          Normal          33 - 110        [facility]  
   
                                        Comment on above:   Performed By: #### C  
BCDF ####  
[address] [address]   
   
                                                    ALT [Catalytic   
activity/Vol]   10 U/L          Normal          7 - 45          [facility]  
   
                                        Comment on above:   Result Comment: Kaleigh  
ents treated with Sulfasalazine may   
generate  
falsely decreased results for ALT.   
   
                                                            Performed By: #### C  
BCDF ####  
[address] [address]   
   
                      Anion gap [Moles/Vol] 19 mmol/L  Normal     10 - 20    [facility]  
   
                                        Comment on above:   Performed By: #### C  
BCDF ####  
[address] [address]   
   
                                                    AST [Catalytic   
activity/Vol]   21 U/L          Normal          9 - 39          [facility]  
   
                                        Comment on above:   Performed By: #### C  
BCDF ####  
[address] [address]   
   
                      Bilirubin [Mass/Vol] 0.3 mg/dL  Normal     0.0 - 1.2  [facility]  
   
                                        Comment on above:   Performed By: #### C  
BCDF ####  
[address] [address]   
   
                      Calcium [Mass/Vol] 9.0 mg/dL  Normal     8.6 - 10.3 [facility]  
   
                                        Comment on above:   Performed By: #### C  
BCDF ####  
[address] [address]   
   
                      Chloride [Moles/Vol] 106 mmol/L Normal     98 - 107   [facility]  
   
                                        Comment on above:   Performed By: #### C  
BCDF ####  
[address] [address]   
   
                      Creatinine [Mass/Vol] 0.52 mg/dL Normal     0.50 - 1.05 [facility]  
   
                                        Comment on above:   Performed By: #### C  
BCDF ####  
[address] [address]   
   
                      eGFR FEMALE >90        Normal     >90        [facility]  
   
                                        Comment on above:   Result Comment: CALC  
ULATIONS OF ESTIMATED GFR ARE PERFORMED  
USING THE  CKD-EPI STUDY REFIT EQUATION  
WITHOUT THE RACE VARIABLE FOR THE IDMS-TRACEABLE  
CREATININE METHODS.  
https://jasn.asnjournals.org/content/early//ASN.  
461475   
   
                                                            Performed By: #### C  
BCDF ####  
[address] [address]   
   
                      Glucose [Mass/Vol] 93 mg/dL   Normal     74 - 99    [facility]  
   
                                        Comment on above:   Performed By: #### C  
BCDF ####  
[address] [address]   
   
                      HCO3 (Bld) [Moles/Vol] 14 mmol/L  Low        21 - 32    [facility]  
   
                                        Comment on above:   Performed By: #### C  
BCDF ####  
[address] [address]   
   
                      Potassium [Moles/Vol] 3.5 mmol/L Normal     3.5 - 5.3  [facility]  
   
                                        Comment on above:   Performed By: #### C  
BCDF ####  
[address] [address]   
   
                      Protein [Mass/Vol] 7.3 g/dL   Normal     6.4 - 8.2  [facility]  
   
                                        Comment on above:   Performed By: #### C  
BCDF ####  
[address] [address]   
   
                      Sodium [Moles/Vol] 135 mmol/L Low        136 - 145  [facility]  
   
                                        Comment on above:   Performed By: #### C  
BCDF ####  
[address] [address]   
   
                                                    Urea nitrogen   
[Mass/Vol]      4 mg/dL         Low             6 - 23          [facility]  
   
                                        Comment on above:   Performed By: #### C  
BCDF ####  
[address] [address]   
   
                                                    CORONAVIRUS 2019, SCREEN ASY  
MPTOMATICon 03-   
   
                                                    DATE OF SYMPTOM ONSET   
[[id number]]?     Canceled        [facility]  
   
                                        Comment on above:   Order Comment: TEST   
CORONAVIRUS 2019, SCREEN ASYMPTOMATIC WAS   
CANCELLED, 03/10/2022 15:28   
   
                                                            Performed By: #### C  
OVSC ####[address] [address]   
   
                                                    SARS-CoV-2 (COVID-19)   
RNA KIRSTEN+probe Ql (Unsp   
spec)           Canceled        Normal                          [facility]  
   
                                        Comment on above:   Order Comment: TEST   
CORONAVIRUS , SCREEN ASYMPTOMATIC WAS   
CANCELLED, 03/10/2022 15:28   
   
                                                            Result Comment: .  
This test has received FDA Emergency Use Authorization (EUA)   
and has been  
verified by [facility].   
This test is only  
authorized for the duration of time that circumstances exist   
to justify the  
authorization of the emergency use of in vitro diagnostic   
tests for the  
detection of SARS-CoV-2 virus and/or diagnosis of COVID-19   
infection under  
section 564(b)(1) of the Act, 21 U.S.C. 360bbb-3(b)(1), unless   
the  
authorization is terminated or revoked sooner.  
[facility] is certified   
under CLIA-88 as  
qualified to perform high complexity testing. Testing is   
performed in the  
[facility] laboratory located at [address] [place] [address].  
SARS-CoV-2/Flu/RSV Multiplex Test:  
Fact sheet for providers:   
https://www.fda.gov/media/145852/download  
Fact sheet for patients:   
https://www.fda.gov/media/023434/download   
   
                                                            Performed By: #### C  
OVSC ####[place] [address]   
   
                      Lab Specimen Source Nasal, Nasopharyngeal [facility]  
   
                                        Comment on above:   Order Comment: TEST   
CORONAVIRUS , SCREEN ASYMPTOMATIC WAS   
CANCELLED, 03/10/2022 15:28   
   
                                                            Performed By: #### C  
OVSC ####[place] [address]   
   
                                                    DRUG SCREEN,URINEon 03-10-20  
22   
   
                      AMPHETAMINE SCREEN,U Negative   Normal     NEGATIVE   [facility]  
   
                                        Comment on above:   Result Comment: CUTO  
FF LEVEL: 500 NG/ML  
Cross-reactivity has been reported with high concentrations  
of the following drugs: buproprion, chloroquine,   
chlorpromazine,  
ephedrine, mephentermine, fenfluramine, phentermine,  
phenylpropanolamine, pseudoephedrine, and propranolol.   
   
                                                            Performed By: #### U  
AMIC ####  
[place] [address]   
   
                      BARBITURATES SCREEN,U Negative   Normal     NEGATIVE   [facility]  
   
                                        Comment on above:   Result Comment: CUTO  
FF LEVEL: 200 NG/ML   
   
                                                            Performed By: #### U  
AMIC ####  
[place] [address]   
   
                                                    BENZODIAZEPINES   
SCREEN,U        Negative        Normal          NEGATIVE        [facility]  
   
                                        Comment on above:   Result Comment: CUTO  
FF LEVEL: 200 NG/ML   
   
                                                            Performed By: #### U  
AMIC ####  
[place] [address]   
   
                      CANNABINOIDS SCREEN,U Positive   Abnormal   NEGATIVE   [facility]  
   
                                        Comment on above:   Result Comment: CUTO  
FF LEVEL: 50 NG/ML   
   
                                                            Performed By: #### U  
AMIC ####  
[place] [address]   
   
                                                    COCAINE METABOLITE   
SCREEN,U        Negative        Normal          NEGATIVE        [facility]  
   
                                        Comment on above:   Result Comment: CUTO  
FF LEVEL: 150 NG/ML   
   
                                                            Performed By: #### U  
AMIC ####  
[place] [address]   
   
                      DRUG SCREEN COMMENT SEE BELOW  Normal                [facility]  
   
                                        Comment on above:   Result Comment: Drug  
 screen results are presumptive and should  
   
not be used to assess  
compliance with prescribed medication. Contact the performing   
[facility]  
laboratory to add-on definitive confirmatory testing if   
clinically  
indicated.  
.  
Toxicology screening results are reported qualitatively. The   
concentration  
must be greater than or equal to the cutoff to be reported as   
positive. The  
concentration at which the screening test can detect an   
individual drug or  
metabolite varies. The absence of expected drug(s) and/or drug   
metabolite(s)  
may indicate non-compliance, inappropriate timing of specimen   
collection  
relative to drug administration, poor drug absorption,   
diluted/adulterated  
urine, or limitations of testing. For medical purposes only;   
not valid for  
forensic use.  
.  
Interpretive questions should be directed to the laboratory   
medical  
directors.   
   
                                                            Performed By: #### U  
AMIC ####  
[place] [address]   
   
                      FENTANYL SCREEN,URINE Negative   Normal     NEGATIVE   [facility]  
   
                                        Comment on above:   Result Comment: CUTO  
FF LEVEL: 1 NG/ML  
The performance characteristics of this  
test have been determined by the individual  
 laboratory site where testing is performed.  
This test has not been cleared or approved  
by the FDA; however, the FDA has determined  
that such clearance is not necessary.   
   
                                                            Performed By: #### U  
AMIC ####  
[place] [address]   
   
                      METHADONE SCREEN,U Negative   Normal     NEGATIVE   [facility]  
   
                                        Comment on above:   Result Comment: CUTO  
FF LEVEL: 150 NG/ML  
The metabolite L-alpha-acetylmethadol (LAAM) is not  
detected by this method in concentrations that would  
be found in the urine of patients on LAAM therapy.   
   
                                                            Performed By: #### U  
AMIC ####  
[place] [address]   
   
                      OPIATES SCREEN,U Negative   Normal     NEGATIVE   [facility]  
   
                                        Comment on above:   Result Comment: CUTO  
FF LEVEL: 300 NG/ML  
The opiate screen does not detect fentanyl, meperidine, or  
tramadol. Oxycodone is not consistently detected (refer to  
Oxycodone Screen, Urine result).   
   
                                                            Performed By: #### U  
AMIC ####  
[place] [address]   
   
                      OXYCODONE SCREEN,U Negative   Normal     NEGATIVE   [facility]  
   
                                        Comment on above:   Result Comment: CUTO  
FF LEVEL: 100 NG/ML  
This test will accurately detect both oxycodone and   
oxymorphone.   
   
                                                            Performed By: #### U  
AMIC ####  
[place] [address]   
   
                      PCP SCREEN,U Negative   Normal     NEGATIVE   [facility]  
   
                                        Comment on above:   Result Comment: CUTO  
FF LEVEL: 25 NG/ML  
Cross-reactivity has been reported with dextromethorphan.   
   
                                                            Performed By: #### U  
AMIC ####  
[place] [address]   
   
                                                    EMR ADDONon 03-   
   
                      ADDON CONFIRMATION REQUEST REC'D Normal                [facility]  
   
                                        Comment on above:   Performed By: #### P  
TINR ####  
[place] [address]   
   
                                                    HCG,URINEon 03-   
   
                                                    Beta HCG (pregnancy   
test) Ql (U)    Negative        Normal          Negative        [facility]  
   
                                        Comment on above:   Performed By: #### U  
AMIC ####  
[place] [address]   
   
                                                    LACTATEon 03-   
   
                      Lactate [Moles/Vol] 1.1 mmol/L Normal     0.4 - 2.0  [facility]  
   
                                        Comment on above:   Result Comment: [place]  
puncture immediately after or during the  
administration of Metamizole may lead to falsely  
low results. Testing should be performed immediately  
prior to Metamizole dosing.   
   
                                                            Performed By: #### P  
TINR ####  
[address] [address]   
   
                                                    LIPASEon 03-   
   
                                                    Lipase [Catalytic   
activity/Vol]   63 U/L          Normal          9 - 82          [facility]  
   
                                        Comment on above:   Result Comment: [place]  
puncture immediately after or during the  
administration of Metamizole may lead to falsely  
low results. Testing should be performed immediately  
prior to Metamizole dosing.  
N-acetyl-p-benzoquinone imine (metabolite of Acetaminophen)  
will generate erroneously low results in samples for patients  
that have taken toxic doses of acetaminophen.   
   
                                                            Performed By: #### U  
AMIC ####  
[address] [address]   
   
                                                    MANUAL DIFFERENTIALon 03-10-  
2022   
   
                      % BAND NEUTROPHIL 2.0 %      Normal     0.0 - 5.0  [facility]  
   
                                        Comment on above:   Performed By: #### U  
AMIC ####  
[address] [address]   
   
                      % BASOPHIL 0.0 %      Normal     0.0 - 2.0  [facility]  
   
                                        Comment on above:   Performed By: #### U  
AMIC ####  
[address] [address]   
   
                      % EOSINOPHIL 0.0 %      Normal     0.0 - 6.0  [facility]  
   
                                        Comment on above:   Performed By: #### U  
AMIC ####  
[address] [address]   
   
                      % LYMPH-ATYPICAL 1.0 %      Normal     0.0 - 2.0  [facility]  
   
                                        Comment on above:   Performed By: #### U  
AMIC ####  
[address] [address]   
   
                      % LYMPHOCYTE 28.0 %     Normal     13.0 - 44.0 [facility]  
   
                                        Comment on above:   Performed By: #### U  
AMIC ####  
[address] [address]   
   
                      % MONOCYTE 27.0 %     Normal     2.0 - 10.0 [facility]  
   
                                        Comment on above:   Performed By: #### U  
AMIC ####  
[address] [place] [id number]   
   
                      % SEG NEUTROPHIL 42.0 %     Normal     40.0 - 80.0 [facility]  
   
                                        Comment on above:   Result Comment: Perc  
ent differential counts (%) should be   
interpreted in the  
context of the absolute cell counts (cells/L).   
   
                                                            Performed By: #### U  
AMIC ####  
[place] [address]   
   
                      ANC        1.36 x10E9/L Normal     1.20 - 7.70 [facility]  
   
                                        Comment on above:   Performed By: #### U  
AMIC ####  
[place] [address]   
   
                      BAND NEUTROPHIL 0.06 x10E9/L Normal     0.00 - 0.70 [facility]  
   
                                        Comment on above:   Performed By: #### U  
AMIC ####  
[place] [id number]   
   
                      BASOPHIL   0.00 x10E9/L Normal     0.00 - 0.10 [facility]  
   
                                        Comment on above:   Performed By: #### U  
AMIC ####  
[place] [id number]   
   
                      EOSINOPHIL 0.00 x10E9/L Normal     0.00 - 0.70 [facility]  
   
                                        Comment on above:   Performed By: #### U  
AMIC ####  
[place] [address]   
   
                      LYMPH-ATYPICAL 0.03 x10E9/L Normal     0.00 - 0.50 [facility]  
   
                                        Comment on above:   Performed By: #### U  
AMIC ####  
[place] [id number]   
   
                      LYMPHOCYTE 0.87 x10E9/L Low        1.20 - 4.80 [facility]  
   
                                        Comment on above:   Performed By: #### U  
AMIC ####  
[place] [address]   
   
                      MONOCYTE   0.84 x10E9/L Normal     0.10 - 1.00 [facility]  
   
                                        Comment on above:   Performed By: #### U  
AMIC ####  
[address] [place] [address]   
   
                      SEG NEUTROPHIL 1.30 x10E9/L Normal     1.20 - 7.00 [facility]  
   
                                        Comment on above:   Performed By: #### U  
AMIC ####  
[id number] [place] [address]   
   
                                                    Patient Profile - Adult v2on  
 03-   
   
                                                    Patient Profile - Adult   
v2                                      Profile:  
Initial Info:  
How to be   
Addressed DANNA (1)  
Spoken Language   
PreferredEnglish (2)  
Source of   
Informationpatient  
Stated Reason for   
Admission nausea   
Wants Family/Rep   
Notified of   
Admissionn/a; family   
present  
Notify PCPnotify PCP  
Informed of Patient   
Visiting Rightsyes  
Arrived Fromemergency   
department  
Patient   
Belongingsremains with   
patient  
Patient Belongings   
Remaining with   
Patientcell   
phone/electronics;   
clothing  
Medications Brought to   
Hospitalno  
  
General Health:  
Weight in kg47   
kilogram(s)  
Weight in pwf275.6   
pound(s)  
Weight Methodactual   
(measured)  
Scale Typebed  
Height in cm149.8   
centimeter(s)(3)  
Height in feet4 feet  
Height in oufrwu67.98   
inch(es)  
Height Methodstated  
BMI (kg/m2)20.944   
square meter  
  
RSP Based Care:  
How would you like to   
participate in your   
care as much as I can   
What is the number one   
concern for you during   
this   
hospitalization nausea   
What is the most   
important thing we can   
do to support you   
during this  
hospitalization help me   
feel better   
Is there anything we   
need to know to best   
care for you no   
  
Substance:  
Smoking Statusnever   
smoker (4)  
Alcohol Usedenies(4)  
  
Health Mgmt:  
Symptoms/Conditions   
Managed at   
Homemusculoskeletal  
Are You Pregnantno (5)  
Are You Currently   
Breastfeedingno (5)  
Musculoskeletal   
Symptoms/Conditionsscol  
iosis  
Musculoskeletal   
Managementmanaged  
  
Relationship/Environ:  
Resource/Environmental   
Concernsnone  
Primary Source of   
Support/Comfortsignific  
ant other  
Lives Withsignificant   
other; dependent   
child(melia)  
Living   
Arrangementsmobile home  
Services Anticipated at   
Transitionnone  
Anticipated Transition   
[place]  
Significant   
IndicatorsComplete  
  
  
  
Information Review:  
Allergies, Home Meds   
and Significant Events   
have been Reviewed and   
Verified  
with Patient/Familyyes  
  
ALLERGY, INTOLERANCE,   
ADVERSE EVENT:  
Allergies:  
MiraLax: Drug,   
Hives/Urticaria, Active  
codeine: Drug, Unknown,   
Active  
  
  
Electronic Signatures:  
Rosa Elena Thao (RN)   
(Signed 10-Mar-2022   
16:46)  
Authored: Initial Info,   
General Health, RSP   
Based Care, Substance,   
Health  
Mgmt,   
Relationship/Environ,   
Additional Information  
  
  
Last Updated:   
10-Mar-2022 16:46 by   
Rosa Elena Thao (RN)  
  
References:  
1. Data Referenced From   
 Patient Profile -   
Adult v2  2022   
05:16  
2. Data Referenced From   
 Triage - ED    
2022 17:34  
3. Data Referenced From   
 1. Vital Signs    
10-Mar-2022 10:41  
4. Data Referenced From   
 Risk Screen - Adult   
Emergency  10-Mar-2022   
15:45  
5. Data Referenced From   
 Triage - ED    
10-Mar-2022 10:41   Normal                                  [facility]  
   
                                                    Provider Note - ED v3on 03   
   
                                        Provider Note - ED v3 Provider Note:  
Chart Review:  
ED NOTES  
ED NOTES:  
HPI:  
Patient complains of   
nausea vomiting no   
abdominal pain which   
has been ongoing  
for the last several   
days. She states the   
pain in her abdomen is   
worsened last  
night. She was seen   
 at [facility] ER for   
patient had  
negative lab work and a   
normal CT scan of the   
abdomen. She was seen  
subsequently here  with normal lab work   
otherwise and was   
tested  
positive for influenza   
A and negative for   
Covid. She states that   
she continues  
to vomit multiple times   
and is unable to keep   
down any liquids or her  
antiemetics. She states   
that she has been   
unable to urinate.   
Denies any  
recent fevers. Denies   
any chance of pregnancy   
stating that she had a   
negative  
pregnancy test 2 days   
ago and is currently on   
her period.  
  
  
ROS:  
All systems are   
negative other than as   
noted in HPI.  
  
  
Physical Exam  
  
I have reviewed the   
triage vital signs.  
Const: Thin young   
female , appears stated   
age, no acute distress  
Eyes: PERRL, EOM   
intact, no conjunctival   
injection, vision   
grossly normal  
HENT: Neck supple   
without meningismus ,   
Moist mucous membranes,   
no pharyengeal  
swelling or exudate  
CV: Regular rate and   
rhythm, Warm,   
well-perfused   
extremities. Chest non   
tender  
RESP: Lungs clear   
bilaterally, Unlabored   
respiratory effort  
GI: soft, left lower   
quadrant tenderness,   
non-distended, no   
masses  
:  
MSK: No gross   
deformities appreciated  
Back: Non tender, no   
pain with ROM  
Skin: Warm, dry. No   
rashes  
Neuro: Alert and   
oriented x4, GCS 15 ,   
CNs II-XII grossly   
intact. Sensation and  
motor function of   
extremities grossly   
intact.  
Psych: Appropriate mood   
and affect.  
  
I have reviewed and   
confirmed nurses/medics   
notes for patient past,   
social  
and family history.  
  
  
Portions of this note   
were dictated by speech   
recognition. An attempt   
at proof  
reading was made to   
minimize errors. Minor   
errors in transcription   
may be  
present.  
  
  
HISTORY OF PRESENTING   
ILLNESS  
DANNA is a 21 year old   
Female and was seen by   
me at 10-Mar-2022 10:37   
for a  
chief complaint of   
flu-like symptoms   
(Patient to ED   
[id number]   
nausea/vomiting  
with abdominal pain.   
Patient was diagnosed   
with influenza x 1 day   
prior.)(1).  
  
Triage Information:   
Most recent Vital Sign   
Value Date  
Temp (F): 99.3   
03- 10:41  
Temp (C): 37.3   
03- 10:41  
Heart Rate (beats/min):   
82 03- 10:41  
Respirations   
(breaths/min): 18   
03- 10:41  
SpO2 (%): 98 03-   
10:41  
BP Systolic (mm Hg):   
134 03- 10:41  
BP Diastolic (mm Hg):   
99 03- 10:41  
  
  
  
  
  
PAST MEDICAL HISTORY  
ALLERGIES/INTOLERANCES:   
Allergy  
Allergen: MiraLax  
Type: Drug  
Reaction:   
Hives/Urticaria  
  
Allergen: codeine  
Type: Drug  
Reaction: Unknown  
  
HEALTH HISTORY: No   
documented data.  
  
OUTPATIENT MEDICATIONS:   
Home Medications Review   
Status for   
Reconciliation:  
Incomplete  
Med Status: Incomplete   
Medication History  
  
Drug Name: ondansetron   
4 mg oral tablet,   
disintegrating  
Instructions: 1 tab(s)   
orally 3 times a day  
  
Drug Name: oseltamivir   
75 mg oral capsule  
Instructions: 1 cap(s)   
orally 2 times a day  
  
SIGNIFICANT EVENTS:   
Past Medical History  
Description:premature  
  
  
Description:delayed   
bone growth  
  
  
Description:Miscarriage  
  
  
Description:scoliosis  
  
  
Past Surgical History  
Description:Appendectom  
y  
  
  
  
MDM  
MDM/ED COURSE:  
1145-patient was   
initially given 4 mg   
oral Zofran and 2 L   
normal saline were  
ordered. On   
reevaluation at this   
time patient notes that   
symptoms have not  
improved and she will   
be given 10 mg Reglan   
and 50 mg IV Benadryl.  
  
1215-on reevaluation   
patient again complains   
of generalized lower   
abdominal  
pain. As she had a   
negative CAT scan  I do not feel that it   
would merit  
repeating a CAT scan   
and she was given 40 mg   
IV Protonix.  
  
1250-patient resting in   
bed asleep.  
  
1315-patient continues   
to complain of nausea   
and has urinated   
approximately 2  
times after the 2 L of   
fluids. She will be   
given 10 mg IV   
Compazine. As  
patient has been seen   
here 3 times in 3 days   
I discussed with her   
possible  
admission to the   
hospital however she   
prefers to try the   
Compazine first.  
  
1400-patient sleeping   
in bed.  
  
1445-on reevaluation   
patient does look   
better and she was   
willing to attempt  
oral intake. Patient   
given ginger ale.  
  
1430-patient again   
vomiting. I discussed   
with her that as she   
has had 3 visits  
in 3 days and currently   
unable to tolerate oral   
intake I have felt that   
she  
would benefit with   
hospitalization for   
intractable vomiting.   
Urine tox was  
positive for marijuana.   
Patient states that she   
does usually smoke   
marijuana  
about once per day but   
has not had any in   
approximately 1 week.  
  
1500-patient vomiting   
and unable to tolerate   
oral intake. Case   
reviewed with  
Dr. Camara who agrees to   
admit patient for   
intractable vomiting.  
  
DISPOSITION  
Diagnosis/Annotation:   
ED Dx  
Name:Nausea and   
vomiting  
Code:R11.2  
  
Name:Influenza A  
Code:J10.1  
  
Name:Intractable vomit   
(more content not   
included)...        Normal                                  [facility]  
   
                                                    RED CELL MORPHOLOGYon 03-10-  
2022   
   
                                                    RBC morphology finding   
Nom (Bld)       NORMAL          Normal                          [facility]  
   
                                        Comment on above:   Performed By: #### U  
[facility] ####  
[facility]  
[address] [id number]   
   
                                                    Risk Screen - Adult Emergenc  
yon 03-   
   
                                                    Risk Screen - Adult   
Emergency                               Preferred Language:  
Preferred Language:  
Preferred Language for   
Discussing Health Care   
(patient/designee)Charleen patel  
  
Advanced Directives:  
Advance Directive/DNRno  
  
Family Violence Adult:  
Abuse Screen:  
Are you or have you   
been threatened or   
abused physically,   
emotionally, or  
sexually by anyoneno  
  
Learning Assessment   
(Patient):  
Learning Assessment   
(Patient):  
Patient is Able to be   
Assessed for   
Learningyes  
Factors Influencing   
Readiness to   
Learnacuteness of   
illness  
Factors that Impact   
Ability to Learnnone  
Devices/Methods Used to   
Communicatenone  
Learning   
Preferencesaudio  
Cultural   
Considerationsnone  
Developmental   
Considerationsnone  
Congregation   
Considerationsnone  
  
Learning Assessment   
(Other Learner):  
Learning Assessment   
(Other Learner):  
Other learner   
availableno  
  
Pressure   
Injury/TB/Substance:  
Pressure Injury:  
Do you have a coughno  
Smoking Statusnever   
smoker  
Alcohol Usedenies  
  
  
Admission Risk Screen:  
Significant   
IndicatorsComplete  
  
CAGE:  
CAGE:  
Is this an injured   
patient at a Tra[place]   
Center   
[place] [place]/[facility]  
[place] no  
  
  
Electronic Signatures:  
Becky Lee (RN)   
(Signed 10-Mar-2022   
15:45)  
Authored: Preferred   
Language, Advanced   
Directives, Family   
Violence Adult,  
Learning Assessment   
(Patient), Learning   
Assessment (Other   
Learner), Pressure  
Injury/TB/Substance,   
Pressure Injury, CAGE  
  
  
Last Updated:   
10-Mar-2022 15:45 by   
Becky Lee (RN)    [facility]  
   
                                                    Triage - EDon 03-   
   
                                        Triage - ED         Quick Triage:  
Are You Pregnantno  
Have You Given Birth In   
The Last 6 Weeksno  
Are You Currently   
Breastfeedingno  
The patient and/or   
guardian verbally   
acknowledges placement   
for services into  
the following (when   
Urgent Care Service   
hours are   
operating):emergency  
department  
  
Chart Review:  
ARRIVAL INFORMATION  
  
Mode of Arrival:   
private vehicle  
  
  
  
CHIEF COMPLAINT  
  
DANNA SOARES   
is a Female patient   
with a chief complaint   
of flu-like  
symptoms (Patient to ED   
[id number]   
nausea/vomiting with   
abdominal pain. Patient  
was diagnosed with   
influenza x 1 day   
prior.).  
Onset of the Complaint:   
06-Mar-2022 09:00  
Triage Date/Time:   
10-Mar-2022 10:41  
DIPIKA: 3  
Pain Rating (0-10): 7 =   
Severe  
Vital Signs:  
Temperature: 99.3F (   
37.3C) taken temporal  
Blood Pressure: 134/99   
Mean:  
Heart Rate: 82  
Respiratory Rate: 18  
Pulse Oximetry: 98% on   
room air, no   
respiratory support.   
Height: 4 feet 11.00  
inches. 149.8 CM  
Weight: 100.3 pounds.   
Calculated 45.5 kg.  
Calculated BMI (kg/m2):   
20.276 Calculated BSA   
(m2) 1.38  
  
Sujit Coma Scale:  
Best Eye Response: (E4)   
spontaneous  
Best Motor Response:   
(M6) obeys commands  
Best Verbal Response:   
(V5) oriented  
[place] Score: 15  
Sujit Assessment   
Qualifiers: patient not   
sedated/intubated  
  
Cough lasting greater   
than 3 weeks: no  
Allergies: yes  
Mask applied: yes  
Last menstrual period:   
10-Mar-2022  
Patient has homicidal   
thoughts: no  
  
  
  
  
Risk Screens  
Suicide Risk Screen  
  
In the Past Month: Have   
you wished you were   
dead or wished you   
could go to  
sleep and not wake up   
no  
In the Past Month: Have   
you had any actual   
thoughts of killing   
yourself no  
In Your Lifetime: Have   
you ever done anything,   
started to do anything,   
or  
prepared to do anything   
to end your life no  
  
  
  
Avila Fall Scale   
Screening  
Has the patient fallen   
before (or is the   
patient in the ED as a   
result of a  
fall) has not had a   
fall  
Does the patient have   
an impaired gait does   
not have impaired gait  
Is the patient   
cognitively impaired   
not cognitively   
impaired  
  
  
Interventions:  
Avila Fall   
Interventions: LOW   
INTERVENTIONS:  
*patient oriented to   
surroundings and call   
system,  
* patient/family falls   
education completed  
and documented,  
*patients fall status   
communicated  
during bedside handoff,  
*whiteboard updated,  
*mode of toileting   
discussed with patient,  
*bed in low position   
with brakes locked,  
*call light in reach,  
* non-skid footwear  
  
  
TRAVEL HISTORY  
Travel History  
Coronavirus Screening:   
no exposure or symptoms  
Travel Exposure   
History: NO travel to   
International locations   
in the past 30  
days  
  
  
PAIN  
  
Pain Scale Used: ASHLEY  
Pain Rating (0-10): 7 =   
Severe  
  
  
  
  
  
Past Medical History:  
Past Medical History   
Reviewedyes  
  
  
Appendectomy: Past   
Surgical History,   
Active  
scoliosis: Past Medical   
History, Active  
Miscarriage: Past   
Medical History, Active  
delayed bone growth:   
Past Medical History,   
Active  
premature: Past Medical   
History, Active  
  
  
Electronic Signatures:  
Andrew Guerra   
(EMT-P) (Signed   
10-Mar-2022 10:44)  
Entered: Risk Screens,   
Pain, Travel History,   
Chart Review, Scores  
Authored: Quick Triage,   
Risk Screens, Pain,   
Travel History, Chart   
Review,  
Scores  
Becky Rice (ERNA)   
(Signed 10-Mar-2022   
11:05)  
Authored: Quick Triage,   
Chart Review, Past   
Medical History  
  
  
Last Updated:   
10-Mar-2022 11:05 by   
Becky Rice (ERNA) Normal                                  [facility]  
   
                                                    UA MICROSCOPICon 03-   
   
                      Mucus Ql (Urine sed) 1+ /LPF    Normal                [facility]  
   
                                        Comment on above:   Performed By: #### U  
AMIC ####[id number]   
[place] [address]   
   
                      RBC        66 /HPF    Abnormal   0-5        [facility]  
   
                                        Comment on above:   Performed By: #### U  
AMIC ####[place] [address]   
   
                      SQUAMOUS EPITH. CELLS 3 /HPF     Normal                [facility]  
   
                                        Comment on above:   Performed By: #### U  
AMIC ####[id number]   
   
                      WBC        2 /HPF     Normal     0-5        [facility]  
   
                                        Comment on above:   Performed By: #### U  
AMIC ####[place] [address]   
   
                                                    URINALYSISon 03-   
   
                      Appearance (U) HAZY       Normal     CLEAR      [facility]  
   
                                        Comment on above:   Performed By: #### C  
BCDF ####  
[place] [address]   
   
                      Bilirubin Ql (U) Negative   Normal     NEGATIVE   [facility]  
   
                                        Comment on above:   Performed By: #### C  
BCDF ####  
[place] [address]   
   
                      Color (U)  Straw      Normal     STRAW,YELLOW [facility]  
   
                                        Comment on above:   Performed By: #### C  
BCDF ####  
[place] [address]   
   
                      Glucose Ql (U) Negative   Normal     NEGATIVE   [facility]  
   
                                        Comment on above:   Performed By: #### C  
BCDF ####  
[place] [address]   
   
                      Hemoglobin Ql (U) LARGE(3+)  Abnormal   NEGATIVE   [facility]  
   
                                        Comment on above:   Performed By: #### C  
BCDF ####  
[place] [address]   
   
                      Ketones Ql (U) 80(2+)     Abnormal   NEGATIVE   [facility]  
   
                                        Comment on above:   Performed By: #### C  
BCDF ####  
[address] [address]   
   
                                                    Leukocyte esterase Test   
strip Ql (U)    Negative        Normal          NEGATIVE        [facility]  
   
                                        Comment on above:   Performed By: #### C  
BCDF ####  
[address] [address]   
   
                      Nitrite Ql (U) Negative   Normal     NEGATIVE   [facility]  
   
                                        Comment on above:   Performed By: #### C  
BCDF ####  
[address] [address]   
   
                      pH (U)     6.0 [pH]   Normal     5.0 - 8.0  [facility]  
   
                                        Comment on above:   Performed By: #### C  
BCDF ####  
[address] [address]   
   
                      Protein Ql (U) Negative   Normal     NEGATIVE   [facility]  
   
                                        Comment on above:   Performed By: #### C  
BCDF ####  
[address] [id number]   
   
                                                    Specific gravity (U)   
[Rel density]       1.012               Normal              1.005 -   
1.035                                   [facility]  
   
                                        Comment on above:   Performed By: #### C  
BCDF ####  
[address] [place] [id number]   
   
                                                    Urobilinogen (U)   
[Mass/Vol]      mg/dL           Normal          0.0 - 1.9       [facility]  
   
                                        Comment on above:   Performed By: #### C  
BCDF ####  
[id number]   
   
                                                    CBC AND DIFFERENTIALon    
   
                      Basophils (Bld) [#/Vol] 0.00 10*3/uL Normal     0.00 - 0.1  
0 [facility]  
   
                                        Comment on above:   Performed By: #### C  
BCDF ####  
[id number]   
   
                      Basophils/100 WBC (Bld) 0.7 %      Normal     0.0 - 2.0  S  
[facility]  
   
                                        Comment on above:   Performed By: #### C  
BCDF ####  
[address] [address]   
   
                                                    Eosinophils (Bld)   
[#/Vol]         0.00 10*3/uL    Normal          0.00 - 0.70     [facility]  
   
                                        Comment on above:   Performed By: #### C  
BCDF ####  
[id number]   
   
                                                    Eosinophils/100 WBC   
(Bld)           0.0 %           Normal          0.0 - 6.0       [facility]  
   
                                        Comment on above:   Performed By: #### C  
BCDF ####  
[id number]   
   
                                                    Erythrocyte   
distribution width   
(RBC) [Ratio]   14.6 %          High            11.5 - 14.5     [facility]  
   
                                        Comment on above:   Performed By: #### C  
BCDF ####  
[address] [address]   
   
                                                    Hematocrit (Bld)   
[Volume fraction] 39.0 %          Normal          36.0 - 46.0     [facility]  
   
                                        Comment on above:   Performed By: #### C  
BCDF ####  
[address] [address]   
   
                                                    Hemoglobin (Bld)   
[Mass/Vol]      12.6 g/dL       Normal          12.0 - 16.0     [facility]  
   
                                        Comment on above:   Performed By: #### C  
BCDF ####  
[address] [address]   
   
                                                    Lymphocytes (Bld)   
[#/Vol]         0.70 10*3/uL    Low             1.20 - 4.80     [facility]  
   
                                        Comment on above:   Performed By: #### C  
BCDF ####  
[address] [address]   
   
                                                    Lymphocytes/100 WBC   
(Bld)           21.9 %          Normal          13.0 - 44.0     [facility]  
   
                                        Comment on above:   Performed By: #### C  
BCDF ####  
[address] [address]   
   
                      MCHC (RBC) [Mass/Vol] 32.3 g/dL  Normal     32.0 - 36.0 [facility]  
   
                                        Comment on above:   Performed By: #### C  
BCDF ####  
[address] [address]   
   
                      MCV (RBC) [Entitic vol] 86 fL      Normal     80 - 100   S  
[facility]  
   
                                        Comment on above:   Performed By: #### C  
BCDF ####  
[address] [address]   
   
                      Monocytes (Bld) [#/Vol] 0.60 10*3/uL Normal     0.10 - 1.0  
0 [facility]  
   
                                        Comment on above:   Performed By: #### C  
BCDF ####  
[address] [address]   
   
                      Monocytes/100 WBC (Bld) 19.0 %     Normal     2.0 - 10.0 S  
[facility]  
   
                                        Comment on above:   Performed By: #### C  
BCDF ####  
[address] [address]   
   
                                                    Neutrophils (Bld)   
[#/Vol]         1.80 10*3/uL    Normal          1.20 - 7.70     [facility]  
   
                                        Comment on above:   Result Comment: Perc  
ent differential counts (%) should be   
interpreted in the  
context of the absolute cell counts (cells/L).   
   
                                                            Performed By: #### C  
BCDF ####  
[address] [address]   
   
                                                    Neutrophils/100 WBC   
(Bld)           58.4 %          Normal          40.0 - 80.0     [facility]  
   
                                        Comment on above:   Performed By: #### C  
BCDF ####  
[address] [address]   
   
                      NUCLEATED RBC 0.1 /100 WBC Normal                [facility]  
   
                                        Comment on above:   Performed By: #### C  
BCDF ####  
[address] [address]   
   
                      Platelets (Bld) [#/Vol] 187 10*3/uL Normal     150 - 450    
[facility]  
   
                                        Comment on above:   Performed By: #### C  
BCDF ####  
[address] [address]   
   
                      RBC        4.53 x10E12/L Normal     4.00 - 5.20 [facility]  
   
                                        Comment on above:   Performed By: #### C  
BCDF ####  
[address] [address]   
   
                      WBC (Bld) [#/Vol] 3.2 10*3/uL Low        4.4 - 11.3 [facility]  
   
                                        Comment on above:   Performed By: #### C  
BCDF ####  
[address] [address]   
   
                                                    COMPREHENSIVE PANELon 2022   
   
                      Albumin [Mass/Vol] 5.0 g/dL   Normal     3.4 - 5.0  [facility]  
   
                                        Comment on above:   Performed By: #### U  
AMIC ####  
[address] [address]   
   
                                                    ALP [Catalytic   
activity/Vol]   50 U/L          Normal          33 - 110        [facility]  
   
                                        Comment on above:   Performed By: #### U  
AMIC ####  
[address] [address]   
   
                                                    ALT [Catalytic   
activity/Vol]   9 U/L           Normal          7 - 45          [facility]  
   
                                        Comment on above:   Result Comment: Kaleigh  
ents treated with Sulfasalazine may   
generate  
falsely decreased results for ALT.   
   
                                                            Performed By: #### U  
AMIC ####  
[address] [address]   
   
                      Anion gap [Moles/Vol] 24 mmol/L  High       10 - 20    [facility]  
   
                                        Comment on above:   Performed By: #### U  
AMIC ####  
[address] [address]   
   
                                                    AST [Catalytic   
activity/Vol]   16 U/L          Normal          9 - 39          [facility]  
   
                                        Comment on above:   Performed By: #### U  
AMIC ####  
[address] [address]   
   
                      Bilirubin [Mass/Vol] 0.4 mg/dL  Normal     0.0 - 1.2  [facility]  
   
                                        Comment on above:   Performed By: #### U  
AMIC ####  
[address] [address]   
   
                      Calcium [Mass/Vol] 9.4 mg/dL  Normal     8.6 - 10.3 [facility]  
   
                                        Comment on above:   Performed By: #### U  
AMIC ####  
[address] [address]   
   
                      Chloride [Moles/Vol] 106 mmol/L Normal     98 - 107   [facility]  
   
                                        Comment on above:   Performed By: #### U  
AMIC ####  
[address] [address]   
   
                      Creatinine [Mass/Vol] 0.68 mg/dL Normal     0.50 - 1.05 [facility]  
   
                                        Comment on above:   Performed By: #### U  
AMIC ####  
[address] [address]   
   
                      eGFR FEMALE >90        Normal     >90        [facility]  
   
                                        Comment on above:   Result Comment: CALC  
ULATIONS OF ESTIMATED GFR ARE PERFORMED  
USING THE  CKD-EPI STUDY REFIT EQUATION  
WITHOUT THE RACE VARIABLE FOR THE IDMS-TRACEABLE  
CREATININE METHODS.  
https://jasn.asnjournals.org/content/early//ASN.  
944053   
   
                                                            Performed By: #### U  
AMIC ####  
[address] [address]   
   
                      Glucose [Mass/Vol] 93 mg/dL   Normal     74 - 99    [facility]  
   
                                        Comment on above:   Performed By: #### U  
AMIC ####  
[address] [address]   
   
                      HCO3 (Bld) [Moles/Vol] 12 mmol/L  Low        21 - 32    [facility]  
   
                                        Comment on above:   Performed By: #### U  
AMIC ####  
[address] [address]   
   
                      Potassium [Moles/Vol] 3.9 mmol/L Normal     3.5 - 5.3  [facility]  
   
                                        Comment on above:   Performed By: #### U  
AMIC ####  
[address] [address]   
   
                      Protein [Mass/Vol] 8.1 g/dL   Normal     6.4 - 8.2  [facility]  
   
                                        Comment on above:   Performed By: #### U  
AMIC ####  
[address] [address]   
   
                      Sodium [Moles/Vol] 138 mmol/L Normal     136 - 145  [facility]  
   
                                        Comment on above:   Performed By: #### U  
AMIC ####  
[address] [address]   
   
                                                    Urea nitrogen   
[Mass/Vol]      6 mg/dL         Normal          6 - 23          [facility]  
   
                                        Comment on above:   Performed By: #### U  
AMIC ####  
[address] [address]   
   
                                                    Covid 19 Resultson   
2   
   
                                                    SARS-CoV-2 (COVID-19)   
RNA KIRSTEN+probe Ql (Unsp   
spec)                                   NEGATIVE COVID-19 Test  
  
Coronaviruses are   
common world-wide and   
are the cause of many   
common colds.  
SARS-COV2 is a new   
coronavirus that began   
circulating worldwide   
in 2019 so we  
are calling it   
COVID-19. It has been   
estimated that four out   
of five patients  
with COVID-19 will   
recover at home without   
the need for medical   
attention.  
Symptoms of COVID-19   
may include cough,   
fever, shortness of   
breath, loss of  
taste or smell and   
other flu-like symptoms   
including chills, sore   
muscles, sore  
throat, and headache.   
Severe illness is more   
common in older people   
and people  
with other health   
problems such as high   
blood pressure,   
obesity, and immune  
system problems.  
  
If the test is   
positive, you have   
COVID-19. You will be   
contacted by the  
ordering physicians   
office and instructed   
to remain on home   
isolation, in  
accordance with CDC   
guidelines. You may   
also be contacted by   
the [facility] to   
see if any of your   
close contacts may have   
been exposed  
to the virus and need   
to quarantine.  
  
If the test is   
negative, you likely do   
not have COVID-19 at   
this time, but you  
still may have a   
different illness that   
can spread to other   
people (like  
Influenza, or the Flu)   
and could still be at   
risk for getting   
COVID-19. We  
recommend that you stay   
away from other people   
to limit the spread of   
illness  
until your symptoms are   
improving and you are   
fever-free for 24 hours   
without  
the use of fever   
lowering medications   
such as acetaminophen   
or ibuprofen. No  
test is 100% accurate   
so if you are still   
concerned you may have   
COVID-19, talk  
to your doctor about   
the need to continue to   
stay away from others.  
  
Medicines  
  
Unless your provider   
told you not to use the   
following:   
Acetaminophen (Tylenol  
and others) is   
generally safe.   
Anti-inflammatory   
medications, such as   
Ibuprofen  
(Advil or Motrin) or   
Naproxen (Aleve) can   
also be used.   
Over-the-counter  
cough and cold   
medicines can be used   
according to the   
instructions on the  
package. Some   
over-the-counter   
medicines also contain   
acetaminophen. Make   
sure  
you are not taking more   
than your recommended   
dose. For those not   
hospitalized,  
there is no specific   
treatment available for   
this illness.   
Antibiotics do not  
treat Coronaviruses.  
  
Follow-Up  
  
Follow up with your   
doctor by scheduling a   
virtual visit or   
consider follow-up  
at one of our urgent   
care fever clinics. If   
you are having   
difficulty  
breathing, or are very   
weak and having   
difficulty standing,   
this is a medical  
emergency. Call 911 or   
have someone take you   
to the nearest   
emergency room  
immediately. If   
possible, wear a   
facemask.  
  
Additional guidance   
from the CDC for   
patients who tested   
POSITIVE for COVID-19  
  
How to isolate:  
Isolate yourself in a   
specific room at home   
and limit your contact   
with others.  
Use a separate bathroom   
from other members of   
the household, when   
possible.  
Leave home only to get   
essential medical care.   
Do not go to work,   
school or  
public areas.  
Avoid using public   
transportation,   
ride-sharing, or taxis.  
Restrict contact with   
pets and other animals.   
If you must care for   
your pet or  
be around animals while   
you are sick, wash your   
hands before and after   
your  
interaction and wear a   
facemask.  
Make sure that shared   
spaces in the home have   
good airflow, such as   
by an air  
conditioner or an   
opened window, weather   
permitting.  
  
Personal Hygiene   
Procedures:  
Wear a face mask when   
in the same room as   
other people or pets.   
If a face mask  
interferes with your   
breathing, others   
should wear a mask when   
sharing space  
with you.  
Frequent hand-washing:   
wash your hands with   
soap and water for at   
least 20  
seconds. If soap and   
water are not   
available, use   
alcohol-based hand   
.  
Avoid touching your   
eyes, nose, and mouth   
with unwashed hands.  
  
Household Hygiene   
Procedures:  
Avoid sharing personal   
household items such as   
dishes, glassware,   
cups, eating  
utensils, towels or   
bedding with other   
people or pets in your   
home. After use,  
these items should be   
washed with soap and   
hot water.  
Disinfect all   
high-touch surfaces   
every day with   
antibacterial cleaning  
solutions such as Lysol   
wipes, bleach,   
cleansers, etc.   
High-touch surfaces  
include tabletops,   
doorknobs, bathroom   
fixtures, toilets,   
phones, keyboards,  
tablets and bedside   
tables.  
Immediately clean any   
surfaces that may have   
blood, poop or body   
fluids on  
them, using   
antibacterial cleaning   
solutions such as Lysol   
wipes, bleach,  
cleansers, etc.  
If clothing or bedding   
come into contact with   
blood, poop or body   
fluids, they  
should be washed   
immediately. Follow the   
directions on the   
laundry detergent  
and clothing labels but   
hot water is   
recommended when   
possible.  
  
Stopping home isolation   
precautions:  
If possible, consult   
your doctor before   
stopping home isolation   
precautions.  
According to the CDC,   
you can discontinue   
home isolation   
precautions when you  
have met both of these   
criteria:  
Your fever and   
respiratory symptoms   
have been gone for 24   
[place] (more content not   
included)...        Normal                                  [facility]  
   
                                                    HCG,URINEon 2022   
   
                                                    Beta HCG (pregnancy   
test) Ql (U)    Negative        Normal          Negative        [facility]  
   
                                        Comment on above:   Performed By: #### H  
EPFP ####  
[address] [address] [id number]   
   
                                                    INFLUENZA A/B, COVID 2019 PC  
R,SYMPTOMATICon 2022   
   
                      INFLUENZA A, PCR Detected   Abnormal   Not Detected [facility]  
   
                                        Comment on above:   Result Comment: Resp  
iratory virus testing is performed   
routinely by PCR  
for Influenza A/B and RSV.  
Not Detected results do not preclude Influenza A/B or RSV  
infections since the adequacy of sample collection or low  
viral burden may impact the clinical sensitivity of this  
test method.   
   
                                                            Performed By: #### C  
BCDF ####  
[place] [address]   
   
                      INFLUENZA B, PCR Not detected Normal     Not Detected [facility]  
   
                                        Comment on above:   Result Comment: Resp  
iratory virus testing is performed   
routinely by PCR  
for Influenza A/B and RSV.  
Not Detected results do not preclude Influenza A/B or RSV  
infections since the adequacy of sample collection or low  
viral burden may impact the clinical sensitivity of this  
test method.   
   
                                                            Performed By: #### C  
BCDF ####  
[place] [address]   
   
                                                    SARS-CoV-2 (COVID-19)   
RNA KIRSTEN+probe Ql (Unsp   
spec)           Not detected    Normal          Not Detected    [facility]  
   
                                        Comment on above:   Result Comment: .  
This test has received FDA Emergency Use Authorization (EUA)   
and has been  
verified by [facility].   
This test is only  
authorized for the duration of time that circumstances exist   
to justify the  
authorization of the emergency use of in vitro diagnostic   
tests for the  
detection of SARS-CoV-2 virus and/or diagnosis of COVID-19   
infection under  
section 564(b)(1) of the Act, 21 U.S.C. 360bbb-3(b)(1), unless   
the  
authorization is terminated or revoked sooner.  
[facility] is certified   
under CLIA-88 as  
qualified to perform high complexity testing. Testing is   
performed in the  
[facility] laboratory located at [address] [place] [address].  
SARS-CoV-2/Flu/RSV Multiplex Test:  
Fact sheet for providers:   
https://www.fda.gov/media/645826/download  
Fact sheet for patients:   
https://www.fda.gov/media/195159/download   
   
                                                            Performed By: #### C  
BCDF ####  
[address] [address]   
   
                      Lab Specimen Source Nasal, Nasopharyngeal Normal            
      [facility]  
   
                                        Comment on above:   Performed By: #### C  
BCDF ####  
[address] [address]   
   
                                                    DATE OF SYMPTOM ONSET   
[[id number]]?     2022        Normal                          [facility]  
   
                                        Comment on above:   Performed By: #### C  
BCDF ####  
[place] [id number]   
   
                                                    LIPASEon 2022   
   
                                                    Lipase [Catalytic   
activity/Vol]   28 U/L          Normal                    [facility]  
   
                                        Comment on above:   Result Comment: [place]  
puncture immediately after or during the  
administration of Metamizole may lead to falsely  
low results. Testing should be performed immediately  
prior to Metamizole dosing.  
N-acetyl-p-benzoquinone imine (metabolite of Acetaminophen)  
will generate erroneously low results in samples for patients  
that have taken toxic doses of acetaminophen.   
   
                                                            Performed By: #### U  
AMIC ####  
[place] [id number]   
   
                                                    Provider Note - ED v3on 03   
   
                                        Provider Note - ED v3 Provider Note:  
Chart Review:  
  
ED NOTES  
ED NOTES:  
Source of Information:   
Patient. EMR was   
reviewed for previous   
records.  
-----------------------  
-----------  
-----------------------  
-----------------------  
---------------  
HPI: Nausea, vomiting,   
diarrhea, abdominal   
pain. This 21-year-old   
white female  
presents to the ED   
complaint of nausea,   
vomiting and diarrhea   
that began 3 days  
ago she states that   
abdominal pain symptoms   
started yesterday.   
Patient states  
that she was seen and   
evaluated at [facility] yesterday. She   
states that she  
had blood work   
performed and was told   
that something was low   
and also had  
imaging of her abdomen   
and pelvis with a CT   
scan that was negative.   
Patient  
states that she has not   
seen a GI specialist   
for symptoms in the   
past. She did  
treat herself with   
Zofran at midnight   
without any improvement   
of her symptoms.  
She states that she is   
unable to keep anything   
down. She denies any   
ill  
contacts.  
-----------------------  
-----------  
-----------------------  
-----------------------  
---------------  
  
PMH: Irregular   
menstrual cycle  
PSH: Appendectomy, ORIF   
of right wrist  
Social Hx: The patient   
denies any use of   
cigarettes or alcohol.   
Occasional use  
of marijuana, denies   
any recent use of   
marijuana.  
Fam:  
MEDS: Zofran  
ALLERGIES: Codeine,   
MiraLAX  
-----------------------  
-----------  
-----------------------  
-----------------------  
---------------  
PHYSICAL EXAM:  
General: Patient alert,   
awake, oriented X3,   
appears be in mild   
distress,  
nontoxic, cooperative  
Skin: Warm. Dry.   
Intact. No rash.  
Eyes: PEARTLA, EOMIs   
intact, sclera white,   
conjunctiva clear  
HEENT: Atraumatic.   
Normo-cephalic. Oral   
nasal mucosa pink and   
moist.  
Neck: Supple without   
meningismus, no   
lymphadenopathy.  
CV: Regular rate and   
rhythm without murmurs,   
heaves, lifts or   
thrills.  
Respiratory: Nonlabored   
breathing. There are no   
retractions or   
tachypnea.  
Lungs are clear to   
auscultation   
bilaterally. She is   
noted to be coughing.  
GI: Soft, tenderness in   
the epigastrium without   
gross distention, bowel   
sounds  
present in all 4   
quadrants. There is no   
pulsatile masses. There   
is no CVA  
tenderness. No rebound,   
rigidity or guarding.  
MUSC: There is no joint   
swelling or bony   
tenderness on exam.  
Neuro: Cranial nerves   
II - XII grossly   
intact. Speech is   
fluent. No focal  
neurologic deficits are   
noted on exam.  
Lower extremities:   
There is no peripheral   
edema bilaterally,   
negative Homans  
sign. No palpable   
cords. Distal pulses   
are present in both   
lower extremities.  
Psych: Maintains eye   
contact. Cooperative.  
-----------------------  
-----------  
-----------------------  
-----------------------  
---------------  
ED course: CT scan   
imaging that was   
performed at [facility] yesterday   
revealed  
free fluid in the   
pelvis which may be due   
to a ruptured ovarian   
cyst although  
no adnexal masses seen.   
Prior appendectomy. No   
acute process involving   
the  
bowel. Patient   
continues to complain   
of nausea with   
epigastric abdominal   
pain.  
Lab work is   
unremarkable with   
normal liver enzymes   
she does have evidence   
of  
dehydration. Patient   
was ordered Phenergan   
and Bentyl for   
discomfort. Patient  
was diagnosed with   
influenza A. She was   
doing much better after   
Phenergan and  
was discharged home   
with a prescription for   
Phenergan   
suppositories, Tamiflu,  
Bentyl. She was   
provided a role to see   
a primary care doctor   
and a GI  
specialist.  
  
This chart was dictated   
with the use of Dragon   
software within the   
framework of  
the current electronic   
medical records   
software. Attempts were   
made to edit in  
real time, given time   
constraints there is   
the potential for   
inaccuracies in my  
dictation.  
  
Lei Anderson DO  
  
  
HISTORY OF PRESENTING   
ILLNESS  
DANNA is a 21 year old   
Female and was seen by   
me at 09-Mar-2022 08:25   
for a  
chief complaint of   
abdominal pain (N/V/D   
started 3 days ago,   
today abd pain  
started was seen   
yesterday [place] said   
electrolytes were   
abnormal.).  
  
Triage Information:   
Most recent Vital Sign   
Value Date  
Temp (F): 99.9   
2022 08:29  
Temp (C): 37.7   
2022 08:29  
Heart Rate (beats/min):   
95 2022 08:29  
Respirations   
(breaths/min): 18   
2022 08:29  
SpO2 (%): 99 2022   
08:29  
BP Systolic (mm Hg):   
117 2022 08:29  
BP Diastolic (mm Hg):   
85 2022 08:29  
  
  
  
  
  
PAST MEDICAL HISTORY  
CURRENT OR FORMER   
SUBSTANCE USE:  
Tobacco/Nicotine Use:   
never smoker  
Alcohol Use: denies  
Drug Use:   
occasionally,Substance   
Comment: Marijuana  
ALLERGIES/INTOLERANCES:   
Allergy  
Allergen: MiraLax  
Type: Drug  
Reaction:   
Hives/Urticaria  
  
Allergen: codeine  
Type: Drug  
Reaction: Unknown  
  
HEALTH HISTORY: No   
documented data.  
  
OUTPATIENT MEDICATIONS:   
Home Medications Review   
Status for   
Reconciliation: Not  
Done  
Med Status: Incomplete   
Medication History  
  
Drug Name: Prot (more   
content not   
included)...        Normal                                  [facility]  
   
                                                    Risk Screen - Adult Emergenc  
yon 2022   
   
                                                    Risk Screen - Adult   
Emergency                               Preferred Language:  
Preferred Language:  
Preferred Language for   
Discussing Health Care   
(patient/designee)Charleen patel  
  
Advanced Directives:  
Advance Directive/DNRno  
  
Family Violence Adult:  
Abuse Screen:  
Are you or have you   
been threatened or   
abused physically,   
emotionally, or  
sexually by anyoneno  
  
Learning Assessment   
(Patient):  
Learning Assessment   
(Patient):  
Patient is Able to be   
Assessed for   
Learningyes  
Factors Influencing   
Readiness to   
Learnacuteness of   
illness  
Factors that Impact   
Ability to Learnnone  
Devices/Methods Used to   
Communicatenone  
Learning   
Preferencesaudio  
Cultural   
Considerationsnone  
Developmental   
Considerationsnone  
Congregation   
Considerationsnone  
  
Learning Assessment   
(Other Learner):  
Learning Assessment   
(Other Learner):  
Other learner   
availableno  
  
Pressure   
Injury/TB/Substance:  
Pressure Injury:  
Pressure Injury Present   
on Admissionno  
Do you have a coughno  
Smoking Statusnever   
smoker  
Alcohol Usedenies  
Drug Usedenies  
  
  
Admission Risk Screen:  
Significant   
IndicatorsComplete  
  
CAGE:  
CAGE:  
Is this an injured   
patient at a Trauma   
Center   
([facility]  
a/[place]): no  
  
  
Electronic Signatures:  
Becky Lee (RN)   
(Signed 09-Mar-2022   
08:34)  
Authored: Preferred   
Language, Advanced   
Directives, Family   
Violence Adult,  
Learning Assessment   
(Patient), Learning   
Assessment (Other   
Learner), Pressure  
Injury/TB/Substance,   
Pressure Injury, CAGE  
  
  
Last Updated:   
09-Mar-2022 08:34 by   
Becky Lee (RN)    [facility]  
   
                                                    Triage - EDon 2022   
   
                                        Triage - ED         Quick Triage:  
Are You Pregnantno  
Are You Currently   
Breastfeedingno  
  
Chart Review:  
ARRIVAL INFORMATION  
  
Mode of Arrival:   
private vehicle  
  
  
CHIEF COMPLAINT  
  
DANNA SOARES   
is a Female patient   
with a chief complaint   
of abdominal  
pain (N/V/D started 3   
days ago, today abd   
pain started was seen   
yesterday [place]  
said electrolytes were   
abnormal.).  
Triage Date/Time:   
09-Mar-2022 08:29  
DIPIKA: 3  
Pain Rating (0-10): 10   
= Severe  
Pain location: abd  
Vital Signs:  
Temperature: 99.9F (   
37.7C)  
Blood Pressure: 117/85   
Mean:  
Heart Rate: 95  
Respiratory Rate: 18  
Pulse Oximetry: 99% on   
room air, no   
respiratory support.   
Height: 4 feet 11.00  
inches. 149.8 CM  
Weight: 100.3 pounds.   
Calculated 45.5 kg.  
Calculated BMI (kg/m2):   
20.276 Calculated BSA   
(m2) 1.38  
  
[place] Coma Scale:  
Best Motor Response:   
(M6) obeys commands  
Best Verbal Response:   
(V5) oriented  
  
  
Allergies: yes  
Last menstrual period:   
09-Mar-2022  
Patient has homicidal   
thoughts: no  
  
  
  
  
Risk Screens  
Suicide Risk Screen  
  
In the Past Month: Have   
you wished you were   
dead or wished you   
could go to  
sleep and not wake up   
no  
In the Past Month: Have   
you had any actual   
thoughts of killing   
yourself no  
In Your Lifetime: Have   
you ever done anything,   
started to do anything,   
or  
prepared to do anything   
to end your life no  
  
  
  
Avila Fall Scale   
Screening  
Has the patient fallen   
before (or is the   
patient in the ED as a   
result of a  
fall) has not had a   
fall  
Does the patient have   
an impaired gait does   
not have impaired gait  
Is the patient   
cognitively impaired   
not cognitively   
impaired  
  
  
Interventions:  
Avila Fall   
Interventions: LOW   
INTERVENTIONS:  
*patient oriented to   
surroundings and call   
system,  
* patient/family falls   
education completed  
and documented,  
*patients fall status   
communicated  
during bedside handoff,  
*whiteboard updated,  
*mode of toileting   
discussed with patient,  
*bed in low position   
with brakes locked,  
*call light in reach,  
* non-skid footwear  
  
  
TRAVEL HISTORY  
Travel History  
Coronavirus Screening:   
no exposure or symptoms  
Travel Exposure   
History: NO travel to   
International locations   
in the past 30  
days  
  
  
PAIN  
  
Pain Scale Used: ASHLEY  
Pain Rating (0-10): 10   
= Severe  
  
  
  
  
  
Past Medical History:  
Past Medical History   
Reviewedyes  
  
Appendectomy: Past   
Surgical History,   
Active  
  
  
Electronic Signatures:  
Becky Lee)   
(Signed 09-Mar-2022   
08:33)  
Entered: Risk Screens,   
Pain, Travel History,   
Chart Review, Scores,   
Past  
Medical History  
Authored: Quick Triage,   
Risk Screens, Pain,   
Travel History, Chart   
Review,  
Scores, Past Medical   
History  
  
  
Last Updated:   
09-Mar-2022 08:33 by   
Becky Lee)    Normal                                  [facility]  
   
                                                    UA MICROSCOPICon 2022   
   
                      Mucus Ql (Urine sed) 1+ /LPF    Normal                [facility]  
   
                                        Comment on above:   Performed By: #### U  
AMIC ####  
[place] [address]   
   
                      RBC        2 /HPF     Normal     0-5        [facility]  
   
                                        Comment on above:   Performed By: #### U  
AMIC ####  
[address] [address]   
   
                      SQUAMOUS EPITH. CELLS 1 /HPF     Normal                [facility]  
   
                                        Comment on above:   Performed By: #### U  
AMIC ####  
[address] [address]   
   
                      WBC        2 /HPF     Normal     0-5        [facility]  
   
                                        Comment on above:   Performed By: #### U  
AMIC ####  
[place] [id number]   
   
                                                    URINALYSIS WITH CULTURE IF I  
NDICATEDon 2022   
   
                      Appearance (U) CLEAR      Normal     CLEAR      [facility]  
   
                                        Comment on above:   Performed By: #### U  
AMIC ####  
[address] [address]   
   
                      Bilirubin Ql (U) Negative   Normal     NEGATIVE   [facility]  
   
                                        Comment on above:   Performed By: #### U  
AMIC ####  
[address] [address]   
   
                      Color (U)  Yellow     Normal     STRAW,YELLOW [facility]  
   
                                        Comment on above:   Performed By: #### U  
AMIC ####  
[address] [address]   
   
                      Glucose Ql (U) Negative   Normal     NEGATIVE   [facility]  
   
                                        Comment on above:   Performed By: #### U  
AMIC ####  
[address] [address]   
   
                      Hemoglobin Ql (U) LARGE(3+)  Abnormal   NEGATIVE   [facility]  
   
                                        Comment on above:   Performed By: #### U  
AMIC ####  
[address] [address]   
   
                      Ketones Ql (U) 80(2+)     Abnormal   NEGATIVE   [facility]  
   
                                        Comment on above:   Performed By: #### U  
AMIC ####  
[address] [address]   
   
                                                    Leukocyte esterase Test   
strip Ql (U)    Negative        Normal          NEGATIVE        [facility]  
   
                                        Comment on above:   Performed By: #### U  
AMIC ####  
[address] [address]   
   
                      Nitrite Ql (U) Negative   Normal     NEGATIVE   [facility]  
   
                                        Comment on above:   Performed By: #### U  
AMIC ####  
[address] [address]   
   
                      pH (U)     5.0 [pH]   Normal     5.0 - 8.0  [facility]  
   
                                        Comment on above:   Performed By: #### U  
AMIC ####  
[address] [address]   
   
                      Protein Ql (U) 30(1+)     Abnormal   NEGATIVE   [facility]  
   
                                        Comment on above:   Performed By: #### U  
AMIC ####  
[facility] [address]   
   
                                                    Specific gravity (U)   
[Rel density]       1.023               Normal              1.005 -   
1.035                                   [facility]  
   
                                        Comment on above:   Performed By: #### U  
AMIC ####  
[address] [address]   
   
                                                    Urobilinogen (U)   
[Mass/Vol]      mg/dL           Normal          0.0 - 1.9       [facility]  
   
                                        Comment on above:   Performed By: #### U  
AMI ####  
[address] [address]   
   
                                                    Clinical Event Note-Stroke F  
ollow-Up Call Backon 2022   
   
                                                    Clinical Event   
Note-Stroke Follow-Up   
Call Back                               Clinical Event:  
Clinical Event Note:  
TopicStroke Follow-Up   
Call Back  
Details  
phone # listed is not a   
working number  
  
  
  
Electronic Signatures:  
Penny Vallejo)   
(Signed 2022   
16:09)  
Authored: Clinical   
Event Note  
  
  
Last Updated:   
2022 16:09 by   
Penny Vallejo (ERNA)  Normal                                  [facility]  
   
                                                    CBC AND DIFFERENTIALon    
   
                      Basophils (Bld) [#/Vol] 0.10 10*3/uL Normal     0.00 - 0.1  
0 [facility]  
   
                                        Comment on above:   Performed By: #### C  
BCDF ####  
[place] [id number]   
   
                      Basophils/100 WBC (Bld) 0.9 %      Normal     0.0 - 2.0  S  
[facility]  
   
                                        Comment on above:   Performed By: #### C  
BCDF ####  
[address] [address]   
   
                                                    Eosinophils (Bld)   
[#/Vol]         0.20 10*3/uL    Normal          0.00 - 0.70     [facility]  
   
                                        Comment on above:   Performed By: #### C  
BCDF ####  
[address] [address]   
   
                                                    Eosinophils/100 WBC   
(Bld)           2.0 %           Normal          0.0 - 6.0       [facility]  
   
                                        Comment on above:   Performed By: #### C  
BCDF ####  
[address] [address]   
   
                                                    Erythrocyte   
distribution width   
(RBC) [Ratio]   15.0 %          High            11.5 - 14.5     [facility]  
   
                                        Comment on above:   Performed By: #### C  
BCDF ####  
[address] [address]   
   
                                                    Hematocrit (Bld)   
[Volume fraction] 39.6 %          Normal          36.0 - 46.0     [facility]  
   
                                        Comment on above:   Performed By: #### C  
BCDF ####  
[address] [address]   
   
                                                    Hemoglobin (Bld)   
[Mass/Vol]      13.2 g/dL       Normal          12.0 - 16.0     [facility]  
   
                                        Comment on above:   Performed By: #### C  
BCDF ####  
[address] [address]   
   
                                                    Lymphocytes (Bld)   
[#/Vol]         2.60 10*3/uL    Normal          1.20 - 4.80     [facility]  
   
                                        Comment on above:   Performed By: #### C  
BCDF ####  
[address] [address]   
   
                                                    Lymphocytes/100 WBC   
(Bld)           29.4 %          Normal          13.0 - 44.0     [facility]  
   
                                        Comment on above:   Performed By: #### C  
BCDF ####  
[address] [address]   
   
                      MCHC (RBC) [Mass/Vol] 33.2 g/dL  Normal     32.0 - 36.0 [facility]  
   
                                        Comment on above:   Performed By: #### C  
BCDF ####  
[address] [address]   
   
                      MCV (RBC) [Entitic vol] 86 fL      Normal     80 - 100   S  
[facility]  
   
                                        Comment on above:   Performed By: #### C  
BCDF ####  
[address] [address]   
   
                      Monocytes (Bld) [#/Vol] 1.10 10*3/uL High       0.10 - 1.0  
0 [facility]  
   
                                        Comment on above:   Performed By: #### C  
BCDF ####  
[address] [address]   
   
                      Monocytes/100 WBC (Bld) 12.6 %     Normal     2.0 - 10.0 S  
[facility]  
   
                                        Comment on above:   Performed By: #### C  
BCDF ####  
[address] [address]   
   
                                                    Neutrophils (Bld)   
[#/Vol]         4.80 10*3/uL    Normal          1.20 - 7.70     [facility]  
   
                                        Comment on above:   Result Comment: Perc  
ent differential counts (%) should be   
interpreted in the  
context of the absolute cell counts (cells/L).   
   
                                                            Performed By: #### C  
BCDF ####  
[address] [address]   
   
                                                    Neutrophils/100 WBC   
(Bld)           55.1 %          Normal          40.0 - 80.0     [facility]  
   
                                        Comment on above:   Performed By: #### C  
BCDF ####  
[address] [address]   
   
                      NUCLEATED RBC 0.1 /100 WBC Normal                [facility]  
   
                                        Comment on above:   Performed By: #### C  
BCDF ####  
[address] [address]   
   
                      Platelets (Bld) [#/Vol] 298 10*3/uL Normal     150 - 450    
[facility]  
   
                                        Comment on above:   Performed By: #### C  
BCDF ####  
[address] [address]   
   
                      RBC        4.59 x10E12/L Normal     4.00 - 5.20 [facility]  
   
                                        Comment on above:   Performed By: #### C  
BCDF ####  
[address] [address]   
   
                      WBC (Bld) [#/Vol] 8.7 10*3/uL Normal     4.4 - 11.3 [facility]  
   
                                        Comment on above:   Performed By: #### C  
BCDF ####  
[address] [address]   
   
                                                    CHEST 1 VIEWon 2022   
   
                                        CHEST 1 VIEW        MRN: [id number]  
Patient Name:   
DANNA SOARES  
  
STUDY:  
NR CT BRAIN ATTACK   
ANGIO HEAD W CONTRAST   
AND POST PROC; NR CT   
BRAIN  
ATTACK ANGIO NECK W   
CONTRAST AND POST PROC;   
CHEST 1 VIEW; 2022  
7:15 pm; 2022 7:03   
pm  
  
INDICATION:  
syncoep and L sided   
weakness ; syncope and   
L sided weakness ;   
syncope  
.  
  
Concern for new onset   
left-sided weakness.  
  
COMPARISON:  
Same day CT head  
  
ACCESSION NUMBER(S):  
[id number]; [id number];   
[id number]  
  
ORDERING CLINICIAN:  
PRISCILLA ZEPEDA  
  
TECHNIQUE:  
Unenhanced CT images of   
the head were obtained.   
Subsequently, 68 mL  
Omnipaque 350 was   
administered   
intravenously and axial   
images of the  
head and neck were   
acquired. Coronal,   
sagittal, and 3-D  
reconstructions were   
provided for review.   
Modified NASCET   
criteria  
were utilized for the   
evaluation of carotid   
stenoses.  
  
FINDINGS:  
  
  
CTA SSTSYY-FC-FGMPMS:  
  
Anterior circulation:   
The bilateral   
intracranial internal   
carotid  
arteries, carotid   
terminals, and proximal   
anterior and middle  
cerebral arteries are   
normal in caliber   
without significant  
narrowing, large vessel   
occlusion, or aneurysm.  
  
Posterior circulation:   
The bilateral   
intracranial vertebral   
arteries,  
vertebrobasilar   
junction, basilar   
artery, and proximal   
posterior  
cerebral arteries are   
normal in caliber   
without significant  
narrowing, large vessel   
occlusion, or aneurysm.  
  
  
CTA NECK:  
  
There is a normal   
three-vessel branch   
pattern of the aortic   
arch.  
  
Right carotid: The   
right common carotid   
artery, carotid   
bifurcation,  
and cervical internal   
carotid artery are   
normal in caliber   
without  
significant narrowing,   
dissection, or   
aneurysm. There is 0%   
stenosis  
by NASCET criteria.  
  
Left carotid: The left   
common carotid artery,   
carotid bifurcation,  
and cervical internal   
carotid artery are   
normal in caliber   
without  
significant narrowing,   
dissection, or   
aneurysm. There is 0%   
stenosis  
by NASCET criteria.  
  
Vertebral arteries: The   
cervical vertebral   
arteries are normal in  
caliber bilaterally   
without significant   
narrowing, dissection,   
or  
aneurysm.  
  
IMPRESSION:  
1. No hemodynamically   
significant stenosis of   
the anterior or  
posterior circulation   
of the brain.  
2. Normal CT angiogram   
of the neck.  
  
  
Electronically signed   
by: NUNO GANN MD  Normal                                  [facility]  
   
                                                    COMPREHENSIVE PANELon 2022   
   
                      Albumin [Mass/Vol] 4.2 g/dL   Normal     3.4 - 5.0  [facility]  
   
                                        Comment on above:   Performed By: #### U  
AMIC ####  
[address] [address]   
   
                                                    ALP [Catalytic   
activity/Vol]   42 U/L          Normal          33 - 110        [facility]  
   
                                        Comment on above:   Performed By: #### U  
AMIC ####  
[address] [place] [address]   
   
                                                    ALT [Catalytic   
activity/Vol]   17 U/L          Normal          7 - 45          [facility]  
   
                                        Comment on above:   Result Comment: Kaleigh  
ents treated with Sulfasalazine may   
generate  
falsely decreased results for ALT.   
   
                                                            Performed By: #### U  
AMIC ####  
[address] [address]   
   
                      Anion gap [Moles/Vol] 18 mmol/L  Normal     10 - 20    [facility]  
   
                                        Comment on above:   Performed By: #### U  
AMIC ####  
[address] [address]   
   
                                                    AST [Catalytic   
activity/Vol]   19 U/L          Normal          9 - 39          [facility]  
   
                                        Comment on above:   Performed By: #### U  
AMIC ####  
[address] [address]   
   
                      Bilirubin [Mass/Vol] 0.6 mg/dL  Normal     0.0 - 1.2  [facility]  
   
                                        Comment on above:   Performed By: #### U  
AMIC ####  
[address] [address]   
   
                      Calcium [Mass/Vol] 9.0 mg/dL  Normal     8.6 - 10.3 [facility]  
   
                                        Comment on above:   Performed By: #### U  
AMIC ####  
[address] [address]   
   
                      Chloride [Moles/Vol] 104 mmol/L Normal     98 - 107   [facility]  
   
                                        Comment on above:   Performed By: #### U  
AMIC ####  
[address] [address]   
   
                      Creatinine [Mass/Vol] 0.52 mg/dL Normal     0.50 - 1.05 [facility]  
   
                                        Comment on above:   Performed By: #### U  
AMIC ####  
[address] [address]   
   
                      eGFR FEMALE >90        Normal     >90        [facility]  
   
                                        Comment on above:   Result Comment: CALC  
ULATIONS OF ESTIMATED GFR ARE PERFORMED  
USING THE  CKD-EPI STUDY REFIT EQUATION  
WITHOUT THE RACE VARIABLE FOR THE IDMS-TRACEABLE  
CREATININE METHODS.  
https://jasn.asnjournals.org/content/early/ASN.2021  
557832   
   
                                                            Performed By: #### U  
AMIC ####  
[address] [address]   
   
                      Glucose [Mass/Vol] 99 mg/dL   Normal     74 - 99    [facility]  
   
                                        Comment on above:   Performed By: #### U  
AMIC ####  
[address] [address]   
   
                      HCO3 (Bld) [Moles/Vol] 20 mmol/L  Low        21 - 32    [facility]  
   
                                        Comment on above:   Performed By: #### U  
AMIC ####  
[address] [address]   
   
                      Potassium [Moles/Vol] 3.2 mmol/L Low        3.5 - 5.3  [facility]  
   
                                        Comment on above:   Performed By: #### U  
AMIC ####  
[address] [address]   
   
                      Protein [Mass/Vol] 6.4 g/dL   Normal     6.4 - 8.2  [facility]  
   
                                        Comment on above:   Performed By: #### U  
AMIC ####  
[address] [address]   
   
                      Sodium [Moles/Vol] 139 mmol/L Normal     136 - 145  [facility]  
   
                                        Comment on above:   Performed By: #### U  
AMIC ####  
[address] [address]   
   
                                                    Urea nitrogen   
[Mass/Vol]      5 mg/dL         Low             6 - 23          [facility]  
   
                                        Comment on above:   Performed By: #### U  
AMIC ####  
[address] [address]   
   
                                                    CT BRAIN ATTACK HEAD WO CONT  
RASTon 2022   
   
                                                    CT BRAIN ATTACK HEAD WO   
CONTRAST                                Addendum Begins  
MRN: [id number]  
Patient Name:   
DANNA SOARES  
  
ADDENDUM:  
Negative results   
verbally confirmed with   
Priscilla Zepeda by RAD OP   
team  
member Abel Greer.  
Electronically signed   
by: NUNO GANN MD   
Addendum Ends  
MRN: [id number]  
Patient Name:   
DANNA SOARES  
  
STUDY:  
NR CT BRAIN ATTACK HEAD   
WO CONTRAST; 2022   
6:09 pm  
  
INDICATION:  
L sided weakness  
  
COMPARISON:  
Prior CT head   
2022.  
  
ACCESSION NUMBER(S):  
[id number]  
  
ORDERING CLINICIAN:  
PRISCILLA ZEPEDA  
  
TECHNIQUE:  
Multiple contiguous   
noncontrast axial CT   
images of the brain   
were  
obtained from the skull   
base to the vertex   
utilizing 5 mm slice  
thickness. Coronal   
reformatted images were   
also obtained.  
  
FINDINGS:  
  
  
VENTRICLES AND CSF   
SPACES:  
Age-appropriate size   
and configuration of   
the ventricles and   
sulci.  
  
Brain parenchyma:  
Gray-white matter   
interface is   
well-maintained. No   
masses are seen.  
No midline shift or   
mass effect.  
  
HEMORRHAGE:  
None.  
  
CALVARIUM:  
No destructive lesion   
or depressed calvarial   
fracture. The  
extracranial soft   
tissues are grossly   
unremarkable.  
  
ADDITIONAL FINDINGS:  
Visualized paranasal   
sinuses and bilateral   
mastoids are grossly   
clear.  
  
IMPRESSION:  
No acute intracranial   
abnormality.  
  
Electronically signed   
by: NUNO GANN MD  [facility]  
   
                                                    CT C-SPINE WO CONTRASTon    
   
                                        CT C-SPINE WO CONTRAST STUDY:  
CT Maxillofacial Bones   
without IV Contrast; CT   
Cervical Spine without  
IV contrast; 2022   
5:25 PM  
  
INDICATION:  
Right eye, right jaw,   
and right neck pain.   
Left arm and leg   
weakness.  
Syncope and   
collapse/fall. Found on   
floor. Vomiting   
yesterday.  
  
COMPARISON:  
2022 CT Head.  
  
ACCESSION NUMBER(S):  
[id number], [id number]  
  
ORDERING CLINICIAN:  
PRISCILLA ZEPEDA CNP  
  
TECHNIQUE: CT of the   
maxillofacial bones was   
performed without  
contrast. CT of the   
cervical spine was   
performed without   
intravenous  
or intrathecal   
contrast. Sagittal and   
coronal reconstructions   
were  
generated.  
  
  
Automated mA/kV   
exposure control was   
utilized and patient   
examination  
was performed in strict   
accordance with   
principles of ALARA.  
  
FINDINGS:  
  
CT MAXILLOFACIAL BONES:  
There are no facial   
bone fractures. Mild   
leftward directed nasal  
septal deviation  
  
Scant opacification in   
the ethmoid air cells.   
There are no air-fluid  
levels.  
  
Globes, orbits, and   
extraocular muscles are   
intact. No retrobulbar  
hematoma is   
demonstrated. The   
temporomandibular   
joints are  
unremarkable. The   
visualized mastoid air   
cells are clear.  
  
CT CERVICAL SPINE:  
The alignment is   
anatomic. There is no   
fracture or traumatic  
subluxation.  
  
The vertebral body   
heights are well   
maintained. Disc spaces   
are  
preserved. No   
significant central   
canal stenosis is   
demonstrated.  
The neural foramina are   
patent throughout.  
  
The paravertebral soft   
tissues are within   
normal limits. The  
visualized upper chest   
is unremarkable.  
  
IMPRESSION:  
  
  
1. No evidence of   
facial bone fracture.  
  
2. Scattered   
opacifications in the   
ethmoid air cells.  
  
3. Chronic appearing   
leftward directed nasal   
septal deviation.  
  
4. No acute vertebral   
body fracture or   
subluxation.  
  
  
Signed by Nguyễn Vences MD  
Electronically signed   
by: NGUYỄN SIERRA MD     Normal                                  [facility]  
   
                                                    CT FACIAL BONES W/O CONTRAST  
on 2022   
   
                                                    CT FACIAL BONES W/O   
CONTRAST                                STUDY:  
CT Maxillofacial Bones   
without IV Contrast; CT   
Cervical Spine without  
IV contrast; 2022   
5:25 PM  
  
INDICATION:  
Right eye, right jaw,   
and right neck pain.   
Left arm and leg   
weakness.  
Syncope and   
collapse/fall. Found on   
floor. Vomiting   
yesterday.  
  
COMPARISON:  
2022 CT Head.  
  
ACCESSION NUMBER(S):  
[id number], [id number]  
  
ORDERING CLINICIAN:  
PRISCILLA ZEPEDA CNP  
  
TECHNIQUE: CT of the   
maxillofacial bones was   
performed without  
contrast. CT of the   
cervical spine was   
performed without   
intravenous  
or intrathecal   
contrast. Sagittal and   
coronal reconstructions   
were  
generated.  
  
  
Automated mA/kV   
exposure control was   
utilized and patient   
examination  
was performed in strict   
accordance with   
principles of ALARA.  
  
FINDINGS:  
  
CT MAXILLOFACIAL BONES:  
There are no facial   
bone fractures. Mild   
leftward directed nasal  
septal deviation  
  
Scant opacification in   
the ethmoid air cells.   
There are no air-fluid  
levels.  
  
Globes, orbits, and   
extraocular muscles are   
intact. No retrobulbar  
hematoma is   
demonstrated. The   
temporomandibular   
joints are  
unremarkable. The   
visualized mastoid air   
cells are clear.  
  
CT CERVICAL SPINE:  
The alignment is   
anatomic. There is no   
fracture or traumatic  
subluxation.  
  
The vertebral body   
heights are well   
maintained. Disc spaces   
are  
preserved. No   
significant central   
canal stenosis is   
demonstrated.  
The neural foramina are   
patent throughout.  
  
The paravertebral soft   
tissues are within   
normal limits. The  
visualized upper chest   
is unremarkable.  
  
IMPRESSION:  
  
  
1. No evidence of   
facial bone fracture.  
  
2. Scattered   
opacifications in the   
ethmoid air cells.  
  
3. Chronic appearing   
leftward directed nasal   
septal deviation.  
  
4. No acute vertebral   
body fracture or   
subluxation.  
  
  
Signed by Nguyễn Vences MD  
Electronically signed   
by: NGUYỄN SIERRA MD     Normal                                  [facility]  
   
                                                    DRUG SCREEN,URINEon 20   
   
                      AMPHETAMINE SCREEN,U Canceled   Normal                [facility]  
   
                                        Comment on above:   Order Comment: TEST   
DRUG SCREEN,URINE WAS CANCELLED,   
2022 20:45 PATIENT DISCHARGED.   
   
                                                            Result Comment: CUTO  
FF LEVEL: 500 NG/ML  
Cross-reactivity has been reported with high concentrations  
of the following drugs: buproprion, chloroquine,   
chlorpromazine,  
ephedrine, mephentermine, fenfluramine, phentermine,  
phenylpropanolamine, pseudoephedrine, and propranolol.   
   
                                                            Performed By: #### H  
EPFP ####  
[place] [address]   
   
                      BARBITURATES SCREEN,U Canceled   Normal                [facility]  
   
                                        Comment on above:   Order Comment: TEST   
DRUG SCREEN,URINE WAS CANCELLED,   
2022 20:45 PATIENT DISCHARGED.   
   
                                                            Result Comment: CUTO  
FF LEVEL: 200 NG/ML   
   
                                                            Performed By: #### H  
EPFP ####  
[place] [address]   
   
                                                    BENZODIAZEPINES   
SCREEN,U        Canceled        [facility]  
   
                                        Comment on above:   Order Comment: TEST   
DRUG SCREEN,URINE WAS CANCELLED,   
2022 20:45 PATIENT DISCHARGED.   
   
                                                            Result Comment: CUTO  
FF LEVEL: 200 NG/ML   
   
                                                            Performed By: #### H  
EPFP ####  
[place] [address]   
   
                      CANNABINOIDS SCREEN,U Canceled   [facility]  
   
                                        Comment on above:   Order Comment: TEST   
DRUG SCREEN,URINE WAS CANCELLED,   
2022 20:45 PATIENT DISCHARGED.   
   
                                                            Result Comment: CUTO  
FF LEVEL: 50 NG/ML   
   
                                                            Performed By: #### H  
EPFP ####  
[place] [address]   
   
                                                    COCAINE METABOLITE   
SCREEN,U        Canceled        [facility]  
   
                                        Comment on above:   Order Comment: TEST   
DRUG SCREEN,URINE WAS CANCELLED,   
2022 20:45 PATIENT DISCHARGED.   
   
                                                            Result Comment: CUTO  
FF LEVEL: 150 NG/ML   
   
                                                            Performed By: #### H  
EPFP ####  
[place] [address]   
   
                      DRUG SCREEN COMMENT Canceled   [facility]  
   
                                        Comment on above:   Order Comment: TEST   
DRUG SCREEN,URINE WAS CANCELLED,   
2022 20:45 PATIENT DISCHARGED.   
   
                                                            Result Comment: Drug  
 screen results are presumptive and should   
not be used to assess  
compliance with prescribed medication. Contact the performing   
[facility]  
laboratory to add-on definitive confirmatory testing if   
clinically  
indicated.  
.  
Toxicology screening results are reported qualitatively. The   
concentration  
must be greater than or equal to the cutoff to be reported as   
positive. The  
concentration at which the screening test can detect an   
individual drug or  
metabolite varies. The absence of expected drug(s) and/or drug   
metabolite(s)  
may indicate non-compliance, inappropriate timing of specimen   
collection  
relative to drug administration, poor drug absorption,   
diluted/adulterated  
urine, or limitations of testing. For medical purposes only;   
not valid for  
forensic use.  
.  
Interpretive questions should be directed to the laboratory   
medical  
directors.   
   
                                                            Performed By: #### H  
EPFP ####  
[place] [address]   
   
                      FENTANYL SCREEN,URINE Canceled   [facility]  
   
                                        Comment on above:   Order Comment: TEST   
DRUG SCREEN,URINE WAS CANCELLED,   
2022 20:45 PATIENT DISCHARGED.   
   
                                                            Result Comment: CUTO  
FF LEVEL: 1 NG/ML  
The performance characteristics of this  
test have been determined by the individual  
 laboratory site where testing is performed.  
This test has not been cleared or approved  
by the FDA; however, the FDA has determined  
that such clearance is not necessary.   
   
                                                            Performed By: #### H  
EPFP ####  
[place] [address]   
   
                      METHADONE SCREEN,U Canceled   [facility]  
   
                                        Comment on above:   Order Comment: TEST   
DRUG SCREEN,URINE WAS CANCELLED,   
2022 20:45 PATIENT DISCHARGED.   
   
                                                            Result Comment: CUTO  
FF LEVEL: 150 NG/ML  
The metabolite L-alpha-acetylmethadol (LAAM) is not  
detected by this method in concentrations that would  
be found in the urine of patients on LAAM therapy.   
   
                                                            Performed By: #### H  
EPFP ####  
[place] [id number]   
   
                      OPIATES SCREEN,U Canceled   Normal                [facility]  
   
                                        Comment on above:   Order Comment: TEST   
DRUG SCREEN,URINE WAS CANCELLED,   
2022 20:45 PATIENT DISCHARGED.   
   
                                                            Result Comment: CUTO  
FF LEVEL: 300 NG/ML  
The opiate screen does not detect fentanyl, meperidine, or  
tramadol. Oxycodone is not consistently detected (refer to  
Oxycodone Screen, Urine result).   
   
                                                            Performed By: #### H  
EPFP ####  
[address] [address]   
   
                      OXYCODONE SCREEN,U Canceled   Normal                [facility]  
   
                                        Comment on above:   Order Comment: TEST   
DRUG SCREEN,URINE WAS CANCELLED,   
2022 20:45 PATIENT DISCHARGED.   
   
                                                            Result Comment: CUTO  
FF LEVEL: 100 NG/ML  
This test will accurately detect both oxycodone and   
oxymorphone.   
   
                                                            Performed By: #### H  
EPFP ####  
[place] [id number]   
   
                      PCP SCREEN,U Canceled   Normal                [facility]  
   
                                        Comment on above:   Order Comment: TEST   
DRUG SCREEN,URINE WAS CANCELLED,   
2022 20:45 PATIENT DISCHARGED.   
   
                                                            Result Comment: CUTO  
FF LEVEL: 25 NG/ML  
Cross-reactivity has been reported with dextromethorphan.   
   
                                                            Performed By: #### H  
EPFP ####  
[place] [id number]   
   
                                                    GLUCOSE-POCTon 2022   
   
                      Glucose [Mass/Vol] 87 mg/dL   Normal     74 - 99    [facility]  
   
                                        Comment on above:   Performed By: #### H  
EPFP ####  
[address] [address]   
   
                                                    HCG,SERUM QUALITATIVEon    
   
                      HCG,SERUM QUALITATIVE Negative   Normal     Negative   [facility]  
   
                                        Comment on above:   Performed By: #### H  
EPFP ####  
[place] [id number]   
   
                                                    NR CT BRAIN ATTACK ANGIO HEA  
D W CONTRAST AND POST PROCon 2022   
   
                                                    NR CT BRAIN ATTACK   
ANGIO HEAD W CONTRAST   
AND POST PROC                           MRN: [id number]  
Patient Name:   
DANNA SOARES  
  
STUDY:  
NR CT BRAIN ATTACK   
ANGIO HEAD W CONTRAST   
AND POST PROC; NR CT   
BRAIN  
ATTACK ANGIO NECK W   
CONTRAST AND POST PROC;   
CHEST 1 VIEW; 2022  
7:15 pm; 2022 7:03   
pm  
  
INDICATION:  
syncoep and L sided   
weakness ; syncope and   
L sided weakness ;   
syncope  
.  
  
Concern for new onset   
left-sided weakness.  
  
COMPARISON:  
Same day CT head  
  
ACCESSION NUMBER(S):  
[id number]; [id number];   
[id number]  
  
ORDERING CLINICIAN:  
PRISCILLA ZEPEDA  
  
TECHNIQUE:  
Unenhanced CT images of   
the head were obtained.   
Subsequently, 68 mL  
Omnipaque 350 was   
administered   
intravenously and axial   
images of the  
head and neck were   
acquired. Coronal,   
sagittal, and 3-D  
reconstructions were   
provided for review.   
Modified NASCET   
criteria  
were utilized for the   
evaluation of carotid   
stenoses.  
  
FINDINGS:  
  
  
CTA [id number]:  
  
Anterior circulation:   
The bilateral   
intracranial internal   
carotid  
arteries, carotid   
terminals, and proximal   
anterior and middle  
cerebral arteries are   
normal in caliber   
without significant  
narrowing, large vessel   
occlusion, or aneurysm.  
  
Posterior circulation:   
The bilateral   
intracranial vertebral   
arteries,  
vertebrobasilar   
junction, basilar   
artery, and proximal   
posterior  
cerebral arteries are   
normal in caliber   
without significant  
narrowing, large vessel   
occlusion, or aneurysm.  
  
  
CTA NECK:  
  
There is a normal   
three-vessel branch   
pattern of the aortic   
arch.  
  
Right carotid: The   
right common carotid   
artery, carotid   
bifurcation,  
and cervical internal   
carotid artery are   
normal in caliber   
without  
significant narrowing,   
dissection, or   
aneurysm. There is 0%   
stenosis  
by NASCET criteria.  
  
Left carotid: The left   
common carotid artery,   
carotid bifurcation,  
and cervical internal   
carotid artery are   
normal in caliber   
without  
significant narrowing,   
dissection, or   
aneurysm. There is 0%   
stenosis  
by NASCET criteria.  
  
Vertebral arteries: The   
cervical vertebral   
arteries are normal in  
caliber bilaterally   
without significant   
narrowing, dissection,   
or  
aneurysm.  
  
IMPRESSION:  
1. No hemodynamically   
significant stenosis of   
the anterior or  
posterior circulation   
of the brain.  
2. Normal CT angiogram   
of the neck.  
  
  
Electronically signed   
by: NUNO GANN MD  [facility]  
   
                                                    NR CT BRAIN ATTACK ANGIO NEC  
K W CONTRAST AND POST PROCon 2022   
   
                                                    NR CT BRAIN ATTACK   
ANGIO NECK W CONTRAST   
AND POST PROC                           MRN: [id number]  
Patient Name:   
DANNA SOARES  
  
STUDY:  
NR CT BRAIN ATTACK   
ANGIO HEAD W CONTRAST   
AND POST PROC; NR CT   
BRAIN  
ATTACK ANGIO NECK W   
CONTRAST AND POST PROC;   
CHEST 1 VIEW; 2022  
7:15 pm; 2022 7:03   
pm  
  
INDICATION:  
syncoep and L sided   
weakness ; syncope and   
L sided weakness ;   
syncope  
.  
  
Concern for new onset   
left-sided weakness.  
  
COMPARISON:  
Same day CT head  
  
ACCESSION NUMBER(S):  
[id number]; [id number];   
[id number]  
  
ORDERING CLINICIAN:  
PRISCILLA ZEPEDA  
  
TECHNIQUE:  
Unenhanced CT images of   
the head were obtained.   
Subsequently, 68 mL  
Omnipaque 350 was   
administered   
intravenously and axial   
images of the  
head and neck were   
acquired. Coronal,   
sagittal, and 3-D  
reconstructions were   
provided for review.   
Modified NASCET   
criteria  
were utilized for the   
evaluation of carotid   
stenoses.  
  
FINDINGS:  
  
  
CTA [id number]:  
  
Anterior circulation:   
The bilateral   
intracranial internal   
carotid  
arteries, carotid   
terminals, and proximal   
anterior and middle  
cerebral arteries are   
normal in caliber   
without significant  
narrowing, large vessel   
occlusion, or aneurysm.  
  
Posterior circulation:   
The bilateral   
intracranial vertebral   
arteries,  
vertebrobasilar   
junction, basilar   
artery, and proximal   
posterior  
cerebral arteries are   
normal in caliber   
without significant  
narrowing, large vessel   
occlusion, or aneurysm.  
  
  
CTA NECK:  
  
There is a normal   
three-vessel branch   
pattern of the aortic   
arch.  
  
Right carotid: The   
right common carotid   
artery, carotid   
bifurcation,  
and cervical internal   
carotid artery are   
normal in caliber   
without  
significant narrowing,   
dissection, or   
aneurysm. There is 0%   
stenosis  
by NASCET criteria.  
  
Left carotid: The left   
common carotid artery,   
carotid bifurcation,  
and cervical internal   
carotid artery are   
normal in caliber   
without  
significant narrowing,   
dissection, or   
aneurysm. There is 0%   
stenosis  
by NASCET criteria.  
  
Vertebral arteries: The   
cervical vertebral   
arteries are normal in  
caliber bilaterally   
without significant   
narrowing, dissection,   
or  
aneurysm.  
  
IMPRESSION:  
1. No hemodynamically   
significant stenosis of   
the anterior or  
posterior circulation   
of the brain.  
2. Normal CT angiogram   
of the neck.  
  
  
Electronically signed   
by: NUNO GANN MD  Normal                                  [facility]  
   
                                                    PT/INRon 2022   
   
                      PT Coag (PPP) [Time] 15.2 s     High       9.8 - 13.4 [facility]  
   
                                        Comment on above:   Result Comment: Note  
 new reference range as of 2021 at   
10:00am.   
   
                                                            Performed By: #### P  
TINR ####  
[address] [address]   
   
                      PT, INR    1.3        High       0.9 - 1.1  [facility]  
   
                                        Comment on above:   Performed By: #### P  
TINR ####  
[address] [address]   
   
                                                    Provider Note - ED v3on    
   
                                        Provider Note - ED v3 Provider Note:  
Chart Review:  
ED NOTES  
ED NOTES:  
HPI:  
Admitted to the   
hospital here for 5   
days and discharged   
today for vomiting.  
Apparently patient had   
gone to the bathroom   
had a syncopal episode   
and fell  
forward hitting her   
head on the ground. She   
complains of pain to   
the right jaw  
and around the right   
orbital bones. Patient   
also states that since   
her  
syncopal episode her   
left side feels weak.   
She also complains of   
some  
left-sided chest pain.   
She does note that her   
vomiting is better.  
  
  
ROS:  
All systems are   
negative other than as   
noted in HPI.  
  
  
Physical Exam  
  
I have reviewed the   
triage vital signs.  
Const: Well nourished,   
well developed, appears   
stated age, no acute   
distress  
Eyes: PERRL, EOM   
intact, no conjunctival   
injection, vision   
grossly normal  
HENT: Neck supple   
without meningismus ,   
diffuse cervical   
vertebral tenderness,  
moist mucous membranes,   
no pharyengeal swelling   
or exudate  
CV: Regular rate and   
rhythm, Warm,   
well-perfused   
extremities. Chest non   
tender  
RESP: Lungs clear   
bilaterally, Unlabored   
respiratory effort  
GI: soft, non-tender,   
non-distended, no   
masses  
:  
MSK: No gross   
deformities   
appreciated, tenderness   
over the right orbital   
bones.  
Back: Non tender, no   
pain with ROM  
Skin: Warm, dry. No   
rashes  
Neuro: Alert and   
oriented x4, GCS 15 ,   
patient has difficulty   
lifting left leg  
off the bed patient has   
decreased push and   
pulls with left foot.   
Left arm  
movement is weak..  
Psych: Appropriate mood   
and affect.  
  
I have reviewed and   
confirmed nurses/medics   
notes for patient past,   
social  
and family history.  
  
  
Portions of this note   
were dictated by speech   
recognition. An attempt   
at proof  
reading was made to   
minimize errors. Minor   
errors in transcription   
may be  
present.  
  
  
HISTORY OF PRESENTING   
ILLNESS  
DANNA is a 21 year old   
Female and was seen by   
me at 2022 17:27   
for a  
chief complaint of   
syncope (to er per   
justin ems with c/o   
after going to the  
br, she had a syncopal   
episode. family found   
her on the floor. pt   
c/o right  
eye, right jaw and neck   
pain. c/o left arm and   
leg weakness after the   
syncopal  
episode. pt is alert   
and oriented now. pt   
was just in the   
hospital for vomiting  
and d/c yesterday.) .  
  
Triage Information:   
Most recent Vital Sign   
Value Date  
Temp (F): 98.8   
2022 17:34  
Temp (C): 37.1   
2022 17:34  
Heart Rate (beats/min):   
99 2022 17:34  
Respirations   
(breaths/min): 18   
2022 17:34  
SpO2 (%): 99 2022   
17:34  
BP Systolic (mm Hg):   
123 2022 17:34  
BP Diastolic (mm Hg):   
83 2022 17:34  
  
  
  
  
PAST MEDICAL HISTORY  
ALLERGIES/INTOLERANCES:   
Allergy  
Allergen: MiraLax  
Type: Drug  
Reaction:   
Hives/Urticaria  
  
Allergen: codeine  
Type: Drug  
Reaction: Unknown  
  
HEALTH HISTORY: No   
documented data.  
  
OUTPATIENT MEDICATIONS:   
Home Medications Review   
Status for   
Reconciliation: N/A  
Med Status: Incomplete   
Medication History  
  
Drug Name: Protonix 40   
mg oral delayed release   
tablet  
Instructions: 1 tab(s)   
orally 2 times a day  
  
Drug Name: Zofran 8 mg   
oral tablet  
Instructions: 1 tab(s)   
orally 3 times, As   
Needed -for nausea and   
vomiting  
  
Drug Name: sucralfate 1   
g oral tablet  
Instructions: 1 tab(s)   
orally 4 times a day   
-.Meds to Beds - 4   
Times a Day  
Before Meals  
  
SIGNIFICANT EVENTS:   
Past Medical History  
Description:premature  
  
  
Description:delayed   
bone growth  
  
  
Description:Miscarriage  
  
  
Description:scoliosis  
  
  
  
CRITICAL CARE  
RESULTS:  
Recent Lab Results:  
  
I have reviewed these   
laboratory results:  
  
Complete Blood Count +   
Differential   
2022 17:45:00  
  
ResultValue  
White Blood Cell Count   
8.7  
Nucleated Erythrocyte   
Count 0.1  
Red Blood Cell Count   
4.59  
HGB 13.2  
HCT 39.6  
MCV 86  
MCHC 33.2  
  
RDW-CV 15.0 H  
Neutrophil % 55.1  
Lymphocyte % 29.4  
Monocyte % 12.6  
Eosinophil % 2.0  
Basophil % 0.9  
Neutrophil Count 4.80  
Lymphocyte Count 2.60  
Monocyte Count 1.10 H  
Eosinophil Count 0.20  
Basophil Count 0.10  
  
Comprehensive Metabolic   
Panel 2022   
17:45:00  
  
ResultValue  
Glucose, Serum 99  
  
K 3.2 L  
  
Bicarbonate, Serum 20 L  
Anion Gap, Serum 18  
BUN 5 L  
CREAT 0.52  
GFR Female >90  
Calcium, Serum 9.0  
ALB 4.2  
ALKP 42  
T Pro 6.4  
T Bili 0.6  
Alanine   
Aminotransferase, Serum   
17  
Aspartate Transaminase,   
Serum 19  
  
PT + INR, Plasma   
2022 17:45:00  
  
ResultValue  
Prothrombin Time,   
Plasma 15.2 H  
International   
Normalized Ratio,   
Plasma 1.3 H  
  
RBC Morphology   
2022 17:45:00  
  
ResultValue  
Red Blood Cell   
Morphology SEE BELOW  
Ovalocytes FEW  
  
Troponin I, Serum   
2022 17:45:00  
  
ResultValue  
Troponin I, Serum <0.02  
  
HCG, Serum 2022   
17:45:00  
  
ResultValue  
HCG, Serum NEGATIVE  
  
Glucose_POCT   
2022 17:42:00  
  
ResultValue  
Glucose-POCT 87  
  
Radiology Results:  
  
Impression:  
  
1. No hemodynamically   
significant stenosis of   
the anterior or  
posterior circulation   
of the brain.  
2. Normal CT angiogram   
of the neck.  
  
(more content not   
included)...        Normal                                  [facility]  
   
                                                    RED CELL MORPHOLOGYon 2022   
   
                      OVALOCYTES FEW        Normal                [facility]  
   
                                        Comment on above:   Performed By: #### C  
BCDF ####  
[place] [address]   
   
                                                    RBC morphology finding   
Nom (Bld)       SEE BELOW       Normal                          [facility]  
   
                                        Comment on above:   Performed By: #### C  
BCDF ####  
[place] [id number]   
   
                                                    Risk Screen - Adult Emergenc  
yon 2022   
   
                                                    Risk Screen - Adult   
Emergency                               Preferred Language:  
Preferred Language:  
Preferred Language for   
Discussing Health Care   
(patient/designee)Charleen patel  
  
Advanced Directives:  
Advance Directive/DNRno  
  
Family Violence Adult:  
Abuse Screen:  
Are you or have you   
been threatened or   
abused physically,   
emotionally, or  
sexually by anyoneno  
  
Learning Assessment   
(Patient):  
Learning Assessment   
(Patient):  
Patient is Able to be   
Assessed for   
Learningyes  
Factors Influencing   
Readiness to Learnn/a  
Factors that Impact   
Ability to Learnnone  
Devices/Methods Used to   
Communicatenone  
Learning   
Preferencesverbal   
instruction; written   
material  
Cultural   
Considerationsnone  
Developmental   
Considerationsnone  
Congregation   
Considerationsnone  
  
Learning Assessment   
(Other Learner):  
Learning Assessment   
(Other Learner):  
Other learner   
availableno  
  
Pressure   
Injury/TB/Substance:  
Pressure Injury:  
Do you have a coughno  
Smoking Statusnever   
smoker  
Alcohol Usedenies  
Drug Useoccasionally  
  
  
Admission Risk Screen:  
Significant   
IndicatorsComplete  
  
CAGE:  
CAGE:  
Is this an injured   
patient at a Trauma   
Center   
([facility]/[place]  
a/[place]): no  
  
  
Electronic Signatures:  
Becky Rice)   
(Signed 2022   
17:49)  
Authored: Preferred   
Language, Advanced   
Directives, Family   
Violence Adult,  
Learning Assessment   
(Patient), Learning   
Assessment (Other   
Learner), Pressure  
Injury/TB/Substance,   
Pressure Injury, CAGE  
  
  
Last Updated:   
2022 17:49 by   
Becky Rice (RN) Normal                                  [facility]  
   
                                                    TROPONIN Ion 2022   
   
                                                    Troponin I.cardiac   
[Mass/Vol]      ng/mL           Normal          0.00 - 0.03     [facility]  
   
                                        Comment on above:   Result Comment: LESS  
 THAN 0.04 NG/ML: NEGATIVE  
REPEAT TESTING IN THREE TO SIX HOURS  
IF CLINICALLY INDICATED.  
0.04 - 0.5 NG/ML: CONSISTENT WITH POSSIBLE  
CARDIAC DAMAGE AND POSSIBLE INCREASED  
CLINICAL RISK.  
SERIAL MEASUREMENTS MAY HELP ASSESS EXTENT OF  
MYOCARDIAL DAMAGE.  
>0.5 NG/ML: CONSISTENT WITH CARDIAC DAMAGE,  
INCREASED CLINICAL RISK AND MYOCARDIAL  
INFARCTION. SERIAL MEASUREMENTS MAY HELP  
ASSESS EXTENT OF MYOCARDIAL DAMAGE.  
.  
Note: Troponin I testing is performed using different  
testing methodology at [facility] than at other  
[facility]. Direct result comparisons should only  
be made within the same method.   
   
                                                            Performed By: #### H  
EPFP ####  
[facility]  
[address] [place] [address]   
   
                                                    Triage - EDon 2022   
   
                                        Triage - ED         Quick Triage:  
Are You Pregnantno  
Have You Given Birth In   
The Last 6 Weeksno  
Are You Currently   
Breastfeedingno  
  
Chart Review:  
PRIMARY ASSESSMENT  
Team Activation  
Team Activated: STROKE  
  
ABCD Normal Findings:   
airway open and patent,   
circulation normal and   
alert and  
oriented  
  
  
ARRIVAL INFORMATION  
  
Means of Arrival:   
stretcher Mode of   
Arrival: ambulance   
Agency: [facility]  
Name: [facility] Arrival   
From: home Accompanied   
By: self  
Language:  
Spoken Language   
Preferred: English   
Reading Language   
Preferred: English  
  
  
CHIEF COMPLAINT  
  
DANNA SOARES   
is a Female patient   
with a chief complaint   
of syncope  
(to er per [facility] ems   
with c/o after going to   
the , she had a   
syncopal  
episode. family found   
her on the floor. pt   
c/o right eye, right   
jaw and neck  
pain. c/o left arm and   
leg weakness after the   
syncopal episode. pt is   
alert and  
oriented now. pt was   
just in the hospital   
for vomiting and d/c   
yesterday.).  
Triage Date/Time:   
2022 17:26  
DIPIKA: 2  
Pain Rating (0-10): 9 =   
Severe  
Pain location: right   
eye  
Vital Signs:  
Temperature: 98.8F (   
37.1C) taken temporal  
Blood Pressure: 123/83   
Mean:  
Heart Rate: 99  
Respiratory Rate: 18  
Pulse Oximetry: 99% on   
room air, no   
respiratory support.   
Height: 4 feet 11.00  
inches. 149.8 CM  
Weight: 100.3 pounds.   
Calculated 45.5 kg.   
(stated)  
Calculated BMI (kg/m2):   
20.276 Calculated BSA   
(m2) 1.38  
  
Sujit Coma Scale:  
Best Eye Response: (E4)   
spontaneous  
Best Motor Response:   
(M6) obeys commands  
Best Verbal Response:   
(V5) oriented  
[place] Score: 15  
  
  
Allergies: yes  
Last menstrual period:   
2022  
Patient has homicidal   
thoughts: no  
  
  
  
  
Risk Screens  
Suicide Risk Screen  
  
In the Past Month: Have   
you wished you were   
dead or wished you   
could go to  
sleep and not wake up   
no  
In the Past Month: Have   
you had any actual   
thoughts of killing   
yourself no  
In Your Lifetime: Have   
you ever done anything,   
started to do anything,   
or  
prepared to do anything   
to end your life no  
  
  
  
Avila Fall Scale   
Screening  
Has the patient fallen   
before (or is the   
patient in the ED as a   
result of a  
fall) has had a fall  
Does the patient have   
an impaired gait does   
not have impaired gait  
Is the patient   
cognitively impaired   
not cognitively   
impaired  
  
Avila Fall Scale  
History of falling   
(immediate or previous)   
yes (25)  
Secondary Diagnosis yes   
(15)  
Intravenous Therapy/   
Heparin/Saline Lock no   
(0)  
Gait/Transferring   
normal/bedrest/wheelcha  
ir (0)  
Ambulatory Aids   
none/bedrest/nurse   
assist (0)  
Mental Status oriented   
to own ability (0)  
Avila Fall Risk Score:   
40  
  
Interventions:  
Avila Fall   
Interventions: LOW   
INTERVENTIONS:  
*patient oriented to   
surroundings and call   
system,  
* patient/family falls   
education completed  
and documented,  
*patients fall status   
communicated  
during bedside handoff,  
*whiteboard updated,  
*mode of toileting   
discussed with patient,  
*bed in low position   
with brakes locked,  
*call light in reach,  
* non-skid footwear and   
MODERATE INTERVENTIONS:  
*Low Interventions   
Plus:  
* falls risk   
band/sticker applied to   
patient,  
*yellow non-skid   
footwear,  
*instruct to call for   
assistance before  
getting out of bed,  
*bed/chair/bedside   
commode/toilet alarms,  
*sensory   
devices/ambulatory   
aides  
available and in reach,  
*medications reviewed   
for potential  
side effects and care   
planning.  
  
  
TRAVEL HISTORY  
Travel History  
Coronavirus Screening:   
no exposure or symptoms  
Travel Exposure   
History: NO travel to   
International locations   
in the past 30  
days  
  
  
PAIN  
  
Pain Scale Used: ASHLEY  
Pain Rating (0-10): 9 =   
Severe  
  
  
  
  
  
Past Medical History:  
Past Medical History   
Reviewedyes  
  
scoliosis: Past Medical   
History, Active  
Miscarriage: Past   
Medical History, Active  
delayed bone growth:   
Past Medical History,   
Active  
premature: Past Medical   
History, Active  
  
  
Electronic Signatures:  
Becky Rice ([facility]NA)   
(Signed 2022   
17:47)  
Entered: Risk Screens,   
Pain, Arrival, ABCD,   
Travel History, Chart   
Review, Past  
Medical History  
Authored: Quick Triage,   
Risk Screens, Pain,   
Arrival, ABCD, Travel   
History,  
Chart Review, Past   
Medical History  
  
  
Last Updated:   
2022 17:47 by   
Becky Rice (ERNA) [facility]  
   
                                                    URINALYSISon 2022   
   
                      Appearance (U) Canceled   [facility]  
   
                                        Comment on above:   Order Comment: TEST   
URINALYSIS WAS CANCELLED, 2022 20:45  
   
PATIENT DISCHARGED.   
   
                                                            Performed By: #### C  
BCDF ####  
[place] [address]   
   
                      ASCORBIC ACID Canceled   [facility]  
   
                                        Comment on above:   Order Comment: TEST   
URINALYSIS WAS CANCELLED, 2022 20:45  
   
PATIENT DISCHARGED.   
   
                                                            Result Comment: Conc  
entrations > = 20 mg/dL of ascorbic acid   
can be expected to cause strong  
interference in the reactions testing for glucose, nitrite and   
blood. It is  
recommended to discontinue Vitamin C administration and retest   
in 10 hours.   
   
                                                            Performed By: #### C  
BCDF ####  
[address] [address]   
   
                      Bilirubin Ql (U) Canceled   [facility]  
   
                                        Comment on above:   Order Comment: TEST   
URINALYSIS WAS CANCELLED, 2022 20:45  
   
PATIENT DISCHARGED.   
   
                                                            Performed By: #### C  
BCDF ####  
[address] [address]   
   
                      Color (U)  Canceled   [facility]  
   
                                        Comment on above:   Order Comment: TEST   
URINALYSIS WAS CANCELLED, 2022 20:45  
   
PATIENT DISCHARGED.   
   
                                                            Performed By: #### C  
BCDF ####  
[address] [address]   
   
                      Glucose Ql (U) Canceled   [facility]  
   
                                        Comment on above:   Order Comment: TEST   
URINALYSIS WAS CANCELLED, 2022 20:45  
   
PATIENT DISCHARGED.   
   
                                                            Performed By: #### C  
BCDF ####  
[address] [address]   
   
                      Hemoglobin Ql (U) Canceled   [facility]  
   
                                        Comment on above:   Order Comment: TEST   
URINALYSIS WAS CANCELLED, 2022 20:45  
   
PATIENT DISCHARGED.   
   
                                                            Performed By: #### C  
BCDF ####  
[address] [address]   
   
                      Ketones Ql (U) Canceled   [facility]  
   
                                        Comment on above:   Order Comment: TEST   
URINALYSIS WAS CANCELLED, 2022 20:45  
   
PATIENT DISCHARGED.   
   
                                                            Performed By: #### C  
BCDF ####  
[address] [address]   
   
                                                    Leukocyte esterase Test   
strip Ql (U)    Canceled        [facility]  
   
                                        Comment on above:   Order Comment: TEST   
URINALYSIS WAS CANCELLED, 2022 20:45  
   
PATIENT DISCHARGED.   
   
                                                            Performed By: #### C  
BCDF ####  
[address] [address]   
   
                      Nitrite Ql (U) Canceled   [facility]  
   
                                        Comment on above:   Order Comment: TEST   
URINALYSIS WAS CANCELLED, 2022 20:45  
   
PATIENT DISCHARGED.   
   
                                                            Performed By: #### C  
BCDF ####  
[address] [address]   
   
                      pH         Canceled   [facility]  
   
                                        Comment on above:   Order Comment: TEST   
URINALYSIS WAS CANCELLED, 2022 20:45  
   
PATIENT DISCHARGED.   
   
                                                            Performed By: #### C  
BCDF ####  
[address] [address]   
   
                      Protein Ql (U) Canceled   [facility]  
   
                                        Comment on above:   Order Comment: TEST   
URINALYSIS WAS CANCELLED, 2022 20:45  
   
PATIENT DISCHARGED.   
   
                                                            Performed By: #### C  
BCDF ####  
[address] [address]   
   
                                                    Specific gravity (U)   
[Rel density]   Canceled        Normal                          [facility]  
   
                                        Comment on above:   Order Comment: TEST   
URINALYSIS WAS CANCELLED, 2022 20:45  
  
PATIENT DISCHARGED.   
   
                                                            Performed By: #### C  
BCDF ####  
[address] [address]   
   
                      UROBILINOGEN Canceled   Normal                [facility]  
   
                                        Comment on above:   Order Comment: TEST   
URINALYSIS WAS CANCELLED, 2022 20:45  
  
PATIENT DISCHARGED.   
   
                                                            Performed By: #### C  
BCDF ####  
[address] [address]   
   
                                                    BASIC METABOLIC PANELon    
   
                                                    Urea nitrogen   
[Mass/Vol]      mg/dL           Low             6 - 23          [facility]  
   
                                        Comment on above:   Performed By: #### C  
BCDF ####  
[address] [address]   
   
                      Anion gap [Moles/Vol] 13 mmol/L  Normal     10 - 20    [facility]  
   
                                        Comment on above:   Performed By: #### C  
BCDF ####  
[address] [address]   
   
                      Calcium [Mass/Vol] 8.3 mg/dL  Low        8.6 - 10.3 [facility]  
   
                                        Comment on above:   Performed By: #### C  
BCDF ####  
[address] [address]   
   
                      Chloride [Moles/Vol] 102 mmol/L Normal     98 - 107   [facility]  
   
                                        Comment on above:   Performed By: #### C  
BCDF ####  
[address] [address]   
   
                      Creatinine [Mass/Vol] 0.40 mg/dL Low        0.50 - 1.05 [facility]  
   
                                        Comment on above:   Performed By: #### C  
BCDF ####  
[address] [address]   
   
                      eGFR FEMALE >90        Normal     >90        [facility]  
   
                                        Comment on above:   Result Comment: CALC  
ULATIONS OF ESTIMATED GFR ARE PERFORMED  
USING THE  CKD-EPI STUDY REFIT EQUATION  
WITHOUT THE RACE VARIABLE FOR THE IDMS-TRACEABLE  
CREATININE METHODS.  
https://jasn.asnjournals.org/content/early//ASN.  
276512   
   
                                                            Performed By: #### C  
BCDF ####  
[address] [address]   
   
                      Glucose [Mass/Vol] 85 mg/dL   Normal     74 - 99    [facility]  
   
                                        Comment on above:   Performed By: #### C  
BCDF ####  
[address] [address]   
   
                      HCO3 (Bld) [Moles/Vol] 23 mmol/L  Normal     21 - 32    [facility]  
   
                                        Comment on above:   Performed By: #### C  
BCDF ####  
[address] [address]   
   
                      Potassium [Moles/Vol] 3.4 mmol/L Low        3.5 - 5.3  [facility]  
   
                                        Comment on above:   Performed By: #### C  
BCDF ####  
[address] [address]   
   
                      Sodium [Moles/Vol] 135 mmol/L Low        136 - 145  [facility]  
   
                                        Comment on above:   Performed By: #### C  
BCDF ####  
[address] [address]   
   
                                                    Discharge [id number]   
022   
   
                                        Discharge Profile2  Discharge Orders:  
Anticipated Discharge   
Date:  
Anticipated Discharge   
[id number]  
  
Anticipated Discharge   
Time10:58  
  
DNAR:  
Code Status at   
Discharge: Full Code  
  
Activity:  
activity as tolerated.  
  
Diet:  
Dietlow cholesterol,   
low fat  
  
Call Provider If   
(Homegoing Patients):  
Any new concerning   
symptoms.  
  
Heart Failure:  
Patient Instructions:  
- CALL 911 IF YOU HAVE   
ANY OF THE SIGNS AND   
SYMPTOMS OF HEART   
FAILURE: 1. Chest  
pain 2. Significant   
Shortness of breath 3.   
Fainting.  
  
- Notify your physician   
immediately if you have   
shortness of breath;   
weight  
gain of 3 lbs. or more;   
fatigue and loss of   
energy; swelling of   
lower  
extremities or abdomen;   
dizziness or fainting;   
change of appetite; and   
frequent  
coughing.  
  
- Patient received   
Living With Heart   
Failure book.  
  
- Daily weight on the   
same scale, same time   
after voiding and   
before eating.  
  
- Maintain daily weight   
log.  
  
Activity:  
- Balance activity with   
rest, gradually   
increase your activity   
as tolerated.  
  
- Exercise as   
prescribed by your   
physician.  
  
Stroke:  
Patient Instructions:  
- CALL 911 OR GO   
DIRECTLY TO THE   
EMERGENCY ROOM IF YOU   
HAVE ANY OF THE SIGNS  
AND SYMPTOMS OF STROKE:   
1. Sudden weakness or   
numbness of the face,   
arm or leg,  
especially on one side   
of the body. 2. Sudden   
difficulty speaking or  
understanding. 3.   
Sudden trouble seeing   
in one or both eyes. 4.   
Sudden trouble  
walking, dizziness,   
loss of balance or   
coordination. 5. Sudden   
severe headache  
with no known cause. 6.   
Loss of consciousness   
or decreased   
consciousness,  
fainting, or seizures.  
  
- Know the Risk Factors   
for Stroke: high blood   
pressure, high   
cholesterol,  
diabetes, smoking,   
physical inactivity,   
overweight, previous   
stroke or TIA,  
heart disease, atrial   
fibrillation.  
  
- Always carry a   
medication list with   
you and take it to ALL   
Healthcare  
Provider visits.  
  
- You may be contacted   
by a Hospital   
Representative after   
discharge to evaluate  
your progress at home   
and to discuss your   
experience at   
[facility].  
  
Hospital Specific   
Instructions - [facility]:  
- For   
questions/problems/conc  
erns call the Discharge   
Physician at   
[phone]  
and have them paged.  
  
Hospital Course (Home   
Care/Gold Form):  
Hospital Course:  
Hospital Course:   
include significant   
abnormal lab values  
21-year-old female who   
was diagnosed at [facility] with infectious   
colitis was  
prescribed   
ciprofloxacin and   
Flagyl had a 7-day   
course of intractable   
nausea  
vomiting, who Has   
cannabinoid use was   
admitted to the   
hospital secondary to  
severe dehydration,   
hypokalemia and   
hypomagnesemia. Patient   
was trialed on  
multiple different   
antiemetics with no   
relief in her nausea   
vomiting. After  
she was appropriately   
fluid resuscitated   
nausea vomiting did   
improve, we tried  
a diet and she   
immediately threw up.   
She was started on   
Reglan and still threw  
up after the Reglan.   
She was complaining of   
left upper quadrant   
abdominal pain  
and secondary to the   
nausea vomiting I   
started her on Carafate   
for concern for  
gastritis. She had 2 CT   
abdomen pelvis is this   
admission 1 in the   
emergency  
room and one   
recommended by Dr. Gallagher which did not   
reveal acute pathology.  
She had a right upper   
quadrant ultrasound   
which was unremarkable.   
She was  
started on IV Protonix   
twice a day as she had   
epigastric pain, acid   
reflux and  
pain with swallowing   
and concern for   
persistent nausea   
vomiting that she  
possibly also had   
esophagitis. Patient   
did not undergo   
endoscopic as we  
clinically were   
treating her symptoms.   
She improved with   
adding Carafate and  
was able to eat with no   
vomiting. She was   
instructed to stop   
cannabinoid and  
she is in agreement   
with the plan. She has   
been instructed to   
follow with her  
primary care physician.   
She had 2 CT abdomen   
pelvis's which did not   
show  
infectious colitis so I   
discontinued   
antibiotics at time of   
discharge. She  
remained afebrile and   
hemodynamically stable.   
She has been prescribed   
a  
gastric emptying study   
that will be performed   
on Monday as   
outpatient. She is  
being discharged to   
home in stable   
condition. Discharge   
time greater than 30  
minutes. Her   
electrolytes have been   
corrected, her   
potassium is 3.4   
magnesium  
2.00. She will have an   
additional dose of   
potassium prior to   
discharge of the  
hospital. Should her   
potassium will need to   
be followed as   
outpatient and she  
has been instructed to   
eat potassium rich   
foods and she loves   
spinach which  
will also help her   
magnesium.  
  
Provider FINAL REVIEW   
of Orders:  
Final Review:  
Final Review of   
Medication   
Reconciliation and   
Orders Completedby VAL Dickens at   
2022 11:05:05  
  
Appointments:  
Follow-Up Appointment   
01:  
Physician/Dept/ServiceCECI JORGENSEN  
Call to Schedule in1   
week  
  
Follow-Up Appointment   
02:  
Physician/Dept/Ignacio Mathis  
Reason for   
ReferralHospital   
Follow-up  
Call to Schedule in2   
weeks  
[place] [address]  
Phone   
[phone]  
  
Other Clinician   
Instructions: (more   
content not   
included)...        Normal                                  [facility]  
   
                                                    LACTATEon 2022   
   
                      Lactate [Moles/Vol] 0.6 mmol/L Normal     0.4 - 2.0  [facility]  
   
                                        Comment on above:   Result Comment: [place]  
puncture immediately after or during the  
administration of Metamizole may lead to falsely  
low results. Testing should be performed immediately  
prior to Metamizole dosing.   
   
                                                            Performed By: #### C  
BCDF ####  
[address] [address]   
   
                                                    MAGNESIUMon 2022   
   
                      Magnesium [Mass/Vol] 2.00 mg/dL Normal     1.60 - 2.40 [facility]  
   
                                        Comment on above:   Performed By: #### H  
EPFP ####  
[place] [id number]   
   
                                                    BASIC METABOLIC PANELon    
   
                      Anion gap [Moles/Vol] 13 mmol/L  Normal     10 - 20    [facility]  
   
                                        Comment on above:   Performed By: #### H  
EPFP ####  
[address] [address]   
   
                      Calcium [Mass/Vol] 8.3 mg/dL  Low        8.6 - 10.3 [facility]  
   
                                        Comment on above:   Performed By: #### H  
EPFP ####  
[address] [address]   
   
                      Chloride [Moles/Vol] 102 mmol/L Normal     98 - 107   [facility]  
   
                                        Comment on above:   Performed By: #### H  
EPFP ####  
[address] [address]   
   
                      Creatinine [Mass/Vol] 0.40 mg/dL Low        0.50 - 1.05 [facility]  
   
                                        Comment on above:   Performed By: #### H  
EPFP ####  
[address] [address]   
   
                      eGFR FEMALE >90        Normal     >90        [facility]  
   
                                        Comment on above:   Result Comment: CALC  
ULATIONS OF ESTIMATED GFR ARE PERFORMED  
USING THE  CKD-EPI STUDY REFIT EQUATION  
WITHOUT THE RACE VARIABLE FOR THE IDMS-TRACEABLE  
CREATININE METHODS.  
https://jasn.asnjournals.org/content/early/ASN.  
536949   
   
                                                            Performed By: #### H  
EPFP ####  
[address] [address]   
   
                      Glucose [Mass/Vol] 102 mg/dL  High       74 - 99    [facility]  
   
                                        Comment on above:   Performed By: #### H  
EPFP ####  
[address] [address]   
   
                      HCO3 (Bld) [Moles/Vol] 23 mmol/L  Normal     21 - 32    [facility]  
   
                                        Comment on above:   Performed By: #### H  
EPFP ####  
[address] [address]   
   
                      Potassium [Moles/Vol] 3.5 mmol/L Normal     3.5 - 5.3  [facility]  
   
                                        Comment on above:   Performed By: #### H  
EPFP ####  
[address] [address]   
   
                      Sodium [Moles/Vol] 134 mmol/L Low        136 - 145  [facility]  
   
                                        Comment on above:   Performed By: #### H  
EPFP ####  
[address] [address]   
   
                                                    Urea nitrogen   
[Mass/Vol]      2 mg/dL         Low             6 - 23          [facility]  
   
                                        Comment on above:   Performed By: #### H  
EPFP ####  
[address] [address]   
   
                                                    Urea nitrogen   
[Mass/Vol]      2 mg/dL         Low             6 - 23          [facility]  
   
                                        Comment on above:   Result Comment: repe  
ated and verified   
   
                                                            Performed By: #### P  
TINR ####  
[address] [address]   
   
                      Anion gap [Moles/Vol] 8 mmol/L   Low        10 - 20    [facility]  
   
                                        Comment on above:   Performed By: #### P  
TINR ####  
[address] [address]   
   
                      Calcium [Mass/Vol] 8.2 mg/dL  Low        8.6 - 10.3 [facility]  
   
                                        Comment on above:   Performed By: #### P  
TINR ####  
[address] [address]   
   
                      Chloride [Moles/Vol] 103 mmol/L Normal     98 - 107   [facility]  
   
                                        Comment on above:   Performed By: #### P  
TINR ####  
[address] [address]   
   
                      Creatinine [Mass/Vol] 0.36 mg/dL Low        0.50 - 1.05 [facility]  
   
                                        Comment on above:   Performed By: #### P  
TINR ####  
[address] [address]   
   
                      eGFR FEMALE >90        Normal     >90        [facility]  
   
                                        Comment on above:   Result Comment: CALC  
ULATIONS OF ESTIMATED GFR ARE PERFORMED  
USING THE  CKD-EPI STUDY REFIT EQUATION  
WITHOUT THE RACE VARIABLE FOR THE IDMS-TRACEABLE  
CREATININE METHODS.  
https://jasn.asnjournals.org/content/early//ASN.  
767106   
   
                                                            Performed By: #### P  
TINR ####  
[address] [address]   
   
                      Glucose [Mass/Vol] 91 mg/dL   Normal     74 - 99    [facility]  
   
                                        Comment on above:   Performed By: #### P  
TINR ####  
[address] [address]   
   
                      HCO3 (Bld) [Moles/Vol] 26 mmol/L  Normal     21 - 32    [facility]  
   
                                        Comment on above:   Performed By: #### P  
TINR ####  
[address] [address]   
   
                      Potassium [Moles/Vol] 3.1 mmol/L Low        3.5 - 5.3  [facility]  
   
                                        Comment on above:   Performed By: #### P  
TINR ####  
[address] [address]   
   
                      Sodium [Moles/Vol] 134 mmol/L Low        136 - 145  [facility]  
   
                                        Comment on above:   Performed By: #### P  
TINR ####  
[address] [address]   
   
                      Anion gap [Moles/Vol] 10 mmol/L  Normal     10 - 20    [facility]  
   
                                        Comment on above:   Order Comment: quiles  
d/rb to 2nd floor, 2022 09:47   
   
                                                            Performed By: #### B  
MP ####[address] [address]   
   
                      Calcium [Mass/Vol] 8.1 mg/dL  Low        8.6 - 10.3 [facility]  
   
                                        Comment on above:   Order Comment: quiles  
d/rb to 2nd floor, 2022 09:47   
   
                                                            Performed By: #### B  
MP ####[address] [address]   
   
                      Chloride [Moles/Vol] 103 mmol/L Normal     98 - 107   [facility]  
   
                                        Comment on above:   Order Comment: quiles  
d/rb to 2nd floor, 2022 09:47   
   
                                                            Performed By: #### B  
MP ####[address] [address]   
   
                      Creatinine [Mass/Vol] 0.41 mg/dL Low        0.50 - 1.05 [facility]  
   
                                        Comment on above:   Order Comment: quiles  
d/rb to 2nd floor, 2022 09:47   
   
                                                            Performed By: #### B  
MP ####[address] [address]   
   
                      eGFR FEMALE >90        Normal     >90        [facility]  
   
                                        Comment on above:   Order Comment: quiles  
d/rb to 2nd floor, 2022 09:47   
   
                                                            Result Comment: CALC  
ULATIONS OF ESTIMATED GFR ARE PERFORMED  
USING THE  CKD-EPI STUDY REFIT EQUATION  
WITHOUT THE RACE VARIABLE FOR THE IDMS-TRACEABLE  
CREATININE METHODS.  
https://jasn.asnjournals.org/content/early//ASN.  
406317   
   
                                                            Performed By: #### B  
MP ####[address] [address]   
   
                      Glucose [Mass/Vol] 135 mg/dL  High       74 - 99    [facility]  
   
                                        Comment on above:   Order Comment: quiles  
d/rb to 2nd floor, 2022 09:47   
   
                                                            Performed By: #### B  
MP ####[address] [address]   
   
                      HCO3 (Bld) [Moles/Vol] 25 mmol/L  Normal     21 - 32    [facility]  
   
                                        Comment on above:   Order Comment: quiles  
d/rb to 2nd floor, 2022 09:47   
   
                                                            Performed By: #### B  
MP ####[address] [address]   
   
                      Potassium [Moles/Vol] 2.9 mmol/L Critically low 3.5 - 5.3   
 [facility]  
   
                                        Comment on above:   Order Comment: quiles  
d/rb to 2nd floor, 2022 09:47   
   
                                                            Result Comment: call  
ed/rb to 2nd floor, 2022 09:47   
   
                                                            Performed By: #### B  
MP ####[address] [address]   
   
                      Sodium [Moles/Vol] 135 mmol/L Low        136 - 145  [facility]  
   
                                        Comment on above:   Order Comment: quiles  
d/rb to 2nd floor, 2022 09:47   
   
                                                            Performed By: #### B  
MP ####[place] [address] [place] [address]   
   
                                                    Urea nitrogen   
[Mass/Vol]      2 mg/dL         Low             6 - 23          [facility]  
   
                                        Comment on above:   Order Comment: etta godinez/tarik to 2nd floor, 2022 09:47   
   
                                                            Performed By: #### B  
MP ####[place] [address] [place] [address]   
   
                                                    Daily Progress Note-General   
Internal Medicineon 2022   
   
                                                    Daily Progress   
Note-General Internal   
Medicine                                Consult Type:   
subsequent visit/care  
  
Service: General   
Internal Medicine  
  
History of Present   
Illness:  
History Present   
Illness:  
Admission Reason:   
nausea and vomiting  
HPI:  
patient was feeling   
better this morning.   
diet was advanced. She   
took a few  
bites of her lunch meat   
sandwich and started   
vomiting.She had no   
abdominal  
pain. Her epigastric   
pain resolved. She   
denies ruq pain. She   
just is not  
keeping food down. She   
tried lunch meat   
sandwich after i seen   
her and vomited.  
  
Subjective Data:  
DANNA SOARES   
is a 21 year old Female   
who is Hospital Day #   
5.  
  
Overnight Events:   
Patient had an   
uneventful night.  
  
Objective Data:  
  
Objective Information:  
T PRBPSpO2  
[id number]/9599%  
Date/Time 8:   
8: 8:   
8: 8:20  
Range(36.8C - 37.5C )   
(95 - 98 ) (15 - 18 )   
(108 - 124 )/ (84 - 95   
) (98%  
- 99% )  
Highest temp of 37.5 C   
was recorded at    
8:20  
  
  
Pain reported at    
20:08: 0 = None  
  
Physical Exam by   
System:  
  
Constitutional: Well   
developed,   
awake/alert/oriented   
x3, no distress, alert   
and  
cooperative  
Eyes: PERRL, EOMI,   
clear sclera  
ENMT: mucous membranes   
moist, no apparent   
injury, no lesions seen  
Respiratory/Thorax:   
Patent airways, CTAB,   
normal breath sounds   
with good chest  
expansion, thorax   
symmetric  
Cardiovascular:   
Regular, rate and   
rhythm, no murmurs, 2+   
equal pulses of the  
extremities, normal S   
1and S 2  
Gastrointestinal:   
Nondistended, soft,   
non-tender, no rebound   
tenderness or  
guarding, no masses   
palpable, no   
organomegaly, +BS, no   
bruits  
Musculoskeletal: ROM   
intact, no joint   
swelling, normal   
strength  
Extremities: normal   
extremities, no   
cyanosis edema,   
contusions or wounds,   
no  
clubbing  
Neurological: alert and   
oriented x3, intact   
senses, motor, response   
and  
reflexes, normal   
strength  
Psychological:   
Appropriate mood and   
behavior  
Skin: Warm and dry, no   
lesions, no rashes  
  
Medication:  
  
Medications:  
  
Continuous Medications  
-----------------------  
---------  
  
1. Lactated Ringers   
Infusion: 1000 mL   
IntraVenous  
  
Scheduled Medications  
-----------------------  
---------  
  
1. Ciprofloxacin 400 mg   
IVPB/ Premixed Soln 200   
mL: 200 mL IntraVenous  
Piggyback Every 12   
Hours  
2. Pantoprazole   
Injectable: 40 mg   
IntraVenous Push Every   
12 Hours  
3. Potassium Chloride   
20 mEq/Sterile Water   
100 mL Premix IVPB: 20   
mEq  
IntraVenous Piggyback   
Every 2 Hours  
  
PRN Medications  
-----------------------  
---------  
  
1. Acetaminophen: 650   
mg Oral Every 4 Hours  
2. Acetaminophen: 650   
mg Oral Every 4 Hours  
3. Docusate: 100 mg   
Oral 2 Times a Day  
4. LORazepam   
Injectable: 1 mg   
IntraVenous Push Every   
8 Hours  
5. Magnesium Hydroxide   
-Al Hydrox -Simethicone   
Oral Liquid: 30 mL Oral  
Every 6 Hours  
  
  
Recent Lab Results:  
  
Results:  
  
  
  
  
I have reviewed these   
laboratory results:  
  
Basic Metabolic Panel   
2022 09:02:00  
  
ResultValue  
Lab Comment: called/rb   
to 2nd floor,   
2022 09:47  
Glucose, Serum 135 H  
 L  
K 2.9 LL  
  
Bicarbonate, Serum 25  
Anion Gap, Serum 10  
BUN 2 L  
CREAT 0.41 L  
GFR Female >90  
Calcium, Serum 8.1 L  
  
Magnesium, Serum   
2022 09:02:00  
  
ResultValue  
Magnesium, Serum 1.88  
  
  
Radiology Results:  
  
Results:  
  
  
Impression:  
  
1. Nonspecific free   
fluid within the   
pelvis.  
2. No evidence of bowel   
obstruction, free   
intraperitoneal air or  
abnormal   
intra-abdominal fluid   
collection.  
  
CT Abdomen and Pelvis   
with IV Contrast [2022 11:06AM]  
  
  
Assessment and Plan:  
Code Status:  
Code StatusFull Code  
  
Assessment:  
21-year-old female   
admitted for   
intractable nausea and   
vomiting and recent  
colitis. She also has a   
positive urine tox   
screen with   
cannabinoids and   
opiates  
so she may have   
cannabinoid hyperemesis   
syndrome. There is a   
documented history  
of major depressive   
disorder and bipolar   
disorder.  
  
PLAN:  
1. Continue fluid   
hydration with IV   
fluids  
2. Clear liquids as   
tolerated  
3. Zofran is not   
helping and she said   
Compazine did help in   
the past; I will  
discontinue the Zofran   
for now and add   
Compazine IV every 6   
hours as needed;  
may also discontinue   
the around-the-clock   
metoclopramide  
4. Lab for tomorrow   
morning  
5. We will consult GI  
6. This is day 2 of the   
IV metronidazole and   
ciprofloxacin for her   
recent  
diagnosis of colitis  
7. Pain control as   
needed  
8. DVT prophylaxis with   
early ambulation  
9. Patient will   
maintain inpatient   
status at this time as   
she is still having  
significant nausea and   
vomiting and unable to   
take much in the way of   
p.o.  
  
  
seen and examined  
afebrile  
hemodynamically stable  
change ivf to lr 150   
ml/hr - i ordered 1   
liter bolus this   
morning- clinically  
looks dry- Lips very   
dry and cracked.   
Slightly dry  
Change Flagyl to 250 mg   
IV every 6 hours-she is   
likely intolerant to   
the 500  
mg-if she continues to   
vomit despite changing   
to this dose and adding   
Ativan  
for cyclic vomiting   
along with Protonix 40   
mg IV twice daily we   
will consider  
changing to Augmentin.   
A (more content not   
included)...        Normal                                  [facility]  
   
                                                    MAGNESIUMon 2022   
   
                      Magnesium [Mass/Vol] 1.90 mg/dL Normal     1.60 - 2.40 [facility]  
   
                                        Comment on above:   Performed By: #### H  
EPFP ####  
[address] [address]   
   
                      Magnesium [Mass/Vol] 1.63 mg/dL Normal     1.60 - 2.40 [facility]  
   
                                        Comment on above:   Performed By: #### M  
G ####  
[address] [address]   
   
                      Magnesium [Mass/Vol] 1.88 mg/dL Normal     1.60 - 2.40 [facility]  
   
                                        Comment on above:   Performed By: #### M  
G ####  
[address] [address]   
   
                                                    Nutrition Therapy-Assessment  
on 2022   
   
                                                    Nutrition   
Therapy-Assessment                      Assessment   
Subjective/Objective:  
Note Type: Assessment  
  
Note Authored by:   
Registered Dietitian   
Nutritionist  
Pager Number: Doc Halo  
  
Nutrition Note:  
The patient is a 21   
year old Female   
hospital day #5   
admitted for   
intractable  
N/V.  
  
Nutrition assessment   
completed today for   
NPO/Clear Liquids x 5   
days.  
Pt with diagnosis of   
colitis PTA.  
  
Clear Liquid diet x 4   
days. NPO status prior.   
Pt to be advanced to   
Regular diet  
prior to lunch.  
No oral intakes   
documented. States that   
she has had minimal   
oral intakes for  
the past 10 days.  
Pt reported that she is   
tolerating Clear   
Liquids and is ready   
for diet  
advancement as she is   
hungry. No N/V today.  
Lactated Ringers   
Infusion @150mL/hr x 24   
hours.  
GI following.  
MST=1 on admission risk   
screen for eating   
poorly.  
UBW ~120lb per pt.   
Weight upon admission:   
113lb. Weight obtained   
during  
assessment: 104lb.  
Pt with significant   
weight loss of 8.0% in   
less than one week.  
No recent weight   
history available for   
review.  
Completed NFPA. Minimal   
muscle or fat wasting   
noted.  
With significant weight   
loss and decreased oral   
intakes 2/2 N/V, the pt   
meets  
criteria for severe   
malnutrition in the   
setting of acute   
disease.  
Will monitor oral   
intakes of Regular diet   
and implement ONS only   
as needed.  
Obtained food   
preferences and   
discussed with the diet   
office.  
Encouraged taking   
solids slowly and   
making sure to get   
enough fluids.  
  
No nutritional   
interventions at this   
time. Will continue to   
follow the pt.  
  
Medical Surgical   
History: PMHx: MDD,   
bipolar disorder  
  
  
Objective Information:  
  
T PRBPSpO2  
[id number]  
%  
Date/Time 17:   
17: 17:   
17: 17:36  
Range(36.6C - 37.5C )   
(95 - 100 ) (15 - 18 )   
(108 - 118 )/ (84 - 98   
)  
(98% - 100% )  
Highest temp of 37.5 C   
was recorded at    
8:20  
  
  
  
Pain reported at    
8:00: 0 = None  
  
  
---- Intake and Output   
-----  
Mn/Dy/Year   
TimeIntakeOutputNet  
2022 2:00   
[id number]  
2022 6:00   
[id number]  
2022 10:00   
[id number]  
  
The Intake and Output   
Totals for the last 24   
hours are:  
IntakeOutputNet  
[id number]  
  
Intake Output  
IV Fluids 1800 mL Urine   
2000 mL  
  
Height/Weight:  
Height in cm: 149.8   
centimeter(s)  
Weight (kg): 51.3  
BMI (kg/m2): 22.86   
square meter  
Significant Weight   
Loss: yes  
Interpretation of   
Weight Loss: >2% in 1   
week  
  
Recent Lab Results:  
  
Results:  
  
  
I have reviewed these   
laboratory results:  
  
Basic Metabolic Panel   
Trending View  
  
[id number]   
14:03:00 2022   
09:02:00 2022   
13:33:00  
Glucose, Serum91 135 H   
121 H  
 L 135 L 134 L  
K3.1 L 2.9 LL 2.8 LL  
 103 105  
Bicarbonate, Serum26 25   
20 L  
Anion Gap, Serum8 L 10   
12  
BUN2 L 2 L 3 L  
CREAT0.36 L 0.41 L 0.42   
L  
GFR Female>90 >90 >90  
Calcium, Serum8.2 L 8.1   
L 7.9 L  
Lab Comment: called/rb   
to 2nd floor,   
2022 09:47   
called/rb to radhika harden, 2022   
14:11  
  
Magnesium, Serum   
Trending View  
  
[id number]   
14:03:00 2022   
09:02:00  
Magnesium, Serum1.63   
1.88  
  
  
Current Active   
Medications/PN:  
Acetaminophen, Tablet   
(TYLENOL)  
DOSE = 650 mg Oral   
Every 4 Hours, PRN Pain   
- Mild (1-3),   
2022  
Acetaminophen, Tablet   
(TYLENOL)  
DOSE = 650 mg Oral   
Every 4 Hours, PRN Temp   
Greater Than or Equal   
to 38.0 C,  
2022  
Docusate, Capsule   
(COLACE)  
DOSE = 100 mg Oral 2   
Times a Day, PRN   
Constipation,   
2022  
Magnesium Hydroxide -Al   
Hydrox -Simethicone   
Oral Liquid, (MAALOX)  
DOSE = 30 mL Oral Every   
6 Hours, PRN Dyspepsia,   
2022  
Ciprofloxacin 400 mg   
IVPB/ Premixed Soln 200   
mL, (CIPRO)  
Every 12 Hours  
Recommended Infusion   
Time: 60 minute(s),   
2022  
LORazepam Injectable,   
(ATIVAN)  
DOSE = 1 mg IntraVenous   
Push Every 8 Hours, PRN   
Nausea and/or Vomiting,  
2022  
Pantoprazole   
Injectable, (PROTONIX)  
DOSE = 40 mg   
IntraVenous Push Every   
12 Hours, 2022  
Potassium Chloride   
Extended Release,   
Tablet, Extended   
Release  
DOSE = 40 mEq Oral Once  
Stop After 2 Doses,   
2022  
Metoclopramide   
Injectable, (REGLAN)  
DOSE = 5 mg IntraVenous   
Push Every 6 Hours,   
2022  
LORazepam Injectable,   
(ATIVAN)  
DOSE = 1 mg IntraVenous   
Push Once, 2022  
Lactated Ringers   
Infusion, IV Bag Volume   
= 1,000 mL  
Run at: 150 mL/hr   
IntraVenous ,   
2022  
Lactated Ringers IV   
Bolus, DOSE = 1,000 mL   
Once  
Infuse over 60   
minute(s), 2022  
  
Nutrition Orders:  
Diet May Not   
Participate in Room   
Service, No  
Order entered from   
Admission Screens.,   
2022  
Full Liquid Diet,   
Routine, 2022  
  
Food/Nutrition Related   
History:  
Energy Intake: No oral   
intakes documented. Pt   
has had minimal oral   
intakes for  
the past 10 days she   
reports.  
GI Symptoms: anorexia,   
nausea, vomiting, No   
N/V today. 22 bm   
documented.  
Pt reports that she is   
hungry today. Ordered   
Colace PRN, Protonix   
and Flagyl.  
Oral Problems: denies  
  
Food Allergies Comment:   
[facility]  
  
Nutrition Focused   
Physical Findings:  
Subcutaneous Fat Loss:  
Orbital Fat Pads: Well   
No (more content not   
included)...        Normal                                  [facility]  
   
                                                    Order Reconciliationon    
   
                                        Order Reconciliation Page 1  
  
Discharge   
Reconciliation Document  
  
  
  
Reconciliation Type:   
Discharge requested on   
behalf of Becky Hyman   
(Advanced  
Practice Nurse) done by   
Becky Hyman (APRN-CNS)  
  
Discharge -   
Reconciliation:   
2022 16:25 by:   
Becky Hyman (APRN-CNS)  
Discharge - Reset to   
Incomplete: 2022   
10:54 by: Becky Hyman   
(APRN-CNS)  
Discharge -   
Reconciliation:   
2022 10:56 by:   
Becky Hyman (APRN-CNS)  
  
Home Medications   
EnteredHOME MEDICATIONS   
AT DISCHARGE   
DateReconciliation  
Comment/ Additional   
Information  
Phenadoz 25 mg rectal   
suppository 1   
suppository(ies)   
rectally 3 times a day,  
As Needed -for nausea   
and vomiting   
10-Franco-2022 16:20   
Discontinued;  
Discontinue from ORM  
  
Phenadoz 25 mg rectal   
suppository is not   
required  
promethazine 25 mg oral   
tablet 1 tab(s) orally   
3 times a day, As   
Needed -for  
nausea and vomiting   
10-Franco-2022 16:20   
Discontinued;   
Discontinue from  
ORM  
  
promethazine 25 mg oral   
tablet is not required  
  
  
Current OrdersDateHOME   
MEDICATIONS AT   
DISCHARGE   
DateReconciliation   
Comment/  
Additional Information  
Acetaminophen Tablet   
(TYLENOL)DOSE = 650 mg   
Oral Every 4 Hours, PRN   
Pain -  
Mild (1-3) 2022   
10:03 Acetaminophen is   
not required  
Acetaminophen Tablet   
(TYLENOL)DOSE = 650 mg   
Oral Every 4 Hours, PRN   
Temp  
Greater Than or Equal   
to 38.0 C 2022   
10:03 Acetaminophen is  
not required  
Ciprofloxacin 400 mg   
IVPB/ Premixed Soln 200   
mL (CIPRO)Every 12  
HoursRecommended   
Infusion Time: 60   
minute(s) 2022   
10:34  
Ciprofloxacin 400 mg   
IVPB/ Premixed Soln 200   
mL is not required  
Docusate Capsule   
(COLACE)DOSE = 100 mg   
Oral 2 Times a Day, PRN   
Constipation  
2022 10:03   
Docusate is not   
required  
Lactated Ringers   
Infusion IV Bag Volume   
= 1,000 mL Run at: 150   
mL/hr  
IntraVenous 2022   
12:48 Lactated Ringers   
Infusion  
is not required  
LORazepam Injectable   
(ATIVAN)DOSE = 1 mg   
IntraVenous Push Every   
8 Hours, PRN  
Nausea and/or Vomiting   
2022 08:05   
LORazepam Injectable is   
not  
required  
Magnesium Hydroxide -Al   
Hydrox -Simethicone   
Oral Liquid   
(MAALOX)DOSE = 30 mL  
Oral Every 6 Hours, PRN   
Dyspepsia 2022   
10:03 Magnesium  
Hydroxide -Al Hydrox   
-Simethicone Oral   
Liquid is not required  
Magnesium Sulfate 2   
gram/Sterile Water 50   
mL Premix Soln   
OnceRecommended  
Infusion Time: 2   
hour(s)Stop After 1   
Doses 2022 10:53  
Magnesium Sulfate 2   
gram/Sterile Water 50   
mL Premix Soln is not   
required  
Metoclopramide   
Injectable (REGLAN)DOSE   
= 5 mg IntraVenous Push   
Every 6 Hours  
2022 16:30   
Metoclopramide   
Injectable is not   
required  
Pantoprazole Injectable   
(PROTONIX)DOSE = 40 mg   
IntraVenous Push Every   
12  
Hours 2022 12:37   
Pantoprazole Injectable   
is not required  
Potassium Chloride   
Extended Release   
Tablet, Extended   
ReleaseDOSE = 40 mEq   
Oral  
OnceStop After 2 Doses   
2022 10:53   
Potassium Chloride   
Extended  
Release is not required  
Sucralfate Tablet   
(CARAFATE)DOSE = 1   
gram(s) Oral 4 Times a   
Day Before Meals  
2022 22:26   
sucralfate 1 g oral   
tablet 1 tab(s) orally   
4 times a day  
-.Meds to Beds - 4   
Times a Day Before   
Meals 2022 10:54   
Prescription  
is created for   
sucralfate 1 g oral   
tablet  
Technetium Tc 99m   
Sulfur Colloid -   
(Radiology Contrast)   
DOSE = 500 microCurie  
Oral Once 2022   
16:29 Technetium Tc 99m   
Sulfur Colloid -  
(Radiology Contrast) is   
not required  
  
  
Home Medications Added   
During Discharge   
Reconciliation  
Discharge Discharge   
Diagnosis< R11.2   
Cannabinoid hyperemesis   
syndrome;R11.2  
Intractable nausea and   
vomiting;E87.6 Acute   
hypokalemia;E83.42   
Hypomagnesemia  
Discharge Provider,   
Nicolas Connolly   
Discharge Disposition :   
.Home Condition  
at Discharge:   
Satisfactory  
Discharge Communication   
Instructions for   
Nursing Only: Discharge   
patient  
TODAY.  
Discharge Communication   
Instructions for   
Nursing Only: Remove IV   
prior to  
discharge from   
hospital. Do not remove   
any midline, if   
present, without an  
order from the   
provider.  
Discharge Instructions   
- PHR After your   
discharge from the   
hospital, two  
Summary of Care   
Documents will be   
available online in   
your  Personal Health  
Record (PHR).   
1.Consolidated-Clinical   
Document Architecture   
(C-CDA) Patient  
Discharge Summary This   
document is a summary   
of your hospital stay   
to be kept  
for your   
reference.2.C-CDA Visit   
Summary This document   
is a summary of your  
hospital stay to be   
shared with your   
follow-up providers   
(doctor, dietician,  
physical therapist,   
etc.).  
Guidelines for a   
Healthy Lifestyle  
Protonix 40 mg oral   
delayed release tablet   
1 tab(s) orally 2 times   
a day  
Zofran 8 mg oral tablet   
1 tab(s) orally 3   
times, As Needed -for   
nausea and  
vomiting  
  
All Active Home   
Medications at time of   
Discharge   
Reconciliation:   
2022  
10:56  
Discharge Discharge   
Diagnosis< R11.2   
Cannabinoid hyperemesis   
syndrome;R11.2  
Intractable nausea and   
vomiting;E87.6 Acute   
hypokalemia;E83.42   
Hypomagnesemia  
Discharge Provider,   
Nicolas Connolly   
Discharge Disposition :   
.Home Condition  
at Discharge:   
Satisfactory  
Discharge Communicati   
(more content not   
included)...        Normal                                  [facility]  
   
                                                    US RIGHT UPPER QUADRANTon    
   
                                        US RIGHT UPPER QUADRANT MRN: [id number]  
Patient Name:   
DANNA SOARES  
  
STUDY:  
US RUQ ABDOMEN;   
2022 3:15 pm  
  
INDICATION:  
emesis .  
  
COMPARISON:  
None.  
  
ACCESSION NUMBER(S):  
[id number]  
  
ORDERING CLINICIAN:  
BECKY HYMAN  
  
TECHNIQUE:  
Multiple images of the   
abdomen were obtained.  
  
FINDINGS:  
LIVER:  
The echogenicity of the   
liver is within normal   
limits.  
There is no hepatic   
mass.  
The sagittal dimension   
of the right lobe of   
the liver is 14 cm, not  
enlarged.  
  
GALLBLADDER:  
The gallbladder is   
nondistended.  
The gallbladder wall is   
not thickened.  
There are no   
gallstones.  
There is no sludge.  
There is no   
pericholecystic fluid.  
  
  
BILE DUCTS:  
There is no   
intrahepatic biliary   
dilatation.  
The common bile duct is   
nondilated measuring   
0.5 cm.  
  
PANCREAS:  
The pancreas is   
unremarkable.  
  
RIGHT KIDNEY:  
The right kidney is   
normal in size   
measuring 11.2 cm in   
length.  
  
The echogenicity of the   
cortex is within normal   
limits.  
There is no renal mass.  
There is no intrarenal   
calculus or   
hydronephrosis.  
  
IMPRESSION:  
Unremarkable right   
upper quadrant   
ultrasound.  
Electronically signed   
by: CEE SHIRLEY MD                  Normal                                  [facility]  
   
                                                    BASIC METABOLIC PANELon    
   
                      Anion gap [Moles/Vol] 12 mmol/L  Normal     10 -     [facility]  
   
                                        Comment on above:   Order Comment: quiles  
d/rb to radhika basimrid, 2022 14:11   
   
                                                            Performed By: #### C  
BCDF ####  
[address] [address]   
   
                      Calcium [Mass/Vol] 7.9 mg/dL  Low        8.6 - 10.3 [facility]  
   
                                        Comment on above:   Order Comment: quiles  
[facility]   
   
                                                            Performed By: #### C  
BCDF ####  
[address] [address]   
   
                      Chloride [Moles/Vol] 105 mmol/L Normal     98 - 107   [facility]  
   
                                        Comment on above:   Order Comment: quiles  
d/rb to radhika oxclementerider, 2022 14:11   
   
                                                            Performed By: #### C  
BCDF ####  
[address] [address]   
   
                      Creatinine [Mass/Vol] 0.42 mg/dL Low        0.50 - 1.05 [facility]  
   
                                        Comment on above:   Order Comment: quiles  
d/rb to radhika oxenrider, 2022 14:11   
   
                                                            Performed By: #### C  
BCDF ####  
[address] [address]   
   
                      eGFR FEMALE >90        Normal     >90        [facility]  
   
                                        Comment on above:   Order Comment: quiles  
d/rb to radhika oxenrider, 2022 14:11   
   
                                                            Result Comment: CALC  
ULATIONS OF ESTIMATED GFR ARE PERFORMED  
USING THE  CKD-EPI STUDY REFIT EQUATION  
WITHOUT THE RACE VARIABLE FOR THE IDMS-TRACEABLE  
CREATININE METHODS.  
https://jasn.asnjournals.org/content/early//ASN.  
189664   
   
                                                            Performed By: #### C  
BCDF ####  
[address] [address]   
   
                      Glucose [Mass/Vol] 121 mg/dL  High       74 - 99    [facility]  
   
                                        Comment on above:   Order Comment: quiles  
d/rb to radhkia oxenrider, 2022 14:11   
   
                                                            Performed By: #### C  
BCDF ####  
[address] [address]   
   
                      HCO3 (Bld) [Moles/Vol] 20 mmol/L  Low        21 - 32    [facility]  
   
                                        Comment on above:   Order Comment: quiles  
d/rb to radhika oxenrider, 2022 14:11   
   
                                                            Performed By: #### C  
BCDF ####  
[address] [address]   
   
                      Potassium [Moles/Vol] 2.8 mmol/L Critically low 3.5 - 5.3   
 [facility]  
   
                                        Comment on above:   Order Comment: quiles  
d/rb to radhika oxenrider, 2022 14:11   
   
                                                            Result Comment: MILD  
 HEMOLYSIS DETECTED. The result may be   
falsely elevated due to  
hemolysis or other interferents. Clinical correlation is   
recommended.  
Repeat testing may be considered.  
called/rb to radhika harden, 2022 14:11   
   
                                                            Performed By: #### C  
BCDF ####  
[place] [id number]   
   
                      Sodium [Moles/Vol] 134 mmol/L Low        136 - 145  [facility]  
   
                                        Comment on above:   Order Comment: quiles  
d/rb to radhikaora harden, 2022 14:11   
   
                                                            Performed By: #### C  
BCDF ####  
[place] [id number]   
   
                                                    Urea nitrogen   
[Mass/Vol]      3 mg/dL         Low             6 - 23          [facility]  
   
                                        Comment on above:   Order Comment: quiles  
d/rb to radhikaora carlos, 2022 14:11   
   
                                                            Performed By: #### C  
BCDF ####  
[place] [id number]   
   
                                                    CBCon 2022   
   
                                                    Erythrocyte   
distribution width   
(RBC) [Ratio]   14.6 %          High            11.5 - 14.5     [facility]  
   
                                        Comment on above:   Performed By: #### M  
G ####  
[address] [address]   
   
                                                    Hematocrit (Bld)   
[Volume fraction] 36.0 %          Normal          36.0 - 46.0     [facility]  
   
                                        Comment on above:   Performed By: #### M  
G ####  
[address] [address]   
   
                                                    Hemoglobin (Bld)   
[Mass/Vol]      11.9 g/dL       Low             12.0 - 16.0     [facility]  
   
                                        Comment on above:   Performed By: #### M  
G ####  
[address] [address]   
   
                      MCHC (RBC) [Mass/Vol] 33.1 g/dL  Normal     32.0 - 36.0 [facility]  
   
                                        Comment on above:   Performed By: #### M  
G ####  
[address] [address]   
   
                      MCV (RBC) [Entitic vol] 87 fL      Normal     80 - 100   S  
[facility]  
   
                                        Comment on above:   Performed By: #### M  
G ####  
[address] [address]   
   
                      Platelets (Bld) [#/Vol] 233 10*3/uL Normal     150 - 450    
[facility]  
   
                                        Comment on above:   Performed By: #### M  
G ####  
[address] [address]   
   
                      RBC        4.16 x10E12/L Normal     4.00 - 5.20 [facility]  
   
                                        Comment on above:   Performed By: #### M  
G ####  
[address] [address]   
   
                      WBC (Bld) [#/Vol] 6.3 10*3/uL Normal     4.4 - 11.3 [facility]  
   
                                        Comment on above:   Performed By: #### M  
G ####  
[place] [id number]   
   
                                                    Clinical Event Noteon 2022   
   
                                        Clinical Event Note Clinical Event:  
Clinical Event Note:  
Details  
Patient still having   
intractable nausea and   
vomiting. I gave 1 dose   
of IV  
Phenergan with no   
relief. She is now   
complaining from chest   
pain. I will  
order EKG and troponin.   
She is on 4 different   
types of antiemetics.   
Given  
that she is not having   
any relief of that, I   
am suspecting now a   
central  
sensitization/central   
etiology. I will go   
ahead first and try   
morphine 2 mg IV  
1 dose stat to see if   
this will help the   
patient relax. If not,   
then I may  
consider giving IV   
Ativan.  
  
  
  
Electronic Signatures:  
Sal Ibrahim)   
(Signed 2022   
03:14)  
Authored: Clinical   
Event Note  
  
  
Last Updated:   
2022 03:14 by Sal Ibrahim) [facility]  
   
                                                    Consult-Gastroenterologyon 0  
2022   
   
                                                    Consult-Gastroenterolog  
y                                       Service:  
Service:   
Gastroenterology  
  
Consult:  
Consult requested by   
(Attending Name):   
Nicolas Connolly  
Reason: Recalcitrant   
nausea and vomiting  
  
History of Present   
Illness:  
HPI:  
DANNA SOARES AA   
is a 21 year old Female   
presents to ER where   
she is  
admitted with diffuse   
abdominal pain. She is   
admitted for   
recalcitrant  
vomiting. History of   
abdominal problems   
since adolescence   
largely constipation  
has seen multiple   
doctors in the past.   
Had been struggling   
with daily nausea  
and anorexia beginning   
just before [place]   
worsened after New   
Year's and the  
became acutely worse   
after eating venison.   
She states that she had   
some meat  
that she had not   
processed her uncle   
admitted later that the   
animal had hung  
out and 60 degree   
weather for 4 days   
prior to being   
processed. She states   
that  
she her grandfather and   
uncle all ate the meat   
and only she became   
ill. She  
initially presented to   
the freestanding ER   
with [facility] where   
CT scan was  
done showing diffuse   
colitis she was   
referred to [facility] and  
admitted she was   
treated with 2 days of   
IV antibiotics and sent   
home. Upon  
arrival home she had   
more vomiting and   
presented to our ER   
where she was  
readmitted. She was   
then discharged and   
readmitted with   
recalcitrant vomiting  
and hypokalemia.  
  
She states that   
multiple family members   
throughout her   
knowledge of had their  
gallbladders removed   
after pregnancy and she   
is concerned that some   
of this is  
her gallbladder.  
  
She states she had a   
normal bowel movement   
this afternoon it was   
soft dark in  
color but no blood. She   
denies any worsening   
abdominal pain with   
defecation.  
She states the pain is   
rather constant seems   
to be worse with moving   
or with  
any sudden vibration.   
She states she feels   
somewhat better with   
vomiting feels  
worse with eating. Has   
pain in her left upper   
quadrant rating to her   
left  
lower quadrant with any   
ingestion of food. She   
denies any hematemesis   
enlarged  
with the vomitus is   
bile and undigested   
food.  
  
Past medical history is   
unremarkable other than   
the constipation.  
  
Past surgical history   
is denied.  
  
Family history no one   
with inflammatory bowel   
disease or colon   
cancer.  
  
Social history she   
smokes marijuana   
socially last use was   
New Year's [place]ar[place]. She  
does not use drugs does   
not drink alcohol or   
smoke. She has sole   
custody of  
her younger sister   
stating her mom just   
walked out. She does   
receive state  
assistance for her   
sister and is her   
primary guardian.  
  
10 system review is   
negative set that noted   
above  
  
Review Family/Social   
History and ROS:  
Social History:  
  
Smoking Status: never   
smoker (1)  
Alcohol Use: denies(1)  
Drug Use: denies (1)  
  
  
Allergies:  
MiraLax:   
Hives/Urticaria  
codeine: Unknown  
  
Objective:  
  
Objective Information:  
  
T PRBPSpO2  
[id number]/9299%  
Date/Time 15:251/19   
15: 15:   
15: 15:25  
Range(36.7C - 37.4C )   
(97 - 133 ) (18 - 20 )   
(117 - 139 )/ (86 - 101   
)  
(97% - 99% )  
Highest temp of 37.4 C   
was recorded at    
15:25  
  
Physical Exam by   
System:  
  
Constitutional: Well   
developed,   
awake/alert/oriented   
x3, no distress, alert   
and  
cooperative  
Eyes: PERRL, EOMI,   
clear sclera  
ENMT: mucous membranes   
moist, no apparent   
injury, no lesions seen  
Head/Neck: Neck supple,   
no apparent injury,   
thyroid without mass or   
tenderness,  
No JVD, trachea   
midline, no bruits  
Respiratory/Thorax:   
Patent airways, CTAB,   
normal breath sounds   
with good chest  
expansion, thorax   
symmetric  
Cardiovascular:   
Regular, rate and   
rhythm, no murmurs, 2+   
equal pulses of the  
extremities, normal S   
1and S 2  
Gastrointestinal:   
Abdomen soft mildly   
tender left lower   
quadrant and  
periumbilical region   
without rebound or   
guarding no palpable   
mass or bruit  
Musculoskeletal: ROM   
intact, no joint   
swelling, normal   
strength  
Neurological: alert and   
oriented x3, intact   
senses, motor, response   
and  
reflexes, normal   
strength  
Lymphatic: No   
significant   
lymphadenopathy  
Psychological:   
Appropriate mood and   
behavior  
Skin: Warm and dry, no   
lesions, no rashes  
  
Recent Lab Results:  
  
Results:  
  
  
I have reviewed these   
laboratory results:  
  
Basic Metabolic Panel   
2022 13:33:00  
  
ResultValue  
Lab Comment: called/rb   
to radhika harden,   
2022 14:11  
Glucose, Serum 121 H  
 L  
K 2.8 LL  
  
Bicarbonate, Serum 20 L  
Anion Gap, Serum 12  
BUN 3 L  
CREAT 0.42 L  
GFR Female >90  
Calcium, Serum 7.9 L  
  
Complete Blood Count   
2022 13:33:00  
  
ResultValue  
White Blood Cell Count   
6.3  
Red Blood Cell Count   
4.16  
HGB 11.9 L  
HCT 36.0  
MCV 87  
MCHC 33.1  
  
RDW-CV 14.6 H  
  
Comprehensive Metabolic   
Panel 2022   
12:11:00  
  
ResultValue  
Glucose, Serum 78  
  
K 3.2 L  
  
Bicarbonate, Serum 19 L  
Anion Gap, Serum 16  
BUN 4 L  
CREAT 0.54  
GFR Female >90  
Calcium, Serum 8.5 L  
ALB 3.9  
ALKP 38  
T Pro 6.2 L  
T Bili 0.5  
Alanine   
Aminotransferase, Serum   
10  
Aspartate Transaminase,   
Serum 17  
  
Drug Screen, Urine 1   
(more content not   
included)...        Normal                                  [facility]  
   
                                                    Daily Progress Note-General   
Internal Medicineon 2022   
   
                                                    Daily Progress   
Note-General Internal   
Medicine                                Consult Type:   
subsequent visit/care  
  
Service: General   
Internal Medicine  
  
History of Present   
Illness:  
History Present   
Illness:  
Admission Reason:   
INTRACTABLE NAUSEA AND   
VOMITING  
HPI:  
SHE IS STILL NAUSEAS   
AND VOMITING. SHE HAS   
EPIGASTRIC PAIN. SHE IS   
INTOLERANT  
TO FLAGYL AND HAS   
VOMITING SWALLOWING THE   
PILL. SHE FELT BETTER   
AFTER SHE GOT  
LITER BOLUS THIS   
MORNING. SHE HAS NOT   
SMOKED MARIJUANA FOR   
ABOUT 10 DAYS SAID  
SHE VOMITED WHEN SHE   
TRIED. SHE STILL HAS   
LEFT LOWER QUADRANT   
ABDOMINAL PAIN  
  
Subjective Data:  
DANNA SOARES   
is a 21 year old Female   
who is Hospital Day #   
4.  
  
Overnight Events:   
Patient had an   
uneventful night.  
  
Objective Data:  
  
Objective Information:  
T PRBPSpO2  
[id number]%  
Date/Time 8:   
8: 8:   
8: 8:00  
Range(36.7C - 37C ) (97   
- 133 ) (18 - 20 ) (117   
- 139 )/ (86 - 101 )   
(97%  
- 99% )  
Highest temp of 37 C   
was recorded at    
17:37  
  
  
Pain reported at    
8:00: 0 = None  
  
Physical Exam by   
System:  
  
Constitutional: Well   
developed,   
awake/alert/oriented   
x3, no distress, alert   
and  
cooperative  
Eyes: PERRL, EOMI,   
clear sclera  
ENMT: mucous membranes   
slightly dry, lips   
cracked, no apparent   
injury, no  
lesions seen  
Head/Neck: Neck supple,   
no apparent injury,   
thyroid without mass or   
tenderness,  
No JVD, trachea   
midline, no bruits  
Respiratory/Thorax:   
Patent airways, CTAB,   
normal breath sounds   
with good chest  
expansion, thorax   
symmetric  
Cardiovascular:   
Regular, rate and   
rhythm, no murmurs, 2+   
equal pulses of the  
extremities, normal S   
1and S 2  
Gastrointestinal:   
Nondistended, soft,   
non-tender, no rebound   
tenderness or  
guarding, no masses   
palpable, no   
organomegaly, +BS, no   
bruits  
Musculoskeletal: ROM   
intact, no joint   
swelling, normal   
strength  
Extremities: normal   
extremities, no   
cyanosis edema,   
contusions or wounds,   
no  
clubbing  
Neurological: alert and   
oriented x3, intact   
senses, motor, response   
and  
reflexes, normal   
strength  
Psychological:   
Appropriate mood and   
behavior  
Skin: Warm and dry, no   
lesions, no rashes  
  
Medication:  
  
Medications:  
  
Continuous Medications  
-----------------------  
---------  
  
1. Lactated Ringers   
Infusion: 1000 mL   
IntraVenous  
  
Scheduled Medications  
-----------------------  
---------  
  
1. Ciprofloxacin 400 mg   
IVPB/ Premixed Soln 200   
mL: 200 mL IntraVenous  
Piggyback Every 12   
Hours  
2. metroNIDAZOLE   
(FLAGYL) 500 mg IVPB/   
Premixed Soln 100 mL:   
100 mL  
IntraVenous Piggyback   
Every 8 Hours  
3. Pantoprazole   
Injectable: 40 mg   
IntraVenous Push Every   
12 Hours  
  
PRN Medications  
-----------------------  
---------  
  
1. Acetaminophen: 650   
mg Oral Every 4 Hours  
2. Acetaminophen: 650   
mg Oral Every 4 Hours  
3. Docusate: 100 mg   
Oral 2 Times a Day  
4. LORazepam   
Injectable: 1 mg   
IntraVenous Push Every   
8 Hours  
5. Magnesium Hydroxide   
-Al Hydrox -Simethicone   
Oral Liquid: 30 mL Oral  
Every 6 Hours  
  
  
Recent Lab Results:  
  
Results:  
  
  
I have reviewed these   
laboratory results:  
  
Troponin I, Serum   
2022 04:26:00  
  
ResultValue  
Troponin I, Serum <0.02  
  
Complete Blood Count +   
Differential   
2022 12:11:00  
  
ResultValue  
White Blood Cell Count   
9.4  
Nucleated Erythrocyte   
Count 0.2  
Red Blood Cell Count   
4.53  
HGB 13.1  
HCT 39.7  
MCV 88  
MCHC 32.9  
  
RDW-CV 14.7 H  
Neutrophil % 61.2  
Lymphocyte % 25.4  
Monocyte % 10.0  
Eosinophil % 2.3  
Basophil % 1.1  
Neutrophil Count 5.70  
Lymphocyte Count 2.40  
Monocyte Count 0.90  
Eosinophil Count 0.20  
Basophil Count 0.10  
  
Comprehensive Metabolic   
Panel 2022   
12:11:00  
  
ResultValue  
Glucose, Serum 78  
  
K 3.2 L  
  
Bicarbonate, Serum 19 L  
Anion Gap, Serum 16  
BUN 4 L  
CREAT 0.54  
GFR Female >90  
Calcium, Serum 8.5 L  
ALB 3.9  
ALKP 38  
T Pro 6.2 L  
T Bili 0.5  
Alanine   
Aminotransferase, Serum   
10  
Aspartate Transaminase,   
Serum 17  
  
  
Radiology Results:  
  
Results:  
  
  
Impression:  
  
Nondiagnostic gastric   
emptying study due   
toemesis.  
  
NM Gastric Empty Solid   
Only [2022   
9:27AM]  
  
  
Conclusion:  
 Please see physician   
note for formal   
interpretation   
confirmed by Scribe   
Confirmed by CARLOS MOODY   
() on 2022   
5:11:03 AM  
  
Electrocardiogram 12   
Lead [2022   
5:11AM]  
  
  
Assessment and Plan:  
Code Status:  
Code StatusFull Code  
  
Assessment:  
21-year-old female   
admitted for   
intractable nausea and   
vomiting and recent  
colitis. She also has a   
positive urine tox   
screen with   
cannabinoids and   
opiates  
so she may have   
cannabinoid hyperemesis   
syndrome. There is a   
documented history  
of major depressive   
disorder and bipolar   
disorder.  
  
PLAN:  
1. Continue fluid   
hydration with IV   
fluids  
2. Clear liquids as   
tolerated  
3. Zofran is not   
helping and she said   
Compazine did help in   
the past; I will  
discontinue the Zofran   
for now and add   
Compazine IV every 6   
hours as needed;  
may also discontinue   
the around-the-clock   
metoclopramide  
4. Lab for tomorrow   
morning  
5. We will consult GI  
6. This is day 2 of the   
IV metr (more content   
not included)...    Normal                                  [facility]  
   
                                                    TROPONIN Ion 2022   
   
                                                    Troponin I.cardiac   
[Mass/Vol]      ng/mL           Normal          0.00 - 0.03     [facility]  
   
                                        Comment on above:   Result Comment: LESS  
 THAN 0.04 NG/ML: NEGATIVE  
REPEAT TESTING IN THREE TO SIX HOURS  
IF CLINICALLY INDICATED.  
0.04 - 0.5 NG/ML: CONSISTENT WITH POSSIBLE  
CARDIAC DAMAGE AND POSSIBLE INCREASED  
CLINICAL RISK.  
SERIAL MEASUREMENTS MAY HELP ASSESS EXTENT OF  
MYOCARDIAL DAMAGE.  
>0.5 NG/ML: CONSISTENT WITH CARDIAC DAMAGE,  
INCREASED CLINICAL RISK AND MYOCARDIAL  
INFARCTION. SERIAL MEASUREMENTS MAY HELP  
ASSESS EXTENT OF MYOCARDIAL DAMAGE.  
.  
Note: Troponin I testing is performed using different  
testing methodology at [facility] than at other  
[facility] hospitals. Direct result comparisons should only  
be made within the same method.   
   
                                                            Performed By: #### C  
BCDF ####  
[id number] [place] [address]   
   
                                                    CBC AND DIFFERENTIALon    
   
                      Basophils (Bld) [#/Vol] 0.10 10*3/uL Normal     0.00 - 0.1  
0 [facility]  
   
                                        Comment on above:   Performed By: #### P  
TINR ####  
[address] [address]   
   
                      Basophils/100 WBC (Bld) 1.1 %      Normal     0.0 - 2.0  S  
[facility]  
   
                                        Comment on above:   Performed By: #### P  
TINR ####  
[address] [address]   
   
                                                    Eosinophils (Bld)   
[#/Vol]         0.20 10*3/uL    Normal          0.00 - 0.70     [facility]  
   
                                        Comment on above:   Performed By: #### P  
TINR ####  
[address] [address]   
   
                                                    Eosinophils/100 WBC   
(Bld)           2.3 %           Normal          0.0 - 6.0       [facility]  
   
                                        Comment on above:   Performed By: #### P  
TINR ####  
[address] [address]   
   
                                                    Erythrocyte   
distribution width   
(RBC) [Ratio]   14.7 %          High            11.5 - 14.5     [facility]  
   
                                        Comment on above:   Performed By: #### P  
TINR ####  
[address] [address]   
   
                                                    Hematocrit (Bld)   
[Volume fraction] 39.7 %          Normal          36.0 - 46.0     [facility]  
   
                                        Comment on above:   Performed By: #### P  
TINR ####  
[address] [address]   
   
                                                    Hemoglobin (Bld)   
[Mass/Vol]      13.1 g/dL       Normal          12.0 - 16.0     [facility]  
   
                                        Comment on above:   Performed By: #### P  
TINR ####  
[address] [address]   
   
                                                    Lymphocytes (Bld)   
[#/Vol]         2.40 10*3/uL    Normal          1.20 - 4.80     [facility]  
   
                                        Comment on above:   Performed By: #### P  
TINR ####  
[address] [address]   
   
                                                    Lymphocytes/100 WBC   
(Bld)           25.4 %          Normal          13.0 - 44.0     [facility]  
   
                                        Comment on above:   Performed By: #### P  
TINR ####  
[address] [address]   
   
                      MCHC (RBC) [Mass/Vol] 32.9 g/dL  Normal     32.0 - 36.0 [facility]  
   
                                        Comment on above:   Performed By: #### P  
TINR ####  
[address] [address]   
   
                      MCV (RBC) [Entitic vol] 88 fL      Normal     80 - 100   S  
[facility]  
   
                                        Comment on above:   Performed By: #### P  
TINR ####  
[address] [address]   
   
                      Monocytes (Bld) [#/Vol] 0.90 10*3/uL Normal     0.10 - 1.0  
0 [facility]  
   
                                        Comment on above:   Performed By: #### P  
TINR ####  
[address] [address]   
   
                      Monocytes/100 WBC (Bld) 10.0 %     Normal     2.0 - 10.0 S  
[facility]  
   
                                        Comment on above:   Performed By: #### P  
TINR ####  
[address] [address]   
   
                                                    Neutrophils (Bld)   
[#/Vol]         5.70 10*3/uL    Normal          1.20 - 7.70     [facility]  
   
                                        Comment on above:   Result Comment: Perc  
ent differential counts (%) should be   
interpreted in the  
context of the absolute cell counts (cells/L).   
   
                                                            Performed By: #### P  
TINR ####  
[address] [address]   
   
                                                    Neutrophils/100 WBC   
(Bld)           61.2 %          Normal          40.0 - 80.0     [facility]  
   
                                        Comment on above:   Performed By: #### P  
TINR ####  
[address] [address]   
   
                      NUCLEATED RBC 0.2 /100 WBC Normal                [facility]  
   
                                        Comment on above:   Performed By: #### P  
TINR ####  
[address] [address]   
   
                      Platelets (Bld) [#/Vol] 265 10*3/uL Normal     150 - 450    
[facility]  
   
                                        Comment on above:   Performed By: #### P  
TINR ####  
[address] [address]   
   
                      RBC        4.53 x10E12/L Normal     4.00 - 5.20 [facility]  
   
                                        Comment on above:   Performed By: #### P  
TINR ####  
[id number]   
   
                      WBC (Bld) [#/Vol] 9.4 10*3/uL Normal     4.4 - 11.3 [facility]  
   
                                        Comment on above:   Performed By: #### P  
TINR ####  
[place] [address]   
   
                                                    COMPREHENSIVE PANELon 2022   
   
                      Albumin [Mass/Vol] 3.9 g/dL   Normal     3.4 - 5.0  [facility]  
   
                                        Comment on above:   Performed By: #### P  
TINR ####  
[place] [address]   
   
                                                    ALP [Catalytic   
activity/Vol]   38 U/L          Normal          33 - 110        [facility]  
   
                                        Comment on above:   Performed By: #### P  
TINR ####  
[address] [address]   
   
                                                    ALT [Catalytic   
activity/Vol]   10 U/L          Normal          7 - 45          [facility]  
   
                                        Comment on above:   Result Comment: Kaleigh  
ents treated with Sulfasalazine may   
generate  
falsely decreased results for ALT.   
   
                                                            Performed By: #### P  
TINR ####  
[address] [address]   
   
                      Anion gap [Moles/Vol] 16 mmol/L  Normal     10 - 20    [facility]  
   
                                        Comment on above:   Performed By: #### P  
TINR ####  
[address] [address]   
   
                                                    AST [Catalytic   
activity/Vol]   17 U/L          Normal          9 - 39          [facility]  
   
                                        Comment on above:   Performed By: #### P  
TINR ####  
[address] [address]   
   
                      Bilirubin [Mass/Vol] 0.5 mg/dL  Normal     0.0 - 1.2  [facility]  
   
                                        Comment on above:   Performed By: #### P  
TINR ####  
[address] [address]   
   
                      Calcium [Mass/Vol] 8.5 mg/dL  Low        8.6 - 10.3 [facility]  
   
                                        Comment on above:   Performed By: #### P  
TINR ####  
[address] [address]   
   
                      Chloride [Moles/Vol] 105 mmol/L Normal     98 - 107   [facility]  
   
                                        Comment on above:   Performed By: #### P  
TINR ####  
[address] [address]   
   
                      Creatinine [Mass/Vol] 0.54 mg/dL Normal     0.50 - 1.05 [facility]  
   
                                        Comment on above:   Performed By: #### P  
TINR ####  
[address] [address]   
   
                      eGFR FEMALE >90        Normal     >90        [facility]  
   
                                        Comment on above:   Result Comment: CALC  
ULATIONS OF ESTIMATED GFR ARE PERFORMED  
USING THE  CKD-EPI STUDY REFIT EQUATION  
WITHOUT THE RACE VARIABLE FOR THE IDMS-TRACEABLE  
CREATININE METHODS.  
https://jasn.asnjournals.org/content/early//ASN.  
982964   
   
                                                            Performed By: #### P  
TINR ####  
[address] [address]   
   
                      Glucose [Mass/Vol] 78 mg/dL   Normal     74 - 99    [facility]  
   
                                        Comment on above:   Performed By: #### P  
TINR ####  
[address] [address]   
   
                      HCO3 (Bld) [Moles/Vol] 19 mmol/L  Low        21 - 32    [facility]  
   
                                        Comment on above:   Performed By: #### P  
TINR ####  
[address] [address]   
   
                      Potassium [Moles/Vol] 3.2 mmol/L Low        3.5 - 5.3  [facility]  
   
                                        Comment on above:   Performed By: #### P  
TINR ####  
[address] [address]   
   
                      Protein [Mass/Vol] 6.2 g/dL   Low        6.4 - 8.2  [facility]  
   
                                        Comment on above:   Performed By: #### P  
TINR ####  
[address] [address]   
   
                      Sodium [Moles/Vol] 137 mmol/L Normal     136 - 145  [facility]  
   
                                        Comment on above:   Performed By: #### P  
TINR ####  
[address] [address]   
   
                                                    Urea nitrogen   
[Mass/Vol]      4 mg/dL         Low             6 - 23          [facility]  
   
                                        Comment on above:   Performed By: #### P  
TINR ####  
[address] [address]   
   
                                                    Daily Progress Note-Medicine  
on 2022   
   
                                                    Daily Progress   
Note-Medicine                           Service: Medicine  
  
Review of Systems:  
Review of Systems:  
Constitutional:   
NEGATIVE: Fever, Chills  
  
ENMT: NEGATIVE: Nasal   
Discharge, Nasal   
Congestion, Ear Pain,   
Mouth Pain, Throat  
Pain  
  
Respiratory: NEGATIVE:   
Dry Cough, Productive   
Cough, Hemoptysis,   
Wheezing,  
Shortness of Breath  
  
Cardiac: NEGATIVE:   
Chest Pain, Dyspnea on   
Exertion, Orthopnea,   
Palpitations,  
Syncope  
  
Gastrointestinal:   
POSITIVE: Nausea,   
Vomiting, Abdominal   
Pain; NEGATIVE:  
Diarrhea, Constipation  
  
Genitourinary:   
NEGATIVE: Discharge,   
Dysuria, Flank Pain,   
Frequency, Hematuria  
  
Musculoskeletal:   
NEGATIVE: Decreased   
ROM, Pain, Swelling,   
Stiffness, Weakness  
  
Neurological: NEGATIVE:   
Dizziness, Confusion,   
Headache, Syncope  
  
Psychiatric: NEGATIVE:   
Mood Changes, Anxiety  
  
  
Subjective Data:  
DANNA SOARES   
is a 21 year old Female   
who is Hospital Day #   
3.  
  
Day 2 IV ciprofloxacin   
and metronidazole.  
  
Additional Information:  
Patient still having   
nausea and vomiting;   
she cannot keep some   
water down  
  
Attempted the upper GI   
this morning but   
vomited within 10   
minutes after taking  
the contrast  
  
Objective Data:  
  
Objective Information:  
T PRBPSpO2  
[id number]/9797%  
Date/Time 0:001   
0:001 0:001   
0:001 0:00  
Range(37.1C - 37.6C )   
(93 - 101 ) (16 - 16 )   
(130 - 133 )/ (97 - 97   
)  
(97% - 97% )  
Highest temp of 37.6 C   
was recorded at    
16:13  
  
  
Pain reported at    
8:17: 1 = Mild  
  
Physical Exam by   
System:  
  
Constitutional: Awake   
and alert; oriented x3   
with no apparent   
distress or  
respiratory distress  
Head/Neck: Neck supple   
with no palpable   
lymphadenopathy, bruits   
or masses;  
trachea midline  
Respiratory/Thorax:   
Clear to auscultation   
bilaterally no wheezes   
or rhonchi  
noted  
Cardiovascular: Regular   
rate and rhythm; normal   
S1-S2 with no murmur;   
no  
pitting edema and 2+   
pulses bilaterally  
Gastrointestinal: Soft,   
nondistended, positive   
bowel sounds; there is   
some mild  
tenderness with minimal   
palpation left lower   
quadrant with some   
guarding but no  
rebound  
Neurological: Nonfocal;   
cranial nerves II   
through XII appear   
intact  
Psychological: Pleasant   
affect  
  
Recent Lab Results:  
  
Results:  
  
  
I have reviewed these   
laboratory results:  
  
Complete Blood Count +   
Differential   
2022 10:29:00  
  
ResultValue  
White Blood Cell Count   
9.3  
Nucleated Erythrocyte   
Count 0.1  
Red Blood Cell Count   
4.53  
HGB 12.8  
HCT 39.2  
MCV 87  
MCHC 32.7  
  
RDW-CV 14.8 H  
Neutrophil % 66.2  
Lymphocyte % 20.3  
Monocyte % 9.5  
Eosinophil % 2.6  
Basophil % 1.4  
Neutrophil Count 6.20  
Lymphocyte Count 1.90  
Monocyte Count 0.90  
Eosinophil Count 0.20  
Basophil Count 0.10  
  
Basic Metabolic Panel   
2022 10:29:00  
  
ResultValue  
Glucose, Serum 74  
  
K 3.6  
  
Bicarbonate, Serum 17 L  
Anion Gap, Serum 18  
BUN 7  
CREAT 0.51  
GFR Female >90  
Calcium, Serum 9.1  
  
Drug Screen, Urine   
2022 10:19:00  
  
ResultValue  
Comments. SEE BELOW   
Drug screen results are   
presumptive and should   
not be used  
to assess compliance   
with prescribed   
medication. Contact the   
performing [facility]  
laboratory to add-on   
definitive confirmatory   
testing if clinically   
indicated.  
.Toxicology scre  
Amphetamine Screen,   
Urine PRESUMPTIVE   
NEGATIVE CUTOFF LEVEL:   
500 NG/ML  
Cross-reactivity has   
been reported with high   
concentrations of the   
following  
drugs: buproprion,   
chloroquine,   
chlorpromazine,   
ephedrine,   
mephentermine,  
fenfluramine,   
phentermine,   
phenylpropanolamine  
Barbiturate Screen,   
Urine PRESUMPTIVE   
NEGATIVE PRESUMPTIVE   
NEGATIVE CUTOFF  
LEVEL: 200 NG/ML  
Benzodiazepine Screen,   
Urine PRESUMPTIVE   
NEGATIVE PRESUMPTIVE   
NEGATIVE CUTOFF  
LEVEL: 200 NG/ML  
Cannabinoid Screen,   
Urine PRESUMPTIVE   
POSITIVE PRESUMPTIVE   
POSITIVE CUTOFF  
LEVEL: 50 NG/ML A  
Cocaine Metabolite   
Screen, Urine   
PRESUMPTIVE NEGATIVE   
PRESUMPTIVE NEGATIVE  
CUTOFF LEVEL: 150 NG/ML  
Fentanyl Screen, Urine   
PRESUMPTIVE NEGATIVE   
CUTOFF LEVEL: 1 NG/ML   
The  
performance   
characteristics of this   
test have been   
determined by the   
individual  
 laboratory site   
where testing is   
performed. This test   
has not been cleared  
or approved by the FDA;   
however,  
Methadone Screen, Urine   
PRESUMPTIVE NEGATIVE   
CUTOFF LEVEL: 150 NG/ML   
The  
metabolite   
L-alpha-acetylmethadol   
(LAAM) is not detected   
by this method in  
concentrations that   
would be found in the   
urine of patients on   
LAAM therapy.  
Opiate Screen, Urine   
PRESUMPTIVE POSITIVE   
CUTOFF LEVEL: 300 NG/ML   
The opiate  
screen does not detect   
fentanyl, meperidine,   
or tramadol. Oxycodone   
is not  
consistently detected   
(refer to Oxycodone   
Screen, Urine result).   
A  
Oxycodone Screen, Urine   
(item) PRESUMPTIVE   
NEGATIVE CUTOFF LEVEL:   
100 NG/ML  
This test will   
accurately detect both   
oxycodone and   
oxymorphone.  
PCP Screen, Urine   
PRESUMPTIVE NEGATIVE   
CUTOFF LEVEL: 25 NG/ML  
Cross-reactivity has   
been reported with   
dextromethorphan.  
  
  
Assessment and Plan:  
Additional Dx:  
Left sided colitis:   
Entered Date:   
2022 12:07  
Cannabinoid hyperemesis   
(more content not   
included)...        [facility]  
   
                                                    GASTRIC EMPTY SOLID ONLYon 0  
2022   
   
                                                    GASTRIC EMPTY SOLID   
ONLY                                    MRN: [id number]  
Patient Name:   
DANNA SOARES  
  
STUDY:  
GASTRIC EMPTY SOLID   
ONLY; 2022 6:35 am  
  
INDICATION:  
vomiting .  
  
COMPARISON:  
None.  
  
ACCESSION NUMBER(S):  
[id number]  
  
ORDERING CLINICIAN:  
SAL MCDERMOTT  
  
TECHNIQUE:  
DIVISION OF NUCLEAR   
MEDICINE  
GASTRIC EMPTYING   
QUANTIFICATION  
  
The patient received an   
oral radiolabeled   
solid-phase meal   
utilizing  
1.2 mCi of Tc-99m   
sulfur colloid in   
cooked egg. Sequential   
anterior  
and posterior images of   
the abdomen were then   
acquired. 10 minutes  
after eating, the   
patient vomited and the   
study was discontinued.  
  
FINDINGS:  
The image series shows   
normal filling of the   
stomach in the  
immediate images. An   
image was taken after   
emesis which showed  
retained radiotracer   
material within the   
stomach.  
  
IMPRESSION  
Nondiagnostic gastric   
emptying study due to   
emesis.  
  
I personally reviewed   
the images/study and I   
agree with the findings  
as stated. This study   
was interpreted at   
[facility], [place], [place].  
Electronically signed   
by: SUMIT WETZEL MD           Normal                                  [facility]  
   
                                                    Admission Risk Screen - Adul  
ton 2022   
   
                                                    Admission Risk Screen -   
Adult                                   Allergies:  
Allergies:  
MiraLax:   
Hives/Urticaria  
codeine: Unknown  
  
Patient Verification:  
New W ID Band Applied   
in my Departmentyes  
Patient Identity   
Verified Bypatient  
ID Band FULL Name,   
include Middle,   
spelling matches   
patient's ID used for  
verificationyes  
ID Band  Matches   
Patient ID used for   
Verficationyes  
ID Band MRN Matches EMR   
MRNyes  
  
Visitor Restriction:  
Coronavirus Visitor   
Restriction: Reasonable   
restrictions to   
in-person  
visitors will be   
observed due to current   
coronavirus pandemic.  
  
Travel History:  
COVID-19 Screening   
Completedno exposure or   
symptoms(1)  
Travel or Exposure Past   
30 DaysNO travel to   
International locations   
in the  
past 30 days  
[place] AlertFor   
Ebola-like Symptoms:   
Isolate Patient and   
Notify  
Provider/Infection   
Preventionist  
  
For Contact: Notify   
Provider/Infection   
Preventionist  
  
Advance Directive:  
Advance Directive/DNRno  
Advance Directive   
Information   
Givenpatient/family   
declined  
  
[facility] Fall Screen:  
History of falling   
(immediate or   
previous)no (0)  
Secondary Diagnosisno   
(0)  
Intravenous Therapy/   
Heparin/Saline Lockyes   
(20)  
Gait/Transferringnormal  
/bedrest/wheelchair (0)  
Ambulatory   
Aidsnone/bedrest/nurse   
assist (0)  
Mental Statusoriented   
to own ability (0)  
Score: Low risk (<25).   
Moderate risk (25-44).   
High risk (>44).20  
[facility] InterventionsLOW   
INTERVENTIONS:  
*patient oriented to   
surroundings and call   
system,  
* patient/family falls   
education completed  
and documented,  
*patients fall status   
communicated  
during bedside handoff,  
*whiteboard updated,  
*mode of toileting   
discussed with patient,  
*bed in low position   
with brakes locked,  
*call light in reach,  
* non-skid footwear  
  
  
Family Violence Screen:  
Are you or have you   
been threatened or   
abused physically,   
emotionally, or  
sexually by anyoneno  
Has anyone ever   
threatened to hurt your   
family or your petsno  
Does anyone try to keep   
you from   
having/contacting other   
friends or doing  
things outside your   
homeno  
Do you feel UNSAFE   
going back to the place   
where you are livingno  
Do you feel anyone has   
exploited or taken   
advantage of you   
financially or of  
your personal   
propertyno  
Clinical assessment:   
Are there any apparent   
signs of   
injuries/behaviors that  
could be related to   
abuse/neglectno  
Social Service Consult   
for abuse/neglect   
needed this visitno  
  
Functional Screen:  
Functional Screen: In   
the recent/past 2-4   
weeks, patient or   
family have  
noticedno issues that   
require a   
speech/language consult   
at this time  
  
AM-PAC- Basic   
Mobility/Daily   
Activity:  
Patient baseline   
bedboundno  
Turning from your back   
to your side while in a   
flat bed without using  
bedrailsnone  
Moving from lying on   
your back to sitting on   
the side of a flat bed   
without  
using bedrailsnone  
Moving to and from bed   
to chair (including a   
wheelchair)none  
Standing up from a   
chair using your arms   
(e.g. wheelchair or   
bedside chair)  
none  
To walk in hospital   
roomnone  
Climbing 3-5 steps with   
railingnone  
Basic Mobility - Total   
Score24  
Putting on and taking   
off regular lower body   
clothingnone  
Bathing (including   
washing, rinsing,   
drying)none  
Putting on and taking   
off regular upper body   
clothingnone  
Toileting, which   
includes using toilet,   
bedpan or urinalnone  
Taking care of personal   
grooming such as   
brushing teethnone  
Eating Mealsnone  
Daily Activity - Total   
Score24  
  
Learning Assessment   
(Patient):  
Patient is Able to be   
Assessed for   
Learningyes  
Factors Influencing   
Readiness to   
Learnfatigue  
Factors that Impact   
Ability to Learnnone  
Devices/Methods Used to   
Communicatenone  
Learning   
Preferenceswritten   
material; video; verbal   
instruction; individual  
instruction  
Cultural   
Considerationsnone  
Developmental   
Considerationsnone  
Congregation   
Considerationsnone  
Other   
Learnerssignificant   
other  
  
Learning Assessment   
(Other Learner):  
Other learner   
availableno  
  
Depression Screen:  
During the past month,   
have you often been   
bothered by feeling   
down,  
depressed or hopelessno  
During the past month,   
have you often had   
little interest or   
pleasure in  
doing thingsno  
Have you had any   
thoughts of harming   
anyone elseno (1)  
  
[facility] Suicide:  
Risk Screen Not   
Applicable/Able to   
Answerable to be   
screened  
In the Past Month: Have   
you wished you were   
dead or could go to   
sleep and not  
wake upno(1)  
In the Past Month: Have   
you had any actual   
thoughts of killing   
yourself  
no(1)  
Lifetime: Have you ever   
done, started to do, or   
prepared to do anything   
to  
end your lifeno  
[facility] Suicide   
Risknegative  
  
Adult Nutrition Screen:  
Have you recently lost   
weight without tryingno  
Have you been eating   
poorly because of a   
decreased appetiteyes  
Malnutrition Screening   
Tool Score1  
Malnutrition Screening   
Tool RiskMST = 0 or 1   
Not at risk. Eating   
well with  
little or no weight   
loss  
Nutrition Consult   
needed this visitno  
Can Patient Participate   
in Room Serviceno  
Patient requires Paper   
Dishes/Plastic   
Utensilsno  
CommentsNPO FOR NOW  
  
Pain Screen:  
Joel (more content not   
included)...        Normal                                  [facility]  
   
                                                    BASIC METABOLIC PANELon    
   
                      Anion gap [Moles/Vol] 18 mmol/L  Normal     10 - 20    [facility]  
   
                                        Comment on above:   Performed By: #### B  
MP ####[address] [address]   
   
                      Calcium [Mass/Vol] 9.1 mg/dL  Normal     8.6 - 10.3 [facility]  
   
                                        Comment on above:   Performed By: #### B  
MP ####[address] [address]   
   
                      Creatinine [Mass/Vol] 0.51 mg/dL Normal     0.50 - 1.05 [facility]  
   
                                        Comment on above:   Performed By: #### B  
MP ####[address] [address]   
   
                      eGFR FEMALE >90        Normal     >90        [facility]  
   
                                        Comment on above:   Result Comment: CALC  
ULATIONS OF ESTIMATED GFR ARE PERFORMED  
USING THE  CKD-EPI STUDY REFIT EQUATION  
WITHOUT THE RACE VARIABLE FOR THE IDMS-TRACEABLE  
CREATININE METHODS.  
https://jasn.asnjournals.org/content/early//ASN.  
533194   
   
                                                            Performed By: #### B  
MP ####[address] [address]   
   
                      Glucose [Mass/Vol] 74 mg/dL   Normal     74 - 99    [facility]  
   
                                        Comment on above:   Performed By: #### B  
MP ####[address] [address]   
   
                      HCO3 (Bld) [Moles/Vol] 17 mmol/L  Low        21 - 32    [facility]  
   
                                        Comment on above:   Performed By: #### B  
MP ####[address] [address]   
   
                      Potassium [Moles/Vol] 3.6 mmol/L Normal     3.5 - 5.3  [facility]  
   
                                        Comment on above:   Result Comment: MILD  
 HEMOLYSIS DETECTED. The result may be   
falsely elevated due to  
hemolysis or other interferents. Clinical correlation is   
recommended.  
Repeat testing may be considered.   
   
                                                            Performed By: #### B  
MP ####[place] [address]   
   
                      Sodium [Moles/Vol] 136 mmol/L Normal     136 - 145  [facility]  
   
                                        Comment on above:   Performed By: #### B  
MP ####[place] [address]   
   
                                                    Urea nitrogen   
[Mass/Vol]      7 mg/dL         Normal          6 - 23          [facility]  
   
                                        Comment on above:   Performed By: #### B  
MP ####[place] [address]   
   
                      Chloride [Moles/Vol] 105 mmol/L Normal     98 - 107   [facility]  
   
                                        Comment on above:   Performed By: #### B  
MP ####[place] [address]   
   
                                                            Performed By: #### E  
LECT ####[place] [address]   
   
                                                    CBC AND DIFFERENTIALon    
   
                      Basophils (Bld) [#/Vol] 0.10 10*3/uL Normal     0.00 - 0.1  
0 [facility]  
   
                                        Comment on above:   Performed By: #### C  
BCDF ####[place] [id number]   
   
                      Basophils/100 WBC (Bld) 1.4 %      Normal     0.0 - 2.0  S  
[facility]  
   
                                        Comment on above:   Performed By: #### C  
BCDF ####[place] [id number]   
   
                                                    Eosinophils (Bld)   
[#/Vol]         0.20 10*3/uL    Normal          0.00 - 0.70     [facility]  
   
                                        Comment on above:   Performed By: #### C  
BCDF ####[place] [id number]   
   
                                                    Eosinophils/100 WBC   
(Bld)           2.6 %           Normal          0.0 - 6.0       [facility]  
   
                                        Comment on above:   Performed By: #### C  
BCDF ####[place] [address]   
   
                                                    Erythrocyte   
distribution width   
(RBC) [Ratio]   14.8 %          High            11.5 - 14.5     [facility]  
   
                                        Comment on above:   Performed By: #### C  
BCDF ####[address] [address]   
   
                                                    Hematocrit (Bld)   
[Volume fraction] 39.2 %          Normal          36.0 - 46.0     [facility]  
   
                                        Comment on above:   Performed By: #### C  
BCDF ####[address] [address]   
   
                                                    Hemoglobin (Bld)   
[Mass/Vol]      12.8 g/dL       Normal          12.0 - 16.0     [facility]  
   
                                        Comment on above:   Performed By: #### C  
BCDF ####[address] [address]   
   
                                                    Lymphocytes (Bld)   
[#/Vol]         1.90 10*3/uL    Normal          1.20 - 4.80     [facility]  
   
                                        Comment on above:   Performed By: #### C  
BCDF ####[address] [address]   
   
                                                    Lymphocytes/100 WBC   
(Bld)           20.3 %          Normal          13.0 - 44.0     [facility]  
   
                                        Comment on above:   Performed By: #### C  
BCDF ####[address] [address]   
   
                      MCHC (RBC) [Mass/Vol] 32.7 g/dL  Normal     32.0 - 36.0 [facility]  
   
                                        Comment on above:   Performed By: #### C  
BCDF ####[address] [address]   
   
                      MCV (RBC) [Entitic vol] 87 fL      Normal     80 - 100   S  
[facility]  
   
                                        Comment on above:   Performed By: #### C  
BCDF ####[address] [address]   
   
                      Monocytes (Bld) [#/Vol] 0.90 10*3/uL Normal     0.10 - 1.0  
0 [facility]  
   
                                        Comment on above:   Performed By: #### C  
BCDF ####[address] [address]   
   
                      Monocytes/100 WBC (Bld) 9.5 %      Normal     2.0 - 10.0 S  
[facility]  
   
                                        Comment on above:   Performed By: #### C  
BCDF ####[address] [address]   
   
                                                    Neutrophils (Bld)   
[#/Vol]         6.20 10*3/uL    Normal          1.20 - 7.70     [facility]  
   
                                        Comment on above:   Result Comment: Perc  
ent differential counts (%) should be   
interpreted in the  
context of the absolute cell counts (cells/L).   
   
                                                            Performed By: #### C  
BCDF ####[address] [address]   
   
                                                    Neutrophils/100 WBC   
(Bld)           66.2 %          Normal          40.0 - 80.0     [facility]  
   
                                        Comment on above:   Performed By: #### C  
BCDF ####[address] [address]   
   
                      NUCLEATED RBC 0.1 /100 WBC Normal                [facility]  
   
                                        Comment on above:   Performed By: #### C  
BCDF ####[address] [address]   
   
                      Platelets (Bld) [#/Vol] 275 10*3/uL Normal     150 - 450    
[facility]  
   
                                        Comment on above:   Performed By: #### C  
BCDF ####[address] [address]   
   
                      RBC        4.53 x10E12/L Normal     4.00 - 5.20 [facility]  
   
                                        Comment on above:   Performed By: #### C  
BCDF ####[address] [address]   
   
                      WBC (Bld) [#/Vol] 9.3 10*3/uL Normal     4.4 - 11.3 [facility]  
   
                                        Comment on above:   Performed By: #### C  
BCDF ####[address] [address]   
   
                      Basophils (Bld) [#/Vol] 0.10 10*3/uL Normal     0.00 - 0.1  
0 [facility]  
   
                                        Comment on above:   Performed By: #### P  
TINR ####  
[address] [address]   
   
                      Basophils/100 WBC (Bld) 1.5 %      Normal     0.0 - 2.0  S  
[facility]  
   
                                        Comment on above:   Performed By: #### P  
TINR ####  
[address] [address]   
   
                                                    Eosinophils (Bld)   
[#/Vol]         0.20 10*3/uL    Normal          0.00 - 0.70     [facility]  
   
                                        Comment on above:   Performed By: #### P  
TINR ####  
[address] [address]   
   
                                                    Eosinophils/100 WBC   
(Bld)           2.1 %           Normal          0.0 - 6.0       [facility]  
   
                                        Comment on above:   Performed By: #### P  
TINR ####  
[address] [address]   
   
                                                    Erythrocyte   
distribution width   
(RBC) [Ratio]   14.5 %          Normal          11.5 - 14.5     [facility]  
   
                                        Comment on above:   Performed By: #### P  
TINR ####  
[address] [address]   
   
                                                    Hematocrit (Bld)   
[Volume fraction] 40.2 %          Normal          36.0 - 46.0     [facility]  
   
                                        Comment on above:   Performed By: #### P  
TINR ####  
[address] [address]   
   
                                                    Hemoglobin (Bld)   
[Mass/Vol]      13.4 g/dL       Normal          12.0 - 16.0     [facility]  
   
                                        Comment on above:   Performed By: #### P  
TINR ####  
[address] [address]   
   
                                                    Lymphocytes (Bld)   
[#/Vol]         1.30 10*3/uL    Normal          1.20 - 4.80     [facility]  
   
                                        Comment on above:   Performed By: #### P  
TINR ####  
[address] [address]   
   
                                                    Lymphocytes/100 WBC   
(Bld)           16.6 %          Normal          13.0 - 44.0     [facility]  
   
                                        Comment on above:   Performed By: #### P  
TINR ####  
[address] [address]   
   
                      MCHC (RBC) [Mass/Vol] 33.4 g/dL  Normal     32.0 - 36.0 [facility]  
   
                                        Comment on above:   Performed By: #### P  
TINR ####  
[address] [address]   
   
                      MCV (RBC) [Entitic vol] 86 fL      Normal     80 - 100   S  
[facility]  
   
                                        Comment on above:   Performed By: #### P  
TINR ####  
[address] [address]   
   
                      Monocytes (Bld) [#/Vol] 0.80 10*3/uL Normal     0.10 - 1.0  
0 [facility]  
   
                                        Comment on above:   Performed By: #### P  
TINR ####  
[address] [address]   
   
                      Monocytes/100 WBC (Bld) 9.9 %      Normal     2.0 - 10.0 S  
[facility]  
   
                                        Comment on above:   Performed By: #### P  
TINR ####  
[address] [address]   
   
                                                    Neutrophils (Bld)   
[#/Vol]         5.60 10*3/uL    Normal          1.20 - 7.70     [facility]  
   
                                        Comment on above:   Result Comment: Perc  
ent differential counts (%) should be   
interpreted in the  
context of the absolute cell counts (cells/L).   
   
                                                            Performed By: #### P  
TINR ####  
[address] [address]   
   
                                                    Neutrophils/100 WBC   
(Bld)           69.9 %          Normal          40.0 - 80.0     [facility]  
   
                                        Comment on above:   Performed By: #### P  
TINR ####  
[address] [address]   
   
                      Platelets (Bld) [#/Vol] 272 10*3/uL Normal     150 - 450    
[facility]  
   
                                        Comment on above:   Performed By: #### P  
TINR ####  
[address] [address]   
   
                      RBC        4.69 x10E12/L Normal     4.00 - 5.20 [facility]  
   
                                        Comment on above:   Performed By: #### P  
TINR ####  
[address] [address]   
   
                      WBC (Bld) [#/Vol] 8.1 10*3/uL Normal     4.4 - 11.3 [facility]  
   
                                        Comment on above:   Performed By: #### P  
TINR ####  
[place] [id number]   
   
                                                    COMPREHENSIVE PANELon 2022   
   
                      Albumin [Mass/Vol] 4.1 g/dL   Normal     3.4 - 5.0  [facility]  
   
                                        Comment on above:   Performed By: #### C  
MP ####[place] [address]   
   
                                                    ALP [Catalytic   
activity/Vol]   39 U/L          Normal          33 - 110        [facility]  
   
                                        Comment on above:   Performed By: #### C  
MP ####[place] [id number]   
   
                                                    ALT [Catalytic   
activity/Vol]   9 U/L           Normal          7 - 45          [facility]  
   
                                        Comment on above:   Result Comment: Kaleigh  
ents treated with Sulfasalazine may   
generate  
falsely decreased results for ALT.   
   
                                                            Performed By: #### C  
MP ####[place] [address]   
   
                      Anion gap [Moles/Vol] 18 mmol/L  Normal     10 - 20    [facility]  
   
                                        Comment on above:   Performed By: #### C  
MP ####[place] [id number]   
   
                                                    AST [Catalytic   
activity/Vol]   13 U/L          Normal          9 - 39          [facility]  
   
                                        Comment on above:   Performed By: #### C  
MP ####[address] [address]   
   
                      Bilirubin [Mass/Vol] 0.5 mg/dL  Normal     0.0 - 1.2  [facility]  
   
                                        Comment on above:   Performed By: #### C  
MP ####[address] [address]   
   
                      Calcium [Mass/Vol] 8.8 mg/dL  Normal     8.6 - 10.3 [facility]  
   
                                        Comment on above:   Performed By: #### C  
MP ####[place] [id number]   
   
                      Chloride [Moles/Vol] 100 mmol/L Normal     98 - 107   [facility]  
   
                                        Comment on above:   Performed By: #### C  
MP ####[address] [address]   
   
                      Creatinine [Mass/Vol] 0.56 mg/dL Normal     0.50 - 1.05 [facility]  
   
                                        Comment on above:   Performed By: #### C  
MP ####[address] [address]   
   
                      eGFR FEMALE >90        Normal     >90        [facility]  
   
                                        Comment on above:   Result Comment: CALC  
ULATIONS OF ESTIMATED GFR ARE PERFORMED  
USING THE  CKD-EPI STUDY REFIT EQUATION  
WITHOUT THE RACE VARIABLE FOR THE IDMS-TRACEABLE  
CREATININE METHODS.  
https://jasn.asnjournals.org/content/early/ASN.  
763444   
   
                                                            Performed By: #### C  
MP ####[address] [address]   
   
                      Glucose [Mass/Vol] 86 mg/dL   Normal     74 - 99    [facility]  
   
                                        Comment on above:   Performed By: #### C  
MP ####[address] [address]   
   
                      HCO3 (Bld) [Moles/Vol] 22 mmol/L  Normal     21 - 32    [facility]  
   
                                        Comment on above:   Performed By: #### C  
MP ####[address] [address]   
   
                      Potassium [Moles/Vol] 3.1 mmol/L Low        3.5 - 5.3  [facility]  
   
                                        Comment on above:   Performed By: #### C  
MP ####[address] [address]   
   
                      Protein [Mass/Vol] 6.4 g/dL   Normal     6.4 - 8.2  [facility]  
   
                                        Comment on above:   Performed By: #### C  
MP ####[address] [address]   
   
                      Sodium [Moles/Vol] 137 mmol/L Normal     136 - 145  [facility]  
   
                                        Comment on above:   Performed By: #### C  
MP ####[address] [address]   
   
                                                    Urea nitrogen   
[Mass/Vol]      6 mg/dL         Normal          6 - 23          [facility]  
   
                                        Comment on above:   Performed By: #### C  
MP ####[place] [address]   
   
                                                    CORONAVIRUS 2019 BY PCRon    
   
                                                    SARS-CoV-2 (COVID-19)   
RNA KIRSTEN+probe Ql (Unsp   
spec)           Not detected    Normal          Not Detected    [facility]  
   
                                        Comment on above:   Result Comment: .  
This test has received FDA Emergency Use Authorization (EUA)   
and has been  
verified by [facility].   
This test is only  
authorized for the duration of time that circumstances exist   
to justify the  
authorization of the emergency use of in vitro diagnostic   
tests for the  
detection of SARS-CoV-2 virus and/or diagnosis of COVID-19   
infection under  
section 564(b)(1) of the Act, 21 U.S.C. 360bbb-3(b)(1), unless   
the  
authorization is terminated or revoked sooner.  
[facility] is certified   
under CLIA-88 as  
qualified to perform high complexity testing. Testing is   
performed in the  
[facility] laboratory located at [address] [place] [address].  
SARS-CoV-2/Flu/RSV Multiplex Test:  
Fact sheet for providers:   
https://www.fda.gov/media/144338/download  
Fact sheet for patients:   
https://www.fda.gov/media/898507/download   
   
                                                            Performed By: #### C  
OV19 ####[place] [address]   
   
                      Lab Specimen Source Nasal, Nasopharyngeal Normal            
      [facility]  
   
                                        Comment on above:   Performed By: #### C  
OV19 ####[place] [address]   
   
                                                    CT HEAD WO CONTRASTon 2022   
   
                                        CT HEAD WO CONTRAST MRN: [id number]  
Patient Name:   
DANNA SOARES  
  
STUDY:  
CT HEAD WO CONTRAST;   
2022 12:15 am  
  
INDICATION:  
head injury .  
  
COMPARISON:  
None.  
  
ACCESSION NUMBER(S):  
[id number]  
  
ORDERING CLINICIAN:  
IVAN COLEY  
  
TECHNIQUE:  
Noncontrast axial CT   
scan of head was   
performed. Angled   
reformats in  
brain and bone windows   
were generated. The   
images were reviewed in  
bone, brain, blood and   
soft tissue windows.  
  
FINDINGS:  
CSF Spaces: The   
ventricles, sulci and   
basal cisterns are   
within  
normal limits. There is   
no extraaxial fluid   
collection.  
  
Parenchyma: The   
grey-white   
differentiation is   
intact. There is no  
mass effect or midline   
shift. There is no   
intracranial   
hemorrhage.  
  
Calvarium: The   
calvarium is   
unremarkable.  
  
Paranasal sinuses and   
mastoids: Visualized   
paranasal sinuses and  
mastoids are clear.  
  
IMPRESSION:  
No evidence of acute   
intracranial   
abnormality.  
Electronically signed   
by: KENZIE HINOJOSA MD           [facility]  
   
                                                    Covid 19 Resultson   
2   
   
                                                    SARS-CoV-2 (COVID-19)   
RNA KIRSTEN+probe Ql (Unsp   
spec)                                   NEGATIVE COVID-19 Test  
  
Coronaviruses are   
common world-wide and   
are the cause of many   
common colds.  
SARS-COV2 is a new   
coronavirus that began   
circulating worldwide   
in 2019 so we  
are calling it   
COVID-19. It has been   
estimated that four out   
of five patients  
with COVID-19 will   
recover at home without   
the need for medical   
attention.  
Symptoms of COVID-19   
may include cough,   
fever, shortness of   
breath, loss of  
taste or smell and   
other flu-like symptoms   
including chills, sore   
muscles, sore  
throat, and headache.   
Severe illness is more   
common in older people   
and people  
with other health   
problems such as high   
blood pressure,   
obesity, and immune  
system problems.  
  
If the test is   
positive, you have   
COVID-19. You will be   
contacted by the  
ordering physicians   
office and instructed   
to remain on home   
isolation, in  
accordance with CDC   
guidelines. You may   
also be contacted by   
the [facility] to   
see if any of your   
close contacts may have   
been exposed  
to the virus and need   
to quarantine.  
  
If the test is   
negative, you likely do   
not have COVID-19 at   
this time, but you  
still may have a   
different illness that   
can spread to other   
people (like  
Influenza, or the Flu)   
and could still be at   
risk for getting   
COVID-19. We  
recommend that you stay   
away from other people   
to limit the spread of   
illness  
until your symptoms are   
improving and you are   
fever-free for 24 hours   
without  
the use of fever   
lowering medications   
such as acetaminophen   
or ibuprofen. No  
test is 100% accurate   
so if you are still   
concerned you may have   
COVID-19, talk  
to your doctor about   
the need to continue to   
stay away from others.  
  
Medicines  
  
Unless your provider   
told you not to use the   
following:   
Acetaminophen (Tylenol  
and others) is   
generally safe.   
Anti-inflammatory   
medications, such as   
Ibuprofen  
(Advil or Motrin) or   
Naproxen (Aleve) can   
also be used.   
Over-the-counter  
cough and cold   
medicines can be used   
according to the   
instructions on the  
package. Some   
over-the-counter   
medicines also contain   
acetaminophen. Make   
sure  
you are not taking more   
than your recommended   
dose. For those not   
hospitalized,  
there is no specific   
treatment available for   
this illness.   
Antibiotics do not  
treat Coronaviruses.  
  
Follow-Up  
  
Follow up with your   
doctor by scheduling a   
virtual visit or   
consider follow-up  
at one of our urgent   
care fever clinics. If   
you are having   
difficulty  
breathing, or are very   
weak and having   
difficulty standing,   
this is a medical  
emergency. Call 911 or   
have someone take you   
to the nearest   
emergency room  
immediately. If   
possible, wear a   
facemask.  
  
Additional guidance   
from the CDC for   
patients who tested   
POSITIVE for COVID-19  
  
How to isolate:  
Isolate yourself in a   
specific room at home   
and limit your contact   
with others.  
Use a separate bathroom   
from other members of   
the household, when   
possible.  
Leave home only to get   
essential medical care.   
Do not go to work,   
school or  
public areas.  
Avoid using public   
transportation,   
ride-sharing, or taxis.  
Restrict contact with   
pets and other animals.   
If you must care for   
your pet or  
be around animals while   
you are sick, wash your   
hands before and after   
your  
interaction and wear a   
facemask.  
Make sure that shared   
spaces in the home have   
good airflow, such as   
by an air  
conditioner or an   
opened window, weather   
permitting.  
  
Personal Hygiene   
Procedures:  
Wear a face mask when   
in the same room as   
other people or pets.   
If a face mask  
interferes with your   
breathing, others   
should wear a mask when   
sharing space  
with you.  
Frequent hand-washing:   
wash your hands with   
soap and water for at   
least 20  
seconds. If soap and   
water are not   
available, use   
alcohol-based hand   
.  
Avoid touching your   
eyes, nose, and mouth   
with unwashed hands.  
  
Household Hygiene   
Procedures:  
Avoid sharing personal   
household items such as   
dishes, glassware,   
cups, eating  
utensils, towels or   
bedding with other   
people or pets in your   
home. After use,  
these items should be   
washed with soap and   
hot water.  
Disinfect all   
high-touch surfaces   
every day with   
antibacterial cleaning  
solutions such as Lysol   
wipes, bleach,   
cleansers, etc.   
High-touch surfaces  
include tabletops,   
doorknobs, bathroom   
fixtures, toilets,   
phones, keyboards,  
tablets and bedside   
tables.  
Immediately clean any   
surfaces that may have   
blood, poop or body   
fluids on  
them, using   
antibacterial cleaning   
solutions such as Lysol   
wipes, bleach,  
cleansers, etc.  
If clothing or bedding   
come into contact with   
blood, poop or body   
fluids, they  
should be washed   
immediately. Follow the   
directions on the   
laundry detergent  
and clothing labels but   
hot water is   
recommended when   
possible.  
  
Stopping home isolation   
precautions:  
If possible, consult   
your doctor before   
stopping home isolation   
precautions.  
According to the CDC,   
you can discontinue   
home isolation   
precautions when you  
have met both of these   
criteria:  
Your fever and   
respiratory symptoms   
have been gone for 24   
[place] (more content not   
included)...        Normal                                  [facility]  
   
                                                    DRUG SCREEN,URINEon 20  
22   
   
                      AMPHETAMINE SCREEN,U Negative   Normal     NEGATIVE   [facility]  
   
                                        Comment on above:   Result Comment: CUTO  
FF LEVEL: 500 NG/ML  
Cross-reactivity has been reported with high concentrations  
of the following drugs: buproprion, chloroquine,   
chlorpromazine,  
ephedrine, mephentermine, fenfluramine, phentermine,  
phenylpropanolamine, pseudoephedrine, and propranolol.   
   
                                                            Performed By: #### D  
RUG[address] ####[place] [address]   
   
                      BARBITURATES SCREEN,U Negative   Normal     NEGATIVE   [facility]  
   
                                        Comment on above:   Result Comment: CUTO  
FF LEVEL: 200 NG/ML   
   
                                                            Performed By: #### D  
RUG[address] ####[place] [address]   
   
                                                    BENZODIAZEPINES   
SCREEN,U        Negative        Normal          NEGATIVE        [facility]  
   
                                        Comment on above:   Result Comment: CUTO  
FF LEVEL: 200 NG/ML   
   
                                                            Performed By: #### D  
RUG[address] ####[place] [address]   
   
                      CANNABINOIDS SCREEN,U Positive   Abnormal   NEGATIVE   [facility]  
   
                                        Comment on above:   Result Comment: CUTO  
FF LEVEL: 50 NG/ML   
   
                                                            Performed By: #### D  
RUG[address] ####[place] [address]   
   
                                                    COCAINE METABOLITE   
SCREEN,U        Negative        Normal          NEGATIVE        [facility]  
   
                                        Comment on above:   Result Comment: CUTO  
FF LEVEL: 150 NG/ML   
   
                                                            Performed By: #### D  
RUG[address] ####[place] [address]   
   
                      DRUG SCREEN COMMENT SEE BELOW  Normal                [facility]  
   
                                        Comment on above:   Result Comment: Drug  
 screen results are presumptive and should  
   
not be used to assess  
compliance with prescribed medication. Contact the performing   
[facility]  
laboratory to add-on definitive confirmatory testing if   
clinically  
indicated.  
.  
Toxicology screening results are reported qualitatively. The   
concentration  
must be greater than or equal to the cutoff to be reported as   
positive. The  
concentration at which the screening test can detect an   
individual drug or  
metabolite varies. The absence of expected drug(s) and/or drug   
metabolite(s)  
may indicate non-compliance, inappropriate timing of specimen   
collection  
relative to drug administration, poor drug absorption,   
diluted/adulterated  
urine, or limitations of testing. For medical purposes only;   
not valid for  
forensic use.  
.  
Interpretive questions should be directed to the laboratory   
medical  
directors.   
   
                                                            Performed By: #### D  
RUG[address] ####[place] [address]   
   
                      FENTANYL SCREEN,URINE Negative   Normal     NEGATIVE   [facility]  
   
                                        Comment on above:   Result Comment: CUTO  
FF LEVEL: 1 NG/ML  
The performance characteristics of this  
test have been determined by the individual  
 laboratory site where testing is performed.  
This test has not been cleared or approved  
by the FDA; however, the FDA has determined  
that such clearance is not necessary.   
   
                                                            Performed By: #### D  
RUG[address] ####[place] [address]   
   
                      METHADONE SCREEN,U Negative   Normal     NEGATIVE   [facility]  
   
                                        Comment on above:   Result Comment: CUTO  
FF LEVEL: 150 NG/ML  
The metabolite L-alpha-acetylmethadol (LAAM) is not  
detected by this method in concentrations that would  
be found in the urine of patients on LAAM therapy.   
   
                                                            Performed By: #### D  
RUG[address] ####[place] [address]   
   
                      OPIATES SCREEN,U Positive   Abnormal   NEGATIVE   [facility]  
   
                                        Comment on above:   Result Comment: CUTO  
FF LEVEL: 300 NG/ML  
The opiate screen does not detect fentanyl, meperidine, or  
tramadol. Oxycodone is not consistently detected (refer to  
Oxycodone Screen, Urine result).   
   
                                                            Performed By: #### D  
RUG[address] ####[address]   
[place] [address]   
   
                      OXYCODONE SCREEN,U Negative   Normal     NEGATIVE   [facility]  
   
                                        Comment on above:   Result Comment: CUTO  
FF LEVEL: 100 NG/ML  
This test will accurately detect both oxycodone and   
oxymorphone.   
   
                                                            Performed By: #### D  
RUG[address] ####[address] [address]   
   
                      PCP SCREEN,U Negative   Normal     NEGATIVE   [facility]  
   
                                        Comment on above:   Result Comment: CUTO  
FF LEVEL: 25 NG/ML  
Cross-reactivity has been reported with dextromethorphan.   
   
                                                            Performed By: #### D  
RUG[address] ####[id number]   
   
                                                    Discharge Planning [id number]  
2022   
   
                                                    Discharge Planning   
Note2                                   Discharge Planning:  
Discharge Barriersnone  
Planned Dispositionhome  
Discharge   
Destinationhome  
Patient/Representative   
Stated Goalhome  
Anticipated Discharge   
[id number]  
  
Discharge Planning  
22 CHEPE STARK RN [facility]  
TRANSITIONAL CARE   
COORDINATOR  
I met with the pt,   
introduced myself, and   
explained my role.   
Reviewed  
demographics, phone   
number, insurance, and   
RX coverage. Pt lives   
with  
significant other and   
her children. The Phone   
# she provided to [facility]   
was  
[phone] . Pt   
denies the use of any   
assistance devices ie:   
walker, cane, or  
wheelchair. Pt is able   
to drive. Pt not   
utilizing any [facility]   
agencies. Pt is able  
to obtain home meds   
without difficulty and   
denies any questions.   
Pt states  
significant other will   
take home when   
discharged. She had no   
PCP she is  
uncomfortable at moment   
and will follow to   
provide option of PCP   
list.. Denies  
home oxygen use. Pt   
feels will have the   
help needs after   
discharge at home.  
Dary FAIR, RN,   
[facility]  
  
  
22 CHEPE VALENCAI  
  
Spoke with patient this   
AM and she is feeling   
better. The medical   
team is  
anticipating discharge   
in next 24 hrs as they   
have begun new   
medications trx  
for patient abdominal   
issues. plan is for   
home with no   
anticipated discharge  
needs. kb  
  
22 CHEPE VALENCIA  
patient advises that   
she willl not be   
discharged today as   
planned as she has  
begun experiencing   
nausea and vomiting   
despite medications to   
combat. kb  
  
Discharge Note:   
2022 1133   
Discharge instructions   
reviewed by K. Bryte RN  
with pt. Declines   
wheelchair, ambulates   
to private car   
accompanied by self,  
personal belongings   
taken by patient, no   
distress noted, no   
complaints voiced.  
Yvonne RN  
  
  
  
Assessment:  
Discharge Planning   
Assessment   
[id number]  
Primary Contact Name   
and NumberTERE MOONEY   
[phone](1)  
Lives Withsignificant   
other(1)  
Living   
Arrangementsmobile   
home(1)  
Stated Reason for   
AdmissionNAUSEA &   
VOMITING(1)  
Arrived Fromhome (1)  
Resource/Environmental   
Concernsnone(1)  
Anticipated Transition   
[place](1)  
Services Anticipated at   
Transitionnon(1)  
  
Nursing Checklist:  
Lines/Cathetersremoved/  
appropriate for next   
level of care  
Discharge Med Rec   
Reconciled with Miguel  
  
Discharge   
Documentation:  
Discharge/Transfer   
Date/[id number]   
11:33  
Discharged Accompanied   
Byfamily member  
Discharge   
Modeambulatory  
Transportation   
Methodprivate car  
Code StatusCode Status   
order at time of   
discharge: Full Code  
[place] DNR Form Sent with   
Patient and/or   
Familyn/a  
Valuables/Medications/B  
elongings Returnedyes  
Final DispositionHome  
  
  
  
Electronic Signatures:  
Dary Stark (CLIN   
COOR) (Signed   
2022 14:37)  
Authored: Discharge   
Planning  
Kourtney Toney (RN)   
(Signed 2022   
18:04)  
Authored: Discharge   
Planning, Discharge   
Documentation  
Tonie Hunter (RN) (Signed   
2022 16:05)  
Authored: Discharge   
Planning, Nursing   
Checklist, Discharge   
Documentation  
Remi Decker (SUPV)   
(Signed 2022   
05:33)  
Authored: Assessment  
  
  
Last Updated:   
2022 18:04 by   
Kourtney Toney (RN)  
  
References:  
1. Data Referenced From   
 Patient Profile -   
Adult v2  2022   
05:16               Normal                                  [facility]  
   
                                                    ELECTROLYTE PANELon 20  
22   
   
                      Anion gap [Moles/Vol] 17 mmol/L  Normal     10 -     [facility]  
   
                                        Comment on above:   Performed By: #### E  
LECT ####[facility]   
[place] [address]   
   
                      HCO3 (Bld) [Moles/Vol] 18 mmol/L  Low         -     [facility]  
   
                                        Comment on above:   Performed By: #### E  
LECT ####[address] [address]   
   
                      Potassium [Moles/Vol] 3.2 mmol/L Low        3.5 - 5.3  [facility]  
   
                                        Comment on above:   Performed By: #### E  
LECT ####[address] [address]   
   
                      Sodium [Moles/Vol] 137 mmol/L Normal     136 - 145  [facility]  
   
                                        Comment on above:   Performed By: #### E  
LECT ####[address] [address]   
   
                                                    HCG,URINEon 2022   
   
                                                    Beta HCG (pregnancy   
test) Ql (U)    Negative        Normal          Negative        [facility]  
   
                                        Comment on above:   Performed By: #### P  
HOS ####  
[address] [address]   
   
                                                    LIPASEon 2022   
   
                                                    Lipase [Catalytic   
activity/Vol]   37 U/L          Normal          9 - 82          [facility]  
   
                                        Comment on above:   Result Comment: [place]  
puncture immediately after or during the  
administration of Metamizole may lead to falsely  
low results. Testing should be performed immediately  
prior to Metamizole dosing.  
N-acetyl-p-benzoquinone imine (metabolite of Acetaminophen)  
will generate erroneously low results in samples for patients  
that have taken toxic doses of acetaminophen.   
   
                                                            Performed By: #### L  
IPAS ####[address] [address]   
   
                                                    MAGNESIUMon 2022   
   
                      Magnesium [Mass/Vol] 1.85 mg/dL Normal     1.60 - 2.40 [facility]  
   
                                        Comment on above:   Performed By: #### M  
G ####  
[address] [address]   
   
                                                    Order Reconciliationon    
   
                                        Order Reconciliation Page 1  
  
Admission   
Reconciliation Document  
  
  
Reconciliation Type: ED   
to Observation   
requested on behalf of   
Nicolas Connolly (Physician) done by   
Nicolas Connolly   
()  
  
ED to Observation -   
Reconciliation:   
2022 09:50 by:   
Nicolas Connolly  
()  
ED to Observation -   
AutoLinked: 2022   
09:50 by: Mohsen,   
Nicolas M (DO)  
  
Home   
MedicationsEnteredLast   
Dose TakenReconciled   
with current Order  
Reconciliation Comment/   
Additional Information  
Phenadoz 25 mg rectal   
suppository 1   
suppository(ies)   
rectally 3 times a day,   
As  
Needed -for nausea and   
vomiting   
[id number]   
PM Reviewed and  
Held  
promethazine 25 mg oral   
tablet 1 tab(s) orally   
3 times a day, As   
Needed -for  
nausea and vomiting   
[id number]   
UNKNOWN Reviewed and   
Held  
  
  
Additional Current   
Orders  
Metoclopramide   
Injectable (REGLAN)DOSE   
= 5 mg IntraVenous Push   
Every 8 Hours  
Technetium Tc 99m   
Sulfur Colloid -   
(Radiology Contrast)   
DOSE = 500 microCurie  
Oral Once           Normal                                  [facility]  
   
                                                    Patient Profile - Adult v2on  
 2022   
   
                                                    Patient Profile - Adult   
v2                                      Profile:  
Initial Info:  
How to be   
Addressed DANNA   
Spoken Language   
PreferredEnglish (1)  
Stated Reason for   
AdmissionNAUSEA &   
VOMITING  
Primary Contact Name   
and NumberTERE MOONEY   
[phone]  
Wants Family/Rep   
Notified of   
Admissionyes, primary   
contact  
Notify PCPnotify PCP  
Informed of Patient   
Visiting Rightsyes  
[place]  
Patient   
Belongingsremains with   
patient  
Patient Belongings   
Remaining with   
Patientclothing; cell   
phone/electronics;  
purse/wallet  
Medications Brought to   
Hospitalno  
  
General Health:  
Weight in kg51.3   
kilogram(s)(2)  
Weight in igk954   
pound(s)  
Weight Methodactual   
(measured)  
Scale Typebed  
Height in cm149.8   
centimeter(s)  
Height in feet4 feet(2)  
Height in wkordq49   
inch(es)(2)  
Height Methodstated  
BMI (kg/m2)22.86 square   
meter  
  
[facility] Based Care:  
How would you like to   
participate in your   
careJUST GET MY NAUSEA   
AND VOMITING  
BETTER  
What is the number one   
concern for you during   
this   
hospitalization KEEPING  
DOWN MY ANTIBIOTICS,   
FIRST TIME I HAVE EVER   
HAD COLITIS    
What is the most   
important thing we can   
do to support you   
during this  
hospitalization HELP MY   
NAUSEA   
Is there anything we   
need to know to best   
care for you NOT   
REALLY'  
  
Substance:  
Smoking Statusnever   
smoker  
Alcohol Usedenies  
Drug Usedenies  
  
Health Mgmt:  
Symptoms/Conditions   
[facility] at [place];   
obstetric/gynecologic  
Are You Pregnantno (3)  
Are You Currently   
Breastfeedingno (3)  
OB/GYN   
Managementmanaged  
OB/GYN   
Symptoms/Conditions   
CommentMISCARRIAGE X 2  
Skin Managementmanaged  
Skin   
Symptoms/Conditions   
CommentHYPER   
PIGMENTATION  
Barriers to Managing   
Healthnone  
  
Relationship/Environ:  
Resource/Environmental   
Concernsnone  
Primary Source of   
Support/Comfortgrandpar  
ent  
Lives Withsignificant   
other  
Living   
Arrangementsmobile home  
Services Anticipated at   
Transitionnone  
Anticipated Transition   
[place]  
Significant   
IndicatorsComplete  
  
  
  
Information Review:  
Allergies, Home Meds   
and Significant Events   
have been Reviewed and   
Verified  
with Patient/Familyyes  
  
ALLERGY, INTOLERANCE,   
ADVERSE EVENT:  
Allergies:  
MiraLax: Drug,   
Hives/Urticaria, Active  
codeine: Drug, Unknown,   
Active  
  
  
Electronic Signatures:  
Remi Decker (SUPV)   
(Signed 2022   
05:24)  
Authored: Initial Info,   
General Health, [facility]   
Based Care, Substance,   
Health  
Mgmt,   
Relationship/Environ,   
Additional Information  
  
  
Last Updated:   
2022 05:24 by   
Remi Decker (SUPV)  
  
References:  
1. Data Referenced From   
 Triage - ED    
2022 19:46  
2. Data Referenced From   
 1. Vital Signs    
2022 23:14  
3. Data Referenced From   
 Triage - ED    
2022 23:14   [facility]  
   
                                                    Provider Note - ED v3on    
   
                                        Provider Note - ED v3 Provider Note:  
Chart Review:  
ED NOTES  
ED NOTES:  
History of Present   
Illness:  
21 year-old female   
presents with concern   
for presents with   
concern for syncope.  
Patient recently   
diagnosed with colitis.   
States that she has had   
vomiting and  
diarrhea. States that   
she passed out while   
vomiting over the edge   
of her bed.  
States that she fell   
off the bed and struck   
her head. States that   
she is  
having abdominal   
cramping. Denies any   
fever, chills, chest   
pain, shortness of  
breath, urinary   
symptoms. States that   
she has been having   
irregular periods  
with heavier bleeding.  
  
Past Medical History:   
None  
Past surgical History:   
None  
Family history:   
Reviewed and not   
pertinent to complaint  
Social history: Denies   
any drugs, alcohol,   
tobacco abuse.  
  
_______________________  
_____  
REVIEW OF SYSTEMS:  
  
Pertinent negatives and   
positives noted in the   
HPI. Otherwise, a   
complete  
review of system was   
negative.  
_______________________  
_____  
PHYSICAL EXAM:  
Appearance: Alert,   
oriented , cooperative,   
in no acute distress.  
Skin: Intact, dry skin,   
no lesions, rash,   
petechiae or purpura.   
Pale.  
Eyes: PERRLA, EOMs   
intact, Conjunctiva   
pink with no redness or   
exudates.  
Eyelids without   
lesions. No scleral   
icterus.  
HENT: Normocephalic,   
atraumatic. Nares   
patent  
Neck: Supple, without   
meningismus. Trachea at   
midline. No   
lymphadenopathy.  
Pulmonary: Clear   
bilaterally with good   
chest wall excursion.   
No rales, rhonchi  
or wheezing. No   
accessory muscle use or   
stridor.  
Cardiac: Regular rate   
and rhythm, no rubs,   
murmurs, or gallops. No   
JVD,  
Carotids without   
bruits.  
Abdomen: Abdomen is   
soft, nontender, and   
nondistended. No   
palpable  
organomegaly. No   
rebound or guarding. No   
CVA tenderness.   
Nonsurgical abdomen  
Genitourinary: Exam   
deferred.  
Musculoskeletal: Full   
range of motion. Pulses   
full and equal. No   
cyanosis,  
clubbing, or edema.  
Neurological: Cranial   
nerves are grossly   
intact, grossly normal   
sensation, no  
weakness, no focal   
findings identified.  
Psychiatric:   
Appropriate mood and   
affect.  
  
HISTORY OF PRESENTING   
ILLNESS  
DANNA is a 21 year old   
Female and was seen by   
me at 2022   
23:03.  
  
Triage Information:   
Most recent Vital Sign   
Value Date  
Temp (F): 100   
2022 23:14  
Temp (C): 37.7   
2022 23:14  
Heart Rate (beats/min):   
88 2022 23:14  
Respirations   
(breaths/min): 18   
2022 23:14  
SpO2 (%): 98 2022   
23:14  
BP Systolic (mm Hg):   
125 2022 23:14  
BP Diastolic (mm Hg):   
95 2022 23:14  
  
  
  
  
  
PAST MEDICAL HISTORY  
ALLERGIES/INTOLERANCES:   
Allergy  
Allergen: MiraLax  
Type: Drug  
Reaction:   
Hives/Urticaria  
  
Allergen: codeine  
Type: Drug  
Reaction: Unknown  
  
HEALTH HISTORY: No   
documented data.  
  
OUTPATIENT MEDICATIONS:   
Home Medications Review   
Status for   
Reconciliation:  
Complete  
Med Status: Patient   
Currently Takes   
Medications  
  
Drug Name: promethazine   
25 mg oral tablet  
Instructions: 1 tab(s)   
orally 3 times a day,   
As Needed -for nausea   
and  
vomiting  
  
Drug Name: Phenadoz 25   
mg rectal suppository  
Instructions: 1   
suppository(ies)   
rectally 3 times a day,   
As Needed -for  
nausea and vomiting  
  
SIGNIFICANT EVENTS:   
Past Medical History  
Description:premature  
  
  
Description:delayed   
bone growth  
  
  
Description:Miscarriage  
  
  
Description:scoliosis  
  
  
  
CRITICAL CARE  
RESULTS:  
Recent Lab Results:  
  
I have reviewed these   
laboratory results:  
  
Complete Blood Count +   
Differential   
2022 23:33:00  
  
ResultValue  
White Blood Cell Count   
8.1  
Red Blood Cell Count   
4.69  
HGB 13.4  
HCT 40.2  
MCV 86  
MCHC 33.4  
  
RDW-CV 14.5  
Neutrophil % 69.9  
Lymphocyte % 16.6  
Monocyte % 9.9  
Eosinophil % 2.1  
Basophil % 1.5  
Neutrophil Count 5.60  
Lymphocyte Count 1.30  
Monocyte Count 0.80  
Eosinophil Count 0.20  
Basophil Count 0.10  
  
Comprehensive Metabolic   
Panel 2022   
23:33:00  
  
ResultValue  
Glucose, Serum 86  
  
K 3.1 L  
  
Bicarbonate, Serum 22  
Anion Gap, Serum 18  
BUN 6  
CREAT 0.56  
GFR Female >90  
Calcium, Serum 8.8  
ALB 4.1  
ALKP 39  
T Pro 6.4  
T Bili 0.5  
Alanine   
Aminotransferase, Serum   
9  
Aspartate Transaminase,   
Serum 13  
  
Lipase, Serum   
2022 23:33:00  
  
ResultValue  
Lipase, Serum 37  
  
Radiology Results:  
  
Impression:  
  
No evidence of acute   
intracranial   
abnormality.  
  
CT Head without   
Contrast [2022   
12:20AM]  
  
VITAL SIGNS:  
  
T PRBP SpO2O2(LPM)   
%FiO2 Method  
2022   
23:14:[id number]   
98 room air, no   
respiratory  
support  
  
  
  
  
MDM  
MDM/ED COURSE:  
Patient appears well   
and nontoxic. Vital   
signs within normal   
limits. EKG  
nonischemic. No   
significant anemia. CT   
brain negative. Patient   
treated with  
Zofran and fluid.   
Patient remained   
nauseous and was   
treated with Phenergan   
and  
Toradol given her   
abdominal cramping.   
Reviewed patient's   
charts from previous  
ER visits over the past   
week. Initial CT did   
show some evidence of   
mild  
c (more content not   
included)...        Normal                                  [facility]  
   
                                                    Triage - EDon 2022   
   
                                        Triage - ED         Quick Triage:  
Are You Pregnantno  
Have You Given Birth In   
The Last 6 Weeksno  
Are You Currently   
Breastfeedingno  
The patient and/or   
guardian verbally   
acknowledges placement   
for services into  
the following (when   
Urgent Care Service   
hours are   
operating):emergency  
department  
  
Chart Review:  
ARRIVAL INFORMATION  
  
Mode of Arrival:   
private vehicle  
  
  
CHIEF COMPLAINT  
  
DANNA SOARES   
is a Female patient   
with a chief complaint   
of multiple  
medical complaints (pt   
here from home with c/o   
syncope while trying to   
get out  
of bed, pt reports   
hitting head on floor,   
+LOC. states  I was hot   
and dizzy and  
hit the floor.  pt   
reports having low   
blood glucose at times.   
pt reports  
currently being treated   
for bacterial colitis   
but is unable to keep   
meds down.  
reports N/V/D, fever   
and headache. pt   
reports since falling   
has right hand  
numbness).  
Triage Date/Time:   
2022 23:14  
DIPIKA: 3  
Pain Rating (0-10): 9 =   
Severe  
Vital Signs:  
Temperature: 100.0F (   
37.7C) taken forehead  
Blood Pressure: 125/95   
Mean:  
Heart Rate: 88  
Respiratory Rate: 18  
Pulse Oximetry: 98% on   
room air, no   
respiratory support.   
Height: 4 feet 11.00  
inches. 149.8 CM  
Weight: 113.0 pounds.   
Calculated 51.3 kg.   
(stated)  
Calculated BMI (kg/m2):   
22.860 Calculated BSA   
(m2) 1.46  
  
Sujit Coma Scale:  
Best Eye Response: (E4)   
spontaneous  
Best Motor Response:   
(M6) obeys commands  
Best Verbal Response:   
(V5) oriented  
Sujit Score: 15  
  
  
Allergies: yes  
Last menstrual period:   
2022  
Patient has homicidal   
thoughts: no  
  
  
Symptoms Are POSITIVE   
For:  
headache, nausea,   
numbness and pain  
Symptoms Are Negative   
For:  
anxiety, chills,   
diaphoresis, dyspnea,   
loss of consciousness,   
tingling and  
weakness  
  
  
Risk Screens  
Suicide Risk Screen  
  
In the Past Month: Have   
you wished you were   
dead or wished you   
could go to  
sleep and not wake up   
no  
In the Past Month: Have   
you had any actual   
thoughts of killing   
yourself no  
In Your Lifetime: Have   
you ever done anything,   
started to do anything,   
or  
prepared to do anything   
to end your life no  
  
  
  
Avila Fall Scale   
Screening  
Has the patient fallen   
before (or is the   
patient in the ED as a   
result of a  
fall) has had a fall  
Does the patient have   
an impaired gait does   
not have impaired gait  
Is the patient   
cognitively impaired   
not cognitively   
impaired  
  
Avila Fall Scale  
History of falling   
(immediate or previous)   
yes (25)  
Secondary Diagnosis yes   
(15)  
Intravenous Therapy/   
Heparin/Saline Lock yes   
(20)  
Gait/Transferring weak   
(10)  
Ambulatory Aids   
none/bedrest/nurse   
assist (0)  
Mental Status oriented   
to own ability (0)  
Avila Fall Risk Score:   
70  
  
Interventions:  
Avila Fall   
Interventions: HIGH   
INTERVENTIONS  
*Low and Moderate   
Interventions Plus:  
* supervised toileting   
at all times  
  
  
TRAVEL HISTORY  
Travel History  
Coronavirus Screening:   
no exposure or symptoms  
Travel Exposure   
History: NO travel to   
International locations   
in the past 30  
days  
  
  
PAIN  
  
Pain Scale Used: ASHLEY  
Pain Rating (0-10): 9 =   
Severe  
  
  
  
  
  
Past Medical History:  
Past Medical History   
Reviewedyes  
  
  
Electronic Signatures:  
Robel Sauer (ERNA)   
(Signed 2022   
23:19)  
Entered: Risk Screens,   
Pain, Travel History,   
Chart Review, Scores,   
Past  
Medical History  
Authored: Quick Triage,   
Risk Screens, Pain,   
Travel History, Chart   
Review,  
Scores, Past Medical   
History  
  
  
Last Updated:   
2022 23:19 by   
Robel Sauer (ERNA) Normal                                  [facility]  
   
                                                    UA MICROSCOPICon 2022   
   
                      Mucus Ql (Urine sed) 2+ /LPF    Normal                [facility]  
   
                                        Comment on above:   Performed By: #### P  
HOS ####  
[place] [address]   
   
                      RBC (U) [#/Vol] /uL        Abnormal   0-5        [facility]  
   
                                        Comment on above:   Performed By: #### P  
HOS ####  
[address] [address]   
   
                      SQUAMOUS EPITH. CELLS 7 /HPF     Normal                [facility]  
   
                                        Comment on above:   Performed By: #### P  
HOS ####  
[address] [address]   
   
                      WBC        23 /HPF    Abnormal   0-5        [facility]  
   
                                        Comment on above:   Performed By: #### P  
HOS ####  
[place] [id number]   
   
                                                    URINALYSISon 2022   
   
                      Appearance (U) HAZY       Normal     CLEAR      [facility]  
   
                                        Comment on above:   Performed By: #### U  
A ####[place] [address]   
   
                      Bilirubin Ql (U) Negative   Normal     NEGATIVE   [facility]  
   
                                        Comment on above:   Performed By: #### U  
A ####[place] [address]   
   
                      Color (U)  Yellow     Normal     STRAW,YELLOW [facility]  
   
                                        Comment on above:   Performed By: #### U  
A ####[place] [address]   
   
                      Glucose Ql (U) Negative   Normal     NEGATIVE   [facility]  
   
                                        Comment on above:   Performed By: #### U  
A ####[place] [address]   
   
                      Hemoglobin Ql (U) LARGE(3+)  Abnormal   NEGATIVE   [facility]  
   
                                        Comment on above:   Performed By: #### U  
A ####[place] [address]   
   
                      Ketones Ql (U) 80(2+)     Abnormal   NEGATIVE   [facility]  
   
                                        Comment on above:   Performed By: #### U  
A ####[place] [address]   
   
                                                    Leukocyte esterase Test   
strip Ql (U)    Negative        Normal          NEGATIVE        [facility]  
   
                                        Comment on above:   Performed By: #### U  
A ####[place] [address]   
   
                      Nitrite Ql (U) Negative   Normal     NEGATIVE   [facility]  
   
                                        Comment on above:   Performed By: #### U  
A ####[place] [address]   
   
                      pH (U)     6.0 [pH]   Normal     5.0 - 8.0  [facility]  
   
                                        Comment on above:   Performed By: #### U  
A ####[place] [address]   
   
                      Protein Ql (U) 100(2+)    Abnormal   NEGATIVE   [facility]  
   
                                        Comment on above:   Performed By: #### U  
A ####[place] [address]   
   
                                                    Specific gravity (U)   
[Rel density]       1.023               Normal              1.005 -   
1.035                                   [facility]  
   
                                        Comment on above:   Performed By: #### U  
A ####[address] [address]   
   
                                                    Urobilinogen (U)   
[Mass/Vol]      mg/dL           Normal          0.0 - 1.9       [facility]  
   
                                        Comment on above:   Performed By: #### U  
A ####[address] [address]   
   
                                                    CBC AND DIFFERENTIALon 01-10  
-2022   
   
                      Basophils (Bld) [#/Vol] 0.10 10*3/uL Normal     0.00 - 0.1  
0 [facility]  
   
                                        Comment on above:   Performed By: #### C  
BCDF ####[place] [id number]   
   
                      Basophils/100 WBC (Bld) 1.1 %      Normal     0.0 - 2.0  [facility]  
   
                                        Comment on above:   Performed By: #### C  
BCDF ####[place] [id number]   
   
                                                    Eosinophils (Bld)   
[#/Vol]         0.00 10*3/uL    Normal          0.00 - 0.70     [facility]  
   
                                        Comment on above:   Performed By: #### C  
BCDF ####[address] [address]   
   
                                                    Eosinophils/100 WBC   
(Bld)           0.0 %           Normal          0.0 - 6.0       [facility]  
   
                                        Comment on above:   Performed By: #### C  
BCDF ####[address] [address]   
   
                                                    Erythrocyte   
distribution width   
(RBC) [Ratio]   14.6 %          High            11.5 - 14.5     [facility]  
   
                                        Comment on above:   Performed By: #### C  
BCDF ####[address] [address]   
   
                                                    Hematocrit (Bld)   
[Volume fraction] 39.2 %          Normal          36.0 - 46.0     [facility]  
   
                                        Comment on above:   Performed By: #### C  
BCDF ####[address] [address]   
   
                                                    Hemoglobin (Bld)   
[Mass/Vol]      12.7 g/dL       Normal          12.0 - 16.0     [facility]  
   
                                        Comment on above:   Performed By: #### C  
BCDF ####[address] [address]   
   
                                                    Lymphocytes (Bld)   
[#/Vol]         1.10 10*3/uL    Low             1.20 - 4.80     [facility]  
   
                                        Comment on above:   Performed By: #### C  
BCDF ####[address] [address]   
   
                                                    Lymphocytes/100 WBC   
(Bld)           9.9 %           Normal          13.0 - 44.0     [facility]  
   
                                        Comment on above:   Performed By: #### C  
BCDF ####[address] [address]   
   
                      MCHC (RBC) [Mass/Vol] 32.3 g/dL  Normal     32.0 - 36.0 [facility]  
   
                                        Comment on above:   Performed By: #### C  
BCDF ####[address] [address]   
   
                      MCV (RBC) [Entitic vol] 88 fL      Normal     80 - 100   S  
[facility]  
   
                                        Comment on above:   Performed By: #### C  
BCDF ####[address] [address]   
   
                      Monocytes (Bld) [#/Vol] 1.10 10*3/uL High       0.10 - 1.0  
0 [facility]  
   
                                        Comment on above:   Performed By: #### C  
BCDF ####[address] [address]   
   
                      Monocytes/100 WBC (Bld) 10.0 %     Normal     2.0 - 10.0 S  
[facility]  
   
                                        Comment on above:   Performed By: #### C  
BCDF ####[address] [address]   
   
                                                    Neutrophils (Bld)   
[#/Vol]         8.80 10*3/uL    High            1.20 - 7.70     [facility]  
   
                                        Comment on above:   Result Comment: Perc  
ent differential counts (%) should be   
interpreted in the  
context of the absolute cell counts (cells/L).   
   
                                                            Performed By: #### C  
BCDF ####[address] [address]   
   
                                                    Neutrophils/100 WBC   
(Bld)           79.0 %          Normal          40.0 - 80.0     [facility]  
   
                                        Comment on above:   Performed By: #### C  
BCDF ####[address] [address]   
   
                      NUCLEATED RBC 0.1 /100 WBC Normal                [facility]  
   
                                        Comment on above:   Performed By: #### C  
BCDF ####[address] [address]   
   
                      Platelets (Bld) [#/Vol] 262 10*3/uL Normal     150 - 450    
[facility]  
   
                                        Comment on above:   Performed By: #### C  
BCDF ####[place] [id number]   
   
                      RBC        4.46 x10E12/L Normal     4.00 - 5.20 [facility]  
   
                                        Comment on above:   Performed By: #### C  
BCDF ####[address] [address]   
   
                      WBC (Bld) [#/Vol] 11.2 10*3/uL Normal     4.4 - 11.3 [facility]  
   
                                        Comment on above:   Performed By: #### C  
BCDF ####[place] [id number]   
   
                                                    COMPREHENSIVE PANELon 01-10-  
2022   
   
                      Albumin [Mass/Vol] 4.2 g/dL   Normal     3.4 - 5.0  [facility]  
   
                                        Comment on above:   Performed By: #### C  
MP ####[address] [address]   
   
                                                    ALP [Catalytic   
activity/Vol]   50 U/L          Normal          33 - 110        [facility]  
   
                                        Comment on above:   Performed By: #### C  
MP ####[address] [address]   
   
                                                    ALT [Catalytic   
activity/Vol]   7 U/L           Normal          7 - 45          [facility]  
   
                                        Comment on above:   Result Comment: Kaleigh  
ents treated with Sulfasalazine may   
generate  
falsely decreased results for ALT.   
   
                                                            Performed By: #### C  
MP ####[id number] [place] [address]   
   
                      Anion gap [Moles/Vol] 20 mmol/L  Normal     10 - 20    [facility]  
   
                                        Comment on above:   Performed By: #### C  
MP ####[address] [address]   
   
                                                    AST [Catalytic   
activity/Vol]   11 U/L          Normal          9 - 39          [facility]  
   
                                        Comment on above:   Performed By: #### C  
MP ####[address] [address]   
   
                      Bilirubin [Mass/Vol] 0.5 mg/dL  Normal     0.0 - 1.2  [facility]  
   
                                        Comment on above:   Performed By: #### C  
MP ####[address] [address]   
   
                      Calcium [Mass/Vol] 9.5 mg/dL  Normal     8.6 - 10.3 [facility]  
   
                                        Comment on above:   Performed By: #### C  
MP ####[address] [address]   
   
                      Chloride [Moles/Vol] 107 mmol/L Normal     98 - 107   [facility]  
   
                                        Comment on above:   Performed By: #### C  
MP ####[address] [address]   
   
                      Creatinine [Mass/Vol] 0.66 mg/dL Normal     0.50 - 1.05 [facility]  
   
                                        Comment on above:   Performed By: #### C  
MP ####[address] [address]   
   
                      eGFR FEMALE >90        Normal     >90        [facility]  
   
                                        Comment on above:   Result Comment: CALC  
ULATIONS OF ESTIMATED GFR ARE PERFORMED  
USING THE  CKD-EPI STUDY REFIT EQUATION  
WITHOUT THE RACE VARIABLE FOR THE IDMS-TRACEABLE  
CREATININE METHODS.  
https://jasn.asnjournals.org/content/early//ASN.  
498086   
   
                                                            Performed By: #### C  
MP ####[address] [address]   
   
                      Glucose [Mass/Vol] 91 mg/dL   Normal     74 - 99    [facility]  
   
                                        Comment on above:   Performed By: #### C  
MP ####[address] [address]   
   
                      HCO3 (Bld) [Moles/Vol] 14 mmol/L  Low        21 - 32    [facility]  
   
                                        Comment on above:   Performed By: #### C  
MP ####[place] [address]   
   
                      Potassium [Moles/Vol] 3.4 mmol/L Low        3.5 - 5.3  [facility]  
   
                                        Comment on above:   Performed By: #### C  
MP ####[place] [address]   
   
                      Protein [Mass/Vol] 6.8 g/dL   Normal     6.4 - 8.2  [facility]  
   
                                        Comment on above:   Performed By: #### C  
MP ####[place] [address]   
   
                      Sodium [Moles/Vol] 138 mmol/L Normal     136 - 145  [facility]  
   
                                        Comment on above:   Performed By: #### C  
MP ####[place] [address]   
   
                                                    Urea nitrogen   
[Mass/Vol]      8 mg/dL         Normal          6 - 23          [facility]  
   
                                        Comment on above:   Performed By: #### C  
MP ####[place] [id number]   
   
                                                    GROUP A STREP,PCRon 01-10-20  
22   
   
                      GROUP A STREP,PCR Not detected Normal     Not Detected [facility]  
   
                                        Comment on above:   Result Comment: This  
 test was performed utilizing an   
FDA-cleared rapid nucleic  
acid amplification by PCR to qualitatively detect Group A  
Streptococci from throat swab specimens without the need for  
culture confirmation of negative results.   
   
                                                            Performed By: #### G  
APC1 ####[place] [id number]   
   
                      Lab Specimen Source Throat     Normal                [facility]  
   
                                        Comment on above:   Performed By: #### G  
APC1 ####[place] [id number]   
   
                                                    HCG,URINEon 01-   
   
                                                    Beta HCG (pregnancy   
test) Ql (U)    Negative        Normal          Negative        [facility]  
   
                                        Comment on above:   Performed By: #### H  
CGU ####[place] [id number]   
   
                                                    Beta HCG (pregnancy   
test) Ql (U)    Canceled        Normal                          [facility]  
   
                                        Comment on above:   Order Comment: TEST   
HCG,URINE WAS CANCELLED, 2022 22:53   
PATIENT DISCHARGED.   
   
                                                            Performed By: #### M  
G ####  
[facility]  
[address] [place] [address]   
   
                                                    Provider Note - ED v3on    
   
                                        Provider Note - ED v3 Provider Note:  
Chart Review:  
ED NOTES  
ED NOTES:  
HPI:  
Patient complains of 3   
days of vomiting. She   
states she has Zofran   
at home but  
it is not helping at   
all. Patient otherwise   
denies any fever   
urinary burning  
or frequency and does   
not think that she is   
pregnant. And   
improvement in her  
symptoms at that time.   
An appendectomy when   
she was a child.  
  
  
ROS:  
All systems are   
negative other than as   
noted in HPI.  
  
  
Physical Exam  
  
I have reviewed the   
triage vital signs.  
Const: Well nourished,   
well developed, appears   
stated age, no acute   
distress  
Eyes: PERRL, EOM   
intact, no conjunctival   
injection, vision   
grossly normal  
HENT: Neck supple   
without meningismus ,   
Moist mucous membranes,   
no pharyengeal  
swelling or exudate  
CV: Regular rate and   
rhythm, Warm,   
well-perfused   
extremities. Chest non   
tender  
RESP: Lungs clear   
bilaterally, Unlabored   
respiratory effort  
GI: soft, diffuse   
tenderness,   
non-distended, no   
masses  
:  
MSK: No gross   
deformities appreciated  
Back: Non tender, no   
pain with ROM  
Skin: Warm, dry. No   
rashes  
Neuro: Alert and   
oriented x4, GCS 15 ,   
CNs II-XII grossly   
intact. Sensation and  
motor function of   
extremities grossly   
intact.  
Psych: Appropriate mood   
and affect.  
  
I have reviewed and   
confirmed nurses/medics   
notes for patient past,   
social  
and family history.  
  
  
Portions of this note   
were dictated by speech   
recognition. An attempt   
at proof  
reading was made to   
minimize errors. Minor   
errors in transcription   
may be  
present.  
  
  
HISTORY OF PRESENTING   
ILLNESS  
DANNA is a 21 year old   
Female and was seen by   
me at 10-Franco-2022 12:58   
for a  
chief complaint of   
nausea (Pt states that   
she has been vomiting x   
3 days.  
initally it was a white   
foamy substance not is   
brown/ black. Pt states   
that she  
is unable fluids / meds   
down. Pt is post   
paratum - 10   
months)(1).  
  
Triage Information:   
Most recent Vital Sign   
Value Date  
Temp (F): 98.1   
01- 12:27  
Temp (C): 36.7   
01- 12:27  
Heart Rate (beats/min):   
110 01- 12:27  
Respirations   
(breaths/min): 19   
01- 12:27  
SpO2 (%): 100   
01- 12:27  
BP Systolic (mm Hg):   
120 01- 12:27  
BP Diastolic (mm Hg):   
72 01- 12:27  
  
  
  
  
PAST MEDICAL HISTORY  
ALLERGIES/INTOLERANCES:   
Allergy  
Allergen: MiraLax  
Type: Drug  
Reaction:   
Hives/Urticaria  
  
Allergen: codeine  
Type: Drug  
Reaction: Unknown  
  
HEALTH HISTORY: No   
documented data.  
  
OUTPATIENT MEDICATIONS:   
Home Medications Review   
Status for   
Reconciliation:  
Complete  
Med Status: Patient   
Currently Takes   
Medications  
  
Drug Name:   
cyclobenzaprine 5 mg   
oral tablet  
Instructions: 1 tab(s)   
orally 3 times a day,   
As Needed -for pain  
  
SIGNIFICANT EVENTS:   
Past Medical History  
Description:premature  
  
  
Description:delayed   
bone growth  
  
  
Description:Miscarriage  
  
  
Description:scoliosis  
  
OB/GYN: Is Pregnant: no   
Is Breastfeeding: no  
  
MDM  
MDM/ED COURSE:  
1620-patient initially   
was hydrated with a   
liter of fluids and   
given 25 mg  
Phenergan with minimal   
relief of symptoms. She   
was subsequently given   
another  
liter of fluids and   
Reglan and Benadryl.   
She did complain of   
pain in her  
throat and a strep swab   
was obtained which was   
unremarkable.   
Urinalysis was  
unremarkable and   
pregnancy was negative.   
I did reevaluate her   
midway through  
her visit and patient   
was denying any   
abdominal pain other   
than when she was  
vomiting. Abdomen was   
soft with mild diffuse   
tenderness. On   
reevaluation  
patient denies any   
regular marijuana use.   
And on reevaluation at   
this time  
patient states she is   
feeling better and is   
comfortable with   
discharge home.  
She was given   
prescriptions for   
Phenergan oral and   
suppository and patient   
is  
comfortable with this.   
Patient is tolerating a   
small amount of oral   
intake at  
this time.  
  
  
Your lab work,   
urinalysis, and strep   
test were normal today.   
As your symptoms  
have improved here in   
the emergency   
department I feel that   
you are stable for  
discharge home and you   
have been given   
prescriptions for   
Phenergan oral and  
suppository. Please   
slowly reintroduce   
fluids and solids into   
your diet,  
follow-up with your   
family doctor, and   
otherwise return to the   
nearest ER for  
any new or worsening   
concerns.  
  
  
  
DISPOSITION  
Diagnosis/Annotation:   
ED Dx  
Name:Vomiting  
Code:R11.10  
  
Disposition: discharged  
Type: home  
  
  
CONSULT  
CRITICAL CARE TIME  
Is this a critically   
ill patient: no  
  
  
  
  
  
Electronic Signatures:  
Priscilla Zepeda   
(APRN-CNP) (Signed   
10-Franco-2022 16:27)  
Authored: ED Notes,   
HPI, PMH, MDM/ED   
Course, Clinical   
Impression,   
Attestation,  
Chart Review, Scores  
  
  
Last Updated:   
10-Franco-2022 16:27 by   
Priscilla Zepeda   
(APRN-CNP)  
  
References:  
1. Data Referenced From   
 Triage - ED    
10-Franco-2022 12:27   Normal                                  [facility]  
   
                                                    UA MICROSCOPICon 01-   
   
                      Mucus Ql (Urine sed) 1+ /LPF    Normal                [facility]  
   
                                        Comment on above:   Performed By: #### U  
AMIC ####  
[place] [address]   
   
                      RBC (U) [#/Vol] /uL        Normal     0-5        [facility]  
   
                                        Comment on above:   Performed By: #### U  
AMIC ####  
[place] [address]   
   
                      SQUAMOUS EPITH. CELLS 2 /HPF     Normal                [facility]  
   
                                        Comment on above:   Performed By: #### U  
AMIC ####  
[place] [address]   
   
                      WBC        1 /HPF     Normal     0-5        [facility]  
   
                                        Comment on above:   Performed By: #### U  
AMIC ####  
[place] [address]   
   
                                                    URINALYSISon 01-   
   
                      Appearance (U) CLEAR      Normal     CLEAR      [facility]  
   
                                        Comment on above:   Performed By: #### U  
A ####[place] [address]   
   
                      Bilirubin Ql (U) Negative   Normal     NEGATIVE   [facility]  
   
                                        Comment on above:   Performed By: #### U  
A ####[place] [address]   
   
                      Color (U)  Yellow     Normal     STRAW,YELLOW [facility]  
   
                                        Comment on above:   Performed By: #### U  
A ####[place] [address]   
   
                      Glucose Ql (U) Negative   Normal     NEGATIVE   [facility]  
   
                                        Comment on above:   Performed By: #### U  
A ####[address] [address]   
   
                      Hemoglobin Ql (U) Negative   Normal     NEGATIVE   [facility]  
   
                                        Comment on above:   Performed By: #### U  
A ####[address] [address]   
   
                      Ketones Ql (U) 80(2+)     Abnormal   NEGATIVE   [facility]  
   
                                        Comment on above:   Performed By: #### U  
A ####[place] [id number]   
   
                                                    Leukocyte esterase Test   
strip Ql (U)    Negative        Normal          NEGATIVE        [facility]  
   
                                        Comment on above:   Performed By: #### U  
A ####[place] [id number]   
   
                      Nitrite Ql (U) Negative   Normal     NEGATIVE   [facility]  
   
                                        Comment on above:   Performed By: #### U  
A ####[place] [address]   
   
                      pH (U)     5.0 [pH]   Normal     5.0 - 8.0  [facility]  
   
                                        Comment on above:   Performed By: #### U  
A ####[place] [id number]   
   
                      Protein Ql (U) 30(1+)     Abnormal   NEGATIVE   [facility]  
   
                                        Comment on above:   Performed By: #### U  
A ####[address] [address]   
   
                                                    Specific gravity (U)   
[Rel density]       1.025               Normal              1.005 -   
1.035                                   [facility]  
   
                                        Comment on above:   Performed By: #### U  
A ####[address] [address]   
   
                                                    Urobilinogen (U)   
[Mass/Vol]      mg/dL           Normal          0.0 - 1.9       [facility]  
   
                                        Comment on above:   Performed By: #### U  
A ####[place] [id number]   
   
                      Appearance (U) Canceled   Normal                [facility]  
   
                                        Comment on above:   Order Comment: TEST   
URINALYSIS WAS CANCELLED, 2022 22:53  
   
PATIENT DISCHARGED.   
   
                                                            Performed By: #### U  
A ####[address] [address]   
   
                      ASCORBIC ACID Canceled   [facility]  
   
                                        Comment on above:   Order Comment: TEST   
URINALYSIS WAS CANCELLED, 2022 22:53  
   
PATIENT DISCHARGED.   
   
                                                            Result Comment: Conc  
entrations > = 20 mg/dL of ascorbic acid   
can be expected to cause strong  
interference in the reactions testing for glucose, nitrite and   
blood. It is  
recommended to discontinue Vitamin C administration and retest   
in 10 hours.   
   
                                                            Performed By: #### U  
A ####[address] [address]   
   
                      Bilirubin Ql (U) Canceled   [facility]  
   
                                        Comment on above:   Order Comment: TEST   
URINALYSIS WAS CANCELLED, 2022 22:53  
   
PATIENT DISCHARGED.   
   
                                                            Performed By: #### U  
A ####[address] [address]   
   
                      Color (U)  Canceled   [facility]  
   
                                        Comment on above:   Order Comment: TEST   
URINALYSIS WAS CANCELLED, 2022 22:53  
   
PATIENT DISCHARGED.   
   
                                                            Performed By: #### U  
A ####[address] [address]   
   
                      Glucose Ql (U) Canceled   [facility]  
   
                                        Comment on above:   Order Comment: TEST   
URINALYSIS WAS CANCELLED, 2022 22:53  
   
PATIENT DISCHARGED.   
   
                                                            Performed By: #### U  
A ####[address] [address]   
   
                      Hemoglobin Ql (U) Canceled   [facility]  
   
                                        Comment on above:   Order Comment: TEST   
URINALYSIS WAS CANCELLED, 2022 22:53  
   
PATIENT DISCHARGED.   
   
                                                            Performed By: #### U  
A ####[address] [address]   
   
                      Ketones Ql (U) Canceled   [facility]  
   
                                        Comment on above:   Order Comment: TEST   
URINALYSIS WAS CANCELLED, 2022 22:53  
   
PATIENT DISCHARGED.   
   
                                                            Performed By: #### U  
A ####[address] [address]   
   
                                                    Leukocyte esterase Test   
strip Ql (U)    Canceled        [facility]  
   
                                        Comment on above:   Order Comment: TEST   
URINALYSIS WAS CANCELLED, 2022 22:53  
   
PATIENT DISCHARGED.   
   
                                                            Performed By: #### U  
A ####[address] [address]   
   
                      Nitrite Ql (U) Canceled   [facility]  
   
                                        Comment on above:   Order Comment: TEST   
URINALYSIS WAS CANCELLED, 2022 22:53  
   
PATIENT DISCHARGED.   
   
                                                            Performed By: #### U  
A ####[address] [address]   
   
                      pH         Canceled   [facility]  
   
                                        Comment on above:   Order Comment: TEST   
URINALYSIS WAS CANCELLED, 2022 22:53  
   
PATIENT DISCHARGED.   
   
                                                            Performed By: #### U  
A ####[address] [address]   
   
                      Protein Ql (U) Canceled   [facility]  
   
                                        Comment on above:   Order Comment: TEST   
URINALYSIS WAS CANCELLED, 2022 22:53  
   
PATIENT DISCHARGED.   
   
                                                            Performed By: #### U  
A ####[address] [address]   
   
                                                    Specific gravity (U)   
[Rel density]   Canceled        Normal                          [facility]  
   
                                        Comment on above:   Order Comment: TEST   
URINALYSIS WAS CANCELLED, 2022 22:53  
  
PATIENT DISCHARGED.   
   
                                                            Performed By: #### U  
A ####[address] [address]   
   
                      UROBILINOGEN Canceled   [facility]  
   
                                        Comment on above:   Order Comment: TEST   
URINALYSIS WAS CANCELLED, 2022 22:53  
  
PATIENT DISCHARGED.   
   
                                                            Performed By: #### U  
A ####[address] [address]   
   
                                                    BASIC METABOLIC PANELon -0  
   
   
                      Anion gap [Moles/Vol] 25 mmol/L  High       10 - 20    [facility]  
   
                                        Comment on above:   Performed By: #### M  
G ####  
[address] [address]   
   
                      Calcium [Mass/Vol] 10.4 mg/dL High       8.6 - 10.3 [facility]  
   
                                        Comment on above:   Performed By: #### M  
G ####  
[address] [address]   
   
                      Chloride [Moles/Vol] 99 mmol/L  Normal     98 - 107   [facility]  
   
                                        Comment on above:   Performed By: #### M  
G ####  
[address] [address]   
   
                      Creatinine [Mass/Vol] 0.75 mg/dL Normal     0.50 - 1.05 [facility]  
   
                                        Comment on above:   Performed By: #### M  
G ####  
[address] [address]   
   
                      eGFR FEMALE >90        Normal     >90        [facility]  
   
                                        Comment on above:   Result Comment: CALC  
ULATIONS OF ESTIMATED GFR ARE PERFORMED  
USING THE  CKD-EPI STUDY REFIT EQUATION  
WITHOUT THE RACE VARIABLE FOR THE IDMS-TRACEABLE  
CREATININE METHODS.  
https://jasn.asnjournals.org/content/early/ASN.  
949024   
   
                                                            Performed By: #### M  
G ####  
[address] [address]   
   
                      Glucose [Mass/Vol] 102 mg/dL  High       74 - 99    [facility]  
   
                                        Comment on above:   Performed By: #### M  
G ####  
[address] [address]   
   
                      HCO3 (Bld) [Moles/Vol] 17 mmol/L  Low        21 - 32    [facility]  
   
                                        Comment on above:   Performed By: #### M  
G ####  
[address] [address]   
   
                      Potassium [Moles/Vol] 3.4 mmol/L Low        3.5 - 5.3  [facility]  
   
                                        Comment on above:   Performed By: #### M  
G ####  
[address] [address]   
   
                      Sodium [Moles/Vol] 138 mmol/L Normal     136 - 145  [facility]  
   
                                        Comment on above:   Performed By: #### M  
G ####  
[address] [address]   
   
                                                    Urea nitrogen   
[Mass/Vol]      11 mg/dL        Normal          6 - 23          [facility]  
   
                                        Comment on above:   Performed By: #### M  
G ####  
[address] [address]   
   
                                                    CBC AND DIFFERENTIALon    
   
                      Basophils (Bld) [#/Vol] 0.10 10*3/uL Normal     0.00 - 0.1  
0 [facility]  
   
                                        Comment on above:   Performed By: #### M  
G ####  
[address] [address]   
   
                      Basophils/100 WBC (Bld) 0.6 %      Normal     0.0 - 2.0  S  
[facility]  
   
                                        Comment on above:   Performed By: #### M  
G ####  
[address] [address]   
   
                                                    Eosinophils (Bld)   
[#/Vol]         0.10 10*3/uL    Normal          0.00 - 0.70     [facility]  
   
                                        Comment on above:   Performed By: #### M  
G ####  
[address] [address]   
   
                                                    Eosinophils/100 WBC   
(Bld)           0.7 %           Normal          0.0 - 6.0       [facility]  
   
                                        Comment on above:   Performed By: #### M  
G ####  
[address] [address]   
   
                                                    Erythrocyte   
distribution width   
(RBC) [Ratio]   14.3 %          Normal          11.5 - 14.5     [facility]  
   
                                        Comment on above:   Performed By: #### M  
G ####  
[address] [address]   
   
                                                    Hematocrit (Bld)   
[Volume fraction] 46.0 %          Normal          36.0 - 46.0     [facility]  
   
                                        Comment on above:   Performed By: #### M  
G ####  
[address] [address]   
   
                                                    Hemoglobin (Bld)   
[Mass/Vol]      15.1 g/dL       Normal          12.0 - 16.0     [facility]  
   
                                        Comment on above:   Performed By: #### M  
G ####  
[address] [address]   
   
                                                    Lymphocytes (Bld)   
[#/Vol]         1.10 10*3/uL    Low             1.20 - 4.80     [facility]  
   
                                        Comment on above:   Performed By: #### M  
G ####  
[address] [address]   
   
                                                    Lymphocytes/100 WBC   
(Bld)           12.2 %          Normal          13.0 - 44.0     [facility]  
   
                                        Comment on above:   Performed By: #### M  
G ####  
[address] [address]   
   
                      MCHC (RBC) [Mass/Vol] 32.7 g/dL  Normal     32.0 - 36.0 [facility]  
   
                                        Comment on above:   Performed By: #### M  
G ####  
[address] [address]   
   
                      MCV (RBC) [Entitic vol] 85 fL      Normal     80 - 100   S  
[facility]  
   
                                        Comment on above:   Performed By: #### M  
G ####  
[address] [address]   
   
                      Monocytes (Bld) [#/Vol] 0.80 10*3/uL Normal     0.10 - 1.0  
0 [facility]  
   
                                        Comment on above:   Performed By: #### BASILIA  
G ####  
[address] [address]   
   
                      Monocytes/100 WBC (Bld) 8.4 %      Normal     2.0 - 10.0 S  
[facility]  
   
                                        Comment on above:   Performed By: #### M  
G ####  
[address] [address]   
   
                                                    Neutrophils (Bld)   
[#/Vol]         7.30 10*3/uL    Normal          1.20 - 7.70     [facility]  
   
                                        Comment on above:   Result Comment: Perc  
ent differential counts (%) should be   
interpreted in the  
context of the absolute cell counts (cells/L).   
   
                                                            Performed By: #### M  
G ####  
[address] [address]   
   
                                                    Neutrophils/100 WBC   
(Bld)           78.1 %          Normal          40.0 - 80.0     [facility]  
   
                                        Comment on above:   Performed By: #### M  
G ####  
[address] [address]   
   
                      NUCLEATED RBC 0.1 /100 WBC Normal                [facility]  
   
                                        Comment on above:   Performed By: #### M  
G ####  
[address] [address]   
   
                      Platelets (Bld) [#/Vol] 314 10*3/uL Normal     150 - 450    
[facility]  
   
                                        Comment on above:   Performed By: #### M  
G ####  
[address] [address]   
   
                      RBC        5.38 x10E12/L High       4.00 - 5.20 [facility]  
   
                                        Comment on above:   Performed By: #### M  
G ####  
[address] [address]   
   
                      WBC (Bld) [#/Vol] 9.3 10*3/uL Normal     4.4 - 11.3 [facility]  
   
                                        Comment on above:   Performed By: #### M  
G ####  
[address] [address]   
   
                                                    HEPATIC FUNCTION PANELon    
   
                      Albumin [Mass/Vol] 5.2 g/dL   High       3.4 - 5.0  [facility]  
   
                                        Comment on above:   Performed By: #### H  
EPFP ####  
[address] [address]   
   
                                                    ALP [Catalytic   
activity/Vol]   69 U/L          Normal          33 - 110        [facility]  
   
                                        Comment on above:   Performed By: #### H  
EPFP ####  
[address] [address]   
   
                                                    ALT [Catalytic   
activity/Vol]   10 U/L          Normal          7 - 45          [facility]  
   
                                        Comment on above:   Result Comment: Kaleigh  
ents treated with Sulfasalazine may   
generate  
falsely decreased results for ALT.   
   
                                                            Performed By: #### H  
EPFP ####  
[address] [address]   
   
                                                    AST [Catalytic   
activity/Vol]   14 U/L          Normal          9 - 39          [facility]  
   
                                        Comment on above:   Performed By: #### H  
EPFP ####  
[address] [address]   
   
                      Bilirubin [Mass/Vol] 0.6 mg/dL  Normal     0.0 - 1.2  [facility]  
   
                                        Comment on above:   Performed By: #### H  
EPFP ####  
[address] [address]   
   
                                                    Bilirubin.indirect   
[Mass/Vol]      0.1 mg/dL       Normal          0.0 - 0.3       [facility]  
   
                                        Comment on above:   Performed By: #### H  
EPFP ####  
[address] [address]   
   
                      Protein [Mass/Vol] 8.5 g/dL   High       6.4 - 8.2  [facility]  
   
                                        Comment on above:   Performed By: #### H  
EPFP ####  
[address] [address]   
   
                                                    LIPASEon 2022   
   
                                                    Lipase [Catalytic   
activity/Vol]   16 U/L          Normal          9 - 82          [facility]  
   
                                        Comment on above:   Result Comment: [place]  
puncture immediately after or during the  
administration of Metamizole may lead to falsely  
low results. Testing should be performed immediately  
prior to Metamizole dosing.  
N-acetyl-p-benzoquinone imine (metabolite of Acetaminophen)  
will generate erroneously low results in samples for patients  
that have taken toxic doses of acetaminophen.   
   
                                                            Performed By: #### M  
G ####  
[address] [address]   
   
                                                    Provider Note - ED v3on    
   
                                        Provider Note - ED v3 Provider Note:  
Chart Review:  
ED NOTES  
ED NOTES:  
HPI:  
Patient is a   
21-year-old female with   
a 2-day history of   
nausea vomiting and  
left lower quadrant   
abdominal pain. No   
longer vomiting any   
substance now it is  
just dry heaving.   
States she has had a   
long history of   
 stomach issues  . She  
tried to take some   
Zofran at home but it   
did not work. No fevers   
or chills.  
No neurologic   
complaints. No focal   
weakness or rashes.  
  
  
ROS:  
All systems are   
negative other than as   
noted in HPI.  
  
  
Physical Exam  
  
I have reviewed the   
triage vital signs.  
Const: Well nourished,   
well developed, appears   
stated age, no acute   
distress  
Eyes: PERRL, EOM   
intact, no conjunctival   
injection, vision   
grossly normal  
HENT: Neck supple   
without meningismus ,   
Moist mucous membranes,   
no pharyengeal  
swelling or exudate  
CV: Regular rate and   
rhythm, Warm,   
well-perfused   
extremities. Chest non   
tender  
RESP: Lungs clear   
bilaterally, Unlabored   
respiratory effort  
GI: soft, non-tender,   
non-distended, no   
masses  
:  
MSK: No gross   
deformities appreciated  
Skin: Warm, dry. No   
rashes  
Neuro: Alert and   
oriented x4, GCS 15 ,   
CNs II-XII grossly   
intact. Sensation and  
motor function of   
extremities grossly   
intact.  
Psych: Appropriate mood   
and affect.  
  
  
HISTORY OF PRESENTING   
ILLNESS  
DANNA is a 21 year old   
Female and was seen by   
me at 2022 19:24   
for a  
chief complaint of   
vomiting (Patient   
complains of nausea,   
vomiting, fever and  
left sided abdominal   
pain for 2 days)(1).  
  
Triage Information:   
Most recent Vital Sign   
Value Date  
Temp (F): 99.8   
2022 19:46  
Temp (C): 37.6   
2022 19:46  
Heart Rate (beats/min):   
116 2022 19:46  
Respirations   
(breaths/min): 18   
2022 19:46  
SpO2 (%): 98 2022   
19:46  
BP Systolic (mm Hg):   
126 2022 19:46  
BP Diastolic (mm Hg):   
100 2022 19:46  
  
  
  
  
PAST MEDICAL HISTORY  
ALLERGIES/INTOLERANCES:   
Allergy  
Allergen: MiraLax  
Type: Drug  
Reaction:   
Hives/Urticaria  
  
Allergen: codeine  
Type: Drug  
Reaction: Unknown  
  
HEALTH HISTORY: No   
documented data.  
  
OUTPATIENT MEDICATIONS:   
Home Medications Review   
Status for   
Reconciliation: N/A  
Med Status: Patient   
Currently Takes   
Medications  
  
Drug Name: Vitamins for   
Hair oral tablet  
Instructions: 1 tab(s)   
orally once a day  
  
Drug Name: Prenatal   
Multivitamins with   
Folic Acid 1.25 mg oral   
capsule  
Instructions: 1 cap(s)   
orally once a day  
  
Drug Name:   
prochlorperazine 25 mg   
rectal suppository  
Instructions: 1   
suppository(ies)   
rectally 2 times a day  
  
Drug Name:   
cyclobenzaprine 5 mg   
oral tablet  
Instructions: 1 tab(s)   
orally 3 times a day,   
As Needed -for pain  
  
Drug Name: predniSONE   
50 mg oral tablet  
Instructions: 1 tab(s)   
orally once a day x 5   
days  
  
SIGNIFICANT EVENTS:   
Past Medical History  
Description:premature  
  
  
Description:delayed   
bone growth  
  
  
Description:Miscarriage  
  
  
Description:scoliosis  
  
  
  
  
CRITICAL CARE  
RESULTS:  
Recent Lab Results:  
  
I have reviewed these   
laboratory results:  
  
Hepatic Function Panel   
2022 20:16:00  
  
ResultValue  
Aspartate Transaminase,   
Serum 14  
ALB 5.2 H  
T Bili 0.6  
Bilirubin, Serum Direct   
- Conjugated 0.1  
ALKP 69  
Alanine   
Aminotransferase, Serum   
10  
T Pro 8.5 H  
  
Complete Blood Count +   
Differential   
2022 20:16:00  
  
ResultValue  
White Blood Cell Count   
9.3  
Nucleated Erythrocyte   
Count 0.1  
Red Blood Cell Count   
5.38 H  
HGB 15.1  
HCT 46.0  
MCV 85  
MCHC 32.7  
  
RDW-CV 14.3  
Neutrophil % 78.1  
Lymphocyte % 12.2  
Monocyte % 8.4  
Eosinophil % 0.7  
Basophil % 0.6  
Neutrophil Count 7.30  
Lymphocyte Count 1.10 L  
Monocyte Count 0.80  
Eosinophil Count 0.10  
Basophil Count 0.10  
  
Basic Metabolic Panel   
2022 20:16:00  
  
ResultValue  
Glucose, Serum 102 H  
  
K 3.4 L  
CL 99  
Bicarbonate, Serum 17 L  
Anion Gap, Serum 25 H  
BUN 11  
CREAT 0.75  
GFR Female >90  
Calcium, Serum 10.4 H  
  
Lipase, Serum   
2022 20:16:00  
  
ResultValue  
Lipase, Serum 16  
  
VITAL SIGNS:  
  
T PRBP SpO2O2(LPM)   
%FiO2 Method  
2022   
20:30:[id number]/67 96   
room air, no   
respiratory  
support  
2022   
20:00:[id number]/ 100   
room air, no   
respiratory  
support  
2022   
19:46:[id number]/  
00 98 room air, no   
respiratory  
support  
2022   
19:40:[id number]/  
00 98 room air, no   
respiratory  
support  
  
  
  
  
MDM  
MDM/ED COURSE:  
Patient has received 2   
L of fluid and some   
Phenergan. She has   
Zofran at home.  
Heart rate is improved.   
She is ready for   
discharge.  
  
  
  
  
DISPOSITION  
Diagnosis/Annotation:   
ED Dx  
Name:Nausea and   
vomiting  
Code:R11.2  
  
Name:Dehydration  
Code:E86.0  
  
Disposition: discharged  
Type: home  
  
  
CONSULT  
CRITICAL CARE TIME  
Is this a critically   
ill patient: no  
  
  
  
  
  
Electronic Signatures:  
Winston Gordillo)   
(Signed 2022   
22:27)  
Authored: ED Notes,   
HPI, PMH, PE,   
Results/Vital Signs,   
MDM/ED Course, Clinical  
Impression,   
Attestation, Chart   
Review, Scores  
  
  
Last Updated:   
2022 22:27 by   
Rand, Winston W (M (more   
content not   
included)...        Normal                                  [facility]  
   
                                                    Risk Screen - Adult Emergenc  
yon 2022   
   
                                                    Risk Screen - Adult   
Emergency                               Preferred Language:  
Preferred Language:  
Preferred Language for   
Discussing Health Care   
(patient/designee)Charleen patel  
  
Advanced Directives:  
Advance Directive/DNRno  
Advance Directive   
Information   
Givenpatient/family   
declined  
  
Family Violence Adult:  
Abuse Screen:  
Are you or have you   
been threatened or   
abused physically,   
emotionally, or  
sexually by anyoneno  
  
Learning Assessment   
(Patient):  
Learning Assessment   
(Patient):  
Patient is Able to be   
Assessed for   
Learningyes  
Factors Influencing   
Readiness to   
Learninterest in   
learning  
Factors that Impact   
Ability to Learnnone  
Devices/Methods Used to   
Communicatenone  
Learning   
Preferencesverbal   
instruction  
Cultural   
Considerationsnone  
Developmental   
Considerationsnone  
Congregation   
Considerationsnone  
  
Learning Assessment   
(Other Learner):  
Learning Assessment   
(Other Learner):  
Other learner   
availableno  
  
Pressure   
Injury/TB/Substance:  
Pressure Injury:  
Pressure Injury Present   
on Admissionno  
Do you have a coughno  
Smoking Statusnever   
smoker  
Alcohol Usedenies  
Drug Usedenies  
  
  
Admission Risk Screen:  
Significant   
IndicatorsComplete  
  
CAGE:  
CAGE:  
Is this an injured   
patient at a Tra[place]   
Center   
[place] [place]  
[place]): no  
  
  
Electronic Signatures:  
Ashwini Wells (ERNA)   
(Signed 2022   
19:50)  
Authored: Preferred   
Language, Advanced   
Directives, Family   
Violence Adult,  
Learning Assessment   
(Patient), Learning   
Assessment (Other   
Learner), Pressure  
Injury/TB/Substance,   
Pressure Injury, CAGE  
  
  
Last Updated:   
2022 19:50 by   
Ashwini Wells (ERNA)  [facility]  
   
                                                    Triage - EDon 2022   
   
                                        Triage - ED         Quick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  
   
                                                    N. gonorrhoeae rRNA   
KIRSTEN+probe Ql (Unsp   
spec)           Negative                        Negative        LW-FOAWW-Zvn  
[address]  
Work Phone:   
[phone]  
   
                                        Comment on above:   SOURCE: Urine   
   
                                                    HIV Antigen/Antibody Screeno  
n 2020   
   
                                                    HIV Antigen/Antibody   
Screen          NONREACTIVE                     See Below       YN-MQSTT-Plg  
[address]  
Work Phone:   
[phone]  
   
                                        Comment on above:   SOURCE: Reference Ra  
nge: NONREACTIVE HIV Ag/Ab screen is   
performed using the Siemens Safehis HIV Ag/Ab Combo assay   
which detects the presence of HIV p24 antigen as well as   
antibodies to HIV-1 (Group M and O) and HIV-2.   
   
                                                            SOURCE: Reference Ra  
nge: NONREACTIVE Biotin interference may   
cause falsely decreased results. Patients taking a Biotin dose   
of up to 5 mg/day should refrain from taking Biotin for 24   
hours before sample collection. Providers may contact their   
local laboratory for further information.   
   
                                                            SOURCE: Reference Ra  
nge: NONREACTIVE Results from patients   
taking biotin supplements or receiving high-dose biotin   
therapy should be interpreted with caution due to possible   
interference with this test. Providers may contact their local   
laboratory for further information.   
   
                                                    Hematologyon 2020   
   
                      ABO group Nom (Bld) O                                MG-OB  
GYN-Scott  
[address]  
Work Phone:   
[phone]  
   
                                                    Blood group antibody   
screen Ql       Negative                                        UZ-JINLN-Shg  
[address]  
Work Phone:   
[phone]  
   
                                                    Hemoglobin (Bld)   
[Mass/Vol]      96.8 %                                          TI-TTVDZ-Kxn  
[address]  
Work Phone:   
[phone]  
   
                                                    Rh immune globulin   
screen (Bld) [Interp] Positive                                        MG-OBGYN-L  
an  
[address]  
Work Phone:   
[phone]  
   
                                                    Otheron 2020   
   
                                 2.8 %                            FQ-PSVTL-Tzc  
[address]  
Work Phone:   
[phone]  
   
                                        Comment on above:   HGB A2 values may be  
 falsely elevated in the presence of HGB   
S.   
   
                                 SEE COMMENT                       YW-FSVNZ-Mso  
[address]  
Work Phone:   
[phone]  
   
                                        Comment on above:   Normal   
   
                                 0.4 %                            PF-LERQL-Cut  
[address]  
Work Phone:   
[phone]  
   
                                 Not depressed                       MG-OBGYN-La  
n  
[address]  
Work Phone:   
[phone]  
   
                                 Never                            MZ-CFYOU-Exd  
[address]  
Work Phone:   
[phone]  
   
                                 0 1                              QA-YVEIP-Zss  
[address]  
Work Phone:   
[phone]  
   
                                        Comment on above:   Q1: As much as I alw  
ays couldQ2: As much as I ever didQ3: No,   
neverQ4: No, not at allQ5: No, not at allQ6: No, I have been   
coping as well as everQ7: No, not at allQ8: No, not all allQ9:   
No, [id number]: Never   
   
                                 Negative              Negative   GY-RSNQV-Oms  
[address]  
Work Phone:   
[phone]  
   
                                        Comment on above:   SOURCE: Urine   
   
                                                    Rubella IgG Antibodyon    
   
                      Rubella virus IgG IA Ql Positive                         M  
G-OBGYN-Scott  
[address]  
Work Phone:   
[phone]  
   
                                        Comment on above:   SOURCE: INTERPRETATI  
VE COMMENT NEGATIVE: No IgG antibodies   
specific to Rubella detected. It is likely that the patient   
has not had a previous exposure to Rubella through infection   
or vaccination. Alternatively, the patient may have been   
exposed to Rubella but a failure to respond may indicate   
immunodeficiency. EQUIVOCAL:Equivocal results; obtain   
additional sample for retesting. POSITIVE: IgG antibody to   
Rubella detected. This may indicate that the patient was   
exposed to Rubella through infection or vaccination.The   
interpretation of serological tests should take into   
accountthe immunological status of the patient. Test results   
forpatients, including immunocompromised patients, neonates,   
andpediatric patients, reflect their capacity to   
respondimmunologically to the virus as well as their exposure   
to thepathogen. Patients treated with IVIG may demonstrate   
alteredresults in serological assays.   
   
                                                    SYPHILIS SCREENING WITH REFL  
EXon 2020   
   
                                                    T. pallidum IgG+IgM IA   
Ql (S)          NONREACTIVE                     See Below       [address]  
Work Phone:   
[phone]  
   
                                        Comment on above:   SOURCE: Reference Ra  
nge: NONREACTIVENo significant level of   
Treponema pallidum antibody detected. Repeat testing in 2 to 4   
weeks may be considered if early infection or incubating   
syphilis infection is suspected.   
   
                                                    Complete Blood Count + Diffe  
rentialon 2020   
   
                                                    Erythrocyte   
distribution width   
(RBC) [Ratio]   13.7 %                          See Below       [employer]  
Work Phone:   
[phone]  
   
                                        Comment on above:   Reference Range: 11.  
5 - 14.5   
   
                                                            Ordering Provider: BASILIA JACOBSON [id number]   
   
                                                    Hematocrit (Bld)   
[Volume fraction] 44.1 %                          See Below       XR-YSUXH-Mep  
[employer]  
Work Phone:   
[phone]  
   
                                        Comment on above:   Reference Range: 36.  
0 - 46.0   
   
                                                            Ordering Provider: BASILIA JACOBSON [id number]   
   
                                                    Hemoglobin (Bld)   
[Mass/Vol]      14.6 g/dL                       See Below       VI-IQKAD-Ubw  
[employer]  
Work Phone:   
[phone]  
   
                                        Comment on above:   Reference Range: 12.  
0 - 16.0   
   
                                                            Ordering Provider: BASILIA Rivera146   
   
                      MCHC (RBC) [Mass/Vol] 33.1 g/dL             See Below  [facility]  
[facility]  
Work Phone:   
[phone]  
   
                                        Comment on above:   Reference Range: 32.  
0 - 36.0   
   
                                                            Ordering Provider: BASILIA JACOBSON [id number]   
   
                      MCV (RBC) [Entitic vol] 90 fL                 80 - 100   M  
[facility]  
[facility]  
Work Phone:   
[phone]  
   
                                        Comment on above:   Ordering Provider: BASILIA Rivera146   
   
                      Platelets (Bld) [#/Vol] 316 {x10E9/L}            150 - 450  
  TI-CAVJA-Xuo  
[employer]  
Work Phone:   
[phone]  
   
                                        Comment on above:   Ordering Provider: BASILIA Fong   
   
                      RBC (Bld) [#/Vol] 4.92 {x10E12/L}            See Below  MG  
-OBGYN-Scott  
[address]  
Work Phone:   
[phone]  
   
                                        Comment on above:   Reference Range: 4.0  
0 - 5.20   
   
                                                            Ordering Provider: BASILIA DELA CRUZANA ROSA KYLIE Fong   
   
                                WBC (Bld) [#/Vol] 17.4 {x10E9/L}  above high   
threshold                 4.4 - 11.3                YE-ZKJCS-Dzw  
[address]  
Work Phone:   
[phone]  
   
                                        Comment on above:   Ordering Provider: BASILIA Fong   
   
                                                    Complete Blood Count +   
Differential    SEE MANUAL DIFF                                 JE-TBJLY-Gdw  
[address]  
Work Phone:   
[phone]  
   
                                        Comment on above:   Ordering Provider: BASILIA DELA CRUZANA ROSA KYLIE Fong   
   
                                                    Coronavirus 2019 RNA by PCR,  
 Symptomaticon 2020   
   
                                                    Coronavirus 2019 RNA by   
PCR, Symptomatic NOT DETECTED                    See Below       SW-WYCYK-Ofv  
[address]  
Work Phone:   
[phone]  
   
                                        Comment on above:   SOURCE: Nasal, Nasop  
haryngealReference Range: Not DetectedThis  
   
assay is designed to detect the N2 and E genes of SARS-CoV-2   
via nucleic acid amplification. A Not Detected result does not   
preclude COVID-19 infection since the adequacy of sample   
collection and/or low viral burden may result in presence of   
viral nucleic acids below the clinical sensitivity of this   
test method. Fact sheet for providers:   
www.fda.gov/media/389814/downloadFact sheet for patients:   
www.fda.gov/media/236307/downloadThis test has received FDA   
Emergency Use Authorization (EUA) and has been verified by   
[facility]. This test is   
only authorized for the duration of time that circumstances   
exist to justify the authorization of the emergency use of in   
vitro diagnostic tests for the detection of SARS-CoV-2 virus   
and/or diagnosis of COVID-19 infection under section 564(b)(1)   
of the Act, 21 U.S.C. 360bbb-3(b)(1), unless the authorization   
is terminated or revoked sooner. [facility] is certified under CLIA-88 as   
qualified to perform high complexity testing. Testing is   
performed in the [facility] laboratory located   
at [address] [place] [address].   
   
                                                            Ordering Provider: BASILIA JACOBSON [id number]   
   
                                                    HCG, Beta Quantitativeon    
   
                      [place]n [address] {mIU/mL} Abnormal              M  
G-OBGYN-Scott  
[address]  
Work Phone:   
[phone]  
   
                                        Comment on above:   Low-level positive H  
CG results can be seen in early pregnancy,  
   
in venus- or post-menopausal females due to normal pituitary   
HCG production, or with analytic interference. Repeat testing   
in 48-72 hours can aid in assessing for pregnancy as results   
should double in this time period. FSH measurement is   
recommended in venus- or post-menopausal females as concurrent   
elevation of FSH can support pituitary production as the   
source of the HCG elevation.. Total HCG measurement is   
performed using the [employer] Access Immunoassay which   
detects intact HCG and free beta HCG subunit. This test is not   
indicated for use as a tumor marker. HCG testing is performed   
using a different test methodology at [facility]   
than other [facility]. Direct result comparison should   
only be made within the same method. REF VALUESNONPREGNANT   
FEMALE <5MALES <5   
   
                                                            Ordering Provider: BASILIA JACOBSON [id number]   
   
                                                    Hematologyon 2020   
   
                                                    Band form   
neutrophils/100 WBC   
(Bld)           2.0 %                           0.0 - 5.0       PB-OPZMP-Deq  
[address]  
Work Phone:   
[phone]  
   
                                        Comment on above:   Ordering Provider: BASILIA Rivera146   
   
                      Basophils (Bld) [#/Vol] 0.00 {x10E9/L}            See Adelina oseguera  DF-TKBVF-Ugy  
[address]  
Work Phone:   
[phone]  
   
                                        Comment on above:   Reference Range: 0.0  
0 - 0.10   
   
                                                            Ordering Provider: BASILIA JACOBSON [id number]   
   
                      Basophils/100 WBC (Bld) 0.0 %                 0.0 - 2.0    
G-OBGYN-Scott  
[address]  
Work Phone:   
[phone]  
   
                                        Comment on above:   Ordering Provider: BASILIA Rivera146   
   
                                                    Eosinophils (Bld)   
[#/Vol]         0.00 {x10E9/L}                  See Below       QM-YAHQH-Vvu  
[address]  
Work Phone:   
[phone]  
   
                                        Comment on above:   Reference Range: 0.0  
0 - 0.70   
   
                                                            Ordering Provider: BASILIA Fong   
   
                                                    Eosinophils/100 WBC   
(Bld)           0.0 %                           0.0 - 6.0       HB-SEVJS-Ffi  
[address]  
Work Phone:   
[phone]  
   
                                        Comment on above:   Ordering Provider: BASILIA Fong   
   
                                                    Lymphocytes (Bld)   
[#/Vol]         1.22 {x10E9/L}                  See Below       WZ-DEMAJ-Amb  
[address]  
Work Phone:   
[phone]  
   
                                        Comment on above:   Reference Range: 1.2  
0 - 4.80   
   
                                                            Ordering Provider: BASILIA Fong   
   
                                                    Lymphocytes/100 WBC   
(Bld)           7.0 %                           See Below       TW-PEHBM-Tex  
[address]  
Work Phone:   
[phone]  
   
                                        Comment on above:   Reference Range: 13.  
0 - 44.0   
   
                                                            Ordering Provider: BASILIA Fong   
   
                      Monocytes (Bld) [#/Vol] 0.70 {x10E9/L}            See Belo  
w  RA-CBCSB-Aoq  
[address]  
Work Phone:   
[phone]  
   
                                        Comment on above:   Reference Range: 0.1  
0 - 1.00   
   
                                                            Ordering Provider: BASILIA Fong   
   
                      Monocytes/100 WBC (Bld) 4.0 %                 2.0 - 10.0 M  
[facility]  
[address]  
Work Phone:   
[phone]  
   
                                        Comment on above:   Ordering Provider: BASILIA Fong   
   
                                                    Lactate, Levelon 2020   
   
                      Lactate [Moles/Vol] 1.7 mmol/L            0.4 - 2.0  [facility]  
[facility]  
[address]  
Work Phone:   
[phone]  
   
                                        Comment on above:   Venipuncture immedia  
tely after or during the administration of  
   
Metamizole may lead to falsely low results. Testing should be   
performed immediately prior to Metamizole dosing.   
   
                                                            Ordering Provider: BASILIA Fong   
   
                                                    Metabolic Panelon 2020  
   
   
                                                    ALP [Catalytic   
activity/Vol]   48 U/L                          33 - 110        OI-JXAZV-Jat  
[address]  
Work Phone:   
[phone]  
   
                                        Comment on above:   Ordering Provider: BASILIA Fong   
   
                                Anion gap [Moles/Vol] 24 mmol/L       above high  
   
threshold                 10 - 20                   NO-GBZOG-Cjy  
[address]  
Work Phone:   
[phone]  
   
                                        Comment on above:   Ordering Provider: BASILIA  
CARMEN JACOBSON [address]   
   
                      Bilirubin [Mass/Vol] 0.8 mg/dL             0.0 - 1.2  [facility]  
[facility]  
[address]  
Work Phone:   
[phone]  
   
                                        Comment on above:   Ordering Provider: BASILIA  
CARMEN JACOBSON [address]   
   
                                Calcium [Mass/Vol] 11.3 mg/dL      above high   
threshold                 8.6 - 10.3                IT-NZRBD-Qwu  
[address]  
Work Phone:   
[phone]  
   
                                        Comment on above:   Ordering Provider: BASILIA  
CARMEN JACOBSON [address]   
   
                      Chloride [Moles/Vol] 100 mmol/L            98 - 107   MG[facility]  
[facility]  
[address]  
Work Phone:   
[phone]  
   
                                        Comment on above:   Ordering Provider: BASILIA  
CARMEN JACOBSON [address]   
   
                                CO2 [Moles/Vol] 12 mmol/L       below low   
threshold                 21 - 32                   SV-ZIOYE-Oax  
[address]  
Work Phone:   
[phone]  
   
                                        Comment on above:   Ordering Provider: BASILIA  
CARMEN JACOBSON [address]   
   
                      Creatinine [Mass/Vol] 0.73 mg/dL            See Below  MG-  
[facility]  
[address]  
Work Phone:   
[phone]  
   
                                        Comment on above:   Reference Range: 0.5  
0 - 1.05   
   
                                                            Ordering Provider: BASILIA  
CARMEN JACOBSON [address]   
   
                                Glucose [Mass/Vol] 123 mg/dL       above high   
threshold                 74 - 99                   YS-DWKKC-Xdp  
[address]  
Work Phone:   
[phone]  
   
                                        Comment on above:   Ordering Provider: BASILIA  
CARMEN JACOBSON [address]   
   
                      Potassium [Moles/Vol] 3.7 mmol/L            3.5 - 5.3  MG-  
[facility]  
[address]  
Work Phone:   
[phone]  
   
                                        Comment on above:   Ordering Provider: BASILIA  
CARMEN JACOBSON [address]   
   
                                Protein [Mass/Vol] 8.6 g/dL        above high   
threshold                 6.4 - 8.2                 WD-GGLRB-Doa  
[address]  
Work Phone:   
[phone]  
   
                                        Comment on above:   Ordering Provider: BASILIA  
CARMEN Rivera146   
   
                                Sodium [Moles/Vol] 132 mmol/L      below low   
threshold                 136 - 145                 KO-RTGIX-Rai  
[address]  
Work Phone:   
[phone]  
   
                                        Comment on above:   Ordering Provider: BASILIA JACOBSON [address]   
   
                                                    Urea nitrogen   
[Mass/Vol]      11 mg/dL                        6 - 23          HP-QWTOY-Dls  
[address]  
Work Phone:   
[phone]  
   
                                        Comment on above:   Ordering Provider: BASILIA MORALES JACOBSON [address]   
   
                                                    Otheron 2020   
   
                                                            Interpreted by: OLIVIA BREWER20   
08:56MRN:   
[id number] Name:   
DANNA SOARES   
STUDY:US PELVIS OB   
TRANSABDOMINAL WITH   
TRANSVAGINAL; ;   
2020 6:53 am   
INDICATION:Abdominal   
pain in pregnancy.   
Vomiting and   
right-sided pelvic   
pain.   
COMPARISON:2020.   
ACCESSION   
NUMBER(S):[id number]   
ORDERING   
CLINICIAN:MUNA JACOBSON   
TECHNIQUE:Grayscale   
ultrasound.   
FINDINGS:Uterus is   
retroflexed and   
measures 8.8 x 5.3 x   
6.7 cm. There is   
asingle live   
intrauterine pregnancy.   
Heart rate is 115 beats   
perminute. Gestational   
sac diameter is 1.75   
mm. Crown-rump length   
is0.32 cm. These   
correspond to a   
composite age of 6   
weeks and 1 day+ / -3   
days. LMP is unknown. A   
normal-size yolk sac is   
noted.Gestational sac   
has a normal   
configuration. There is   
a smallelongated   
hypoechoic area in the   
upper cervical canal   
measured at 9 x5 x 10   
mm probably   
representing small   
amount of blood. Cervix   
isotherwise close.   
Right ovary measures   
2.9 x 1.7 x 2.4 cm and   
contains a 1.8 x 1.3   
x1.6 cm slightly   
isoechoic area. This   
may represent a corpus   
luteum orhemorrhagic   
cyst. Vascular flow is   
present in the right   
ovary Left ovary   
measures 2.2 x 1.4 x   
1.2 cm and appears   
unremarkable.Vascular   
flow is present. No   
adnexal masses or free   
fluid. IMPRESSION:A   
single live   
intrauterine pregnancy   
corresponding to 6   
weeks and 1day + / -3   
days. A small   
hypoechoic area in the   
upper cervical canal   
suggesting asmall   
amount of blood. Cervix   
is otherwise close.   
Gestational sac hasa   
normal configuration.   
Approximately 1.8 cm   
isoechoic structure in   
the right ovary   
probablya corpus luteum   
or hemorrhagic cyst.   
Electronically signed   
by: OLIVIA BREWER 20   
08:56               Normal                                  HH-HEYGQ-Vzb  
[address]  
Work Phone:   
[phone]  
   
                                        Comment on above:   Ordering Provider: BASILIA Fong   
   
                                                    Albumin BCP dye   
[Mass/Vol]                5.4 g/dL                  above high   
threshold                 3.4 - 5.0                 HY-JXPID-Cdb  
[address]  
Work Phone:   
[phone]  
   
                                        Comment on above:   Ordering Provider: BASILIA Fong   
   
                                                    ALT With P-5'-P   
[Catalytic   
activity/Vol]   14 U/L                          7 - 45          IX-TQOPU-Coy  
[address]  
Work Phone:   
[phone]  
   
                                        Comment on above:   Patients treated wit  
h Sulfasalazine may generate falsely   
decreased results for ALT.   
   
                                                            Ordering Provider: BASILIA Fong   
   
                                                    AST With P-5'-P   
[Catalytic   
activity/Vol]   33 U/L                          9 - 39          DC-TVDEY-Cwu  
[address]  
Work Phone:   
[phone]  
   
                                        Comment on above:   Ordering Provider: BASILIA Fong   
   
                                                    Segmented   
neutrophils/100 WBC   
(Bld)           87.0 %                          See Below       IR-DYLVO-Baa  
[address]  
Work Phone:   
[phone]  
   
                                        Comment on above:   Reference Range: 40.  
0 - 80.0 Percent differential counts (%)   
should be interpreted in the context of the absolute cell   
counts (cells/L).   
   
                                                            Ordering Provider: BASILIA Fong   
   
                                                15.49 {x10E9/L} above high   
threshold                 See Below                 QO-ZDVYA-Wkx  
[address]  
Work Phone:   
[phone]  
   
                                        Comment on above:   Reference Range: 1.2  
0 - 7.70   
   
                                                            Ordering Provider: BASILIA Fong   
   
                                                15.14 {x10E9/L} above high   
threshold                 See Below                 TU-GORZV-Szc  
[address]  
Work Phone:   
[phone]  
   
                                        Comment on above:   Reference Range: 1.2  
0 - 7.00   
   
                                                            Ordering Provider: BASILIA Fong   
   
                                 0.35 {x10E9/L}            See Below  MG-OBGYN-L  
an  
[address]  
Work Phone:   
[phone]  
   
                                        Comment on above:   Reference Range: 0.0  
0 - 0.70   
   
                                                            Ordering Provider: BASILIA DELA CRUZANA ROSA JACOBSON [address]   
   
                                 NORMAL                           GG-KRRBP-Gyh  
[address]  
Work Phone:   
[phone]  
50  
   
                                        Comment on above:   Ordering Provider: BASILIA Fong   
   
                                 >60                   >60        GG-MQHGV-Tkt  
[address]  
Work Phone:   
[phone]  
50  
   
                                        Comment on above:   CALCULATIONS OF XIAO  
MATED GFR ARE PERFORMED USING THE MDRD   
STUDY EQUATION FOR THE IDMS-TRACEABLE CREATININE METHODS. CLIN   
CHEM 2007;53:766-72   
   
                                                            Ordering Provider: BASILIA MORALES JACOBSON [address]   
   
                                                            [email or web address]  
[email or web address]  
[email or web address]  
.dll?RetrieveTestByDate  
Time?[id number]  
9                                                           UO-HURXS-Bjf  
[address]  
Work Phone:   
[phone]  
50  
   
                                        Comment on above:   Ordering Provider: BASILIA dela cruzana rosa Jacobson Ajit   
   
                                                             Please see physicia  
n   
note for formal   
interpretation   
confirmed by Scribe                                          NP-PDIGJ-Mep  
[address]  
Work Phone:   
[phone]  
50  
   
                                        Comment on above:   Ordering Provider: BASILIA dela cruzana rosa Jacobson Ajit   
   
                                 126 1                            RW-EGSUZ-Bwi  
[address]  
Work Phone:   
[phone]  
50  
   
                                        Comment on above:   Ordering Provider: BASILIA morales Jacobson Ajit   
   
                                 76 1                             DV-DONQB-Bna  
[address]  
Work Phone:   
[phone]  
50  
   
                                        Comment on above:   Ordering Provider: BASILIA dela cruzana rosa Jacobson [address]   
   
                                 290 1                            LG-BMZRE-Gjx  
[address]  
Work Phone:   
[phone]  
50  
   
                                        Comment on above:   Ordering Provider: BASILIA dela cruzana rosa Jacobson [address]   
   
                                 420 1                            YD-DRKEM-Vmh  
[address]  
Work Phone:   
[phone]  
50  
   
                                        Comment on above:   Ordering Provider: BASILIA dela cruzana rosa Jacobson [address]   
   
                                 74 1                             CK-LWTGW-Yyy  
[address]  
Work Phone:   
[phone]  
50  
   
                                        Comment on above:   Ordering Provider: BASILIA dela cruzana rosa Jacobson [address]   
   
                                 89 1                             RS-MJDFL-Pkx  
[address]  
Work Phone:   
[phone]  
50  
   
                                        Comment on above:   Ordering Provider: BASILIA osunatrent Fong   
   
                                 31 1                             LE-PFUET-Fnp  
[address]  
Work Phone:   
[phone]  
50  
   
                                        Comment on above:   Ordering Provider: BASILIA  
carmen Fong   
   
                                 21 1                             YG-GWOCY-Jpv  
[address]  
Work Phone:   
[phone]  
50  
   
                                        Comment on above:   Ordering Provider: BASILIA  
carmen Fong   
   
                                 219 1                            NT-ZMXSX-Ujs  
[address]  
Work Phone:   
[phone]  
50  
   
                                        Comment on above:   Ordering Provider: BASILIA  
carmen Fong   
   
                                 166 1                            NF-SKVAD-Hmt  
[address]  
Work Phone:   
[phone]  
50  
   
                                        Comment on above:   Ordering Provider: BASILIA  
carmen Fong   
   
                                 212 1                            TR-TMFJR-Ewb  
[address]  
Work Phone:   
[phone]  
50  
   
                                        Comment on above:   Ordering Provider: BASILIA  
carmen Fong   
   
                                 364 1                            TZ-XGHWH-Kuc  
[address]  
Work Phone:   
[phone]  
50  
   
                                        Comment on above:   Ordering Provider: BASILIA  
carmen Fong   
   
                                 371 1                            TM-SBGHM-Ymn  
[address]  
Work Phone:   
[phone]  
50  
   
                                        Comment on above:   Ordering Provider: BASILIA Fong   
   
                                                    URINALYSIS WITH CULTURE IF I  
NDICATEDon 2020   
   
                      Appearance (U) CLEAR                 CLEAR      MG-OBGYN-L  
an  
[address]  
Work Phone:   
[phone]  
50  
   
                                        Comment on above:   Ordering Provider: BASILIA Fong   
   
                      Color (U)  Straw                 See Below  KP-FMCWX-Hli  
[address]  
Work Phone:   
[phone]  
50  
   
                                        Comment on above:   SOURCE: Reference Ra  
nge: STRAW,YELLOW   
   
                                                            Ordering Provider: BASILIA Rivera146   
   
                      Glucose Ql (U) Negative              NEGATIVE   MG-OBGYN-L  
an  
[address]  
Work Phone:   
[phone]  
50  
   
                                        Comment on above:   Ordering Provider: BASILIA JACOBSON [address]   
   
                      Ketones Ql (U) 80(2+)     Abnormal   NEGATIVE   MG-OBGYN-L  
an  
[address]  
Work Phone:   
[phone]  
50  
   
                                        Comment on above:   Ordering Provider: BASILIA Rivera146   
   
                                                    Leukocyte esterase Test   
strip Ql (U)    Negative                        NEGATIVE        EY-EVCRL-Ezo  
[address]  
Work Phone:   
[phone]  
50  
   
                                        Comment on above:   Ordering Provider: BASILIA Rivera146   
   
                      pH (U)     6.0 [pH]              5.0 - 8.0  KI-TOZBL-Vhf  
[address]  
Work Phone:   
[phone]  
   
                                        Comment on above:   Ordering Provider: BASILIA Fong   
   
                      Protein (U) [Mass/Vol] Negative              NEGATIVE   MG  
-OBGYN-Scott  
[address]  
Work Phone:   
[phone]  
   
                                        Comment on above:   Ordering Provider: BASILIA Rivera146   
   
                      RBC (U) [#/Vol] Negative              NEGATIVE   MG-OBGYN-  
Scott  
[address]  
Work Phone:   
[phone]  
   
                                        Comment on above:   Ordering Provider: BASILIA Fong   
   
                                                    Specific gravity (U)   
[Rel density]   1.014 1                         See Below       LU-RJTQG-Tik  
[address]  
Work Phone:   
[phone]  
   
                                        Comment on above:   Reference Range: 1.0  
05 - 1.035   
   
                                                            Ordering Provider: BASILIA Rivera146   
   
                                                    URINALYSIS WITH CULTURE   
IF INDICATED    Negative                        NEGATIVE        LE-TPWIR-Plz  
[address]  
Work Phone:   
[phone]  
   
                                        Comment on above:   Ordering Provider: BASILIA Rivera146   
   
                                                    URINALYSIS WITH CULTURE   
IF INDICATED    <2.0                            0.0 - 1.9       KQ-XJTHJ-Pnx  
[address]  
Work Phone:   
[phone]  
   
                                        Comment on above:   Ordering Provider: BASILIA Rivera146   
   
                                                    CBC WITH AUTO DIFFERENTIALon  
 2020   
   
                      Basophils (Bld) [#/Vol] 0.07 10*3/uL                        
 [facility]  
   
                      Basophils/100 WBC (Bld) 0.6 %                            [facility]  
   
                                                    Eosinophils (Bld)   
[#/Vol]         0.01 10*3/uL                                    [facility]  
   
                                                    Eosinophils/100 WBC   
(Bld)           0.1 %                                           [facility]  
   
                                                    Erythrocyte   
distribution width   
(RBC) [Entitic vol] 12.5 %                                  11.6 - 14.8   
%                                       [facility]  
   
                                                    Hematocrit (Bld)   
[Volume fraction] 43.3 %                          36 - 46 %       [facility]  
   
                                                    Hemoglobin (Bld)   
[Mass/Vol]      14.5 g/dL                       12 - 16 g/dL    [facility]  
   
                                                    Immature granulocytes   
(Bld) [#/Vol]   0.06 10*3/uL                                    [facility]  
   
                                                    Immature   
granulocytes/100 WBC   
(Bld)           0.50 %                                          [facility]  
   
                                        Comment on above:   The IG parameter is   
the percentage of metamyelocytes,   
myelocytes and promyelocytes. An immature granulocyte count   
(IG) of 1% or more suggests the possibility of infection, an   
IG count of 3% is very likely related to an infection.   
   
                                                    Interpretation and   
review of laboratory   
results         Abnormal                                        [facility]  
   
                                                    Lymphocytes (Bld)   
[#/Vol]         0.47 10*3/uL    Low                             [facility]  
   
                                                    Lymphocytes/100 WBC   
(Bld)           3.8 %                                           [facility]  
   
                                                    MCH (RBC) [Entitic   
mass]           29.2 pg                         25 - 35 pg      [facility]  
   
                      MCHC (RBC) [Mass/Vol] 33.5 g/dL             31 - 37 g/dL [facility]  
[facility]  
   
                      MCV (RBC) [Entitic vol] 87.3 fL               78 - 102 fL   
[facility]  
   
                      Monocytes (Bld) [#/Vol] 0.29 10*3/uL Low                    
 [facility]  
   
                      Monocytes/100 WBC (Bld) 2.4 %                            [facility]  
[facility]  
   
                                                    Neutrophils (Bld)   
[#/Vol]         11.39 10*3/uL   High                            [facility]  
   
                                                    Neutrophils/100 WBC   
(Bld)           92.6 %                                          [facility]  
   
                                                    Nucleated RBC (Bld)   
[#/Vol]         0.00 10*3/uL                                    [facility]  
   
                                                    Nucleated RBC/100 WBC   
(Bld) [Ratio]   0.0 %                                           [facility]  
   
                                                    Platelet mean volume   
(Bld) [Entitic vol] 13.2 fL             High                9.4 - 12.4   
fL                                      [facility]  
   
                      Platelets (Bld) [#/Vol] 307 10*3/uL                         
[facility]  
   
                      RBC (Bld) [#/Vol] 4.96 10*6/uL                       [facility]  
[facility]  
   
                      WBC (Bld) [#/Vol] 12.29 10*3/uL [facility]  
   
                                                    COVID-19, Molecularon 2020   
   
                                                    Interpretation and   
review of laboratory   
results         Normal                                          [facility]  
   
                      SARS-CoV-2 Not Detected            Not Detected [facility]  
   
                                        Comment on above:   This test was perfor  
med under the FDA's Emergency Use   
Authorization (EUA). Testing was performed using the Abbott ID   
NOW COVID-19 assay on the ID NOW platform.  
  
This test has not been approved for use in asymptomatic   
patients and its performance in this patient population has   
not been evaluated. Negative results do not rule out the   
presence of SARS-CoV-2/COVID-19.  
  
Fact sheets for the EUA can be found at the following links:  
For Healthcare Providers:   
https://www.fda.gov/media/251396/download  
For Patients: https://www.fda.gov/media/117038/download  
  
   
   
                                                    Comprehensive Metabolic Pane  
benjamin 2020   
   
                          Albumin [Mass/Vol] 4.6 g/dL     High         3.2 - 4.5  
   
g/dL                                    [facility]  
   
                                                    ALP [Catalytic   
activity/Vol]   56 U/L                          40 - 140 U/L    [facility]  
   
                                                    ALT [Catalytic   
activity/Vol]   20 U/L                          14 - 65 U/L     [facility]  
   
                          Anion gap [Moles/Vol] 22 mmol/L    High         10 - 2  
0   
mmol/L                                  [facility]  
   
                                                    AST [Catalytic   
activity/Vol]   42 U/L                          0 - 45 U/L      [facility]  
   
                          Bilirubin [Mass/Vol] 0.8 mg/dL                 0 - 1.3  
   
mg/dL                                   [facility]  
   
                          Calcium [Mass/Vol] 9.7 mg/dL                 8.4 - 10.  
2   
mg/dL                                   [facility]  
   
                          Chloride [Moles/Vol] 106 mmol/L                98 - 10  
8   
mmol/L                                  [facility]  
   
                      Creatinine [Mass/Vol] 0.64 mg/dL            0.50 - 1.00 [facility]  
   
                                                    GFR/1.73 sq M predicted   
among non-blacks MDRD   
(S/P/Bld) [Vol   
rate/Area]                              The eGFR should be used   
for monitoring renal   
function only and not   
for medication dosing.                                         [facility]  
   
                                                    GFR/1.73 sq M.predicted   
CKD-EPI (S/P/Bld) [Vol   
rate/Area]          131                                     >=60   
mL/min/1.73   
m2                                      [facility]  
   
                          Glucose [Mass/Vol] 96 mg/dL                  65 - 99   
mg/dL                                   [facility]  
   
                          HCO3 [Moles/Vol] 10 mmol/L    Low          21 - 32   
mmol/L                                  [facility]  
   
                          Potassium [Moles/Vol] 4.3 mmol/L                3.5 -   
5.1   
mmol/L                                  [facility]  
   
                      Protein [Mass/Vol] 8.6 g/dL   High       6 - 8 g/dL [facility]  
alth  
   
                          Sodium [Moles/Vol] 134 mmol/L   Low          135 - 145  
   
mmol/L                                  [facility]  
   
                                                    Urea nitrogen   
[Mass/Vol]      6 mg/dL         Low             8 - 25 mg/dL    [facility]  
   
                                                    Urea   
nitrogen/Creatinine   
[Mass ratio]    9.4 mg/mg       Low                             [facility]  
   
                                                    Otheron 2020   
   
                      Extra Tube Hold for add-ons.                       [facility]  
   
                                        Comment on above:   Auto resulted.   
   
                                                    Interpretation and   
review of laboratory   
results         Abnormal                                        [facility]  
   
                                                            Interpreted by: MAC STEARNS20   
04:26STUDY:Pelvic   
Ultrasound; 2020   
4:03 AM   
INDICATION:RLQ/right   
pelvic pain, nausea,   
vomiting. COMPARISON:US   
pelvis 2020, CT   
AP 2020.   
ACCESSION   
NUMBER(S):[id number]   
ORDERING   
CLINICIAN:HARMAN RIOS MD TECHNIQUE:   
Ultrasound imaging of   
the pelvis was   
performed   
bothtransabdominally   
and transvaginally.   
FINDINGS: The uterus   
measures 8.4 x 5.9 x   
6.2 cm. It demonstrates   
anormal, homogeneous   
echotexture. Single   
intrauterine gestation   
isidentified. Fetal   
pole is identified   
measuring 2 mm in   
diametercorresponding   
to a gestational age of   
5 weeks 5 days with   
fetal heartrate of 106   
bpm. Yolk sac is   
identified. Small   
subchorionichemorrhage   
is again noted,   
unchanged. The right   
ovary measures 3.5 x   
1.7 x 2.3 cm. The right   
ovary onceagain   
demonstrates an   
echogenic 1.8 x 1.5 x   
1.8 cm lesion. The   
rightovary demonstrates   
normal color Doppler   
flow. Small amount of   
simplefree fluid is   
seen in the region of   
the right ovary. The   
left ovary measures 2.7   
x 1.6 x 1.5 cm. It   
demonstrates a   
normalechotexture. The   
left ovary demonstrates   
normal color Doppler   
flow. No significant   
fluid is present within   
the cul-de-sac.   
IMPRESSION: No   
significant interval   
change. Single viable   
intrauterine   
gestation,approximately   
5 weeks 5 days   
gestational age with   
fetal heart rate of106   
bpm.Small subchorionic   
hemorrhage.Stable 1.8   
cm hypoechoic lesion in   
the right ovary,   
possiblyendometrioma or   
hemorrhagic cyst.   
Signed by Mac StearnsElectronically   
signed by: MAC STEARNS   
20 04:26      Normal                                  [facility]  
Work Phone:   
[phone]  
   
                                        Comment on above:   Ordering Provider: BASILIA RIOS [address]   
   
                                                    hCG, Blood, QUANTitativeon 0  
2020   
   
                                                    Beta HCG (pregnancy   
test) Ql (U)                            Males and nonpregnant   
females: <5 mIU/mL   
Females during   
pregnancy: 3-4 weeks   
9-130 mIU/mL 4-5 weeks   
 mIU/mL 5-6   
weeks 850-20,800 mIU/mL   
6-7 weeks 4000-100,200   
mIU/mL 7-12 weeks   
11,500-289,000 mIU/mL   
12-16 weeks   
18,300-137,000 mIU/mL   
16-29 weeks   
1,400-53,000 mIU/mL   
29-41 weeks 940-60,000   
mIU/mL                                                      [facility]  
   
                      HCG Qn     24539 m[IU]/mL High                  [facility]  
   
                                                    Otheron 2020   
   
                                                            Interpreted by: YO NELSON20   
00:38MRN:   
[id number] Name:   
DANNA SOARES   
STUDY:US PELVIS OB   
TRANSABDOMINAL WITH   
TRANSVAGINAL; 2020   
12:19 am   
INDICATION:pelvic pain.   
COMPARISON:None.   
ACCESSION   
NUMBER(S):[id number]   
ORDERING   
CLINICIAN:ANÍBAL GUSTAFSON   
TECHNIQUE:Grayscale and   
color Doppler   
transabdominal and   
transvaginal imagesof   
the pelvis.   
FINDINGS:Transabdominal   
images are essentially   
nondiagnostic due to   
lack ofbladder   
distention.Uterus: The   
uterus measures 8.6 x   
5.2 x 5.3 cm. There is   
anintrauterine   
gestational sac with   
normal appearing yolk   
sac. Mean sacdiameter   
is 15 mm, corresponding   
to 5 weeks 6 days   
gestational age.There   
is a small echogenic   
focus adjacent to the   
yolk sac, likely atiny   
distal pole. This   
measures 1 mm, which is   
too small to   
calculategestational   
age. Cardiac activity   
is not detected, likely   
due tosmall size. A 9 x   
10 x 8 mm hypoechoic   
nonvascular focus   
adjacent tothe   
gestational sac is   
consistent with a small   
periimplantationalbleed  
. Ovaries: The right   
ovary measures 3 x 3.1   
x 3 cm. There is a   
stable1.9 x 1.6 x 2 cm   
echogenic mass in the   
right ovary with   
diffuseinternal echoes   
and posterior acoustic   
enhancement, suggesting   
anendometrioma.. The   
left ovary measures 2.5   
x 1.3 x 1.2 cm and   
appearsnormal. No   
adnexal mass is seen.   
Cul de sac: Small   
amount of simple free   
fluid is nonspecific   
and maybe physiologic.   
IMPRESSION:Intrauterine   
gestational sac with   
normal appearing yolk   
sac andpossible small   
fetal pole. Gestational   
age is 5 weeks 6 days   
by meansac diameter. A   
short-term follow-up   
ultrasound may be   
considered toconfirm   
viability. Small amount   
of simple free pelvic   
fluid is nonspecific   
and may bephysiologic.   
1.9 cm hypoechoic focus   
in the right ovary is   
stable from   
10/17/2019,with imaging   
features suggesting an   
endometrioma.   
Differentialconsiderati  
ons include a   
hemorrhagic   
cyst.Electronically   
signed by: ROSA ELENA NELSON 20 00:38 Normal                                  [facility]  
[address]  
Work Phone:   
[phone]  
   
                                        Comment on above:   Ordering Provider: SARAH GUSTAFSON [id number]   
   
                                                    Complete Blood Count + Diffe  
cristian 2020   
   
                      Basophils (Bld) [#/Vol] 0.10 {x10E9/L}            See Belo  
w  JP-LVZGY-Rgl  
[address]  
Work Phone:   
[phone]  
   
                                        Comment on above:   Reference Range: 0.0  
0 - 0.10   
   
                                                            Ordering Provider: BASILIA RUTH [address]   
   
                      Basophils/100 WBC (Bld) 0.8 %                 0.0 - 2.0  BASILIA  
G-OBGYN-Scott  
[address]  
Work Phone:   
[phone]  
   
                                        Comment on above:   Ordering Provider: BASILIA RUTH [address]   
   
                                                    Eosinophils (Bld)   
[#/Vol]         0.00 {x10E9/L}                  See Below       JP-HKHEF-Rsj  
[address]  
Work Phone:   
[phone]  
   
                                        Comment on above:   Reference Range: 0.0  
0 - 0.70   
   
                                                            Ordering Provider: BASILIA RUTH [address]   
   
                                                    Eosinophils/100 WBC   
(Bld)           0.1 %                           0.0 - 6.0       OI-PKNQJ-Czu  
[address]  
Work Phone:   
[phone]  
   
                                        Comment on above:   Ordering Provider: BASILIA RUTH [address]   
   
                                                    Erythrocyte   
distribution width   
(RBC) [Ratio]   13.4 %                          See Below       YF-CBDKD-Xzg  
[address]  
Work Phone:   
[phone]  
   
                                        Comment on above:   Reference Range: 11.  
5 - 14.5   
   
                                                            Ordering Provider: BASILIA RUTH [address]   
   
                                                    Hematocrit (Bld)   
[Volume fraction] 43.8 %                          See Below       SV-ZKIMP-Esl  
[address]  
Work Phone:   
[phone]  
   
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   
for counseling  
d/w Dr. Jesu Chong MD, PGY3  
  
Attending Note  
Attestation: I saw and   
evaluated the patient.   
I personally obtained   
the key and critical   
portions of the history   
and physical exam or   
was physically present   
for key and critical   
portions performed by   
the attendee. I   
reviewed the attendee's   
documentation and   
discussed the patient   
with the attendee. I   
agree with the   
attendee's medical   
decision making as   
documented on the   
attendee's note with   
the exception/addition   
of the following:  
Comments/Additional   
Findings: AUB,   
depression,   
contraception  
  
Signatures  
Electronically signed   
by : Jerri Chong MD;   
2020 6:22PM EST   
(Co-author)  
Electronically signed   
by : Kanchan Nails MD;   
Aug 1 2020 10:39PM EST   
(Author)            Normal                                     
[employer] 2019   
   
                                        [facility]       HNO ID: [id number]  
Author: Cee Mckenna)   
ARI Parra  
Service: ?  
Author Type: Clinical   
Technician  
Type: Allied [facility]  
Filed: 2019 10:30   
AM  
Note Text:  
Radiology Service   
Progress Note  
PATIENT NAME: Danna Soares  
MRN: [id number]  
DATE OF SERVICE:   
2019  
TIME: 10:29 AM  
PATIENT IDENTITY   
VERIFICATION COMPLETED   
USING TWO (2)   
IDENTIFIERS: Name  
and Date of Birth   
confirmed by patient   
verbally.  
PATIENT GENDER DATA:   
Female. Pregnancy   
status: Pregnant: No  
Breastfeeding status:   
NO.  
PATIENT RELEVANT   
IMPLANT DATA REVIEWED:   
Not Applicable  
RADIOLOGY DEPARTMENT:   
Ultrasound  
PERIPHERAL IV DATA: Not   
applicable  
SIGNED BY: ARI Castro  
2019 10:29   
AM                  [facility]  
   
                                                    Basic Metabolic Panlon    
   
                                        Anion gap [Moles/Vol] Unable to calculat  
e   
because one or more   
components used in   
calculation is outside   
assay range.        Normal              9-18                [facility]  
   
                                        Comment on above:   Performed By: #### C  
BCDIF, BMP ####  
[facility]  
[address]  
[phone]   
   
                      Calcium [Mass/Vol] 8.7 mg/dL  Normal     8.4-10.2   [facility]  
   
                                        Comment on above:   Result Comment: Refe  
rence ranges were not locally established   
for this patient's age group. The normal values are based on   
the following source: Calcium (Gen. 2) (package insert v3.0   
English). [facility] [employer], [place], [place], 2013.   
   
                                                            Performed By: #### C  
BCDIF, ELLEN ####  
[facility]  
[address]  
[phone]   
   
                      Chloride [Moles/Vol] 103 mmol/L Normal          [facility]  
   
                                        Comment on above:   Performed By: #### C  
BCDIF, BMP ####  
[facility]  
[address]  
[phone]   
   
                                        CO2 [Moles/Vol]     Unable to assay.   
Specimen hemolyzed. Normal              22-30               [facility]  
   
                                        Comment on above:   Performed By: #### C  
BCDIF, BMP ####  
[facility]  
[address]  
[phone]   
   
                      Creatinine [Mass/Vol] 0.48 mg/dL Low        0.58-0.96  [facility]  
   
                                        Comment on above:   Performed By: #### C  
BCDIF, BMP ####  
[facility]  
[address]  
[phone]   
   
                      Glucose [Mass/Vol] 111 mg/dL  High       74-99      [facility]  
   
                                        Comment on above:   Result Comment: Refe  
rence ranges for this patient's age group   
have not been established. These reference ranges reflect   
verified or established ranges for the adult population.   
Interpret these ranges with caution using the clinical context   
and additional reference resources.  
The American Diabetes Association (ADA) provides guidance for   
cutoff values for fasting glucose and random glucose. The ADA   
defines fasting as no caloric intake for at least 8 hours.   
Fasting plasma glucose results between 100 to 125 mg/dL   
indicate increased risk for diabetes (prediabetes).  
Fasting plasma glucose results greater than or equal to 126   
mg/dL meet the criteria for diagnosis of diabetes. In the   
absence of unequivocal hyperglycemia, results should be   
confirmed by repeat testing. In a patient with classic   
symptoms of hyperglycemia or hyperglycemic crisis, random   
plasma glucose results greater than or equal to 200 mg/dL meet   
the criteria for diagnosis of diabetes.  
Reference: Standards of Medical Care in Diabetes 2016,   
American Diabetes Association. Diabetes Care. 2016.39(Suppl   
1).   
   
                                                            Performed By: #### C  
BCDIF, BMP ####  
[facility]  
[address]  
[phone]   
   
                                        Potassium [Moles/Vol] Unable to assay.   
Specimen hemolyzed. Normal              3.7-5.1             [facility]  
   
                                        Comment on above:   Result Comment: Call  
ed to and read back by:  
STEPHANIA MIGUEL IN ER 19 1030   
   
                                                            Performed By: #### C  
RAFAEL, ELLEN ####  
[facility]  
[address]  
[phone]   
   
                      Sodium [Moles/Vol] 135 mmol/L Low        136-144    [facility]  
   
                                        Comment on above:   Performed By: #### C  
BCDIF, BMP ####  
[facility]  
[address]  
[phone]   
   
                                                    Urea nitrogen   
[Mass/Vol]      7 mg/dL         Normal          5-18            [facility]  
   
                                        Comment on above:   Result Comment: Refe  
rence ranges were not locally established   
for this patient's age group. The normal values are based on   
the following source: Urea/BUN (package insert v7.0 English).   
[facility], [place], [place], 2015.   
   
                                                            Performed By: #### C  
ELLEN HALL ####  
[facility]  
[address]  
[phone]   
   
                                                    Beta HCG Quant, EDon   
019   
   
                      Beta HCG Quant, .5 mU/mL High       <5.0       [facility]  
   
                                        Comment on above:   Result Comment: NAHEED  
TITATIVE HCG NORMAL RANGES  
Weeks of Gestation (Weeks Since LMP)  
3 Weeks (5.8-71.2 mIU/mL)  
4 Weeks (9.5-750 mIU/mL)  
5 Weeks (217-7138 mIU/mL)  
6 Weeks (158-97064 mIU/mL)  
7 Weeks (3697-746546 mIU/mL)  
8 Weeks (42142-705453 mIU/mL)  
9 Weeks (36607-097871 mIU/mL)  
10 Weeks (44102-117950 mIU/mL)  
12 Weeks (81485-386767 mIU/mL)  
Referenced to 4th IS of [facility]   
   
                                                            Performed By: #### K  
1, HCGED ####  
[facility]  
[address]  
[phone]   
   
                                                    CBC and Differentialon    
   
                      Abs Baso   0.03 k/uL  Normal     <0.11      [facility]  
   
                                        Comment on above:   Performed By: #### C  
BCDIF, BMP ####  
[facility]  
[address]  
[phone]   
   
                      Abs Mono   0.66 k/uL  Normal     <0.87      [facility]  
   
                                        Comment on above:   Performed By: #### C  
BCDIF, BMP ####  
[facility]  
 [place] [id number]   
   
                      Abs Neut   5.14 k/uL  Normal     1.45-7.50  [facility]  
   
                                        Comment on above:   Performed By: #### C  
BCDIF, BMP ####  
[facility]  
 [address]   
   
                      Basophils/100 WBC (Bld) 0.4 %      Normal                [facility]  
   
                                        Comment on above:   Performed By: #### C  
BCDIF, BMP ####  
[facility]  
 [address]   
   
                                                    Eosinophils (Bld)   
[#/Vol]         0.06 10*3/uL    Normal          <0.46           [facility]  
   
                                        Comment on above:   Performed By: #### C  
BCDIF, BMP ####  
[facility]  
 [address]   
   
                                                    Eosinophils/100 WBC   
(Bld)           0.8 %           Normal                          [facility]  
   
                                        Comment on above:   Performed By: #### C  
BCDIF, BMP ####  
[facility]  
[address] [place] [address]   
   
                                                    Erythrocyte   
distribution width   
(RBC) [Ratio]   13.7 %          Normal          11.5-15.0       [facility]  
   
                                        Comment on above:   Performed By: #### C  
BCDIF, BMP ####  
[facility]  
[address] [place] [address]   
   
                                                    Hematocrit (Bld)   
[Volume fraction] 32.4 %          Low             36.0-46.0       [facility]  
   
                                        Comment on above:   Performed By: #### C  
BCDIF, BMP ####  
[facility]  
 [address]   
   
                                                    Hemoglobin (Bld)   
[Mass/Vol]      10.4 g/dL       Low             11.5-15.5       [facility]  
   
                                        Comment on above:   Performed By: #### C  
BCDIF, BMP ####  
[facility]  
 [address]   
   
                                                    Lymphocytes (Bld)   
[#/Vol]         1.77 10*3/uL    Normal          1.00-4.00       [facility]  
   
                                        Comment on above:   Performed By: #### C  
BCDIF, BMP ####  
[facility]  
 [address]  
[phone]   
   
                                                    Lymphocytes/100 WBC   
(Bld)           23.1 %          Normal                          [facility]  
   
                                        Comment on above:   Performed By: #### C  
BCDIF, BMP ####  
[facility]  
 [place] [phone]   
   
                                                    MCH (RBC) [Entitic   
mass]           29.0 pG         Normal          26.0-34.0       [facility]  
   
                                        Comment on above:   Performed By: #### C  
BCDIF, BMP ####  
[facility]  
 [place] [phone]   
   
                      MCHC (RBC) [Mass/Vol] 32.1 g/dL  Normal     30.5-36.0  [facility]  
   
                                        Comment on above:   Performed By: #### C  
BCDIF, BMP ####  
[facility]  
 [place] [phone]   
   
                      MCV (RBC) [Entitic vol] 90.3 fL    Normal     80.0-100.0 [facility]  
   
                                        Comment on above:   Performed By: #### C  
BCDIF, BMP ####  
[facility]  
 [address]   
   
                      Monocytes/100 WBC (Bld) 8.6 %      Normal                [facility]  
   
                                        Comment on above:   Performed By: #### C  
BCDIF, BMP ####  
[facility]  
 [address]   
   
                                                    Neutrophils/100 WBC   
(Bld)           67.1 %          Normal                          [facility]  
   
                                        Comment on above:   Performed By: #### C  
BCDIF, BMP ####  
[facility]  
 [place] [phone]   
   
                                                    Platelet mean volume   
(Bld) [Entitic vol] 11.8 fL         Normal          9.0-12.7        [facility]  
   
                                        Comment on above:   Performed By: #### C  
BCDIF, BMP ####  
[facility] Laboratory  
 [place] [id number]   
   
                      Platelets (Bld) [#/Vol] 224 10*3/uL Normal     150-400      
[facility]  
   
                                        Comment on above:   Performed By: #### C  
BCDIF, BMP ####  
[facility]  
 [place] [phone]   
   
                      RBC (Bld) [#/Vol] 3.59 10*6/uL Low        3.90-5.20  [facility]  
   
                                        Comment on above:   Performed By: #### C  
BCDIF, BMP ####  
[facility]  
 [address]   
   
                      WBC (Bld) [#/Vol] 7.66 10*3/uL Normal     3.70-11.00 [facility]  
   
                                        Comment on above:   Performed By: #### C  
BCDIF, BMP ####  
[facility]  
 [place] [address]   
   
                      Abs Baso   0.04 k/uL  Normal     <0.11      [facility]  
   
                                        Comment on above:   Performed By: #### C  
BCDIF, BMP ####  
[facility]  
 [place] [address]   
   
                      Abs Mono   0.86 k/uL  Normal     <0.87      [facility]  
   
                                        Comment on above:   Performed By: #### C  
BCDIF, BMP ####  
[facility]  
 [place] [address]   
   
                      Abs Neut   6.68 k/uL  Normal     1.45-7.50  [facility]  
   
                                        Comment on above:   Performed By: #### C  
BCDIF, BMP ####  
[facility]  
 [place] [address]   
   
                      Basophils/100 WBC (Bld) 0.5 %      Normal                [facility]  
   
                                        Comment on above:   Performed By: #### C  
BCDIF, BMP ####  
[facility]  
 [place] [address]   
   
                                                    Eosinophils (Bld)   
[#/Vol]         0.11 10*3/uL    Normal          <0.46           [facility]  
   
                                        Comment on above:   Performed By: #### C  
BCDIF, BMP ####  
[facility]  
[address] [place] [address]   
   
                                                    Eosinophils/100 WBC   
(Bld)           1.2 %           Normal                          [facility]  
   
                                        Comment on above:   Performed By: #### C  
BCDIF, BMP ####  
[facility]  
[address] [place] [address]   
   
                                                    Erythrocyte   
distribution width   
(RBC) [Ratio]   21.3 %          High            11.5-15.0       [facility]  
   
                                        Comment on above:   Performed By: #### C  
BCDIF, BMP ####  
[facility]  
 [address]   
   
                                                    Hematocrit (Bld)   
[Volume fraction] 39.4 %          Normal          36.0-46.0       [facility]  
   
                                        Comment on above:   Performed By: #### C  
BCDIF, BMP ####  
[facility]  
 [facility]  
[phone]   
   
                                                    Hemoglobin (Bld)   
[Mass/Vol]      11.9 g/dL       Normal          11.5-15.5       [facility]  
   
                                        Comment on above:   Performed By: #### C  
BCDIF, BMP ####  
[facility]  
 [facility]  
[phone]   
   
                                                    Lymphocytes (Bld)   
[#/Vol]         1.17 10*3/uL    Normal          1.00-4.00       [facility]  
   
                                        Comment on above:   Performed By: #### C  
BCDIF, BMP ####  
[facility]  
 [place] [phone]   
   
                                                    Lymphocytes/100 WBC   
(Bld)           13.2 %          Normal                          [facility]  
   
                                        Comment on above:   Performed By: #### C  
BCDIF, BMP ####  
[facility]  
 [facility]  
[phone]   
   
                                                    MCH (RBC) [Entitic   
mass]           29.7 pG         Normal          26.0-34.0       [facility]  
   
                                        Comment on above:   Performed By: #### C  
BCDIF, BMP ####  
[facility]  
 [facility]  
[phone]   
   
                      MCHC (RBC) [Mass/Vol] 30.2 g/dL  Low        30.5-36.0  [facility]  
   
                                        Comment on above:   Performed By: #### C  
BCDIF, BMP ####  
[facility]  
 [facility]  
[phone]   
   
                      MCV (RBC) [Entitic vol] 98.3 fL    Normal     80.0-100.0 [facility]  
   
                                        Comment on above:   Performed By: #### C  
BCDIF, BMP ####  
[facility]  
 [facility]  
[phone]   
   
                      Monocytes/100 WBC (Bld) 9.7 %      Normal                [facility]  
   
                                        Comment on above:   Performed By: #### C  
BCDIF, BMP ####  
[facility]  
 [facility]  
[phone]   
   
                                                    Neutrophils/100 WBC   
(Bld)           75.4 %          Normal                          [facility]  
   
                                        Comment on above:   Performed By: #### C  
BCDIF, BMP ####  
[facility]  
[address] [place] [phone]   
   
                                                    Platelet mean volume   
(Bld) [Entitic vol] 12.1 fL         Normal          9.0-12.7        [facility]  
   
                                        Comment on above:   Performed By: #### C  
RAFAEL, BMP ####  
[facility]  
[address] [facility]  
[phone]   
   
                      Platelets (Bld) [#/Vol] 230 10*3/uL Normal     150-400      
[facility]  
   
                                        Comment on above:   Performed By: #### VADIM HALL, BMP ####  
[facility]  
[address] [facility]  
[phone]   
   
                      RBC (Bld) [#/Vol] 4.01 10*6/uL Normal     3.90-5.20  [facility]  
   
                                        Comment on above:   Performed By: #### C  
RAFAEL, BMP ####  
[facility]  
[address] [facility]  
[phone]   
   
                      WBC (Bld) [#/Vol] 8.86 10*3/uL Normal     3.70-11.00 [facility]  
   
                                        Comment on above:   Performed By: #### VADIM HALL, BMP ####  
[facility]  
[address] [facility]  
[phone]   
   
                                                    ED NOTEon 2019   
   
                                        ED NOTE             HNO ID: [id number]  
Author: Jerri HAM  
Service: ?  
Author Type: ?  
Type: ED Notes  
Filed: 2019 3:35   
PM  
Note Text:  
Report given to fco Alicia from [facility] medical transfer  
services. Patient is   
not in distress.   
Physician stated   
another bag of  
fluids was not needed   
for transfer since VSS. [facility]  
   
                                        ED NOTE             HNO ID: [id number]  
Author: Jerri HAM  
Service: ?  
Author Type: ?  
Type: ED Notes  
Filed: 2019 2:40   
PM  
Note Text:  
Report called and given   
to [facility] ED, charge   
nurse ERNA Elliott.  [facility]  
   
                                        ED NOTE             HNO ID: [id number]  
Author: Jerri HAM  
Service: ?  
Author Type: ?  
Type: ED Notes  
Filed: 2019 10:27   
AM  
Note Text:  
Labs redrawn for   
hemolysis. Patient is   
resting comfortably in   
bed with no  
distress. Will continue   
to monitor.         [facility]  
   
                                        ED NOTE             HNO ID: [id number]  
Author: Eugenia JeffersonRn)   
ERNA Thomason  
Service: ?  
Author Type: Registered   
Nurse  
Type: ED Notes  
Filed: 2019 9:33   
AM  
Note Text:  
Patient presents to the   
ED post D AND C last   
Wednesday. Patient   
states she  
passed out twice last   
week after the D AND C   
and today she started   
passing  
large clots and is in   
lots of pain.       [facility]  
   
                                                    ED PROV NOTEon 2019   
   
                                        ED PROV NOTE        HNO ID: [id number]  
Author: Krissy Forbes  
Service: Emergency   
Medicine  
Author Type: Physician  
Type: ED Provider Notes  
Filed: 2019 2:14   
PM  
Note Text:  
ED Provider Note  
Patient Name: Danna Soares  
MRN: [id number]  
SERVICE DATE: 19  
History  
Patient presents with:  
Vaginal Bleeding  
Syncope  
Patient presents for   
pelvic pain and vaginal   
bleeding. She had a   
DANDC done  
6 days ago for a missed   
. At that time   
after the procedure she  
went home and states   
she passed out. She has   
not had any further   
syncopal  
episodes and the   
bleeding actually   
subsided until this   
morning when she  
woke up passing large   
blood clots and   
increased pelvic pain.   
No fevers or  
chills, nausea or   
vomiting. She states   
the procedure was done   
at   
facility.  
History reviewed. No   
pertinent past medical   
history.  
History reviewed. No   
pertinent surgical   
history.  
No family history on   
file.  
Social History  
Tobacco Use  
- Smoking status: Never   
Smoker  
- Smokeless tobacco:   
Never Used  
Substance and Sexual   
Activity  
- Alcohol use: Not   
Currently  
- Drug use: Not on file  
- Sexual activity: Not   
on file  
ALLERGIES  
Allergen Reactions  
- Codeine Mental Status   
Change  
- Miralax [Polyethyle*   
Hives  
Review of Systems  
Constitutional:   
Negative for appetite   
change, chills,   
diaphoresis and  
fever.  
Respiratory: Negative   
for cough, shortness of   
breath and wheezing.  
Cardiovascular:   
Negative for chest   
pain, palpitations and   
leg swelling.  
Gastrointestinal:   
Negative for abdominal   
pain, diarrhea, nausea   
and  
vomiting.  
Genitourinary: Positive   
for pelvic pain and   
vaginal bleeding.   
Negative for  
dysuria, flank pain,   
frequency, hematuria   
and urgency.  
Musculoskeletal:   
Negative for   
arthralgias, back pain,   
neck pain and neck  
stiffness.  
Skin: Negative for   
pallor, rash and wound.  
Neurological: Positive   
for syncope and   
light-headedness.   
Negative for  
dizziness, weakness,   
numbness and headaches.  
Psychiatric/Behavioral:   
Negative for   
self-injury and   
suicidal ideas.  
All other systems   
reviewed and are   
negative.  
Physical Exam  
/71   Pulse 75     
Temp 98.4   Resp 16     
Wt 86 lb (39.0kg)     
SpO2 98%  
O2 Therapy: Room Air  
Physical Exam  
Vitals signs and   
nursing note reviewed.  
Constitutional:  
General: She is not in   
acute distress.  
Appearance: She is   
well-developed. She is   
not diaphoretic.  
HENT:  
Head: Normocephalic and   
atraumatic.  
Nose: Nose normal.  
Eyes:  
Conjunctiva/sclera:   
Conjunctivae normal.  
Pupils: Pupils are   
equal, round, and   
reactive to light.  
Neck:  
Musculoskeletal: Normal   
range of motion and   
neck supple.  
Cardiovascular:  
Rate and Rhythm: Normal   
rate and regular   
rhythm.  
Heart sounds: Normal   
heart sounds.  
Pulmonary:  
Effort: Pulmonary   
effort is normal. No   
respiratory distress.  
Breath sounds: Normal   
breath sounds.  
Abdominal:  
General: Bowel sounds   
are normal. There is no   
distension.  
Palpations: Abdomen is   
soft.  
Tenderness: There is no   
tenderness.  
Genitourinary:  
Comments: Large amount   
of vaginal blood in   
vaginal canal. No   
clots.  
No cervical motion   
tenderness or adnexal   
tenderness or masses  
Musculoskeletal: Normal   
range of motion.  
Skin:  
General: Skin is warm   
and dry.  
Capillary Refill:   
Capillary refill takes   
less than 2 seconds.  
Coloration: Skin is not   
pale.  
Findings: No erythema   
or rash.  
Neurological:  
Mental Status: She is   
alert and oriented to   
person, place, and   
time.  
Psychiatric:  
Behavior: Behavior   
normal.  
Diagnostic Testing  
ED Labs Ordered and   
Reviewed  
CBC + DIFF - Abnormal;   
Notable for the   
following components:  
Result Value Ref Range  
MCHC 30.2 (*) 30.5 -   
36.0 g/dL  
RDW-CV 21.3 (*) 11.5 -   
15.0 %  
All other components   
within normal limits  
BASIC METABOLIC PNL -   
Abnormal; Notable for   
the following   
components:  
Glucose 111 (*) 74 - 99   
mg/dL  
Creatinine 0.48 (*)   
0.58 - 0.96 mg/dL  
Sodium 135 (*) 136 -   
144 mmol/L  
All other components   
within normal limits  
BETA HCG, QUANTITATIVE   
FOR ED - Abnormal;   
Notable for the   
following  
components:  
Beta HCG, Quantitative   
For ED Use 180.5 (*)   
<5.0 mU/mL  
All other components   
within normal limits  
CBC + DIFF - Abnormal;   
Notable for the   
following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   Performed By: #### 2  
[id number] ####DU [address] [place] [address]   
   
                      Hematocrit (HCT) 39.9 %     Normal     35.0-45.0  [facility]  
   
                                        Comment on above:   Performed By: #### 2  
[id number] ####DU [address] [place] [address]   
   
                                                    Hemoglobin mass conc   
(Bld)           13.2 g/dL       Normal          12.0-15.0       [facility]  
   
                                        Comment on above:   Performed By: #### 2  
[id number] ####DU [address] [place] [address]   
   
                      MCH        28.6 pg    Normal     26.0-32.0  [facility]  
   
                                        Comment on above:   Performed By: #### 2  
[id number] ####DU [address] [place] [address]   
   
                      MCHC mass conc (RBC) 33.0 g/dL  Normal     33.0-37.0  [facility]  
   
                                        Comment on above:   Performed By: #### 2  
[id number] ####DU [address] [place] [address]   
   
                      MCV        86.9 fL    Normal     78.0-95.0  [facility]  
   
                                        Comment on above:   Performed By: #### 2  
[id number] ####DU [address] [place] [address]   
   
                                                    Platelet mean volume   
(PMV)           10.2 fL         Normal          7.4-11.0        [facility]  
   
                                        Comment on above:   Performed By: #### 2  
[id number] ####DU [address] [place] [address]   
   
                      Platelets  246 E3/mcL Normal     130-400    [facility]  
   
                                        Comment on above:   Performed By: #### 2  
[id number] ####DU [address] [place] [address]   
   
                      WBC (Leukocytes) 12.0 E3/mcL High       3.6-11.0   [facility]  
   
                                        Comment on above:   Performed By: #### 2  
[id number] ####DU [address] [place] [address]   
   
                                                    Chlamydia GC by PCRon 2018   
   
                      Chlamydia by PCR. Not Detected Normal     Not Detected [facility]  
   
                                        Comment on above:   Result Comment: Xper  
t CT/NG Assay performance has not been   
evaluated in patients less than 14 years of age.   
   
                                                            Performed By: #### 2  
[id number] ####DU Urinalysis Manual   
[address] [place] [address]   
   
                      Gonorrhoeae by PCR Not Detected Normal     Not Detected [facility]  
   
                                        Comment on above:   Result Comment: Xper  
t CT/NG Assay performance has not been   
evaluated in patients less than 14 years of age.   
   
                                                            Performed By: #### 2  
[id number] ####DU Urinalysis Manual   
[address] [place] [address]   
   
                                                    U BhCG Qlton 2018   
   
                      HCG.beta subunit Qn Negative   Normal     Neg        [facility]  
   
                                        Comment on above:   Performed By: #### 2  
[id number] ####DU Urinalysis Manual   
[address] [place] [id number]   
   
                                                    UA Completeon 2018   
   
                      UA Blood   Negative   Normal     Negative   [facility]  
   
                                        Comment on above:   Performed By: #### 8  
[id number] ####DU Urinalysis Automated   
[address] [place] [address]   
   
                      UA Clarity Clear      Normal     Clear      [facility]  
   
                                        Comment on above:   Performed By: #### 8  
[id number] ####DU Urinalysis Automated   
[address] [place] [address]   
   
                      UA Leuk Est Negative   Normal     Negative   [facility]  
   
                                        Comment on above:   Performed By: #### 8  
[id number] ####DU Urinalysis Automated   
[address] [place] [address]   
   
                      UA Mucous  Trace      Abnormal   Trace      [facility]  
   
                                        Comment on above:   Performed By: #### 8  
[id number] ####DU Urinalysis Automated   
[address] [place] [address]   
   
                      UA Nitrite Negative   Normal     Negative   [facility]  
   
                                        Comment on above:   Performed By: #### 8  
[id number] ####DU Urinalysis Automated   
[address] [place] [address]   
   
                      UA pH      6.0        Normal     4.6-8.0    [facility]  
   
                                        Comment on above:   Performed By: #### 8  
[id number] ####DU Urinalysis Automated   
[address] [place] [address]   
   
                      UA Protein Negative   Normal     Negative   [facility]  
   
                                        Comment on above:   Performed By: #### 8  
[id number] ####DU Urinalysis Automated   
[address] [place] [address]   
   
                      UA Spec Grav 1.018      Normal     1.003-1.030 [facility]  
   
                                        Comment on above:   Performed By: #### 8  
[id number] ####DU Urinalysis Automated   
[address] [place] [address]   
   
                      UA Squam Epithelial 0-5        Normal     0-5        [facility]  
   
                                        Comment on above:   Performed By: #### 8  
[id number] ####DU Urinalysis Automated   
[address] [place] [address]   
   
                      UA Urobilinogen 2.0 mg/dL  Abnormal              [facility]  
   
                                        Comment on above:   Performed By: #### 8  
[id number] ####DU Urinalysis Automated   
[address] [place] [address]   
   
                      UA WBC     0-5        Normal     0-5        [facility]  
   
                                        Comment on above:   Performed By: #### 8  
[id number] ####DU Urinalysis Automated   
[address] [place] [address]   
   
                      Urine, color Yellow     Normal     Yellow     [facility]  
   
                                        Comment on above:   Performed By: #### 8  
[id number] ####DU Urinalysis Automated   
[address] [place] [address]   
   
                      Urine, erythrocytes 0-3        Normal     0-3        [facility]  
   
                                        Comment on above:   Performed By: #### 8  
[id number] ####DU Urinalysis Automated   
[address] [place] [address]   
   
                      Urine, glucose Negative   Normal     Negative   [facility]  
   
                                        Comment on above:   Performed By: #### 8  
[id number] ####DU Urinalysis Automated   
[address] [place] [address]   
   
                      Urine, ketones presence Negative   Normal     Negative   S  
[facility]  
   
                                        Comment on above:   Performed By: #### 8  
[id number] ####DU Urinalysis Automated   
[address] [place] [address]   
   
                      Urine, urobilinogen Negative   Normal     Negative   [facility]  
   
                                        Comment on above:   Performed By: #### 8  
[id number] ####DU Urinalysis Automated   
[address] [place] [address]   
   
                                                    [place]et [place]/Trich & Yeaston    
   
                          Trich & Yeast None Detected Normal       None   
Detected                                [facility]  
   
                                        Comment on above:   Performed By: #### 8  
[id number] ####DU Misc Micro SubSection,   
   
                                                    XR Spine Lumbar w/ Obliqueso  
n 2018   
   
                                        INR Coag RelTime (Bld) Exam   
Date/Time:3/28/2018   
12:13 EDTReason for   
Exam:MID TO LOW BACK   
PAINReportXR SPINE   
LUMBAR W/   
OBLIQUESCLINICAL   
STATEMENT: Mid to low   
back pain for 3 weeks.   
No known   
trauma.TECHNIQUE: 5   
views of the lumbar   
spine were compared to   
x-rays   
2014.FINDINGS:   
There are 5   
nonrib-bearing   
lumbar-type vertebral   
body segments   
noted.Convex left   
curvature of the lumbar   
spine is seen. Pedicles   
appear symmetric.No   
pars defects are seen.   
No compression fracture   
or malalignment   
identified.No   
significant   
degenerative disc   
disease is   
identified.IMPRESSION:N  
o acute osseous   
variation is seen.*****   
FINAL REPORT   
*****Dictated:   
2018 9:06 pm Muna Walden DO   
(Electronic Signature):   
2018 9:06   
pmSigned by: Muna Walden DO Technologist:   
GLP                 Normal                                  [facility]  
   
                                                    XR Spine Thoracic 3 Viewson   
2018   
   
                                        INR Coag RelTime (Bld) Exam   
Date/Time:3/28/2018   
12:13 EDTReason for   
Exam:MID TO LOW BACK   
PAINReportXR SPINE   
THORACIC 3   
VIEWSCLINICAL   
STATEMENT: Mid to low   
back pain. Symptoms for   
3 weeks. No known   
trauma.TECHNIQUE: 3   
views of the thoracic   
spine were compared to   
chest x-ray   
9/15/2016.FINDINGS:   
S-shaped curvature of   
the thoracolumbar spine   
is seen. 12   
rib-bearingthoracic   
segments are noted.   
Pedicles appear   
symmetric. No   
compression fractureor   
malalignment   
identified. No   
significant   
degenerative disc   
disease is   
seen.Visualized lungs   
appear   
clear.IMPRESSION:S-shap  
ed curvature of the   
thoracolumbar spine. No   
acute osseous variation   
isseen.***** FINAL   
REPORT *****Dictated:   
2018 9:07 pm Muna Walden DO PSigned   
(Electronic Signature):   
2018 9:07   
pmSigned by: Muna Walden DO Technologist:   
GLP                 Normal                                  [facility]  
   
                                                    C Urineon 2017   
   
                                        C Urine             Final Report: Rare   
Normal skin maddison   
isolated            Normal                                  [facility]  
   
                                        Comment on above:   Performed By: #### 2  
[id number] ####[facility]   
[id number] [place] [address]   
   
                                                    U BhCG Qlton 2017   
   
                      HCG.beta subunit Qn Negative   Normal     Neg        [facility]  
   
                                        Comment on above:   Performed By: #### 2  
[id number] ####[facility] Urinalysis Manual   
[id number] [place] [address]   
   
                                                    UA Completeon 2017   
   
                      UA Blood   Negative   Normal     Negative   [facility]  
   
                                        Comment on above:   Performed By: #### 8  
[id number] ####[facility]inalysis Automated   
[id number] [place] [address]   
   
                      UA Clarity Cloudy     Abnormal   Clear      [facility]  
   
                                        Comment on above:   Performed By: #### 8  
[id number] ####[facility]   
[address] [place] [address]   
   
                      UA Leuk Est 1+         Abnormal   Negative   [facility]  
   
                                        Comment on above:   Performed By: #### 8  
[id number] ####[facility]   
[address] [place] [address]   
   
                      UA Mucous  Many       Abnormal   Trace      [facility]  
   
                                        Comment on above:   Performed By: #### 8  
[id number] ####[facility]   
[address] [place] [address]   
   
                      UA Nitrite Negative   Normal     Negative   [facility]  
   
                                        Comment on above:   Performed By: #### 8  
[id number] ####[facility]   
[address] [place] [address]   
   
                      UA pH      6.0        Normal     4.6-8.0    [facility]  
   
                                        Comment on above:   Performed By: #### 8  
[id number] ####[facility]   
[address] [place] [address]   
   
                      UA Protein Negative   Normal     Negative   [facility]  
   
                                        Comment on above:   Performed By: #### 8  
[id number] ####[facility]   
[id number] [place] [address]   
   
                      UA Spec Grav 1.021      Normal     1.003-1.030 [facility]  
   
                                        Comment on above:   Performed By: #### 8  
[id number] ####[facility]   
[address] [place] [address]   
   
                      UA Squam Epithelial 5-10       Abnormal   0-5        [facility]  
   
                                        Comment on above:   Performed By: #### 8  
[id number] ####[facility]inalysis [facility]   
[address] [place] [address]   
   
                      UA Urobilinogen 2.0 mg/dL  Abnormal              [facility]  
   
                                        Comment on above:   Performed By: #### 8  
[id number] ####[facility]inalysis [facility]   
[address] [place] [address]   
   
                      UA WBC     10-20      Abnormal   0-5        [facility]  
   
                                        Comment on above:   Performed By: #### 8  
[id number] ####[facility]   
[address] [place] [address]   
   
                      Urine, color Yellow     Normal     Yellow     [facility]  
   
                                        Comment on above:   Performed By: #### 8  
[id number] ####[facility]   
[id number] [place] [address]   
   
                      Urine, erythrocytes 0-3        Normal     0-3        [facility]  
   
                                        Comment on above:   Performed By: #### 8  
[id number] ####[facility]ysis [facility]   
[address] [place] [address]   
   
                      Urine, glucose Negative   Normal     Negative   [facility]  
   
                                        Comment on above:   Performed By: #### 8  
[id number] ####[facility]inalysis [facility]ated   
[address] [place] [address]   
   
                      Urine, ketones presence Negative   Normal     Negative   [facility]  
   
                                        Comment on above:   Performed By: #### 8  
[id number] ####[facility] Urinalysis Automated   
[address] [place] [address]   
   
                      Urine, urobilinogen Negative   Normal     Negative   [facility]  
   
                                        Comment on above:   Performed By: #### 8  
[id number] ####[facility] Urinalysis Automated   
[address] [place] [address]   
  
  
  
Vital Signs  
  
  
                          Date Time    Vital Sign   Value        Performing   
Clinician                               Facility  
   
                                                    2024   
19:                              Diastolic blood   
pressure                  71 mm[Hg]                 Lorenzo Cid MD  
Work Phone:   
[phone]                          [facility]  
   
                                                    2024   
19:          Heart rate          94 /min             Lorenzo Cid MD  
Work Phone:   
[phone]                          [facility]  
   
                                                    2024   
19:          Respiratory rate    18 /min             Lorenzo Cid MD  
Work Phone:   
[phone]                          [facility]  
   
                                                    2024   
19:                              SaO2% (BldA) [Mass   
fraction]                 99 %                      Lorenzo Cid MD  
Work Phone:   
[phone]                          [facility]  
   
                                                    2024   
19:                              Systolic blood   
pressure                  119 mm[Hg]                Lorenzo Cid MD  
Work Phone:   
[phone]                          [facility]  
   
                                                    2024   
17:                              Body mass index   
(BMI) [Ratio]             20.19 kg/m2               Lorenzo Cid MD  
Work Phone:   
[phone]                          [facility]  
   
                                                    2024   
17:          Body weight         45.36 kg            Lorenzo Cid MD  
Work Phone:   
[phone]                          [facility]  
   
                                                    2024   
17:          Body temperature    97.9 [degF]         Lorenzo Cid MD  
Work Phone:   
[phone]                          [facility]  
   
                                                    2024   
22:                              Diastolic blood   
pressure                  81 mm[Hg]                 Winston Gordillo MD  
Work Phone:   
[phone]                          [facility]  
   
                                                    2024   
22:          Heart rate          91 /min             Winston Gordillo MD  
Work Phone:   
[phone]                          [facility]  
   
                                                    2024   
22:          Respiratory rate    18 /min             Winston Gordillo MD  
Work Phone:   
[phone]                          [facility]  
   
                                                    2024   
22:                              SaO2% (BldA) [Mass   
fraction]                 100 %                     Winston Gordillo MD  
Work Phone:   
[phone]                          [facility]  
   
                                                    2024   
22:                              Systolic blood   
pressure                  112 mm[Hg]                Winston Gordillo MD  
Work Phone:   
[phone]                          [facility]  
   
                                                    2024   
21:          Body height         149.9 cm            Winston Gordillo MD  
Work Phone:   
[phone]                          [facility]  
   
                                                    2024   
21:                              Body mass index   
(BMI) [Ratio]             18.99 kg/m2               Winston Gordillo MD  
Work Phone:   
[phone]                          [facility]  
   
                                                    2024   
21:          Body temperature    98.2 [degF]         Winston Gordillo MD  
Work Phone:   
[phone]                          [facility]  
   
                                                    2024   
21:          Body weight         42.64 kg            Winston Gordillo MD  
Work Phone:   
[phone]                          [facility]  
   
                                                    2024   
18:          Body temperature    98.4 [degF]         Axel Radford MD  
Work Phone:   
[phone]                          [facility]  
   
                                                    2024   
18:                              Diastolic blood   
pressure                  78 mm[Hg]                 Axel Radford MD  
Work Phone:   
[phone]                          [facility]  
   
                                                    2024   
18:          Heart rate          84 /min             Axel Radford MD  
Work Phone:   
[phone]                          [facility]  
   
                                                    2024   
18:          Respiratory rate    16 /min             Axel Radford MD  
Work Phone:   
[phone]                          [facility]  
   
                                                    2024   
18:                              SaO2% (BldA) [Mass   
fraction]                 99 %                      Axel Radford MD  
Work Phone:   
[phone]                          [facility]  
   
                                                    2024   
18:                              Systolic blood   
pressure                  114 mm[Hg]                Axel Radford MD  
Work Phone:   
[phone]                          [facility]  
   
                                                    2024   
16:          Body temperature    99.1 [degF]         Christiano Knight MD  
Work Phone:   
[phone]                          [facility]  
   
                                                    2024   
16:                              Diastolic blood   
pressure                  77 mm[Hg]                 Christiano Knight MD  
Work Phone:   
[phone]                          [facility]  
   
                                                    2024   
16:          Heart rate          74 /min             Christiano Knight MD  
Work Phone:   
[phone]                          [facility]  
   
                                                    2024   
16:          Respiratory rate    14 /min             Christiano Knight MD  
Work Phone:   
[phone]                          [facility]  
   
                                                    2024   
16:                              SaO2% (BldA) [Mass   
fraction]                 100 %                     Christiano Knight MD  
Work Phone:   
[phone]                          [facility]  
   
                                                    2024   
16:                              Systolic blood   
pressure                  137 mm[Hg]                Christiano Knight MD  
Work Phone:   
[phone]                          [facility]  
   
                                                    2024   
20:          Body height         149.9 cm            Christiano Knight MD  
Work Phone:   
[phone]                          [facility]  
   
                                                    2024   
20:                              Body mass index   
(BMI) [Ratio]             22.81 kg/m2               Christiano Knight MD  
Work Phone:   
[phone]                          [facility]  
   
                                                    2024   
20:          Body weight         51.26 kg            Christiano Knight MD  
Work Phone:   
[phone]                          [facility]  
   
                                                    2022   
09:      Body temperature 98.24 [degF]    No Pcp Required [facility]  
   
                                                    2022   
09:                              Diastolic blood   
pressure            81 mm[Hg]           No Pcp Required     [facility]  
   
                                                    2022   
09:      Heart rate      94 /min         No Pcp Required [facility]  
   
                                                    2022   
09:      Respiratory rate 17 /min         No Pcp Required [facility]  
   
                                                    2022   
09:                              SaO2% (BldA) [Mass   
fraction]           98 %                No Pcp Required     [facility]  
   
                                                    2022   
09:                              Systolic blood   
pressure            117 mm[Hg]          No Pcp Required     [facility]  
   
                                                    2022   
21:                              Diastolic blood   
pressure            106 mm[Hg]          No Pcp Required     [facility]  
   
                                                    2022   
21:      Heart rate      79 /min         No Pcp Required [facility]  
   
                                                    2022   
21:      Respiratory rate 18 /min         No Pcp Required [facility]  
   
                                                    2022   
21:                              SaO2% (BldA) [Mass   
fraction]           98 %                No Pcp Required     [facility]  
   
                                                    2022   
21:                              Systolic blood   
pressure            131 mm[Hg]          No Pcp Required     [facility]  
   
                                                    2022   
19:      Body height     149.8 cm        No Pcp Required [facility]  
   
                                                    2022   
19:      Body temperature 98.78 [degF]    No Pcp Required [facility]  
   
                                                    2022   
19:      Body weight     45.5 kg         No Pcp Required [facility]  
   
                                                    2022   
10:      Body temperature 98.06 [degF]    No Pcp Required [facility]  
   
                                                    2022   
10:                              Diastolic blood   
pressure            92 mm[Hg]           No Pcp Required     [facility]  
   
                                                    2022   
10:      Heart rate      97 /min         No Pcp Required [facility]  
   
                                                    2022   
10:      Respiratory rate 16 /min         No Pcp Required [facility]  
   
                                                    2022   
10:                              SaO2% (BldA) [Mass   
fraction]           99 %                No Pcp Required     [facility]  
   
                                                    2022   
10:                              Systolic blood   
pressure            124 mm[Hg]          No Pcp Required     [facility]  
   
                                                    01-   
18:                              Diastolic blood   
pressure            81 mm[Hg]           No Pcp Required     [facility]  
   
                                                    01-   
18:      Heart rate      104 /min        No Pcp Required [facility]  
   
                                                    01-   
18:      Respiratory rate 16 /min         No Pcp Required [facility]  
   
                                                    01-   
18:                              SaO2% (BldA) [Mass   
fraction]           97 %                No Pcp Required     [facility]  
   
                                                    01-   
18:                              Systolic blood   
pressure            114 mm[Hg]          No Pcp Required     [facility]  
   
                                                    01-   
14:      Body height     149.8 cm        No Pcp Required [facility]  
   
                                                    01-   
14:      Body temperature 98.06 [degF]    No Pcp Required [facility]  
   
                                                    01-   
14:      Body weight     54.5 kg         No Pcp Required [facility]  
   
                                                    2021   
19:      Body height     149.8 cm        No Pcp Required [facility]  
   
                                                    2021   
19:      Body temperature 99.32 [degF]    No Pcp Required [facility]  
   
                                                    2021   
19:      Body weight     54.5 kg         No Pcp Required [facility]  
   
                                                    2021   
19:                              Diastolic blood   
pressure            69 mm[Hg]           No Pcp Required     [facility]  
   
                                                    2021   
19:      Heart rate      75 /min         No Pcp Required [facility]  
   
                                                    2021   
19:      Respiratory rate 16 /min         No Pcp Required [facility]  
   
                                                    2021   
19:                              SaO2% (BldA) [Mass   
fraction]           95 %                No Pcp Required     [facility]  
   
                                                    2021   
19:                              Systolic blood   
pressure            105 mm[Hg]          No Pcp Required     [facility]  
   
                                                    2021   
08:                              BMI (Body Mass   
Index)              23.11 kg/m2         [facility]  
   
                                                    [facility]   
08:      Body weight     51.9 kg         [facility]  
   
                                                    [facility]   
08:      Body Temperature 97.7 [degF]     [facility]  
   
                                                    2021   
08:      BP Diastolic    89 mm[Hg]       [facility]  
   
                                                    20[facility]   
08:      BP Systolic     134 mm[Hg]      [facility]  
   
                                                    2021   
08:      Height          149.9 cm        [facility]  
   
                                                    2021   
08:      Pulse (Heart Rate) 72 /min         [facility]  
   
                                                    2021   
08:      Pulse Oximetry  99 %            [facility]  
   
                                                    2021   
08:      Respiratory Rate 19 /min         [facility]  
   
                                                    2020   
15:                              BMI (Body Mass   
Index)              20.2 kg/m2          Marguerite Marizol       [facility]   
[facility]  
Work Phone:   
[phone]  
   
                                                    2020   
15:      Body weight     45.36 kg        Marguerite Marizol   [facility]  
   
[facility]  
Work Phone:   
[phone]  
   
                                                    2020   
15:      BP Diastolic    61 mm[Hg]       Marguerite Marizol   [facility]  
   
[facility]  
Work Phone:   
[phone]  
   
                                                    2020   
15:      BP Systolic     96 mm[Hg]       Marguerite Marizol   [facility]  
   
[facility]  
Work Phone:   
[phone]  
   
                                                    2020   
15:                              BSA (Body Surface   
Area)               1.37 m2             Marguerite Marizol       [facility]   
[facility]  
Work Phone:   
[phone]  
   
                                                    2020   
15:      Height          149.86 cm       Marguerite Marizol   [facility]  
   
[address]  
Work Phone:   
[phone]  
   
                                                    2020   
15:      Pulse (Heart Rate) 82 /min         Marguerite Marizol   [facility]  
own   
[address]nd [address]  
Work Phone:   
[phone]  
   
                                                    2020   
15:                      33 1            Marguerite Marizol   [facility] [facility]  
   
2nd Fl  
Work Phone:   
[phone]  
   
                                                    Comment on   
above:                                  BMI Percentile   
   
                                                    2020   
15:                      4 1             Marguerite Marizol   [facility]  
   
2nd Fl  
Work Phone:   
[phone]  
   
                                                    Comment on   
above:                                  2-20 Weight Percentile   
   
                                                    2020   
15:                      1 1             Marguerite Marizol   [facility]  
   
2nd Fl  
Work Phone:   
[phone]  
   
                                                    Comment on   
above:                                  2-20 Stature Percentile   
   
                                                    2020   
15:                      0 1             Marguerite Marizol   [facility]  
   
2nd Fl  
Work Phone:   
[phone]  
   
                                                    Comment on   
above:                                  Pain Scale   
   
                                                    2020   
15:                              BMI (Body Mass   
Index)              18.99 kg/m2         Marguerite Marizol       [facility]   
2nd Fl  
Work Phone:   
[phone]  
   
                                                    2020   
15:      Body weight     42.64 kg        Marguerite Marizol   [facility]  
   
2nd Fl  
Work Phone:   
[phone]  
   
                                                    2020   
15:      BP Diastolic    70 mm[Hg]       Marguerite Marizol   [facility]  
   
2nd Fl  
Work Phone:   
[phone]  
   
                                                    2020   
15:      BP Systolic     110 mm[Hg]      Marguerite Marizol   [facility]  
   
2nd Fl  
Work Phone:   
[phone]  
   
                                                    2020   
15:                              BSA (Body Surface   
Area)               1.34 m2             Marguerite Marizol       [facility]   
2nd Fl  
Work Phone:   
[phone]  
   
                                                    2020   
15:      Height          149.86 cm       Marguerite Stevenat   [facility]  
   
2nd Fl  
Work Phone:   
[phone]  
   
                                                    2020   
15:      Pulse (Heart Rate) 81 /min         Marguerite Stevenat   [facility]  
own   
2nd Fl  
Work Phone:   
[phone]  
   
                                                    2020   
15:                      0 1             Marguerite Stevenat   [facility]  
   
2nd Fl  
Work Phone:   
[phone]  
   
                                                    Comment on   
above:                                  Pain Scale   
   
                                                    2020   
15:                      17 1            Marguerite Stevenat   [facility]  
   
2nd Fl  
Work Phone:   
[phone]  
   
                                                    Comment on   
above:                                  BMI Percentile   
   
                                                    2020   
15:                      1 1             Marguerite Stevenat   [facility]  
   
2nd Fl  
Work Phone:   
[phone]  
   
                                                    Comment on   
above:                                  2-20 Stature Percentile   
   
                                                            2-20 Weight Percenti  
le   
   
                                                    2020   
17:                              BMI (Body Mass   
Index)              18.38 kg/m2         Corinne Bazella     [facility]  
[address]  
Work Phone:   
[phone]  
   
                                                    2020   
17:      Body weight     41.27 kg        Corinne Bazella QO-LCSSE[facility]  
arianne  
[address]  
Work Phone:   
[phone]  
   
                                                    2020   
17:      BP Diastolic    73 mm[Hg]       Corinne Bazella HR-YOXKD-Kdemlve  
arianne  
[address]  
Work Phone:   
[phone]  
   
                                                    2020   
17:      BP Systolic     105 mm[Hg]      Corinne Bazella FK-KXAOR-Tjihckn  
arianne  
[address]  
Work Phone:   
[phone]  
   
                                                    2020   
17:                              BSA (Body Surface   
Area)               1.32 m2             Corinne Bazella     [facility]  
[address]  
Work Phone:   
[phone]  
   
                                                    2020   
17:      Height          149.86 cm       Corinne Bazella KU-SJCFN-Roqcycu  
arianne  
[address]  
Work Phone:   
[phone]  
   
                                                    2020   
17:      Pulse (Heart Rate) 101 /min        Corinne Bazella [facility]  
erbroo  
[address]  
Work Phone:   
[phone]  
   
                                                    2020   
17:                      1 1             Corinne Bazella DU-ZLZIN-Mskbgca  
arianne  
[address]  
Work Phone:   
[phone]  
   
                                                    Comment on   
above:                                  2-20 Stature Percentile   
   
                                                            2-20 Weight Percenti  
le   
   
                                                    2020   
17:                      10 1            Corinne Bazella [facility]WLNT[facility]  
arianne  
[address]  
Work Phone:   
[phone]  
   
                                                    Comment on   
above:                                  BMI Percentile   
   
                                                    2020   
17:                      3 1             Corinne Bazella SU-TRHMW-Yhou[facility]m  
arianne  
[address]  
Work Phone:   
[phone]  
   
                                                    Comment on   
above:                                     
   
                                                    2020   
17:                      0 1             Corinne Bazella DB-XWHLE-Lagpyfs  
arianne  
[address]  
Work Phone:   
[phone]  
   
                                                    Comment on   
above:                                  Para   
   
                                                    2020   
18:      BP Diastolic    81 mm[Hg]       [facility]  
   
                                                    2020   
18:      BP Systolic     137 mm[Hg]      [facility]  
   
                                                    2020   
18:      Pulse Oximetry  100 %           [facility]  
   
                                                    2020   
17:      Pulse (Heart Rate) 124 /min        [facility]  
   
                                                    2020   
17:      Body Temperature 97.9 [degF]     [facility]  
   
                                                    2020   
17:      Respiratory Rate 16 /min         [facility]  
   
                                                    2020   
17:      Body weight     36.29 kg        [facility] Carla    [facility]  
   
                                                    2019   
14:                              BMI (Body Mass   
Index)              18.58 kg/m2         Chelsie Bowden          [facility] [address]   
[place]  
Work Phone:   
[phone]  
   
                                                    2019   
14:      Body weight     41.73 kg        Chelsie Bowden      [facility]  
0   
[place]  
Work Phone:   
[phone]  
   
                                                    2019   
14:      BP Diastolic    71 mm[Hg]       Chelsie Bowden      [facility]  
0   
[place]  
Work Phone:   
[phone]  
   
                                                    2019   
14:      BP Systolic     103 mm[Hg]      Chelsie Bowden      [facility]  
0   
[place]  
Work Phone:   
[phone]  
   
                                                    2019   
14:                              BSA (Body Surface   
Area)               1.33 m2             Chelsie Bowden          [facility] [address]   
[place]  
Work Phone:   
[phone]  
   
                                                    2019   
14:      Height          149.86 cm       Chelsie Bowden      [facility]  
0   
[place]  
Work Phone:   
[phone]  
   
                                                    2019   
14:      Pulse (Heart Rate) 79 /min         Chelsie Bowden      [facility]   
[address]   
[place]  
Work Phone:   
[phone]  
   
                                                    2019   
14:                      0 1             Chelsie Bowden      [facility]  
0   
[place]  
Work Phone:   
[phone]  
   
                                                    Comment on   
above:                                  Para   
   
                                                    2019   
14:                      1 1             Chelsie Bowden      [facility]  
0   
[place]  
Work Phone:   
[phone]  
   
                                                    Comment on   
above:                                     
   
                                                            2-20 Weight Percenti  
le   
   
                                                    2019   
14:                      2 1             Chelsie Bowden      [facility]  
0   
[place]  
Work Phone:   
[phone]  
   
                                                    Comment on   
above:                                  2-20 Stature Percentile   
   
                                                    2019   
14:                      14 1            Chelsie Bowden      [facility]  
0   
[place]  
Work Phone:   
[phone]  
   
                                                    Comment on   
above:                                  BMI Percentile   
   
                                                    2019   
12:                              BMI (Body Mass   
Index)              17.37 kg/m2         Chelsie Bowden          [facility]   
2nd Fl  
Work Phone:   
[phone]  
   
                                                    2019   
12:      Body weight     39.01 kg        Chelsie Bowden      [facility]  
   
2nd Fl  
Work Phone:   
[phone]  
   
                                                    2019   
12:      BP Diastolic    86 mm[Hg]       Chelsie Bowden      [facility]  
   
2nd Fl  
Work Phone:   
[phone]  
   
                                                    2019   
12:      BP Systolic     120 mm[Hg]      Chelsie Bowden      [facility]  
   
2nd Fl  
Work Phone:   
[phone]  
   
                                                    2019   
12:                              BSA (Body Surface   
Area)               1.29 m2             Chelsie Bowden          [facility]   
2nd Fl  
Work Phone:   
[phone]  
   
                                                    2019   
12:      Height          149.86 cm       Chelsie Bowden      [facility]  
   
2nd Fl  
Work Phone:   
[phone]  
   
                                                    2019   
12:      Pulse (Heart Rate) 81 /min         Chelsie Bowden      [facility]DP[facility]  
own   
2nd Fl  
Work Phone:   
[phone]  
   
                                                    2019   
12:                      4 1             Chelsie Bowden      [facility]  
   
2nd Fl  
Work Phone:   
[phone]  
   
                                                    Comment on   
above:                                  BMI Percentile   
   
                                                    2019   
12:                      1 1             Chelsie Bowden      [facility]  
   
2nd Fl  
Work Phone:   
[phone]  
   
                                                    Comment on   
above:                                  2-20 Weight Percentile   
   
                                                               
   
                                                    2019   
12:                      0 1             Chelsie Bowden      [facility]  
   
2nd Fl  
Work Phone:   
[phone]  
   
                                                    Comment on   
above:                                  Pain Scale   
   
                                                            Para   
   
                                                    2019   
12:                      2 1             Chelsie Bowden      [facility]  
   
2nd Fl  
Work Phone:   
[phone]  
   
                                                    Comment on   
above:                                  2-20 Stature Percentile   
  
  
  
Encounters  
  
  
                          Encounter Date Encounter Type Care Provider Facility  
   
                                                    Start: 2024  
End: 2024     ambulatory          NATALI REILLY PEREZ [facility]  
   
                                                    Start: 2024  
End: 2024                         Emergency department   
patient visit                           Lorenzo Cid MD  
Work Phone:   
[phone]                          [facility]   
Emergency Department  
   
                                                    Start: 2024  
End: 2024                         Emergency department   
patient visit             PHYSICIAN KAYLEEN              [facility]  
   
                                                    Start: 2024  
End: 2024                         Emergency department   
patient visit             WINSTON GORDILLO              [facility]  
   
                                                    Start: 2024  
End: 2024                         Emergency department   
patient visit                           Winston Gordillo MD  
Work Phone:   
[phone]                          [facility] Emergency   
Medicine  
   
                                        Comment on above:   Nausea and vomiting,  
 unspecified vomiting type (Primary Dx)   
   
                                                    Start: 02-  
End: 02-                         Emergency department   
patient visit             [facility]  
   
                                                    Start: 2024  
End: 02-                         Emergency department   
patient visit             UNKNOWN PROVIDER          Facility:[facility]  
   
                                                    Start: 2024  
End: 2024                         Emergency department   
patient visit                           Axel Radford MD  
Work Phone:   
[phone]                          [facility] Emergency   
Medicine  
   
                                        Comment on above:   Seizures (Family rep  
orts  months  of seizures without   
medications.   
Drove 1 hour to get here today with two seizures in the car.   
Endorses nausea and vomiting. )   
   
                                                    Start: 2024  
End: 2024                         Emergency department   
patient visit             CHRISTIANO SANTOS          [facility]  
   
                                                    Start: 2024  
End: 2024     ambulatory          [facility] COOPER      [facility]  
al  
   
                                                    Start: 2024  
End: 2024                         Emergency department   
patient visit                           Christiano Knight MD  
Work Phone:   
[phone]                          [facility] Med Surg   
2nd Floor  
   
                                        Comment on above:   Intractable nausea a  
nd vomiting   
   
                                                    Start: 2024  
End: 2024                         Emergency department   
patient visit             IVAN GODINEZ [facility]  
   
                                                    Start: 02-  
End: 02-                         Emergency department   
patient visit             PHYSICIAN KAYLEEN              [facility]  
   
                                                    Start: 02-  
End: 02-                         Emergency department   
patient visit             ELMIRA MANCINI            [facility]  
   
                                                    Start: 2024  
End: 2024                         Emergency department   
patient visit             PHYSICIAN NO              [facility]  
   
                                                    Start: 2024  
End: 2024                         Emergency department   
[facility]  
   
                                                    Start: 03-  
End: 2022                         Evaluation and   
management of inpatient   Mitra Camara                [facility] 3 Med Surg [facility]   
[address]  
   
                                                    Start: 2022  
End: 2022                         Emergency department   
patient visit             [facility]  
   
                                                    Start: 2022  
End: 2022                         Evaluation and   
management of inpatient   Nicolas Connolly        [facility] 2 ACS Med Surg 03  
   
                                                    Start: 01-  
End: 01-                         Emergency department   
patient visit             Priscilla Zepeda              [facility] Emergency 09  
   
                                                    Start: 2021  
End: 2021                         Emergency department   
patient visit             Priscilla Gottiw              [facility] Emergency  
   
                                                    Start: 2021  
End: 2021                         Emergency department   
patient visit                           Leo Turner  
Work Phone:   
[phone]                          [facility]   
Emergency Department  
   
                                        Start: 2020   Patient encounter   
procedure                 Marguerite Marizol             [facility] 2nd   
Fl  
Work Phone:   
[phone]  
   
                                        Start: 2020   Patient encounter   
procedure                 Marguerite Marizol             [facility] 2nd   
Fl  
Work Phone:   
[phone]  
   
                                        Start: 2020   Patient encounter   
procedure                 Corinne Bazella           [facility]   
[address]  
Work Phone:   
[phone]  
   
                                                    Start: 2020  
End: 2020                         Emergency department   
patient visit                           Eliot Puentesreynaldo Aguirre  
Work Phone:   
[phone]                          [facility]   
Emergency Department  
   
                                        Start: 2020   Patient encounter   
procedure                 Corinne Bazella           FQ-DBUOI-Jxeloqhqxbs   
[address]  
Work Phone:   
[phone]  
   
                                        Start: 2020   Patient encounter   
procedure                 Corinne Bazella           CZ-YJXXQ-Te[facility]el[facility]tm[facility]   
[address]  
Work Phone:   
[phone]  
   
                                        Start: 2019   Patient encounter   
procedure                 Corinne Bazella           WJ-FEQOE-Rqsivhkfxtw   
300  
Work Phone:   
[phone]  
   
                                        Start: 2019   Patient encounter   
procedure                 Chelsie Bowden                IX-SGOLC-Texz[facility] [address]  
Work Phone:   
[phone]  
   
                                        Start: 2019   Patient encounter   
procedure                 Jerri Tabor              [facility] 2nd   
Fl  
Work Phone:   
[phone]  
   
                                        Start: 2019   Patient encounter   
procedure                 Chelsie Bowden                [facility] 2nd   
Fl  
Work Phone:   
[phone]  
   
                                        Start: 2019   Patient encounter   
procedure                 Chelsie Bowden                [facility] 2nd   
Fl  
Work Phone:   
[phone]  
   
                                        Start: 2019   Patient encounter   
procedure                 Chelsie Bowden                [facility] 2nd   
Fl  
Work Phone:   
[phone]  
   
                                                    Start: 2018  
End: 2018                         Emergency department   
patient visit             [facility]         Facility:[facility]  
   
                                                    Start: 2018  
End: 2018     Ambulatory          [facility]   Facility:[facility]  
   
                                                    Start: 2017  
End: 2017                         Emergency department   
patient visit             [facility]         Facility:[facility]  
  
  
  
Procedures  
  
  
                          Date         Procedure    Procedure Detail Performing   
Clinician  
   
                                                    Start:   
2024          Urinalysis microscopic only                     Lorenzo vargas MD  
Work Phone:   
[phone]  
   
                                                    Start:   
2024                              Urinalysis, reagent strip   
without microscopy                                  Lorenzo Cid MD  
Work Phone:   
[phone]  
   
                                                    Start:   
2024                              Complete blood count with   
white cell differential,   
automated                                           Lorenzo Cid MD  
Work Phone:   
[phone]  
   
                                                    Start:   
2024          Comprehensive metabolic panel                     Lorenzo modi MD  
Work Phone:   
[phone]  
   
                                                    Start:   
2024                              Basic metabolic 2000 panel -   
Serum or Plasma                                     IVAN COLEY  
   
                                                    Start:   
2024                              CBC W Auto Differential panel   
- Blood                                             IVAN COLEY  
   
                                                    Start:   
2024                              Hepatic function 2000 panel -   
Serum or Plasma                                     IVAN LEMASTERS  
   
                                                    Start:   
2024          LIGHT BLUE TOP                          IVAN LEMASTERS  
   
                                                    Start:   
2024                              Lipase [Enzymatic   
activity/volume] in Serum or   
Plasma                                              IVAN LEMASTERS  
   
                                                    Start:   
2024                              Manual Differential panel -   
Blood                                               IVAN LEMASTERS  
   
                                                    Start:   
2024          RAINBOW DRAW                            IVAN LEMASTERS  
   
                                                    Start:   
2024          SST TOP                                 IVAN LEMASTERS  
   
                                                    Start:   
2024                              Basic metabolic panel calcium   
total                                               Winston Gordillo MD  
Work Phone:   
[phone]  
   
                                                    Start:   
2024                              Basic metabolic panel calcium   
total                                               June Dyson APRN-CNP  
Work Phone:   
[phone]  
   
                                                    Start:   
2024                              Bacteria identified in Urine   
by Culture                                          IVAN LEMASTERS  
   
                                                    Start:   
2024          DRUG SCREEN,URINE                       IVAN LEMASTERS  
   
                                                    Start:   
2024          HCG, URINE, QUALITATIVE                     IVAN RAGHAV  
S  
   
                                                    Start:   
2024          MICROSCOPIC ONLY, URINE                     IVAN RAGHAV  
S  
   
                                                    Start:   
2024                              URINALYSIS WITH REFLEX CULTURE   
AND MICROSCOPIC                                     IVAN LEMASTERS  
   
                                                    Start:   
2024          CT HEAD WO IV CONTRAST                     IVAN LEMASTERS  
   
                                                    Start:   
2024          EXTRA TUBES                             IVAN LEMASTERS  
   
                                                    Start:   
2024          EXTRA URINE GRAY TUBE                     IVAN LEMASTERS  
   
                                                    Start:   
2024                              CBC W Auto Differential panel   
- Blood                                             IVAN LEMASTERS  
   
                                                    Start:   
2024                              Comprehensive metabolic 2000   
panel - Serum or Plasma                             IVAN LEMASTERS  
   
                                                    Start:   
2024                              Magnesium [Mass/volume] in   
Serum or Plasma                                     IVAN LEMASTERS  
   
                                                    Start:   
2024          PROLACTIN                               IVAN LEMASTERS  
   
                                                    Start:   
2024          ECG 12-LEAD                             IVAN LEMASTERS  
   
                                                    Start:   
2024                              Toxin/antitoxin assay tissue   
culture                                             Bob Cooper MD  
Work Phone:   
[phone]  
   
                                                    Start:   
2024                              Complete blood count with   
white cell differential,   
automated                                           Juancho Apodaca MD  
Work Phone:   
[phone]  
   
                                                    Start:   
2024          Comprehensive metabolic panel                     Juancho rivera MD  
Work Phone:   
[phone]  
   
                                                    Start:   
2024                              Drug tst prsmv instrmnt chem   
analyzers pr date                                   Christiano Knight MD  
Work Phone:   
[phone]  
   
                                                    Start:   
2024          Comprehensive metabolic panel                     Christiano Knight MD  
Work Phone:   
[phone]  
   
                                                    Start:   
2024                              CBC W Auto Differential panel   
- Blood                                             IVAN COLEY  
   
                                                    Start:   
2024                              Comprehensive metabolic 2000   
panel - Serum or Plasma                             IVAN LEMASTERS  
   
                                                    Start:   
2024                              Lipase [Enzymatic   
activity/volume] in Serum or   
Plasma                                              IVAN LEMROBERT  
   
                                                    Start:   
2024          INSERT PERIPHERAL IV                     IVAN LEMASTERS  
   
                                                    Start:   
2024          ECG 12-LEAD                             VIAN LEMASTERS  
   
                                                    Start:   
2024          HCG, URINE, QUALITATIVE                     IVAN RAGHAV  
S  
   
                                                    Start:   
2024                              Bacteria identified in Urine   
by Culture                                          IVAN COLEY  
   
                                                    Start:   
2024          EXTRA URINE GRAY TUBE                     IVAN COLEY  
   
                                                    Start:   
2024          MICROSCOPIC ONLY, URINE                     IVAN AVILEZ  
S  
   
                                                    Start:   
2024                              URINALYSIS WITH REFLEX CULTURE   
AND MICROSCOPIC                                     IVAN LEMROBERT  
   
                                                    Start:   
2024          INFLUENZA A AND B PCR                     IVAN COLEY  
   
                                                    Start:   
2024          SARS-COV-2 PCR                          IVAN COLEY  
   
                                                    Start:   
2024                              INITIATE DROPLET PLUS   
ISOLATION                                           IVAN COLEY  
   
                                                    Start:   
2024                              CBC W Auto Differential panel   
- Blood                                             IVAN COLEY  
   
                                                    Start:   
2024                              Comprehensive metabolic 2000   
panel - Serum or Plasma                             IVAN COLEY  
   
                                                    Start:   
2024          INSERT PERIPHERAL IV                     IVAN BRIJESH  
   
                                                    Start:   
2024                              Glucose [Mass/volume] in Serum   
or Plasma                                           IVAN COLEY  
   
                                                    Start:   
03-  
End: 03-     Gas panel - Arterial blood                     Becky Dials  
   
                                                    Start:   
2022  
End: 2022     EKG impression                          Priscilla I Moomaw  
   
                                                    Start:   
2020          MAC Imaging Order                       Marguerite Marizol  
   
                                                    Start:   
2020          MAC Imaging Order                       Corinne Bazella  
   
                                                    Start:   
2020          COVID-19, MOLECULAR                     Octavia Messina  
Work Phone:   
[phone]  
   
                                                    Start:   
2020                              Choriogonadotropin   
[Units/volume] in Serum or   
Plasma                                              Octavia Messina  
Work Phone:   
[phone]  
   
                                                    Start:   
2020                              Complete blood count with   
white cell differential,   
automated                                           Octavia Messina  
Work Phone:   
[phone]  
   
                                                    Start:   
2020                              Complete blood count with   
white cell differential,   
manual                                              Octavia Messina  
Work Phone:   
[phone]  
   
                                                    Start:   
2020                              Comprehensive metabolic 2000   
panel - Serum or Plasma                             Octavia Messina  
Work Phone:   
[phone]  
   
                                                    Start:   
2020          GREY TOP                                Octavia Messina  
Work Phone:   
[phone]  
   
                                                    Start:   
2020          LAVENDER TOP                            Octavia Messina  
Work Phone:   
[phone]  
   
                                                    Start:   
2020          LIGHT BLUE TOP                          Octavia Messina  
Work Phone:   
[phone]  
   
                                                    Start:   
2020          LIGHT GREEN TOP                         Octavia Messina  
Work Phone:   
[phone]  
   
                                                    Start:   
2020          MINT GREEN TOP                          Octavia Messina  
Work Phone:   
[phone]  
   
                                                    Start:   
2020          PINK TOP                                Octavia Messina  
Work Phone:   
[phone]  
   
                                                    Start:   
2020          RAINBOW DRAW                            Octavia Messina  
Work Phone:   
[phone]  
   
                                                    Start:   
2019          Surgical Pathology                      Chelsie Bowden  
   
                                                    Start:   
2019          Antibody rubella                        Chelsie Bowden  
   
                                                    Start:   
2019                              Complete Blood Count Pregnancy   
Anemia Panel with reflex                            Chelsie Bowden  
   
                                                    Start:   
2019                              Culture bacterial quanttative   
colony count urine                                  Chelsie Bowden  
   
                                                    Start:   
2019          Hemoglobin Identification                     Chelsie Bowden  
   
                                                    Start:   
2019          Hepatitis c antibody                     Chelsie Bowden  
   
                                                    Start:   
2019                              Iaad ia hepatitis b surface   
antigen                                             Chelsie Bowden  
   
                                                    Start:   
2019                              Iadna chlamydia trachomatis   
amplified probe tq                                  Chelsie Bowden  
   
                                                    Start:   
2019          SYPHILIS SCREENING WITH REFLEX                     Chelsie Arango  
s  
   
                                                    Start:   
2019          Type and Screen                         Chelsie Bowden  
  
  
  
Plan of Treatment  
  
  
                          Date         Care Activity Detail       Author  
   
                                        Start: 2061   RSV VACCINE (1 -   
1-dose 60+ series)                      RSV VACCINE (1 - 1-dose   
60+ series)                             [facility]  
   
                                        Start: 2051   Shingles (RZV)   
Vaccine (1 of 2)                        Shingles (RZV) Vaccine   
(1 of 2)                                [facility]  
   
                                        Start: 2051   Zoster Vaccines (1 o  
f   
2)                                      Zoster Vaccines (1 of   
2)                                      [facility]  
   
                          Start: 2024 Influenza vaccination INFLUENZA VACC  
INE (#1) [facility]  
   
                                        Start: 2023   DTaP/Tdap/Td Vaccine  
s   
(7 - Td or Tdap)                        DTaP/Tdap/Td Vaccines   
(7 - Td or Tdap)                        [facility]  
   
                          Start: 2023 Tetanus vaccination              [facility]  
   
                                        Start: 2023   COVID-19 VACCINE ( season)                         COVID-19 VACCINE ( season)                         [facility]  
   
                          Start: 2023 Influenza vaccination INFLUENZA VACC  
INE (#1) [facility]  
   
                                        Start: 2022   Screening for   
malignant neoplasm of   
cervix                                              [facility]  
   
                                                    Start: 2022  
End: 2023                                     Sore Throat Lozenge 1   
Oral Lozenge Every 2   
Hours PRN ; LozengeDOSE   
= 1 lozenge(s) Oral   
Every 4 Hours, PRN Sore   
ThroatCa   
lozenge(s)/DOSE x 1 = 1   
lozenge(s)/Dose (Daily   
Total is 6 lozenge(s))   
Start: 11-Mar-2022 End:   
11-Mar-2023 Ordered:   
11-Mar-2022 Mitra Camara   
Intent                                  [facility]  
   
                                                    Start: 03-  
End: 2023                                     Acetaminophen 325 mg   
Oral Tablet Every 4   
Hours PRN ; Tablet   
(TYLENOL)DOSE = 650 mg   
Oral Every 4 Hours, PRN   
Pain - Mild (1-3)   
Start: 10-Mar-2022 End:   
10-Mar-2023 Ordered:   
10-Mar-2022 Becky Hyman                                  [facility]  
   
                                                    Start: 2022  
End: 2022                                     Technetium Tc 99m   
Sulfur Colloid -   
(Radiology Contrast) .   
; DOSE = 500 microCurie   
Oral Once Start:   
2022 End:   
2022 Ordered:   
2022 Becky Hyman                                  [facility]  
   
                                                    Start: 2022  
End: 2023                                             [facility]  
   
                                        Start: 2019   Screening for   
Chlamydia trachomatis                   STI Screening (Age   
18-24)                                  [facility]  
   
                                        Start: 10-   Hepatitis A (HAV)   
Vaccine (2 of 2 -   
2-dose series)                          Hepatitis A (HAV)   
Vaccine (2 of 2 -   
2-dose series)                          [facility]  
   
                                        Start: 10-   Hepatitis A Vaccines  
   
(2 of 2 - 2-dose   
series)                                 Hepatitis A Vaccines (2   
of 2 - 2-dose series)                   [facility]  
   
                                        Start: 2017   Screening for   
Chlamydia trachomatis     CHLAMYDIA SCREEN          [facility]  
   
                          Start: 2016 HIV screening              [facility]  
   
                          Start: 2002 COVID-19 Vaccine (#1) COVID-19 Vacci  
ne (#1) [facility]  
   
                                Start: 2001 Hepatitis C screening HEPATITI  
S C VIRUS   
SCREENING                               [facility]  
   
                          Start: 2001 Lipid panel  Lipid Panel  [facility]  
   
                                        Start: 2001   Screening for   
Chlamydia trachomatis     GONORRHEA SCREEN          [facility]  
   
                          Start: 2001 Yearly Adult Physical Yearly Adult P  
hysical [facility]  
   
                                                      
End: 2024     CALPROTECTIN, STOOL                     [facility]  
   
                                        Comment on above:   One Time for 1 Occur  
rences starting 2024 until   
2024   
   
                                                      
End: 2024     GI PANEL,PCR                            [facility]  
Work Phone:   
[phone]  
   
                                        Comment on above:   One Time for 1 Occur  
rences starting 2024 until   
2024   
   
                                                      
End: 2024     Gray Top                                [facility]  
Work Phone:   
[phone]  
   
                                        Comment on above:   Once for 1 Occurrenc  
es starting 2024 until 2024   
   
                                                      
End: 2024     Light Blue Top                          [facility]  
Work Phone:   
[phone]  
   
                                        Comment on above:   Once for 1 Occurrenc  
es starting 2024 until 2024   
   
                                                      
End: 2024     [place] draw                            [facility]  
Work Phone:   
[phone]  
   
                                        Comment on above:   Once (Lab) for 1 Occ  
urrences starting 2024 until   
2024   
   
                                                      
End: 2024     [facility]  
Work Phone:   
[phone]  
   
                                        Comment on above:   Once for 1 Occurrenc  
es starting 2024 until 2024   
   
                                                                 [id number]  
[address]   
[address]  
Work Phone:   
[phone]  
   
                                                    NEGATED: Highlighted   
row has been ruled   
out!                                                Planned Goals not   
documented                              [id number]   
[address]  
Work Phone:   
[phone]  
  
  
  
Immunizations  
  
  
                      Immunization Date Immunization Notes      Care Provider Fa  
cilikim  
   
                                        06-          meningococcal B vacc  
ine,   
recombinant, OMV,   
adjuvanted                                          Axel Radford MD  
Work Phone:   
[phone]                          [facility]  
   
                                        2018          hepatitis A vaccine,  
   
pediatric/adolescent   
dosage, 2 dose schedule                             Axel Radford MD  
Work Phone:   
[phone]                          [facility]  
   
                                        2018          meningococcal B vacc  
ine,   
recombinant, OMV,   
adjuvanted                                          Axel Radford MD  
Work Phone:   
[phone]                          [facility]  
   
                                        2018          meningococcal   
polysaccharide (groups   
A, C, Y and W-135)   
diphtheria toxoid   
conjugate vaccine   
(MCV4P)                                             Axel Radford MD  
Work Phone:   
[phone]                          [facility]  
   
                                        2018          hepatitis A and   
hepatitis B vaccine                                 Winston Gordillo MD  
Work Phone:   
[phone]                          [facility]  
Work Phone:   
[phone]  
   
                                        2014          human papilloma viru  
s   
vaccine, quadrivalent                               Axel Radford MD  
Work Phone:   
[phone]                          [facility]  
   
                                        2014          human papilloma viru  
s   
vaccine, quadrivalent                               Axel Radford MD  
Work Phone:   
[phone]                          [facility]  
   
                                        2013          human papilloma viru  
s   
vaccine, quadrivalent                               Axel Radford MD  
Work Phone:   
[phone]                          [facility]  
   
                                        2013          influenza, live,   
intranasal, quadrivalent                            Axel Radford MD  
Work Phone:   
[phone]                          [facility]  
   
                                        2013          meningococcal   
polysaccharide (groups   
A, C, Y and W-135)   
diphtheria toxoid   
conjugate vaccine   
(MCV4P)                                             Axel Radford MD  
Work Phone:   
[phone]                          [facility]  
   
                                        2013          tetanus toxoid, redu  
karina   
diphtheria toxoid, and   
acellular pertussis   
vaccine, adsorbed                                   Axel Radford MD  
Work Phone:   
[phone]                          [facility]  
   
                                        2013          influenza virus vacc  
ine,   
unspecified formulation                             Christiano Knight MD  
Work Phone:   
[phone]                          [facility]  
   
                                        2007          diphtheria, tetanus   
toxoids and acellular   
pertussis vaccine                                   Axel Radford MD  
Work Phone:   
[phone]                          [facility]  
   
                                        2007          measles, mumps and   
rubella virus vaccine                               Axel Radford MD  
Work Phone:   
[phone]                          [facility]  
   
                                        2007          poliovirus vaccine,   
inactivated                                         Axel Radford MD  
Work Phone:   
[phone]                          [facility]  
   
                          2007   varicella virus vaccine              Tej Radford MD  
Work Phone:   
[phone]                          [facility]  
   
                                        2004          diphtheria, tetanus   
toxoids and acellular   
pertussis vaccine                                   Axel Radford MD  
Work Phone:   
[phone]                          [facility]  
   
                                        2004          haemophilus influenz  
ae   
type b vaccine, PRP-T   
conjugate                                           Axel Radford MD  
Work Phone:   
[phone]                          [facility]  
   
                                        2004          measles, mumps and   
rubella virus vaccine                               Axel Radford MD  
Work Phone:   
[phone]                          [facility]  
   
                                        2004          poliovirus vaccine,   
inactivated                                         Axel Radford MD  
Work Phone:   
[phone]                          [facility]  
   
                          2004   varicella virus vaccine              Tej Radford MD  
Work Phone:   
[phone]                          [facility]  
   
                                        2002          diphtheria, tetanus   
toxoids and acellular   
pertussis vaccine                                   Axel Radford MD  
Work Phone:   
[phone]                          [facility]  
   
                                        2002          pneumococcal conjuga  
te   
vaccine, 7 valent                                   Axel Radford MD  
Work Phone:   
[phone]                          [facility]  
   
                                        2002          diphtheria, tetanus   
toxoids and acellular   
pertussis vaccine                                   Axel Radford MD  
Work Phone:   
[phone]                          [facility]  
   
                                        2002          haemophilus influenz  
ae   
type b vaccine, PRP-T   
conjugate                                           Axel Radford MD  
Work Phone:   
[phone] [facility]  
   
                                        2002          hepatitis B vaccine,  
   
pediatric or   
pediatric/adolescent   
dosage                                              Axel Radford MD  
Work Phone:   
[phone]                          [facility]  
   
                                        2002          pneumococcal conjuga  
te   
vaccine, 7 valent                                   Axel Radford MD  
Work Phone:   
[phone]                          [facility]  
   
                                        2002          poliovirus vaccine,   
inactivated                                         Axel Radford MD  
Work Phone:   
[phone]                          [facility]  
   
                                        2002          diphtheria, tetanus   
toxoids and acellular   
pertussis vaccine                                   Axel Radford MD  
Work Phone:   
[phone]                          [facility]  
   
                                        2002          haemophilus influenz  
ae   
type b vaccine, PRP-T   
conjugate                                           Axel Radford MD  
Work Phone:   
[phone]                          [facility]  
   
                                        2002          hepatitis B vaccine,  
   
pediatric or   
pediatric/adolescent   
dosage                                              Axel Radford MD  
Work Phone:   
[phone]                          [facility]  
   
                                        2002          pneumococcal conjuga  
te   
vaccine, 7 valent                                   Axel Radford MD  
Work Phone:   
[phone]                          [facility]  
   
                                        2002          poliovirus vaccine,   
inactivated                                         Axel Radford MD  
Work Phone:   
[phone]                          [facility]  
   
                                        2001          hepatitis B vaccine,  
   
pediatric or   
pediatric/adolescent   
dosage                                              Axel Radford MD  
Work Phone:   
[phone]                          [facility]  
  
  
  
Payers  
  
  
                          Date         Payer Category Payer        Policy ID  
   
                          2023   Medicaid                  [id number]  
[id number]  
[id number]  
   
                                2021      Medicaid        [employer]  
[place] MEDICA[place]   
[place] [employer] MEDICAID   
[id number] 2021-Present            [id number]   
[id number]  
[id number]  
   
                          2020   Medicaid                  [id number]  
   
                          2017   Unknown                     
   
                          2001   Unknown                   [id number]   
[id number]  
   
                          2001   Unknown                   [id number]   
[id number]  
   
                          2001   Unknown                   [id number]   
[id number]  
   
                          2001   Unknown                   [id number]   
[id number]  
   
                          2001   Unknown                   [id number]   
[id number]  
   
                          2001   Unknown                   [id number]   
[id number]  
   
                          2001   Unknown                   [id number]   
[id number]  
   
                          2001   Unknown                   [id number]   
[id number]  
   
                          2001   Unknown                   [id number]   
[id number]  
   
                          2001   Unknown                   [id number]   
[id number]  
   
                          2001   Unknown                   [id number]   
[id number]  
  
  
  
Social History  
  
  
                          Date         Type         Detail       Facility  
   
                                       -            -            [address]  
Work Phone:   
[phone]  
   
                                                    Start:   
2020  
End: 2024                         Tobacco smoking status   
NHIS                      Never smoker              [facility]  
   
                                                    Start:   
2020          Alcohol intake      Ex-drinker (finding) [facility]  
   
                                                    Start:   
2001          Sex Assigned At Birth Not on file         [facility]  
   
                                                    Start:   
02-  
End: 2024                         Exposure to SARS-CoV-2   
(event)                   Not sure                  [facility]  
   
                                                    Start:   
2021  
End: 2024     Tobacco use and exposure Never used          [facility]  
   
                                                                Tobacco smoking   
consumption unknown                     [facility]  
   
                                                    Start:   
2024  
End: 2024           Alcoholic beverage intake Lifetime non-drinker   
(finding)                               [facility]  
   
                                                    Start:   
2024  
End: 2024                         History of Social   
function                                            [facility]  
   
                                                    Start:   
2024  
End: 2024     [facility] Utilities                           [facility]  
   
                                                            Has the electric, ga  
s,   
oil, or water company   
threatened to shut off   
services in your home in   
past 12Mo                 No                        [facility]  
   
                                                            (I/We) worried wheth  
er   
(my/our) food would run   
out before (I/we) got   
money to buy more.        Sometimes true            [facility]  
   
                                                            In the past 12 month  
s,   
has lack of   
transportation kept you   
from medical appointments   
or from getting   
medications?              No                        [facility]  
   
                                                    Start:   
10-                Alcohol intake            Current non-drinker of   
alcohol (finding)                       [facility]  
   
                                                    Start:   
2024                              Pregnancy           [facility]  
  
  
  
Functional Status  
  
  
                          Date         Assessment   Result       Facility  
   
                                                    Functional observable [facility]  
   
                                                    NEGATED: Highlighted   
row                       Functional performance    Functional status   
health issues are not   
documented Disease                      [facility]  
Work Phone:   
[phone]  
  
  
  
Mental Status  
  
  
                          Date         Assessment   Result       Facility  
   
                                2022                      Cognitive functi  
ons   
12-Mar-202210:39                        [facility]  
   
                                2022                      Cognitive functi  
ons   
[id number]-[id number]                        [facility]  
   
                                                    NEGATED: Highlighted   
row                                     Cognitive function   
[Interpretation]                        Cognitive status   
health issues are not   
documented Disease                      [facility] [address]  
Work Phone:   
[phone]  
  
  
  
Clinical Notes 2022 to 2024  
 Mario Barkley RN - 2024 8:18 PM Janiya Barkley RN - 2024   
8:18 PM Dg Knight MD - 2024 7:55 PM Gerson Lima RN - 
2024 7:14 PM EDTDischarge Instructions  
  
                                Note Date & Type Note            Facility  
   
                                                    2024 Emergency   
department Note                         Formatting of this note might be   
different from the original.  
Pt given discharge instructions   
and states understanding of   
these instructions. Pt has no   
questions at this time. Pt   
encouraged to follow up with   
OBGYN and PCP. Pt given home   
care instructions to help with   
symptoms at home. Pt encouraged   
to  medication from the   
pharmacy and take as prescribed.   
Pt leaves ER in stable condition   
with steady gait at this time.  
Electronically signed by Mario Barkley RN at 2024 8:21   
PM EDT  
                                        [facility]  
   
                                                    2024 Emergency   
department Note                         Formatting of this note might be   
different from the original.  
Pt given discharge instructions   
and states understanding of   
these instructions. Pt has no   
questions at this time. Pt   
encouraged to follow up with   
OBGYN and PCP. Pt given home   
care instructions to help with   
symptoms at home. Pt encouraged   
to  medication from the   
pharmacy and take as prescribed.   
Pt leaves ER in stable condition   
with steady gait at this time.  
Electronically signed by Mario Barkley RN at 2024 8:21   
PM EDT  
Formatting of this note might be   
different from the original.  
The patient does have some   
bacteria in her urine. She did   
have low potassium. She is given   
potassium supplement here in the   
ER. She feels much improved and   
feels ready to go home. I will   
put her on some Macrobid and   
Zofran. I will have her follow   
up with primary care. She did   
have a small amount of protein   
in her urine, but no   
hypertension or any other   
findings consistent with   
preeclampsia.  
  
Christiano Knight MD  
24  
  
Electronically signed by Christiano Knight MD at 2024 7:56   
PM EDT  
Formatting of this note might be   
different from the original.  
REport given to Mario UMANZOR  
Electronically signed by Leia Lima RN at 2024 7:14 PM   
EDT  
Formatting of this note might be   
different from the original.  
Pt denies ability to provide   
urine specimen.  
Electronically signed by Leia Lima RN at 2024 6:02 PM   
EDT  
Formatting of this note is   
different from the original.  
Emergency Department Report  
  
[facility] EMERGENCY   
DEPARTMENT  
  
Service Date:.24  
  
PCP: No primary care provider on   
file.  
MRN: [id number]  
  
  
Chief Complaint:  
Chief Complaint  
Patient presents with  
Abdominal Pain  
Found patient in ED waiting room   
on her knees with her face in   
her boyfriends lap. He sates     
she did this because she did not   
know how long the wait was and   
she was trying to handle the   
pain . I had patient sit back in   
wheel chair an she was taken to   
room. Per family patient had a   
seizure before her abdominal   
cramping started. The seizure   
lasted 4 minutes and patient was   
  just stiff  per family.   
Patient present to us with   
abdominal cramping that radiates   
into back with clear discharge   
that went throug  
  
HPI  
Danna Soares is a 23 y.o.   
female presents to the ED with   
chief complaint of abdominal   
cramping and vomiting. Patient   
is a  AB2 presenting to the   
emergency department complaining   
of pelvic cramping. She states   
she feels she had a seizure   
today. She states she has a   
history of seizures. She states   
she was on no medications. She   
states she has had vomiting   
recently and feels that she was   
dehydrated. She states she was   
thirsty. She denies any headache   
or neck stiffness. She denies   
any vaginal bleeding. She denies   
any loss of bowel or bladder   
control. She denies any headache   
or neck stiffness. She denies   
any recent travel or trauma. She   
denies any yellowing to the skin  
  
Review of Systems:  
Review of Systems  
Review of Systems  
Constitutional: Negative for   
fevers or chills  
Skin: Negative for rash or   
bruising  
HENT: Negative  
Eyes: Negative  
Cardiovascular: Negative  
Gastrointestinal: Positive for   
vomiting. Negative for diarrhea.   
Positive for abdominal cramping  
Respiratory: Negative for cough   
or wheezing  
Genitourinary: Negative for   
frequency urgency or dysuria  
Musculoskeletal: Negative for   
fall, injury, trauma  
Neurological: Negative for   
headache  
  
Past Medical History:  
Past Medical History:  
Diagnosis Date  
Seizures  
  
Past Surgical History:  
No past surgical history on   
file.  
  
Allergies:  
Allergies  
Allergen Reactions  
Codeine  
Miralax [Polyethylene Glycol]  
  
  
Medications:  
Patient's Medications  
No medications on file  
  
Family History:  
History reviewed. No pertinent   
family history.  
  
Social History:  
Social History  
  
Socioeconomic History  
Marital status: Single  
Spouse name: Not on file  
Number of children: Not on file  
Years of education: Not on file  
Highest education level: Not on   
file  
Occupational History  
Not on file  
Tobacco Use  
Smoking status: Never  
Smokeless tobacco: Never  
Vaping Use  
Vaping status: Never Used  
Substance and Sexual Activity  
Alcohol use: Never  
Drug use: Yes  
Types: Marijuana  
Sexual activity: Not on file  
Other Topics Concern  
Not on file  
Social History Narrative  
Not on file  
  
Social Determinants of Health  
  
Financial Resource Strain: Not   
on file  
Food Insecurity: Food Insecurity   
Present (2024)  
Hunger Vital Sign  
Worried About Running Out of   
Food in the Last Year: Sometimes   
true  
Ran Out of Food in the Last   
Year: Sometimes true  
Transportation Needs: No   
Transportation Needs (2024)  
PRAPARE - Transportation  
Lack of Transportation   
(Medical): No  
Lack of Transportation   
(Non-Medical): No  
Physical Activity: Not on file  
Stress: Not on file  
Social Connections: Not on file  
Intimate Partner Violence: Not   
At Risk (2024)  
Humiliation, Afraid, Rape, and   
Kick questionnaire  
Fear of Current or Ex-Partner:   
No  
Emotionally Abused: No  
Physically Abused: No  
Sexually Abused: No  
Housing Stability: Unknown   
(2024)  
Housing Stability Vital Sign  
Unable to Pay for Housing in the   
Last Year: No  
Number of Places Lived in the   
Last Year: Not on file  
Unstable Housing in the Last   
Year: No  
  
Physical Exam:  
Physical Exam  
General: Well-developed   
well-nourished female  
HENT: Head is atraumatic. Mouth   
is dry  
Eyes: Pupils are equal  
Skin: Warm, dry. No rash  
Abdomen: Soft. No distention   
guarding or rebound  
Respiratory: Clear. No rhonchi  
Heart: Heart tones are regular.   
Capillary refill is brisk  
Neurologic: Awake, alert,   
oriented, answering questions   
appropriately. Moving all   
extremities well  
Lymphatic: No lymphedema or   
lymphadenopathy  
Musculoskeletal: No trauma   
fracture or deformity  
Psychiatric: Cooperative with   
examiner  
  
Vital Signs During ED Visit  
Patient Vitals for the past 24   
hrs:  
BP Temp Pulse Resp SpO2 Weight  
24 1809 120/69 -- 102 18   
97 % --  
24 1748 -- -- -- -- --   
45.4 kg (100 lb)  
24 1747 115/68 97.9 F   
(36.6 C) 84 22 97 % --  
  
Orders/Results:  
Results for orders placed or   
performed during the hospital   
encounter of 24  
CBC, EDIF, PLATELET  
Result Value Ref Range  
WBC (WHITE BLOOD COUNT) 10.8 3.6   
- 11.0 10*3/uL  
RBC 4.45 4.0 - 5.4 10*6/uL  
HEMOGLOBIN (HGB) 13.3 12.0 -   
16.0 G/DL  
HEMATOCRIT (HCT) 39.2 36.0 -   
48.0 %  
MEAN CELL VOLUME 88.1 80.0 -   
100.0 FL  
Mean Cell HGB 29.9 26.0 - 35.0   
PG  
MEAN CELL HGB CONCENTRATION 33.9   
27.0 - 37.0 G/DL  
RBC DISTRIBUTION 13.6 11.5 -   
14.5 %  
PLATELET COUNT 282 130 - 400   
10*3/uL  
MEAN PLATELET VOLUME 9.9 7.4 -   
11.0 FL  
DIFFERENTIAL TYPE AUTO DIFF %  
NEUTROPHILS 71.9 37.0 - 75.0 %  
LYMPHOCYTE 17.0 (L) 20.0 - 55.0   
%  
MONOCYTE % 9.8 0.0 - 10.0 %  
EOSINOPHIL % 0.6 0.0 - 11.0 %  
BASOPHIL % 0.7 0.0 - 2.0 %  
Absolute Neutrophil Count 7.8   
(H) 1.4 - 6.5 10*3/uL  
LYMPHOCYTES, ABSOLUTE 1.8 1.2 -   
3.4 10*3/uL  
MONOCYTES, ABSOLUTE 1.1 (H) 0.0   
- 0.7 10*3/uL  
ABSOLUTE EOSINOPHIL COUNT 0.1   
0.0 - 0.7 10*3/uL  
ABSOLUTE BASOPHIL COUNT 0.1 0.0   
- 0.2 10*3/uL  
COMPREHENSIVE METABOLIC PANEL  
Result Value Ref Range  
Glucose 90 70 - 100 MG/DL  
BUN 7 7 - 20 MG/DL  
CREATININE SERUM 0.50 (L) 0.70 -   
1.20 MG/DL  
SODIUM 133 (L) 137 - 145 MMOL/L  
POTASSIUM 2.9 (LL) 3.5 - 5.1   
MMOL/L  
CHLORIDE 104 98 - 107 MMOL/L  
CALCIUM 9.2 8.4 - 10.2 MG/DL  
PROTEIN, TOTAL 7.2 6.3 - 8.2   
GM/DL  
Albumin 4.1 3.5 - 5.0 G/dl  
BILIRUBIN, TOTAL 0.9 0.2 - 1.3   
MG/DL  
AST 36 14 - 36 IU/L  
ALKALINE PHOSPHATASE 53 38 - 126   
IU/L  
CARBON DIOXIDE (CO2) 17 (LL) 22   
- 30 MMOL/L  
A/G Ratio 1.3 RATIO  
ALT 32 <35 IU/L  
ESTIMATED GFR, NON AFRICAN AMER   
164 ml/min/1.73sq.m  
ESTIMATED GFR, AFRICAN AMERICAN   
198 ml/min/1.73sq.m  
GFR COMMENT Average GFR for   
20-29 years old = 116.  
LACTATE, BLOOD  
Result Value Ref Range  
LACTATE 1.7 0.7 - 2.0 mmol/L  
  
Radiographic Imaging  
No orders to display  
  
Procedures:  
Procedures  
  
Moderate Sedation Procedure:  
No  
  
ED Summary/MDM  
Patient was ordered fetal heart   
tones. Blood work was also   
obtained. Urinalysis was   
ordered. She was given 2 L of   
saline wide open. She was   
resting comfortably. Her blood   
work has returned. Lactic acid   
level of 1.7. Potassium is   
slightly low at 2.9. Her H&H of   
13 and 39. Urinalysis is   
currently pending.  
  
I have endorsed the case at   
shift change to the oncoming   
physician. Please refer to his   
note for any outstanding   
diagnostic studies and final   
disposition and results to   
treatment.  
  
Clinical Impression:  
1. Dehydration  
2. Hyperemesis gravidarum  
  
No follow-ups on file.  
New Prescriptions  
No medications on file  
  
Discontinued Medications  
No medications on file  
  
An After Visit Summary was   
printed and given to the patient   
with above information.  
.  
  
  
Lorenzo Cid MD  
24  
  
Electronically signed by Lorenzo Cid MD at 2024 6:55   
PM EDT  
documented in this encounter            [facility]  
   
                                                    2024 Physician   
Emergency department Note               Formatting of this note might be   
different from the original.  
The patient does have some   
bacteria in her urine. She did   
have low potassium. She is given   
potassium supplement here in the   
ER. She feels much improved and   
feels ready to go home. I will   
put her on some Macrobid and   
Zofran. I will have her follow   
up with primary care. She did   
have a small amount of protein   
in her urine, but no   
hypertension or any other   
findings consistent with   
preeclampsia.  
  
Christiano Knight MD  
24  
  
Electronically signed by Christiano Knight MD at 2024 7:56   
PM EDT  
                                        [facility]  
   
                                                    2024 Emergency   
department Note                         Formatting of this note might be   
different from the original.  
REport given to Mario UMANZOR  
Electronically signed by Leia Lima RN at 2024 7:14 PM   
EDT  
                                        [facility]  
   
                                                    2024 Emergency   
department Note                         Formatting of this note might be   
different from the original.  
Pt denies ability to provide   
urine specimen.  
Electronically signed by Leia Lima RN at 2024 6:02 PM   
EDT  
                                        [facility]  
   
                                                    2024 Physician   
Emergency department Note               Formatting of this note is   
different from the original.  
Emergency Department Report  
  
[facility] EMERGENCY   
DEPARTMENT  
  
Service Date:.24  
  
PCP: No primary care provider on   
file.  
MRN: [id number]  
  
  
Chief Complaint:  
Chief Complaint  
Patient presents with  
Abdominal Pain  
Found patient in ED waiting room   
on her knees with her face in   
her boyfriends lap. He sates     
she did this because she did not   
know how long the wait was and   
she was trying to handle the   
pain . I had patient sit back in   
wheel chair an she was taken to   
room. Per family patient had a   
seizure before her abdominal   
cramping started. The seizure   
lasted 4 minutes and patient was   
  just stiff  per family.   
Patient present to us with   
abdominal cramping that radiates   
into back with clear discharge   
that went throug  
  
HPI  
Danna Soares is a 23 y.o.   
female presents to the ED with   
chief complaint of abdominal   
cramping and vomiting. Patient   
is a  AB2 presenting to the   
emergency department complaining   
of pelvic cramping. She states   
she feels she had a seizure   
today. She states she has a   
history of seizures. She states   
she was on no medications. She   
states she has had vomiting   
recently and feels that she was   
dehydrated. She states she was   
thirsty. She denies any headache   
or neck stiffness. She denies   
any vaginal bleeding. She denies   
any loss of bowel or bladder   
control. She denies any headache   
or neck stiffness. She denies   
any recent travel or trauma. She   
denies any yellowing to the skin  
  
Review of Systems:  
Review of Systems  
Review of Systems  
Constitutional: Negative for   
fevers or chills  
Skin: Negative for rash or   
bruising  
HENT: Negative  
Eyes: Negative  
Cardiovascular: Negative  
Gastrointestinal: Positive for   
vomiting. Negative for diarrhea.   
Positive for abdominal cramping  
Respiratory: Negative for cough   
or wheezing  
Genitourinary: Negative for   
frequency urgency or dysuria  
Musculoskeletal: Negative for   
fall, injury, trauma  
Neurological: Negative for   
headache  
  
Past Medical History:  
Past Medical History:  
Diagnosis Date  
Seizures  
  
Past Surgical History:  
No past surgical history on   
file.  
  
Allergies:  
Allergies  
Allergen Reactions  
Codeine  
Miralax [Polyethylene Glycol]  
  
  
Medications:  
Patient's Medications  
No medications on file  
  
Family History:  
History reviewed. No pertinent   
family history.  
  
Social History:  
Social History  
  
Socioeconomic History  
Marital status: Single  
Spouse name: Not on file  
Number of children: Not on file  
Years of education: Not on file  
Highest education level: Not on   
file  
Occupational History  
Not on file  
Tobacco Use  
Smoking status: Never  
Smokeless tobacco: Never  
Vaping Use  
Vaping status: Never Used  
Substance and Sexual Activity  
Alcohol use: Never  
Drug use: Yes  
Types: Marijuana  
Sexual activity: Not on file  
Other Topics Concern  
Not on file  
Social History Narrative  
Not on file  
  
Social Determinants of Health  
  
Financial Resource Strain: Not   
on file  
Food Insecurity: Food Insecurity   
Present (2024)  
Hunger Vital Sign  
Worried About Running Out of   
Food in the Last Year: Sometimes   
true  
Ran Out of Food in the Last   
Year: Sometimes true  
Transportation Needs: No   
Transportation Needs (2024)  
PRAPARE - Transportation  
Lack of Transportation   
(Medical): No  
Lack of Transportation   
(Non-Medical): No  
Physical Activity: Not on file  
Stress: Not on file  
Social Connections: Not on file  
Intimate Partner Violence: Not   
At Risk (2024)  
Humiliation, Afraid, Rape, and   
Kick questionnaire  
Fear of Current or Ex-Partner:   
No  
Emotionally Abused: No  
Physically Abused: No  
Sexually Abused: No  
Housing Stability: Unknown   
(2024)  
Housing Stability Vital Sign  
Unable to Pay for Housing in the   
Last Year: No  
Number of Places Lived in the   
Last Year: Not on file  
Unstable Housing in the Last   
Year: No  
  
Physical Exam:  
Physical Exam  
General: Well-developed   
well-nourished female  
HENT: Head is atraumatic. Mouth   
is dry  
Eyes: Pupils are equal  
Skin: Warm, dry. No rash  
Abdomen: Soft. No distention   
guarding or rebound  
Respiratory: Clear. No rhonchi  
Heart: Heart tones are regular.   
Capillary refill is brisk  
Neurologic: Awake, alert,   
oriented, answering questions   
appropriately. Moving all   
extremities well  
Lymphatic: No lymphedema or   
lymphadenopathy  
Musculoskeletal: No trauma   
fracture or deformity  
Psychiatric: Cooperative with   
examiner  
  
Vital Signs During ED Visit  
Patient Vitals for the past 24   
hrs:  
BP Temp Pulse Resp SpO2 Weight  
24 1809 120/69 -- 102 18   
97 % --  
24 1748 -- -- -- -- --   
45.4 kg (100 lb)  
24 1747 115/68 97.9 F   
(36.6 C) 84 22 97 % --  
  
Orders/Results:  
Results for orders placed or   
performed during the hospital   
encounter of 24  
CBC, EDIF, PLATELET  
Result Value Ref Range  
WBC (WHITE BLOOD COUNT) 10.8 3.6   
- 11.0 10*3/uL  
RBC 4.45 4.0 - 5.4 10*6/uL  
HEMOGLOBIN (HGB) 13.3 12.0 -   
16.0 G/DL  
HEMATOCRIT (HCT) 39.2 36.0 -   
48.0 %  
MEAN CELL VOLUME 88.1 80.0 -   
100.0 FL  
Mean Cell HGB 29.9 26.0 - 35.0   
PG  
MEAN CELL HGB CONCENTRATION 33.9   
27.0 - 37.0 G/DL  
RBC DISTRIBUTION 13.6 11.5 -   
14.5 %  
PLATELET COUNT 282 130 - 400   
10*3/uL  
MEAN PLATELET VOLUME 9.9 7.4 -   
11.0 FL  
DIFFERENTIAL TYPE AUTO DIFF %  
NEUTROPHILS 71.9 37.0 - 75.0 %  
LYMPHOCYTE 17.0 (L) 20.0 - 55.0   
%  
MONOCYTE % 9.8 0.0 - 10.0 %  
EOSINOPHIL % 0.6 0.0 - 11.0 %  
BASOPHIL % 0.7 0.0 - 2.0 %  
Absolute Neutrophil Count 7.8   
(H) 1.4 - 6.5 10*3/uL  
LYMPHOCYTES, ABSOLUTE 1.8 1.2 -   
3.4 10*3/uL  
MONOCYTES, ABSOLUTE 1.1 (H) 0.0   
- 0.7 10*3/uL  
ABSOLUTE EOSINOPHIL COUNT 0.1   
0.0 - 0.7 10*3/uL  
ABSOLUTE BASOPHIL COUNT 0.1 0.0   
- 0.2 10*3/uL  
COMPREHENSIVE METABOLIC PANEL  
Result Value Ref Range  
Glucose 90 70 - 100 MG/DL  
BUN 7 7 - 20 MG/DL  
CREATININE SERUM 0.50 (L) 0.70 -   
1.20 MG/DL  
SODIUM 133 (L) 137 - 145 MMOL/L  
POTASSIUM 2.9 (LL) 3.5 - 5.1   
MMOL/L  
CHLORIDE 104 98 - 107 MMOL/L  
CALCIUM 9.2 8.4 - 10.2 MG/DL  
PROTEIN, TOTAL 7.2 6.3 - 8.2   
GM/DL  
Albumin 4.1 3.5 - 5.0 G/dl  
BILIRUBIN, TOTAL 0.9 0.2 - 1.3   
MG/DL  
AST 36 14 - 36 IU/L  
ALKALINE PHOSPHATASE 53 38 - 126   
IU/L  
CARBON DIOXIDE (CO2) 17 (LL) 22   
- 30 MMOL/L  
A/G Ratio 1.3 RATIO  
ALT 32 <35 IU/L  
ESTIMATED GFR, NON AFRICAN AMER   
164 ml/min/1.73sq.m  
ESTIMATED GFR, AFRICAN AMERICAN   
198 ml/min/1.73sq.m  
GFR COMMENT Average GFR for   
20-29 years old = 116.  
LACTATE, BLOOD  
Result Value Ref Range  
LACTATE 1.7 0.7 - 2.0 mmol/L  
  
Radiographic Imaging  
No orders to display  
  
Procedures:  
Procedures  
  
Moderate Sedation Procedure:  
No  
  
ED Summary/MDM  
Patient was ordered fetal heart   
tones. Blood work was also   
obtained. Urinalysis was   
ordered. She was given 2 L of   
saline wide open. She was   
resting comfortably. Her blood   
work has returned. Lactic acid   
level of 1.7. Potassium is   
slightly low at 2.9. Her H&H of   
13 and 39. Urinalysis is   
currently pending.  
  
I have endorsed the case at   
shift change to the oncoming   
physician. Please refer to his   
note for any outstanding   
diagnostic studies and final   
disposition and results to   
treatment.  
  
Clinical Impression:  
1. Dehydration  
2. Hyperemesis gravidarum  
  
No follow-ups on file.  
New Prescriptions  
No medications on file  
  
Discontinued Medications  
No medications on file  
  
An After Visit Summary was   
printed and given to the patient   
with above information.  
.  
  
  
Lorenzo Cid MD  
24 [id number]  
  
Electronically signed by Lorenzo Cid MD at 2024 6:55   
PM EDT  
                                        [facility]  
   
                                                    2024 Emergency   
department Note                         Formatting of this note is   
different from the original.  
Patient is a 23-year-old female   
who describes a history of   
gastroparesis and marijuana use   
presents with a chief complaint   
of abdominal pain nausea   
vomiting starting this morning.   
She insist that her nausea and   
vomiting is not due to her   
marijuana use and that she does   
not have cannabis hyperemesis   
syndrome. This is the patient's   
10th visit in February. It is   
currently .  
  
  
  
Review of Systems  
  
Physical Exam  
Vitals and nursing note   
reviewed.  
Constitutional:  
General: She is not in acute   
distress.  
Appearance: She is   
well-developed.  
HENT:  
Head: Normocephalic and   
atraumatic.  
Eyes:  
Conjunctiva/sclera: Conjunctivae   
normal.  
Cardiovascular:  
Rate and Rhythm: Normal rate and   
regular rhythm.  
Heart sounds: No murmur heard.  
Pulmonary:  
Effort: Pulmonary effort is   
normal. No respiratory distress.  
Breath sounds: Normal breath   
sounds.  
Abdominal:  
Palpations: Abdomen is soft.  
Tenderness: There is no   
abdominal tenderness.  
Musculoskeletal:  
General: No swelling.  
Cervical back: Neck supple.  
Skin:  
General: Skin is warm and dry.  
Capillary Refill: Capillary   
refill takes less than 2   
seconds.  
Neurological:  
Mental Status: She is alert.  
Psychiatric:  
Mood and Affect: Mood normal.  
  
  
  
Labs Reviewed  
BASIC METABOLIC PANEL - Abnormal  
Result Value  
Glucose 97  
Sodium 136  
Potassium 3.4 (*)  
Chloride 102  
Bicarbonate 20 (*)  
Anion Gap 17  
Urea Nitrogen 4 (*)  
Creatinine 0.54  
eGFR >90  
Calcium 9.5  
MANUAL DIFFERENTIAL - Abnormal  
Neutrophils %, Manual 84.0  
Lymphocytes %, Manual 9.0  
Monocytes %, Manual 6.0  
Eosinophils %, Manual 0.0  
Basophils %, Manual 1.0  
Seg Neutrophils Absolute, Manual   
8.90 (*)  
Lymphocytes Absolute, Manual   
0.95 (*)  
Monocytes Absolute, Manual 0.64  
Eosinophils Absolute, Manual   
0.00  
Basophils Absolute, Manual 0.11   
(*)  
Total Cells Counted 100  
RBC Morphology No significant   
RBC morphology present  
CBC WITH AUTO DIFFERENTIAL -   
Normal  
WBC 10.6  
nRBC 0.0  
RBC 5.01  
Hemoglobin 14.9  
Hematocrit 44.1  
MCV 88  
MCH 29.7  
MCHC 33.8  
RDW 13.6  
Platelets 306  
Immature Granulocytes %,   
Automated 0.4  
Immature Granulocytes Absolute,   
Automated 0.04  
Narrative:  
The previously reported   
component Neutrophils % is no   
longer being reported.  
The previously reported   
component Lymphocytes % is no   
longer being reported.  
The previously reported   
component Monocytes % is no   
longer being reported.  
The previously  
reported component Eosinophils %   
is no longer being reported.  
The previously reported   
component Basophils % is no   
longer being reported.  
The previously reported   
component Absolute Neutrophils   
is no longer being reported.  
The previously reported  
component Absolute Lymphocytes   
is no longer being reported.  
The previously reported   
component Absolute Monocytes is   
no longer being reported.  
The previously reported   
component Absolute Eosinophils   
is no longer being reported.  
The previously reported  
component Absolute Basophils is   
no longer being reported.  
HEPATIC FUNCTION PANEL - Normal  
Albumin 4.9  
Bilirubin, Total 0.6  
Bilirubin, Direct 0.1  
Alkaline Phosphatase 39  
ALT 16  
AST 15  
Total Protein 7.4  
LIPASE - Normal  
Lipase 27  
Narrative:  
Venipuncture immediately after   
or during the administration of   
Metamizole may lead to falsely   
low results. Testing should be   
performed immediately prior to   
Metamizole dosing.  
GRAY TOP  
  
  
No orders to display  
  
  
Procedures  
  
Medical Decision Making  
23-year-old female with multiple   
ER visits this month alone   
presented with nausea vomiting   
abdominal pain. She received a   
liter of fluids and 5 mg IVP   
Compazine and is feeling much   
better and request to be   
discharged home. Electrolytes   
are essentially unremarkable for   
acute findings. Lipase and   
biliary numbers are normal. She   
can be discharged.  
  
  
  
Diagnoses as of 24  
Nausea and vomiting, unspecified   
vomiting type  
  
  
Winston Gordillo MD  
24  
  
Electronically signed by Winston Gordillo MD at 2024 10:19 PM   
EST  
documented in this encounter            [facility]  
Work Phone:   
[phone]  
   
                                                    2024 Physician   
Emergency department Note               Formatting of this note is   
different from the original.  
Patient is a 23-year-old female   
who describes a history of   
gastroparesis and marijuana use   
presents with a chief complaint   
of abdominal pain nausea   
vomiting starting this morning.   
She insist that her nausea and   
vomiting is not due to her   
marijuana use and that she does   
not have cannabis hyperemesis   
syndrome. This is the patient's   
10th visit in February. It is   
currently .  
  
  
  
Review of Systems  
  
Physical Exam  
Vitals and nursing note   
reviewed.  
Constitutional:  
General: She is not in acute   
distress.  
Appearance: She is   
well-developed.  
HENT:  
Head: Normocephalic and   
atraumatic.  
Eyes:  
Conjunctiva/sclera: Conjunctivae   
normal.  
Cardiovascular:  
Rate and Rhythm: Normal rate and   
regular rhythm.  
Heart sounds: No murmur heard.  
Pulmonary:  
Effort: Pulmonary effort is   
normal. No respiratory distress.  
Breath sounds: Normal breath   
sounds.  
Abdominal:  
Palpations: Abdomen is soft.  
Tenderness: There is no   
abdominal tenderness.  
Musculoskeletal:  
General: No swelling.  
Cervical back: Neck supple.  
Skin:  
General: Skin is warm and dry.  
Capillary Refill: Capillary   
refill takes less than 2   
seconds.  
Neurological:  
Mental Status: She is alert.  
Psychiatric:  
Mood and Affect: Mood normal.  
  
  
  
Labs Reviewed  
BASIC METABOLIC PANEL - Abnormal  
Result Value  
Glucose 97  
Sodium 136  
Potassium 3.4 (*)  
Chloride 102  
Bicarbonate 20 (*)  
Anion Gap 17  
Urea Nitrogen 4 (*)  
Creatinine 0.54  
eGFR >90  
Calcium 9.5  
MANUAL DIFFERENTIAL - Abnormal  
Neutrophils %, Manual 84.0  
Lymphocytes %, Manual 9.0  
Monocytes %, Manual 6.0  
Eosinophils %, Manual 0.0  
Basophils %, Manual 1.0  
Seg Neutrophils Absolute, Manual   
8.90 (*)  
Lymphocytes Absolute, Manual   
0.95 (*)  
Monocytes Absolute, Manual 0.64  
Eosinophils Absolute, Manual   
0.00  
Basophils Absolute, Manual 0.11   
(*)  
Total Cells Counted 100  
RBC Morphology No significant   
RBC morphology present  
CBC WITH AUTO DIFFERENTIAL -   
Normal  
WBC 10.6  
nRBC 0.0  
RBC 5.01  
Hemoglobin 14.9  
Hematocrit 44.1  
MCV 88  
MCH 29.7  
MCHC 33.8  
RDW 13.6  
Platelets 306  
Immature Granulocytes %,   
Automated 0.4  
Immature Granulocytes Absolute,   
Automated 0.04  
Narrative:  
The previously reported   
component Neutrophils % is no   
longer being reported.  
The previously reported   
component Lymphocytes % is no   
longer being reported.  
The previously reported   
component Monocytes % is no   
longer being reported.  
The previously  
reported component Eosinophils %   
is no longer being reported.  
The previously reported   
component Basophils % is no   
longer being reported.  
The previously reported   
component Absolute Neutrophils   
is no longer being reported.  
The previously reported  
component Absolute Lymphocytes   
is no longer being reported.  
The previously reported   
component Absolute Monocytes is   
no longer being reported.  
The previously reported   
component Absolute Eosinophils   
is no longer being reported.  
The previously reported  
component Absolute Basophils is   
no longer being reported.  
HEPATIC FUNCTION PANEL - Normal  
Albumin 4.9  
Bilirubin, Total 0.6  
Bilirubin, Direct 0.1  
Alkaline Phosphatase 39  
ALT 16  
AST 15  
Total Protein 7.4  
LIPASE - Normal  
Lipase 27  
Narrative:  
Venipuncture immediately after   
or during the administration of   
Metamizole may lead to falsely   
low results. Testing should be   
performed immediately prior to   
Metamizole dosing.  
GRAY TOP  
  
  
No orders to display  
  
  
Procedures  
  
Medical Decision Making  
23-year-old female with multiple   
ER visits this month alone   
presented with nausea vomiting   
abdominal pain. She received a   
liter of fluids and 5 mg IVP   
Compazine and is feeling much   
better and request to be   
discharged home. Electrolytes   
are essentially unremarkable for   
acute findings. Lipase and   
biliary numbers are normal. She   
can be discharged.  
  
  
  
Diagnoses as of 24  
Nausea and vomiting, unspecified   
vomiting type  
  
  
Winston Gordillo MD  
24  
  
Electronically signed by Winston Gordillo MD at 2024 10:19 PM   
EST  
                                        [facility]  
Work Phone:   
[phone]  
   
                                                    2024 Hospital   
Discharge instructions                    
  
  
Axel Radford MD - 2024   
10:53 PM ESTFormatting of this   
note might be different from the   
original.  
  
  
Procedures done during this   
visit: None  
  
Electronically signed by Axel Radford MD at 2024 10:53   
PM EST  
  
  
  
  
The following attachments cannot   
be sent through Care   
Everywhere.Nausea and Vomiting   
Discharge Instructions, Adult   
(English)documented in this   
encounter                               [facility]  
   
                                        2024 Note     Physician Triage Not  
e  
The patient was seen by me in   
intake for a brief history and   
physical obtained  
for triage reasons only. My exam   
is intended to be an initial   
medical screening  
exam for disposition within our   
ED with limited initial orders   
placed, when  
appropriate, to expedite care by   
the treating team.  
HIPAA: Verbal permission granted   
from patient to discuss case,   
including  
protected health information, in   
front of family / friends in   
room at the time  
of the evaluation.  
Patient complains of had seizure   
on the way here - lives 1 hour   
away.+ nausea AND  
neck pains. No fevers. Had at   
least 20 seizures over the last   
week. Not on  
medications for the seizures.  
No pregnancy.  
Focused Exam: alert now,  
The patient is deemed   
appropriate for acute.  
Initial orders: iv, labs.  
The remainder of testing,   
treatment, and diagnostic plan   
will be assumed by the  
next clinician who will be   
seeing the patient as a primary   
patient, creating a  
plan and impression, and final   
disposition of the patient from   
the ED. I had a  
limited role in this case.  
PLEASE SEE OTHER   
ATTENDING/RESIDENT/PHYSICIAN/CNP  
/PA NOTATION  
FLYNN Araujo                 [facility]  
   
                                                    2024 Physician   
Emergency department Note               Formatting of this note might be   
different from the original.  
Physician Triage Note  
  
The patient was seen by me in   
intake for a brief history and   
physical obtained for triage   
reasons only. My exam is   
intended to be an initial   
medical screening exam for   
disposition within our ED with   
limited initial orders placed,   
when appropriate, to expedite   
care by the treating team.  
  
HIPAA: Verbal permission granted   
from patient to discuss case,   
including protected health   
information, in front of family   
/ friends in room at the time of   
the evaluation.  
  
Patient complains of had seizure   
on the way here - lives 1 hour   
away.+ nausea & neck pains. No   
fevers. Had at least 20 seizures   
over the last week. Not on   
medications for the seizures.  
No pregnancy.  
  
Focused Exam: alert now,  
  
The patient is deemed   
appropriate for acute.  
  
Initial orders: iv, labs.  
  
The remainder of testing,   
treatment, and diagnostic plan   
will be assumed by the next   
clinician who will be seeing the   
patient as a primary patient,   
creating a plan and impression,   
and final disposition of the   
patient from the ED. I had a   
limited role in this case.  
  
PLEASE SEE OTHER   
ATTENDING/RESIDENT/PHYSICIAN/CNP  
/PA NOTATION  
  
FLYNN Araujo  
  
Electronically signed by   
June Dyson APRN-CNP at   
2024 6:19 PM EST  
                                        [facility]  
Work Phone:   
[phone]  
   
                                                    2024 Emergency   
department Note                         Formatting of this note might be   
different from the original.  
Physician Triage Note  
  
The patient was seen by me in   
intake for a brief history and   
physical obtained for triage   
reasons only. My exam is   
intended to be an initial   
medical screening exam for   
disposition within our ED with   
limited initial orders placed,   
when appropriate, to expedite   
care by the treating team.  
  
HIPAA: Verbal permission granted   
from patient to discuss case,   
including protected health   
information, in front of family   
/ friends in room at the time of   
the evaluation.  
  
Patient complains of had seizure   
on the way here - lives 1 hour   
away.+ nausea & neck pains. No   
fevers. Had at least 20 seizures   
over the last week. Not on   
medications for the seizures.  
No pregnancy.  
  
Focused Exam: alert now,  
  
The patient is deemed   
appropriate for acute.  
  
Initial orders: iv, labs.  
  
The remainder of testing,   
treatment, and diagnostic plan   
will be assumed by the next   
clinician who will be seeing the   
patient as a primary patient,   
creating a plan and impression,   
and final disposition of the   
patient from the ED. I had a   
limited role in this case.  
  
PLEASE SEE OTHER   
ATTENDING/RESIDENT/PHYSICIAN/CNP  
/PA NOTATION  
  
FLYNN Araujo  
  
Electronically signed by   
June Dyson APRN-CNP at   
2024 6:19 PM EST  
documented in this encounter            [facility]  
   
                                        2024 Plan of care note Formatting   
of this note might be   
different from the original.  
  
Problem: Patient Care Overview  
Goal: Plan of Care Review  
Outcome: Adequate for Discharge  
Goal: Individualization &   
Mutuality  
Outcome: Adequate for Discharge  
Goal: Discharge Needs Assessment  
Outcome: Adequate for Discharge  
Goal: Interdisciplinary   
Rounds/Family Conf  
Outcome: Adequate for Discharge  
  
Problem: Skin Integrity   
Impairment, Risk/Actual (Adult)  
Goal: Identify Related Risk   
Factors and Signs and Symptoms  
Description: Related risk   
factors and signs and symptoms   
are identified upon initiation   
of Human Response Clinical   
Practice Guideline (CPG)  
Outcome: Adequate for Discharge  
Goal: Skin Integrity/Wound   
Healing  
Description: Patient will   
demonstrate the desired outcomes   
by discharge/transition of care.  
Outcome: Adequate for Discharge  
  
Problem: Nausea/Vomiting (Adult)  
Goal: Identify Related Risk   
Factors and Signs and Symptoms  
Description: Related risk   
factors and signs and symptoms   
are identified upon initiation   
of Human Response Clinical   
Practice Guideline (CPG)  
2024 1933 by Janelle Short RN  
Outcome: Adequate for Discharge  
2024 1625 by Janelle Short RN  
Outcome: Progressing  
Goal: Symptom Relief  
Description: Patient will   
demonstrate the desired outcomes   
by discharge/transition of care.  
2024 by Janelle Short RN  
Outcome: Adequate for Discharge  
2024 by Janelle Short RN  
Outcome: Progressing  
Goal: Adequate Hydration  
Description: Patient will   
demonstrate the desired outcomes   
by discharge/transition of care.  
2024 by Janelle Short RN  
Outcome: Adequate for Discharge  
2024 by Janelle Short RN  
Outcome: Progressing  
Intervention: Minimize Nausea   
Triggers/Manage Symptoms  
Flowsheets (Taken 2024   
1625)  
Nausea/Vomiting Interventions:  
cool cloth applied  
sips clear liquids given  
slow deep breathing encouraged  
stimuli minimized  
Environmental Support:  
calm environment promoted  
personal routine supported  
rest periods encouraged  
environmental consistency   
promoted  
Intervention: Position to   
Prevent Aspiration  
Flowsheets (Taken 2024)  
Body Position:   
positioned/repositioned   
independently  
Head of Bed (HOB): HOB 30-59   
degrees  
Intervention: Promote/Maintain   
Hydration  
Flowsheets (Taken 2024)  
Fluid/Electrolyte Management:  
fluids provided  
intravenous fluids adjusted  
electrolyte supplement initiated  
  
Electronically signed by Janelle Short RN at 2024 7:33   
PM [facility]  
   
                                                    2024 Miscellaneous   
Notes                                   Formatting of this note might be   
different from the original.  
  
Problem: Patient Care Overview  
Goal: Plan of Care Review  
Outcome: Adequate for Discharge  
Goal: Individualization &   
Mutuality  
Outcome: Adequate for Discharge  
Goal: Discharge Needs Assessment  
Outcome: Adequate for Discharge  
Goal: Interdisciplinary   
Rounds/Family Conf  
Outcome: Adequate for Discharge  
  
Problem: Skin Integrity   
Impairment, Risk/Actual (Adult)  
Goal: Identify Related Risk   
Factors and Signs and Symptoms  
Description: Related risk   
factors and signs and symptoms   
are identified upon initiation   
of Human Response Clinical   
Practice Guideline (CPG)  
Outcome: Adequate for Discharge  
Goal: Skin Integrity/Wound   
Healing  
Description: Patient will   
demonstrate the desired outcomes   
by discharge/transition of care.  
Outcome: Adequate for Discharge  
  
Problem: Nausea/Vomiting (Adult)  
Goal: Identify Related Risk   
Factors and Signs and Symptoms  
Description: Related risk   
factors and signs and symptoms   
are identified upon initiation   
of Human Response Clinical   
Practice Guideline (CPG)  
2024 by Janelle Short RN  
Outcome: Adequate for Discharge  
2024 by Janelle Short RN  
Outcome: Progressing  
Goal: Symptom Relief  
Description: Patient will   
demonstrate the desired outcomes   
by discharge/transition of care.  
2024 by Janelle Short RN  
Outcome: Adequate for Discharge  
2024 by Janelle Short RN  
Outcome: Progressing  
Goal: Adequate Hydration  
Description: Patient will   
demonstrate the desired outcomes   
by discharge/transition of care.  
2024 by Janelle Short RN  
Outcome: Adequate for Discharge  
2024 by Janelle Short RN  
Outcome: Progressing  
Intervention: Minimize Nausea   
Triggers/Manage Symptoms  
Flowsheets (Taken 2024   
162)  
Nausea/Vomiting Interventions:  
cool cloth applied  
sips clear liquids given  
slow deep breathing encouraged  
stimuli minimized  
Environmental Support:  
calm environment promoted  
personal routine supported  
rest periods encouraged  
environmental consistency   
promoted  
Intervention: Position to   
Prevent Aspiration  
Flowsheets (Taken 2024)  
Body Position:   
positioned/repositioned   
independently  
Head of Bed (HOB): HOB 30-59   
degrees  
Intervention: Promote/Maintain   
Hydration  
Flowsheets (Taken 2024)  
Fluid/Electrolyte Management:  
fluids provided  
intravenous fluids adjusted  
electrolyte supplement initiated  
  
Electronically signed by Janelle Short RN at 2024 7:33   
PM EST  
Formatting of this note might be   
different from the original.  
  
Problem: Nausea/Vomiting (Adult)  
Goal: Identify Related Risk   
Factors and Signs and Symptoms  
Description: Related risk   
factors and signs and symptoms   
are identified upon initiation   
of Human Response Clinical   
Practice Guideline (CPG)  
Outcome: Progressing  
Goal: Symptom Relief  
Description: Patient will   
demonstrate the desired outcomes   
by discharge/transition of care.  
Outcome: Progressing  
Goal: Adequate Hydration  
Description: Patient will   
demonstrate the desired outcomes   
by discharge/transition of care.  
Outcome: Progressing  
Intervention: Minimize Nausea   
Triggers/Manage Symptoms  
Flowsheets (Taken 2024)  
Nausea/Vomiting Interventions:  
cool cloth applied  
sips clear liquids given  
slow deep breathing encouraged  
stimuli minimized  
Environmental Support:  
calm environment promoted  
personal routine supported  
rest periods encouraged  
environmental consistency   
promoted  
Intervention: Position to   
Prevent Aspiration  
Flowsheets (Taken 2024   
162)  
Body Position:   
positioned/repositioned   
independently  
Head of Bed (HOB): HOB 30-59   
degrees  
Intervention: Promote/Maintain   
Hydration  
Flowsheets (Taken 2024   
162)  
Fluid/Electrolyte Management:  
fluids provided  
intravenous fluids adjusted  
electrolyte supplement initiated  
  
Electronically signed by Janelle Short RN at 2024 4:26   
PM EST  
Formatting of this note might be   
different from the original.  
Afternoon assessment completed.   
No changes noted. Stool specimen   
obtained. Patient resting with   
no complaints. Call light and   
phone within reach. Rn continue   
to monitor.  
  
Electronically signed by Janelle Short RN at 2024 4:24   
PM EST  
Formatting of this note might be   
different from the original.  
No change noted from previous   
assessment by this RN unless   
otherwise detailed in   
coordinating flow sheets.   
Patient denies any needs at this   
time. Will continue to monitor.  
  
Electronically signed by Janelle Short RN at 2024   
12:52 PM EST  
Formatting of this note might be   
different from the original.  
Responded to patient's call   
light. She verbalizes she wants   
to leave [facility] Dr. Cooper notified.   
Pulled IV out. Patient's best   
friend at bedside to drive   
patient home.  
Electronically signed by   
Jennifer Tolliver RN at   
2024 7:41 PM EST  
Formatting of this note might be   
different from the original.  
Resting in bed. Continues to be   
medicated for complaints of   
nausea and emesis which is   
yellow in color. Call light in   
reach.  
Electronically signed by   
Fadia Unger RN at   
2024 4:32 AM EST  
Formatting of this note might be   
different from the original.  
Patient awake in bed. Has many   
requests for this nurse   
including a second shower, extra   
pillows, warm blankets, drinks,   
and pain medication, and   
medication for nausea as soon as   
she can have something. No   
emesis observed this shift.   
Trying to meet all of patient   
requests and basic needs.   
Assessment unchanged from   
previous one. No seizure   
activity observed. Call light in   
reach.  
Electronically signed by   
Fadia Unger RN at   
2024 1:15 AM EST  
Formatting of this note might be   
different from the original.  
Patient to room 2755 from ED and   
assisted into bed x 1 assist.   
Patient oriented to room and   
call light system. Admission   
assessment initiated. Family   
with patient and will assist her   
with shower per her request. Is   
asking for nausea medication and   
will call  to get order   
for something else as she has   
had all she can have at this   
time. SR up x 4 due to seizure   
precautions being maintained as   
patient has active seizure   
history, having one in ED as   
reported by staff.  
Electronically signed by   
Fadia Unger RN at   
2024 10:28 PM EST  
documented in this encounter            [facility]  
   
                                        2024 Plan of care note Formatting   
of this note might be   
different from the original.  
  
Problem: Nausea/Vomiting (Adult)  
Goal: Identify Related Risk   
Factors and Signs and Symptoms  
Description: Related risk   
factors and signs and symptoms   
are identified upon initiation   
of Human Response Clinical   
Practice Guideline (CPG)  
Outcome: Progressing  
Goal: Symptom Relief  
Description: Patient will   
demonstrate the desired outcomes   
by discharge/transition of care.  
Outcome: Progressing  
Goal: Adequate Hydration  
Description: Patient will   
demonstrate the desired outcomes   
by discharge/transition of care.  
Outcome: Progressing  
Intervention: Minimize Nausea   
Triggers/Manage Symptoms  
Flowsheets (Taken 2024   
1625)  
Nausea/Vomiting Interventions:  
cool cloth applied  
sips clear liquids given  
slow deep breathing encouraged  
stimuli minimized  
Environmental Support:  
calm environment promoted  
personal routine supported  
rest periods encouraged  
environmental consistency   
promoted  
Intervention: Position to   
Prevent Aspiration  
Flowsheets (Taken 2024   
1625)  
Body Position:   
positioned/repositioned   
independently  
Head of Bed (HOB): HOB 30-59   
degrees  
Intervention: Promote/Maintain   
Hydration  
Flowsheets (Taken 2024   
1625)  
Fluid/Electrolyte Management:  
fluids provided  
intravenous fluids adjusted  
electrolyte supplement initiated  
  
Electronically signed by Janelle Short RN at 2024 4:26   
PM [facility]  
   
                                        2024 Nurse Note Formatting of this  
 note might be   
different from the original.  
Afternoon assessment completed.   
No changes noted. Stool specimen   
obtained. Patient resting with   
no complaints. Call light and   
phone within reach. Rn continue   
to monitor.  
  
Electronically signed by Janelle Short RN at 2024 4:24   
PM [facility]  
   
                                        2024 Nurse Note Formatting of this  
 note might be   
different from the original.  
No change noted from previous   
assessment by this RN unless   
otherwise detailed in   
coordinating flow sheets.   
Patient denies any needs at this   
time. Will continue to monitor.  
  
Electronically signed by Janelle Short RN at 2024   
12:52 PM [facility]  
   
                                                    2024 History of   
Present illness Narrative               Formatting of this note is   
different from the original.  
LSW met with patient to discuss   
discharge plans. Patient states   
that she currently lives in a   
mobile home with her fiance. She   
does not have advance   
directives. She denies mental   
health history. She states that   
she is independent with all ADLs   
and does not use any DME. She   
has no PCP and states that she   
does not want one. She denies   
all questions and concerns.  
  
Discharge Plan:  
Patient will return home.  
  
24 1120  
Referral Information  
Arrived From home or self-care  
Readmission Information  
Was patient readmitted within 30   
Days? No  
Information Source  
Information Source patient  
Outpatient Providers  
Outpatient Providers Updated In   
[facility] Yes  
Contact Information  
/SW Added to Care   
Team Yes  
This Writer is Primary Case   
Manager/SW Yes  
Social Work Contact Name   
Krissy Davison  
's Phone Number   
[phone]  
Living Environment  
Lives With significant other  
Living Arrangements mobile home  
Provides Primary Care For no one  
Primary Care Provided By self  
Support System Immediate family  
Able to Return to Prior   
Arrangements yes  
Functional Status  
Patient's Functional Status   
Prior To This Admission?   
Independent  
Are There Status Changes This   
Admission? No  
Changes Observed Since   
Admission? No Changes Observed  
Concerns With Patient Being Able   
To Care For Themselves At   
Discharge? No  
Employment/Financial  
Employed? No  
Source Of Income public   
assistance  
Financial Concerns none  
Insurance  
Medical Insurance Verified Yes  
Prescription Coverage Yes  
Pharmacy updated in [facility] Yes  
Initial Discharge Planning  
Home Care Services (PTA) No  
Home Therapies (PTA) Home SLP  
DME (PTA) None  
Medical Supplies (PTA) None  
Patient Goal for Discharge   
Return home with assistance from   
family and friends  
Anticipated discharge   
disposition Home  
Anticipated Changes Related to   
Illness none  
Transportation Available car  
Assessment/Concerns to be   
Addressed  
Concerns To Be Addressed no   
discharge needs   
identified;denies needs/concerns   
at this time  
  
  
Electronically signed by   
KEARA Davis at   
2024 11:23 AM EST  
documented in this encounter            [facility]  
   
                                        2024 Nurse Note Formatting of this  
 note might be   
different from the original.  
Responded to patient's call   
light. She verbalizes she wants   
to leave [place] Dr. Cooper notified.   
Pulled IV out. Patient's best   
friend at bedside to drive   
patient home.  
Electronically signed by   
Jennifer Tolliver RN at   
2024 7:41 PM EST  
                                        [place]  
   
                                        2024 Nurse Note Formatting of this  
 note might be   
different from the original.  
Resting in bed. Continues to be   
medicated for complaints of   
nausea and emesis which is   
yellow in color. Call light in   
reach.  
Electronically signed by   
Fadia Unger RN at   
2024 4:32 AM [facility]  
                                        [place]  
   
                                        2024 Nurse Note Formatting of this  
 note might be   
different from the original.  
Patient awake in bed. Has many   
requests for this nurse   
including a second shower, extra   
pillows, warm blankets, drinks,   
and pain medication, and   
medication for nausea as soon as   
she can have something. No   
emesis observed this shift.   
Trying to meet all of patient   
requests and basic needs.   
Assessment unchanged from   
previous one. No seizure   
activity observed. Call light in   
reach.  
Electronically signed by   
Fadia Unger RN at   
2024 1:15 AM [facility]  
   
                                                    2024 Emergency   
department Note                         Formatting of this note might be   
different from the original.  
Pt transported to MS/ICU to room   
2755. Patient belongings with   
pt. Pt left in stable condition   
with call light within reach and   
family at bedside. Report sheet   
tubed to MS and bedside report   
given to nurse.  
Electronically signed by Marla Biggs RN at 2024 8:29 PM   
[facility]  
   
                                                    2024 Emergency   
department Note                         Formatting of this note might be   
different from the original.  
Pt transported to MS/ICU to room   
2755. Patient belongings with   
pt. Pt left in stable condition   
with call light within reach and   
family at bedside. Report sheet   
tubed to MS and bedside report   
given to nurse.  
Electronically signed by Marla Biggs RN at 2024 8:29 PM   
EST  
Formatting of this note might be   
different from the original.  
Pt wishes to keep bottoms on at   
this time.  
Electronically signed by Pilar Clifford RN at 2024   
7:59 PM EST  
Formatting of this note might be   
different from the original.  
Pt requesting pain medication at   
this time. Provider aware. Pt   
ambulates to  with steady   
gait.  
Electronically signed by Pilar Clifford RN at 2024   
6:36 PM EST  
Formatting of this note is   
different from the original.  
DEPARTMENT OF EMERGENCY MEDICINE  
  
CHIEF COMPLAINT  
Vomiting, Abdominal Pain (Pt dx   
with colitis , unable to keep   
medications down with zofran and   
phenergan suppository/Neg flu   
covid and pregnancy test), and   
Seizure (X 4 days/Waiting to see   
neuro for seizures/[facility]   
in [place] last night)  
  
  
GLENDA Soares is a 23 y.o.   
female who presents with a   
complaint of nausea, vomiting,   
diarrhea, and abdominal pain.   
She has had symptoms for 4 days.   
She was seen twice yesterday at   
[facility]. She had laboratory   
testing and a CT scan of the   
abdomen and pelvis. The CT scan   
of the abdomen and pelvis showed   
possible early colitis. She was   
started on Augmentin. She was   
provided with Zofran and   
Phenergan. She states her throat   
is raw from vomiting. No fevers   
or chills. No cough. She also   
does complain of some   
intermittent seizures. This was   
worked up yesterday at   
[facility] as well. She has been   
referred to Neurology for   
follow-up.  
  
REVIEW OF SYSTEMS  
Review of Systems  
  
Constitutional: No fevers, no   
chills  
Eyes: No vision change, no   
drainage  
ENT: No ear pain, throat is raw   
from vomiting  
Cardiovascular: No chest pain,   
no edema  
Respiratory: No shortness of   
breath, no cough  
Gastrointestinal: Positive   
vomiting, Positive diarrhea  
Genitourinary: No dysuria, no   
frequency  
Musculoskeletal: No joint pain,   
no joint swelling  
Integumentary: No rash, no   
itching  
Neurologic: No headache, no   
confusion  
Psychiatric: No anxiety, no   
depression  
  
PAST MEDICAL HISTORY  
Past Medical History:  
Diagnosis Date  
Seizures  
  
  
SURGICAL HISTORY  
No past surgical history on   
file.  
  
CURRENT MEDICATIONS  
No current facility-administered   
medications for this encounter.  
  
No current outpatient   
medications on file.  
  
  
ALLERGIES  
Allergies  
Allergen Reactions  
Codeine  
Miralax [Polyethylene Glycol]  
  
  
FAMILY HISTORY  
History reviewed. No pertinent   
family history.  
  
SOCIAL HISTORY  
Social History  
  
Socioeconomic History  
Marital status: Not on file  
Spouse name: Not on file  
Number of children: Not on file  
Years of education: Not on file  
Highest education level: Not on   
file  
Occupational History  
Not on file  
Tobacco Use  
Smoking status: Never  
Smokeless tobacco: Never  
Vaping Use  
Vaping status: Never Used  
Substance and Sexual Activity  
Alcohol use: Never  
Drug use: Yes  
Types: Marijuana  
Sexual activity: Not on file  
Other Topics Concern  
Not on file  
Social History Narrative  
Not on file  
  
Social Determinants of Health  
  
Financial Resource Strain: Not   
on file  
Food Insecurity: Not on file  
Transportation Needs: Not on   
file  
Physical Activity: Not on file  
Stress: Not on file  
Social Connections: Not on file  
Intimate Partner Violence: Not   
on file  
Housing Stability: Not on file  
  
  
PHYSICAL EXAM  
/77   Pulse 80   Temp 99.4   
F (37.4 C) (Oral)   Resp 17   Ht   
1.499 m (4' 11 )   SpO2 100%     
Smoking Status Never  
Physical Exam  
  
The patient is well-developed,   
well-nourished, and in no acute   
distress.  
Head is atraumatic and   
normocephalic.  
Pupils are equal and reactive.  
Face is symmetric.  
Mucous membranes are moist.  
Neck is supple.  
Heart is regular rate and   
rhythm.  
Lungs are clear to auscultation.  
Abdomen is soft, nontender,   
nondistended.  
Skin is warm and dry.  
Extremities are warm and well   
perfused and without acute   
deformity.  
Neurologically, the patient is   
awake, alert, and without acute   
deficit.  
Psychiatrically, the patient is   
calm and cooperative.  
  
ED COURSE & MEDICAL DECISION   
MAKING  
  
Laboratory evaluation shows   
bicarb low at 14. She continues   
to complain of nausea despite   
receiving Reglan, Benadryl, and   
Zofran. Her drug screen is   
positive for cannabinoids. I did   
address possible cannabis   
hyperemesis syndrome with the   
patient but she denies any   
regular use of cannabis and   
states it is only very   
occasional use. Given her   
continued complaint of   
intractable nausea, along with   
her acidosis, I did contact Dr. Apodaca who has agreed to admit the   
patient  
  
Impression: 1. Intractable   
nausea and vomiting 2. Metabolic   
acidosis  
  
Disposition: Admission  
  
Christiano Knight MD  
248  
  
Electronically signed by Christiano Knight MD at 2024 6:26   
PM EST  
documented in this encounter            [facility]  
   
                                        2024 Nurse Note Formatting of this  
 note might be   
different from the original.  
Patient to room 2755 from ED and   
assisted into bed x 1 assist.   
Patient oriented to room and   
call light system. Admission   
assessment initiated. Family   
with patient and will assist her   
with shower per her request. Is   
asking for nausea medication and   
will call  to get order   
for something else as she has   
had all she can have at this   
time. SR up x 4 due to seizure   
precautions being maintained as   
patient has active seizure   
history, having one in ED as   
reported by staff.  
Electronically signed by   
Fadia Unger RN at   
2024 10:28 PM [facility]  
   
                                                    2024 Emergency   
department Note                         Formatting of this note might be   
different from the original.  
Pt wishes to keep bottoms on at   
this time.  
Electronically signed by Pilar Clifford RN at 2024   
7:59 PM [facility]  
   
                                                    2024 Emergency   
department Note                         Formatting of this note might be   
different from the original.  
Pt requesting pain medication at   
this time. Provider aware. Pt   
ambulates to  with steady   
gait.  
Electronically signed by Pilar Clifford RN at 2024   
6:36 PM [facility]  
   
                                                    2024 Physician   
Emergency department Note               Formatting of this note is   
different from the original.  
DEPARTMENT OF EMERGENCY MEDICINE  
  
CHIEF COMPLAINT  
Vomiting, Abdominal Pain (Pt dx   
with colitis , unable to keep   
medications down with zofran and   
phenergan suppository/Neg flu   
covid and pregnancy test), and   
Seizure (X 4 days/Waiting to see   
neuro for seizures/[facility]   
in [place] last night)  
  
  
GLENDA Soares is a 23 y.o.   
female who presents with a   
complaint of nausea, vomiting,   
diarrhea, and abdominal pain.   
She has had symptoms for 4 days.   
She was seen twice yesterday at   
[facility]. She had laboratory   
testing and a CT scan of the   
abdomen and pelvis. The CT scan   
of the abdomen and pelvis showed   
possible early colitis. She was   
started on Augmentin. She was   
provided with Zofran and   
Phenergan. She states her throat   
is raw from vomiting. No fevers   
or chills. No cough. She also   
does complain of some   
intermittent seizures. This was   
worked up yesterday at   
[facility] as well. She has been   
referred to Neurology for   
follow-up.  
  
REVIEW OF SYSTEMS  
Review of Systems  
  
Constitutional: No fevers, no   
chills  
Eyes: No vision change, no   
drainage  
ENT: No ear pain, throat is raw   
from vomiting  
Cardiovascular: No chest pain,   
no edema  
Respiratory: No shortness of   
breath, no cough  
Gastrointestinal: Positive   
vomiting, Positive diarrhea  
Genitourinary: No dysuria, no   
frequency  
Musculoskeletal: No joint pain,   
no joint swelling  
Integumentary: No rash, no   
itching  
Neurologic: No headache, no   
confusion  
Psychiatric: No anxiety, no   
depression  
  
PAST MEDICAL HISTORY  
Past Medical History:  
Diagnosis Date  
Seizures  
  
  
SURGICAL HISTORY  
No past surgical history on   
file.  
  
CURRENT MEDICATIONS  
No current facility-administered   
medications for this encounter.  
  
No current outpatient   
medications on file.  
  
  
ALLERGIES  
Allergies  
Allergen Reactions  
Codeine  
Miralax [Polyethylene Glycol]  
  
  
FAMILY HISTORY  
History reviewed. No pertinent   
family history.  
  
SOCIAL HISTORY  
Social History  
  
Socioeconomic History  
Marital status: Not on file  
Spouse name: Not on file  
Number of children: Not on file  
Years of education: Not on file  
Highest education level: Not on   
file  
Occupational History  
Not on file  
Tobacco Use  
Smoking status: Never  
Smokeless tobacco: Never  
Vaping Use  
Vaping status: Never Used  
Substance and Sexual Activity  
Alcohol use: Never  
Drug use: Yes  
Types: Marijuana  
Sexual activity: Not on file  
Other Topics Concern  
Not on file  
Social History Narrative  
Not on file  
  
Social Determinants of Health  
  
Financial Resource Strain: Not   
on file  
Food Insecurity: Not on file  
Transportation Needs: Not on   
file  
Physical Activity: Not on file  
Stress: Not on file  
Social Connections: Not on file  
Intimate Partner Violence: Not   
on file  
Housing Stability: Not on file  
  
  
PHYSICAL EXAM  
/77   Pulse 80   Temp 99.4   
F (37.4 C) (Oral)   Resp 17   Ht   
1.499 m (4' 11 )   SpO2 100%     
Smoking Status Never  
Physical Exam  
  
The patient is well-developed,   
well-nourished, and in no acute   
distress.  
Head is atraumatic and   
normocephalic.  
Pupils are equal and reactive.  
Face is symmetric.  
Mucous membranes are moist.  
Neck is supple.  
Heart is regular rate and   
rhythm.  
Lungs are clear to auscultation.  
Abdomen is soft, nontender,   
nondistended.  
Skin is warm and dry.  
Extremities are warm and well   
perfused and without acute   
deformity.  
Neurologically, the patient is   
awake, alert, and without acute   
deficit.  
Psychiatrically, the patient is   
calm and cooperative.  
  
ED COURSE & MEDICAL DECISION   
MAKING  
  
Laboratory evaluation shows   
bicarb low at 14. She continues   
to complain of nausea despite   
receiving Reglan, Benadryl, and   
Zofran. Her drug screen is   
positive for cannabinoids. I did   
address possible cannabis   
hyperemesis syndrome with the   
patient but she denies any   
regular use of cannabis and   
states it is only very   
occasional use. Given her   
continued complaint of   
intractable nausea, along with   
her acidosis, I did contact Dr. Apodaca who has agreed to admit the   
patient  
  
Impression: 1. Intractable   
nausea and vomiting 2. Metabolic   
acidosis  
  
Disposition: Admission  
  
Christiano Knight MD  
24  
  
Electronically signed by Christiano Knight MD at 2024 6:26   
PM [facility]  
Work Phone:   
[phone]  
   
                                        2022 Note     Send Summary:  
Discharge Summary Providers:  
Provider RoleProvider Name  
Mitra Santos Hafiz  
Nurse PractitionerAlyssa Beckwith  
PrimaryRequired, No Pcp  
  
  
Note Recipients: Required, No   
Pcp, MD  
  
  
Discharge:  
  
Summary:  
Admission Date: .10-Mar-2022   
10:33:00  
Discharge Date: 12-Mar-2022  
Attending Physician at   
Discharge: Mitra Camara  
Admission Reason: influenza A   
severe dehydration(1)  
Final Discharge Diagnoses:   
Cannabinoid hyperemesis   
syndrome, Intractable nausea  
and vomiting  
Procedures: none  
Condition at Discharge:   
Satisfactory  
Disposition at Discharge: .Home  
Vital Signs:  
  
T PRBPSpO2  
[id number]/8198%  
Date/Time3/12 7: 7:   
7: 7: 7:46  
Range(36.6C - 36.8C ) (80 - 94 )   
(16 - 17 ) (112 - 117 )/ (72 -   
81 ) (96%  
- 99% )  
  
Date: Weight/Scale Type:Height:  
10-Mar-2022 16:4347 kg /   
bne965.8 cm  
Physical Exam:  
Constitutional: Thin  
Eyes: PERRL, EOMI, clear sclera  
ENMT: mucous membranes moist, no   
apparent injury, no lesions seen  
Head/Neck: Neck supple, no   
apparent injury, No JVD, trachea   
midline,  
Respiratory/Thorax: Patent   
airways, CTAB, normal breath   
sounds with good chest  
expansion, thorax symmetric  
Cardiovascular: Regular, rate   
and rhythm, no murmurs, 2+ equal   
pulses of the  
extremities, normal S 1and S 2  
Gastrointestinal: Nondistended,   
soft, non-tender, no rebound   
tenderness or  
guarding, no masses palpable, no   
organomegaly, +BS, no bruits  
Musculoskeletal: ROM intact, no   
joint swelling, normal strength  
Extremities: normal extremities,   
no cyanosis edema, contusions or   
wounds, no  
clubbing  
Neurological: alert and oriented   
x3, intact senses, motor,   
response and  
reflexes, normal strength  
Psychological: Appropriate mood   
and behavior  
Skin: Warm and dry, no lesions,   
no rashes  
  
Hospital Course:  
HPI:  
GÉNESISSDDANNA CASTRO is a 21   
year old Female with pmh of   
hyperemesis  
syndrome secondary to cannanboid   
use, major depression disorder   
who had  
hospitalization in 2022   
for similar presentation as   
today. She has 3  
emergency room visits in the   
past few days with today being   
the 3rd visit. She  
was seen in another ED on the   
 and our ED on the  she   
had n/v/diarrhea  
and abdominal pain. She has been   
having diarrhea nausea and   
vomiting for 3  
days. On the  she developed   
abdominal pain that hurts with   
movement and on  
inspiration. CT abdomen pelvis   
negative and did not reveal   
acute pathology. She  
presented today not feeling any   
better. she was treated   
yesterday with  
phenergan and bentyl. She was   
diagnosed with influenza A and   
dc to home with  
tamiflu, bentyl and phenergan.   
On presentation today she   
continued to vomit was  
unable to keep down any liquids   
or her nausea medicine so she   
presented to  
[facility]   
emergency department for further   
evaluation. She has  
had decrease in urination. She   
denies any recent fevers. She is   
currently on  
her menses with a negative   
pregnancy test 2 days ago. She   
had low-grade  
temperature 99.3, blood pressure   
134/99 heart rate 82   
respirations 18 pulse  
oxygen saturation 98% on room   
air. Her laboratory values   
showed a bicarbonate  
of 14 and yesterday was 12,   
normal creatinine, potassium 3.5   
sodium 135 and  
glucose 93. Urinalysis with   
ketones 80 and a large amount of   
blood white cells  
2, 1+ mucus. Urine toxicology   
screen showed cannabinoid   
positive. She states  
that she last smoked marijuana a   
week ago. In the emergency   
department she was  
treated with 2 L of normal   
saline and is being admitted for   
further evaluation.  
  
Past medical history: Major   
depressive disorder, hyperemesis   
syndrome secondary  
to cannabinoid  
Past surgical history:   
Appendectomy, right index finger   
surgery, ORIF of right  
wrist, tonsillectomy and   
adenoidectomy  
Family history: Father-diabetes  
Social history: Patient is   
single, she is cannabinoid   
dependent, denies  
nicotine or alcohol use.  
  
Allergies: Codeine MiraLAX  
Medications: Reviewed and   
reconciled  
  
Hospital course: Patient was   
started on Tamiflu on 3/9 which   
is 4/5 day  
treatment. She will continue 1   
day on discharge. She was given   
IV fluids and  
electrolytes replaced   
accordingly. Nausea/vomiting and   
abdominal pain likely  
related to hyperemesis syndrome   
secondary to cannabinoid use.   
She was seen by  
critical care Dr. Messina for   
normal anion gap metabolic   
acidosis which resolved.  
She has very low osmolar state   
and recommends a high protein   
diet on discharge.  
She indicates today she feels   
better than she has and requests   
to go home. She  
was counseled on marijuana   
cessation at time of discharge.   
She has not had any  
nausea or vomiting and has been   
able to tolerate her diet.   
Denies any abdominal  
pain.  
  
  
Discharge Information:  
  
and Continuing Care:  
Lab Results - Pending:  
None  
Radiology Results - Pending:   
None  
Discharge Instructions:  
Nutrition/Diet:  
regular, high protein  
  
Labs:  
Lab Test(s): Basic Metabolic   
Panel  
Date To Be Drawn: 3/14  
Call (more content not   
included)...                            [facility]  
   
                                        03- Note     History of Present I  
llness:  
Pregnant/Lactating:  
Are You Pregnantno (1)  
Are You Currently   
Breastfeedingno (1)  
  
Admission Reason: influenza A   
severe dehydration  
HPI:  
DANNA SOARES is a 21   
year old Female with pmh of   
hyperemesis  
syndrome secondary to cannanboid   
use, major depression disorder   
who had  
hospitalization in 2022   
for similar presentation as   
today. She has 3  
emergency room visits in the   
past few days with today being   
the 3rd visit. She  
was seen in another ED on the   
 and our ED on the  she   
had n/v/diarrhea  
and abdominal pain. She has been   
having diarrhea nausea and   
vomiting for 3  
days. On the  she developed   
abdominal pain that hurts with   
movement and on  
inspiration. CT abdomen pelvis   
negative and did not reveal   
acute pathology. She  
presented today not feeling any   
better. she was treated   
yesterday with  
phenergan and bentyl. She was   
diagnosed with influenza A and   
dc to home with  
tamiflu, bentyl and phenergan.   
On presentation today she   
continued to vomit was  
unable to keep down any liquids   
or her nausea medicine so she   
presented to  
[facility]   
emergency department for further   
evaluation. She has  
had decrease in urination. She   
denies any recent fevers. She is   
currently on  
her menses with a negative   
pregnancy test 2 days ago. She   
had low-grade  
temperature 99.3, blood pressure   
134/99 heart rate 82   
respirations 18 pulse  
oxygen saturation 98% on room   
air. Her laboratory values   
showed a bicarbonate  
of 14 and yesterday was 12,   
normal creatinine, potassium 3.5   
sodium 135 and  
glucose 93. Urinalysis with   
ketones 80 and a large amount of   
blood white cells  
2, 1+ mucus. Urine toxicology   
screen showed cannabinoid   
positive. She states  
that she last smoked marijuana a   
week ago. In the emergency   
department she was  
treated with 2 L of normal   
saline and is being admitted for   
further evaluation.  
  
Past medical history: Major   
depressive disorder, hyperemesis   
syndrome secondary  
to cannabinoid  
Past surgical history:   
Appendectomy, right index finger   
surgery, ORIF of right  
wrist, tonsillectomy and   
adenoidectomy  
Family history: Father-diabetes  
Social history: Patient is   
single, she is cannabinoid   
dependent, denies  
nicotine or alcohol use.  
  
Allergies: Codeine MiraLAX  
Medications: Reviewed and   
reconciled  
  
Review of systems: 12 point   
review systems reviewed with   
patient with pertinent  
positives listed in HPI   
otherwise review systems   
negative  
  
Social History:  
Social History:  
Smoking Statusnever smoker (2)  
Alcohol Usedenies(2)  
Drug Useoccasionally (3)  
  
  
  
Allergies:  
MiraLax: Hives/Urticaria  
codeine: Unknown  
  
Medications Prior to Admission:  
Admission Medication   
Reconciliation has not been   
completed for this patient.  
  
Objective:  
  
Objective Information:  
T PRBPSpO2  
[id number]/8698%  
Date/Time3/10 10:413/10   
16:003/10 16:003/10 16:003/10   
16:00  
Range(37.3C - 37.3C ) (72 - 100   
) (16 - 18 ) (112 - 136 )/ (68 -   
103 )  
(97% - 98% )  
Highest temp of 37.3 C was   
recorded at 3/10 10:41  
  
  
Pain reported at 3/10 14:00:   
unable to assess; sleeping  
  
  
  
Weights  
3/10 10:41: Weight in lbs   
((lbs)) 100.3  
3/10 10:41: Weight in kg (Weight   
(kg)) 45.5  
3/10 10:41: BMI (kg/m2) (BMI   
(kg/m2)) 20.276  
  
Physical Exam by System:  
  
Constitutional: Ill-appearing,   
pale  
Eyes: PERRL, EOMI, clear sclera  
ENMT: mucous membranes moist, no   
apparent injury, no lesions seen  
Head/Neck: Neck supple, no   
apparent injury,No JVD, trachea   
midline,  
Respiratory/Thorax: Patent   
airways, CTAB, normal breath   
sounds with good chest  
expansion, thorax symmetric  
Cardiovascular: Regular, rate   
and rhythm, no murmurs, 2+ equal   
pulses of the  
extremities, normal S 1and S 2  
Gastrointestinal: Nondistended,   
soft, non-tender, no rebound   
tenderness or  
guarding, no masses palpable, no   
organomegaly, +BS, no bruits  
Musculoskeletal: ROM intact, no   
joint swelling, normal strength  
Extremities: normal extremities,   
no cyanosis edema, contusions or   
wounds, no  
clubbing  
Neurological: alert and oriented   
x3, intact senses, motor,   
response and  
reflexes, normal strength  
Psychological: Appropriate mood   
and behavior  
Skin: Warm and dry, no lesions,   
no rashes  
  
Medications:  
  
Medications:  
  
Continuous Medications  
--------------------------------  
  
1. Lactated Ringers Infusion:   
1000 mL IntraVenous  
  
Scheduled Medications  
--------------------------------  
  
1. LORazepam Injectable: 1 mg   
IntraVenous Push Every 6 Hours  
2. Oseltamivir: 75 mg Oral Every   
12 Hours  
3. Pantoprazole Injectable: 40   
mg IntraVenous Push Every 12   
Hours  
  
PRN Medications  
--------------------------------  
  
1. Acetaminophen: 650 mg Oral   
Every 4 Hours  
2. Promethazine IV Piggy Back:   
12.5 mg IntraVenous Piggyback   
Every 6 Hours  
  
  
  
Recent Lab Results:  
  
Results:  
  
  
  
I have reviewed these laboratory   
results:  
  
Coronavirus 2019, Screen   
Asymptomatic 10-Mar-2022   
15:28:40  
  
ResultValue  
Fluid Source Nasal,   
Nasopharyngeal (more content not   
included)...                            [facility]  
   
                                        2022 Note     Send Summary:  
Discharge Summary Providers:  
Provider RoleProvider Name  
ReferringLeIvan Almonte Alexander M ConsultingThomae, Dale R  
PrimaryRequired, No Pcp  
  
  
Note Recipients: Ivan Coley MD  
  
  
Discharge:  
  
Summary:  
Admission Date: .2022   
22:59:00  
Discharge Date: 2022  
Attending Physician at   
Discharge: Nicolas Connolly  
Admission Reason: nausea and   
vomiting(1)  
Final Discharge Diagnoses:   
Intractable nausea and vomiting  
Procedures: none  
Condition at Discharge:   
Satisfactory  
Disposition at Discharge: .Home  
Vital Signs:  
  
T PRBPSpO2  
[id number]/9299%  
Date/Time 8: 8:   
8: 8: 8:20  
Range(36.2C - 37.1C ) (89 - 100   
) (16 - 18 ) (118 - 124 )/ (79 -   
98 )  
(97% - 100% )  
Highest temp of 37.1 C was   
recorded at  4:08  
  
Date: Weight/Scale Type:Height:  
2022 05:1651.3 kg /   
gdp824.8 cm  
Physical Exam:  
Constitutional: No acute   
distress  
HEENT: Moist oral mucosa  
Respiratory/thorax: Lung sounds   
clear throughout the lung fields   
even chest  
expansion  
CV: S1-S2 regular rate and   
rhythm  
GI: Soft nontender bowel sounds   
present x4  
Extremity: No peripheral edema  
Neuro: Alert and oriented x3 no   
focal deficit  
Hospital Course:  
21-year-old female who was   
diagnosed at [facility] with   
infectious colitis was  
prescribed ciprofloxacin and   
Flagyl had a 7-day course of   
intractable nausea  
vomiting, who Has cannabinoid   
use was admitted to the hospital   
secondary to  
severe dehydration, hypokalemia   
and hypomagnesemia. Patient was   
trialed on  
multiple different antiemetics   
with no relief in her nausea   
vomiting. After  
she was appropriately fluid   
resuscitated nausea vomiting did   
improve, we tried  
a diet and she immediately threw   
up. She was started on Reglan   
and still threw  
up after the Reglan. She was   
complaining of left upper   
quadrant abdominal pain  
and secondary to the nausea   
vomiting I started her on   
Carafate for concern for  
gastritis. She had 2 CT abdomen   
pelvis is this admission 1 in   
the emergency  
room and one recommended by Dr. Gallagher which did not reveal acute   
pathology.  
She had a right upper quadrant   
ultrasound which was   
unremarkable. She was  
started on IV Protonix twice a   
day as she had epigastric pain,   
acid reflux and  
pain with swallowing and concern   
for persistent nausea vomiting   
that she  
possibly also had esophagitis.   
Patient did not undergo   
endoscopic as we  
clinically were treating her   
symptoms. She improved with   
adding Carafate and  
was able to eat with no   
vomiting. She was instructed to   
stop cannabinoid and  
she is in agreement with the   
plan. She has been instructed to   
follow with her  
primary care physician. She had   
2 CT abdomen pelvis's which did   
not show  
infectious colitis so I   
discontinued antibiotics at time   
of discharge. She  
remained afebrile and   
hemodynamically stable. She has   
been prescribed a  
gastric emptying study that will   
be performed on Monday as   
outpatient. Her  
electrolytes have been   
corrected, her potassium is 3.4   
magnesium 2.00. She  
will have an additional dose of   
potassium prior to discharge of   
the hospital.  
Should her potassium will need   
to be followed as outpatient and   
she has been  
instructed to eat potassium rich   
foods and she loves spinach   
which will also  
help her magnesium. She is being   
discharged to home in stable   
condition.  
Discharge time greater than 30   
minutes.  
  
  
Discharge Information:  
  
and Continuing Care:  
Lab Results - Pending:  
None  
Radiology Results - Pending:   
None  
Discharge Instructions:  
Activity:  
activity as tolerated.  
Balance activity with rest,   
gradually increase your activity   
as  
tolerated  
Exercise as prescribed by your   
physician  
  
Nutrition/Diet:  
low cholesterol, low fat  
  
Follow Up Appointments:  
Follow-Up Appointment 01:  
Physician/Dept/Service: PCP  
Call to Schedule in: 1 week  
  
Follow-Up Appointment 02:  
Physician/Dept/Service: Dr Mathis  
Call to Schedule in: 2 weeks  
  
Discharge Medications: Home   
Medication  
Protonix 40 mg oral delayed   
release tablet - 1 tab(s) orally   
2 times a day  
sucralfate 1 g oral tablet - 1   
tab(s) orally 4 times a day   
-.Meds to Beds - 4  
Times a Day Before Meals  
  
PRN Medication  
Zofran 8 mg oral tablet - 1   
tab(s) orally 3 times, As Needed   
-for nausea and  
vomiting  
  
DNR Status:  
Code StatusCode Status order at   
time of discharge: Full Code  
  
  
Electronic Signatures:  
Becky Hyman (APRN-CNS) (Signed   
2022 11:06)  
Authored: Send Summary, Summary   
Content, Ongoing Care, DNR   
Status, Note  
Completion  
  
  
Last Updated: 2022 11:06   
by Becky Hyman (APRN-CNS)  
  
References:  
1. Data Referenced From  Daily   
Progress Note-General Internal   
Medicine   
2022 13:35                       [facility]  
   
                                        2022 Note     History of Present I  
llness:  
Pregnant/Lactating:  
Are You Pregnantno (1)  
Are You Currently   
Breastfeedingno (1)  
  
Admission Reason: Nausea,   
vomiting and abdominal pain  
HPI:  
DANNA SOARES is a 21   
year old Female  
  
This is a 21-year-old white   
female who was recently   
diagnosed 8 days ago with  
colitis. She was at [facility] on [address] and a CT scan was done   
showing  
colitis. She was transferred to   
[facility]. She states she was   
admitted  
there for 2 days on antibiotics   
and discharged home on   
antibiotics. Since  
being discharged she has been   
having abdominal pain especially   
left side along  
with intractable nausea and   
vomiting. She says she cannot   
keep her antibiotics  
down and she was sent home with   
and she reports feeling hot and   
sweaty along  
with chills. She had diarrhea   
once a day for the last 2 days.   
Because of the  
pain and nausea and vomiting,   
she came into the ER to be   
evaluated. No sick  
contacts at home or in general   
and no change in diets.  
  
In the ER her blood pressure is   
125/95 with a pulse of 88 and a   
respiratory  
rate of 18. She is satting 98%   
on room air she has a   
temperature of 37.7. Her  
white count is normal at 8.1   
with no shift and her H&H are   
normal at 13.4 and  
40.2. Platelets of 272,000. She   
is COVID-19 negative. Her CMP is   
remarkable  
only for a potassium of 3.1.   
Urine pregnancy test is   
negative. And urinalysis  
shows 2+ protein with 3+ blood   
and 2+ ketones and greater than   
182 RBCs and 23  
WBCs. CT of the head without   
contrast is nonacute.  
  
In the ER, she was given oral   
Bentyl along with 4 mg of IV   
Zofran and started  
on IV fluids. She was given 40   
mg of p.o. potassium. Then she   
was given 50 mg  
of IV Toradol and then 12.5 mg   
IV piggyback Phenergan. She was   
then given 20  
mg of IM dicyclomine and then   
started on 10 mg of IV Reglan   
followed by 2 doses  
of 2 mg IV push Haldol. She is   
admitted to the medical service   
with working  
diagnosis of intractable nausea   
and vomiting.  
  
  
PAST MEDICAL HISTORY  
see above  
  
PAST SURGICAL HISTORY  
1. Appendectomy 8 6  
2. Surgery for her right index   
finger  
3. ORIF right wrist  
4. Tonsillectomy and   
adenoidectomy  
  
FAMILY HISTORY  
Mother is alive and well  
Father is alive with diabetes  
8 siblings alive and well  
1 daughter alive and well  
  
SOCIAL HISTORY  
Patient is single  
No tobacco or alcohol  
She denies any drug abuse   
history although she said she   
did do marijuana on [place]  
's  
  
Social History:  
Social History:  
Smoking Statusnever smoker (1)  
Alcohol Usedenies(1)  
Drug Usedenies (1)  
  
  
  
Allergies:  
MiraLax: Hives/Urticaria  
codeine: Unknown  
  
Medications Prior to Admission:  
  
promethazine 25 mg oral tablet:   
1 tab(s) orally 3 times a day,   
As Needed -for  
nausea and vomiting  
Phenadoz 25 mg rectal   
suppository: 1 suppository(ies)   
rectally 3 times a day,  
As Needed -for nausea and   
vomiting.  
  
Review of Systems:  
Constitutional: POSITIVE: Fever,   
Chills  
  
Eyes: NEGATIVE: Blurry Vision,   
Drainage, Diploplia, Redness,   
Vision Loss/  
Change  
  
ENMT: NEGATIVE: Nasal Discharge,   
Nasal Congestion, Ear Pain,   
Mouth Pain, Throat  
Pain  
  
Respiratory: NEGATIVE: Dry   
Cough, Productive Cough,   
Hemoptysis, Wheezing,  
Shortness of Breath  
  
Cardiac: NEGATIVE: Chest Pain,   
Dyspnea on Exertion, Orthopnea,   
Palpitations,  
Syncope  
  
Gastrointestinal: POSITIVE:   
Nausea, Vomiting, Diarrhea,   
Abdominal Pain;  
NEGATIVE: Constipation  
  
Genitourinary: NEGATIVE:   
Discharge, Dysuria, Flank Pain,   
Frequency, Hematuria  
  
Musculoskeletal: NEGATIVE:   
Decreased ROM, Pain, Swelling,   
Stiffness, Weakness  
  
Neurological: NEGATIVE:   
Dizziness, Confusion, Headache,   
Syncope  
  
Psychiatric: NEGATIVE: Mood   
Changes, Anxiety  
  
  
Objective:  
  
Objective Information:  
T PRBPSpO2  
[id number]/7697%  
Date/Time 7: 7:   
7: 7: 7:30  
Range(37.7C - 37.8C ) (84 - 94 )   
(16 - 23 ) (111 - 134 )/ (76 -   
95 ) (96%  
- 100% )  
Highest temp of 37.8 C was   
recorded at  7:30  
  
  
Pain reported at  4:00: 4 =   
Moderate  
  
Physical Exam by System:  
  
Constitutional: Awake and alert;   
oriented x3 with no apparent   
distress or  
respiratory distress  
Eyes: PERRL, EOMI, clear sclera  
ENMT: mucous membranes moist,   
oropharynx clear  
Head/Neck: Normocephalic; neck   
supple, no apparent injury,   
thyroid without mass  
or tenderness, No JVD, trachea   
midline, no bruits  
Respiratory/Thorax: Clear to   
auscultation bilaterally no   
wheezes or rhonchi  
noted  
Cardiovascular: Regular rate and   
rhythm; normal S1-S2 with no   
murmur; no  
pitting edema and 2+ pulses   
bilaterally  
Gastrointestinal: Soft,   
nondistended, positive bowel   
sounds; there is some mild  
tenderness with minimal   
palpation left lower quadrant   
with some guarding but no  
rebound  
Neurological: Nonfocal, intact   
senses, motor, response and   
reflexes, normal  
strength; cranial nerves II   
through XII appear intact  
Psychological: Pleasant affect  
  
Recent Lab Results:  
  
Results:  
  
  
I have reviewed these laboratory   
results (more content not   
included)...                            [facility]  
   
                                        Evaluation note     Psychological: Appro  
priate mood   
and behaviorExtremities: normal   
extremities, no cyanosis edema,   
contusions or wounds, no   
clubbingNeurological: alert and   
oriented x3, intact senses,   
motor, response and reflexes,   
normal strengthSkin: Warm and   
dry, no lesions, no   
rashesRespiratory/Thorax: Patent   
airways, CTAB, normal breath   
sounds with good chest   
expansion, thorax symmetricENMT:   
mucous membranes moist, no   
apparent injury, no lesions   
seenCardiovascular: Regular,   
rate and rhythm, no murmurs, 2+   
equal pulses of the extremities,   
normal S 1and S   
2Gastrointestinal: Nondistended,   
soft, non-tender, no rebound   
tenderness or guarding, no   
masses palpable, no   
organomegaly, +BS, no   
bruitsMusculoskeletal: ROM   
intact, no joint swelling,   
normal strengthEyes: PERRL,   
EOMI, clear   
scleraConstitutional: Well   
developed, awake/alert/oriented   
x3, no distress, alert and   
cooperative                             [facility]  
   
                                        Evaluation note     Skin: Warm and dryEy  
es:   
Extraocular muscles intactENMT:   
Moist mucous   
membranesPsychological:   
CooperativeNeurological: Awake,   
alert, orientedCardiovascular:   
Regular rate and   
rhythmExtremities: No peripheral   
edemaHead/Neck: Normocephalic,   
atraumaticRespiratory/Thorax:   
Bilateral equal breath   
soundsGastrointestinal: Soft,   
nontender, nondistended,   
positive bowel   
soundsMusculoskeletal: Moving   
all extremitiesConstitutional:   
Awake, alert, oriented, no acute   
distress                                [facility]  
  
  
  
                                                    Evaluation note   
  
  
  
                                                    Diagnosis  
   
                                                      
  
  
Intractable nausea and vomiting- Primary  
  
  
Persistent vomiting  
   
                                                      
  
  
Colitis  
  
  
Other and unspecified noninfectious gastroenteritis and colitis  
   
                                                      
  
  
Diarrhea of presumed infectious origin  
   
                                                      
  
  
Smoker  
  
  
Tobacco use disorder  
   
                                                      
  
  
Metabolic acidosis  
  
  
Acidosis  
  
documented in this encounter  
[facility]   
  
                                                    Diagnosis  
   
                                                      
  
  
Nausea and vomiting, unspecified vomiting type- Primary  
  
documented in this encounter  
[facility]   
  
                                                    Diagnosis  
   
                                                      
  
  
Nausea and vomiting, unspecified vomiting type- Primary  
  
documented in this encounter  
[facility]  
Work Phone: [phone] note*   
  
                                                    Diagnosis  
   
                                                      
  
  
Dehydration- Primary  
   
                                                      
  
  
Hyperemesis gravidarum  
  
  
Mild hyperemesis gravidarum, unspecified as to episode of care  
   
                                                      
  
  
Acute cystitis without hematuria  
  
  
Acute cystitis  
  
documented in this encounter  
[facility] Activity:activity as 
  tolerated.  
* Call Provider If:Any new concerning symptoms.  
* Patient Instructions:- CALL 911 IF YOU HAVE ANY OF THE SIGNS AND SYMPTOMS OF 
  HEART FAILURE: 1. Chest pain 2. Significant Shortness of breath 3. Fainting. -
  Notify your physician immediately if you have shortness of breath; weight gain
  of 3 lbs. or more; fatigue and loss of energy; swelling of lowerextremities or
  abdomen; dizziness or fainting; change of appetite; and frequent coughing. - 
  Patientreceived Living With Heart Failure book. - Daily weight on the same 
  scale, same time after voiding and before eating. - Maintain daily weight log.  
* Activity (Heart Failure):- Balance activity with rest, gradually increase your
  activity as tolerated. - Exercise as prescribed by your physician.  
* Patient Instructions:- CALL 911 OR GO DIRECTLY TO THE EMERGENCY ROOM IF YOU 
  HAVE ANY OF THE SIGNS AND SYMPTOMS OF STROKE: 1. Sudden weakness or numbness 
  of the face, arm or leg, especially on one side of the body. 2. Sudden 
  difficulty speaking or understanding. 3. Sudden trouble seeing in one or both 
  eyes. 4. Sudden trouble walking, dizziness, loss of balance or coordination. 
  5. Sudden severe headache with no known cause. 6. Loss of consciousness or 
  decreased consciousness, fainting, or seizures. - Know the Risk Factors for 
  Stroke: high blood pressure, high cholesterol, diabetes, smoking, physical 
  inactivity, overweight, previous stroke or TIA, heart disease, atrial 
  fibrillation. - Always carry a medication list with you and take it to ALL 
  Healthcare Provider visits. - You may be contacted by a Hospital 
  Representative after discharge to evaluate your progress at home and to 
  discuss yourexperience at [facility].  
* Hospital Specific Instructions - [facility]:- For questions/problems/concerns call 
  the Discharge Physician at [phone] and have them paged.  
* Follow Up Appointment 1:Physician/Dept/Service: PCPCall to Schedule in: 1 week  
* Follow Up Appointment 2:Physician/Dept/Service: Dr Valverde for Referral: 
  Hospital Follow-upLocation: [address] [address]Phone Number: 
  [phone]  
* Gold Form - Other Clinicians:Other Clinician Instructions: Please provide list
  of potassium rich inmagnesium rich foods. Spinach and broccoli is an excellent
  source. Please eat regularly to maintainnatural source of potassium and 
  magnesium.  
[facility]* Labs 1 (Modify 
  Template):Lab Test(s): Basic Metabolic PanelDate To Be Drawn: 3/14Call Results
  To: PCPFax Results To: PCPLab Instructions: Walk-in lab, no appointment 
  required.Comments: monitor potassium  
* Additional Orders:Additional Instructions: Follow up with PCP within 1 week of
  dischargeContinue Tamiflu for one more day (tomorrow) on dischargeContinue 
  home medicationsNo new medications at time ofdischargeHigh protein regular 
  diet  
* Call Provider If:Breathing faster than normal. Breathing harder than normal or
  having retractions. Fever of 100.4 F (38 C) or higher. Temperature is greater 
  than 102 degrees. Chills. Drinking less than normal. Not being able to go 4-6 
  hours between albuterol treatments. Urinating less than normal, over 1 day. 
  Urinating less than 4 times per day. Acting very sleepy and difficult to 
  awaken. Vomiting (throwing up) and not able to eat or drink for 12 hours. 3 or
  more loose, watery bowel movements in 24 hours (diarrhea). Any new concerning 
  symptoms.  
* Follow Up Appointment 1:Physician/Dept/Service: PCPRepalmira for Referral: follow
  up post dischargeCall to Schedule in: 1 weekComments: You were given a list of
  primary care physicians please call one and get established  
[facility]* Attachments  
  
The following attachments cannot be sent through Care Everywhere.  
  
* Dehydration (English)  
* Hyperemesis Gravidarum (HG) ([facility]) (English)  
* Urinary Tract Infections (UTIs) in Women ([facility]) (English)  
  
documented in this encounterOhio[facility] CenterReason for referral (narrative)* 
  Unlisted Procedure Code (Routine) - New Request  
  
                          Specialty    Diagnoses / Procedures Referred By Contac  
t Referred To Contact  
   
                                                              
  
  
Procedures  
  
  
INPATIENT ADMISSION   
NOTIFICATION                              
  
  
Juancho Apodaca MD  
  
  
[address] [place] [address]  
  
  
Phone: [phone]  
  
  
Fax: [phone]                         
  
  
  
  
  
                    Referral ID Status    Reason    Start Date Expiration Date V  
isits   
Requested                               Visits   
Authorized  
   
                [id number] New Request         2024 3/7/2025 1       1  
  
  
  
  
Electronically signed by Juancho Apodaca MD at 2024 6:29 PM [facility]  
  
Summary Purpose  
  
  
                                                      
  
  
  
Family History  
No Family History Records FoundNo Family History Records FoundNo Family History 
Records FoundNo Family History Records FoundNo Family History Records FoundNo 
Family History Records FoundNo Family History Records FoundNo Family History 
Records FoundNo Family History Records FoundNo Family History Records FoundNo 
Family History Records Found  
  
Advance Directives  
No Advanced Directives Records FoundDocuments on File  
  
                          Type         Date Recorded Patient Representative Expl  
anation  
   
                                                    Advance Directives and Livin  
g   
Will                2020 6:29 PM                         
  
                                Documents on File  
  
                          Type         Date Recorded Patient Representative Expl  
anation  
   
                                                    Advance Directives and Livin  
g   
Will                2020 6:29 PM                         
   
                                                    Advance Directives and Livin  
g   
Will                2021 8:35 AM                          
  
                           Latest Code Status on File  
  
                          Code Status  Date Activated Date Inactivated Comments  
   
                          Full Code    2024 7:39 PM                
  
  
  
                                Date Activated  Date Inactivated Comments  
   
                                2024 7:39 PM                   
  
  
  
Discharge Instructions  
* Instructions*   
  
Leo Turner MD - 2021  
  
  
  
If unable to follow-up with the physician/clinic recommended above, please see 
an Urgent Care Clinic for re-evaluation within the same number of days.  
Return to the nearest emergency department at any time if there is:  
any new, returning or worsening symptoms  
new or changing rash  
fever > 100.4 (or feeling like there is a high fever if you don't have a 
thermometer)  
uncontrollable shaking chills  
difficulty following up as recommended  
or any other concerns about your condition or treatment.  
  
best regards,  
Leo Turner MD  
  
  
  
  
  
* Attachments  
  
The following attachments cannot be sent through Care Everywhere.  
  
* Postpartum Breast Care: Non-Breastfeeding (English)  
* Rash (English)  
  
documented in this encounter  
  
Assessments  
  
  
                                                    Diagnosis  
   
                                                      
  
  
Postpartum breast pain- Primary  
  
  
Other and unspecified disorder of breast associated with childbirth, postpartum   
condition or complication  
   
                                                      
  
  
Postpartum bloody nipple discharge  
   
                                                      
  
  
Rash  
  
  
Rash and other nonspecific skin eruption  
  
  
  
Additional Source Comments  
  
  
  
                                                    INFORMATION SOURCE (unrecogn  
ized section and content)  
   
                                          
  
  
  
                                        DATE CREATED        AUTHOR  
   
                                2018                      [facility]  
  
  
  
                                DATE CREATED    AUTHOR          AUTHOR'S ORGANIZ  
ATION  
   
                                2019                      [facility]  
  
  
  
                                DATE CREATED    AUTHOR          AUTHOR'S ORGANIZ  
ATION  
   
                                2020                       [employer]  
  
  
  
                                DATE CREATED    AUTHOR          AUTHOR'S ORGANIZ  
ATION  
   
                                2022                      [facility]  
  
  
  
                                DATE CREATED    AUTHOR          AUTHOR'S ORGANIZ  
ATION  
   
                                02/15/2024                      [facility]  
  
  
  
                                DATE CREATED    AUTHOR          AUTHOR'S ORGANIZ  
ATION  
   
                                2024                      [facility]   
[facility]  
  
  
  
                                DATE CREATED    AUTHOR          AUTHOR'S ORGANIZ  
ATION  
   
                                2024                      [facility]  
  
  
  
                                DATE CREATED    AUTHOR          AUTHOR'S ORGANIZ  
ATION  
   
                                2024                      [facility]  
  
  
  
                                DATE CREATED    AUTHOR          AUTHOR'S ORGANIZ  
ATION  
   
                                2024                      [facility]  
al  
  
  
  
                                DATE CREATED    AUTHOR          AUTHOR'S ORGANIZ  
ATION  
   
                                2024                      [facility]  
[facility]ter  
  
  
  
                                DATE CREATED    AUTHOR          AUTHOR'S ORGANIZ  
ATION  
   
                                2024                      [facility]  
  
  
  
  
  
                                                    Reason for Visit (unrecogniz  
ed section and content)  
   
                                          
  
  
  
                                        Reason              Comments  
   
                                        Emesis                
   
                                        Nausea                
  
  
  
                                        Reason              Comments  
   
                                        Breast Pain           
  
  
  
                                        Reason              Comments  
   
                                        Vomiting              
   
                                        Abdominal Pain      Pt dx with colitis ,  
 unable to keep medications down with zofran   
and   
phenergan suppositoryNeg flu covid and pregnancy test  
   
                                        Seizure             [facility] [place] last   
night  
  
  
  
                                        Reason              Comments  
   
                                        Seizures            Family reports  julio  
hs  of seizures without medications. Drove 1 hour   
to get   
here today with two seizures in the car. Endorses nausea and vomiting.  
  
  
  
                                        Reason              Comments  
   
                                        Abdominal Pain      Pt comes in for gene  
ralized abdominal pain x today. Pt was seen   
recently for a similar event. Pt states she was doing well for about   
1wk and then ate [employer] last pm, then woke up this am with vomiting.   
Pt took some reglan, but it increased the pain.  
  
  
  
                                        Reason              Comments  
   
                                        Abdominal Pain      Found patient in ED   
waiting room on her knees with her face in   
her   
boyfriends lap. He sates   she did this because she did not know how   
long the wait was and she was trying to handle the pain . I had   
patient sit back in wheel chair an she was taken to room. Per family   
patient had a seizure before her abdominal cramping started. The   
seizure lasted 4 minutes and patient was   just stiff  per family.   
Patient present to us with abdominal cramping that radiates into back   
with clear discharge that went throug  
  
  
  
    Jordan, Ashlee, RN - 2020 6:44 PM Ashlee Diaz RN -   
2020 6:34 PM Ashlee Diaz RN - 2020 5:16 PM Tish Kaye RN - 2021 8:47 AM EDT  
  
                                                    ED Notes (unrecognized secti  
on and content)  
   
                                                      
  
  
THIS RN SEES PT WALKING QUICKLY DOWN HALLWAY TO EMS EXIT. PT STOPPED BY THIS RN 
D/T   
PT STILL HAVING IV IN. WHEN ASKED WHERE SHE WAS GOING PT STATES  MY MOM CALLED 
AND WE   
HAVE TO TAKE MY SISTER TO THE CRISIS UNIT.  THIS RN STATES THAT SHE CANNOT LEAVE
WITH   
AN IV IN. PT AGREES TO COME BACK AND REMOVE IV. WHEN THIS RN WAS GETTING 
SUPPLIES TO   
TAKE IV OUT, PT RIPS IV OUT OF LEFT AC AND STARTS TO BLEED DOWN ARM AND ON 
FLOOR.   
THIS RN SECURES 2X2 WITH TAPE ON IV SITE. BLEEDING CONTROLLED. PT CONTINUES TO 
WALK   
OUT AFTER IV IS REMOVED. DR MESSINA AWARE. PT GCS 15 AND GAIT STEADY WITH AMBULATE
OUT   
OF ER.  
  
  
Electronically signed by Ashlee Ortega RN at 2020 6:48 PM EDT  
  
  
PT AMBULATES TO RR AND BACK WITHOUT DIFFICULTY. UPDATED ON POC. NO CONCERNS 
VOICED.   
RESPS EVEN AND UNLABORED  
  
  
Electronically signed by Ashlee Ortega RN at 2020 6:34 PM EDT  
  
  
PT PRESENTS TO THE ED FOR N/V X4 DAYS. STATES SHE IS 6 WEEKS PREGNANT  
  
Special isolation precautions are in place with signage outside this patient's 
room.   
This RN performs hand hygiene and enters the patient room wearing:  
? gloves  
? an appropriately fitting (N-95, PAPR, Aura) mask  
? face shield  
? protective gown  
to provide nursing care. See nursing documentation for the care provided.  
  
  
Electronically signed by Ashlee Ortega RN at 2020 6:20 PM 
EDTdocumented in   
this encounter  
  
  
Patient DC home with follow up to OB, as well as given referral for PCP here. 
NAD   
noted. Verbalized understanding of prescription. Ambulated independently out of 
ED.  
  
  
Electronically signed by Tish Angel RN at 2021 8:48 AM EDT  
  
  
Patient comes in for complaints of bilateral breast and nipple pain, with bloody
  
discharge that started today. Patient is appx 1 month post partum, vaginal 
delivery,   
not breastfeeding. States pain now is 7/10, when waking up it was 10/10 but did 
not   
take any medications at home. Patient also states having an increase in a  rash 
skin   
condition that she has had for 5 years is now all of a sudden rash has moved to 
her   
stomach. Patient takes no prescription medications at this time. Denies and   
n/v/f/d/cp/sob.  
  
  
Electronically signed by Tish Angel RN at 2021 8:30 AM EDT  
  
  
Formatting of this note might be different from the original.  
ED PROVIDER NOTE  
[facility] EMERGENCY DEPARTMENT  
  
NAME: Danna Soares AGE: 20 y.o. : 2000  
VISIT DATE: 2021  
MRN: [id number]  
CSN: [id number]  
PCP: Physician No  
  
Chief Complaint  
Patient presents with  
Breast Pain  
  
  
HPI  
  
HPI:  
20-year-old female previously healthy, now with bilateral breast pain 1 month 6 
days   
postpartum. Patient reports uncomplicated delivery and pregnancy. Not breast-
feeding.   
She began to develop the bilateral generalized breast discomfort about a week 
ago.   
Today she noted small amounts of blood leaking from both nipples. She also 
reports   
that she has a history of hyperpigmentation of her skin on her upper arms and   
bilateral thighs for about 5 years, but now has a dark discolored rash on her 
abdomen   
for the past several days as well. Not pruritic. She has never had the rash 
evaluated   
by a physician.  
No recent fever. She did have a  sinus infection  1 week ago, no recent 
antibiotic.   
Otherwise negative review of systems.  
  
Severity: Moderate  
Location: **as above*  
Radiating to: **only as above; otherwise none*  
Exacerbated by: **only as above; otherwise none*  
Relieved by: **only as above; otherwise none* patient has not tried any pain   
medicines yet  
Associated with: **only as above; otherwise none*  
  
Historian(s) deny any other concerns.  
ROS negative except as above.  
  
I have reviewed and agree with the available nursing notes except as otherwise   
reported.  
I have reviewed available medical records.  
  
  
REVIEW OF SYSTEMS:  
Const:  
No fever  
Eyes:  
No vision change  
ENT:  
No remaining congestion  
No sore throat  
CV:  
No CP  
No syncope  
Resp:  
No cough  
No SOB  
GI:  
No Abdo pain  
No nausea  
No vomiting  
No diarrhea  
No constipation  
:  
No dysuria  
No hematuria  
MSK:  
Negative except as noted in HPI  
Skin:  
rash. Not pruritic.  
Neuro:  
No HA  
Hem:  
No bleeding/clotting problems except as per HPI. No nosebleeds or GI bleeding or
  
hemoptysis or easy bruising/bleeding.  
Psych:  
Nl behavior  
  
except as otherwise noted  
  
  
PHYSICAL EXAM:  
Patient Vitals for the past 24 hrs:  
BP Temp Temp src Pulse Resp SpO2 Height Weight  
21 0835 51.9 kg (114 lb 6.7 oz)  
21 0820 134/89 97.7 F (36.5 C) Tympanic 72 (!) 19 99 % 4' 11  49.9 kg (110
lb)  
  
VS Reviewed.  
  
  
Constitutional:  
Non-toxic  
Head:  
Normocephalic  
Atraumatic  
Eyes:  
PERRL  
EOMi  
ENT:  
Mucus membranes moist  
No pharyngeal injection  
Neck:  
Nl ROM  
trachea midline  
Cardiovascular:  
RRR  
Respiratory:  
No resp distress  
Breasts  
Examined with female RN at the bedside assisting.  
Both breasts have a normal symmetric appearance, including the nipples. There is
no   
visible skin abnormality or apparent swelling. The skin is not hot to the touch.
  
Nipples appear normal. There is no active or expressible discharge or bleeding. 
There   
are no skin lesions. There is no palpable peau d'orange feel to the skin, nor 
any   
palpable mass  
Gastrointestinal:  
Non-distended  
Genitourinary:  
  
Back  
Nl inspection  
Upper Extremities:  
Nl inspection  
Lower Extremities:  
  
Neurologic:  
Alert  
answers questions appropriately  
no focal deficits  
Psych:  
Appropriate  
Skin:  
Warm  
Dry  
Nl color  
Coalesced hyperpigmented flat to slightly raised round plaques ranging from 0.5 
to 1   
cm diameter, located on the bilateral upper and lower arms, and the bilateral   
abdomen, extending across the left flank. The abdominal lesions have more of a 
dusky   
gray appearance to them, about the lesions on the arms and forearms have a 
slightly   
erythematous hyperpigmented skin color to them. Lesions are nontender.   
No crepitus or fluctuance or induration or excessive heat.  
  
No acute/emergency findings unless otherwise specified  
  
  
History reviewed. No pertinent past medical history.  
  
History reviewed. No pertinent surgical history.  
  
Family History  
Problem Relation Age of Onset  
Cancer Sister  
Cancer Maternal Grandmother  
Cancer Paternal Grandmother  
  
Social History  
  
Socioeconomic History  
Marital status: Single  
Spouse name: Not on file  
Number of children: Not on file  
Years of education: Not on file  
Highest education level: Not on file  
Occupational History  
Not on file  
Social Needs  
Financial resource strain: Not on file  
Food insecurity  
Worry: Not on file  
Inability: Not on file  
Transportation needs  
Medical: Not on file  
Non-medical: Not on file  
Tobacco Use  
Smoking status: Never Smoker  
Smokeless tobacco: Never Used  
Substance and Sexual Activity  
Alcohol use: Not on file  
Drug use: Never  
Sexual activity: Not on file  
Lifestyle  
Physical activity  
Days per week: Not on file  
Minutes per session: Not on file  
Stress: Not on file  
Relationships  
Social connections  
Talks on phone: Not on file  
Gets together: Not on file  
Attends Church service: Not on file  
Active member of club or organization: Not on file  
Attends meetings of clubs or organizations: Not on file  
Relationship status: Not on file  
Other Topics Concern  
Not on file  
Social History Narrative  
Not on file  
  
No current outpatient medications on file prior to encounter.  
  
  
Allergies  
Allergen Reactions  
Miralax [Polyethylene Glycol 3350] Hives  
Codeine Anxiety  
  
Review of Systems  
  
Patient Vitals for the past 24 hrs:  
BP Temp Temp src Pulse Resp SpO2 Height Weight  
21 0835 51.9 kg (114 lb 6.7 oz)  
21 0820 134/89 97.7 F (36.5 C) Tympanic 72 (!) 19 99 % 4' 11  49.9 kg (110
lb)  
  
Physical Exam  
  
Laboratory & Radiographic Imaging (if done):  
No results found for this visit on 21.  
No orders to display  
  
Procedures  
  
  
  
MDM  
  
Medical Decision Making  
  
DDx (including but not limited to):  
? Breast pain and discharge likely secondary to hormonal changes postpartum. She
will   
need follow-up with her OB/GYN/women's health clinic.  
? Nonspecific rash, acute on chronic. I encouraged patient to follow-up with a   
primary care clinic; she does not currently have one so I am providing contact 
info.   
Primary care will be able to refer her out to a dermatologist to address this   
evolving chronic rash.  
  
No indication of:  
? Mastitis/abscess  
? Breast lump/mass, though an ER examination cannot rule out breast masses and I
  
encouraged patient to follow-up closely with her OB/Gyn clinic for ongoing 
women's   
health care.  
? Systemic illness  
? Bleeding diathesis  
  
  
  
  
Considered appropriately wide DDx.  
  
At this time, acutely dangerous emergency conditions found to be unlikely based 
on   
history, exam, vitals and any testing, except as otherwise specified, and 
patient is   
appropriate for outpatient management. Pt will return to the ED if condition is   
worsening in any way, or if new sx arise.  
  
They understand, are appreciative and comfortable with outpatient plan including
  
follow-up and return ED recommendations as discussed.  
Pt appears nontoxic, well-hydrated and comfortable.  
  
The patient has been informed that they may have pre-hypertension or  
hypertension based on a blood pressure reading in the Emergency  
Department. I recommend that the patient call the primary care provider  
listed on their discharge instructions or a physician of their choice as  
soon as possible to arrange follow-up in the next 4 weeks for further  
evaluation of possible pre-hypertension or hypertension.  
  
.  
  
  
  
Clinical Impression:  
1. Postpartum breast pain  
2. Postpartum bloody nipple discharge  
3. Rash  
  
ED Disposition  
ED Disposition Condition Comment  
Discharge Stable Danna Eichelsderfer discharged to home/self care in stable   
condition.  
  
  
  
  
Follow-up Information  
1. Natali Perez MD.  
Specialty: Obstetrics/Gynecology  
Why: For further evaluation & treatment within 2 days  
[address]  
[facility] [address]  
[phone]  
  
  
  
2. Mireille Rey MD.  
Specialty: Internal Medicine  
Why: To establish primary care within the next week. She will then be  
able to refer you to a dermatologist to evaluate the rash.  
[address] [address]  
[phone]  
  
  
  
  
Contact information for after-discharge care  
Follow-up information has not been specified.  
  
  
New Prescriptions  
  
  
ibuprofen (ADVIL,MOTRIN) 200 MG tablet Take 2 (two) tablets (400 mg total) by 
mouth   
every 8 (eight) hours as needed for pain .  
  
  
  
  
  
Leo Turner MD  
[id number]  
  
  
  
Electronically signed by Leo Turner MD at 2021 8:52 AM   
EDTdocumented in this encounter  
  
  
                         <item><item><item><item><item>  
  
                                                    Privacy Markings (unrecogniz  
ed section and content)  
   
                                                    Section Author: Teresa Ely   
PROHIBITION ON REDISCLOSURE OF CONFIDENTIAL   
INFORMATION   
This notice accompanies a disclosure of information concerning a client made to 
you   
with the consent of such client. This information has been disclosed to you from
  
records protected by federal confidentiality rules (42 C.F.R. Part 2). The 
federal   
rules prohibit you from making any further disclosure of this information unless
  
further disclosure is expressly permitted by the written consent of the person 
to   
whom it pertains or as otherwise permitted by 42 C.F.R. Part 2. A general   
authorization for the release of medical or other information is NOT sufficient 
for   
this purpose.Section Author: Teresa Ely PROHIBITION ON REDISCLOSURE OF 
CONFIDENTIAL   
INFORMATION This notice accompanies a disclosure of information concerning a 
client   
made to you with the consent of such client. This information has been disclosed
to   
you from records protected by federal confidentiality rules (42 C.F.R. Part 2). 
The   
federal rules prohibit you from making any further disclosure of this 
information   
unless further disclosure is expressly permitted by the written consent of the 
person   
to whom it pertains or as otherwise permitted by 42 C.F.R. Part 2. A general   
authorization for the release of medical or other information is NOT sufficient 
for   
this purpose.Section Author: Teresa Ely PROHIBITION ON REDISCLOSURE OF 
CONFIDENTIAL   
INFORMATION This notice accompanies a disclosure of information concerning a 
client   
made to you with the consent of such client. This information has been disclosed
to   
you from records protected by federal confidentiality rules (42 C.F.R. Part 2). 
The   
federal rules prohibit you from making any further disclosure of this 
information   
unless further disclosure is expressly permitted by the written consent of the 
person   
to whom it pertains or as otherwise permitted by 42 C.F.R. Part 2. A general   
authorization for the release of medical or other information is NOT sufficient 
for   
this purpose.Section Author: Teresa Ely PROHIBITION ON REDISCLOSURE OF 
CONFIDENTIAL   
INFORMATION This notice accompanies a disclosure of information concerning a 
client   
made to you with the consent of such client. This information has been disclosed
to   
you from records protected by federal confidentiality rules (42 C.F.R. Part 2). 
The   
federal rules prohibit you from making any further disclosure of this 
information   
unless further disclosure is expressly permitted by the written consent of the 
person   
to whom it pertains or as otherwise permitted by 42 C.F.R. Part 2. A general   
authorization for the release of medical or other information is NOT sufficient 
for   
this purpose.Section Author: Teresa Ely PROHIBITION ON REDISCLOSURE OF 
CONFIDENTIAL   
INFORMATION This notice accompanies a disclosure of information concerning a 
client   
made to you with the consent of such client. This information has been disclosed
to   
you from records protected by federal confidentiality rules (42 C.F.R. Part 2). 
The   
federal rules prohibit you from making any further disclosure of this 
information   
unless further disclosure is expressly permitted by the written consent of the 
person   
to whom it pertains or as otherwise permitted by 42 C.F.R. Part 2. A general   
authorization for the release of medical or other information is NOT sufficient 
for   
this purpose.  
  
  
                                    Scheduled  
  
                                                    Active and Recently Administ  
ered Medications (unrecognized section and content)  
   
                                          
  
  
  
                          Medication Order 02/10/2024   2024   2024  
   
                                                      
  
  
Ampicillin-Sulbactam   
Sodium (UNASYN) 3 g in   
sodium chloride 0.9% (MB   
PLUS) 100 mL (total   
volume) IVPB (COMPLETED)  
3 g, Intravenous,   
Administer over 30   
Minutes, ONCE, 1 dose, On   
Sun 24 at 1930,   
Contains a penicillin.  
                                                    193 ($$New Bag$$ -   
Provider: Marla Biggs RN)  
                                        1049 (Stopped - Provider:   
Janelle Short RN)  
  
   
                                                      
  
  
dicyclomine (BENTYL)   
injection 20 mg   
(COMPLETED)  
20 mg, Intramuscular,   
ONCE, 1 dose, On Sun   
2/11/24 at 1815  
                                                    1841 (Given - Provider:   
Pilar Clifford RN)  
                                          
   
                                                      
  
  
diphenhydrAMINE   
(BENADRYL) injection 12.5   
mg (COMPLETED)  
12.5 mg, Intravenous,   
ONCE, 1 dose, On Sun   
2/11/24 at 1715  
                                                    1710 (Given - Provider:   
Marla Biggs RN)  
                                          
   
                                                      
  
  
Enoxaparin Sodium   
(LOVENOX) injection 40 mg  
40 mg, Subcutaneous,   
DAILY AT BEDTIME, First   
dose on 24 at   
2100, Until Discontinued,   
Indications: DVT/PE   
prophylaxis  
                                                            2100 (Canceled Entry  
 -   
Provider: System Discharge   
- Comment: Automatically   
canceled at discontinue of   
medication order)  
  
   
                                                      
  
  
Ketorolac (TORADOL)   
injection 30 mg   
(COMPLETED)  
30 mg, Intravenous, ONCE,   
1 dose, On Sun 2/11/24 at   
1900  
                                                    1841 (Given - Provider:   
Pilar Clifford RN)  
                                          
   
                                                      
  
  
Magnesium sulfate 2 g/50   
ml in sterile water   
premix IVPB 2 g 50 mL   
(total volume)  
2 g, Intravenous,   
Administer over 4 Hours,   
DAILY, First dose on 24 at 0600, Until   
Discontinued, Give if   
magnesium is less than 2   
on morning labs. Infuse   
at a rate of 0.5 gm/hour.  
                                                              
   
                                                      
  
  
Metoclopramide (REGLAN)   
injection 10 mg   
(COMPLETED)  
10 mg, Intravenous, ONCE,   
1 dose, On Sun 2/11/24 at   
1715  
                                                    1710 (Given - Provider:   
Marla Biggs RN)  
                                          
   
                                                      
  
  
metroNIDAZOLE (FLAGYL)   
500 mg in NaCl premix   
IVPB  
500 mg, Intravenous, at   
200 mL/hr, Administer   
over 30 Minutes, EVERY 8   
HOURS NON-STANDARD, First   
dose on 24 at   
1500, Until Discontinued  
                                                              
   
                                                      
  
  
Ondansetron 4mg/2ml   
(ZOFRAN) injection 4 mg   
(COMPLETED)  
4 mg, Intravenous, ONCE,   
1 dose, On Sun 2/11/24 at   
1715  
                                                    1710 (Given - Provider:   
Marla Biggs RN)  
                                          
   
                                                      
  
  
Pantoprazole (PROTONIX)   
injection 40 mg  
40 mg, Intravenous, EVERY   
12 HOURS, First dose on   
24 at 1230,   
Until Discontinued,   
Dilute each 40 mg vial   
with 10 mL of NS. All   
bolus doses, whether 40   
mg or 80 mg, should be   
administered over at   
least two minutes.,   
Indications: Inpt Stress   
Ulcer Prophylaxis  
                                                            1542 (Given - Provid  
er:   
Janelle Short RN)2100   
(Canceled Entry -   
Provider: System Discharge   
- Comment: Automatically   
canceled at discontinue of   
medication order)  
  
   
                                                      
  
  
Potassium chloride   
(K-DUR) tablet ER 40   
mEq(Linked Group 1)  
40 mEq, Oral, DAILY,   
First dose on 24   
at 0600, Until   
Discontinued, Give if   
potassium is 3.1 to 3.4   
on morning labs and   
patient is able to   
tolerate oral medications   
Swallow tablets whole; do   
not crush, chew, or suck   
on tablet. Tablet may   
also be broken in half   
and each half swallowed   
separately.  
                                                              
   
                                                      
  
  
potassium chloride 40 mEq   
in 0.9% sodium chloride   
500 ml IVPB(Linked Group   
1)  
40 mEq, Intravenous, at   
125 mL/hr, Administer   
over 4 Hours, DAILY,   
First dose on 24   
at 0600, Until   
Discontinued, Give if   
potassium is less than   
3.1 on morning labs OR if   
potassium is 3.1 to 3.4   
on morning labs and   
patient is UNABLE to   
tolerate oral medications  
                                                              
   
                                                      
  
  
potassium chloride 40 mEq   
in 0.9% sodium chloride   
500 ml IVPB (COMPLETED)  
40 mEq, Intravenous, at   
125 mL/hr, Administer   
over 4 Hours, ONCE, 1   
dose, On 24 at   
1300  
                                                            1318 ($$New Bag$$ -   
Provider: Janelle Shotr RN)1730 (Stopped -   
Provider: Janelle Short RN)  
  
   
                                                      
  
  
Sodium chloride 0.9% IV   
solution 1,000 mL   
(COMPLETED)  
1,000 mL, Intravenous,   
ONCE, 1 dose, On Sun   
2/11/24 at 1715  
                                                    1709 ($$New Bag$$ -   
Provider: Marla Biggs RN)1909 (Stopped -   
Provider: Mary Abrams RN)  
                                          
  
                                   Continuous  
  
                          Medication Order 02/10/2024   2024   2024  
   
                                                      
  
  
Dextrose 5% 1,000 mL with   
Sodium bicarbonate 100 mEq   
IV solution  
Intravenous, CONTINUOUS,   
Starting on 24 at   
1200, Until 24 at   
2143  
                                                            1318 ($$New Bag$$ -   
Provider: Janelle Short RN)1743   
(Rate/Dose Verify -   
Provider: Janelle Short RN)  
  
   
                                                      
  
  
Sodium chloride 0.9% IV   
solution (CANCELED)  
Intravenous, at 125 mL/hr,   
CONTINUOUS, Starting on   
Sun 24 at 1945, Until   
Mon 24 at 1037  
                                                    2103 ($$New Bag$$ -   
Provider: Fadia Unger RN)  
                                        0515 ($$New Bag$$ -   
Provider: Fadia Unger RN)0925   
(Rate/Dose Verify -   
Provider: Janelle Short RN)1049   
(Stopped - Provider:   
Janelle Short RN)  
  
  
                                       PRN  
  
                          Medication Order 02/10/2024   2024   2024  
   
                                                      
  
  
Acetaminophen (TYLENOL)   
tablet 650 mg  
650 mg, Oral, EVERY 4 HOURS   
AS NEEDED, Starting on Sun   
24 at 1939, Until 24 at 2143, Mild Pain,   
Oral temp > 100.4 F  
                                                              
   
                                                      
  
  
Metoclopramide (REGLAN)   
injection 10 mg (CANCELED)  
10 mg, Intravenous, EVERY 6   
HOURS AS NEEDED, Starting   
on Sun 24 at 1939,   
Until 24 at 0919,   
Nausea / Vomiting  
                                                            0432 (Given - Provid  
er:   
Fadia Unger RN)  
  
   
                                                      
  
  
Metoclopramide (REGLAN)   
injection 10 mg  
10 mg, Intravenous, EVERY 6   
HOURS AS NEEDED, Starting   
on Mon 24 at 0918,   
Until 24 at 2143,   
Nausea / Vomiting, 2nd line   
N/V  
                                                            1543 (Given - Provid  
er:   
Janelle Short RN)  
  
   
                                                      
  
  
Ondansetron 4mg/2ml   
(ZOFRAN) injection 4 mg  
4 mg, Intravenous, EVERY 4   
HOURS AS NEEDED, Starting   
on Sun 24 at 1939,   
Until 24 at 2143,   
Nausea / Vomiting  
                                                    1950 (Given - Provider:   
Marla Bgigs RN)  
                                        0046 (Given - Provider:   
Fadia Unger RN)0945 (Given -   
Provider: Janelle Short RN)1415   
(Given - Provider: Teresa Brady RN)1827 (Given   
- Provider: Janelle   
Short, RN)  
  
   
                                                      
  
  
Promethazine (PHENERGAN)   
12.5 mg in Sodium chloride   
0.9%, with overfill 60.5 mL   
(total volume) IVPB   
(CANCELED)  
12.5 mg, Intravenous, at   
121-242 mL/hr, Administer   
over 15-30 Minutes, EVERY 4   
HOURS AS NEEDED, Starting   
on Sun 2/11/24 at 2134,   
Until 24 at 0919,   
Nausea / Vomiting, Total   
dose is 25 mg, which will   
be two bags of 12.5mg each   
Extravasation Risk  
                                                    2210 ($$New Bag$$ -   
Provider: Fadia Unger RN)2211 ($$New   
Bag$$ - Provider:   
Fadia Unger, ERNA)  
                                        0742 (Stopped -   
Provider: Janelle Short RN)  
  
   
                                                      
  
  
Promethazine (PHENERGAN)   
12.5 mg in Sodium chloride   
0.9%, with overfill 60.5 mL   
(total volume) IVPB(Linked   
Group 2)  
12.5 mg, Intravenous, at   
121-242 mL/hr, Administer   
over 15-30 Minutes, EVERY 4   
HOURS AS NEEDED, Starting   
on 24 at 0918,   
Until 24 at 2143,   
Refractory Nausea Vomiting,   
Total dose is 25 mg, which   
will be two bags of 12.5mg   
each Extravasation Risk  
                                                              
   
                                                      
  
  
Promethazine (PHENERGAN)   
12.5 mg in Sodium chloride   
0.9%, with overfill 60.5 mL   
(total volume) IVPB(Linked   
Group 2)  
12.5 mg, Intravenous, at   
121-242 mL/hr, Administer   
over 15-30 Minutes, EVERY 4   
HOURS AS NEEDED, Starting   
on 24 at 0918,   
Until 24 at 2143,   
Nausea / Vomiting, Total   
dose is 25mg which is two   
bags of 12.5mg each   
Extravasation Risk  
                                                              
  
                                  Linked Groups  
  
                                                    Order  
   
                                                      
  
  
Group 1:  
  
  
Potassium chloride (K-DUR) tablet ER 40 mEqJump to med  
40 mEq, Oral, DAILY, First dose on 24 at 0600, Until Discontinued, Give
if   
potassium is 3.1 to 3.4 on morning labs and patient is able to tolerate oral   
medications Swallow tablets whole; do not crush, chew, or suck on tablet. Tablet
may   
also be broken in half and each half swallowed separately.  
  
   
                                                      
  
  
Or  
  
  
potassium chloride 40 mEq in 0.9% sodium chloride 500 ml IVPBJump to med  
40 mEq, Intravenous, at 125 mL/hr, Administer over 4 Hours, DAILY, First dose on
24 at 0600, Until Discontinued, Give if potassium is less than 3.1 on 
morning   
labs OR if potassium is 3.1 to 3.4 on morning labs and patient is UNABLE to 
tolerate   
oral medications  
  
   
                                                      
  
  
Group 2:  
  
  
Promethazine (PHENERGAN) 12.5 mg in Sodium chloride 0.9%, with overfill 60.5 mL   
(total volume) IVPBJump to med  
12.5 mg, Intravenous, at 121-242 mL/hr, Administer over 15-30 Minutes, EVERY 4 
HOURS   
AS NEEDED, Starting on 24 at 0918, Until 24 at 2143, 
Refractory   
Nausea Vomiting, Total dose is 25 mg, which will be two bags of 12.5mg each   
Extravasation Risk  
  
   
                                                      
  
  
And  
  
  
Promethazine (PHENERGAN) 12.5 mg in Sodium chloride 0.9%, with overfill 60.5 mL   
(total volume) IVPBJump to med  
12.5 mg, Intravenous, at 121-242 mL/hr, Administer over 15-30 Minutes, EVERY 4 
HOURS   
AS NEEDED, Starting on 24 at 0918, Until 24 at 2143, Nausea /   
Vomiting, Total dose is 25mg which is two bags of 12.5mg each Extravasation Risk  
  
  
                                    Scheduled  
  
                          Medication Order 2024  
   
                                                      
  
  
ketorolac (TORADOL) 15 MG/ML   
injection (COMPLETED)  
15 mg, Intravenous Push, ONCE, 1   
dose, On 24 at 2323  
                                                            2300 (Given - Provid  
er:   
Arsenio Blanco RN)  
  
   
                                                      
  
  
ondansetron (ZOFRAN) 4 MG/2ML   
injection (COMPLETED)  
4 mg, Intravenous Push, STAT, 1   
dose, On 24 at 1925  
                                                            1859 (Given - Provid  
er: Zaira Allen RN)  
  
   
                                                      
  
  
ondansetron (ZOFRAN) 4 MG/2ML   
injection (COMPLETED)  
4 mg, Intravenous Push, STAT, 1   
dose, On 24 at 2323  
                                                            2259 (Given - Provid  
er:   
Arsenio Blanco RN)  
  
  
                                    Scheduled  
  
                          Medication Order 2024  
   
                                                      
  
  
prochlorperazine (Compazine)   
injection 5 mg (COMPLETED)  
5 mg, intravenous, Once, On 24 at , For 1 dose  
                                                             (Given - Provid  
er:   
Nicolas Ro RN)  
  
   
                                                      
  
  
sodium chloride 0.9 % bolus   
1,000 mL (COMPLETED)  
1,000 mL, intravenous, at 1,000   
mL/hr, Administer over 1 Hours,   
Once, On 24 at ,   
For 1 dose  
                                                             (New Bag - Prov  
ider:   
Nicolas Ro RN) (Stopped   
- Provider: Tegan Lee RN)  
  
  
                                    Scheduled  
  
                          Medication Order 2024  
   
                                                      
  
  
diphenhydrAMINE (BENADRYL)   
injection 25 mg (COMPLETED)  
25 mg, Intravenous, ONCE, 1 dose,   
On 24 at 1830  
                                                            1802 (Given - Provid  
er: Leia Lima RN)  
  
   
                                                      
  
  
Metoclopramide (REGLAN) injection   
10 mg (COMPLETED)  
10 mg, Intravenous, ONCE, 1 dose,   
On 24 at 1800  
                                                            1802 (Given - Provid  
er: Leia Lima RN)  
  
   
                                                      
  
  
Nitrofurantoin   
(macrocrystal-monohydrate)   
(MACROBID) capsule 100 mg   
(COMPLETED)  
100 mg, Oral, ONCE, 1 dose, On   
24 at 2030, Administer   
with food  
                                                             (Given - Provid  
er: Mario Barkley RN)  
  
   
                                                      
  
  
potassium bicarbonate (EFFER-K)   
effervescent tablet 50 mEq   
(COMPLETED)  
50 mEq, Oral, ONCE, 1 dose, On   
24 at 2030  
                                                             (Given - Provid  
er: Mario Barkley RN)  
  
   
                                                      
  
  
Sodium chloride 0.9% IV solution   
1,000 mL (COMPLETED)  
1,000 mL, Intravenous, ONCE, 1   
dose, On 24 at 1830  
                                                            1948 ($$New Bag$$ -   
Provider:   
Mario Barkley RN)   
(Stopped - Provider: Mario Barkley RN)  
  
   
                                                      
  
  
Sodium chloride 0.9% IV solution   
1,000 mL (COMPLETED)  
1,000 mL, Intravenous, Administer   
over 60 Minutes, ONCE, 1 dose, On   
24 at 1830  
                                                            1802 ($$New Bag$$ -   
Provider:   
Leia Lima, ERNA)1952 (Stopped   
- Provider: Mario Barkley RN)  
  
  
  
  
  
  
                                                    Care Teams (unrecognized sec  
tion and content)  
   
                                          
  
  
  
                      Team Member Relationship Specialty  Start Date End Date  
   
                                                      
  
  
Generic Provider, No Assigned Pcp, MD  
  
  
[address]  
  
  
[place] [address] PCP - General                   24            
  
  
FOR RECORDS PERTAINING TO PATIENTS WHO ARE OR HAVE BEEN ENROLLED IN A CHEMICAL 
DEPENDENCY/SUBSTANCEABUSE PROGRAM, SOME INFORMATION MAY BE OMITTED. This 
clinical summary was aggregated from multiple sources. Caution should be 
exercised in using it in the provision of clinical care. This summary normalizes
information from multiple sources, and as a consequence, information in this 
document may materially change the coding, format and clinical context of 
patient data. In addition, data may be omitted in some cases. CLINICAL DECISIONS
SHOULD BE BASED ON THE PRIMARY CLINICAL RECORDS. [employer] [facility]. provides 
no warranty or guarantee of the accuracy or completeness of information in this 
document.

## 2025-02-07 NOTE — PCM.HP.OB
HPI - General
General
Date of Admission: 25
HPI Narrative
DANNA SOARES, is a 23 y/o  @ 39 weeks 0 days who presents to L&D with rupture of membranes since 11pm and frequent contractions. She was found to be 5 cm at 3:30 this am upon arrival and requesting an epidural. 


Maternal Data
Information
ASHELY Calculator
 Estimated Delivery Date Method Current WG
Current Estimate 25 Ultrasound #1 39w 0d
Other Estimates 25 LMP (Uncertain) 35w 1d


PFSH
[facility]
Medical History (Reviewed 25 @ 11:23 by Miley Graves)

OCD (obsessive compulsive disorder)
ADD (attention deficit disorder)
Depression with anxiety
bipolar


Home Medications

?Medication ?Instructions ?Recorded ?Last Taken ?Type
docosahexaenoic acid 200 mg 200 mg PO DAILY pregnancy 24 08:00 History
capsule (Prenatal DHA)   200 mg 
blood sugar diagnostic (Blood #120 ea 24 Unknown Rx
Glucose Test strips)    
blood-glucose meter #1 ea 24 Unknown Rx
lancets #200 ea 24 Unknown Rx




Allergy/AdvReac Type Severity Reaction Status Date / Time
promethazine (From Phenergan) Allergy Severe Other Verified 25 04:14
codeine Allergy  HALLUCINATI Verified 25 04:14
   ONS  
polyethylene glycol 3350 AdvReac  Hives Verified 25 04:14
(From Miralax)     


Family History (Reviewed 25 @ 11:23 by Miley Graves)
Other
 Colon cancer
 Hypertension
 Lung cancer
 Ovarian cancer
 Schizophrenia
 Uterine cancer
 bipolar



Surgical History (Reviewed 25 @ 11:23 by Miley Graves)

S/P tonsillectomy
s/p finger surgery
S/P appendectomy


Social History (Reviewed 25 @ 11:23 by Miley Graves)
adopted:  No 
household members:  family 
housing:  house 
number of children:  1 
current occupational status:  employed 
current occupation:  dancer 
current occupational exposures/hazards:  No 
pets and animals:  Yes pets and animals: cat(s) and dog(s) 
history of recent travel:  No 
sexually active:  Yes 
Smoking Status:  Never smoker 
alcohol intake:  never 
substance use type:  marijuana 
caffeine:  No 
what type of physical activity do you participate in:  aerobics 
frequency:  5-6 times per week 
seatbelt use:  never 
do you feel safe at home:  Yes 
additional social history:  Boyfriend- Alexander 



Pregnancy History

        4            Elective abortions        
Hx Para        1            Spontaneous abortions        2
Hx # Term Pregnancies                    Ectopic pregnancies        
Hx #  Pregnancies                    Multiple births        
                    # of living children        1


Past Pregnancies
Del. Date Name GA/Weeks Outcome Route Bth Weight Infant Gen Labor Lgth Anesthesia Del Locatn Provider FOB
21 Ani 39 live birth - full term  7lbs 7oz Female  epidural [facility] 

Delivery Date: 21  Last Updated by: Ese Garrido
      IoL chronic decreased FM


Visit Details

Expected Delivery Route/Plan

Labor Preferences-
CB/BF classes: no
labor support person: Marcela Munoz
labor intervention preferences: []
pain management options preferred: epidural
cut cord/dad catch: Marcela cut cord
breastfeeding: bottle
PP birth control planned: discussed
discussed possible routes of delivery and associated risks: []
special requests: []

Plans
Covid status: []
Flu vaccine: declines
Tdap vaccine: []
Rhogam: NA
LARC form signed: yes
Problem list reviewed and updated with the most current plan of care details and appropriate orders placed.  
Relevant counseling for the gestational age provided.
Continue routine prenatal care and follow up unless otherwise noted in visit notes/problem list details


OB Flowsheet
Initial Weight: Not Recorded

Date
<del>????????????</del>
EGA Weight BP Urine Prot
<del>????????????</del>
Glucose FHR FuHt Pres Dilation
<del>????????????</del>
Effaced Fetal St Visit Note
24
<del>????????????</del>
13w 0d 107 lb 4 oz 105/69 
<del>????????????</del>
 153   
<del>????????????</del>
  KW- CRL 61mm. GA 13.0 weeks. Accepts NIPT and carrier
KW- CRL 61mm. GA 13.0 weeks. Accepts NIPT and carrier. encouraged to stop smoking 
24
<del>????????????</del>
17w 3d 107 lb 109/67 
<del>????????????</del>
 145   
<del>????????????</del>
  JV- no cramping, spotting, or complaints. has anatomy ultrasound ordered for . did not do NIPT. 
10/16/24
<del>????????????</del>
22w 5d 117 lb 8 oz 111/67 
<del>????????????</del>
 153   
<del>????????????</del>
  JV- no lof, vaginal bleeding, or dec fm. declines flu vaccine. needs new ob labs still and will do that today. 
24
<del>????????????</del>
26w 5d 127 lb 2 oz 110/70 Negative 
<del>????????????</del>
Negative  134 26  
<del>????????????</del>
  MH-No VB, LOF. Good Fm. 3rd trim labs pending
24
<del>????????????</del>
28w 4d 127 lb 4 oz 128/75 
<del>????????????</del>
 140 28  
<del>????????????</del>
  SM- checking sugars most WNL= will continue to check 
24
<del>????????????</del>
30w 5d 132 lb 8 oz 110/60 Negative 
<del>????????????</del>
Negative  147 30  
<del>????????????</del>
  MH-No VB, LOF. Good FM. Reviewed glucose/stable
25
<del>????????????</del>
34w 3d 140 lb 4 oz 110/68 Negative 
<del>????????????</del>
Negative  134 33  
<del>????????????</del>
  MH-No VB, lof. Good FM.  Just checked glucose 7 days. abnormals noted.  Reviewed testing times, food options renata lunch when always high.  Consult dietitian. Tox screen
25
<del>????????????</del>
35w 6d 141 lb 8 oz 114/75 Negative 
<del>????????????</del>
Negative  140 35  
<del>????????????</del>
  SM- met with nutritionist, some elevated BS, overall WNL, no vb lof good fm nor egular ctx
25
<del>????????????</del>
36w 4d 145 lb 4 oz 120/76 
<del>????????????</del>
 144 36 Cephalic 1
<del>????????????</del>
30 -3 JV- gbs collected. no complaints. glucose log reviewed and normal. only a couple of 2 hr pp over 120 (122 or 121). growth scan to be done soon. 
25
<del>????????????</del>
38w 0d 144 lb 110/69 Negative 
<del>????????????</del>
Negative  140 38  3
<del>????????????</del>
60 -2 LC- 3625g EFW on todays growth scan. mostly normal pp(121-122 if over), all normal fasting. 39 week IOL set up.
25
<del>????????????</del>
38w 5d 144 lb 114/69 Negative 
<del>????????????</del>
Negative  140 38 Cephalic 3
<del>????????????</del>
70 -2 SM- no vb lof good fm no regular ctx membranes swept IOL friday


ROS
Constitutional
Constitutional: Denies change in weight, fatigue, fever(s), headache(s), poor appetite or weakness
Eyes
Eyes: Denies blurry vision, change in vision, seeing flashes or spots in vision
ENT
HEENT: Denies dizziness, headache(s), loss taste/smell or sore throat
Cardiovascular
Cardiovascular: Denies chest pain, dizziness, dyspnea, irregular heart rhythm, leg edema, palpitations, rapid heart rate or vomiting
Respiratory/Chest
Respiratory/Chest: Denies chest tightness, cough, dyspnea or breast pain
Gastrointestinal
Gastrointestinal: Denies abdominal pain, anorexia, constipation, cramping, diarrhea, hemorrhoids, vomiting or weight changes
Genitourinary
Genitourinary: Denies dysuria, flank pain, genital lesions, genital pain, urinary frequency or urinary urgency
Musculoskeletal
Musculoskeletal: Denies back pain, difficulty walking, joint pain, limited range of motion, muscle cramps or numbness
Integumentary
Integumentary: Denies lesions or unusual bruising
Neurologic
Neurologic: Denies abnormal movements, abnormal speech, dizziness, numbness, seizure-like activity or syncope
Psychiatric
Psychiatric: Denies anxiety, behavioral changes, change in appetite, change in libido, cognitive impairment, confusion, depression, difficulty concentrating, hallucinations or suicidal thoughts
Endocrine
Endocrinology: Denies excessive sweating, polydipsia or polyuria
Hematologic/Lymphatic
Hematologic/Lymphatic: Denies easy bleeding, easy bruising or lymphadenopathy
Allergic/Immunologic
Allergic/Immunologic: Denies itchy eyes, lip swelling, seasonal rhinorrhea, rhinitis, throat swelling, tongue swelling, eczemia, wheezing or asthma

Vital Signs
Vital Signs
Vital Signs: 


 25
04:06 25
04:06 25
04:06
Temperature   98.6 F
Temperature Source Temporal  
Pulse Rate   
Respiratory Rate  18 
Blood Pressure   
BP Systolic   
BP Diastolic   
Pulse Ox   

 25
04:07 25
04:07 25
04:07
Temperature   
Temperature Source   
Pulse Rate  108 H 
Respiratory Rate   
Blood Pressure 111/69  
BP Systolic 111  
BP Diastolic 69  
Pulse Ox   100

 25
04:20 25
04:20 25
05:03
Temperature   
Temperature Source   
Pulse Rate 91  
Respiratory Rate   
Blood Pressure   117/69
BP Systolic   117
BP Diastolic   69
Pulse Ox  99 

 25
05:03 25
05:04 25
05:04
Temperature   
Temperature Source   
Pulse Rate 115 H 105 H 
Respiratory Rate   
Blood Pressure   
BP Systolic   
BP Diastolic   
Pulse Ox   100

 25
05:04 25
05:04 25
05:08
Temperature   
Temperature Source   
Pulse Rate 87  
Respiratory Rate   
Blood Pressure   117/73
BP Systolic   117
BP Diastolic   73
Pulse Ox  92 

 25
05:08 25
05:09 25
05:09
Temperature   
Temperature Source   
Pulse Rate 109 H 100 
Respiratory Rate   
Blood Pressure   
BP Systolic   
BP Diastolic   
Pulse Ox   99

 25
05:13 25
05:13 25
05:14
Temperature   
Temperature Source   
Pulse Rate 90  
Respiratory Rate   
Blood Pressure   121/69 H
BP Systolic   121
BP Diastolic   69
Pulse Ox  83 

 25
05:14 25
05:14 25
05:18
Temperature   
Temperature Source   
Pulse Rate 104 H  90
Respiratory Rate   
Blood Pressure   
BP Systolic   
BP Diastolic   
Pulse Ox  100 

 25
05:18 25
05:19 25
05:19
Temperature   
Temperature Source   
Pulse Rate  97 
Respiratory Rate   
Blood Pressure   
BP Systolic   
BP Diastolic   
Pulse Ox 93  100

 25
05:19 25
05:19 25
05:23
Temperature   
Temperature Source   
Pulse Rate  106 H 
Respiratory Rate   
Blood Pressure 117/62  108/58 L
BP Systolic 117  108
BP Diastolic 62  58
Pulse Ox   

 25
05:23 25
05:24 25
05:24
Temperature   
Temperature Source   
Pulse Rate 112 H 103 H 
Respiratory Rate   
Blood Pressure   
BP Systolic   
BP Diastolic   
Pulse Ox   97

 25
05:29 25
05:29 25
05:29
Temperature   
Temperature Source   
Pulse Rate  100 
Respiratory Rate   
Blood Pressure 110/66  
BP Systolic 110  
BP Diastolic 66  
Pulse Ox   100

 25
05:30 25
05:33 25
05:33
Temperature   
Temperature Source   
Pulse Rate   105 H
Respiratory Rate 16  
Blood Pressure  107/65 
BP Systolic  107 
BP Diastolic  65 
Pulse Ox   

 25
05:34 25
05:34 25
05:35
Temperature   
Temperature Source   
Pulse Rate 99  
Respiratory Rate   16
Blood Pressure   
BP Systolic   
BP Diastolic   
Pulse Ox  97 

 25
05:38 25
05:38 25
05:39
Temperature   
Temperature Source   
Pulse Rate  99 99
Respiratory Rate   
Blood Pressure 122/75 H  
BP Systolic 122  
BP Diastolic 75  
Pulse Ox   

 25
05:39 25
05:40 25
05:40
Temperature   
Temperature Source   
Pulse Rate   
Respiratory Rate  16 16
Blood Pressure   
BP Systolic   
BP Diastolic   
Pulse Ox 100  

 25
05:43 25
05:43 25
05:43
Temperature   
Temperature Source   
Pulse Rate  100 
Respiratory Rate   
Blood Pressure 123/75 H  
BP Systolic 123  
BP Diastolic 75  
Pulse Ox   98

 25
05:45 25
05:48 25
05:48
Temperature   
Temperature Source   
Pulse Rate   112 H
Respiratory Rate 16  
Blood Pressure  119/68 
BP Systolic  119 
BP Diastolic  68 
Pulse Ox   

 25
05:49 25
05:49 25
05:50
Temperature   
Temperature Source   
Pulse Rate 91  
Respiratory Rate   16
Blood Pressure   
BP Systolic   
BP Diastolic   
Pulse Ox  100 

 25
05:53 25
05:53 25
05:54
Temperature   
Temperature Source   
Pulse Rate  103 H 105 H
Respiratory Rate   
Blood Pressure 120/76  
BP Systolic 120  
BP Diastolic 76  
Pulse Ox   

 25
05:54 25
05:55 25
05:58
Temperature   
Temperature Source   
Pulse Rate   
Respiratory Rate  16 
Blood Pressure   120/78
BP Systolic   120
BP Diastolic   78
Pulse Ox 98  

 25
05:58 25
05:59 25
05:59
Temperature   
Temperature Source   
Pulse Rate 104 H 101 H 
Respiratory Rate   
Blood Pressure   
BP Systolic   
BP Diastolic   
Pulse Ox   100

 25
06:00
Temperature 
Temperature Source 
Pulse Rate 
Respiratory Rate 16
Blood Pressure 
BP Systolic 
BP Diastolic 
Pulse Ox 

Weight

Weight:                        145 lb 3.2 oz                                     
Body Mass Index (BMI)          29.3                                              




Physical Exam
Const
alert, oriented x3, no apparent distress and healthy appearing
General Appearance: cooperative; Negative for anxious
HEENT
normocephalic
Face and Sinus: normal facial exam
Eyes
EOMs intact bilaterally and no scleral icterus
General Eye: normal appearance of both eyes
Neck
full ROM and supple
Lymph
Lymphatic: no lymphadenopathy noted
Chest
Chest: abnormal inspection of the chest
Resp
normal respiratory effort
Effort and Inspection: able to speak in complete sentences
Cardio
regular rate
GI
soft to palpation and non-tender
Inspection: gravid
Palpation: soft; Negative for tender

external exam normal
Amniotic Fluid: ROM+plus
Back/Spine
no CVA tenderness
Extremity
normal to inspection, full ROM and no clubbing, cyanosis or edema
General Extremity: Negative for calf tenderness or edema
Skin
Lesions: no lesions
Rashes: no rashes
Psych
mental status grossly normal

Prenatal Labs
Prenatal Labs
Prenatal Labs: 
      Blood Type O POSITIVE 
      Antibody Screen NEGATIVE 
      Hct 34.1 % (37-47)  L
      Hgb 11.1 g/dL (12.0-15.0)  L
      Obstetrics Ultrasound 
      Syphilis Total Ab Non-reactive 
      Rubella IgG Antibody Reactive  (Nonreactive) 
      Hep Bs Antigen Negative  (Negative) 
      Hepatitis C Antibody Non-Reactive  (Nonreactive) 
      Hepatitis C Ab (EIA)  
      Chlamydia DNA (KIRSTEN) Negative  (Negative) 
      N.gonorrhoeae DNA (KIRSTEN) Negative  (Negative) 
      HIV 1&2 Antibody Non Reactive  (Non Reactive) 
      Glucose 1 Hr 50 gm 162 mg/dL ()  H
      Rhogam given: No


Assessment & Plan
(1) Gestational diabetes: 
QUALIFIERS:               Gestational diabetes mellitus control: diet-controlled  Trimester: third trimester  Qualified Code(s): O24.410 - Gestational diabetes mellitus in pregnancy, diet controlled
COMMENT:       Does not check often or correct times. Elevated readings noted over a 7 day course of readings.  Continue to check QID. Consult dietitian. 36 wk growth US
(2) Abnormal glucose affecting pregnancy: 
COMMENT:       failed 1 hour and threw up 3 hour, tracking sugars at home. <95 fasting. 2hr around 110. Enc to bring readings in
(3) History of recurrent miscarriages: 
(4) Marijuana abuse: 
COMMENT:       random tox.  +25- patient quit!
(5) Scoliosis: 
COMMENT:       Does report causes occasional pain.
(6) Bipolar disorder: 
QUALIFIERS:               Active/Remission status: remission status unspecified  Qualified Code(s): F31.9 - Bipolar disorder, unspecified
COMMENT:       Not on meds.
(7) Supervision of high risk pregnancy, antepartum: 
COMMENT:       PRR  ASHELY 26 girl Irma Finn (sister-8 years old-has custody) , BF Alexander
(8) Pregnancy: 
QUALIFIERS:               Weeks of gestation: 38 weeks  Qualified Code(s): Z3A.38 - 38 weeks gestation of pregnancy
COMMENT:       GBS neg, nl anatomy, nl growth
PLAN: 
Plan
Patient presents IAL, plan expectant management for , pitocin  PRN - however cx is already 9 cm 
Pain management: has epidural.    
GBS negative .
Management of any pregnancy complications: see above 
I have reviewed the [facility] and made any clinically relevant updates.

## 2025-02-08 VITALS
DIASTOLIC BLOOD PRESSURE: 69 MMHG | SYSTOLIC BLOOD PRESSURE: 111 MMHG | RESPIRATION RATE: 16 BRPM | TEMPERATURE: 97.34 F | HEART RATE: 97 BPM

## 2025-02-08 VITALS
TEMPERATURE: 97.52 F | HEART RATE: 84 BPM | DIASTOLIC BLOOD PRESSURE: 69 MMHG | OXYGEN SATURATION: 97 % | SYSTOLIC BLOOD PRESSURE: 102 MMHG | RESPIRATION RATE: 16 BRPM

## 2025-02-08 VITALS — RESPIRATION RATE: 16 BRPM | TEMPERATURE: 97.52 F

## 2025-02-08 VITALS — SYSTOLIC BLOOD PRESSURE: 98 MMHG | HEART RATE: 93 BPM | DIASTOLIC BLOOD PRESSURE: 63 MMHG

## 2025-02-08 VITALS — DIASTOLIC BLOOD PRESSURE: 69 MMHG | OXYGEN SATURATION: 98 % | SYSTOLIC BLOOD PRESSURE: 102 MMHG | HEART RATE: 76 BPM

## 2025-02-08 NOTE — PCM.PN.OB
Subjective
Subjective
Patient doing well without complaints. Tolerating PO. Ambulating and voiding without difficulty. Feeding well. Denies chest pain, shortness of breath, calf pain/swelling, fevers, chills, lightheadedness.

Objective Data
Objective Data
Vital Signs: 
Vital Signs

Temp Pulse Resp BP Pulse Ox O2 Del Method
 97.5 F L  84   16   102/69   97   Room Air 
 25 08:00  25 08:00  25 08:00  25 08:00  25 08:00  25 08:00



Oxygen Delivery Method         Room Air                                         
Weight:                        145 lb 3.2 oz                                    
Body Mass Index (BMI)          29.3                                             


Intake & Output: 
Intake and Output for Last 24 Hours

 25
 23:59 23:59 23:59
Intake Total  1665.67 / 1665.67 
Output Total  200 / 200 
Balance  1465.67 / 1465.67 


Lab / Micro Data

25 04:15          

Labs: 
Laboratory Results - last 24 hr

25 04:15: Diff Path Review Reviewed
25 09:19: POC Glucose 127 H
25 12:37: POC Glucose 158 H
25 16:00: POC Glucose 90
25 20:21: POC Glucose 115 H
25 06:10: POC Glucose 83



ROS
Constitutional
Constitutional: Denies chills, fatigue, fever(s), poor appetite or weakness
Eyes
Eyes: Denies blurry vision, change in vision, seeing flashes or spots in vision
ENT
HEENT: Denies dizziness, headache(s), loss taste/smell or sore throat
Cardiovascular
Cardiovascular: Denies chest pain, dizziness, dyspnea, irregular heart rhythm, palpitations or rapid heart rate
Respiratory/Chest
Respiratory/Chest: Denies chest tightness, cough, dyspnea or breast pain
Gastrointestinal
Gastrointestinal: Denies abdominal pain, constipation or vomiting
Genitourinary
Genitourinary: Denies dysuria or flank pain
Musculoskeletal
Musculoskeletal: Denies difficulty walking, joint pain, limited range of motion or numbness
Neurologic
Neurologic: Denies abnormal movements, abnormal speech, dizziness, numbness, seizure-like activity or syncope
Psychiatric
Psychiatric: Denies anxiety, behavioral changes, change in appetite, confusion, depression or suicidal thoughts

Physical Exam
Const
alert, oriented x3 and no apparent distress
General Appearance: cooperative and comfortable
Resp
normal respiratory effort
Cardio
regular rate
GI
normal to inspection, nondistended, normoactive bowel sounds
GI Narrative: 
uterus is firm below umbilicus 
Palpation: soft
Back/Spine
no CVA tenderness and thoraco-lumbar ROM normal
Extremity
normal to inspection, no clubbing, cyanosis or edema, no calf tenderness and no pedal edema
Psych
mental status grossly normal, thought process normal, cooperative, affect normal, speech normal, activity/motor behavior normal, denies homicidal ideation and denies suicidal ideation

Assessment & Plan
(1) Vaginal delivery: 
COMMENT:       NISSA Barrera
PLAN: 
Plan
s/p  PPD # 1
1. routine post delivery care
2. breast feeding- support given but wants to bottle feed 
3. rh positive
4. rubella immune
5. ok to dc to home today

## 2025-02-10 NOTE — OB.TRI.PN_ITS
Progress Notes    
Date of Service: 25    
Progress Note:     
    
Patient presents for triage evaluation secondary to contractions possible ROM    
FHT: 135 Moderate variability reactive no decelerations category I tracing    
Hyden:  irregular Contractions    
Assessment and plan: false labor amnionitc membranes intact 38 weeks pregnancy   
Reactive NST, reassuring maternal and fetal status patient discharged to home to  
follow-up as scheduled. no cerivcla change rom plus negative.  See problem list   
details for additional plan information.    
Laboratory Studies:     
    
                                Laboratory Tests    
    
    
    
  25 Range/Units    
    
  16:45     
     
Vag Amniotic Fld Detect  Negative  (Negative)      
    
    
    
    
    
Charges/Coding    
Procedures Urinary/Genital    
[id number]: [id number] Fetal non-stress test Interp    
    
Assessment & Plan    
(1) Supervision of high risk pregnancy, antepartum:     
COMMENT:       PRR  ASHELY 26 girl Irma Finn (sister-8  
years old-has custody) , BF Alexander    
(2) Pregnancy:     
QUALIFIERS:               Weeks of gestation: 38 weeks  Qualified Code(s):   
Z3A.38 - 38 weeks gestation of pregnancy    
COMMENT:       GBS neg, nl anatomy, nl growth

## 2025-02-10 NOTE — CASEMGMT
Social Work Assessment
Labor and Delivery Unit

Patient Address: [address] [place] [address]. [address], [place] [id number]
Phone number: [phone]

Date of Referral: 25
Time of Referral:? 421
Referred By: Dr. Silverio Reyes
Date of Intervention: ?25?
Time of Intervention:? 1430

Reason for Referral:?  maternal mother is an addict 

Sw completed chart review and acknowledges social work consult due to concerns regarding family members use of substances. Sw presented to bedside and introduced self to mother of baby (CIARA- Bacilio) and father of baby (CARLOZ- Alexander Dawson). Also 
present was CIARA's best friend, Marcela. CIARA stated it was okay to complete assessment with her friend present. Sw explained reason for sw involvement and completed psychosocial assessment. 

History obtained from: medical records, MOB and CARLOZ. ? 

Household composition: Currently residing in the family home is CARLOZ URBINA, their 4 year old daughter- Ani, and CIARA's 9 year old sister that she has custody of- Mariajose. CARLOZ reports that he has two older children, a 13 and 9 year old- however he does 
not have contact with them. Parents deny any problems or concerns with their housing, stating that it is safe and secure. 

Patient's parent/guardian status:?CIARA states that she and CARLOZ have been together for 8 years after meeting each other on [facility]. No concerns reported regarding domestic violence or intimate partner violence.  ?

Medical History: ?CIARA is 24 year old  female who si  4, para 1- now 2 following labor and delivery of . CIARA received routine prenatal care during pregnancy with [place]. CIARA presented to hospital and delivered baby via 
vaginal delivery at 39 weeks gestation on 25. baby girl, named Ivonne Ott, was born weighing 8lb 1oz and had apgars of 8 and 9 at one and five minutes of life, respectfully. CIARA is bottle feeding and states that she is still deciding 
on a pediatrician for baby. 

Educational Status:? CIARA states that she completed school through 8th grade, and CARLOZ graduated and has some trade school education. Parents deny problems with reading, learning or comprehension. CIARA states that she stopped going to school after 8th 
grade so that she could help take care of her sister whom she later obtained custody of. 

Financial Status: CARLOZ is gainfully employed outside of the home working as a subcontractor. 

Infant Supplies: All necessary baby supplies obtained, including: car seat, safe sleep space, clothes, diapers and wipes. 

Childcare/Caregiver(s):? CIARA will be the primary caregiver to baby, along with FOROSALINDA when he is not working. 

Transportation:?? Neither parent has their drivers license or reliable means of transportation. MOB states that her best friend or family help her get to scheduled appointments. FOB states that his boss helps him get to and from work.

Programs/Agencies Involved: ???CIARA is connected to insurance through [employer] and [employer]. MOB states that she wants to get connected to [employer], but has not yet done so.l

Children Services/Legal Issues:??? Parents deny involvement with children services. Rukhsana explained that rukhsana will be making a referral to [facility] due to MOB use of THC during pregnancy. MOB expressed understanding. 
- Rukhsana called [facility] and spoke to hotline screenerVijaya. 

Behavioral Health Issues: ??Mental Health History:?FOROSALINDA denies mental health history. MOB states that she has been diagnosed with OCD, ADD, Bipolar, Depression and Anxiety. MOB states that she is not prescribed medications to help her manage her 
mental health symptoms. MOB states that she uses THC to help her cope with any symptoms that she may be experiencing. MOB states that she tries to do things without medication. MOB states that she has a lot of coping skills that she uses as well: 
wlking, being outside/ in the sunshine, listening to music, deep breathing. ?? Substance Use History:??MOB admits to THC use during pregnancy. MOB states that she would smoke a couple of times a week to help her mental health, or to help her nausea. 
MOB states that she does not smoke in the home, but in a garage or bathroom that is not around the children in the home. MOB states that when she does smoke she ensures that there is another adult/ caregiver present.  Family History:?MOB states that 
her mom has significant mental health history including: schizophrenia and Bipolar MOB states that her mom also has significant substance use that includes: pills, heroin and alcohol. MOB states that these concerns is what led to her losing custody 
of her sister. ???? Drug Screens: CIARA has + drug screen for marijuana in 2025. MOB drug screen negative for THC at time of delivery, and baby urine was negative, meconium still pending. 

Family/Social Stressors:? Parents deny any issues, concerns or stressors at this time. 

Support Systems: CIARA states that CARLOZ, Marcela and her grandma are her biggest supports. 

Postpartum Depression/Shaken Baby/Safe Sleeping: Sw educated parents on signs and symptoms of baby blues and postpartum depression/ anxiety to be mindful of during this postpartum period. MOB talkative and engaging during assessment. MOB states that 
she did not struggle with any baby blues or postpartum symptoms after her daughter was born. MOB states that she is hopeful that she does not struggle with time either. MOB states that she and FOROSALINDA have been together for a while and he would be able 
to recognize if she was struggling with her mental health. Sw encouraged parents to have a conversation about ways that FOROSALINDA could be supportive if MOB were to struggle. FOB expressed understanding. Sw educated parents on shaken baby prevention and 
ABCs of safe sleep, parents expressed understanding. 

ASSESSMENT: MOB and baby admitted following labor and delivery of . MOB with mental health history and is not connected to any mental health services or supports. MOB states that if she were to struggle with her mental health and would 
experience any concerns she may consider getting connected to a counselor. Sw provided CIARA with list of local counseling agencies that she could use. CIARA has a lot on her plate at home as she has chosen to home school her 4 year old and is also the 
primary caregiver to her younger sister who has several medical diagnoses that impact her cognitive ability. ? CIARA reports to having her own supports in place, and manages things at home by prioritizing and using healthy coping skills. MOB expressed 
understanding for the requirement of sw to make referral to Children Services due to her use of THC during pregnancy. MOB states that use was to help her manage her mental health and also nausea whenever it would present itself. MOB observed to have 
supports from FOB and her friend Marcela. Both individuals appropriate at bedside and interactive throughout completion of assessment. 

Safe Plan of Care for infant related to substance use:? CIARA states that she still plans on continuing to use THC on a needed basis, which she states is usually a couple of times a week. MOB states that she never uses in front of her other children, 
ensures that another adult is always around and changes her clothes, washes her hands. MOB encouraged to not be under the influence when being the primary caregiver to baby or her other two children in the home. MOB also educated on how THC stays in 
breast milk and was discouraged from smoking and breast feeding, MOB expressed understanding and reports that is why she is bottle feeding. 

PLAN:?? No other services requested or indicated. MOB and baby to be discharged when medically ready. Parents were provided literature regarding: signs and symptoms of baby blues and  mood and anxiety disorders, Help Me Grow, shaken baby 
prevention, ABCs of safe sleep and a list of Psychiatric hospital resources that are available for them should any needs present themselves. 

Mira Lee, MSW, LSW

## 2025-02-10 NOTE — OB.TRI.PN
Progress Notes
Date of Service: 25
Progress Note: 

Patient presents for triage evaluation secondary to contractions possible ROM
FHT: 135 Moderate variability reactive no decelerations category I tracing
[place]:  irregular Contractions
Assessment and plan: false labor amnionitc membranes intact 38 weeks pregnancy Reactive NST, reassuring maternal and fetal status patient discharged to home to follow-up as scheduled. no cerivcla change rom plus negative.  See problem list details 
for additional plan information.
Laboratory Studies: 

Laboratory Tests

  25 Range/Units
  16:45 
Vag Amniotic Fld Detect  Negative  (Negative)  




Charges/Coding
Procedures Urinary/Genital
52xxx-59xxx: [id number] Fetal non-stress test Interp

Assessment & Plan
(1) Supervision of high risk pregnancy, antepartum: 
COMMENT:       PRR  ASHELY 25 girl Irma Finn (sister-8 years old-has custody) , BF Alexander
(2) Pregnancy: 
QUALIFIERS:               Weeks of gestation: 38 weeks  Qualified Code(s): Z3A.38 - 38 weeks gestation of pregnancy
COMMENT:       GBS neg, nl anatomy, nl growth